# Patient Record
Sex: MALE | Race: WHITE | NOT HISPANIC OR LATINO
[De-identification: names, ages, dates, MRNs, and addresses within clinical notes are randomized per-mention and may not be internally consistent; named-entity substitution may affect disease eponyms.]

---

## 2017-04-03 ENCOUNTER — APPOINTMENT (OUTPATIENT)
Dept: FAMILY MEDICINE | Facility: CLINIC | Age: 52
End: 2017-04-03

## 2020-03-28 ENCOUNTER — EMERGENCY (EMERGENCY)
Facility: HOSPITAL | Age: 55
LOS: 1 days | End: 2020-03-28
Admitting: EMERGENCY MEDICINE
Payer: OTHER MISCELLANEOUS

## 2020-03-28 PROCEDURE — 99283 EMERGENCY DEPT VISIT LOW MDM: CPT

## 2020-03-28 PROCEDURE — 99053 MED SERV 10PM-8AM 24 HR FAC: CPT

## 2022-12-02 ENCOUNTER — APPOINTMENT (OUTPATIENT)
Dept: FAMILY MEDICINE | Facility: CLINIC | Age: 57
End: 2022-12-02

## 2023-10-12 ENCOUNTER — INPATIENT (INPATIENT)
Facility: HOSPITAL | Age: 58
LOS: 14 days | Discharge: NURSING, REHAB OR LONG-TERM CARE | DRG: 25 | End: 2023-10-27
Attending: INTERNAL MEDICINE | Admitting: PSYCHIATRY & NEUROLOGY
Payer: MEDICAID

## 2023-10-12 ENCOUNTER — APPOINTMENT (OUTPATIENT)
Dept: NEUROLOGY | Facility: HOSPITAL | Age: 58
End: 2023-10-12

## 2023-10-12 VITALS
HEART RATE: 84 BPM | OXYGEN SATURATION: 98 % | SYSTOLIC BLOOD PRESSURE: 142 MMHG | RESPIRATION RATE: 24 BRPM | DIASTOLIC BLOOD PRESSURE: 91 MMHG

## 2023-10-12 DIAGNOSIS — I63.9 CEREBRAL INFARCTION, UNSPECIFIED: ICD-10-CM

## 2023-10-12 DIAGNOSIS — Z98.890 OTHER SPECIFIED POSTPROCEDURAL STATES: Chronic | ICD-10-CM

## 2023-10-12 PROCEDURE — 36224 PLACE CATH CAROTD ART: CPT | Mod: 59,RT

## 2023-10-12 PROCEDURE — 61645 PERQ ART M-THROMBECT &/NFS: CPT | Mod: LT

## 2023-10-12 PROCEDURE — 70460 CT HEAD/BRAIN W/DYE: CPT | Mod: 26

## 2023-10-12 PROCEDURE — 76377 3D RENDER W/INTRP POSTPROCES: CPT | Mod: 26

## 2023-10-12 RX ORDER — INFLUENZA VIRUS VACCINE 15; 15; 15; 15 UG/.5ML; UG/.5ML; UG/.5ML; UG/.5ML
0.5 SUSPENSION INTRAMUSCULAR ONCE
Refills: 0 | Status: DISCONTINUED | OUTPATIENT
Start: 2023-10-12 | End: 2023-10-27

## 2023-10-12 RX ORDER — SODIUM CHLORIDE 9 MG/ML
1000 INJECTION INTRAMUSCULAR; INTRAVENOUS; SUBCUTANEOUS
Refills: 0 | Status: DISCONTINUED | OUTPATIENT
Start: 2023-10-12 | End: 2023-10-14

## 2023-10-12 RX ORDER — CHLORHEXIDINE GLUCONATE 213 G/1000ML
1 SOLUTION TOPICAL DAILY
Refills: 0 | Status: DISCONTINUED | OUTPATIENT
Start: 2023-10-12 | End: 2023-10-14

## 2023-10-12 NOTE — CHART NOTE - NSCHARTNOTEFT_GEN_A_CORE
Interventional Neuro Radiology  Pre-Procedure Note     HPI:  58 year old male with hx of DM, hyperlipidemia, HTN, derrell dove, non adherence, stroke post thrombolysis by report at [facility]. Presents s/p syncopal event while at work 10/12/23 w/ vision loss and numbness, last known normal reported  but by report not entirely clear if patient was at baseline prior. CT head concerning for Cutoff of the M1 portion of the right middle cerebral artery possibly chronic        PAST MEDICAL & SURGICAL HISTORY:  DM  HTN  HLD  Stroke post thrombolysis by report   Derrell dove      Social History: pending     FAMILY HISTORY: pending     Allergies: NKDA, NKFA     Current Medications: reported non adherence     Labs:   see [facility]     Blood Bank: pending       Neuro Exam: Awake and alert, oriented x3, fluent, normal naming and repetition, follows 3 step commands. Extraocular movements intact, no nystagmus, visual fields full, face symmetric, tongue midline. No drift, 5/5 power x 4 extremities. Normal sensation to LT. Normal finger-to-nose and rapid alternating movements.    NIH SS:  DATE: 10/12/23  TIME:  1A: Level of consciousness (0-3):   1B: Questions (0-2):   1C: Commands (0-2):   2: Gaze (0-2):   3: Visual fields (0-3):   4: Facial palsy (0-3):   MOTOR:  5A: Left arm motor drift (0-4):   5B: Right arm motor drift (0-4):   6A: Left leg motor drift (0-4):   6B: Right leg motor drift (0-4):   7: Limb ataxia (0-2):   SENSORY:  8: Sensation (0-2):   SPEECH:  9: Language (0-3):   10: Dysarthria (0-2):   EXTINCTION:  11: Extinction/inattention (0-2):     TOTAL SCORE:     Assessment/Plan:   This is a 58y ____ hand dominant Male  presents with   Patient presents to neuro-IR for selective cerebral angiography/mechanical thrombectomy.   Procedure/ risks/ benefits/ goals/ alternatives were explained. Risks include but are not limited to stroke/ vessel injury/ hemorrhage/ groin/vessel hematoma. All questions answered and concerns addressed. Informed content obtained from patients sister Selma who verbalizes /expresses full understanding (wife  and daughter < 18). Consent placed in chart. Interventional Neuro Radiology  Pre-Procedure Note   Preliminary note, official note pending attending review/signature.     HPI:  58 year old male with hx of DM, hyperlipidemia, HTN, dove derrell, non adherence to meds , stroke () with no residual  post thrombolysis by report at [facility]. Presents s/p syncopal event while at work 10/12/23 w/ vision loss and numbness, last known normal reported  but by report not entirely clear if patient was at baseline prior. 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 Cut off of the left posterior cerebral artery at the P1-2 junction with mild distal reconstitution.         PAST MEDICAL & SURGICAL HISTORY:  DM  HTN  HLD  Stroke post thrombolysis by report   Derrell dove    Social History: pending     FAMILY HISTORY: pending     Allergies: NKDA, NKFA     Current Medications: reported non adherence     Labs:   see [facility] paperwork   Bun 15 Cr 1.1    Blood Bank: pending       Neuro Exam: Awake and alert, disoriented to person, time, able to ID obects, repeat and follow simple commands, EOMI, no facial palsy, RHHA, right pupil 4 mm reactive, left pupil 3 mm reactive, LUE/LLE 5/5, RUE/RLE moves well against gravity but with drift in arm and leg, right hemiataxia, no neglect , decreased sensation on right     NIH SS:  DATE: 10/12/23  TIME:   1A: Level of consciousness (0-3): 0  1B: Questions (0-2): 2   1C: Commands (0-2): 0  2: Gaze (0-2): 0  3: Visual fields (0-3): 2  4: Facial palsy (0-3): 0  MOTOR:  5A: Left arm motor drift (0-4): 0  5B: Right arm motor drift (0-4): 1  6A: Left leg motor drift (0-4): 0  6B: Right leg motor drift (0-4): 1  7: Limb ataxia (0-2): 2  SENSORY:  8: Sensation (0-2): 1  SPEECH:  9: Language (0-3): 1  10: Dysarthria (0-2): 0  EXTINCTION:  11: Extinction/inattention (0-2): 0    TOTAL SCORE: 10    Assessment/Plan:   This is a 58y Male  presents with right sided vision loss, right sided sensory changes, right hemiparesis,  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     Patient presents to neuro-IR for selective cerebral angiography/mechanical thrombectomy.   Procedure/ risks/ benefits/ goals/ alternatives were explained. Risks include but are not limited to stroke/ vessel injury/ hemorrhage/ groin/vessel- damage hematoma. All questions answered and concerns addressed. Informed content obtained from patients sister Selma who verbalizes /expresses full understanding (wife  and daughter < 18). Consent placed in chart. Interventional Neuro Radiology  Pre-Procedure Note   Preliminary note, official note pending attending review/signature.     HPI:  58 year old male with hx of DM, hyperlipidemia, HTN, derrell dove, non adherence to meds , stroke () with no residual  post thrombolysis by report at [facility]. Presents s/p syncopal event while at work 10/12/23 w/ vision loss and numbness, last known normal reported  but by report not entirely clear if patient was at baseline prior. 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 Cut off of the left posterior cerebral artery at the P1-2 junction with mild distal reconstitution.     Pre MRS 0  NIHSS admission 10     PAST MEDICAL & SURGICAL HISTORY:  DM  HTN  HLD  Stroke post thrombolysis by report   Derrell dove    Social History: pending     FAMILY HISTORY: pending     Allergies: NKDA, NKFA     Current Medications: reported non adherence     Labs:   see [facility] paperwork   Bun 15 Cr 1.1    Blood Bank: pending       Neuro Exam: Awake and alert, disoriented to person, time, able to ID obects, repeat and follow simple commands, EOMI, no facial palsy, RHHA, right pupil 4 mm reactive, left pupil 3 mm reactive, LUE/LLE 5/5, RUE/RLE moves well against gravity but with drift in arm and leg, right hemiataxia, no neglect , decreased sensation on right     NIH SS:  DATE: 10/12/23  TIME:   1A: Level of consciousness (0-3): 0  1B: Questions (0-2): 2   1C: Commands (0-2): 0  2: Gaze (0-2): 0  3: Visual fields (0-3): 2  4: Facial palsy (0-3): 0  MOTOR:  5A: Left arm motor drift (0-4): 0  5B: Right arm motor drift (0-4): 1  6A: Left leg motor drift (0-4): 0  6B: Right leg motor drift (0-4): 1  7: Limb ataxia (0-2): 2  SENSORY:  8: Sensation (0-2): 1  SPEECH:  9: Language (0-3): 1  10: Dysarthria (0-2): 0  EXTINCTION:  11: Extinction/inattention (0-2): 0    TOTAL SCORE: 10    Assessment/Plan:   This is a 58y Male  presents with right sided vision loss, right sided sensory changes, right hemiparesis,  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     Patient presents to neuro-IR for selective cerebral angiography/mechanical thrombectomy.   Procedure/ risks/ benefits/ goals/ alternatives were explained. Risks include but are not limited to stroke/ vessel injury/ hemorrhage/ groin/vessel- damage hematoma. All questions answered and concerns addressed. Informed content obtained from patients sister Selma who verbalizes /expresses full understanding (wife  and daughter < 18). Consent placed in chart. Interventional Neuro Radiology  Pre-Procedure Note     HPI:  58 year old male with hx of DM, hyperlipidemia, HTN, derrell dove, non adherence to meds , stroke () with no residual  post thrombolysis by report at [facility]. Presents s/p syncopal event while at work 10/12/23 w/ vision loss and numbness, last known normal reported  but by report not entirely clear if patient was at baseline prior. 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 Cut off of the left posterior cerebral artery at the P1-2 junction with mild distal reconstitution.     Pre MRS 0  NIHSS admission 10     PAST MEDICAL & SURGICAL HISTORY:  DM  HTN  HLD  Stroke post thrombolysis by report   Derrell dove    Social History: pending     FAMILY HISTORY: pending     Allergies: NKDA, NKFA     Current Medications: reported non adherence     Labs:   see [facility] paperwork   Bun 15 Cr 1.1    Blood Bank: pending       Neuro Exam: Awake and alert, disoriented to person, time, able to ID obects, repeat and follow simple commands, EOMI, no facial palsy, RHHA, right pupil 4 mm reactive, left pupil 3 mm reactive, LUE/LLE 5/5, RUE/RLE moves well against gravity but with drift in arm and leg, right hemiataxia, no neglect , decreased sensation on right     NIH SS:  DATE: 10/12/23  TIME:   1A: Level of consciousness (0-3): 0  1B: Questions (0-2): 2   1C: Commands (0-2): 0  2: Gaze (0-2): 0  3: Visual fields (0-3): 2  4: Facial palsy (0-3): 0  MOTOR:  5A: Left arm motor drift (0-4): 0  5B: Right arm motor drift (0-4): 1  6A: Left leg motor drift (0-4): 0  6B: Right leg motor drift (0-4): 1  7: Limb ataxia (0-2): 2  SENSORY:  8: Sensation (0-2): 1  SPEECH:  9: Language (0-3): 1  10: Dysarthria (0-2): 0  EXTINCTION:  11: Extinction/inattention (0-2): 0    TOTAL SCORE: 10    Assessment/Plan:   This is a 58y Male  presents with right sided vision loss, right sided sensory changes, right hemiparesis,  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     Patient presents to neuro-IR for selective cerebral angiography/mechanical thrombectomy.   Procedure/ risks/ benefits/ goals/ alternatives were explained. Risks include but are not limited to stroke/ vessel injury/ hemorrhage/ groin/vessel- damage hematoma. All questions answered and concerns addressed. Informed content obtained from patients sister Selma who verbalizes /expresses full understanding (wife  and daughter < 18). Consent placed in chart.

## 2023-10-12 NOTE — H&P ADULT - NSHPPHYSICALEXAM_GEN_ALL_CORE
PHYSICAL EXAM:    General: No Acute Distress     Neurological: Awake, alert & oriented to person, place , Following some simple Commands, PERRL, EOMI, Visual fields intact, Face Symmetric, Mild to moderate dysarthria, Moving all extremities Right homonymous hemianopsia.  .  Muscle Strength:RUE drift and RLE drift. Decreased sesantion to light touch on   the left face, arm, and leg.     Sensation:Decreased sesantion to light touch on  the left face, arm, and leg.   Cerebellar: There is no dysmetria on finger to nose testing.    Gait : deferred    Pulmonary: non labored breathing, no use of accessory muscles    Cardiovascular:  Regular Rate and Rhythm     Gastrointestinal: Soft, Nontender, Nondistended

## 2023-10-12 NOTE — CHART NOTE - NSCHARTNOTEFT_GEN_A_CORE
Interventional Neuro- Radiology   Procedure Note     Procedure: Selective Cerebral Angiography   Pre- Procedure Diagnosis: left PCA occlusion, possible right PCA , MCA  Post- Procedure Diagnosis:    : Dr. Kp MENDEZ    First Assist: Dr. Alf Esteves     RN: Tessa    Anesthesia:   (general anesthesia) Dr. Solorio     Sheath: 7 Fr Slender left radial     I/Os:  Fluids:  Crespo:  Contrast:  Estimated Blood Loss:    Antibiotics:    Vitals:    Preliminary Report:  Under MAC/ general anesthesia, using a ___Fr short/long sheath to the right/ left/ bilateral groin examination of left vertebral artery/ left common carotid artery/ left external carotid artey/ right vertebral artery/ right common carotid artery/ right external carotid artery via selective cerebral angiography demonstrates ________. ( Official note to follow).    Patient tolerated procedure well, vital signs as noted above,  no change in neurological status noted.  Results discussed with   Groin sheath d/c'ed at , manual compression held to hemostasis, no active bleeding, no hematoma, soft throughout, + pedal pulses, angio-seal device applied, quick clot and safeguard balloon dressing applied at _____h.     Patient transferred to ICU. Interventional Neuro- Radiology   Procedure Note   Preliminary report, official note pending attending review/signature.  Procedure: Selective Cerebral Angiography   Pre- Procedure Diagnosis: left PCA occlusion, possible right PCA , MCA  Post- Procedure Diagnosis: left pca occlusion s/p thrombectomy TICI 3 recanalization     : Dr. Kp MENDEZ    First Assist: Dr. Alf Esteves     RN: Iva Zarate     Anesthesia:   (general anesthesia) Dr. Solorio     Sheath: 7 Fr Slender left radial     I/Os:  Fluids: 500cc   Crespo: due to void   Contrast: 41cc   Estimated Blood Loss: < 50cc     Vitals: 144/76 HR 96 2 95% RR 24    Preliminary Report:  Under   general anesthesia, using a 7 Fr slender sheath to left radial artery  examination  via selective cerebral angiography demonstrates left PCA occlusion now s/p aspiration thrombectomy with tici 3 recanalization, right occipital lobe fills from collateral from right SHANI, right MCA likely chronic , DIEGO Ct no acute findings appreciated ( Official note to follow).    Patient tolerated procedure well, vital signs as noted above, extubation in progress, patient awake, no acute neurological changes appreciated.  Results discussed with ICU  left radial sheath d/c'ed , manual compression held to hemostasis, no active bleeding, no hematoma, soft throughout, + pedal pulses, angio-seal device applied, quick clot and safeguard balloon dressing applied at 2245  SBP goal 120-160mmHg   Orders per sunrise, radial band deflation as noted due to heparin administration.   Patient transferred to ICU, sister. Interventional Neuro- Radiology   Procedure Note   Preliminary report, official note pending attending review/signature.  Procedure: Selective Cerebral Angiography   Pre- Procedure Diagnosis: left PCA occlusion, possible right PCA , MCA  Post- Procedure Diagnosis: left pca occlusion s/p thrombectomy TICI 3 recanalization     : Dr. Kp MENDEZ    First Assist: Dr. Alf Esteves     RN: Iva Zarate     Anesthesia:   (general anesthesia) Dr. Solorio     Medications:   2000Units Heparin  200mcg Nitroglycerin   Verapamil 2.5mg   IVP into left radial sheath     Sheath: 7 Fr Slender left radial     I/Os:  Fluids: 500cc   Crespo: due to void   Contrast: 41cc   Estimated Blood Loss: < 50cc     Vitals: 144/76 HR 96 2 95% RR 24    Preliminary Report:  Under   general anesthesia, using a 7 Fr slender sheath to left radial artery  examination  via selective cerebral angiography demonstrates left PCA occlusion now s/p aspiration thrombectomy with tici 3 recanalization, right occipital lobe fills from collateral from right SHANI, right MCA likely chronic , DIEGO Ct no acute findings appreciated ( Official note to follow).    Patient tolerated procedure well, vital signs as noted above, extubation in progress, patient awake, no acute neurological changes appreciated.  Results discussed with ICU  left radial sheath d/c'ed , manual compression held to hemostasis, no active bleeding, no hematoma, soft throughout, + pedal pulses, angio-seal device applied,  radial band applied at 2245  SBP goal 120-160mmHg   Orders per sunrise, radial band deflation as noted in orders  (had  heparin administration).   Patient transferred to ICU, sister. Interventional Neuro- Radiology   Procedure Note     Procedure: Selective Cerebral Angiography   Pre- Procedure Diagnosis: left PCA occlusion, possible right PCA , MCA  Post- Procedure Diagnosis: left pca occlusion s/p thrombectomy TICI 3 recanalization     : Dr. Kp MENDEZ    First Assist: Dr. Alf Esteves     RN: Iva Zarate     Anesthesia:   (general anesthesia) Dr. Solorio     Medications:   2000Units Heparin  200mcg Nitroglycerin   Verapamil 2.5mg   IVP into left radial sheath     Sheath: 7 Fr Slender left radial     I/Os:  Fluids: 500cc   Crespo: due to void   Contrast: 41cc   Estimated Blood Loss: < 50cc     Vitals: 144/76 HR 96 2 95% RR 24    Preliminary Report:  Under   general anesthesia, using a 7 Fr slender sheath to left radial artery  examination  via selective cerebral angiography demonstrates left PCA occlusion now s/p aspiration thrombectomy with tici 3 recanalization, right occipital lobe fills from collateral from right SHANI, right MCA likely chronic , DIEGO Ct no acute findings appreciated ( Official note to follow).    Patient tolerated procedure well, vital signs as noted above, extubation in progress, patient awake, no acute neurological changes appreciated.  Results discussed with ICU  left radial sheath d/c'ed , manual compression held to hemostasis, no active bleeding, no hematoma, soft throughout, + pedal pulses, angio-seal device applied,  radial band applied at 2245  SBP goal 120-160mmHg   Orders per sunrise, radial band deflation as noted in orders  (had  heparin administration).   Patient transferred to ICU, sister.

## 2023-10-12 NOTE — H&P ADULT - HISTORY OF PRESENT ILLNESS
Patient is a 59 y/o M with a PMHx of stroke one year ago (  at the time patient recieved TNK and had full  resolution of symptoms), HTN, DM, non-compliant with medications, who presented following an episode of LOC.   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 At around 6:15 pm patient had a sudden fall at work and lost conciousness. It is unclear if patient had any symptoms before he fell. NIHSS 10 on admission . Patient is not a current TNK candidate  due to unclear exact last known well. TRF to [facility] for thrombectomy     National Institutes of Health (NIH) Stroke Scale  .....................................................................................................  1a. Assess Level of Consciousness (Alert=0, Coma=3)  Alert (0 points)  1b. Assess Orientation: Month, Age (1 point per bad answer)  Answers one question correctly (1 point)  1c. Follow Commands: Open and close eyes, make fist and release (1   point per command NOT obeyed  )  Performs one task correctly (1 point)  2. Follow my finger (Normal=0, Forced deviation=2)  Normal (0 points)  3. Visual field   (Normal=0, hemianopia=2, bilateral loss=3)  Complete hemianopia (2 points)  4. Facial palsy: Show teeth, Raise eyebrows, Squeeze eyes shut (Normal=0, Complete=3)  Normal (0 points)  5a. Motor Strength Left Arm  : Elevate to 90 degrees  No drift (0 points)  5b. Motor Strength Right Arm  : Elevate to 90 degrees  Some effort against gravity   (2 points)  6a. Motor Strength Left Leg: Elevate to 30 degrees  No drift (0 points)  6b. Motor Strength Right Leg: Elevate to 30 degrees  Drift (1 point)  7. Coordination or limb ataxia: Finger-nose-finger, Heel-knee-shin (Absent=0, both limbs=2)  Absent (0 points)  8. Sensory: Pin prick to face, arm, trunk, and legs, Compare sides (Normal=0, Severe loss=2)  Mild to moderate loss (1 point)  9. Language: Name items, Describe picture, Read sentences ((No Aphasia=0, Mute=3)  Mild to moderate aphasa (1 point)   10.10. Dysarthria: Speech clarity while reading word list (Normal=0, Nearly unintelligible=2)  Mild to moderate dysarthria (1 point)  11Extinction and Inattention: Formerly called 'Neglect' (None=0, Complete=2)  No abnormality (0 points)

## 2023-10-12 NOTE — H&P ADULT - NSHPLABSRESULTS_GEN_ALL_CORE
Imaging   HEAD CT: No evidence of an acute intracranial hemorhage, midline shift or  hydrocephalus. Mild bifrontal periventricular white matter ischemic  changes. Bilateral maxillary sinusitis  NECK CTA  : No hemodynamic significant narowing involving the carotid  bifurcations. Hypoplastic right vertebral artery. Dominant left vertebral  artery..  BRAIN CTA  : Cutoff of the M  1 portion of the right middle cerebral artery  which may be chronic as there appears to be lenticular striate  collaterals and reconstitution of more distal MCA segments  . Cutoff of the  right posterior cerebral artery at the P1-2 junction with reconstitution  of more distal segments.   Cut off of the left posterior cerebral artery at  the P1-2 junction with mild distal reconstitution. Hypoplastic right  vertebral artery with portions of the V4 segment not visualized  CT PERFUSION  : Regions of ischemia within the posterior circulation with a  volume of 121   mL without evidence of core infarction

## 2023-10-12 NOTE — PATIENT PROFILE ADULT - FALL HARM RISK - HARM RISK INTERVENTIONS

## 2023-10-12 NOTE — H&P ADULT - ASSESSMENT
59 y/o M with a PMHx of stroke one year ago (at the time patient recieved TNK and had   full resolution of symptoms), HTN, DM, non-compliant with medications, who presented.presented following an episode of LOC. 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 Cut off of the left posterior cerebral artery at the P1-2 junction with mild distal reconstitution. Patient is not a current TNK candidate  due to unclear exact last known well. TRF to [facility] for thrombectomy       PLAN:  Neuro:  -Neurochecks per angio protocol  - Angio access: L radial. May deflate pressure per angio protocol. (d/c 3 cc of air x 15 minutes.)   - Tenecteplase  not given   - CT head in am   - Stat CT head if neurological decline.   - MRI non contrast.   CV:  - SBP Goal 120-160  - Stroke core measure   - TTE   - LED   Pulm:  -extubated on  RA, satting well.   GI:  - NPO, pending dysphagia   Gu:  - voiding  - I&O Q1 hour  - NS@75 while NPO   - Monitor Electrolytes & Renal Function  Heme:  - Monitor H&H  - Chemical DVT prophylaxis: * Chemical DVT prophylaxis is contraindicated due to risk of bleeding  - Mechanical DVT Prophylaxis: Maintain B/L LE sequential compression devices  ID:  - Monitor WBC and Temperature	  Endo  - Monitor BGL, maintain <180  - Pending A1C, TSH

## 2023-10-12 NOTE — H&P ADULT - NSHPSOCIALHISTORY_GEN_ALL_CORE
R occipital lobe from SHANI branch through collateral, MCA occlusion patient admits to drinking 4 beers /day. Denies smoking. denies illicit drug use.Resides  with daughter currently.

## 2023-10-13 ENCOUNTER — TRANSCRIPTION ENCOUNTER (OUTPATIENT)
Age: 58
End: 2023-10-13

## 2023-10-13 LAB
A1C WITH ESTIMATED AVERAGE GLUCOSE RESULT: 8.6 % — HIGH (ref 4–5.6)
ANION GAP SERPL CALC-SCNC: 12 MMOL/L — SIGNIFICANT CHANGE UP (ref 5–17)
BUN SERPL-MCNC: 11 MG/DL — SIGNIFICANT CHANGE UP (ref 8–20)
CALCIUM SERPL-MCNC: 8.4 MG/DL — SIGNIFICANT CHANGE UP (ref 8.4–10.5)
CHLORIDE SERPL-SCNC: 100 MMOL/L — SIGNIFICANT CHANGE UP (ref 96–108)
CHOLEST SERPL-MCNC: 199 MG/DL — SIGNIFICANT CHANGE UP
CO2 SERPL-SCNC: 24 MMOL/L — SIGNIFICANT CHANGE UP (ref 22–29)
CREAT SERPL-MCNC: 0.9 MG/DL — SIGNIFICANT CHANGE UP (ref 0.5–1.3)
EGFR: 99 ML/MIN/1.73M2 — SIGNIFICANT CHANGE UP
ESTIMATED AVERAGE GLUCOSE: 200 MG/DL — HIGH (ref 68–114)
GLUCOSE BLDC GLUCOMTR-MCNC: 145 MG/DL — HIGH (ref 70–99)
GLUCOSE BLDC GLUCOMTR-MCNC: 204 MG/DL — HIGH (ref 70–99)
GLUCOSE BLDC GLUCOMTR-MCNC: 259 MG/DL — HIGH (ref 70–99)
GLUCOSE SERPL-MCNC: 200 MG/DL — HIGH (ref 70–99)
HCT VFR BLD CALC: 34.9 % — LOW (ref 39–50)
HCV AB S/CO SERPL IA: 0.08 S/CO — SIGNIFICANT CHANGE UP (ref 0–0.99)
HCV AB SERPL-IMP: SIGNIFICANT CHANGE UP
HDLC SERPL-MCNC: 29 MG/DL — LOW
HGB BLD-MCNC: 12.6 G/DL — LOW (ref 13–17)
LIPID PNL WITH DIRECT LDL SERPL: 115 MG/DL — HIGH
MCHC RBC-ENTMCNC: 32.6 PG — SIGNIFICANT CHANGE UP (ref 27–34)
MCHC RBC-ENTMCNC: 36.1 GM/DL — HIGH (ref 32–36)
MCV RBC AUTO: 90.4 FL — SIGNIFICANT CHANGE UP (ref 80–100)
MRSA PCR RESULT.: SIGNIFICANT CHANGE UP
NON HDL CHOLESTEROL: 170 MG/DL — HIGH
PLATELET # BLD AUTO: 115 K/UL — LOW (ref 150–400)
POTASSIUM SERPL-MCNC: 4.1 MMOL/L — SIGNIFICANT CHANGE UP (ref 3.5–5.3)
POTASSIUM SERPL-SCNC: 4.1 MMOL/L — SIGNIFICANT CHANGE UP (ref 3.5–5.3)
RBC # BLD: 3.86 M/UL — LOW (ref 4.2–5.8)
RBC # FLD: 13.5 % — SIGNIFICANT CHANGE UP (ref 10.3–14.5)
S AUREUS DNA NOSE QL NAA+PROBE: SIGNIFICANT CHANGE UP
SODIUM SERPL-SCNC: 136 MMOL/L — SIGNIFICANT CHANGE UP (ref 135–145)
TRIGL SERPL-MCNC: 277 MG/DL — HIGH
TSH SERPL-MCNC: 5.36 UIU/ML — HIGH (ref 0.27–4.2)
WBC # BLD: 6.01 K/UL — SIGNIFICANT CHANGE UP (ref 3.8–10.5)
WBC # FLD AUTO: 6.01 K/UL — SIGNIFICANT CHANGE UP (ref 3.8–10.5)

## 2023-10-13 PROCEDURE — 93306 TTE W/DOPPLER COMPLETE: CPT | Mod: 26

## 2023-10-13 PROCEDURE — 99291 CRITICAL CARE FIRST HOUR: CPT

## 2023-10-13 PROCEDURE — 93010 ELECTROCARDIOGRAM REPORT: CPT

## 2023-10-13 PROCEDURE — 70544 MR ANGIOGRAPHY HEAD W/O DYE: CPT | Mod: 26,59

## 2023-10-13 PROCEDURE — 70551 MRI BRAIN STEM W/O DYE: CPT | Mod: 26

## 2023-10-13 PROCEDURE — 99223 1ST HOSP IP/OBS HIGH 75: CPT

## 2023-10-13 PROCEDURE — 70450 CT HEAD/BRAIN W/O DYE: CPT | Mod: 26

## 2023-10-13 PROCEDURE — 93970 EXTREMITY STUDY: CPT | Mod: 26

## 2023-10-13 RX ORDER — ENOXAPARIN SODIUM 100 MG/ML
40 INJECTION SUBCUTANEOUS
Refills: 0 | Status: DISCONTINUED | OUTPATIENT
Start: 2023-10-13 | End: 2023-10-14

## 2023-10-13 RX ORDER — SODIUM CHLORIDE 9 MG/ML
1000 INJECTION, SOLUTION INTRAVENOUS
Refills: 0 | Status: DISCONTINUED | OUTPATIENT
Start: 2023-10-13 | End: 2023-10-15

## 2023-10-13 RX ORDER — FAMOTIDINE 10 MG/ML
20 INJECTION INTRAVENOUS DAILY
Refills: 0 | Status: DISCONTINUED | OUTPATIENT
Start: 2023-10-13 | End: 2023-10-27

## 2023-10-13 RX ORDER — ACETAMINOPHEN 500 MG
650 TABLET ORAL EVERY 6 HOURS
Refills: 0 | Status: DISCONTINUED | OUTPATIENT
Start: 2023-10-13 | End: 2023-10-27

## 2023-10-13 RX ORDER — DEXTROSE 50 % IN WATER 50 %
12.5 SYRINGE (ML) INTRAVENOUS ONCE
Refills: 0 | Status: DISCONTINUED | OUTPATIENT
Start: 2023-10-13 | End: 2023-10-27

## 2023-10-13 RX ORDER — SENNA PLUS 8.6 MG/1
2 TABLET ORAL AT BEDTIME
Refills: 0 | Status: DISCONTINUED | OUTPATIENT
Start: 2023-10-13 | End: 2023-10-15

## 2023-10-13 RX ORDER — INSULIN LISPRO 100/ML
VIAL (ML) SUBCUTANEOUS
Refills: 0 | Status: DISCONTINUED | OUTPATIENT
Start: 2023-10-13 | End: 2023-10-27

## 2023-10-13 RX ORDER — THIAMINE MONONITRATE (VIT B1) 100 MG
500 TABLET ORAL EVERY 8 HOURS
Refills: 0 | Status: COMPLETED | OUTPATIENT
Start: 2023-10-13 | End: 2023-10-16

## 2023-10-13 RX ORDER — LABETALOL HCL 100 MG
10 TABLET ORAL EVERY 4 HOURS
Refills: 0 | Status: DISCONTINUED | OUTPATIENT
Start: 2023-10-13 | End: 2023-10-14

## 2023-10-13 RX ORDER — HYDRALAZINE HCL 50 MG
10 TABLET ORAL EVERY 4 HOURS
Refills: 0 | Status: DISCONTINUED | OUTPATIENT
Start: 2023-10-13 | End: 2023-10-25

## 2023-10-13 RX ORDER — DEXTROSE 50 % IN WATER 50 %
25 SYRINGE (ML) INTRAVENOUS ONCE
Refills: 0 | Status: DISCONTINUED | OUTPATIENT
Start: 2023-10-13 | End: 2023-10-27

## 2023-10-13 RX ORDER — FOLIC ACID 0.8 MG
1 TABLET ORAL DAILY
Refills: 0 | Status: DISCONTINUED | OUTPATIENT
Start: 2023-10-13 | End: 2023-10-27

## 2023-10-13 RX ORDER — GLUCAGON INJECTION, SOLUTION 0.5 MG/.1ML
1 INJECTION, SOLUTION SUBCUTANEOUS ONCE
Refills: 0 | Status: DISCONTINUED | OUTPATIENT
Start: 2023-10-13 | End: 2023-10-14

## 2023-10-13 RX ORDER — ALPRAZOLAM 0.25 MG
0.5 TABLET ORAL ONCE
Refills: 0 | Status: DISCONTINUED | OUTPATIENT
Start: 2023-10-13 | End: 2023-10-13

## 2023-10-13 RX ORDER — ATORVASTATIN CALCIUM 80 MG/1
80 TABLET, FILM COATED ORAL AT BEDTIME
Refills: 0 | Status: DISCONTINUED | OUTPATIENT
Start: 2023-10-13 | End: 2023-10-27

## 2023-10-13 RX ORDER — ASPIRIN/CALCIUM CARB/MAGNESIUM 324 MG
81 TABLET ORAL DAILY
Refills: 0 | Status: DISCONTINUED | OUTPATIENT
Start: 2023-10-13 | End: 2023-10-14

## 2023-10-13 RX ADMIN — SODIUM CHLORIDE 75 MILLILITER(S): 9 INJECTION INTRAMUSCULAR; INTRAVENOUS; SUBCUTANEOUS at 13:03

## 2023-10-13 RX ADMIN — ATORVASTATIN CALCIUM 80 MILLIGRAM(S): 80 TABLET, FILM COATED ORAL at 22:57

## 2023-10-13 RX ADMIN — ENOXAPARIN SODIUM 40 MILLIGRAM(S): 100 INJECTION SUBCUTANEOUS at 17:03

## 2023-10-13 RX ADMIN — Medication 1 TABLET(S): at 11:22

## 2023-10-13 RX ADMIN — Medication 1 MILLIGRAM(S): at 11:21

## 2023-10-13 RX ADMIN — Medication 105 MILLIGRAM(S): at 15:24

## 2023-10-13 RX ADMIN — FAMOTIDINE 20 MILLIGRAM(S): 10 INJECTION INTRAVENOUS at 17:03

## 2023-10-13 RX ADMIN — Medication 6: at 11:20

## 2023-10-13 RX ADMIN — Medication 10 MILLIGRAM(S): at 03:54

## 2023-10-13 RX ADMIN — SENNA PLUS 2 TABLET(S): 8.6 TABLET ORAL at 22:57

## 2023-10-13 RX ADMIN — Medication 0.5 MILLIGRAM(S): at 10:26

## 2023-10-13 RX ADMIN — Medication 4: at 17:03

## 2023-10-13 RX ADMIN — Medication 81 MILLIGRAM(S): at 17:03

## 2023-10-13 RX ADMIN — Medication 105 MILLIGRAM(S): at 22:57

## 2023-10-13 NOTE — DISCHARGE NOTE NURSING/CASE MANAGEMENT/SOCIAL WORK - PATIENT PORTAL LINK FT
You can access the FollowMyHealth Patient Portal offered by [facility] by registering at the following website: [email or web address]. By joining [facility]’s FollowMyHealth portal, you will also be able to view your health information using other applications (apps) compatible with our system.

## 2023-10-13 NOTE — SBIRT NOTE ADULT - NSSBIRTALCPOSREINDET_GEN_A_CORE
Patient reported he drinks 3 vodka drinks per week. Patient reported his alcohol use is not an issue. SW offered alcohol use resources, patient declined.

## 2023-10-13 NOTE — PHARMACOTHERAPY INTERVENTION NOTE - COMMENTS
Spoke with patient at bedside to confirm pharmacy records. Patient has not been compliant with medications/has not taken them in "a while" - this is consistent with [facility] records showing nothing filled since 1/2023

## 2023-10-13 NOTE — DISCHARGE NOTE NURSING/CASE MANAGEMENT/SOCIAL WORK - NSDCPEFALRISK_GEN_ALL_CORE
For information on Fall & Injury Prevention, visit: [email or web address] OR  [email or web address] OR  https://www.cdc.gov/steadi/patient.html

## 2023-10-13 NOTE — PROGRESS NOTE ADULT - SUBJECTIVE AND OBJECTIVE BOX
NSICU ATTENDING PROGRESS NOTE    Historical Review:   58M with a PMHx of stroke 2022 (at the time patient recieved TNK and had full resolution of symptoms), HTN, DM, non-compliant with medications, who presented following an episode of LOC.   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 At around 6:15 pm patient had a sudden fall at work and lost conciousness. It is unclear if patient had any symptoms before he fell. NIHSS 10 on admission . Patient is not a current TNK candidate  due to unclear exact last known well. TRF to [facility] for thrombectomy     24hr EVENTS:  - s/p DSA, found L PCA occlusion s/p aspiration with TICI 3 racanalization     ROS: reports R facial numbness, minimal R leg numbness, no CP/SOB, no heartburn, no n/v, no dysuria    ----------------------------------------------------------------------------------------------------  PHYSICAL EXAM:  General: sitting up in bed, comfortable, pleasant and conversant  HEENT: MMM  Neuro:  -Mental status- O to self, hospital (not which one) and year (not month), eyes open, following all commands  -CN- PERRL 3mm, EOMI, R upper quadrantonopia b/l, tongue midline, face symmetric  -Motor- 5/5 throughout  -Sensory - decreased sensation to L leg and L face (V2 distribution), no extinction to DSS  -Cerebellar - no tiffany dysmetria    CV: irregular rhythm and variable rate  Pulm: no accessory muscle use, breathing comfortably on RA  Abd: soft, nontender, nondistended  Skin: warm, dry    -----------------------------------------------------------------------------------------------------  ICU Vital Signs Last 24 Hrs  T(C): 36.9 (13 Oct 2023 07:35), Max: 36.9 (13 Oct 2023 07:35)  T(F): 98.4 (13 Oct 2023 07:35), Max: 98.4 (13 Oct 2023 07:35)  HR: 72 (13 Oct 2023 12:45) (62 - 84)  BP: 151/80 (13 Oct 2023 12:45) (112/91 - 168/110)  BP(mean): 101 (13 Oct 2023 12:45) (98 - 129)  ABP: --  ABP(mean): --  RR: 16 (13 Oct 2023 12:45) (10 - 24)  SpO2: 99% (13 Oct 2023 12:45) (94% - 100%)    O2 Parameters below as of 13 Oct 2023 12:00  Patient On (Oxygen Delivery Method): nasal cannula  O2 Flow (L/min): 3          I&O's Summary    12 Oct 2023 07:01  -  13 Oct 2023 07:00  --------------------------------------------------------  IN: 225 mL / OUT: 1725 mL / NET: -1500 mL    13 Oct 2023 07:01  -  13 Oct 2023 13:30  --------------------------------------------------------  IN: 387 mL / OUT: 0 mL / NET: 387 mL        MEDICATIONS  (STANDING):  atorvastatin 80 milliGRAM(s) Oral at bedtime  chlorhexidine 2% Cloths 1 Application(s) Topical daily  dextrose 5%. 1000 milliLiter(s) (50 mL/Hr) IV Continuous <Continuous>  dextrose 5%. 1000 milliLiter(s) (100 mL/Hr) IV Continuous <Continuous>  dextrose 50% Injectable 25 Gram(s) IV Push once  dextrose 50% Injectable 12.5 Gram(s) IV Push once  dextrose 50% Injectable 25 Gram(s) IV Push once  folic acid 1 milliGRAM(s) Oral daily  glucagon  Injectable 1 milliGRAM(s) IntraMuscular once  influenza   Vaccine 0.5 milliLiter(s) IntraMuscular once  insulin lispro (ADMELOG) corrective regimen sliding scale   SubCutaneous Before meals and at bedtime  multivitamin 1 Tablet(s) Oral daily  senna 2 Tablet(s) Oral at bedtime  sodium chloride 0.9%. 1000 milliLiter(s) (75 mL/Hr) IV Continuous <Continuous>  thiamine IVPB 500 milliGRAM(s) IV Intermittent every 8 hours      IMAGING:   MRI - L temporo-occipital and small L occipital acute infarcts    LAB RESULTS:                          12.6   6.01  )-----------( 115      ( 13 Oct 2023 03:10 )             34.9     10-13    136  |  100  |  11.0  ----------------------------<  200<H>  4.1   |  24.0  |  0.90    Ca    8.4      13 Oct 2023 03:10      -----------------------------------------------------------------------------------------------------------------------------------------------------------------------------------

## 2023-10-13 NOTE — CONSULT NOTE ADULT - ASSESSMENT
INCOMPLETE- VISIT PENDING   ASSESSMENT: Patient is a 57 y/o M with a PMHx of stroke 2022 reported at [facility]  (at the time patient received thrombolysis and had full  resolution of symptoms), reported dove dove, HTN, DM, HLD nonadherent with medications, who presented following an episode of LOC while at work the evening of 10/12/23, there was reported right sided hemisensory loss and right homonomous hemianopia .  Reported unclear if definitively last known well was prior at 1815 therefor excluded from tenecteplase after CT head did not demonstrate acute infarction or hemorrhage.  Perfusion demonstrated regions of ischemia in the posterior circulation, CT angiogram head/neck showed right PCA P1-2 cutoff, possibly chronic right MCA M1 cutoff which had distal reconstitution and established collaterals, and left PCA occlusion which patient was subsequently transferred for mechanical thrombectomy. Cerebral angiogram and mechanical thrombectomy performed using aspiration with TICI 3 recanalization of left PCA.  The right occipital lobe filled from right SHANI collaterals, and the right MCA as well was thought to be chronic given noted collateralization.        NEURO:   -Neurologically ---   -Continue close monitoring for neurologic deterioration    - Stroke neuro checks q _   - Permissive HTN or  SBP goal   -ANTITHROMBOTIC THERAPY:   -titrate statin to LDL goal less than 70-MRI Brain w/o, MRA Head w/o and Neck w/contrast  -Dysphagia screen: pass/fail   -Physical therapy/OT/Speech eval/treatment.       -CARDIOVASCULAR: check TTE, cardiac monitoring w/ telemetry for now, further evaluation pending findings of noted workup                              -HEMATOLOGY: H/H _, Platelets __, patient should have all age and risk appropriate malignancy screenings with PCP or sooner if clinically suspected      DVT ppx: Heparin s.c [] LMWH []     PULMONARY: CXR ___ , protecting airway, saturating well     RENAL: BUN/Cr _, monitor urine output, maintain adequate hydration       Na Goal:  135-145     Crespo:    ID: afebrile, no leukocytosis, monitor for si/sx of infection     OTHER:  condition and plan of care d/w patient, questions and concerns addressed.     DISPOSITION: Rehab or home depending on PT eval once stable and workup is complete      CORE MEASURES:        Admission NIHSS:10      Tenecteplase : [] YES [x] NO      LDL/HDL/A1C: 115/29/8.6     Depression Screen- if depression hx and/or present      Statin Therapy: as noted      Dysphagia Screen: [x] PASS [] FAIL     Smoking [] YES [x] NO      Afib [] YES [x] NO     Stroke Education [x] YES [] NO    Obtain screening lower extremity venous ultrasound in patients who meet 1 or more of the following criteria as patient is high risk for DVT/PE on admission:   [] History of DVT/PE  []Hypercoagulable states (Factor V Leiden, Cancer, OCP, etc. )  []Prolonged immobility (hemiplegia/hemiparesis/post operative or any other extended immobilization)  [] Transferred from outside facility (Rehab or Long term care)  [] Age </= to 50 INCOMPLETE- VISIT PENDING   ASSESSMENT: Patient is a 59 y/o M with a PMHx of stroke 2022 reported at [facility]  (at the time patient received thrombolysis and had full  resolution of symptoms), reported dove dove, HTN, DM, HLD nonadherent with medications, who presented following an episode of LOC while at work the evening of 10/12/23, there was reported right sided hemisensory loss and right homonomous hemianopia .  Reported unclear if definitively last known well was prior at 1815 therefor excluded from tenecteplase after CT head did not demonstrate acute infarction or hemorrhage.  Perfusion demonstrated regions of ischemia in the posterior circulation, CT angiogram head/neck showed right PCA P1-2 cutoff, possibly chronic right MCA M1 cutoff which had distal reconstitution and established collaterals, and left PCA occlusion which patient was subsequently transferred for mechanical thrombectomy. Cerebral angiogram and mechanical thrombectomy performed using aspiration with TICI 3 recanalization of left PCA.  The right occipital lobe filled from right SHANI collaterals, and the right MCA as well was thought to be chronic given noted collateralization. Etiology; embolic stroke of undetermined source.  While there is underlying dove dove can not entirely exclude cardioembolism/hypercoagualble state.        NEURO:   -Neurologically ---   -Continue close monitoring for neurologic deterioration    - Stroke neuro checks per post thrombectomy protocol  -CT Head follow up this am 10/13  -TTE  w/ bubble / contrast  -LE duplex  -Hypercoagulable panel   - SBP goal 120-160mmHg   -ANTITHROMBOTIC THERAPY:   -titrate statin to LDL goal less than 70  -MRI Brain w/o  - MRA Head NOVA  -Dysphagia screen: pass/fail   -Physical therapy/OT/Speech eval/treatment.       -CARDIOVASCULAR: check TTE, cardiac monitoring w/ telemetry for now, further evaluation pending findings of noted workup                              -HEMATOLOGY: H/H _, Platelets __, patient should have all age and risk appropriate malignancy screenings with PCP or sooner if clinically suspected      DVT ppx: Heparin s.c [] LMWH []     PULMONARY: CXR ___ , protecting airway, saturating well     RENAL: BUN/Cr _, monitor urine output, maintain adequate hydration       Na Goal:  135-145     Crespo:    ID: afebrile, no leukocytosis, monitor for si/sx of infection     OTHER:  condition and plan of care d/w patient, questions and concerns addressed.     DISPOSITION: Rehab or home depending on PT eval once stable and workup is complete      CORE MEASURES:        Admission NIHSS:10      Tenecteplase : [] YES [x] NO      LDL/HDL/A1C: 115/29/8.6     Depression Screen- if depression hx and/or present      Statin Therapy: as noted      Dysphagia Screen: [x] PASS [] FAIL     Smoking [] YES [x] NO      Afib [] YES [x] NO     Stroke Education [x] YES [] NO    Obtain screening lower extremity venous ultrasound in patients who meet 1 or more of the following criteria as patient is high risk for DVT/PE on admission:   [] History of DVT/PE  []Hypercoagulable states (Factor V Leiden, Cancer, OCP, etc. )  []Prolonged immobility (hemiplegia/hemiparesis/post operative or any other extended immobilization)  [] Transferred from outside facility (Rehab or Long term care)  [] Age </= to 50 INCOMPLETE- VISIT PENDING   ASSESSMENT: Patient is a 59 y/o M with a PMHx of stroke 2022 reported at [facility]  (at the time patient received thrombolysis and had full  resolution of symptoms), reported dove dove, HTN, DM, HLD nonadherent with medications, who presented following an episode of LOC while at work the evening of 10/12/23, there was reported right sided hemisensory loss and right homonomous hemianopia .  Reported unclear if definitively last known well was prior at 1815 therefor excluded from tenecteplase after CT head did not demonstrate acute infarction or hemorrhage.  Perfusion demonstrated regions of ischemia in the posterior circulation, CT angiogram head/neck showed right PCA P1-2 cutoff, possibly chronic right MCA M1 cutoff which had distal reconstitution and established collaterals, and left PCA occlusion which patient was subsequently transferred for mechanical thrombectomy. Cerebral angiogram and mechanical thrombectomy performed using aspiration with TICI 3 recanalization of left PCA.  The right occipital lobe filled from right SHANI collaterals, and the right MCA as well was thought to be chronic given noted collateralization. Etiology; embolic stroke of undetermined source.  While there is underlying dove dove can not entirely exclude cardioembolism/hypercoagualble state.        NEURO:   -Neurologically ---   -Continue close monitoring for neurologic deterioration    - Stroke neuro checks per post thrombectomy protocol  -CT Head follow up this am 10/13  -TTE  w/ bubble / contrast  -LE duplex  -Hypercoagulable panel   - SBP goal 120-160mmHg   -ANTITHROMBOTIC THERAPY:   -titrate statin to LDL goal less than 70  -MRI Brain w/o  - MRA Head NOVA  -Consideration in Outpatient NM spect w/w.out diamox  -Dysphagia screen: pass/fail   -Physical therapy/OT/Speech eval/treatment.       -CARDIOVASCULAR: check TTE, cardiac monitoring w/ telemetry for now, further evaluation pending findings of noted workup                              -HEMATOLOGY: H/H _, Platelets __, patient should have all age and risk appropriate malignancy screenings with PCP or sooner if clinically suspected      DVT ppx: Heparin s.c [] LMWH []     PULMONARY: CXR ___ , protecting airway, saturating well     RENAL: BUN/Cr _, monitor urine output, maintain adequate hydration       Na Goal:  135-145     Crespo:    ID: afebrile, no leukocytosis, monitor for si/sx of infection     OTHER:  condition and plan of care d/w patient, questions and concerns addressed.     DISPOSITION: Rehab or home depending on PT eval once stable and workup is complete      CORE MEASURES:        Admission NIHSS:10      Tenecteplase : [] YES [x] NO      LDL/HDL/A1C: 115/29/8.6     Depression Screen- if depression hx and/or present      Statin Therapy: as noted      Dysphagia Screen: [x] PASS [] FAIL     Smoking [] YES [x] NO      Afib [] YES [x] NO     Stroke Education [x] YES [] NO    Obtain screening lower extremity venous ultrasound in patients who meet 1 or more of the following criteria as patient is high risk for DVT/PE on admission:   [] History of DVT/PE  []Hypercoagulable states (Factor V Leiden, Cancer, OCP, etc. )  []Prolonged immobility (hemiplegia/hemiparesis/post operative or any other extended immobilization)  [] Transferred from outside facility (Rehab or Long term care)  [] Age </= to 50 INCOMPLETE- VISIT PENDING   ASSESSMENT: Patient is a 59 y/o M with a PMHx of stroke 2022 reported at [facility]  (at the time patient received thrombolysis and had full  resolution of symptoms), reported dove dove, HTN, DM, HLD nonadherent with medications, who presented following an episode of LOC while at work the evening of 10/12/23, there was reported right sided hemisensory loss and right homonomous hemianopia .  Reported unclear if definitively last known well was prior at 1815 therefor excluded from tenecteplase after CT head did not demonstrate acute infarction or hemorrhage.  Perfusion demonstrated regions of ischemia in the posterior circulation, CT angiogram head/neck showed right PCA P1-2 cutoff, possibly chronic right MCA M1 cutoff which had distal reconstitution and established collaterals, and left PCA occlusion which patient was subsequently transferred for mechanical thrombectomy. Cerebral angiogram and mechanical thrombectomy performed using aspiration with TICI 3 recanalization of left PCA.  The right occipital lobe filled from right SHANI collaterals, and the right MCA as well was thought to be chronic given noted collateralization. Etiology; embolic stroke of undetermined source.  While there is underlying dove dove can not entirely exclude cardioembolism/hypercoagualble state.        NEURO:   -Neurologically ---   -Continue close monitoring for neurologic deterioration    - Stroke neuro checks per post thrombectomy protocol  -CT Head follow up this am 10/13  -TTE  w/ bubble / contrast  -LE duplex  -Hypercoagulable panel   - SBP goal 120-160mmHg   -ANTITHROMBOTIC THERAPY:   -titrate statin to LDL goal less than 70  -MRI Brain w/o  - MRA Head NOVA  -Consideration in Outpatient NM spect w/w.out diamox  -Dysphagia screen: pass/fail   -Physical therapy/OT/Speech eval/treatment.       -CARDIOVASCULAR: check TTE, cardiac monitoring w/ telemetry for now, further evaluation pending findings of noted workup                              -HEMATOLOGY: H/H _, Platelets __, patient should have all age and risk appropriate malignancy screenings with PCP or sooner if clinically suspected      DVT ppx: Heparin s.c [] LMWH []     PULMONARY: CXR ___ , protecting airway, saturating well     RENAL: BUN/Cr _, monitor urine output, maintain adequate hydration       Na Goal:  135-145     Crespo:    ID: afebrile, no leukocytosis, monitor for si/sx of infection     OTHER:  condition and plan of care d/w patient, questions and concerns addressed.  CIWA     DISPOSITION: Rehab or home depending on PT eval once stable and workup is complete      CORE MEASURES:        Admission NIHSS:10      Tenecteplase : [] YES [x] NO      LDL/HDL/A1C: 115/29/8.6     Depression Screen- if depression hx and/or present      Statin Therapy: as noted      Dysphagia Screen: [x] PASS [] FAIL     Smoking [] YES [x] NO      Afib [] YES [x] NO     Stroke Education [x] YES [] NO    Obtain screening lower extremity venous ultrasound in patients who meet 1 or more of the following criteria as patient is high risk for DVT/PE on admission:   [] History of DVT/PE  []Hypercoagulable states (Factor V Leiden, Cancer, OCP, etc. )  []Prolonged immobility (hemiplegia/hemiparesis/post operative or any other extended immobilization)  [] Transferred from outside facility (Rehab or Long term care)  [] Age </= to 50 INCOMPLETE- VISIT PENDING   ASSESSMENT: Patient is a 57 y/o M with a PMHx of stroke 2022 reported at [facility]  (at the time patient received thrombolysis and had full  resolution of symptoms), reported dove dove, HTN, DM, HLD nonadherent with medications, who presented following an episode of LOC while at work the evening of 10/12/23, there was reported right sided hemisensory loss and right homonomous hemianopia .  Reported unclear if definitively last known well was prior at 1815 therefor excluded from tenecteplase after CT head did not demonstrate acute infarction or hemorrhage.  Perfusion demonstrated regions of ischemia in the posterior circulation, CT angiogram head/neck showed right PCA P1-2 cutoff, possibly chronic right MCA M1 cutoff which had distal reconstitution and established collaterals, and left PCA occlusion which patient was subsequently transferred for mechanical thrombectomy. Cerebral angiogram and mechanical thrombectomy performed using aspiration with TICI 3 recanalization of left PCA.  The right occipital lobe filled from right SHANI collaterals, and the right MCA as well was thought to be chronic given noted collateralization. MR with small to moderate left temporal occipital lobe infarctions. Occluded right MCA, occluded right PCA and right vertebral artery proximal segment. Etiology; embolic stroke of undetermined source.  While there is underlying dove dove can not entirely exclude cardioembolic / hypercoagulable state.     NEURO:   -Neurologically overall with improvement post thrombectomy and again today, appears with full visual fields at this time  -some memory impairment- suggest OT cognitive evaluation   -Continue close monitoring for neurologic deterioration    - Stroke neuro checks per post thrombectomy protocol  -TTE  w/ bubble / contrast  -LE duplex to r.o DVT   -Hypercoagulable panel   - SBP goal 120-160mmHg for now, further titration and recommendations pending noted workup   -ANTITHROMBOTIC THERAPY: ASA 81mg daily   -titrate statin to LDL goal less than 70  -MRI Brain w/o as noted   - MRA Head NOVA  -Consideration in Outpatient NM spect w/w.out diamox  -Dysphagia screen: pass  -Physical therapy/OT/Speech eval/treatment.     -CARDIOVASCULAR: check TTE, cardiac monitoring w/ telemetry for now, further evaluation pending findings of noted workup                              -HEMATOLOGY: H/H with anemia, Platelets 115, close monitoring for further thrombocytopenia, patient should have all age and risk appropriate malignancy screenings with PCP or sooner if clinically suspected      DVT ppx: no absolute neurological contraindication to pharmacological dvt ppx.     PULMONARY: CXR pending , protecting airway, saturating well     RENAL: BUN/Cr within range, monitor urine output, maintain adequate hydration       Na Goal:  135-145    ID: afebrile, no leukocytosis, monitor for si/sx of infection     OTHER:  condition and plan of care d/w patient, questions and concerns addressed.  CIWA protcol, monitor closely for si/sx of withdrawal.     DISPOSITION: Rehab or home depending on PT eval once stable and workup is complete      CORE MEASURES:        Admission NIHSS:10      Tenecteplase : [] YES [x] NO      LDL/HDL/A1C: 115/29/8.6     Depression Screen- if depression hx and/or present      Statin Therapy: as noted      Dysphagia Screen: [x] PASS [] FAIL     Smoking [] YES [x] NO      Afib [] YES [x] NO     Stroke Education [x] YES [] NO    Obtain screening lower extremity venous ultrasound in patients who meet 1 or more of the following criteria as patient is high risk for DVT/PE on admission:   [] History of DVT/PE  []Hypercoagulable states (Factor V Leiden, Cancer, OCP, etc. )  []Prolonged immobility (hemiplegia/hemiparesis/post operative or any other extended immobilization)  [] Transferred from outside facility (Rehab or Long term care)  [] Age </= to 50 ASSESSMENT: Patient is a 59 y/o M with a PMHx of stroke 2022 reported at [facility]  (at the time patient received thrombolysis and had full  resolution of symptoms), reported dove dove, HTN, DM, HLD nonadherent with medications, who presented following an episode of LOC while at work the evening of 10/12/23, there was reported right sided hemisensory loss and right homonomous hemianopia .  Reported unclear if definitively last known well was prior at 1815 therefor excluded from tenecteplase after CT head did not demonstrate acute infarction or hemorrhage.  Perfusion demonstrated regions of ischemia in the posterior circulation, CT angiogram head/neck showed right PCA P1-2 cutoff, possibly chronic right MCA M1 cutoff which had distal reconstitution and established collaterals, and left PCA occlusion which patient was subsequently transferred for mechanical thrombectomy. Cerebral angiogram and mechanical thrombectomy performed using aspiration with TICI 3 recanalization of left PCA.  The right occipital lobe filled from right SHANI collaterals, and the right MCA as well was thought to be chronic given noted collateralization. MR with small to moderate left temporal occipital lobe infarctions. Occluded right MCA, occluded right PCA and right vertebral artery proximal segment. Etiology; embolic stroke of undetermined source.  While there is underlying dove dove can not entirely exclude cardioembolic / hypercoagulable state.     NEURO:   -Neurologically overall with improvement post thrombectomy and again today, appears with full visual fields at this time  -some memory impairment- suggest OT cognitive evaluation   -Continue close monitoring for neurologic deterioration    - Stroke neuro checks per post thrombectomy protocol  -TTE  w/ bubble / contrast  -LE duplex to r.o DVT   -Hypercoagulable panel   - SBP goal 120-160mmHg for now, further titration and recommendations pending noted workup   -ANTITHROMBOTIC THERAPY: ASA 81mg daily   -titrate statin to LDL goal less than 70  -MRI Brain w/o as noted   - MRA Head NOVA  -Consideration in Outpatient NM spect w/w.out diamox  -Dysphagia screen: pass  -Physical therapy/OT/Speech eval/treatment.     -CARDIOVASCULAR: check TTE, cardiac monitoring w/ telemetry for now, further evaluation pending findings of noted workup                              -HEMATOLOGY: H/H with anemia, Platelets 115, close monitoring for further thrombocytopenia, patient should have all age and risk appropriate malignancy screenings with PCP or sooner if clinically suspected      DVT ppx: no absolute neurological contraindication to pharmacological dvt ppx.     PULMONARY: CXR pending , protecting airway, saturating well     RENAL: BUN/Cr within range, monitor urine output, maintain adequate hydration       Na Goal:  135-145    ID: afebrile, no leukocytosis, monitor for si/sx of infection     OTHER:  condition and plan of care d/w patient, questions and concerns addressed.  CIWA protcol, monitor closely for si/sx of withdrawal.     DISPOSITION: Rehab or home depending on PT eval once stable and workup is complete      CORE MEASURES:        Admission NIHSS:10      Tenecteplase : [] YES [x] NO      LDL/HDL/A1C: 115/29/8.6     Depression Screen- if depression hx and/or present      Statin Therapy: as noted      Dysphagia Screen: [x] PASS [] FAIL     Smoking [] YES [x] NO      Afib [] YES [x] NO     Stroke Education [x] YES [] NO    Obtain screening lower extremity venous ultrasound in patients who meet 1 or more of the following criteria as patient is high risk for DVT/PE on admission:   [] History of DVT/PE  []Hypercoagulable states (Factor V Leiden, Cancer, OCP, etc. )  []Prolonged immobility (hemiplegia/hemiparesis/post operative or any other extended immobilization)  [] Transferred from outside facility (Rehab or Long term care)  [] Age </= to 50

## 2023-10-13 NOTE — SBIRT NOTE ADULT - NSSBIRTDRGBRIEFINTDET_GEN_A_CORE
Patient denies current substance use. Patient reported history of substance use 6-7 years ago. Patient reported history of cocaine and heroine use. [facility]. SW offered substance abuse resources, patient declined.

## 2023-10-13 NOTE — CONSULT NOTE ADULT - SUBJECTIVE AND OBJECTIVE BOX
Preliminary note, official note pending attending review/signature.                               [facility] Stroke Team  CC: LOC    HPI:  Patient is a 57 y/o M with a PMHx of stroke 2022 reported at [facility]  (at the time patient received thrombolysis and had full  resolution of symptoms), family reported dove dove, HTN, DM, HLD nonadherent with medications, who presented following an episode of LOC.  As per sister report, patient was last seen well by his niece  morning of 10/12/23  before patient went to work. At around 6:15 pm that night patient had a sudden fall at work and lost consciousness It is unclear if patient had any symptoms before he fell. NIHSS 10 on admission . Transferred to [facility] for mechanical thrombectomy.        PAST MEDICAL & SURGICAL HISTORY:  HTN (hypertension)  CVA (cerebrovascular accident)  History of hip surgery      MEDICATIONS  (STANDING):  chlorhexidine 2% Cloths 1 Application(s) Topical daily  influenza   Vaccine 0.5 milliLiter(s) IntraMuscular once  sodium chloride 0.9%. 1000 milliLiter(s) (75 mL/Hr) IV Continuous <Continuous>    MEDICATIONS  (PRN):  hydrALAZINE Injectable 10 milliGRAM(s) IV Push every 4 hours PRN SBP>160  labetalol Injectable 10 milliGRAM(s) IV Push every 4 hours PRN SBP>160      Allergies  No Known Allergies    Intolerances        SOCIAL HISTORY:  no tob,   no alcohol   no drugs    FAMILY HISTORY:    ROS: 14 point ROS negative other than what is present in HPI or below    Vital Signs Last 24 Hrs  T(C): 36.9 (13 Oct 2023 07:35), Max: 36.9 (13 Oct 2023 07:35)  T(F): 98.4 (13 Oct 2023 07:35), Max: 98.4 (13 Oct 2023 07:35)  HR: 66 (13 Oct 2023 06:45) (62 - 84)  BP: 132/91 (13 Oct 2023 06:45) (132/91 - 168/110)  BP(mean): 103 (13 Oct 2023 06:45) (98 - 128)  RR: 19 (13 Oct 2023 06:45) (10 - 24)  SpO2: 99% (13 Oct 2023 06:45) (94% - 100%)    Parameters below as of 13 Oct 2023 06:45  Patient On (Oxygen Delivery Method): nasal cannula  O2 Flow (L/min): 3    EXAM PENDING     General: NAD    Detailed Neurologic Exam:    Mental status: The patient is awake and alert and has normal attention span.  The patient is fully oriented in 3 spheres. The patient is oriented to current events. The patient is able to name objects, follow commands, repeat sentences.    Cranial nerves: Pupils equal and react symmetrically to light. There is no visual field deficit to confrontation. Extraocular motion is full with no nystagmus. There is no ptosis. Facial sensation is intact. Facial musculature is symmetric. Palate elevates symmetrically. Tongue is midline.    Motor: There is normal bulk and tone.  There is no tremor.  Strength is 5/5 in the right arm and leg.   Strength is 5/5 in the left arm and leg.    Sensation: Intact to light touch and pin in 4 extremities    Reflexes: 1-2+ throughout and plantar responses are flexor.    Cerebellar: There is no dysmetria on finger to nose testing.    Gait : deferred    [facility] SS:  DATE: 10/13/23  TIME:  1A: Level of consciousness (0-3):   1B: Questions (0-2):   1C: Commands (0-2):   2: Gaze (0-2):   3: Visual fields (0-3):   4: Facial palsy (0-3):   MOTOR:  5A: Left arm motor drift (0-4):   5B: Right arm motor drift (0-4):   6A: Left leg motor drift (0-4):   6B: Right leg motor drift (0-4):   7: Limb ataxia (0-2):   SENSORY:  8: Sensation (0-2):   SPEECH:  9: Language (0-3):   10: Dysarthria (0-2):   EXTINCTION:  11: Extinction/inattention (0-2):     TOTAL SCORE:     prehospital mRS=0      LABS:                         12.6   6.01  )-----------( 115      ( 13 Oct 2023 03:10 )             34.9       10-13    136  |  100  |  11.0  ----------------------------<  200<H>  4.1   |  24.0  |  0.90    Ca    8.4      13 Oct 2023 03:10        Lipid Profile (10.13.23 @ 03:10)    Cholesterol: 199 mg/dL   Triglycerides, Serum: 277 mg/dL   HDL Cholesterol: 29 mg/dL   Non HDL Cholesterol: 170    LDL Cholesterol Calculated: 115 mg/dL    A1C with Estimated Average Glucose (10.13.23 @ 03:10)    A1C with Estimated Average Glucose Result: 8.6 %   Estimated Average Glucose: 200 mg/dL              RADIOLOGY & ADDITIONAL STUDIES (independently reviewed unless otherwise noted):  10/12/2023 19:13  IMPRESSION:  HEAD CT: No evidence of an acute intracranial hemorhage, midline shift or hydrocephalus. Mild bifrontal periventricular white matter ischemic changes. Bilateral maxillary sinusitis  NECK CTA: No hemodynamic significant narowing involving the carotid bifurcations. Hypoplastic right vertebral artery. Dominant left vertebral artery..  BRAIN CTA: Cutoff of the M1 portion of the right middle cerebral artery which may be chronic as there appears to be lenticular striate collaterals and reconstitution of more distal MCA segments. Cutoff of the right posterior cerebral artery at the P1-2 junction with reconstitution of more distal segments. Cut off of the left posterior cerebral artery at the P1-2 junction with mild distal reconstitution. Hypoplastic right vertebral artery with portions of the V4 segment not visualized  CT PERFUSION: Regions of ischemia within the posterior circulation with a volume of 121 mL without evidence of core infarction. Preliminary note, official note pending attending review/signature.                               [facility] Stroke Team  CC: LOC    HPI:  Patient is a 59 y/o M with a PMHx of stroke 2022 reported at [facility]  (at the time patient received thrombolysis and had full  resolution of symptoms), family reported derrell dove, HTN, DM, HLD nonadherent with medications, who presented following an episode of LOC.  As per sister report, patient was last seen well by his niece  morning of 10/12/23  before patient went to work. At around 6:15 pm that night patient had a sudden fall at work and lost consciousness It is unclear if patient had any symptoms before he fell. NIHSS 10 on admission . Transferred to [facility] for mechanical thrombectomy.        PAST MEDICAL & SURGICAL HISTORY:  HTN (hypertension)  CVA (cerebrovascular accident)  History of hip surgery  Stroke as noted by sister   Derrell Dove       MEDICATIONS  (STANDING):  chlorhexidine 2% Cloths 1 Application(s) Topical daily  influenza   Vaccine 0.5 milliLiter(s) IntraMuscular once  sodium chloride 0.9%. 1000 milliLiter(s) (75 mL/Hr) IV Continuous <Continuous>    MEDICATIONS  (PRN):  hydrALAZINE Injectable 10 milliGRAM(s) IV Push every 4 hours PRN SBP>160  labetalol Injectable 10 milliGRAM(s) IV Push every 4 hours PRN SBP>160      Allergies  No Known Allergies    Intolerances    SOCIAL HISTORY:  no tob,   etoh : 1 pt vodka daily  no drugs, years     FAMILY HISTORY:  possibly stroke in father   DM , HLD, HTN     ROS: 14 point ROS negative other than what is present in HPI or below. Right face numbness and tingling     Vital Signs Last 24 Hrs  T(C): 36.9 (13 Oct 2023 07:35), Max: 36.9 (13 Oct 2023 07:35)  T(F): 98.4 (13 Oct 2023 07:35), Max: 98.4 (13 Oct 2023 07:35)  HR: 66 (13 Oct 2023 06:45) (62 - 84)  BP: 132/91 (13 Oct 2023 06:45) (132/91 - 168/110)  BP(mean): 103 (13 Oct 2023 06:45) (98 - 128)  RR: 19 (13 Oct 2023 06:45) (10 - 24)  SpO2: 99% (13 Oct 2023 06:45) (94% - 100%)    Parameters below as of 13 Oct 2023 06:45  Patient On (Oxygen Delivery Method): nasal cannula  O2 Flow (L/min): 3    General: NAD    Detailed Neurologic Exam:    Mental status: The patient is awake and alert and has normal attention span.  The patient is fully oriented to self , location, president, disoriented to month and year, The patient is oriented to current events. The patient is able to name objects, follow commands, repeat sentences.    Cranial nerves: Pupil right 3mm left 2mm, There is no visual field deficit to confrontation, able to count fingers in all four quadrants. Extraocular motion is full with no nystagmus. There is no ptosis. Facial sensation is intact. Facial musculature is symmetric (states both sides numb but equal).      Motor: There is normal bulk and tone.  There is no tremor.  Strength is 5/5 in the right arm and leg.   Strength is 5/5 in the left arm and leg.    Sensation: Intact to light touch  in 4 extremities, no extinction.     Cerebellar: There is no dysmetria on finger to nose testing.    Gait : deferred    [facility] SS:  DATE: 10/13/23  TIME: 1250  1A: Level of consciousness (0-3):   1B: Questions (0-2): 1  1C: Commands (0-2):   2: Gaze (0-2):   3: Visual fields (0-3):   4: Facial palsy (0-3):   MOTOR:  5A: Left arm motor drift (0-4):   5B: Right arm motor drift (0-4):   6A: Left leg motor drift (0-4):   6B: Right leg motor drift (0-4):   7: Limb ataxia (0-2):   SENSORY:  8: Sensation (0-2):   SPEECH:  9: Language (0-3):   10: Dysarthria (0-2):   EXTINCTION:  11: Extinction/inattention (0-2):     TOTAL SCORE: 1    prehospital mRS=0      LABS:                         12.6   6.01  )-----------( 115      ( 13 Oct 2023 03:10 )             34.9       10-13    136  |  100  |  11.0  ----------------------------<  200<H>  4.1   |  24.0  |  0.90    Ca    8.4      13 Oct 2023 03:10        Lipid Profile (10.13.23 @ 03:10)    Cholesterol: 199 mg/dL   Triglycerides, Serum: 277 mg/dL   HDL Cholesterol: 29 mg/dL   Non HDL Cholesterol: 170    LDL Cholesterol Calculated: 115 mg/dL    A1C with Estimated Average Glucose (10.13.23 @ 03:10)    A1C with Estimated Average Glucose Result: 8.6 %   Estimated Average Glucose: 200 mg/dL              RADIOLOGY & ADDITIONAL STUDIES (independently reviewed unless otherwise noted):  10/12/2023 19:13  IMPRESSION:  HEAD CT: No evidence of an acute intracranial hemorhage, midline shift or hydrocephalus. Mild bifrontal periventricular white matter ischemic changes. Bilateral maxillary sinusitis  NECK CTA: No hemodynamic significant narowing involving the carotid bifurcations. Hypoplastic right vertebral artery. Dominant left vertebral artery..  BRAIN CTA: Cutoff of the M1 portion of the right middle cerebral artery which may be chronic as there appears to be lenticular striate collaterals and reconstitution of more distal MCA segments. Cutoff of the right posterior cerebral artery at the P1-2 junction with reconstitution of more distal segments. Cut off of the left posterior cerebral artery at the P1-2 junction with mild distal reconstitution. Hypoplastic right vertebral artery with portions of the V4 segment not visualized  CT PERFUSION: Regions of ischemia within the posterior circulation with a volume of 121 mL without evidence of core infarction. Preliminary note, official note pending attending review/signature.                               [facility] Stroke Team  CC: LOC    HPI:  Patient is a 59 y/o M with a PMHx of stroke 2022 reported at [facility]  (at the time patient received thrombolysis and had full  resolution of symptoms), family reported derrell dove, HTN, DM, HLD nonadherent with medications, who presented following an episode of LOC.  As per sister report, patient was last seen well by his niece  morning of 10/12/23  before patient went to work. At around 6:15 pm that night patient had a sudden fall at work and lost consciousness It is unclear if patient had any symptoms before he fell. NIHSS 10 on admission . Transferred to [facility] for mechanical thrombectomy.        PAST MEDICAL & SURGICAL HISTORY:  HTN (hypertension)  CVA (cerebrovascular accident)  History of hip surgery  Stroke as noted by sister   Derrell Dove       MEDICATIONS  (STANDING):  chlorhexidine 2% Cloths 1 Application(s) Topical daily  influenza   Vaccine 0.5 milliLiter(s) IntraMuscular once  sodium chloride 0.9%. 1000 milliLiter(s) (75 mL/Hr) IV Continuous <Continuous>    MEDICATIONS  (PRN):  hydrALAZINE Injectable 10 milliGRAM(s) IV Push every 4 hours PRN SBP>160  labetalol Injectable 10 milliGRAM(s) IV Push every 4 hours PRN SBP>160      Allergies  No Known Allergies    Intolerances    SOCIAL HISTORY:  no tob,   etoh : 1 pt vodka daily  no drugs, years     FAMILY HISTORY:  possibly stroke in father   DM , HLD, HTN     ROS: 14 point ROS negative other than what is present in HPI or below. Right face numbness and tingling.     Vital Signs Last 24 Hrs  T(C): 36.9 (13 Oct 2023 07:35), Max: 36.9 (13 Oct 2023 07:35)  T(F): 98.4 (13 Oct 2023 07:35), Max: 98.4 (13 Oct 2023 07:35)  HR: 66 (13 Oct 2023 06:45) (62 - 84)  BP: 132/91 (13 Oct 2023 06:45) (132/91 - 168/110)  BP(mean): 103 (13 Oct 2023 06:45) (98 - 128)  RR: 19 (13 Oct 2023 06:45) (10 - 24)  SpO2: 99% (13 Oct 2023 06:45) (94% - 100%)    Parameters below as of 13 Oct 2023 06:45  Patient On (Oxygen Delivery Method): nasal cannula  O2 Flow (L/min): 3    General: NAD    Detailed Neurologic Exam:    Mental status: The patient is awake and alert and has normal attention span.  The patient is fully oriented to self , location, president, disoriented to month and year, The patient is oriented to current events. The patient is able to name objects, follow commands, repeat sentences.    Cranial nerves: Pupil right 3mm left 2mm, There is no visual field deficit to confrontation, able to count fingers in all four quadrants. Extraocular motion is full with no nystagmus. There is no ptosis. Facial sensation is intact. Facial musculature is symmetric (states both sides numb but equal).      Motor: There is normal bulk and tone.  There is no tremor.  Strength is 5/5 in the right arm and leg.   Strength is 5/5 in the left arm and leg.    Sensation: Intact to light touch  in 4 extremities, no extinction.     Cerebellar: There is no dysmetria on finger to nose testing.    Gait : deferred    [facility] SS:  DATE: 10/13/23  TIME: 1250  1A: Level of consciousness (0-3):   1B: Questions (0-2): 1  1C: Commands (0-2):   2: Gaze (0-2):   3: Visual fields (0-3):   4: Facial palsy (0-3):   MOTOR:  5A: Left arm motor drift (0-4):   5B: Right arm motor drift (0-4):   6A: Left leg motor drift (0-4):   6B: Right leg motor drift (0-4):   7: Limb ataxia (0-2):   SENSORY:  8: Sensation (0-2): 1  SPEECH:  9: Language (0-3):   10: Dysarthria (0-2):   EXTINCTION:  11: Extinction/inattention (0-2):     TOTAL SCORE: 2    prehospital mRS=0      LABS:                         12.6   6.01  )-----------( 115      ( 13 Oct 2023 03:10 )             34.9       10-13    136  |  100  |  11.0  ----------------------------<  200<H>  4.1   |  24.0  |  0.90    Ca    8.4      13 Oct 2023 03:10        Lipid Profile (10.13.23 @ 03:10)    Cholesterol: 199 mg/dL   Triglycerides, Serum: 277 mg/dL   HDL Cholesterol: 29 mg/dL   Non HDL Cholesterol: 170    LDL Cholesterol Calculated: 115 mg/dL    A1C with Estimated Average Glucose (10.13.23 @ 03:10)    A1C with Estimated Average Glucose Result: 8.6 %   Estimated Average Glucose: 200 mg/dL      RADIOLOGY & ADDITIONAL STUDIES (independently reviewed unless otherwise noted):  10/12/2023 19:13  IMPRESSION:  HEAD CT: No evidence of an acute intracranial hemorhage, midline shift or hydrocephalus. Mild bifrontal periventricular white matter ischemic changes. Bilateral maxillary sinusitis  NECK CTA: No hemodynamic significant narowing involving the carotid bifurcations. Hypoplastic right vertebral artery. Dominant left vertebral artery..  BRAIN CTA: Cutoff of the M1 portion of the right middle cerebral artery which may be chronic as there appears to be lenticular striate collaterals and reconstitution of more distal MCA segments. Cutoff of the right posterior cerebral artery at the P1-2 junction with reconstitution of more distal segments. Cut off of the left posterior cerebral artery at the P1-2 junction with mild distal reconstitution. Hypoplastic right vertebral artery with portions of the V4 segment not visualized  CT PERFUSION: Regions of ischemia within the posterior circulation with a volume of 121 mL without evidence of core infarction.    (10.13.23 @ 12:31)   MRI BRAIN: Acute small to moderate medial left temporal occipital lobe   infarctions. Acute punctate left occipital lobe infarction.  MRA BRAIN: Occluded right MCA. Occluded right PCA. Occluded right   vertebral artery proximal V4 segment.  MRI BRAIN NOVA: Values above.                                        [facility] Stroke Team  CC: LOC    HPI:  Patient is a 59 y/o M with a PMHx of stroke 2022 reported at [facility]  (at the time patient received thrombolysis and had full  resolution of symptoms), family reported derrell dove, HTN, DM, HLD nonadherent with medications, who presented following an episode of LOC.  As per sister report, patient was last seen well by his niece  morning of 10/12/23  before patient went to work. At around 6:15 pm that night patient had a sudden fall at work and lost consciousness It is unclear if patient had any symptoms before he fell. NIHSS 10 on admission . Transferred to [facility] for mechanical thrombectomy.        PAST MEDICAL & SURGICAL HISTORY:  HTN (hypertension)  CVA (cerebrovascular accident)  History of hip surgery  Stroke as noted by sister   Derrell Dove       MEDICATIONS  (STANDING):  chlorhexidine 2% Cloths 1 Application(s) Topical daily  influenza   Vaccine 0.5 milliLiter(s) IntraMuscular once  sodium chloride 0.9%. 1000 milliLiter(s) (75 mL/Hr) IV Continuous <Continuous>    MEDICATIONS  (PRN):  hydrALAZINE Injectable 10 milliGRAM(s) IV Push every 4 hours PRN SBP>160  labetalol Injectable 10 milliGRAM(s) IV Push every 4 hours PRN SBP>160      Allergies  No Known Allergies    Intolerances    SOCIAL HISTORY:  no tob,   etoh : 1 pt vodka daily  no drugs, years     FAMILY HISTORY:  possibly stroke in father   DM , HLD, HTN     ROS: 14 point ROS negative other than what is present in HPI or below. Right face numbness and tingling.     Vital Signs Last 24 Hrs  T(C): 36.9 (13 Oct 2023 07:35), Max: 36.9 (13 Oct 2023 07:35)  T(F): 98.4 (13 Oct 2023 07:35), Max: 98.4 (13 Oct 2023 07:35)  HR: 66 (13 Oct 2023 06:45) (62 - 84)  BP: 132/91 (13 Oct 2023 06:45) (132/91 - 168/110)  BP(mean): 103 (13 Oct 2023 06:45) (98 - 128)  RR: 19 (13 Oct 2023 06:45) (10 - 24)  SpO2: 99% (13 Oct 2023 06:45) (94% - 100%)    Parameters below as of 13 Oct 2023 06:45  Patient On (Oxygen Delivery Method): nasal cannula  O2 Flow (L/min): 3    General: NAD    Detailed Neurologic Exam:    Mental status: The patient is awake and alert and has normal attention span.  The patient is fully oriented to self , location, president, disoriented to month and year, The patient is oriented to current events. The patient is able to name objects, follow commands, repeat sentences.    Cranial nerves: Pupil right 3mm left 2mm, There is no visual field deficit to confrontation, able to count fingers in all four quadrants. Extraocular motion is full with no nystagmus. There is no ptosis. Facial sensation is intact. Facial musculature is symmetric (states both sides numb but equal).      Motor: There is normal bulk and tone.  There is no tremor.  Strength is 5/5 in the right arm and leg.   Strength is 5/5 in the left arm and leg.    Sensation: Intact to light touch  in 4 extremities, no extinction.     Cerebellar: There is no dysmetria on finger to nose testing.    Gait : deferred    [facility] SS:  DATE: 10/13/23  TIME: 1250  1A: Level of consciousness (0-3):   1B: Questions (0-2): 1  1C: Commands (0-2):   2: Gaze (0-2):   3: Visual fields (0-3):   4: Facial palsy (0-3):   MOTOR:  5A: Left arm motor drift (0-4):   5B: Right arm motor drift (0-4):   6A: Left leg motor drift (0-4):   6B: Right leg motor drift (0-4):   7: Limb ataxia (0-2):   SENSORY:  8: Sensation (0-2): 1  SPEECH:  9: Language (0-3):   10: Dysarthria (0-2):   EXTINCTION:  11: Extinction/inattention (0-2):     TOTAL SCORE: 2    prehospital mRS=0      LABS:                         12.6   6.01  )-----------( 115      ( 13 Oct 2023 03:10 )             34.9       10-13    136  |  100  |  11.0  ----------------------------<  200<H>  4.1   |  24.0  |  0.90    Ca    8.4      13 Oct 2023 03:10        Lipid Profile (10.13.23 @ 03:10)    Cholesterol: 199 mg/dL   Triglycerides, Serum: 277 mg/dL   HDL Cholesterol: 29 mg/dL   Non HDL Cholesterol: 170    LDL Cholesterol Calculated: 115 mg/dL    A1C with Estimated Average Glucose (10.13.23 @ 03:10)    A1C with Estimated Average Glucose Result: 8.6 %   Estimated Average Glucose: 200 mg/dL      RADIOLOGY & ADDITIONAL STUDIES (independently reviewed unless otherwise noted):  10/12/2023 19:13  IMPRESSION:  HEAD CT: No evidence of an acute intracranial hemorhage, midline shift or hydrocephalus. Mild bifrontal periventricular white matter ischemic changes. Bilateral maxillary sinusitis  NECK CTA: No hemodynamic significant narowing involving the carotid bifurcations. Hypoplastic right vertebral artery. Dominant left vertebral artery..  BRAIN CTA: Cutoff of the M1 portion of the right middle cerebral artery which may be chronic as there appears to be lenticular striate collaterals and reconstitution of more distal MCA segments. Cutoff of the right posterior cerebral artery at the P1-2 junction with reconstitution of more distal segments. Cut off of the left posterior cerebral artery at the P1-2 junction with mild distal reconstitution. Hypoplastic right vertebral artery with portions of the V4 segment not visualized  CT PERFUSION: Regions of ischemia within the posterior circulation with a volume of 121 mL without evidence of core infarction.    (10.13.23 @ 12:31)   MRI BRAIN: Acute small to moderate medial left temporal occipital lobe   infarctions. Acute punctate left occipital lobe infarction.  MRA BRAIN: Occluded right MCA. Occluded right PCA. Occluded right   vertebral artery proximal V4 segment.  MRI BRAIN NOVA: Values above.

## 2023-10-13 NOTE — PROGRESS NOTE ADULT - ASSESSMENT
*** 57 y/o M with a PMHx of stroke one year ago (at the time patient recieved TNK and had   full resolution of symptoms), HTN, DM, non-compliant with medications, who presented.presented following an episode of LOC. 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 Cut off of the left posterior cerebral artery at the P1-2 junction with mild distal reconstitution. Patient is not a current TNK candidate  due to unclear exact last known well. TRF to [facility] for thrombectomy       PLAN:  Neuro:  -Neurochecks per angio protocol  - Angio access: L radial. May deflate pressure per angio protocol. (d/c 3 cc of air x 15 minutes.)   - Tenecteplase  not given   - CT head in am   - Stat CT head if neurological decline.   - MRI non contrast.   CV:  - SBP Goal 120-160  - Stroke core measure   - TTE   - LED   Pulm:  -extubated on  RA, satting well.   GI:  - NPO, pending dysphagia   Gu:  - voiding  - I&O Q1 hour  - NS@75 while NPO   - Monitor Electrolytes & Renal Function  Heme:  - Monitor H&H  - Chemical DVT prophylaxis: * Chemical DVT prophylaxis is contraindicated due to risk of bleeding  - Mechanical DVT Prophylaxis: Maintain B/L LE sequential compression devices  ID:  - Monitor WBC and Temperature	  Endo  - Monitor BGL, maintain <180  - Pending A1C, TSH     58M with h/o stroke (2022, sx fully resolved), HTN, DM, non-compliant with medications, developed LOC. CTH w/R PCA cutoff. Transfer to [facility] for thrombectomy.    PLAN:  Neuro: L temporoparietal and small occipital strokes  - Neurochecks per angio protocol  - tylenol for pain  - ASA 81mg, will need plavix in the future per INR  - CIWA protocol, high dose thiamine, folate  - stroke core measures  PT/OT - evaluate    CV:  - SBP Goal 120-160  - Stroke core measures  - TTE   - LED     Pulm:  - spO2 >92%  - incentive spirometer    GI:  - DASH diet  - bowel regimen  - famotidine    Renal/:  - I&O Q1 hour  - Monitor Electrolytes & Renal Function    Heme:  - Monitor H&H  - DVT ppx - lovenox and SCDs    ID:  - Monitor WBC and Temperature	    Endo: A1c 8.6, TSH 5.36  - Monitor BGL, maintain <180  - insulin sliding scale    Dispo: ICU until 24h post-thrombectomy  - SW to assist with housing, disability, childcare

## 2023-10-14 DIAGNOSIS — R94.31 ABNORMAL ELECTROCARDIOGRAM [ECG] [EKG]: ICD-10-CM

## 2023-10-14 DIAGNOSIS — I63.9 CEREBRAL INFARCTION, UNSPECIFIED: ICD-10-CM

## 2023-10-14 LAB
ALBUMIN SERPL ELPH-MCNC: 3.7 G/DL — SIGNIFICANT CHANGE UP (ref 3.3–5.2)
ALP SERPL-CCNC: 66 U/L — SIGNIFICANT CHANGE UP (ref 40–120)
ALT FLD-CCNC: 43 U/L — HIGH
ANION GAP SERPL CALC-SCNC: 9 MMOL/L — SIGNIFICANT CHANGE UP (ref 5–17)
APTT BLD: 27.5 SEC — SIGNIFICANT CHANGE UP (ref 24.5–35.6)
APTT BLD: 58.9 SEC — HIGH (ref 24.5–35.6)
AST SERPL-CCNC: 32 U/L — SIGNIFICANT CHANGE UP
BILIRUB DIRECT SERPL-MCNC: 0.1 MG/DL — SIGNIFICANT CHANGE UP (ref 0–0.3)
BILIRUB INDIRECT FLD-MCNC: 0.4 MG/DL — SIGNIFICANT CHANGE UP (ref 0.2–1)
BILIRUB SERPL-MCNC: 0.5 MG/DL — SIGNIFICANT CHANGE UP (ref 0.4–2)
BUN SERPL-MCNC: 10 MG/DL — SIGNIFICANT CHANGE UP (ref 8–20)
CALCIUM SERPL-MCNC: 8.4 MG/DL — SIGNIFICANT CHANGE UP (ref 8.4–10.5)
CHLORIDE SERPL-SCNC: 104 MMOL/L — SIGNIFICANT CHANGE UP (ref 96–108)
CO2 SERPL-SCNC: 27 MMOL/L — SIGNIFICANT CHANGE UP (ref 22–29)
CREAT SERPL-MCNC: 0.97 MG/DL — SIGNIFICANT CHANGE UP (ref 0.5–1.3)
EGFR: 90 ML/MIN/1.73M2 — SIGNIFICANT CHANGE UP
GLUCOSE BLDC GLUCOMTR-MCNC: 169 MG/DL — HIGH (ref 70–99)
GLUCOSE BLDC GLUCOMTR-MCNC: 170 MG/DL — HIGH (ref 70–99)
GLUCOSE BLDC GLUCOMTR-MCNC: 184 MG/DL — HIGH (ref 70–99)
GLUCOSE BLDC GLUCOMTR-MCNC: 244 MG/DL — HIGH (ref 70–99)
GLUCOSE SERPL-MCNC: 153 MG/DL — HIGH (ref 70–99)
HCT VFR BLD CALC: 36 % — LOW (ref 39–50)
HCT VFR BLD CALC: 37.1 % — LOW (ref 39–50)
HGB BLD-MCNC: 12.8 G/DL — LOW (ref 13–17)
HGB BLD-MCNC: 13 G/DL — SIGNIFICANT CHANGE UP (ref 13–17)
INR BLD: 1.02 RATIO — SIGNIFICANT CHANGE UP (ref 0.85–1.18)
MAGNESIUM SERPL-MCNC: 2.2 MG/DL — SIGNIFICANT CHANGE UP (ref 1.6–2.6)
MCHC RBC-ENTMCNC: 32.7 PG — SIGNIFICANT CHANGE UP (ref 27–34)
MCHC RBC-ENTMCNC: 33.1 PG — SIGNIFICANT CHANGE UP (ref 27–34)
MCHC RBC-ENTMCNC: 35 GM/DL — SIGNIFICANT CHANGE UP (ref 32–36)
MCHC RBC-ENTMCNC: 35.6 GM/DL — SIGNIFICANT CHANGE UP (ref 32–36)
MCV RBC AUTO: 93 FL — SIGNIFICANT CHANGE UP (ref 80–100)
MCV RBC AUTO: 93.5 FL — SIGNIFICANT CHANGE UP (ref 80–100)
PHOSPHATE SERPL-MCNC: 3.3 MG/DL — SIGNIFICANT CHANGE UP (ref 2.4–4.7)
PLATELET # BLD AUTO: 104 K/UL — LOW (ref 150–400)
PLATELET # BLD AUTO: 116 K/UL — LOW (ref 150–400)
POTASSIUM SERPL-MCNC: 4.2 MMOL/L — SIGNIFICANT CHANGE UP (ref 3.5–5.3)
POTASSIUM SERPL-SCNC: 4.2 MMOL/L — SIGNIFICANT CHANGE UP (ref 3.5–5.3)
PROT SERPL-MCNC: 6.2 G/DL — LOW (ref 6.6–8.7)
PROTHROM AB SERPL-ACNC: 11.3 SEC — SIGNIFICANT CHANGE UP (ref 9.5–13)
RBC # BLD: 3.87 M/UL — LOW (ref 4.2–5.8)
RBC # BLD: 3.97 M/UL — LOW (ref 4.2–5.8)
RBC # FLD: 13.3 % — SIGNIFICANT CHANGE UP (ref 10.3–14.5)
RBC # FLD: 13.6 % — SIGNIFICANT CHANGE UP (ref 10.3–14.5)
SODIUM SERPL-SCNC: 140 MMOL/L — SIGNIFICANT CHANGE UP (ref 135–145)
T3 SERPL-MCNC: 74 NG/DL — LOW (ref 80–200)
T4 AB SER-ACNC: 5.6 UG/DL — SIGNIFICANT CHANGE UP (ref 4.5–12)
T4 FREE SERPL-MCNC: 1.2 NG/DL — SIGNIFICANT CHANGE UP (ref 0.9–1.8)
TROPONIN T SERPL-MCNC: <0.01 NG/ML — SIGNIFICANT CHANGE UP (ref 0–0.06)
TSH SERPL-MCNC: 4.75 UIU/ML — HIGH (ref 0.27–4.2)
UFH PPP CHRO-ACNC: <0.04 IU/ML — LOW (ref 0.3–0.7)
WBC # BLD: 4.55 K/UL — SIGNIFICANT CHANGE UP (ref 3.8–10.5)
WBC # BLD: 4.75 K/UL — SIGNIFICANT CHANGE UP (ref 3.8–10.5)
WBC # FLD AUTO: 4.55 K/UL — SIGNIFICANT CHANGE UP (ref 3.8–10.5)
WBC # FLD AUTO: 4.75 K/UL — SIGNIFICANT CHANGE UP (ref 3.8–10.5)

## 2023-10-14 PROCEDURE — 99291 CRITICAL CARE FIRST HOUR: CPT

## 2023-10-14 PROCEDURE — 70450 CT HEAD/BRAIN W/O DYE: CPT | Mod: 26,59

## 2023-10-14 PROCEDURE — 0042T: CPT

## 2023-10-14 PROCEDURE — 70496 CT ANGIOGRAPHY HEAD: CPT | Mod: 26

## 2023-10-14 PROCEDURE — 99233 SBSQ HOSP IP/OBS HIGH 50: CPT

## 2023-10-14 PROCEDURE — 93010 ELECTROCARDIOGRAM REPORT: CPT

## 2023-10-14 PROCEDURE — 70498 CT ANGIOGRAPHY NECK: CPT | Mod: 26

## 2023-10-14 PROCEDURE — 99232 SBSQ HOSP IP/OBS MODERATE 35: CPT

## 2023-10-14 RX ORDER — SODIUM CHLORIDE 9 MG/ML
500 INJECTION INTRAMUSCULAR; INTRAVENOUS; SUBCUTANEOUS ONCE
Refills: 0 | Status: COMPLETED | OUTPATIENT
Start: 2023-10-14 | End: 2023-10-14

## 2023-10-14 RX ORDER — HEPARIN SODIUM 5000 [USP'U]/ML
1500 INJECTION INTRAVENOUS; SUBCUTANEOUS
Qty: 25000 | Refills: 0 | Status: DISCONTINUED | OUTPATIENT
Start: 2023-10-14 | End: 2023-10-15

## 2023-10-14 RX ADMIN — CHLORHEXIDINE GLUCONATE 1 APPLICATION(S): 213 SOLUTION TOPICAL at 06:35

## 2023-10-14 RX ADMIN — SODIUM CHLORIDE 1000 MILLILITER(S): 9 INJECTION INTRAMUSCULAR; INTRAVENOUS; SUBCUTANEOUS at 14:39

## 2023-10-14 RX ADMIN — Medication 105 MILLIGRAM(S): at 21:18

## 2023-10-14 RX ADMIN — HEPARIN SODIUM 15 UNIT(S)/HR: 5000 INJECTION INTRAVENOUS; SUBCUTANEOUS at 16:26

## 2023-10-14 RX ADMIN — Medication 105 MILLIGRAM(S): at 06:38

## 2023-10-14 RX ADMIN — HEPARIN SODIUM 15 UNIT(S)/HR: 5000 INJECTION INTRAVENOUS; SUBCUTANEOUS at 19:15

## 2023-10-14 RX ADMIN — Medication 1 MILLIGRAM(S): at 12:17

## 2023-10-14 RX ADMIN — SODIUM CHLORIDE 1000 MILLILITER(S): 9 INJECTION INTRAMUSCULAR; INTRAVENOUS; SUBCUTANEOUS at 16:26

## 2023-10-14 RX ADMIN — Medication 2: at 16:52

## 2023-10-14 RX ADMIN — Medication 2: at 06:38

## 2023-10-14 RX ADMIN — SENNA PLUS 2 TABLET(S): 8.6 TABLET ORAL at 21:18

## 2023-10-14 RX ADMIN — Medication 105 MILLIGRAM(S): at 13:45

## 2023-10-14 RX ADMIN — Medication 2: at 21:18

## 2023-10-14 RX ADMIN — FAMOTIDINE 20 MILLIGRAM(S): 10 INJECTION INTRAVENOUS at 12:24

## 2023-10-14 RX ADMIN — Medication 4: at 12:17

## 2023-10-14 RX ADMIN — Medication 1 TABLET(S): at 12:17

## 2023-10-14 RX ADMIN — Medication 81 MILLIGRAM(S): at 12:17

## 2023-10-14 RX ADMIN — ATORVASTATIN CALCIUM 80 MILLIGRAM(S): 80 TABLET, FILM COATED ORAL at 21:18
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
## 2023-10-14 NOTE — CHART NOTE - NSCHARTNOTEFT_GEN_A_CORE
CTA and CT perfusion performed to evaluate L superior quadrantanopsia. Acute CTP perfusion mismatch noted. 75cc penumbra, infinite mismatch ratio.     Case discussed with Dr. Barkley and Dr. Goodrich

## 2023-10-14 NOTE — CONSULT NOTE ADULT - PROBLEM SELECTOR RECOMMENDATION 9
Hx of Stroke x 1 years  Presents with fall/loc at work  Cerebral angiogram on 10/12/2023, found L PCA occlusion s/p aspiration with TICI 3 recanalization  Neurology following  aspirin  chewable 81 mg Oral daily  atorvastatin 80 mg Oral at bedtime
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
## 2023-10-14 NOTE — PROGRESS NOTE ADULT - ASSESSMENT
INCOMPLETE- VISIT PENDING   ASSESSMENT: Patient is a 57 y/o M with a PMHx of stroke 2022 reported at [facility]  (at the time patient received thrombolysis and had full  resolution of symptoms), reported dove dove, HTN, DM, HLD nonadherent with medications, who presented following an episode of LOC while at work the evening of 10/12/23, there was reported right sided hemisensory loss and right homonomous hemianopia .  Reported unclear if definitively last known well was prior at 1815 therefor excluded from tenecteplase after CT head did not demonstrate acute infarction or hemorrhage.  Perfusion demonstrated regions of ischemia in the posterior circulation, CT angiogram head/neck showed right PCA P1-2 cutoff, possibly chronic right MCA M1 cutoff which had distal reconstitution and established collaterals, and left PCA occlusion which patient was subsequently transferred for mechanical thrombectomy. Cerebral angiogram and mechanical thrombectomy performed using aspiration with TICI 3 recanalization of left PCA.  The right occipital lobe filled from right SHANI collaterals, and the right MCA as well was thought to be chronic given noted collateralization. MR with small to moderate left temporal occipital lobe infarctions. Occluded right MCA, occluded right PCA and right vertebral artery proximal segment. Etiology; embolic stroke of undetermined source.  While there is underlying dove dove can not entirely exclude cardioembolic / hypercoagulable state.     NEURO:   -Neurologically stable with left superior quadrantanopia and left hemisensory loss.  -some memory impairment- suggest OT cognitive evaluation   -Continue close monitoring for neurologic deterioration    - Stroke neuro checks q 2 hrs.  -TTE  -Report as above was reviewed.   -LE duplex no DVT reported  -Hypercoagulable panel   - SBP goal 120-160mmHg for now, further titration and recommendations pending noted workup   -ANTITHROMBOTIC THERAPY: ASA 81mg daily   -titrate statin to LDL goal less than 70  -MRI Brain w/o as noted   - MRA Head NOVA  -Consideration in Outpatient NM spect w/w.out diamox  -Dysphagia screen: pass  -Physical therapy/OT/Speech eval/treatment.     -CARDIOVASCULAR: check TTE, cardiac monitoring w/ telemetry for now, further evaluation pending findings of noted workup                              -HEMATOLOGY: H/H with anemia, Platelets 115, close monitoring for further thrombocytopenia, patient should have all age and risk appropriate malignancy screenings with PCP or sooner if clinically suspected      DVT ppx: no absolute neurological contraindication to pharmacological dvt ppx.     PULMONARY: CXR pending , protecting airway, saturating well     RENAL: BUN/Cr within range, monitor urine output, maintain adequate hydration       Na Goal:  135-145    ID: afebrile, no leukocytosis, monitor for si/sx of infection     OTHER:  condition and plan of care d/w patient, questions and concerns addressed.  CIWA protcol, monitor closely for si/sx of withdrawal.     DISPOSITION: Rehab or home depending on PT eval once stable and workup is complete      CORE MEASURES:        Admission NIHSS:10      Tenecteplase : [] YES [x] NO      LDL/HDL/A1C: 115/29/8.6     Depression Screen- if depression hx and/or present      Statin Therapy: as noted      Dysphagia Screen: [x] PASS [] FAIL     Smoking [] YES [x] NO      Afib [] YES [x] NO     Stroke Education [x] YES [] NO    Obtain screening lower extremity venous ultrasound in patients who meet 1 or more of the following criteria as patient is high risk for DVT/PE on admission:   [] History of DVT/PE  []Hypercoagulable states (Factor V Leiden, Cancer, OCP, etc. )  []Prolonged immobility (hemiplegia/hemiparesis/post operative or any other extended immobilization)  [] Transferred from outside facility (Rehab or Long term care)  [] Age </= to 50 INCOMPLETE- VISIT PENDING   ASSESSMENT: Patient is a 59 y/o M with a PMHx of stroke 2022 reported at [facility]  (at the time patient received thrombolysis and had full  resolution of symptoms), reported dove dove, HTN, DM, HLD nonadherent with medications, who presented following an episode of LOC while at work the evening of 10/12/23, there was reported right sided hemisensory loss and right homonomous hemianopia .  Reported unclear if definitively last known well was prior at 1815 therefor excluded from tenecteplase after CT head did not demonstrate acute infarction or hemorrhage.  Perfusion demonstrated regions of ischemia in the posterior circulation, CT angiogram head/neck showed right PCA P1-2 cutoff, possibly chronic right MCA M1 cutoff which had distal reconstitution and established collaterals, and left PCA occlusion which patient was subsequently transferred for mechanical thrombectomy. Cerebral angiogram and mechanical thrombectomy performed using aspiration with TICI 3 recanalization of left PCA.  The right occipital lobe filled from right SHANI collaterals, and the right MCA as well was thought to be chronic given noted collateralization. MR with small to moderate left temporal occipital lobe infarctions. Occluded right MCA, occluded right PCA and right vertebral artery proximal segment. Etiology; embolic stroke of undetermined source.  While there is underlying dove dove can not entirely exclude cardioembolic / hypercoagulable state.     NEURO:   -Neurologically stable .  -some memory impairment- suggest OT cognitive evaluation   -Continue close monitoring for neurologic deterioration    - Stroke neuro checks q 2 hrs.  -TTE  -Report as above was reviewed.   -LE duplex no DVT reported  -Hypercoagulable panel   - SBP goal 120-160mmHg for now, further titration and recommendations pending noted workup   -ANTITHROMBOTIC THERAPY: ASA 81mg daily   -titrate statin to LDL goal less than 70  -MRI Brain w/o as noted   - MRA Head NOVA  -Consideration in Outpatient NM spect w/w.out diamox  -Dysphagia screen: pass  -Physical therapy/OT/Speech eval/treatment.     -CARDIOVASCULAR: check TTE, cardiac monitoring w/ telemetry for now, further evaluation pending findings of noted workup                              -HEMATOLOGY: H/H with anemia, Platelets 115, close monitoring for further thrombocytopenia, patient should have all age and risk appropriate malignancy screenings with PCP or sooner if clinically suspected      DVT ppx: no absolute neurological contraindication to pharmacological dvt ppx.     PULMONARY: CXR pending , protecting airway, saturating well     RENAL: BUN/Cr within range, monitor urine output, maintain adequate hydration       Na Goal:  135-145    ID: afebrile, no leukocytosis, monitor for si/sx of infection     OTHER:  condition and plan of care d/w patient, questions and concerns addressed.  CIWA protcol, monitor closely for si/sx of withdrawal.     DISPOSITION: Rehab or home depending on PT eval once stable and workup is complete      CORE MEASURES:        Admission NIHSS:10      Tenecteplase : [] YES [x] NO      LDL/HDL/A1C: 115/29/8.6     Depression Screen- if depression hx and/or present      Statin Therapy: as noted      Dysphagia Screen: [x] PASS [] FAIL     Smoking [] YES [x] NO      Afib [] YES [x] NO     Stroke Education [x] YES [] NO    Obtain screening lower extremity venous ultrasound in patients who meet 1 or more of the following criteria as patient is high risk for DVT/PE on admission:   [] History of DVT/PE  []Hypercoagulable states (Factor V Leiden, Cancer, OCP, etc. )  []Prolonged immobility (hemiplegia/hemiparesis/post operative or any other extended immobilization)  [] Transferred from outside facility (Rehab or Long term care)  [] Age </= to 50 INCOMPLETE- VISIT PENDING   ASSESSMENT: Patient is a 59 y/o M with a PMHx of stroke 2022 reported at [facility]  (at the time patient received thrombolysis and had full  resolution of symptoms), reported dove dove, HTN, DM, HLD nonadherent with medications, who presented following an episode of LOC while at work the evening of 10/12/23, there was reported right sided hemisensory loss and right homonomous hemianopia .  Reported unclear if definitively last known well was prior at 1815 therefor excluded from tenecteplase after CT head did not demonstrate acute infarction or hemorrhage.  Perfusion demonstrated regions of ischemia in the posterior circulation, CT angiogram head/neck showed right PCA P1-2 cutoff, possibly chronic right MCA M1 cutoff which had distal reconstitution and established collaterals, and left PCA occlusion which patient was subsequently transferred for mechanical thrombectomy. Cerebral angiogram and mechanical thrombectomy performed using aspiration with TICI 3 recanalization of left PCA.  The right occipital lobe filled from right SHANI collaterals, and the right MCA as well was thought to be chronic given noted collateralization. MR with small to moderate left temporal occipital lobe infarctions. Occluded right MCA, occluded right PCA and right vertebral artery proximal segment. Etiology; embolic stroke of undetermined source.  While there is underlying dove dove can not entirely exclude cardioembolic / hypercoagulable state.     NEURO:   -Neurologically appears to have right hemispheric symptoms in addition to L PCA distribution stroke.  - Needs repeat CT head today.  - Will discuss with neuro IR Plan for any further intervention.  -some memory impairment- suggest OT cognitive evaluation   -Continue close monitoring for neurologic deterioration    - Stroke neuro checks q 2 hrs.  -TTE  -Report as above was reviewed.   -LE duplex no DVT reported  -Hypercoagulable panel   - SBP goal 120-160mmHg for now, further titration and recommendations pending noted workup   -ANTITHROMBOTIC THERAPY: ASA 81mg daily   -titrate statin to LDL goal less than 70  -MRI Brain w/o as noted   - MRA Head NOVA  -Consideration in Outpatient NM spect w/w.out diamox  -Dysphagia screen: pass  -Physical therapy/OT/Speech eval/treatment.     -CARDIOVASCULAR: check TTE, cardiac monitoring w/ telemetry for now, further evaluation pending findings of noted workup                              -HEMATOLOGY: H/H with anemia, Platelets 115, close monitoring for further thrombocytopenia, patient should have all age and risk appropriate malignancy screenings with PCP or sooner if clinically suspected      DVT ppx: no absolute neurological contraindication to pharmacological dvt ppx.     PULMONARY: CXR pending , protecting airway, saturating well     RENAL: BUN/Cr within range, monitor urine output, maintain adequate hydration       Na Goal:  135-145    ID: afebrile, no leukocytosis, monitor for si/sx of infection     OTHER:  condition and plan of care d/w patient, questions and concerns addressed.  CIWA protcol, monitor closely for si/sx of withdrawal.     DISPOSITION: Rehab or home depending on PT eval once stable and workup is complete      CORE MEASURES:        Admission NIHSS:10      Tenecteplase : [] YES [x] NO      LDL/HDL/A1C: 115/29/8.6     Depression Screen- if depression hx and/or present      Statin Therapy: as noted      Dysphagia Screen: [x] PASS [] FAIL     Smoking [] YES [x] NO      Afib [] YES [x] NO     Stroke Education [x] YES [] NO    Obtain screening lower extremity venous ultrasound in patients who meet 1 or more of the following criteria as patient is high risk for DVT/PE on admission:   [] History of DVT/PE  []Hypercoagulable states (Factor V Leiden, Cancer, OCP, etc. )  []Prolonged immobility (hemiplegia/hemiparesis/post operative or any other extended immobilization)  [] Transferred from outside facility (Rehab or Long term care)  [] Age </= to 50 INCOMPLETE- VISIT PENDING   ASSESSMENT: Patient is a 57 y/o M with a PMHx of stroke 2022 reported at [facility]  (at the time patient received thrombolysis and had full  resolution of symptoms), reported dove dove, HTN, DM, HLD nonadherent with medications, who presented following an episode of LOC while at work the evening of 10/12/23, there was reported right sided hemisensory loss and right homonomous hemianopia .  Reported unclear if definitively last known well was prior at 1815 therefor excluded from tenecteplase after CT head did not demonstrate acute infarction or hemorrhage.  Perfusion demonstrated regions of ischemia in the posterior circulation, CT angiogram head/neck showed right PCA P1-2 cutoff, possibly chronic right MCA M1 cutoff which had distal reconstitution and established collaterals, and left PCA occlusion which patient was subsequently transferred for mechanical thrombectomy. Cerebral angiogram and mechanical thrombectomy performed using aspiration with TICI 3 recanalization of left PCA.  The right occipital lobe filled from right SHANI collaterals, and the right MCA as well was thought to be chronic given noted collateralization. MR with small to moderate left temporal occipital lobe infarctions. Occluded right MCA, occluded right PCA and right vertebral artery proximal segment. Etiology; embolic stroke of undetermined source.  While there is underlying dove dove can not entirely exclude cardioembolic / hypercoagulable state.     NEURO:   -Neurologically appears to have right hemispheric symptoms in addition to L PCA distribution stroke.  - Needs repeat CT head and CT A CTP to evaluate for hypoperfusion.  -some memory impairment- suggest OT cognitive evaluation   -Continue close monitoring for neurologic deterioration    - Stroke neuro checks q 2 hrs.  -TTE  -Report as above was reviewed.   -LE duplex no DVT reported  -Hypercoagulable panel   - SBP goal permissive to [id number] Hg for now given new deficits.    -ANTITHROMBOTIC THERAPY: ASA 81mg daily   -titrate statin to LDL goal less than 70  -MRI Brain w/o as noted   - MRA Head NOVA -images and reports were reviewed.   -Consideration in Outpatient NM spect w/w.out diamox  -Dysphagia screen: pass  -Physical therapy/OT/Speech eval/treatment.     -CARDIOVASCULAR: check TTE, cardiac monitoring w/ telemetry for now, further evaluation pending findings of noted workup                              -HEMATOLOGY: H/H with anemia, Platelets 115, close monitoring for further thrombocytopenia, patient should have all age and risk appropriate malignancy screenings with PCP or sooner if clinically suspected      DVT ppx: no absolute neurological contraindication to pharmacological dvt ppx.     PULMONARY: CXR pending , protecting airway, saturating well     RENAL: BUN/Cr within range, monitor urine output, maintain adequate hydration       Na Goal:  135-145    ID: afebrile, no leukocytosis, monitor for si/sx of infection     OTHER:  condition and plan of care d/w patient, questions and concerns addressed.  CIWA protcol, monitor closely for si/sx of withdrawal.     DISPOSITION: Rehab or home depending on PT eval once stable and workup is complete      CORE MEASURES:        Admission NIHSS:10      Tenecteplase : [] YES [x] NO      LDL/HDL/A1C: 115/29/8.6     Depression Screen- if depression hx and/or present      Statin Therapy: as noted      Dysphagia Screen: [x] PASS [] FAIL     Smoking [] YES [x] NO      Afib [] YES [x] NO     Stroke Education [x] YES [] NO    Obtain screening lower extremity venous ultrasound in patients who meet 1 or more of the following criteria as patient is high risk for DVT/PE on admission:   [] History of DVT/PE  []Hypercoagulable states (Factor V Leiden, Cancer, OCP, etc. )  []Prolonged immobility (hemiplegia/hemiparesis/post operative or any other extended immobilization)  [] Transferred from outside facility (Rehab or Long term care)  [] Age </= to 50

## 2023-10-14 NOTE — CONSULT NOTE ADULT - ASSESSMENT
57 y/o M with a PMHx of stroke one year ago (patient recieved TNK and had full  resolution of symptoms), HTN, DM, non-compliant with medications, and sleep apnea, who presented following an episode of LOC.   As per chart patient had a sudden fall at work and lost consciousness It is unclear if patient had any symptoms before he fell.  NIHSS 10 on admission.   Patient is not a current TNK candidate.     Patient underwent cerebral angiogram on 10/12/2023, found L PCA occlusion s/p aspiration with TICI 3 recanalization.  A cardiac consult was called today due to abnormal EKG.  Patient with SB to 35 noted on the monitor overnight, today on the monitor SR with multiple episodes of PVC's, PJC's, noted.  Am EKG with SR 68, RBBB noted.   Patient denies any cardiac history, family history, states he is a non smoker, and does not take any medications.  Patient has known noncompliance.    Complains of facial numbness.  Denies headache, dizziness chest pain, palpation's sob, pnd, orthopnea, hemoptysis n/v/d/c/abd pain, fever, chills, syncope, presyncope, + for numbing and tingling of face, left arm, side and arm.

## 2023-10-14 NOTE — CONSULT NOTE ADULT - PROBLEM SELECTOR RECOMMENDATION 2
Tele:  Sr, tonya, PAC's  - james 35.  EKG:  Sr 68, RBBB, with PJC's.  Trops pending - continue to trend the trops  TTE - EF is 50 to 55%, Trace mitral valve regurgitation, and Mild tricuspid regurgitation.  Ischemic work up.

## 2023-10-14 NOTE — PROGRESS NOTE ADULT - ASSESSMENT
58M with h/o stroke (2022, sx fully resolved), HTN, DM, non-compliant with medications, developed LOC. CTH w/R PCA cutoff. Transfer to [facility] for thrombectomy.    PLAN:  Neuro: L temporoparietal and small occipital strokes  - Neurochecks q4h  - tylenol for pain  - ASA 81mg, will need plavix - timing per IR  - CIWA protocol, high dose thiamine, folate  - stroke core measures  PT/OT - evaluate    CV:  - SBP Goal 120-160  - Stroke core measures  - TTE   - LED     Pulm:  - spO2 >92%  - incentive spirometer    GI:  - DASH diet  - bowel regimen  - famotidine    Renal/:  - I&O Q1 hour  - Monitor Electrolytes & Renal Function    Heme:  - Monitor H&H  - DVT ppx - lovenox and SCDs    ID:  - Monitor WBC and Temperature	    Endo: A1c 8.6, TSH 5.36  - Monitor BGL, maintain <180  - insulin sliding scale    Dispo: ICU until 24h post-thrombectomy  - SW to assist with housing, disability, childcare  58M with h/o stroke (2022, sx fully resolved), HTN, DM, non-compliant with medications, developed LOC. CTH w/R PCA cutoff. Transfer to [facility] for thrombectomy.    PLAN:  Neuro: L temporoparietal and small occipital strokes  - Neurochecks q4h  - tylenol for pain  - ASA 81mg, will need plavix - timing per IR  - CIWA protocol, high dose thiamine, folate  - stroke core measures  PT/OT - evaluate    CV:  - SBP Goal 120-160  - Stroke core measures  - statin    Pulm: ADRIANO  - spO2 >92%  - incentive spirometer    GI:  - DASH diet  - bowel regimen  - famotidine  - LBM 10/12    Renal/:  - I&O Q1 hour  - Monitor Electrolytes & Renal Function  - voiding    Heme:  - Monitor H&H  - DVT ppx - lovenox and SCDs    ID:  - Monitor WBC and Temperature	    Endo: A1c 8.6, TSH 5.36  - Monitor BGL, maintain <180  - insulin sliding scale    Dispo:    - SW to assist with housing, disability, childcare

## 2023-10-14 NOTE — CONSULT NOTE ADULT - SUBJECTIVE AND OBJECTIVE BOX
[facility]                                              CARDIOLOGY AT [place] [address]                                             Telephone: [phone]. Fax:[phone]                                                       CARDIOLOGY CONSULTATION NOTE                                                                                             History obtained by: Patient and medical record  Community Cardiologist:   None   obtained: Yes [  ] No [ x ]  Reason for Consultation:   Abnormal ekg.    Available out pt records reviewed: Yes [  ] No [  ]  NA    Chief complaint:    Patient is a 58y old  Male who presents with a chief complaint of stroke.       HPI:  57 y/o M with a PMHx of stroke one year ago (patient recieved TNK and had full  resolution of symptoms), HTN, DM, non-compliant with medications, and sleep apnea, who presented following an episode of LOC.   As per chart patient had a sudden fall at work and lost consciousness It is unclear if patient had any symptoms before he fell.  NIHSS 10 on admission.   Patient is not a current TNK candidate.     Patient underwent cerebral angiogram on 10/12/2023, found L PCA occlusion s/p aspiration with TICI 3 recanalization.  A cardiac consult was called today due to abnormal EKG.  Patient with SB to 35 noted on the monitor overnight, today on the monitor SR with multiple episodes of PVC's, PJC's, noted.  Am EKG with SR 68, RBBB noted.   Patient denies any cardiac history, family history, states he is a non smoker, and does not take any medications.  Patient has known noncompliance.    Complains of facial numbness.  Denies headache, dizziness chest pain, palpation's sob, pnd, orthopnea, hemoptysis n/v/d/c/abd pain, fever, chills, syncope, presyncope, + for numbing and tingling of face, left arm, side and arm.       CARDIAC TESTING   ECHO:  ACC: [id number] EXAM:  ECHO TTE WITH CON COMP W DOPP                        PROCEDURE DATE:  10/13/2023    INTERPRETATION:  TRANSTHORACIC ECHOCARDIOGRAM REPORT    Patient Name:   CINDY DUARTE Patient Location: Inpatient  Medical Rec #:  [id number]               Accession #:      [id number]  Account #:                             Height:           66.0 in 167.6 cm  YOB: 1965               Weight:           242.5 lb 110.00   kg  Patient Age:    58 years               BSA:              2.17 m²  Patient Gender: M                      BP:               132/91 mmHg    Date of Exam:        10/13/2023 7:44:42 PM  Sonographer:         Jasmin Latif  Referring Physician: Savana Kenney    Procedure:     2D Echo/Doppler/Color Doppler Complete.  Indications:   I63.9 - Cerebral infarction, unspecified  Diagnosis:     I63.9 - Cerebral infarction, unspecified  Study Details: Technically difficult study. Study quality was adversely   affected                 due to body habitus.  2D AND M-MODE MEASUREMENTS (normal ranges within parentheses):  Left                 Normal   Aorta/Left            Normal  Ventricle:                    Atrium:  IVSd (2D):    0.97  (0.7-1.1) Aortic Root    2.80  (2.4-3.7)                 cm             (2D):           cm  LVPWd (2D):   0.93  (0.7-1.1) Left Atrium    2.90  (1.9-4.0)                 cm             (2D):           cm  LVIDd (2D):   4.87  (3.4-5.7) LA Volume      20.4                 cm             Index         ml/m²  LVIDs (2D):   3.60            Right Ventricle:                 cm             RVd (2D):        3.46 cm  LV FS (2D):   26.0   (>25%)   TAPSE:           1.59 cm                  %  Relative Wall 0.38   (<0.42)  Thickness    LV SYSTOLIC FUNCTION BY 2D PLANIMETRY (MOD):  EF-A4C View: 52.2 % EF-Biplane: 47 %    LV DIASTOLIC FUNCTION:  MV Peak E: 0.87 m/s Decel Time: 224 msec  MV Peak A: 0.69 m/s  E/A Ratio: 1.26    SPECTRAL DOPPLER ANALYSIS (where applicable):  Mitral Valve:  MV P1/2 Time: 64.96 msec  MV Area, PHT: 3.39 cm²    Aortic Valve: AoV Max Fam: 1.64 m/s AoV Peak PG: 10.8 mmHg AoV Mean P.0 mmHg    LVOT Vmax: 1.22 m/s LVOT VTI: 0.274 m LVOT Diameter: 1.70 cm    AoV Area, Vmax: 1.69 cm² AoV Area, VTI: 1.55 cm² AoV Area, Vmn: 1.50 cm²  Ao VTI: 0.402  Aortic Insufficiency:  AI Half-time:  481 msec  AI Decel Rate: 1.50 m/s²    Tricuspid Valve and PA/RV Systolic Pressure: TR Max Velocity: 2.54 m/s RA   Pressure: 3 mmHg RVSP/PASP: 28.8 mmHg    PHYSICIAN INTERPRETATION:  Left Ventricle: The left ventricular internal cavity size is normal. Left   ventricular wall thickness is normal.  Left ventricular ejection fraction, by visual estimation, is 50 to 55%.   Spectral Doppler shows normal pattern of LV diastolic filling. Normal LV   filling pressures.  Right Ventricle: The right ventricular size is mildly enlarged. TV S' 1.4   m/s.  Left Atrium: Normal left atrial size.  Right Atrium: The right atrium is normal in size. The right atrial   pressure is normal. Normal right atrial size.  Pericardium: Trivial pericardial effusion is present. The pericardial   effusion is anterior to the right ventricle. There is no evidence of   cardiac tamponade.  Mitral Valve: The mitral valve is normal in structure. Mild thickening of   the anterior mitral valve leaflet. Mitral leaflet mobility is normal.   There is mild mitral annular calcification. Trace mitral valve   regurgitation is seen.  Tricuspid Valve: Structurally normal tricuspid valve, with normal leaflet   excursion. Mild tricuspid regurgitation is visualized.  Aortic Valve: The aortic valve is trileaflet. Sclerotic aortic valve with   normal opening. Trivial aortic valve regurgitation is seen.  Pulmonic Valve: Structurally normal pulmonic valve, with normal leaflet   excursion. Trace pulmonic valve regurgitation.  Aorta: There is dilatation of the ascending aorta.  Pulmonary Artery: The main pulmonary artery is normal in size.  Venous: The pulmonary veins appear normal. The inferior vena cava is   normal. The inferior vena cava was normal sized, with respiratory size   variation greater than 50%.  Summary:   1. Technically difficult study.   2. Normal left ventricular internal cavity size.   3. Left ventricular ejection fraction, by visual estimation, is 50 to   55%.   4. Normal left atrial size.   5. Normal right atrial size.   6. Mildly enlarged right ventricle.   7. Dilatation of the ascending aorta.   8. Sclerotic aortic valve with normal opening.   9. Mild mitral annular calcification.  10. Mild thickening of the anterior mitral valve leaflet.  11. Trace mitral valve regurgitation.  12. Mild tricuspid regurgitation.  13. Trivial pericardial effusion.  14. Recommend transesophageal echocardiogram.    Michael Tavera MD Electronically signed on 10/14/2023 at 9:47:58 AM    STRESS:    CATH:     ELECTROPHYSIOLOGY:     PAST MEDICAL HISTORY  HTN (hypertension)  CVA (cerebrovascular accident)    PAST SURGICAL HISTORY  No significant past surgical history  History of hip surgery    SOCIAL HISTORY:  Denies smoking/alcohol/drugs  CIGARETTES:     ALCOHOL:  DRUGS:    FAMILY HISTORY:  Family History of Cardiovascular Disease:  Yes [  ] No [  x]  Coronary Artery Disease in first degree relative: Yes [  ] No [x  ]  Sudden Cardiac Death in First degree relative: Yes [  ] No [ x ]    HOME MEDICATIONS:  None    CURRENT CARDIAC MEDICATIONS:  hydrALAZINE Injectable 10 milliGRAM(s) IV Push every 4 hours PRN SBP>160    CURRENT OTHER MEDICATIONS:  acetaminophen     Tablet .. 650 milliGRAM(s) Oral every 6 hours PRN Mild Pain (1 - 3)  LORazepam   Injectable 2 milliGRAM(s) IV Push every 2 hours PRN CIWA-Ar score increase by 2 points and a total score of 7 or less  famotidine    Tablet 20 milliGRAM(s) Oral daily  senna 2 Tablet(s) Oral at bedtime  aspirin  chewable 81 milliGRAM(s) Oral daily  atorvastatin 80 milliGRAM(s) Oral at bedtime  enoxaparin Injectable 40 milliGRAM(s) SubCutaneous <User Schedule>  folic acid 1 milliGRAM(s) Oral daily  influenza   Vaccine 0.5 milliLiter(s) IntraMuscular once  insulin lispro (ADMELOG) corrective regimen sliding scale   SubCutaneous Before meals and at bedtime  multivitamin 1 Tablet(s) Oral daily  thiamine IVPB 500 milliGRAM(s) IV Intermittent every 8 hours, Stop order after: 3 Days    ALLERGIES:   No Known Allergies    REVIEW OF SYMPTOMS:   CONSTITUTIONAL: No fever, no chills, no weight loss, no weight gain, no fatigue   ENMT:  No vertigo; No sinus or throat pain  NECK: No pain or stiffness  CARDIOVASCULAR: No chest pain, no dyspnea, no syncope/presyncope, no palpitations, no dizziness, no Orthopnea, no Paroxsymal nocturnal dyspnea  RESPIRATORY: no Shortness of breath, no cough, no wheezing  : No dysuria, no hematuria   GI: No dark color stool, no nausea, no diarrhea, no constipation, no abdominal pain   NEURO: No headache, no slurred speech   MUSCULOSKELETAL: No joint pain or swelling; No muscle, back, or extremity pain  PSYCH: No agitation, no anxiety.    ALL OTHER REVIEW OF SYSTEMS ARE NEGATIVE.    VITAL SIGNS:  T(C): 36.4 (10-14-23 @ 07:49), Max: 36.8 (10-13-23 @ 15:43)  T(F): 97.6 (10-14-23 @ 07:49), Max: 98.3 (10-13-23 @ 15:43)  HR: 68 (10-14-23 @ 12:00) (56 - 86)  BP: 145/71 (10-14-23 @ 12:00) (122/78 - 159/88)  RR: 20 (10-14-23 @ 12:00) (14 - 21)  SpO2: 98% (10-14-23 @ 12:00) (91% - 100%)    INTAKE AND OUTPUT:   10-13 @ 07:01  -  10-14 @ 07:00  --------------------------------------------------------  IN: 3410 mL / OUT: 2700 mL / NET: 710 mL  10-14 @ 07:01  -  10-14 @ 13:31  --------------------------------------------------------  IN: 711 mL / OUT: 900 mL / NET: -189 mL    PHYSICAL EXAM:  Constitutional: Comfortable . No acute distress, obese  HEENT: Atraumatic, left facial droop noted,   neck is supple . no JVD. No carotid bruit.  CNS: A&Ox3. No focal deficits.   Respiratory:   Diminished bilaterally  Cardiovascular: RRR normal s1 s2. No murmur. No rubs or gallop.  Gastrointestinal: Soft, non-tender. +Bowel sounds.   Extremities: + Peripheral Pulses, No clubbing, cyanosis, or edema  Psychiatric: Calm . no agitation.   Skin: Warm and dry, no ulcers on extremities     LABS:                        12.8   4.55  )-----------( 104      ( 14 Oct 2023 03:38 )             36.0     1014    140  |  104  |  10.0  ----------------------------<  153<H>  4.2   |  27.0  |  0.97    Ca    8.4      14 Oct 2023 03:38  Phos  3.3     10-  Mg     2.2     10-    TPro  6.2<L>  /  Alb  3.7  /  TBili  0.5  /  DBili  0.1  /  AST  32  /  ALT  43<H>  /  AlkPhos  66  10-14    Urinalysis Basic - ( 14 Oct 2023 03:38 )    Color: x / Appearance: x / SG: x / pH: x  Gluc: 153 mg/dL / Ketone: x  / Bili: x / Urobili: x   Blood: x / Protein: x / Nitrite: x   Leuk Esterase: x / RBC: x / WBC x   Sq Epi: x / Non Sq Epi: x / Bacteria: x    Thyroid Stimulating Hormone, Serum: 4.75 uIU/mL (10-14-23 @ 03:38)    INTERPRETATION OF TELEMETRY:  Sr, bigeminy, PAC's  - james 35.      ECG:  Sr 68, RBBB, with PJC"s.    Prior ECG: Yes [  ] No [  ]                                                  [facility]                                              CARDIOLOGY AT [place] [address]                                             Telephone: [phone]. Fax:[phone]                                                       CARDIOLOGY CONSULTATION NOTE                                                                                             History obtained by: Patient and medical record  Community Cardiologist:   None   obtained: Yes [  ] No [ x ]  Reason for Consultation:   Abnormal ekg.    Available out pt records reviewed: Yes [  ] No [  ]  NA    Chief complaint:    Patient is a 58y old  Male who presents with a chief complaint of stroke.       HPI:  57 y/o M with a PMHx of stroke one year ago (patient recieved TNK and had full  resolution of symptoms), HTN, DM, non-compliant with medications, and sleep apnea, who presented following an episode of LOC.   As per chart patient had a sudden fall at work and lost consciousness It is unclear if patient had any symptoms before he fell.  NIHSS 10 on admission.   Patient is not a current TNK candidate.     Patient underwent cerebral angiogram on 10/12/2023, found L PCA occlusion s/p aspiration with TICI 3 recanalization.  A cardiac consult was called today due to abnormal EKG.  Patient with SB to 35 noted on the monitor overnight, today on the monitor SR with multiple episodes of PVC's, PJC's, noted.  Am EKG with SR 68, RBBB noted.   Patient denies any cardiac history, family history, states he is a non smoker, and does not take any medications.  Patient has known noncompliance.    Complains of facial numbness.  Denies headache, dizziness chest pain, palpation's sob, pnd, orthopnea, hemoptysis n/v/d/c/abd pain, fever, chills, syncope, presyncope, + for numbing and tingling of face, left arm, side and arm.       CARDIAC TESTING   ECHO:  ACC: [id number] EXAM:  ECHO TTE WITH CON COMP W DOPP                        PROCEDURE DATE:  10/13/2023    INTERPRETATION:  TRANSTHORACIC ECHOCARDIOGRAM REPORT    Patient Name:   CINDY DUARTE Patient Location: Inpatient  Medical Rec #:  [id number]               Accession #:      [id number]  Account #:                             Height:           66.0 in 167.6 cm  YOB: 1965               Weight:           242.5 lb 110.00   kg  Patient Age:    58 years               BSA:              2.17 m²  Patient Gender: M                      BP:               132/91 mmHg    Date of Exam:        10/13/2023 7:44:42 PM  Sonographer:         Jasmin Latif  Referring Physician: Savana Kenney    Procedure:     2D Echo/Doppler/Color Doppler Complete.  Indications:   I63.9 - Cerebral infarction, unspecified  Diagnosis:     I63.9 - Cerebral infarction, unspecified  Study Details: Technically difficult study. Study quality was adversely   affected                 due to body habitus.  2D AND M-MODE MEASUREMENTS (normal ranges within parentheses):  Left                 Normal   Aorta/Left            Normal  Ventricle:                    Atrium:  IVSd (2D):    0.97  (0.7-1.1) Aortic Root    2.80  (2.4-3.7)                 cm             (2D):           cm  LVPWd (2D):   0.93  (0.7-1.1) Left Atrium    2.90  (1.9-4.0)                 cm             (2D):           cm  LVIDd (2D):   4.87  (3.4-5.7) LA Volume      20.4                 cm             Index         ml/m²  LVIDs (2D):   3.60            Right Ventricle:                 cm             RVd (2D):        3.46 cm  LV FS (2D):   26.0   (>25%)   TAPSE:           1.59 cm                  %  Relative Wall 0.38   (<0.42)  Thickness    LV SYSTOLIC FUNCTION BY 2D PLANIMETRY (MOD):  EF-A4C View: 52.2 % EF-Biplane: 47 %    LV DIASTOLIC FUNCTION:  MV Peak E: 0.87 m/s Decel Time: 224 msec  MV Peak A: 0.69 m/s  E/A Ratio: 1.26    SPECTRAL DOPPLER ANALYSIS (where applicable):  Mitral Valve:  MV P1/2 Time: 64.96 msec  MV Area, PHT: 3.39 cm²    Aortic Valve: AoV Max Fam: 1.64 m/s AoV Peak PG: 10.8 mmHg AoV Mean P.0 mmHg    LVOT Vmax: 1.22 m/s LVOT VTI: 0.274 m LVOT Diameter: 1.70 cm    AoV Area, Vmax: 1.69 cm² AoV Area, VTI: 1.55 cm² AoV Area, Vmn: 1.50 cm²  Ao VTI: 0.402  Aortic Insufficiency:  AI Half-time:  481 msec  AI Decel Rate: 1.50 m/s²    Tricuspid Valve and PA/RV Systolic Pressure: TR Max Velocity: 2.54 m/s RA   Pressure: 3 mmHg RVSP/PASP: 28.8 mmHg    PHYSICIAN INTERPRETATION:  Left Ventricle: The left ventricular internal cavity size is normal. Left   ventricular wall thickness is normal.  Left ventricular ejection fraction, by visual estimation, is 50 to 55%.   Spectral Doppler shows normal pattern of LV diastolic filling. Normal LV   filling pressures.  Right Ventricle: The right ventricular size is mildly enlarged. TV S' 1.4   m/s.  Left Atrium: Normal left atrial size.  Right Atrium: The right atrium is normal in size. The right atrial   pressure is normal. Normal right atrial size.  Pericardium: Trivial pericardial effusion is present. The pericardial   effusion is anterior to the right ventricle. There is no evidence of   cardiac tamponade.  Mitral Valve: The mitral valve is normal in structure. Mild thickening of   the anterior mitral valve leaflet. Mitral leaflet mobility is normal.   There is mild mitral annular calcification. Trace mitral valve   regurgitation is seen.  Tricuspid Valve: Structurally normal tricuspid valve, with normal leaflet   excursion. Mild tricuspid regurgitation is visualized.  Aortic Valve: The aortic valve is trileaflet. Sclerotic aortic valve with   normal opening. Trivial aortic valve regurgitation is seen.  Pulmonic Valve: Structurally normal pulmonic valve, with normal leaflet   excursion. Trace pulmonic valve regurgitation.  Aorta: There is dilatation of the ascending aorta.  Pulmonary Artery: The main pulmonary artery is normal in size.  Venous: The pulmonary veins appear normal. The inferior vena cava is   normal. The inferior vena cava was normal sized, with respiratory size   variation greater than 50%.  Summary:   1. Technically difficult study.   2. Normal left ventricular internal cavity size.   3. Left ventricular ejection fraction, by visual estimation, is 50 to   55%.   4. Normal left atrial size.   5. Normal right atrial size.   6. Mildly enlarged right ventricle.   7. Dilatation of the ascending aorta.   8. Sclerotic aortic valve with normal opening.   9. Mild mitral annular calcification.  10. Mild thickening of the anterior mitral valve leaflet.  11. Trace mitral valve regurgitation.  12. Mild tricuspid regurgitation.  13. Trivial pericardial effusion.  14. Recommend transesophageal echocardiogram.    Michael Tavera MD Electronically signed on 10/14/2023 at 9:47:58 AM    STRESS:    CATH:     ELECTROPHYSIOLOGY:     PAST MEDICAL HISTORY  HTN (hypertension)  CVA (cerebrovascular accident)    PAST SURGICAL HISTORY  No significant past surgical history  History of hip surgery    SOCIAL HISTORY:  Denies smoking/alcohol/drugs  CIGARETTES:     ALCOHOL:  DRUGS:    FAMILY HISTORY:  Family History of Cardiovascular Disease:  Yes [  ] No [  x]  Coronary Artery Disease in first degree relative: Yes [  ] No [x  ]  Sudden Cardiac Death in First degree relative: Yes [  ] No [ x ]    HOME MEDICATIONS:  None    CURRENT CARDIAC MEDICATIONS:  hydrALAZINE Injectable 10 milliGRAM(s) IV Push every 4 hours PRN SBP>160    CURRENT OTHER MEDICATIONS:  acetaminophen     Tablet .. 650 milliGRAM(s) Oral every 6 hours PRN Mild Pain (1 - 3)  LORazepam   Injectable 2 milliGRAM(s) IV Push every 2 hours PRN CIWA-Ar score increase by 2 points and a total score of 7 or less  famotidine    Tablet 20 milliGRAM(s) Oral daily  senna 2 Tablet(s) Oral at bedtime  aspirin  chewable 81 milliGRAM(s) Oral daily  atorvastatin 80 milliGRAM(s) Oral at bedtime  enoxaparin Injectable 40 milliGRAM(s) SubCutaneous <User Schedule>  folic acid 1 milliGRAM(s) Oral daily  influenza   Vaccine 0.5 milliLiter(s) IntraMuscular once  insulin lispro (ADMELOG) corrective regimen sliding scale   SubCutaneous Before meals and at bedtime  multivitamin 1 Tablet(s) Oral daily  thiamine IVPB 500 milliGRAM(s) IV Intermittent every 8 hours, Stop order after: 3 Days    ALLERGIES:   No Known Allergies    REVIEW OF SYMPTOMS:   CONSTITUTIONAL: No fever, no chills, no weight loss, no weight gain, no fatigue   ENMT:  No vertigo; No sinus or throat pain  NECK: No pain or stiffness  CARDIOVASCULAR: No chest pain, no dyspnea, no syncope/presyncope, no palpitations, no dizziness, no Orthopnea, no Paroxsymal nocturnal dyspnea  RESPIRATORY: no Shortness of breath, no cough, no wheezing  : No dysuria, no hematuria   GI: No dark color stool, no nausea, no diarrhea, no constipation, no abdominal pain   NEURO: No headache, no slurred speech   MUSCULOSKELETAL: No joint pain or swelling; No muscle, back, or extremity pain  PSYCH: No agitation, no anxiety.    ALL OTHER REVIEW OF SYSTEMS ARE NEGATIVE.    VITAL SIGNS:  T(C): 36.4 (10-14-23 @ 07:49), Max: 36.8 (10-13-23 @ 15:43)  T(F): 97.6 (10-14-23 @ 07:49), Max: 98.3 (10-13-23 @ 15:43)  HR: 68 (10-14-23 @ 12:00) (56 - 86)  BP: 145/71 (10-14-23 @ 12:00) (122/78 - 159/88)  RR: 20 (10-14-23 @ 12:00) (14 - 21)  SpO2: 98% (10-14-23 @ 12:00) (91% - 100%)    INTAKE AND OUTPUT:   10-13 @ 07:01  -  10-14 @ 07:00  --------------------------------------------------------  IN: 3410 mL / OUT: 2700 mL / NET: 710 mL  10-14 @ 07:01  -  10-14 @ 13:31  --------------------------------------------------------  IN: 711 mL / OUT: 900 mL / NET: -189 mL    PHYSICAL EXAM:  Constitutional: Comfortable . No acute distress, obese  HEENT: Atraumatic, left facial droop noted,   neck is supple . no JVD. No carotid bruit.  CNS: A&Ox3. No focal deficits.   Respiratory:   Diminished bilaterally  Cardiovascular: RRR normal s1 s2. No murmur. No rubs or gallop.  Gastrointestinal: Soft, non-tender. +Bowel sounds.   Extremities: + Peripheral Pulses, No clubbing, cyanosis, or edema  Psychiatric: Calm . no agitation.   Skin: Warm and dry, no ulcers on extremities     LABS:                        12.8   4.55  )-----------( 104      ( 14 Oct 2023 03:38 )             36.0     1014    140  |  104  |  10.0  ----------------------------<  153<H>  4.2   |  27.0  |  0.97    Ca    8.4      14 Oct 2023 03:38  Phos  3.3     10-  Mg     2.2     10-14    TPro  6.2<L>  /  Alb  3.7  /  TBili  0.5  /  DBili  0.1  /  AST  32  /  ALT  43<H>  /  AlkPhos  66  10-14    Urinalysis Basic - ( 14 Oct 2023 03:38 )    Color: x / Appearance: x / SG: x / pH: x  Gluc: 153 mg/dL / Ketone: x  / Bili: x / Urobili: x   Blood: x / Protein: x / Nitrite: x   Leuk Esterase: x / RBC: x / WBC x   Sq Epi: x / Non Sq Epi: x / Bacteria: x    Thyroid Stimulating Hormone, Serum: 4.75 uIU/mL (10-14-23 @ 03:38)    INTERPRETATION OF TELEMETRY:  Sr, bigeminy, PAC's  - james 35.      ECG:  Sr 68, RBBB, with PJC"s, PAC's, possible 2-1 block  Concerned for 2 - 1 block discussed with Dr. Mercer - stated Sr with PJC.     Prior ECG: Yes [  ] No [  ]

## 2023-10-14 NOTE — CONSULT NOTE ADULT - SUBJECTIVE AND OBJECTIVE BOX
GERALDCINDY COMBS  58y  Male      Patient is a 58y old  Male who presents with a chief complaint of stroke (13 Oct 2023 13:23)    Patient is a 59 y/o M with a PMHx of stroke one year ago (at the time patient received TNK and had full  resolution of symptoms), HTN, DM, non-compliant with medications, who presented following an episode of LOC. NIHSS 10 on admission . Patient is not a current TNK candidate  due to unclear exact last known well. TRF to [facility] for thrombectomy. MRI confirmed - Acute small to moderate medial left temporal occipital lobe infarctions. Acute punctate left occipital lobe infarction. Occluded right MCA. Occluded right PCA. Occluded right. Vertebral artery proximal V4 segment. S/p cerebral angiogram: found L PCA occlusion s/p aspiration with TICI 3 recanalization on 10/12. Down stable to downgrade to medicine. Also h/o ETOH use, reports drinking vodka almost everyday, no DT history.     At the time of my examination, pt reports left sided lip and facial numbness (was reports right sided symptoms to neurosx team). No other concerns.     PAST MEDICAL/SURGICAL HISTORY  PAST MEDICAL & SURGICAL HISTORY:  HTN (hypertension)      CVA (cerebrovascular accident)      History of hip surgery      REVIEW OF SYSTEMS:  CONSTITUTIONAL: No fever, weight loss, or fatigue  EYES: No eye pain, visual disturbances, or discharge  ENMT:  No difficulty hearing, tinnitus, vertigo; No sinus or throat pain  NECK: No pain or stiffness  BREASTS: No pain, masses, or nipple discharge  RESPIRATORY: No cough, wheezing, chills or hemoptysis; No shortness of breath  CARDIOVASCULAR: No chest pain, palpitations, dizziness, or leg swelling  GASTROINTESTINAL: No abdominal or epigastric pain. No nausea, vomiting, or hematemesis; No diarrhea or constipation. No melena or hematochezia.  GENITOURINARY: No dysuria, frequency, hematuria, or incontinence  NEUROLOGICAL: No headaches, memory loss, loss of strength, numbness, or tremors  SKIN: No itching, burning, rashes, or lesions   LYMPH NODES: No enlarged glands  ENDOCRINE: No heat or cold intolerance; No hair loss  MUSCULOSKELETAL: No joint pain or swelling; No muscle, back, or extremity pain  PSYCHIATRIC: No depression, anxiety, mood swings, or difficulty sleeping  HEME/LYMPH: No easy bruising, or bleeding gums  ALLERY AND IMMUNOLOGIC: No hives or eczema    T(C): 36.4 (10-14-23 @ 07:49), Max: 36.8 (10-13-23 @ 15:43)  HR: 56 (10-14-23 @ 06:00) (56 - 86)  BP: 133/72 (10-14-23 @ 06:00) (125/81 - 158/80)  RR: 14 (10-14-23 @ 06:00) (13 - 21)  SpO2: 99% (10-14-23 @ 06:00) (94% - 100%)  Wt(kg): --Vital Signs Last 24 Hrs  T(C): 36.4 (14 Oct 2023 07:49), Max: 36.8 (13 Oct 2023 15:43)  T(F): 97.6 (14 Oct 2023 07:49), Max: 98.3 (13 Oct 2023 15:43)  HR: 56 (14 Oct 2023 06:00) (56 - 86)  BP: 133/72 (14 Oct 2023 06:00) (125/81 - 158/80)  BP(mean): 91 (14 Oct 2023 06:00) (89 - 129)  RR: 14 (14 Oct 2023 06:00) (13 - 21)  SpO2: 99% (14 Oct 2023 06:00) (94% - 100%)    Parameters below as of 14 Oct 2023 04:00  Patient On (Oxygen Delivery Method): room air    PHYSICAL EXAM:  GENERAL: NAD, well-groomed  HEAD:  Atraumatic, Normocephalic  NERVOUS SYSTEM:  Alert & Oriented X3, Good concentration; Motor Strength 5/5 B/L upper and lower extremities  CHEST/LUNG: Clear to percussion bilaterally; No rales, rhonchi, wheezing, or rubs  HEART: Regular rate and rhythm; No murmurs, rubs, or gallops  ABDOMEN: Soft, Nontender, Nondistended; Bowel sounds present  EXTREMITIES:  2+ Peripheral Pulses, No clubbing, cyanosis, or edema  LYMPH: No lymphadenopathy noted  SKIN: No rashes or lesions    Consultant(s) Notes Reviewed:  [x ] YES  [ ] NO  Care Discussed with Consultants/Other Providers [ x] YES  [ ] NO    LABS:  CBC   10-14-23 @ 03:38  Hematcorit 36.0  Hemoglobin 12.8  Mean Cell Hemoglobin 33.1  Platelet Count-Automated 104  RBC Count 3.87  Red Cell Distrib Width 13.6  Wbc Count 4.55      BMP  10-14-23 @ 03:38  Anion Gap. Serum 9  Blood Urea Nitrogen,Serm 10.0  Calcium, Total Serum 8.4  Carbon Dioxide, Serum 27.0  Chloride, Serum 104  Creatinine, Serum 0.97  eGFR in  --  eGFR in Non Afican American --  Gloucose, serum 153  Potassium, Serum 4.2  Sodium, Serum 140        10-13-23 @ 03:10  Anion Gap. Serum 12  Blood Urea Nitrogen,Serm 11.0  Calcium, Total Serum 8.4  Carbon Dioxide, Serum 24.0  Chloride, Serum 100  Creatinine, Serum 0.90  eGFR in  --  eGFR in Non Afican American --  Gloucose, serum 200  Potassium, Serum 4.1  Sodium, Serum 136        CMP  10-14-23 @ 03:38  Linda Aminotransferase(ALT/SGPT)43  Albumin, Serum 3.7  Alkaline Phosphatase, Serum 66  Anion Gap, Serum 9  Aspartate Aminotransferase (AST/SGOT)32  Bilirubin Total, Serum 0.5  Blood Urea Nitrogen, Serum 10.0  Calcium,Total Serum 8.4  Carbon Dioxide, Serum 27.0  Chloride, Serum 104  Creatinine, Serum 0.97  eGFR if  --  eGFR if Non African American --  Glucose, Serum 153  Potassium, Serum 4.2  Protein Total, Serum 6.2  Sodium, Serum 140        10-13-23 @ 03:10  Linda Aminotransferase(ALT/SGPT)--  Albumin, Serum --  Alkaline Phosphatase, Serum --  Anion Gap, Serum 12  Aspartate Aminotransferase (AST/SGOT)--  Bilirubin Total, Serum --  Blood Urea Nitrogen, Serum 11.0  Calcium,Total Serum 8.4  Carbon Dioxide, Serum 24.0  Chloride, Serum 100  Creatinine, Serum 0.90  eGFR if  --  eGFR if Non African American --  Glucose, Serum 200  Potassium, Serum 4.1  Protein Total, Serum --  Sodium, Serum 136        PT/INR      Amylase/Lipase            RADIOLOGY & ADDITIONAL TESTS:    Imaging Personally Reviewed:  [ ] YES  [ ] NO

## 2023-10-14 NOTE — PROGRESS NOTE ADULT - SUBJECTIVE AND OBJECTIVE BOX
[facility] Stroke Team  CC: LOC    HPI:  Patient is a 57 y/o M with a PMHx of stroke 2022 reported at [facility]  (at the time patient received thrombolysis and had full  resolution of symptoms), family reported dove dove, HTN, DM, HLD nonadherent with medications, who presented following an episode of LOC.  As per sister report, patient was last seen well by his niece  morning of 10/12/23  before patient went to work. At around 6:15 pm that night patient had a sudden fall at work and lost consciousness It is unclear if patient had any symptoms before he fell. NIHSS 10 on admission . Transferred to [facility] for mechanical thrombectomy.        PAST MEDICAL & SURGICAL HISTORY:  HTN (hypertension)  CVA (cerebrovascular accident)  History of hip surgery  Stroke as noted by sister   Miriam Dove     Allergies  No Known Allergies    Intolerances    SOCIAL HISTORY:  no tob,   etoh : 1 pt vodka daily  no drugs, years     FAMILY HISTORY:  possibly stroke in father   DM , HLD, HTN       Vital Signs Last 24 Hrs  T(C): 36.4 (14 Oct 2023 07:49), Max: 36.8 (13 Oct 2023 15:43)  T(F): 97.6 (14 Oct 2023 07:49), Max: 98.3 (13 Oct 2023 15:43)  HR: 68 (14 Oct 2023 12:00) (56 - 86)  BP: 145/71 (14 Oct 2023 12:00) (122/78 - 159/88)  BP(mean): 89 (14 Oct 2023 12:00) (89 - 125)  RR: 20 (14 Oct 2023 12:00) (14 - 21)  SpO2: 98% (14 Oct 2023 12:00) (91% - 100%)    Parameters below as of 14 Oct 2023 12:00  Patient On (Oxygen Delivery Method): room air    MEDICATIONS    acetaminophen     Tablet .. 650 milliGRAM(s) Oral every 6 hours PRN  aspirin  chewable 81 milliGRAM(s) Oral daily  atorvastatin 80 milliGRAM(s) Oral at bedtime  dextrose 5%. 1000 milliLiter(s) IV Continuous <Continuous>  dextrose 5%. 1000 milliLiter(s) IV Continuous <Continuous>  dextrose 50% Injectable 12.5 Gram(s) IV Push once  dextrose 50% Injectable 25 Gram(s) IV Push once  dextrose 50% Injectable 25 Gram(s) IV Push once  enoxaparin Injectable 40 milliGRAM(s) SubCutaneous <User Schedule>  famotidine    Tablet 20 milliGRAM(s) Oral daily  folic acid 1 milliGRAM(s) Oral daily  hydrALAZINE Injectable 10 milliGRAM(s) IV Push every 4 hours PRN  influenza   Vaccine 0.5 milliLiter(s) IntraMuscular once  insulin lispro (ADMELOG) corrective regimen sliding scale   SubCutaneous Before meals and at bedtime  LORazepam   Injectable 2 milliGRAM(s) IV Push every 2 hours PRN  multivitamin 1 Tablet(s) Oral daily  senna 2 Tablet(s) Oral at bedtime  thiamine IVPB 500 milliGRAM(s) IV Intermittent every 8 hours      LABS:  CBC Full  -  ( 14 Oct 2023 03:38 )  WBC Count : 4.55 K/uL  RBC Count : 3.87 M/uL  Hemoglobin : 12.8 g/dL  Hematocrit : 36.0 %  Platelet Count - Automated : 104 K/uL  Mean Cell Volume : 93.0 fl  Mean Cell Hemoglobin : 33.1 pg  Mean Cell Hemoglobin Concentration : 35.6 gm/dL  Auto Neutrophil # : x  Auto Lymphocyte # : x  Auto Monocyte # : x  Auto Eosinophil # : x  Auto Basophil # : x  Auto Neutrophil % : x  Auto Lymphocyte % : x  Auto Monocyte % : x  Auto Eosinophil % : x  Auto Basophil % : x    Urinalysis Basic - ( 14 Oct 2023 03:38 )    Color: x / Appearance: x / SG: x / pH: x  Gluc: 153 mg/dL / Ketone: x  / Bili: x / Urobili: x   Blood: x / Protein: x / Nitrite: x   Leuk Esterase: x / RBC: x / WBC x   Sq Epi: x / Non Sq Epi: x / Bacteria: x      10-14    140  |  104  |  10.0  ----------------------------<  153<H>  4.2   |  27.0  |  0.97    Ca    8.4      14 Oct 2023 03:38  Phos  3.3     10-14  Mg     2.2     10-14    TPro  6.2<L>  /  Alb  3.7  /  TBili  0.5  /  DBili  0.1  /  AST  32  /  ALT  43<H>  /  AlkPhos  66  10-14    LIVER FUNCTIONS - ( 14 Oct 2023 03:38 )  Alb: 3.7 g/dL / Pro: 6.2 g/dL / ALK PHOS: 66 U/L / ALT: 43 U/L / AST: 32 U/L / GGT: x           Lipid Profile (10.13.23 @ 03:10)    Cholesterol: 199 mg/dL   Triglycerides, Serum: 277 mg/dL   HDL Cholesterol: 29 mg/dL   Non HDL Cholesterol: 170    LDL Cholesterol Calculated: 115 mg/dL    A1C with Estimated Average Glucose (10.13.23 @ 03:10)    A1C with Estimated Average Glucose Result: 8.6 %   Estimated Average Glucose: 200 mg/dL    Detailed Neurologic Exam:    Mental status: The patient is awake and alert and has normal attention span.  The patient is fully oriented to self , location, president, disoriented to month and year, The patient is oriented to current events. The patient is able to name objects, follow commands, repeat sentences.    Cranial nerves: Pupil right 3mm left 2mm, There is a left superior quadrantanopsia. Extraocular motion is full with no nystagmus. There is no ptosis. Facial sensation is intact on the right and diminished on the left. Facial musculature is symmetric (states both sides numb but equal).      Motor: There is normal bulk and tone.  There is no tremor.  Strength is 5/5 in the right arm and leg.   Strength is 5/5 in the left arm and leg.    Sensation: Intact to light touch  on the right and diminished on the left.     Cerebellar: There is no dysmetria on finger to nose testing.    Gait : deferred      RADIOLOGY & ADDITIONAL STUDIES (independently reviewed unless otherwise noted):  10/12/2023 19:13  IMPRESSION:  HEAD CT: No evidence of an acute intracranial hemorhage, midline shift or hydrocephalus. Mild bifrontal periventricular white matter ischemic changes. Bilateral maxillary sinusitis  NECK CTA: No hemodynamic significant narowing involving the carotid bifurcations. Hypoplastic right vertebral artery. Dominant left vertebral artery..  BRAIN CTA: Cutoff of the M1 portion of the right middle cerebral artery which may be chronic as there appears to be lenticular striate collaterals and reconstitution of more distal MCA segments. Cutoff of the right posterior cerebral artery at the P1-2 junction with reconstitution of more distal segments. Cut off of the left posterior cerebral artery at the P1-2 junction with mild distal reconstitution. Hypoplastic right vertebral artery with portions of the V4 segment not visualized  CT PERFUSION: Regions of ischemia within the posterior circulation with a volume of 121 mL without evidence of core infarction.    (10.13.23 @ 12:31)   MRI BRAIN: Acute small to moderate medial left temporal occipital lobe   infarctions. Acute punctate left occipital lobe infarction.  MRA BRAIN: Occluded right MCA. Occluded right PCA. Occluded right   vertebral artery proximal V4 segment.  MRI BRAIN NOVA: Values above.      US Duplex Venous Lower Ext Complete, Bilateral (10.13.23 @ 22:47) >  IMPRESSION:  No evidence of deep venous thrombosis in either lower extremity.      TIARA KOEHLER MD; Attending Radiologist     TTE Echo Complete w/ Contrast w/ Doppler (10.13.23 @ 19:44) >    Summary:   1. Technically difficult study.   2. Normal left ventricular internal cavity size.   3. Left ventricular ejection fraction, by visual estimation, is 50 to   55%.   4. Normal left atrial size.   5. Normal right atrial size.   6. Mildly enlarged right ventricle.   7. Dilatation of the ascending aorta.   8. Sclerotic aortic valve with normal opening.   9. Mild mitral annular calcification.  10. Mild thickening of the anterior mitral valve leaflet.  11. Trace mitral valve regurgitation.  12. Mild tricuspid regurgitation.  13. Trivial pericardial effusion.  14. Recommend transesophageal echocardiogram.    Michael Tavera MD Electronically signed on10/14/2023 at 9:47:58 AM                                [facility] Stroke Team  CC: LOC    HPI:  Patient is a 57 y/o M with a PMHx of stroke 2022 reported at [facility]  (at the time patient received thrombolysis and had full  resolution of symptoms), family reported dove dove, HTN, DM, HLD nonadherent with medications, who presented following an episode of LOC.  As per sister report, patient was last seen well by his niece  morning of 10/12/23  before patient went to work. At around 6:15 pm that night patient had a sudden fall at work and lost consciousness It is unclear if patient had any symptoms before he fell. NIHSS 10 on admission . Transferred to [facility] for mechanical thrombectomy.        PAST MEDICAL & SURGICAL HISTORY:  HTN (hypertension)  CVA (cerebrovascular accident)  History of hip surgery  Stroke as noted by sister   Miriam Dove     Allergies  No Known Allergies    Intolerances    SOCIAL HISTORY:  no tob,   etoh : 1 pt vodka daily  no drugs, years     FAMILY HISTORY:  possibly stroke in father   DM , HLD, HTN       Vital Signs Last 24 Hrs  T(C): 36.4 (14 Oct 2023 07:49), Max: 36.8 (13 Oct 2023 15:43)  T(F): 97.6 (14 Oct 2023 07:49), Max: 98.3 (13 Oct 2023 15:43)  HR: 68 (14 Oct 2023 12:00) (56 - 86)  BP: 145/71 (14 Oct 2023 12:00) (122/78 - 159/88)  BP(mean): 89 (14 Oct 2023 12:00) (89 - 125)  RR: 20 (14 Oct 2023 12:00) (14 - 21)  SpO2: 98% (14 Oct 2023 12:00) (91% - 100%)    Parameters below as of 14 Oct 2023 12:00  Patient On (Oxygen Delivery Method): room air    MEDICATIONS    acetaminophen     Tablet .. 650 milliGRAM(s) Oral every 6 hours PRN  aspirin  chewable 81 milliGRAM(s) Oral daily  atorvastatin 80 milliGRAM(s) Oral at bedtime  dextrose 5%. 1000 milliLiter(s) IV Continuous <Continuous>  dextrose 5%. 1000 milliLiter(s) IV Continuous <Continuous>  dextrose 50% Injectable 12.5 Gram(s) IV Push once  dextrose 50% Injectable 25 Gram(s) IV Push once  dextrose 50% Injectable 25 Gram(s) IV Push once  enoxaparin Injectable 40 milliGRAM(s) SubCutaneous <User Schedule>  famotidine    Tablet 20 milliGRAM(s) Oral daily  folic acid 1 milliGRAM(s) Oral daily  hydrALAZINE Injectable 10 milliGRAM(s) IV Push every 4 hours PRN  influenza   Vaccine 0.5 milliLiter(s) IntraMuscular once  insulin lispro (ADMELOG) corrective regimen sliding scale   SubCutaneous Before meals and at bedtime  LORazepam   Injectable 2 milliGRAM(s) IV Push every 2 hours PRN  multivitamin 1 Tablet(s) Oral daily  senna 2 Tablet(s) Oral at bedtime  thiamine IVPB 500 milliGRAM(s) IV Intermittent every 8 hours      LABS:  CBC Full  -  ( 14 Oct 2023 03:38 )  WBC Count : 4.55 K/uL  RBC Count : 3.87 M/uL  Hemoglobin : 12.8 g/dL  Hematocrit : 36.0 %  Platelet Count - Automated : 104 K/uL  Mean Cell Volume : 93.0 fl  Mean Cell Hemoglobin : 33.1 pg  Mean Cell Hemoglobin Concentration : 35.6 gm/dL  Auto Neutrophil # : x  Auto Lymphocyte # : x  Auto Monocyte # : x  Auto Eosinophil # : x  Auto Basophil # : x  Auto Neutrophil % : x  Auto Lymphocyte % : x  Auto Monocyte % : x  Auto Eosinophil % : x  Auto Basophil % : x    Urinalysis Basic - ( 14 Oct 2023 03:38 )    Color: x / Appearance: x / SG: x / pH: x  Gluc: 153 mg/dL / Ketone: x  / Bili: x / Urobili: x   Blood: x / Protein: x / Nitrite: x   Leuk Esterase: x / RBC: x / WBC x   Sq Epi: x / Non Sq Epi: x / Bacteria: x      10-14    140  |  104  |  10.0  ----------------------------<  153<H>  4.2   |  27.0  |  0.97    Ca    8.4      14 Oct 2023 03:38  Phos  3.3     10-14  Mg     2.2     10-14    TPro  6.2<L>  /  Alb  3.7  /  TBili  0.5  /  DBili  0.1  /  AST  32  /  ALT  43<H>  /  AlkPhos  66  10-14    LIVER FUNCTIONS - ( 14 Oct 2023 03:38 )  Alb: 3.7 g/dL / Pro: 6.2 g/dL / ALK PHOS: 66 U/L / ALT: 43 U/L / AST: 32 U/L / GGT: x           Lipid Profile (10.13.23 @ 03:10)    Cholesterol: 199 mg/dL   Triglycerides, Serum: 277 mg/dL   HDL Cholesterol: 29 mg/dL   Non HDL Cholesterol: 170    LDL Cholesterol Calculated: 115 mg/dL    A1C with Estimated Average Glucose (10.13.23 @ 03:10)    A1C with Estimated Average Glucose Result: 8.6 %   Estimated Average Glucose: 200 mg/dL    Detailed Neurologic Exam:    Mental status: The patient is awake and alert and has normal attention span.  The patient is fully oriented to self , location, president, disoriented to month and year, The patient is oriented to current events. The patient is able to name objects, follow commands, repeat sentences.    Cranial nerves: Pupil right 3mm left 2mm, There is a right superior quadrantanopsia. Extraocular motion is full with no nystagmus. There is no ptosis. Facial sensation is intact on the right and diminished on the left. Facial musculature is symmetric (states both sides numb but equal).      Motor: There is normal bulk and tone.  There is no tremor.  Strength is 5/5 in the right arm and leg.   Strength is 5/5 in the left arm and leg.    Sensation: Intact to light touch  on the right and diminished on the left.     Cerebellar: There is no dysmetria on finger to nose testing.    Gait : deferred      RADIOLOGY & ADDITIONAL STUDIES (independently reviewed unless otherwise noted):  10/12/2023 19:13  IMPRESSION:  HEAD CT: No evidence of an acute intracranial hemorhage, midline shift or hydrocephalus. Mild bifrontal periventricular white matter ischemic changes. Bilateral maxillary sinusitis  NECK CTA: No hemodynamic significant narowing involving the carotid bifurcations. Hypoplastic right vertebral artery. Dominant left vertebral artery..  BRAIN CTA: Cutoff of the M1 portion of the right middle cerebral artery which may be chronic as there appears to be lenticular striate collaterals and reconstitution of more distal MCA segments. Cutoff of the right posterior cerebral artery at the P1-2 junction with reconstitution of more distal segments. Cut off of the left posterior cerebral artery at the P1-2 junction with mild distal reconstitution. Hypoplastic right vertebral artery with portions of the V4 segment not visualized  CT PERFUSION: Regions of ischemia within the posterior circulation with a volume of 121 mL without evidence of core infarction.    (10.13.23 @ 12:31)   MRI BRAIN: Acute small to moderate medial left temporal occipital lobe   infarctions. Acute punctate left occipital lobe infarction.  MRA BRAIN: Occluded right MCA. Occluded right PCA. Occluded right   vertebral artery proximal V4 segment.  MRI BRAIN NOVA: Values above.      US Duplex Venous Lower Ext Complete, Bilateral (10.13.23 @ 22:47) >  IMPRESSION:  No evidence of deep venous thrombosis in either lower extremity.      TIARA KOEHLER MD; Attending Radiologist     TTE Echo Complete w/ Contrast w/ Doppler (10.13.23 @ 19:44) >    Summary:   1. Technically difficult study.   2. Normal left ventricular internal cavity size.   3. Left ventricular ejection fraction, by visual estimation, is 50 to   55%.   4. Normal left atrial size.   5. Normal right atrial size.   6. Mildly enlarged right ventricle.   7. Dilatation of the ascending aorta.   8. Sclerotic aortic valve with normal opening.   9. Mild mitral annular calcification.  10. Mild thickening of the anterior mitral valve leaflet.  11. Trace mitral valve regurgitation.  12. Mild tricuspid regurgitation.  13. Trivial pericardial effusion.  14. Recommend transesophageal echocardiogram.    Michael Tavera MD Electronically signed on10/14/2023 at 9:47:58 AM                                [facility] Stroke Team  CC: LOC    HPI:  Patient is a 57 y/o M with a PMHx of stroke 2022 reported at [facility]  (at the time patient received thrombolysis and had full  resolution of symptoms), family reported dove dove, HTN, DM, HLD nonadherent with medications, who presented following an episode of LOC.  As per sister report, patient was last seen well by his niece  morning of 10/12/23  before patient went to work. At around 6:15 pm that night patient had a sudden fall at work and lost consciousness It is unclear if patient had any symptoms before he fell. NIHSS 10 on admission . Transferred to [facility] for mechanical thrombectomy.        PAST MEDICAL & SURGICAL HISTORY:  HTN (hypertension)  CVA (cerebrovascular accident)  History of hip surgery  Stroke as noted by sister   Miriam Dove     Allergies  No Known Allergies    Intolerances    SOCIAL HISTORY:  no tob,   etoh : 1 pt vodka daily  no drugs, years     FAMILY HISTORY:  possibly stroke in father   DM , HLD, HTN       Vital Signs Last 24 Hrs  T(C): 36.4 (14 Oct 2023 07:49), Max: 36.8 (13 Oct 2023 15:43)  T(F): 97.6 (14 Oct 2023 07:49), Max: 98.3 (13 Oct 2023 15:43)  HR: 68 (14 Oct 2023 12:00) (56 - 86)  BP: 145/71 (14 Oct 2023 12:00) (122/78 - 159/88)  BP(mean): 89 (14 Oct 2023 12:00) (89 - 125)  RR: 20 (14 Oct 2023 12:00) (14 - 21)  SpO2: 98% (14 Oct 2023 12:00) (91% - 100%)    Parameters below as of 14 Oct 2023 12:00  Patient On (Oxygen Delivery Method): room air    MEDICATIONS    acetaminophen     Tablet .. 650 milliGRAM(s) Oral every 6 hours PRN  aspirin  chewable 81 milliGRAM(s) Oral daily  atorvastatin 80 milliGRAM(s) Oral at bedtime  dextrose 5%. 1000 milliLiter(s) IV Continuous <Continuous>  dextrose 5%. 1000 milliLiter(s) IV Continuous <Continuous>  dextrose 50% Injectable 12.5 Gram(s) IV Push once  dextrose 50% Injectable 25 Gram(s) IV Push once  dextrose 50% Injectable 25 Gram(s) IV Push once  enoxaparin Injectable 40 milliGRAM(s) SubCutaneous <User Schedule>  famotidine    Tablet 20 milliGRAM(s) Oral daily  folic acid 1 milliGRAM(s) Oral daily  hydrALAZINE Injectable 10 milliGRAM(s) IV Push every 4 hours PRN  influenza   Vaccine 0.5 milliLiter(s) IntraMuscular once  insulin lispro (ADMELOG) corrective regimen sliding scale   SubCutaneous Before meals and at bedtime  LORazepam   Injectable 2 milliGRAM(s) IV Push every 2 hours PRN  multivitamin 1 Tablet(s) Oral daily  senna 2 Tablet(s) Oral at bedtime  thiamine IVPB 500 milliGRAM(s) IV Intermittent every 8 hours      LABS:  CBC Full  -  ( 14 Oct 2023 03:38 )  WBC Count : 4.55 K/uL  RBC Count : 3.87 M/uL  Hemoglobin : 12.8 g/dL  Hematocrit : 36.0 %  Platelet Count - Automated : 104 K/uL  Mean Cell Volume : 93.0 fl  Mean Cell Hemoglobin : 33.1 pg  Mean Cell Hemoglobin Concentration : 35.6 gm/dL  Auto Neutrophil # : x  Auto Lymphocyte # : x  Auto Monocyte # : x  Auto Eosinophil # : x  Auto Basophil # : x  Auto Neutrophil % : x  Auto Lymphocyte % : x  Auto Monocyte % : x  Auto Eosinophil % : x  Auto Basophil % : x    Urinalysis Basic - ( 14 Oct 2023 03:38 )    Color: x / Appearance: x / SG: x / pH: x  Gluc: 153 mg/dL / Ketone: x  / Bili: x / Urobili: x   Blood: x / Protein: x / Nitrite: x   Leuk Esterase: x / RBC: x / WBC x   Sq Epi: x / Non Sq Epi: x / Bacteria: x      10-14    140  |  104  |  10.0  ----------------------------<  153<H>  4.2   |  27.0  |  0.97    Ca    8.4      14 Oct 2023 03:38  Phos  3.3     10-14  Mg     2.2     10-14    TPro  6.2<L>  /  Alb  3.7  /  TBili  0.5  /  DBili  0.1  /  AST  32  /  ALT  43<H>  /  AlkPhos  66  10-14    LIVER FUNCTIONS - ( 14 Oct 2023 03:38 )  Alb: 3.7 g/dL / Pro: 6.2 g/dL / ALK PHOS: 66 U/L / ALT: 43 U/L / AST: 32 U/L / GGT: x           Lipid Profile (10.13.23 @ 03:10)    Cholesterol: 199 mg/dL   Triglycerides, Serum: 277 mg/dL   HDL Cholesterol: 29 mg/dL   Non HDL Cholesterol: 170    LDL Cholesterol Calculated: 115 mg/dL    A1C with Estimated Average Glucose (10.13.23 @ 03:10)    A1C with Estimated Average Glucose Result: 8.6 %   Estimated Average Glucose: 200 mg/dL    Detailed Neurologic Exam:    Mental status: The patient is awake and alert and has normal attention span.  The patient is fully oriented to self , location, president, disoriented to month and year, The patient is oriented to current events. The patient is able to name objects, follow commands, repeat sentences.    Cranial nerves: Pupil right 3mm left 2mm, There is a left superior quadrantanopsia. Extraocular motion is full with no nystagmus. There is no ptosis. Facial sensation is intact on the right and diminished on the left. Facial musculature is symmetric (states both sides numb but equal).      Motor: There is normal bulk and tone.  There is no tremor.  Strength is 5/5 in the right arm and leg.   Strength is 5/5 in the left arm and leg.    Sensation: Intact to light touch  on the right and diminished on the left.     Cerebellar: There is no dysmetria on finger to nose testing.    Gait : deferred      RADIOLOGY & ADDITIONAL STUDIES (independently reviewed unless otherwise noted):  10/12/2023 19:13  IMPRESSION:  HEAD CT: No evidence of an acute intracranial hemorhage, midline shift or hydrocephalus. Mild bifrontal periventricular white matter ischemic changes. Bilateral maxillary sinusitis  NECK CTA: No hemodynamic significant narowing involving the carotid bifurcations. Hypoplastic right vertebral artery. Dominant left vertebral artery..  BRAIN CTA: Cutoff of the M1 portion of the right middle cerebral artery which may be chronic as there appears to be lenticular striate collaterals and reconstitution of more distal MCA segments. Cutoff of the right posterior cerebral artery at the P1-2 junction with reconstitution of more distal segments. Cut off of the left posterior cerebral artery at the P1-2 junction with mild distal reconstitution. Hypoplastic right vertebral artery with portions of the V4 segment not visualized  CT PERFUSION: Regions of ischemia within the posterior circulation with a volume of 121 mL without evidence of core infarction.    (10.13.23 @ 12:31)   MRI BRAIN: Acute small to moderate medial left temporal occipital lobe   infarctions. Acute punctate left occipital lobe infarction.  MRA BRAIN: Occluded right MCA. Occluded right PCA. Occluded right   vertebral artery proximal V4 segment.  MRI BRAIN NOVA: Values above.      US Duplex Venous Lower Ext Complete, Bilateral (10.13.23 @ 22:47) >  IMPRESSION:  No evidence of deep venous thrombosis in either lower extremity.      TIARA KOEHLER MD; Attending Radiologist     TTE Echo Complete w/ Contrast w/ Doppler (10.13.23 @ 19:44) >    Summary:   1. Technically difficult study.   2. Normal left ventricular internal cavity size.   3. Left ventricular ejection fraction, by visual estimation, is 50 to   55%.   4. Normal left atrial size.   5. Normal right atrial size.   6. Mildly enlarged right ventricle.   7. Dilatation of the ascending aorta.   8. Sclerotic aortic valve with normal opening.   9. Mild mitral annular calcification.  10. Mild thickening of the anterior mitral valve leaflet.  11. Trace mitral valve regurgitation.  12. Mild tricuspid regurgitation.  13. Trivial pericardial effusion.  14. Recommend transesophageal echocardiogram.    Michael Tavera MD Electronically signed on10/14/2023 at 9:47:58 AM

## 2023-10-14 NOTE — CONSULT NOTE ADULT - ASSESSMENT
57 y/o M with a PMHx of stroke one year ago (at the time patient received TNK and had full  resolution of symptoms), HTN, DM, non-compliant with medications, p/w LOC, noted with acute CVA.     #Acute CVA  MRI brain result noted  s/p recanalization  c/w ASA, Lipitor  f/u neuro recs about timing of Plavix  TTE read pending  DVT studies negative    #Occluded R MCA/PCA   per discussion with neurosx, pt with collaterals  c/w q4hr neuro checks  outpt neurosx followup    #ETOH abuse  not in active withdrawal, and pt denies h/o DT  c/w symptom trigger CIWA  c/w MV, thiamine and folic acid  SW consulted  PT/OT eval    #HTN  BP stable currently, off BP meds  start as needed    #DM  non compliant with meds at home  A1C 8.6  ISS, accu checks, CC diet  DM educator consulted    #Elevated TSH  f/u free T4 level    #HLD  c/w Lipitor 80mg QD    #Anemia  Hg stable at 10.6, trend CBC    DVT ppx: Lovenox  Dispo: Tele    PT/OT eval pending  59 y/o M with a PMHx of stroke one year ago (at the time patient received TNK and had full  resolution of symptoms), HTN, DM, non-compliant with medications, p/w LOC, noted with acute CVA.     #Acute CVA  MRI brain result noted  s/p recanalization  c/w ASA, Lipitor  f/u neuro recs about timing of Plavix  TTE read pending  DVT studies negative    #Occluded R MCA/PCA   per discussion with neurosx, pt with collaterals  c/w q4hr neuro checks  outpt neurosx followup    #ETOH abuse  not in active withdrawal, and pt denies h/o DT  c/w symptom trigger CIWA  c/w MV, thiamine and folic acid  SW consulted  PT/OT eval    #Episode of bradycardia  Per tele, pt had 1 episode of james down to 30s x few seconds and self resolved  f/u TTE read  obtain EKG  monitor on tele, dc Labetalol   if persistent bradycardia, will consult cardiology  T4 level as below    #HTN  BP stable currently, off BP meds  start as needed    #DM  non compliant with meds at home  A1C 8.6  ISS, accu checks, CC diet  DM educator consulted    #Elevated TSH  f/u free T4 level    #HLD  c/w Lipitor 80mg QD    #Anemia  Hg stable at 10.6, trend CBC    DVT ppx: Lovenox  Dispo: Tele    PT/OT eval pending  59 y/o M with a PMHx of stroke one year ago (at the time patient received TNK and had full  resolution of symptoms), HTN, DM, non-compliant with medications, p/w LOC, noted with acute CVA.     #Acute CVA  MRI brain result noted  s/p recanalization  c/w ASA, Lipitor  f/u neuro recs about timing of Plavix  TTE read pending  DVT studies negative    #Occluded R MCA/PCA   per discussion with neurosx, pt with collaterals  c/w q4hr neuro checks  outpt neurosx followup    #ETOH abuse  not in active withdrawal, and pt denies h/o DT  c/w symptom trigger CIWA  c/w MV, thiamine and folic acid  SW consulted  PT/OT eval    #Episode of bradycardia  Per tele, pt had 1 episode of james down to 30s x few seconds and self resolved  f/u TTE read  obtain EKG, will consult cards  monitor on tele, dc Labetalol   if persistent bradycardia, will consult cardiology  T4 level as below    #HTN  BP stable currently, off BP meds  start as needed    #DM  non compliant with meds at home  A1C 8.6  ISS, accu checks, CC diet  DM educator consulted    #Elevated TSH  f/u free T4 level    #HLD  c/w Lipitor 80mg QD    #Anemia  Hg stable at 10.6, trend CBC    DVT ppx: Lovenox  Dispo: Tele    PT/OT eval pending  57 y/o M with a PMHx of stroke one year ago (at the time patient received TNK and had full  resolution of symptoms), HTN, DM, non-compliant with medications, p/w LOC, noted with acute CVA.     #Acute CVA  MRI brain result noted  s/p recanalization  c/w ASA, Lipitor  f/u neuro recs about timing of Plavix and further images if indicated  TTE read pending  DVT studies negative    #Occluded R MCA/PCA   per discussion with neurosx, pt with collaterals  c/w q4hr neuro checks  outpt neurosx followup    #ETOH abuse  not in active withdrawal, and pt denies h/o DT  c/w symptom trigger CIWA  c/w MV, thiamine and folic acid  SW consulted  PT/OT eval    #Episode of bradycardia  Per tele, pt had 1 episode of james down to 30s x few seconds and self resolved  f/u TTE read  obtain EKG, will consult cards  monitor on tele, dc Labetalol   if persistent bradycardia, will consult cardiology  T4 level as below    #HTN  BP stable currently, off BP meds  start as needed    #DM  non compliant with meds at home  A1C 8.6  ISS, accu checks, CC diet  DM educator consulted    #Elevated TSH  f/u free T4 level    #HLD  c/w Lipitor 80mg QD    #Anemia  Hg stable at 10.6, trend CBC    DVT ppx: Lovenox  Dispo: Tele    PT/OT eval pending

## 2023-10-14 NOTE — CONSULT NOTE ADULT - NS ATTEND AMEND GEN_ALL_CORE FT
Patient seen and examined by me personally. 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 Overnight was found to have bradycardia with atrial periods of bigeminy. While awake he is in sinus rhythm with PVCs. No syncope noted. TTE without structural abnormality.       Recommendations:   - Continue telemetry monitoring  - recommend ILR on discharge  - Avoid av alejandro blockers  - Will consult electrophysiology for further management    --  Denzel Zapata MD  Interventional and Structural Cardiology  [phone]
Agree with PA's assessment and plan.

## 2023-10-15 LAB
ANION GAP SERPL CALC-SCNC: 11 MMOL/L — SIGNIFICANT CHANGE UP (ref 5–17)
APTT BLD: 73.6 SEC — HIGH (ref 24.5–35.6)
APTT BLD: 78.6 SEC — HIGH (ref 24.5–35.6)
APTT BLD: 88.4 SEC — HIGH (ref 24.5–35.6)
BUN SERPL-MCNC: 9.5 MG/DL — SIGNIFICANT CHANGE UP (ref 8–20)
CALCIUM SERPL-MCNC: 8.8 MG/DL — SIGNIFICANT CHANGE UP (ref 8.4–10.5)
CHLORIDE SERPL-SCNC: 101 MMOL/L — SIGNIFICANT CHANGE UP (ref 96–108)
CO2 SERPL-SCNC: 25 MMOL/L — SIGNIFICANT CHANGE UP (ref 22–29)
CREAT SERPL-MCNC: 1.03 MG/DL — SIGNIFICANT CHANGE UP (ref 0.5–1.3)
EGFR: 84 ML/MIN/1.73M2 — SIGNIFICANT CHANGE UP
GLUCOSE BLDC GLUCOMTR-MCNC: 161 MG/DL — HIGH (ref 70–99)
GLUCOSE BLDC GLUCOMTR-MCNC: 184 MG/DL — HIGH (ref 70–99)
GLUCOSE BLDC GLUCOMTR-MCNC: 198 MG/DL — HIGH (ref 70–99)
GLUCOSE BLDC GLUCOMTR-MCNC: 206 MG/DL — HIGH (ref 70–99)
GLUCOSE SERPL-MCNC: 175 MG/DL — HIGH (ref 70–99)
HCT VFR BLD CALC: 37.7 % — LOW (ref 39–50)
HGB BLD-MCNC: 12.9 G/DL — LOW (ref 13–17)
MAGNESIUM SERPL-MCNC: 2.2 MG/DL — SIGNIFICANT CHANGE UP (ref 1.6–2.6)
MCHC RBC-ENTMCNC: 32 PG — SIGNIFICANT CHANGE UP (ref 27–34)
MCHC RBC-ENTMCNC: 34.2 GM/DL — SIGNIFICANT CHANGE UP (ref 32–36)
MCV RBC AUTO: 93.5 FL — SIGNIFICANT CHANGE UP (ref 80–100)
PHOSPHATE SERPL-MCNC: 3.3 MG/DL — SIGNIFICANT CHANGE UP (ref 2.4–4.7)
PLATELET # BLD AUTO: 112 K/UL — LOW (ref 150–400)
POTASSIUM SERPL-MCNC: 4.3 MMOL/L — SIGNIFICANT CHANGE UP (ref 3.5–5.3)
POTASSIUM SERPL-SCNC: 4.3 MMOL/L — SIGNIFICANT CHANGE UP (ref 3.5–5.3)
RBC # BLD: 4.03 M/UL — LOW (ref 4.2–5.8)
RBC # FLD: 13.3 % — SIGNIFICANT CHANGE UP (ref 10.3–14.5)
SODIUM SERPL-SCNC: 137 MMOL/L — SIGNIFICANT CHANGE UP (ref 135–145)
T4 FREE SERPL-MCNC: 1.2 NG/DL — SIGNIFICANT CHANGE UP (ref 0.9–1.8)
TROPONIN T SERPL-MCNC: <0.01 NG/ML — SIGNIFICANT CHANGE UP (ref 0–0.06)
TSH SERPL-MCNC: 3.05 UIU/ML — SIGNIFICANT CHANGE UP (ref 0.27–4.2)
WBC # BLD: 5.12 K/UL — SIGNIFICANT CHANGE UP (ref 3.8–10.5)
WBC # FLD AUTO: 5.12 K/UL — SIGNIFICANT CHANGE UP (ref 3.8–10.5)

## 2023-10-15 PROCEDURE — 99233 SBSQ HOSP IP/OBS HIGH 50: CPT

## 2023-10-15 PROCEDURE — 70450 CT HEAD/BRAIN W/O DYE: CPT | Mod: 26

## 2023-10-15 PROCEDURE — 99232 SBSQ HOSP IP/OBS MODERATE 35: CPT

## 2023-10-15 PROCEDURE — 93010 ELECTROCARDIOGRAM REPORT: CPT

## 2023-10-15 RX ORDER — SENNA PLUS 8.6 MG/1
2 TABLET ORAL EVERY 12 HOURS
Refills: 0 | Status: DISCONTINUED | OUTPATIENT
Start: 2023-10-15 | End: 2023-10-27

## 2023-10-15 RX ORDER — CHLORHEXIDINE GLUCONATE 213 G/1000ML
1 SOLUTION TOPICAL
Refills: 0 | Status: DISCONTINUED | OUTPATIENT
Start: 2023-10-15 | End: 2023-10-27

## 2023-10-15 RX ORDER — HEPARIN SODIUM 5000 [USP'U]/ML
1500 INJECTION INTRAVENOUS; SUBCUTANEOUS
Qty: 25000 | Refills: 0 | Status: DISCONTINUED | OUTPATIENT
Start: 2023-10-15 | End: 2023-10-17

## 2023-10-15 RX ORDER — POLYETHYLENE GLYCOL 3350 17 G/17G
17 POWDER, FOR SOLUTION ORAL EVERY 12 HOURS
Refills: 0 | Status: DISCONTINUED | OUTPATIENT
Start: 2023-10-15 | End: 2023-10-27

## 2023-10-15 RX ADMIN — FAMOTIDINE 20 MILLIGRAM(S): 10 INJECTION INTRAVENOUS at 11:18

## 2023-10-15 RX ADMIN — Medication 1 TABLET(S): at 11:18

## 2023-10-15 RX ADMIN — Medication 2: at 11:21

## 2023-10-15 RX ADMIN — ATORVASTATIN CALCIUM 80 MILLIGRAM(S): 80 TABLET, FILM COATED ORAL at 21:38

## 2023-10-15 RX ADMIN — HEPARIN SODIUM 15 UNIT(S)/HR: 5000 INJECTION INTRAVENOUS; SUBCUTANEOUS at 19:30

## 2023-10-15 RX ADMIN — Medication 105 MILLIGRAM(S): at 13:29

## 2023-10-15 RX ADMIN — POLYETHYLENE GLYCOL 3350 17 GRAM(S): 17 POWDER, FOR SOLUTION ORAL at 17:15

## 2023-10-15 RX ADMIN — Medication 4: at 21:38

## 2023-10-15 RX ADMIN — Medication 2: at 17:14

## 2023-10-15 RX ADMIN — SENNA PLUS 2 TABLET(S): 8.6 TABLET ORAL at 17:15

## 2023-10-15 RX ADMIN — Medication 105 MILLIGRAM(S): at 06:18

## 2023-10-15 RX ADMIN — Medication 1 MILLIGRAM(S): at 11:18

## 2023-10-15 RX ADMIN — HEPARIN SODIUM 16 UNIT(S)/HR: 5000 INJECTION INTRAVENOUS; SUBCUTANEOUS at 06:18

## 2023-10-15 RX ADMIN — Medication 2: at 06:23

## 2023-10-15 RX ADMIN — Medication 105 MILLIGRAM(S): at 22:00

## 2023-10-15 RX ADMIN — HEPARIN SODIUM 15 UNIT(S)/HR: 5000 INJECTION INTRAVENOUS; SUBCUTANEOUS at 11:47

## 2023-10-15 NOTE — PHYSICAL THERAPY INITIAL EVALUATION ADULT - ADDITIONAL COMMENTS
pt a poor historian due to memory deficits, reports living in home with daughter. Can't remember if there are stairs to enter/inside. Independent. Working prior to admission

## 2023-10-15 NOTE — PROGRESS NOTE ADULT - PROBLEM SELECTOR PLAN 2
Tele:  Sr, bigeminy, PAC's  - james 35. yesterday  Overnight monitor has concern for 2-1block and periods of SB with PJC' and PAC"s.    EKG:  Sr 68, RBBB, with PJC's.  Trops negative.    TTE - EF is 50 to 55%, Trace mitral valve regurgitation, and Mild tricuspid regurgitation.  Ischemic work up before discharge Tele:  Sr, bigeminy, PAC's  - james 35. yesterday  Overnight monitor has concern for 2-1block. Strips reviewed with Dr. Benz and spoke with Neuro surgery.  No concern for PPM.      Sr with p waves after t wave concerning for 2- 1 block, when sb drops 30's and has PJC's and PACS.  Reviewed with Dr. Zapata and Dr. Sylvie Mercer recommending adding bb, multiple PJC's, and PAC's noted.    EP consult   ECG:  Sr, PJC's, interior ischemia this am  Trops negative.    TTE - EF is 50 to 55%, Trace mitral valve regurgitation, and Mild tricuspid regurgitation.  Ischemic work up before discharge

## 2023-10-15 NOTE — PHYSICAL THERAPY INITIAL EVALUATION ADULT - PERTINENT HX OF CURRENT PROBLEM, REHAB EVAL
58M with h/o stroke (2022, sx fully resolved), HTN, DM, non-compliant with medications, developed LOC. CTH w/R PCA cutoff. Transfer to [facility] for thrombectomy.

## 2023-10-15 NOTE — PROGRESS NOTE ADULT - SUBJECTIVE AND OBJECTIVE BOX
[facility]                                                         CARDIOLOGY AT [place] [address]                                                         Telephone: [phone]. Fax:[phone]                                                                             PROGRESS NOTE    Reason for follow up:   Stroke  Update:   Patient  started on heparin for poor circulation.  Patient seen by Neurology and stating concern embolic stroke will plan for ROCIO and ILR    Review of symptoms:   Cardiac:  No chest pain. No dyspnea. No palpitations.  Respiratory: no cough. No dyspnea  Gastrointestinal: No diarrhea. No abdominal pain. No bleeding.   Neuro: No focal neuro complaints.    Vitals:  T(C): 36.8 (10-15-23 @ 08:00), Max: 36.8 (10-15-23 @ 04:05)  HR: 68 (10-15-23 @ 08:00) (35 - 78)  BP: 144/95 (10-15-23 @ 08:00) (122/78 - 173/87)  RR: 17 (10-15-23 @ 08:00) (14 - 21)  SpO2: 97% (10-15-23 @ 08:00) (91% - 100%)  I&O's Summary  14 Oct 2023 07:01  -  15 Oct 2023 07:00  --------------------------------------------------------  IN: 2362 mL / OUT: 4100 mL / NET: -1738 mL  15 Oct 2023 07:01  -  15 Oct 2023 09:38  --------------------------------------------------------  IN: 253 mL / OUT: 0 mL / NET: 253 mL    Weight (kg): 111.3 (10-12 @ 23:30)    PHYSICAL EXAM:  Appearance: Comfortable. No acute distress  HEENT:  Atraumatic. Normocephalic.  Normal oral mucosa  Neurologic: A & O x 3, no gross focal deficits.  Cardiovascular: RRR S1 S2, No murmur, no rubs/gallops. No JVD  Respiratory: Lungs clear to auscultation, unlabored   Gastrointestinal:  Soft, Non-tender, + BS  Lower Extremities: 2+ Peripheral Pulses, No clubbing, cyanosis, or edema  Psychiatry: Patient is calm. No agitation.   Skin: warm and dry.    CURRENT CARDIAC MEDICATIONS:  hydrALAZINE Injectable 10 milliGRAM(s) IV Push every 4 hours PRN      CURRENT OTHER MEDICATIONS:  acetaminophen     Tablet .. 650 milliGRAM(s) Oral every 6 hours PRN Mild Pain (1 - 3)  LORazepam   Injectable 2 milliGRAM(s) IV Push every 2 hours PRN CIWA-Ar score increase by 2 points and a total score of 7 or less  famotidine    Tablet 20 milliGRAM(s) Oral daily  senna 2 Tablet(s) Oral at bedtime  atorvastatin 80 milliGRAM(s) Oral at bedtime  insulin lispro (ADMELOG) corrective regimen sliding scale   SubCutaneous Before meals and at bedtime  dextrose 5%. 1000 milliLiter(s) (50 mL/Hr) IV Continuous <Continuous>  dextrose 5%. 1000 milliLiter(s) (100 mL/Hr) IV Continuous <Continuous>  folic acid 1 milliGRAM(s) Oral daily  heparin  Infusion 1500 Unit(s)/Hr (16 mL/Hr) IV Continuous <Continuous>  influenza   Vaccine 0.5 milliLiter(s) IntraMuscular once  multivitamin 1 Tablet(s) Oral daily  thiamine IVPB 500 milliGRAM(s) IV Intermittent every 8 hours, Stop order after: 3 Days      LABS:	 	  ( 14 Oct 2023 14:16 )  Troponin T  <0.01,  CPK  X    , CKMB  X    , BNP X                                12.9   5.12  )-----------( 112      ( 15 Oct 2023 03:55 )             37.7     10-15    137  |  101  |  9.5  ----------------------------<  175<H>  4.3   |  25.0  |  1.03    Ca    8.8      15 Oct 2023 03:55  Phos  3.3     10-15  Mg     2.2     10-15    TPro  6.2<L>  /  Alb  3.7  /  TBili  0.5  /  DBili  0.1  /  AST  32  /  ALT  43<H>  /  AlkPhos  66  10-14    PT/INR/PTT ( 15 Oct 2023 03:55 )                       :                       :      X            :       73.6                  .        .                   .              .           .       X           .                                       Lipid Profile: Date: 10-13 @ 03:10  Total cholesterol 199; Direct LDL: --; HDL: 29; Triglycerides:277    HgA1c:   TSH: Thyroid Stimulating Hormone, Serum: 3.05 uIU/mL  Thyroid Stimulating Hormone, Serum: 4.75 uIU/mL  Thyroid Stimulating Hormone, Serum: 5.36 uIU/mL      TELEMETRY:   Sr with p waves after t wave concerning for 2- 1 block, when sb drops 30's and has PJC's and PACS.  Reviewed with Dr. Zapata and Dr. Mercer    ECG:  Pending                                                                [facility]                                                         CARDIOLOGY AT [place] [address]                                                         Telephone: [phone]. Fax:[phone]                                                                             PROGRESS NOTE    Reason for follow up:   Stroke  Update:   Patient  started on heparin by neuro surgery  Patient seen by Neurology and stating concern embolic stroke will plan for ROCIO and ILR    Review of symptoms:   Cardiac:  No chest pain. No dyspnea. No palpitations.  Respiratory: no cough. No dyspnea  Gastrointestinal: No diarrhea. No abdominal pain. No bleeding.   Neuro: No focal neuro complaints.    Vitals:  T(C): 36.8 (10-15-23 @ 08:00), Max: 36.8 (10-15-23 @ 04:05)  HR: 68 (10-15-23 @ 08:00) (35 - 78)  BP: 144/95 (10-15-23 @ 08:00) (122/78 - 173/87)  RR: 17 (10-15-23 @ 08:00) (14 - 21)  SpO2: 97% (10-15-23 @ 08:00) (91% - 100%)  I&O's Summary  14 Oct 2023 07:01  -  15 Oct 2023 07:00  --------------------------------------------------------  IN: 2362 mL / OUT: 4100 mL / NET: -1738 mL  15 Oct 2023 07:01  -  15 Oct 2023 09:38  --------------------------------------------------------  IN: 253 mL / OUT: 0 mL / NET: 253 mL    Weight (kg): 111.3 (10-12 @ 23:30)    PHYSICAL EXAM:  Appearance: Comfortable. No acute distress  HEENT:  Atraumatic. Normocephalic.  Normal oral mucosa  Neurologic: A & O x 3, no gross focal deficits.  Cardiovascular: RRR S1 S2, No murmur, no rubs/gallops. No JVD  Respiratory: Lungs clear to auscultation, unlabored   Gastrointestinal:  Soft, Non-tender, + BS  Lower Extremities: 2+ Peripheral Pulses, No clubbing, cyanosis, or edema  Psychiatry: Patient is calm. No agitation.   Skin: warm and dry.    CURRENT CARDIAC MEDICATIONS:  hydrALAZINE Injectable 10 milliGRAM(s) IV Push every 4 hours PRN      CURRENT OTHER MEDICATIONS:  acetaminophen     Tablet .. 650 milliGRAM(s) Oral every 6 hours PRN Mild Pain (1 - 3)  LORazepam   Injectable 2 milliGRAM(s) IV Push every 2 hours PRN CIWA-Ar score increase by 2 points and a total score of 7 or less  famotidine    Tablet 20 milliGRAM(s) Oral daily  senna 2 Tablet(s) Oral at bedtime  atorvastatin 80 milliGRAM(s) Oral at bedtime  insulin lispro (ADMELOG) corrective regimen sliding scale   SubCutaneous Before meals and at bedtime  dextrose 5%. 1000 milliLiter(s) (50 mL/Hr) IV Continuous <Continuous>  dextrose 5%. 1000 milliLiter(s) (100 mL/Hr) IV Continuous <Continuous>  folic acid 1 milliGRAM(s) Oral daily  heparin  Infusion 1500 Unit(s)/Hr (16 mL/Hr) IV Continuous <Continuous>  influenza   Vaccine 0.5 milliLiter(s) IntraMuscular once  multivitamin 1 Tablet(s) Oral daily  thiamine IVPB 500 milliGRAM(s) IV Intermittent every 8 hours, Stop order after: 3 Days      LABS:	 	  ( 14 Oct 2023 14:16 )  Troponin T  <0.01,  CPK  X    , CKMB  X    , BNP X                                12.9   5.12  )-----------( 112      ( 15 Oct 2023 03:55 )             37.7     10-15    137  |  101  |  9.5  ----------------------------<  175<H>  4.3   |  25.0  |  1.03    Ca    8.8      15 Oct 2023 03:55  Phos  3.3     10-15  Mg     2.2     10-15    TPro  6.2<L>  /  Alb  3.7  /  TBili  0.5  /  DBili  0.1  /  AST  32  /  ALT  43<H>  /  AlkPhos  66  10-14    PT/INR/PTT ( 15 Oct 2023 03:55 )                       :                       :      X            :       73.6                  .        .                   .              .           .       X           .                                       Lipid Profile: Date: 10-13 @ 03:10  Total cholesterol 199; Direct LDL: --; HDL: 29; Triglycerides:277    HgA1c:   TSH: Thyroid Stimulating Hormone, Serum: 3.05 uIU/mL  Thyroid Stimulating Hormone, Serum: 4.75 uIU/mL  Thyroid Stimulating Hormone, Serum: 5.36 uIU/mL      TELEMETRY:   Sr with p waves after t wave concerning for 2- 1 block, when sb drops 30's and has PJC's and PACS.  Reviewed with Dr. Zapata and Dr. Mercer    ECG:  Pending                                                                [facility]                                                         CARDIOLOGY AT [place] [address]                                                         Telephone: [phone]. Fax:[phone]                                                                             PROGRESS NOTE    Reason for follow up:   Stroke  Update:   Patient  started on heparin by neuro surgery  Patient seen by Neurology and stating concern embolic stroke will plan for ROCIO and ILR  Abnormal EKG - EP reviewed strips  Review of symptoms:   Cardiac:  No chest pain. No dyspnea. No palpitations.  Respiratory: no cough. No dyspnea  Gastrointestinal: No diarrhea. No abdominal pain. No bleeding.   Neuro: No focal neuro complaints.    Vitals:  T(C): 36.8 (10-15-23 @ 08:00), Max: 36.8 (10-15-23 @ 04:05)  HR: 68 (10-15-23 @ 08:00) (35 - 78)  BP: 144/95 (10-15-23 @ 08:00) (122/78 - 173/87)  RR: 17 (10-15-23 @ 08:00) (14 - 21)  SpO2: 97% (10-15-23 @ 08:00) (91% - 100%)  I&O's Summary  14 Oct 2023 07:01  -  15 Oct 2023 07:00  --------------------------------------------------------  IN: 2362 mL / OUT: 4100 mL / NET: -1738 mL  15 Oct 2023 07:01  -  15 Oct 2023 09:38  --------------------------------------------------------  IN: 253 mL / OUT: 0 mL / NET: 253 mL    Weight (kg): 111.3 (10-12 @ 23:30)    PHYSICAL EXAM:  Appearance: Comfortable. No acute distress  HEENT:  Atraumatic. Normocephalic.  Normal oral mucosa  Neurologic: A & O x 3, no gross focal deficits.  Cardiovascular: RRR S1 S2, No murmur, no rubs/gallops. No JVD  Respiratory: Lungs clear to auscultation, unlabored   Gastrointestinal:  Soft, Non-tender, + BS  Lower Extremities: 2+ Peripheral Pulses, No clubbing, cyanosis, or edema  Psychiatry: Patient is calm. No agitation.   Skin: warm and dry.    CURRENT CARDIAC MEDICATIONS:  hydrALAZINE Injectable 10 milliGRAM(s) IV Push every 4 hours PRN      CURRENT OTHER MEDICATIONS:  acetaminophen     Tablet .. 650 milliGRAM(s) Oral every 6 hours PRN Mild Pain (1 - 3)  LORazepam   Injectable 2 milliGRAM(s) IV Push every 2 hours PRN CIWA-Ar score increase by 2 points and a total score of 7 or less  famotidine    Tablet 20 milliGRAM(s) Oral daily  senna 2 Tablet(s) Oral at bedtime  atorvastatin 80 milliGRAM(s) Oral at bedtime  insulin lispro (ADMELOG) corrective regimen sliding scale   SubCutaneous Before meals and at bedtime  dextrose 5%. 1000 milliLiter(s) (50 mL/Hr) IV Continuous <Continuous>  dextrose 5%. 1000 milliLiter(s) (100 mL/Hr) IV Continuous <Continuous>  folic acid 1 milliGRAM(s) Oral daily  heparin  Infusion 1500 Unit(s)/Hr (16 mL/Hr) IV Continuous <Continuous>  influenza   Vaccine 0.5 milliLiter(s) IntraMuscular once  multivitamin 1 Tablet(s) Oral daily  thiamine IVPB 500 milliGRAM(s) IV Intermittent every 8 hours, Stop order after: 3 Days      LABS:	 	  ( 14 Oct 2023 14:16 )  Troponin T  <0.01,  CPK  X    , CKMB  X    , BNP X                                12.9   5.12  )-----------( 112      ( 15 Oct 2023 03:55 )             37.7     10-15    137  |  101  |  9.5  ----------------------------<  175<H>  4.3   |  25.0  |  1.03    Ca    8.8      15 Oct 2023 03:55  Phos  3.3     10-15  Mg     2.2     10-15    TPro  6.2<L>  /  Alb  3.7  /  TBili  0.5  /  DBili  0.1  /  AST  32  /  ALT  43<H>  /  AlkPhos  66  10-14    PT/INR/PTT ( 15 Oct 2023 03:55 )                       :                       :      X            :       73.6                  .        .                   .              .           .       X           .                                       Lipid Profile: Date: 10-13 @ 03:10  Total cholesterol 199; Direct LDL: --; HDL: 29; Triglycerides:277    HgA1c:   TSH: Thyroid Stimulating Hormone, Serum: 3.05 uIU/mL  Thyroid Stimulating Hormone, Serum: 4.75 uIU/mL  Thyroid Stimulating Hormone, Serum: 5.36 uIU/mL      TELEMETRY:   Sr with p waves after t wave concerning for 2- 1 block, when sb drops 30's and has PJC's and PACS.  Reviewed with Dr. Zapata and Dr. Sylvie Mercer recommending adding bb, multiple PJC's, and PAC's noted.     ECG:  Sr, PJC's, interior ischemia.                                                                  [facility]                                                         CARDIOLOGY AT [place] [address]                                                         Telephone: [phone]. Fax:[phone]                                                                             PROGRESS NOTE    Reason for follow up:   Stroke  Update:   Patient  started on heparin by neuro surgery  Patient seen by Neurology and stating concern embolic stroke will plan for ROCIO and ILR vs MCOT - as patient has known non complaince  Abnormal EKG - EP reviewed strips  Review of symptoms:   Cardiac:  No chest pain. No dyspnea. No palpitations.  Respiratory: no cough. No dyspnea  Gastrointestinal: No diarrhea. No abdominal pain. No bleeding.   Neuro: No focal neuro complaints.    Vitals:  T(C): 36.8 (10-15-23 @ 08:00), Max: 36.8 (10-15-23 @ 04:05)  HR: 68 (10-15-23 @ 08:00) (35 - 78)  BP: 144/95 (10-15-23 @ 08:00) (122/78 - 173/87)  RR: 17 (10-15-23 @ 08:00) (14 - 21)  SpO2: 97% (10-15-23 @ 08:00) (91% - 100%)  I&O's Summary  14 Oct 2023 07:01  -  15 Oct 2023 07:00  --------------------------------------------------------  IN: 2362 mL / OUT: 4100 mL / NET: -1738 mL  15 Oct 2023 07:01  -  15 Oct 2023 09:38  --------------------------------------------------------  IN: 253 mL / OUT: 0 mL / NET: 253 mL    Weight (kg): 111.3 (10-12 @ 23:30)    PHYSICAL EXAM:  Appearance: Comfortable. No acute distress  HEENT:  Atraumatic. Normocephalic.  Normal oral mucosa  Neurologic: A & O x 3, no gross focal deficits.  Cardiovascular: RRR S1 S2, No murmur, no rubs/gallops. No JVD  Respiratory: Lungs clear to auscultation, unlabored   Gastrointestinal:  Soft, Non-tender, + BS  Lower Extremities: 2+ Peripheral Pulses, No clubbing, cyanosis, or edema  Psychiatry: Patient is calm. No agitation.   Skin: warm and dry.    CURRENT CARDIAC MEDICATIONS:  hydrALAZINE Injectable 10 milliGRAM(s) IV Push every 4 hours PRN      CURRENT OTHER MEDICATIONS:  acetaminophen     Tablet .. 650 milliGRAM(s) Oral every 6 hours PRN Mild Pain (1 - 3)  LORazepam   Injectable 2 milliGRAM(s) IV Push every 2 hours PRN CIWA-Ar score increase by 2 points and a total score of 7 or less  famotidine    Tablet 20 milliGRAM(s) Oral daily  senna 2 Tablet(s) Oral at bedtime  atorvastatin 80 milliGRAM(s) Oral at bedtime  insulin lispro (ADMELOG) corrective regimen sliding scale   SubCutaneous Before meals and at bedtime  dextrose 5%. 1000 milliLiter(s) (50 mL/Hr) IV Continuous <Continuous>  dextrose 5%. 1000 milliLiter(s) (100 mL/Hr) IV Continuous <Continuous>  folic acid 1 milliGRAM(s) Oral daily  heparin  Infusion 1500 Unit(s)/Hr (16 mL/Hr) IV Continuous <Continuous>  influenza   Vaccine 0.5 milliLiter(s) IntraMuscular once  multivitamin 1 Tablet(s) Oral daily  thiamine IVPB 500 milliGRAM(s) IV Intermittent every 8 hours, Stop order after: 3 Days      LABS:	 	  ( 14 Oct 2023 14:16 )  Troponin T  <0.01,  CPK  X    , CKMB  X    , BNP X                                12.9   5.12  )-----------( 112      ( 15 Oct 2023 03:55 )             37.7     10-15    137  |  101  |  9.5  ----------------------------<  175<H>  4.3   |  25.0  |  1.03    Ca    8.8      15 Oct 2023 03:55  Phos  3.3     10-15  Mg     2.2     10-15    TPro  6.2<L>  /  Alb  3.7  /  TBili  0.5  /  DBili  0.1  /  AST  32  /  ALT  43<H>  /  AlkPhos  66  10-14    PT/INR/PTT ( 15 Oct 2023 03:55 )                       :                       :      X            :       73.6                  .        .                   .              .           .       X           .                                       Lipid Profile: Date: 10-13 @ 03:10  Total cholesterol 199; Direct LDL: --; HDL: 29; Triglycerides:277    HgA1c:   TSH: Thyroid Stimulating Hormone, Serum: 3.05 uIU/mL  Thyroid Stimulating Hormone, Serum: 4.75 uIU/mL  Thyroid Stimulating Hormone, Serum: 5.36 uIU/mL      TELEMETRY:   Sr with p waves after t wave concerning for 2- 1 block, when sb drops 30's and has PJC's and PACS.  Reviewed with Dr. Zapata and Dr. Sylvie Mercer recommending adding bb, multiple PJC's, and PAC's noted.     ECG:  Sr, PJC's, interior ischemia.

## 2023-10-15 NOTE — PROGRESS NOTE ADULT - SUBJECTIVE AND OBJECTIVE BOX
Luis Carbone M.D.    Patient is a 58y old  Male who presents with a chief complaint of Cerebral infarction     (15 Oct 2023 11:53)    Patient is a 57 y/o M with a PMHx of stroke one year ago (at the time patient received TNK and had full  resolution of symptoms), HTN, DM, non-compliant with medications, who presented following an episode of LOC. NIHSS 10 on admission . Patient is not a current TNK candidate  due to unclear exact last known well. TRF to [facility] for thrombectomy. MRI confirmed - Acute small to moderate medial left temporal occipital lobe infarctions. Acute punctate left occipital lobe infarction. Occluded right MCA. Occluded right PCA. Occluded right. Vertebral artery proximal V4 segment. S/p cerebral angiogram: found L PCA occlusion s/p aspiration with TICI 3 recanalization on 10/12. Repeat stroke imaging 10/14 with initial concern for poss new perfusion deficit however on further review noted to be present on admission. Started on heparin drip per neurology recommendations given severe athrosclerotic disease. Also noted with abnormal EKG, possible 2:1 block, seen by cardiology. Now stable for down stable to downgrade to medicine. Also h/o ETOH use, reports drinking vodka almost everyday, no DT history.     At the time of my examination, pt reports left sided lip and facial numbness (was reports right sided symptoms to neurosx team). No other concerns.     MEDICATIONS  (STANDING):  atorvastatin 80 milliGRAM(s) Oral at bedtime  chlorhexidine 2% Cloths 1 Application(s) Topical <User Schedule>  dextrose 50% Injectable 12.5 Gram(s) IV Push once  dextrose 50% Injectable 25 Gram(s) IV Push once  dextrose 50% Injectable 25 Gram(s) IV Push once  famotidine    Tablet 20 milliGRAM(s) Oral daily  folic acid 1 milliGRAM(s) Oral daily  heparin  Infusion 1500 Unit(s)/Hr (15 mL/Hr) IV Continuous <Continuous>  influenza   Vaccine 0.5 milliLiter(s) IntraMuscular once  insulin lispro (ADMELOG) corrective regimen sliding scale   SubCutaneous Before meals and at bedtime  multivitamin 1 Tablet(s) Oral daily  polyethylene glycol 3350 17 Gram(s) Oral every 12 hours  senna 2 Tablet(s) Oral every 12 hours  thiamine IVPB 500 milliGRAM(s) IV Intermittent every 8 hours    MEDICATIONS  (PRN):  acetaminophen     Tablet .. 650 milliGRAM(s) Oral every 6 hours PRN Mild Pain (1 - 3)  hydrALAZINE Injectable 10 milliGRAM(s) IV Push every 4 hours PRN SBP>160  LORazepam   Injectable 2 milliGRAM(s) IV Push every 2 hours PRN CIWA-Ar score increase by 2 points and a total score of 7 or less      I&O's Summary    14 Oct 2023 07:01  -  15 Oct 2023 07:00  --------------------------------------------------------  IN: 2362 mL / OUT: 4100 mL / NET: -1738 mL    15 Oct 2023 07:01  -  15 Oct 2023 15:25  --------------------------------------------------------  IN: 1056 mL / OUT: 700 mL / NET: 356 mL        PHYSICAL EXAM:  Vital Signs Last 24 Hrs  T(C): 36.8 (15 Oct 2023 08:00), Max: 36.8 (15 Oct 2023 04:05)  T(F): 98.2 (15 Oct 2023 08:00), Max: 98.2 (15 Oct 2023 04:05)  HR: 66 (15 Oct 2023 15:00) (35 - 78)  BP: 175/99 (15 Oct 2023 15:00) (121/72 - 175/99)  BP(mean): 115 (15 Oct 2023 15:00) (82 - 121)  RR: 20 (15 Oct 2023 15:00) (14 - 21)  SpO2: 100% (15 Oct 2023 15:00) (96% - 100%)    Parameters below as of 15 Oct 2023 13:00  Patient On (Oxygen Delivery Method): room air    GENERAL: NAD, well-groomed  HEAD:  Atraumatic, Normocephalic  NERVOUS SYSTEM:  Alert & Oriented x2-3 with short term memory loss; Motor Strength 5/5 B/L upper and lower extremities  CHEST/LUNG: Clear to percussion bilaterally; No rales, rhonchi, wheezing, or rubs  HEART: Regular rate and rhythm; No murmurs, rubs, or gallops  ABDOMEN: Soft, Nontender, Nondistended; Bowel sounds present  EXTREMITIES:  2+ Peripheral Pulses, No clubbing, cyanosis, or edema  LYMPH: No lymphadenopathy noted  SKIN: No rashes or lesions    LABS:                        12.9   5.12  )-----------( 112      ( 15 Oct 2023 03:55 )             37.7     10-15    137  |  101  |  9.5  ----------------------------<  175<H>  4.3   |  25.0  |  1.03    Ca    8.8      15 Oct 2023 03:55  Phos  3.3     10-15  Mg     2.2     10-15    TPro  6.2<L>  /  Alb  3.7  /  TBili  0.5  /  DBili  0.1  /  AST  32  /  ALT  43<H>  /  AlkPhos  66  10-14    PT/INR - ( 14 Oct 2023 15:55 )   PT: 11.3 sec;   INR: 1.02 ratio         PTT - ( 15 Oct 2023 09:58 )  PTT:88.4 sec  CARDIAC MARKERS ( 15 Oct 2023 11:10 )  x     / <0.01 ng/mL / x     / x     / x      CARDIAC MARKERS ( 14 Oct 2023 14:16 )  x     / <0.01 ng/mL / x     / x     / x          Urinalysis Basic - ( 15 Oct 2023 03:55 )    Color: x / Appearance: x / SG: x / pH: x  Gluc: 175 mg/dL / Ketone: x  / Bili: x / Urobili: x   Blood: x / Protein: x / Nitrite: x   Leuk Esterase: x / RBC: x / WBC x   Sq Epi: x / Non Sq Epi: x / Bacteria: x        CAPILLARY BLOOD GLUCOSE      POCT Blood Glucose.: 184 mg/dL (15 Oct 2023 11:15)  POCT Blood Glucose.: 161 mg/dL (15 Oct 2023 06:22)  POCT Blood Glucose.: 184 mg/dL (14 Oct 2023 21:17)  POCT Blood Glucose.: 169 mg/dL (14 Oct 2023 16:50)      RADIOLOGY & ADDITIONAL TESTS:  Results Reviewed:   Imaging Personally Reviewed:  Electrocardiogram Personally Reviewed: Luis Carbone M.D.    Patient is a 58y old  Male who presents with a chief complaint of Cerebral infarction     (15 Oct 2023 11:53)    Patient is a 59 y/o M with a PMHx of stroke one year ago (at the time patient received TNK and had full  resolution of symptoms), HTN, DM, non-compliant with medications, who presented following an episode of LOC. NIHSS 10 on admission . Patient was not TNK candidate  due to unclear exact last known well. TRF to [facility] for thrombectomy. MRI confirmed - Acute small to moderate medial left temporal occipital lobe infarctions. Acute punctate left occipital lobe infarction. Occluded right MCA. Occluded right PCA. Occluded right. Vertebral artery proximal V4 segment. S/p cerebral angiogram: found with L PCA occlusion s/p aspiration with TICI 3 recanalization on 10/12. Repeat stroke imaging 10/14 with initial concern for poss new perfusion deficit however on further review noted to be present on admission. Started on heparin drip per neurology given severe atherosclerotic disease. Also noted with abnormal EKG, possible 2:1 block, seen by cardiology. Now stable for down stable to downgrade to medicine. Also h/o ETOH use, reports drinking vodka almost everyday, no DT history.     At the time of my examination, pt reports left sided lip, facial arm and leg numbness. No other concerns.     MEDICATIONS  (STANDING):  atorvastatin 80 milliGRAM(s) Oral at bedtime  chlorhexidine 2% Cloths 1 Application(s) Topical <User Schedule>  dextrose 50% Injectable 12.5 Gram(s) IV Push once  dextrose 50% Injectable 25 Gram(s) IV Push once  dextrose 50% Injectable 25 Gram(s) IV Push once  famotidine    Tablet 20 milliGRAM(s) Oral daily  folic acid 1 milliGRAM(s) Oral daily  heparin  Infusion 1500 Unit(s)/Hr (15 mL/Hr) IV Continuous <Continuous>  influenza   Vaccine 0.5 milliLiter(s) IntraMuscular once  insulin lispro (ADMELOG) corrective regimen sliding scale   SubCutaneous Before meals and at bedtime  multivitamin 1 Tablet(s) Oral daily  polyethylene glycol 3350 17 Gram(s) Oral every 12 hours  senna 2 Tablet(s) Oral every 12 hours  thiamine IVPB 500 milliGRAM(s) IV Intermittent every 8 hours    MEDICATIONS  (PRN):  acetaminophen     Tablet .. 650 milliGRAM(s) Oral every 6 hours PRN Mild Pain (1 - 3)  hydrALAZINE Injectable 10 milliGRAM(s) IV Push every 4 hours PRN SBP>160  LORazepam   Injectable 2 milliGRAM(s) IV Push every 2 hours PRN CIWA-Ar score increase by 2 points and a total score of 7 or less      I&O's Summary    14 Oct 2023 07:01  -  15 Oct 2023 07:00  --------------------------------------------------------  IN: 2362 mL / OUT: 4100 mL / NET: -1738 mL    15 Oct 2023 07:01  -  15 Oct 2023 15:25  --------------------------------------------------------  IN: 1056 mL / OUT: 700 mL / NET: 356 mL        PHYSICAL EXAM:  Vital Signs Last 24 Hrs  T(C): 36.8 (15 Oct 2023 08:00), Max: 36.8 (15 Oct 2023 04:05)  T(F): 98.2 (15 Oct 2023 08:00), Max: 98.2 (15 Oct 2023 04:05)  HR: 66 (15 Oct 2023 15:00) (35 - 78)  BP: 175/99 (15 Oct 2023 15:00) (121/72 - 175/99)  BP(mean): 115 (15 Oct 2023 15:00) (82 - 121)  RR: 20 (15 Oct 2023 15:00) (14 - 21)  SpO2: 100% (15 Oct 2023 15:00) (96% - 100%)    Parameters below as of 15 Oct 2023 13:00  Patient On (Oxygen Delivery Method): room air    GENERAL: NAD, well-groomed  HEAD:  Atraumatic, Normocephalic  NERVOUS SYSTEM:  Alert & Oriented x2 (hospital and name), with short term memory loss; Motor Strength 5/5 B/L upper and lower extremities. left sided body numbness and left sided hemianopsia  CHEST/LUNG: Clear to percussion bilaterally; No rales, rhonchi, wheezing, or rubs  HEART: Regular rate and rhythm; No murmurs, rubs, or gallops  ABDOMEN: Soft, Nontender, Nondistended; Bowel sounds present  EXTREMITIES:  2+ Peripheral Pulses, No clubbing, cyanosis, or edema  LYMPH: No lymphadenopathy noted  SKIN: No rashes or lesions    LABS:                        12.9   5.12  )-----------( 112      ( 15 Oct 2023 03:55 )             37.7     10-15    137  |  101  |  9.5  ----------------------------<  175<H>  4.3   |  25.0  |  1.03    Ca    8.8      15 Oct 2023 03:55  Phos  3.3     10-15  Mg     2.2     10-15    TPro  6.2<L>  /  Alb  3.7  /  TBili  0.5  /  DBili  0.1  /  AST  32  /  ALT  43<H>  /  AlkPhos  66  10-14    PT/INR - ( 14 Oct 2023 15:55 )   PT: 11.3 sec;   INR: 1.02 ratio         PTT - ( 15 Oct 2023 09:58 )  PTT:88.4 sec  CARDIAC MARKERS ( 15 Oct 2023 11:10 )  x     / <0.01 ng/mL / x     / x     / x      CARDIAC MARKERS ( 14 Oct 2023 14:16 )  x     / <0.01 ng/mL / x     / x     / x          Urinalysis Basic - ( 15 Oct 2023 03:55 )    Color: x / Appearance: x / SG: x / pH: x  Gluc: 175 mg/dL / Ketone: x  / Bili: x / Urobili: x   Blood: x / Protein: x / Nitrite: x   Leuk Esterase: x / RBC: x / WBC x   Sq Epi: x / Non Sq Epi: x / Bacteria: x        CAPILLARY BLOOD GLUCOSE      POCT Blood Glucose.: 184 mg/dL (15 Oct 2023 11:15)  POCT Blood Glucose.: 161 mg/dL (15 Oct 2023 06:22)  POCT Blood Glucose.: 184 mg/dL (14 Oct 2023 21:17)  POCT Blood Glucose.: 169 mg/dL (14 Oct 2023 16:50)      RADIOLOGY & ADDITIONAL TESTS:  Results Reviewed:   Imaging Personally Reviewed:  Electrocardiogram Personally Reviewed:

## 2023-10-15 NOTE — DIETITIAN INITIAL EVALUATION ADULT - PERTINENT MEDS FT
MEDICATIONS  (STANDING):  atorvastatin 80 milliGRAM(s) Oral at bedtime  chlorhexidine 2% Cloths 1 Application(s) Topical <User Schedule>  dextrose 50% Injectable 12.5 Gram(s) IV Push once  dextrose 50% Injectable 25 Gram(s) IV Push once  dextrose 50% Injectable 25 Gram(s) IV Push once  famotidine    Tablet 20 milliGRAM(s) Oral daily  folic acid 1 milliGRAM(s) Oral daily  heparin  Infusion 1500 Unit(s)/Hr (15 mL/Hr) IV Continuous <Continuous>  influenza   Vaccine 0.5 milliLiter(s) IntraMuscular once  insulin lispro (ADMELOG) corrective regimen sliding scale   SubCutaneous Before meals and at bedtime  multivitamin 1 Tablet(s) Oral daily  polyethylene glycol 3350 17 Gram(s) Oral every 12 hours  senna 2 Tablet(s) Oral every 12 hours  thiamine IVPB 500 milliGRAM(s) IV Intermittent every 8 hours    MEDICATIONS  (PRN):  acetaminophen     Tablet .. 650 milliGRAM(s) Oral every 6 hours PRN Mild Pain (1 - 3)  hydrALAZINE Injectable 10 milliGRAM(s) IV Push every 4 hours PRN SBP>160  LORazepam   Injectable 2 milliGRAM(s) IV Push every 2 hours PRN CIWA-Ar score increase by 2 points and a total score of 7 or less
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
## 2023-10-15 NOTE — OCCUPATIONAL THERAPY INITIAL EVALUATION ADULT - PERTINENT HX OF CURRENT PROBLEM, REHAB EVAL
As per MD note: Patient is a 59 y/o M with a PMHx of stroke one year ago (  at the time patient recieved TNK and had full  resolution of symptoms), HTN, DM, non-compliant with medications, who presented following an episode of LOC.   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 At around 6:15 pm patient had a sudden fall at work and lost conciousness. It is unclear if patient had any symptoms before he fell. NIHSS 10 on admission . Patient is not a current TNK candidate  due to unclear exact last known well. TRF to [facility] for thrombectomy

## 2023-10-15 NOTE — PHYSICAL THERAPY INITIAL EVALUATION ADULT - IMPAIRMENTS CONTRIBUTING TO GAIT DEVIATIONS, PT EVAL
impaired balance/impaired coordination/impaired postural control/decreased sensation/decreased strength

## 2023-10-15 NOTE — PROGRESS NOTE ADULT - SUBJECTIVE AND OBJECTIVE BOX
[facility] Stroke Team  CC: LOC    HPI:  Patient is a 57 y/o M with a PMHx of stroke 2022 reported at [facility]  (at the time patient received thrombolysis and had full  resolution of symptoms), family reported dove dove, HTN, DM, HLD nonadherent with medications, who presented following an episode of LOC.  As per sister report, patient was last seen well by his niece  morning of 10/12/23  before patient went to work. At around 6:15 pm that night patient had a sudden fall at work and lost consciousness It is unclear if patient had any symptoms before he fell. NIHSS 10 on admission . Transferred to [facility] for mechanical thrombectomy.        PAST MEDICAL & SURGICAL HISTORY:  HTN (hypertension)  CVA (cerebrovascular accident)  History of hip surgery  Stroke as noted by sister   Miriam Dove     Allergies  No Known Allergies    Intolerances    SOCIAL HISTORY:  no tob,   etoh : 1 pt vodka daily  no drugs, years     FAMILY HISTORY:  possibly stroke in father   DM , HLD, HTN             Vital Signs Last 24 Hrs  T(C): 36.8 (15 Oct 2023 08:00), Max: 36.8 (15 Oct 2023 04:05)  T(F): 98.2 (15 Oct 2023 08:00), Max: 98.2 (15 Oct 2023 04:05)  HR: 66 (15 Oct 2023 15:00) (35 - 78)  BP: 175/99 (15 Oct 2023 15:00) (121/72 - 175/99)  BP(mean): 115 (15 Oct 2023 15:00) (82 - 121)  RR: 20 (15 Oct 2023 15:00) (14 - 21)  SpO2: 100% (15 Oct 2023 15:00) (96% - 100%)    Parameters below as of 15 Oct 2023 13:00  Patient On (Oxygen Delivery Method): room air        MEDICATIONS    acetaminophen     Tablet .. 650 milliGRAM(s) Oral every 6 hours PRN  atorvastatin 80 milliGRAM(s) Oral at bedtime  chlorhexidine 2% Cloths 1 Application(s) Topical <User Schedule>  dextrose 50% Injectable 12.5 Gram(s) IV Push once  dextrose 50% Injectable 25 Gram(s) IV Push once  dextrose 50% Injectable 25 Gram(s) IV Push once  famotidine    Tablet 20 milliGRAM(s) Oral daily  folic acid 1 milliGRAM(s) Oral daily  heparin  Infusion 1500 Unit(s)/Hr IV Continuous <Continuous>  hydrALAZINE Injectable 10 milliGRAM(s) IV Push every 4 hours PRN  influenza   Vaccine 0.5 milliLiter(s) IntraMuscular once  insulin lispro (ADMELOG) corrective regimen sliding scale   SubCutaneous Before meals and at bedtime  LORazepam   Injectable 2 milliGRAM(s) IV Push every 2 hours PRN  multivitamin 1 Tablet(s) Oral daily  polyethylene glycol 3350 17 Gram(s) Oral every 12 hours  senna 2 Tablet(s) Oral every 12 hours  thiamine IVPB 500 milliGRAM(s) IV Intermittent every 8 hours         LABS:  CBC Full  -  ( 15 Oct 2023 03:55 )  WBC Count : 5.12 K/uL  RBC Count : 4.03 M/uL  Hemoglobin : 12.9 g/dL  Hematocrit : 37.7 %  Platelet Count - Automated : 112 K/uL  Mean Cell Volume : 93.5 fl  Mean Cell Hemoglobin : 32.0 pg  Mean Cell Hemoglobin Concentration : 34.2 gm/dL  Auto Neutrophil # : x  Auto Lymphocyte # : x  Auto Monocyte # : x  Auto Eosinophil # : x  Auto Basophil # : x  Auto Neutrophil % : x  Auto Lymphocyte % : x  Auto Monocyte % : x  Auto Eosinophil % : x  Auto Basophil % : x    Urinalysis Basic - ( 15 Oct 2023 03:55 )    Color: x / Appearance: x / SG: x / pH: x  Gluc: 175 mg/dL / Ketone: x  / Bili: x / Urobili: x   Blood: x / Protein: x / Nitrite: x   Leuk Esterase: x / RBC: x / WBC x   Sq Epi: x / Non Sq Epi: x / Bacteria: x      10-15    137  |  101  |  9.5  ----------------------------<  175<H>  4.3   |  25.0  |  1.03    Ca    8.8      15 Oct 2023 03:55  Phos  3.3     10-15  Mg     2.2     10-15    TPro  6.2<L>  /  Alb  3.7  /  TBili  0.5  /  DBili  0.1  /  AST  32  /  ALT  43<H>  /  AlkPhos  66  10-14    LIVER FUNCTIONS - ( 14 Oct 2023 03:38 )  Alb: 3.7 g/dL / Pro: 6.2 g/dL / ALK PHOS: 66 U/L / ALT: 43 U/L / AST: 32 U/L / GGT: x           Hemoglobin A1C:       PT/INR - ( 14 Oct 2023 15:55 )   PT: 11.3 sec;   INR: 1.02 ratio         PTT - ( 15 Oct 2023 09:58 )  PTT:88.4 sec    Lipid Profile (10.13.23 @ 03:10)    Cholesterol: 199 mg/dL   Triglycerides, Serum: 277 mg/dL   HDL Cholesterol: 29 mg/dL   Non HDL Cholesterol: 170    LDL Cholesterol Calculated: 115 mg/dL    A1C with Estimated Average Glucose (10.13.23 @ 03:10)    A1C with Estimated Average Glucose Result: 8.6 %   Estimated Average Glucose: 200 mg/dL    Detailed Neurologic Exam:    Mental status: The patient is awake and alert and has normal attention span.  The patient is fully oriented to self , location, president, disoriented to month and year, The patient is oriented to current events. The patient is able to name objects, follow commands, repeat sentences.    Cranial nerves: Pupil right 3mm left 2mm, There is a left Homonymous hemianopia. ( denser in the superior quadrants)  Extraocular motion is full with no nystagmus. There is no ptosis. Facial sensation is intact on the right and diminished on the left. Facial musculature is symmetric .    Motor: There is normal bulk and tone.  There is no tremor.  Strength is 5/5 in the right arm and leg.   Strength is 5/5 in the left arm and leg.    Sensation: Intact to light touch  on the right and diminished on the left.     Cerebellar: There is no dysmetria on finger to nose testing.    Gait : deferred      RADIOLOGY & ADDITIONAL STUDIES (independently reviewed unless otherwise noted):  10/12/2023 19:13  IMPRESSION:  HEAD CT: No evidence of an acute intracranial hemorhage, midline shift or hydrocephalus. Mild bifrontal periventricular white matter ischemic changes. Bilateral maxillary sinusitis  NECK CTA: No hemodynamic significant narowing involving the carotid bifurcations. Hypoplastic right vertebral artery. Dominant left vertebral artery..  BRAIN CTA: Cutoff of the M1 portion of the right middle cerebral artery which may be chronic as there appears to be lenticular striate collaterals and reconstitution of more distal MCA segments. Cutoff of the right posterior cerebral artery at the P1-2 junction with reconstitution of more distal segments. Cut off of the left posterior cerebral artery at the P1-2 junction with mild distal reconstitution. Hypoplastic right vertebral artery with portions of the V4 segment not visualized  CT PERFUSION: Regions of ischemia within the posterior circulation with a volume of 121 mL without evidence of core infarction.    (10.13.23 @ 12:31)   MRI BRAIN: Acute small to moderate medial left temporal occipital lobe   infarctions. Acute punctate left occipital lobe infarction.  MRA BRAIN: Occluded right MCA. Occluded right PCA. Occluded right   vertebral artery proximal V4 segment.  MRI BRAIN NOVA: Values below.    NOVA BRAIN: (ml/min)    RMCA :0 RACA :326 RACA2 :187RPCA :-1 RVA :-48 JEN :317    LACA2 :64 LACA :110 LMCA :192 LPCA :107 LVA :187 LICA :241 right superior   cerebellar artery 16. Left superior cerebellar artery 40    BA :1:30     CT Angio Head w/ IV Cont (10.14.23 @ 15:25) >    IMPRESSION:      CT BRAIN WITHOUT CONTRAST:  1. Decreased attenuation involving the medial aspect of the left temporal   lobe, not present on prior noncontrast CT study. However, restricted   diffusion was appreciated in the same location on the prior MRI of   10/13/2023.  2. Nonacute left ventral pontine ischemic event, present on prior CT and   MRI.      Findings were communicated by Dr. Behr-Ventura to Dr. Luis Carbone,   covering attending at approximately 3:08 PM on 10/14/2023..        CT PERFUSION:    Technical limitations: Significantly limited by patient motion during   image acquisition and suboptimal arterial waveform.     Core infartction: 0 ml; [place]/tissue at risk for active ischemia: 75   mL involving right temporal brain parenchyma.  If symptoms persist consider follow up head CT or MRI, MRA  if no   contraindication.    CTA HEAD:  1. Poor visualization of the proximal M1 segment of the right middle   cerebral artery, as on prior.  2. Irregular pattern of flow within the P2 segment of the left posterior   cerebral artery, as on prior.  4. Cut off of the left posterior cerebral artery at the P1/P2 junction,   as on prior, with suggestion of distal reconstitution..  5. Hypoplastic poorly visualized intracranial right vertebral artery.  6. No evidence of aneurysm formation at the level of the [place]   [place]. Tiny aneurysms can be beyond the resolution of CTA technique.    CTA NECK:  1. No evidence of significant stenosis or occlusion in association with   the bilateral common carotid arteriesor bilateral internal carotid   arteries.  2. Poorly visualized proximal left vertebral artery with suggestion of   distal reconstitution at the level of C2.    Consider follow-up MRI of the brain, if clinically warranted and if there   are no MRI contraindications.    Remainder of the findings were communicated by Dr. Behr-Ventura to Dr. Luis Carbone, covering attending at approximately 4:18 PM on 10/14/2023.    DAWN M BEHR-VENTURA MD; Attending Radiologist    CT Head No Cont (10.15.23 @ 13:13) >    IMPRESSION: Stable medial left temporal occipital lobe infarction.    SOCRATES GRAY MD; Attending Radiologist        US Duplex Venous Lower Ext Complete, Bilateral (10.13.23 @ 22:47) >  IMPRESSION:  No evidence of deep venous thrombosis in either lower extremity.      TIARA KOEHLER MD; Attending Radiologist     TTE Echo Complete w/ Contrast w/ Doppler (10.13.23 @ 19:44) >    Summary:   1. Technically difficult study.   2. Normal left ventricular internal cavity size.   3. Left ventricular ejection fraction, by visual estimation, is 50 to   55%.   4. Normal left atrial size.   5. Normal right atrial size.   6. Mildly enlarged right ventricle.   7. Dilatation of the ascending aorta.   8. Sclerotic aortic valve with normal opening.   9. Mild mitral annular calcification.  10. Mild thickening of the anterior mitral valve leaflet.  11. Trace mitral valve regurgitation.  12. Mild tricuspid regurgitation.  13. Trivial pericardial effusion.  14. Recommend transesophageal echocardiogram.    Michael Tavera MD Electronically signed on10/14/2023 at 9:47:58 AM

## 2023-10-15 NOTE — PROGRESS NOTE ADULT - PROBLEM SELECTOR PLAN 1
Hx of Stroke x 1 years  Presents with fall/loc at work  Cerebral angiogram on 10/12/2023, found L PCA occlusion s/p aspiration with TICI 3 recanalization  Neurology following -   aspirin  chewable 81 mg Oral daily  atorvastatin 80 mg Oral at bedtime.  Patient seen by Neurology and stating concern embolic stroke will plan for ROCIO and ILR Hx of Stroke x 1 years  Presents with fall/loc at work  Cerebral angiogram on 10/12/2023, found L PCA occlusion s/p aspiration with TICI 3 recanalization  Neurology following -   aspirin  chewable 81 mg Oral daily  atorvastatin 80 mg Oral at bedtime.  Patient seen by Neurology and stating concern embolic stroke will plan for ROCIO and MCOT outpatient vs ILR  Patient has known non compliance.   EP consult  in am.

## 2023-10-15 NOTE — OCCUPATIONAL THERAPY INITIAL EVALUATION ADULT - FINE MOTOR COORDINATION, RIGHT HAND THUMB/FINGER OPPOSITION SKILLS, OT EVAL
Patient comes to clinic for follow up anticoagulation visit.  DX: Thrombophelia Goal range: 2.0-3.0    LAST(INR) 1.6 on 1/14/19. Dose To resume usual dose prior to starting Bactrim  Todays INR is 1.8. Dose Increase 2.5 mg/wk (10%)2.5 mg every Su, Tu, F; 5 mg all other days=27.5 mg/wk  as per protocol.  Follow up 2 wks. See Ambulatory Anticoagulation Flow Sheet.    Teaching: dose, return date, conditions requiring contact with Coumadin Clinic. Dosing calendar provided.  Pt verbalized understanding of instructions and is aware of need to call with any questions or concerns and to report any changes in medications, health, diet and / or lifestyle.     Dr. Reid  is in office today supervising treatment. Note forwarded to physician for review.     
normal performance

## 2023-10-15 NOTE — DIETITIAN INITIAL EVALUATION ADULT - PERTINENT LABORATORY DATA
10-15    137  |  101  |  9.5  ----------------------------<  175<H>  4.3   |  25.0  |  1.03    Ca    8.8      15 Oct 2023 03:55  Phos  3.3     10-15  Mg     2.2     10-15    TPro  6.2<L>  /  Alb  3.7  /  TBili  0.5  /  DBili  0.1  /  AST  32  /  ALT  43<H>  /  AlkPhos  66  10-14  POCT Blood Glucose.: 184 mg/dL (10-15-23 @ 11:15)  A1C with Estimated Average Glucose Result: 8.6 % (10-13-23 @ 03:10)

## 2023-10-15 NOTE — PHYSICAL THERAPY INITIAL EVALUATION ADULT - GENERAL OBSERVATIONS, REHAB EVAL
Pt received in bed, + IV, + tele//BP monitoring, breathing on RA in NAD, in 0/10 pain reports left sided numbness, agreeable to PT eval

## 2023-10-15 NOTE — PROGRESS NOTE ADULT - CRITICAL CARE ATTENDING COMMENT
I spent 40minutes of critical care time examining patient, reviewing vitals, labs, medications, imaging and discussing with the team goals of care to prevent life-threatening in this patient who is at high risk for deterioration or death due to:  stroke
I have personally provided the above mentioned minutes of critical care time including review of laboratory values, imaging, interdisciplinary care coordination, and frequent monitoring for decompensation.    Based on my personal evaluation, this patient has a high probability of imminent or life-threatening deterioration due to the presence of: stroke post-thrombectomy, hypertension, hyperglycemia -  which required my direct attention, intervention, and personal management. Other billable procedures, if performed, are documented separately.
I spent 60 minutes of critical care time examining patient, reviewing vitals, labs, medications, imaging and discussing with the team goals of care to prevent life-threatening in this patient who is at high risk for deterioration or death due to:  stroke

## 2023-10-15 NOTE — PROGRESS NOTE ADULT - ASSESSMENT
59 y/o M with a PMHx of stroke one year ago (at the time patient received TNK and had full  resolution of symptoms), HTN, DM, non-compliant with medications, p/w LOC, noted with acute CVA.     #Acute CVA  MRI brain result noted, repeat CTH stable  s/p recanalization  c/w Lipitor  TTE result below  DVT studies negative  Started on heparin drip given extensive atherosclerotic disease, AC per neuro  ROCIO pending, NPO mn    #Occluded R MCA/PCA   per discussion with neurosx, pt with collaterals  c/w q4hr neuro checks  outpt neurosx followup    #ETOH abuse  not in active withdrawal, and pt denies h/o DT  c/w symptom trigger CIWA  c/w MV, thiamine and folic acid  SW consulted  PT/OT eval    #Episode of bradycardia with concern for 2:1 block on tele  TTE EF 50-55%, no other structural abnormal   ROCIO pending as above  cards naomie recs  ILR on discharge  Cards to consult EP for evaluation, recs pending     #DM  non compliant with meds at home  A1C 8.6  ISS, accu checks, CC diet  DM educator consulted    #Elevated TSH  T4 level normal  outpt f/u    #HLD  c/w Lipitor 80mg QD    #Anemia  Hg stable at 10.6, trend CBC    DVT ppx: Lovenox  Dispo: Tele    PT/OT eval - acute rehab     59 y/o M with a PMHx of stroke one year ago (at the time patient received TNK and had full  resolution of symptoms), HTN, DM, non-compliant with medications, p/w LOC, noted with acute CVA.     #Acute CVA  MRI brain result noted, repeat CTH stable  s/p recanalization  c/w Lipitor  TTE result below  DVT studies negative  Started on heparin drip given extensive atherosclerotic disease, AC per neuro  ROCIO pending, NPO mn  BP goal 120-180s per neurosx    #Occluded R MCA/PCA   per discussion with neurosx, pt with collaterals  c/w q4hr neuro checks  outpt neurosx followup    #ETOH abuse  not in active withdrawal, and pt denies h/o DT  c/w symptom trigger CIWA  c/w MV, thiamine and folic acid  SW consulted  PT/OT eval - acute rehab    #Episode of bradycardia with concern for 2:1 block on tele  TTE EF 50-55%, no other structural abnormal   ROCIO pending as above  cards naomie recs  ILR on discharge  Cards to consult EP for evaluation, recs pending     #HTN  No meds at home  Per neurosx, BP gaol 120-180s, at goal currently  cont to monitor    #DM  non compliant with meds at home  A1C 8.6  ISS, accu checks, CC diet  DM educator consulted    #Elevated TSH  T4 level normal  outpt f/u    #HLD  c/w Lipitor 80mg QD    #Anemia  Hg stable at 10.6, trend CBC    DVT ppx: Lovenox  Dispo: Tele    PT/OT eval - acute rehab

## 2023-10-15 NOTE — PROGRESS NOTE ADULT - ASSESSMENT
INCOMPLETE- VISIT PENDING   ASSESSMENT: Patient is a 57 y/o M with a PMHx of stroke 2022 reported at [facility]  (at the time patient received thrombolysis and had full  resolution of symptoms), reported dove dove, HTN, DM, HLD nonadherent with medications, who presented following an episode of LOC while at work the evening of 10/12/23, there was reported right sided hemisensory loss and right homonomous hemianopia .  Reported unclear if definitively last known well was prior at 1815 therefor excluded from tenecteplase after CT head did not demonstrate acute infarction or hemorrhage.  Perfusion demonstrated regions of ischemia in the posterior circulation, CT angiogram head/neck showed right PCA P1-2 cutoff, possibly chronic right MCA M1 cutoff which had distal reconstitution and established collaterals, and left PCA occlusion which patient was subsequently transferred for mechanical thrombectomy. Cerebral angiogram and mechanical thrombectomy performed using aspiration with TICI 3 recanalization of left PCA.  The right occipital lobe filled from right SHANI collaterals, and the right MCA as well was thought to be chronic given noted collateralization. MR with small to moderate left temporal occipital lobe infarctions. Occluded right MCA, occluded right PCA and right vertebral artery proximal segment. Etiology; embolic stroke of undetermined source.  While there is underlying dove dove can not entirely exclude cardioembolic / hypercoagulable state.     NEURO:   -Neurologically appears to have right hemispheric symptoms in addition to L PCA distribution stroke.  - Repeat CT head and CTA CTP  -images and reports were reviewed. Shows R PCA region hypoperfusion.  - Will need repeat MRI Brain when stable.  -some memory impairment- suggest OT cognitive evaluation   -Continue close monitoring for neurologic deterioration    - Stroke neuro checks q 2 hrs.  -TTE  -Report as above was reviewed.   -LE duplex no DVT reported  -Hypercoagulable panel   - SBP goal permissive to [id number] mm Hg for now.  -ANTITHROMBOTIC THERAPY:   - After discussion with [facility] attending Dr. Goodrich therapeutic Heparin gtt with PTT goal 60-80 was started.  - ASA discontinued.  -titrate statin to LDL goal less than 70  -MRI Brain w/o as noted   - MRA Head NOVA -images and reports were reviewed.   -Consideration in Outpatient NM spect w/w out Diamox  -Dysphagia screen: pass  -Physical therapy/OT/Speech eval/treatment.     -CARDIOVASCULAR: check TTE, cardiac monitoring w/ telemetry for now, further evaluation pending findings of noted workup                              -HEMATOLOGY: H/H with anemia, Platelets 115, close monitoring for further thrombocytopenia, patient should have all age and risk appropriate malignancy screenings with PCP or sooner if clinically suspected      DVT ppx: no absolute neurological contraindication to pharmacological dvt ppx.     PULMONARY: CXR pending , protecting airway, saturating well     RENAL: BUN/Cr within range, monitor urine output, maintain adequate hydration       Na Goal:  135-145    ID: afebrile, no leukocytosis, monitor for si/sx of infection     OTHER:  condition and plan of care d/w patient, questions and concerns addressed.  CIWA protcol, monitor closely for si/sx of withdrawal.     DISPOSITION: Rehab or home depending on PT eval once stable and workup is complete      CORE MEASURES:        Admission NIHSS:10      Tenecteplase : [] YES [x] NO      LDL/HDL/A1C: 115/29/8.6     Depression Screen- if depression hx and/or present      Statin Therapy: as noted      Dysphagia Screen: [x] PASS [] FAIL     Smoking [] YES [x] NO      Afib [] YES [x] NO     Stroke Education [x] YES [] NO    Obtain screening lower extremity venous ultrasound in patients who meet 1 or more of the following criteria as patient is high risk for DVT/PE on admission:   [] History of DVT/PE  []Hypercoagulable states (Factor V Leiden, Cancer, OCP, etc. )  []Prolonged immobility (hemiplegia/hemiparesis/post operative or any other extended immobilization)  [] Transferred from outside facility (Rehab or Long term care)  [] Age </= to 50

## 2023-10-15 NOTE — DIETITIAN INITIAL EVALUATION ADULT - OTHER INFO
58M with h/o stroke (2022, sx fully resolved), HTN, DM, non-compliant with medications, developed LOC. CTH w/R PCA cutoff. Transfer to [facility] for thrombectomy.

## 2023-10-15 NOTE — OCCUPATIONAL THERAPY INITIAL EVALUATION ADULT - ADDITIONAL COMMENTS
Pt unable to provide social history secondary to memory recall barriers. Pt will benefit from care manager follow up.  right handed

## 2023-10-15 NOTE — CHART NOTE - NSCHARTNOTEFT_GEN_A_CORE
HPI: Patient is a 57 y/o M with a PMHx of stroke one year ago (at the time patient received TNK and had full  resolution of symptoms), HTN, DM, non-compliant with medications, who presented following an episode of LOC. 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 At around 6:15 pm patient had a sudden fall at work and lost consciousness. It is unclear if patient had any symptoms before he fell. NIHSS 10 on admission . Patient is not a current TNK candidate  due to unclear exact last known well. TRF to [facility] for thrombectomy     Int Events:  10/12: Thrombectomy via L radial artery for TICI 3 revascularization of L P1-P2 occlusion. Noted LMCA chronic occlusion.   10/13: Neurologic status improving. Got MR. Started on ASA and SQL.  10/14: quadrantanopia now on L superior (originally R); Repeat stroke imaging completed and initial concern for CTP showing poss new perfusion deficit however on further review noted to be present on admission. Started on heparin drip per neurology recommendations.  10/15: Therapeutic on heparin drip, stability HCT obtained. Cardiology consult for further stroke work-up. Liberalized SBP goal 120-180. Increase bowel regimen.     PROCEDURE:  10/12: Thrombectomy via L radial artery for TICI 3 revascularization of L P1-P2 occlusion  POD#3    Vital Signs Last 24 Hrs  T(C): 36.8 (10-15-23 @ 08:00), Max: 36.8 (10-15-23 @ 04:05)  T(F): 98.2 (10-15-23 @ 08:00), Max: 98.2 (10-15-23 @ 04:05)  HR: 66 (10-15-23 @ 14:00) (35 - 78)  BP: 141/82 (10-15-23 @ 14:00) (121/72 - 173/87)  BP(mean): 90 (10-15-23 @ 14:00) (82 - 121)  RR: 17 (10-15-23 @ 14:00) (14 - 21)  SpO2: 100% (10-15-23 @ 14:00) (96% - 100%)    PHYSICAL EXAM:  General: sitting up in bed, comfortable, pleasant and conversant  HEENT: MMM  Neuro:  -Mental status- forgetful, oriented x 2-3 to self, hospital (not which one) and year (not month, intermittently year), eyes open, following all commands  -CN- PERRL 3mm, EOMI, b/l R upper quadrantonopia resolved, tongue midline, face symmetric  -Motor- strength 5/5 throughout  -Sensory - minimally decreased sensation to L leg and L face (V2 distribution), no extinction to DSS  -Cerebellar - no dysmetria    CV: irregular rhythm and variable rate  Pulm: no accessory muscle use, breathing comfortably on RA  Abd: soft, nontender, nondistended  Skin: warm, dry    LABS:                12.9   5.12  )-----------( 112      ( 15 Oct 2023 03:55 )             37.7    10-15    137  |  101  |  9.5  ----------------------------<  175<H>  4.3   |  25.0  |  1.03    Ca    8.8      15 Oct 2023 03:55  Phos  3.3     10-15  Mg     2.2     10-15    TPro  6.2<L>  /  Alb  3.7  /  TBili  0.5  /  DBili  0.1  /  AST  32  /  ALT  43<H>  /  AlkPhos  66  10-14  PT/INR - ( 14 Oct 2023 15:55 )   PT: 11.3 sec;   INR: 1.02 ratio    PTT - ( 15 Oct 2023 09:58 )  PTT:88.4 sec    IMAGING:     MEDICATIONS:  Antibiotics:    Neuro:  acetaminophen     Tablet .. 650 milliGRAM(s) Oral every 6 hours PRN Mild Pain (1 - 3)  LORazepam   Injectable 2 milliGRAM(s) IV Push every 2 hours PRN CIWA-Ar score increase by 2 points and a total score of 7 or less    Cardiac:  hydrALAZINE Injectable 10 milliGRAM(s) IV Push every 4 hours PRN SBP>160    Pulm:    GI/:  famotidine    Tablet 20 milliGRAM(s) Oral daily  polyethylene glycol 3350 17 Gram(s) Oral every 12 hours  senna 2 Tablet(s) Oral every 12 hours    Other:   atorvastatin 80 milliGRAM(s) Oral at bedtime  chlorhexidine 2% Cloths 1 Application(s) Topical <User Schedule>  dextrose 50% Injectable 12.5 Gram(s) IV Push once  dextrose 50% Injectable 25 Gram(s) IV Push once  dextrose 50% Injectable 25 Gram(s) IV Push once  folic acid 1 milliGRAM(s) Oral daily  heparin  Infusion 1500 Unit(s)/Hr IV Continuous <Continuous>  influenza   Vaccine 0.5 milliLiter(s) IntraMuscular once  insulin lispro (ADMELOG) corrective regimen sliding scale   SubCutaneous Before meals and at bedtime  multivitamin 1 Tablet(s) Oral daily  thiamine IVPB 500 milliGRAM(s) IV Intermittent every 8 hours    -------------------------------------------------------------------------------------------------------------   58 year Male with h/o stroke (2022, sx fully resolved), HTN, DM, non-compliant with medications, ETOH (pint of vodka per day) developed LOC. CTH w/ R PCA cutoff. NIHSS 10 on admission . Patient is not TNK candidate due to unclear exact last known well. Transfer to [facility] for thrombectomy.   10/12 s/p cerebral angiogram: found L PCA occlusion s/p aspiration with TICI 3 recanalization of left PCA. The right occipital lobe filled from right SHANI collaterals, and the right MCA as well was thought to be chronic given noted collateralization. MR with small to moderate left temporal occipital lobe infarctions. Occluded right MCA, occluded right PCA and right vertebral artery proximal segment. Etiology; embolic stroke of undetermined source.  While there is underlying dove dove can not entirely exclude cardioembolic / hypercoagulable state.     Neuro:   - Q 4 hr stroke checks   - continue Heparin drip, full AC planning per Neurology  - Appreciate neurology recs  - pain control with prn tylenol  - CIWA protocol (drinks pint of vodka per day). high dose thiamine, folate.  - stroke core measures  - pending PT/OT    Respiratory:   - Room Air saturating well  - incentive spirometry     CV:  - SBP goal 120- 160. prn hydralazine and labetolol IVP  - Cardiology consulted, planning for ROCIO 10/16  - EP consulted c/f potential new 2-1 block, pending official recs however no acute intervetions  - continue lipitor    Renal:   - IVL   - Voiding   - Replete electrolytes as needed    GI:    - DASH diet, tolerating well. NPO after MN for ROCIO  - Bowel Regimen: senna, miralax  - GI PPX: pepcid    Endocrine:   - ISS    Heme/Onc:               - DVT ppx: venodynes  - On therapeutic heparin drip at 15. goal 60-80  - LE dopp 10/13 neg    ID:   - Afebrile      SW consulted to assist with housing, disability, childcare ( w/ 15yo daughter)    Dispo: telemtery under medicine  Case discussed with Dr. Mcdaniels. HPI: Patient is a 59 y/o M with a PMHx of stroke one year ago (at the time patient received TNK and had full  resolution of symptoms), HTN, DM, non-compliant with medications, who presented following an episode of LOC. 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 At around 6:15 pm patient had a sudden fall at work and lost consciousness. It is unclear if patient had any symptoms before he fell. NIHSS 10 on admission . Patient is not a current TNK candidate  due to unclear exact last known well. TRF to [facility] for thrombectomy     Int Events:  10/12: Thrombectomy via L radial artery for TICI 3 revascularization of L P1-P2 occlusion. Noted LMCA chronic occlusion.   10/13: Neurologic status improving. Got MR. Started on ASA and SQL.  10/14: quadrantanopia now on L superior (originally R); Repeat stroke imaging completed and initial concern for CTP showing poss new perfusion deficit however on further review noted to be present on admission. Started on heparin drip per neurology recommendations.  10/15: Therapeutic on heparin drip, stability HCT obtained. Cardiology consult for further stroke work-up. Liberalized SBP goal 120-180. Increase bowel regimen.     PROCEDURE:  10/12: Thrombectomy via L radial artery for TICI 3 revascularization of L P1-P2 occlusion  POD#3    Vital Signs Last 24 Hrs  T(C): 36.8 (10-15-23 @ 08:00), Max: 36.8 (10-15-23 @ 04:05)  T(F): 98.2 (10-15-23 @ 08:00), Max: 98.2 (10-15-23 @ 04:05)  HR: 66 (10-15-23 @ 14:00) (35 - 78)  BP: 141/82 (10-15-23 @ 14:00) (121/72 - 173/87)  BP(mean): 90 (10-15-23 @ 14:00) (82 - 121)  RR: 17 (10-15-23 @ 14:00) (14 - 21)  SpO2: 100% (10-15-23 @ 14:00) (96% - 100%)    PHYSICAL EXAM:  General: sitting up in bed, comfortable, pleasant and conversant  HEENT: MMM  Neuro:  -Mental status- forgetful, oriented x 2-3 to self, hospital (not which one) and year (not month, intermittently year), eyes open, following all commands  -CN- PERRL 3mm, EOMI, b/l R upper quadrantonopia resolved, tongue midline, face symmetric  -Motor- strength 5/5 throughout  -Sensory - minimally decreased sensation to L leg and L face (V2 distribution), no extinction to DSS  -Cerebellar - no dysmetria    CV: irregular rhythm and variable rate  Pulm: no accessory muscle use, breathing comfortably on RA  Abd: soft, nontender, nondistended  Skin: warm, dry    LABS:                12.9   5.12  )-----------( 112      ( 15 Oct 2023 03:55 )             37.7    10-15    137  |  101  |  9.5  ----------------------------<  175<H>  4.3   |  25.0  |  1.03    Ca    8.8      15 Oct 2023 03:55  Phos  3.3     10-15  Mg     2.2     10-15    TPro  6.2<L>  /  Alb  3.7  /  TBili  0.5  /  DBili  0.1  /  AST  32  /  ALT  43<H>  /  AlkPhos  66  10-14  PT/INR - ( 14 Oct 2023 15:55 )   PT: 11.3 sec;   INR: 1.02 ratio    PTT - ( 15 Oct 2023 09:58 )  PTT:88.4 sec    IMAGING:     MEDICATIONS:  Antibiotics:    Neuro:  acetaminophen     Tablet .. 650 milliGRAM(s) Oral every 6 hours PRN Mild Pain (1 - 3)  LORazepam   Injectable 2 milliGRAM(s) IV Push every 2 hours PRN CIWA-Ar score increase by 2 points and a total score of 7 or less    Cardiac:  hydrALAZINE Injectable 10 milliGRAM(s) IV Push every 4 hours PRN SBP>160    Pulm:    GI/:  famotidine    Tablet 20 milliGRAM(s) Oral daily  polyethylene glycol 3350 17 Gram(s) Oral every 12 hours  senna 2 Tablet(s) Oral every 12 hours    Other:   atorvastatin 80 milliGRAM(s) Oral at bedtime  chlorhexidine 2% Cloths 1 Application(s) Topical <User Schedule>  dextrose 50% Injectable 12.5 Gram(s) IV Push once  dextrose 50% Injectable 25 Gram(s) IV Push once  dextrose 50% Injectable 25 Gram(s) IV Push once  folic acid 1 milliGRAM(s) Oral daily  heparin  Infusion 1500 Unit(s)/Hr IV Continuous <Continuous>  influenza   Vaccine 0.5 milliLiter(s) IntraMuscular once  insulin lispro (ADMELOG) corrective regimen sliding scale   SubCutaneous Before meals and at bedtime  multivitamin 1 Tablet(s) Oral daily  thiamine IVPB 500 milliGRAM(s) IV Intermittent every 8 hours    -------------------------------------------------------------------------------------------------------------   58 year Male with h/o stroke (2022, sx fully resolved), HTN, DM, non-compliant with medications, ETOH (pint of vodka per day) developed LOC. CTH w/ R PCA cutoff. NIHSS 10 on admission . Patient is not TNK candidate due to unclear exact last known well. Transfer to [facility] for thrombectomy.   10/12 s/p cerebral angiogram: found L PCA occlusion s/p aspiration with TICI 3 recanalization of left PCA. The right occipital lobe filled from right SHANI collaterals, and the right MCA as well was thought to be chronic given noted collateralization. MR with small to moderate left temporal occipital lobe infarctions. Occluded right MCA, occluded right PCA and right vertebral artery proximal segment. Etiology; embolic stroke of undetermined source.  While there is underlying dove dove can not entirely exclude cardioembolic / hypercoagulable state.     Neuro:   - Q 4 hr stroke checks   - continue Heparin drip, full AC planning per Neurology  - Appreciate neurology recs  - pain control with prn tylenol  - CIWA protocol (drinks pint of vodka per day). high dose thiamine, folate.  - stroke core measures  - pending PT/OT    Respiratory:   - Room Air saturating well  - incentive spirometry     CV:  - SBP goal 120- 160. prn hydralazine and labetolol IVP  - Cardiology consulted, planning for ROCIO 10/16  - EP consulted c/f potential new 2-1 block, pending official recs however no acute intervetions  - continue lipitor    Renal:   - IVL   - Voiding   - Replete electrolytes as needed    GI:    - DASH diet, tolerating well. NPO after MN for ROCIO  - Bowel Regimen: senna, miralax  - GI PPX: pepcid    Endocrine:   - ISS    Heme/Onc:               - DVT ppx: venodynes  - On therapeutic heparin drip at 15. goal 60-80  - LE dopp 10/13 neg    ID:   - Afebrile      SW consulted to assist with housing, disability, childcare ( w/ 17yo daughter)    Dispo: telemtery under medicine, signed out to Dr. Carbone  Case discussed with Dr. Mcdaniels.

## 2023-10-15 NOTE — PROGRESS NOTE ADULT - ASSESSMENT
58M with h/o stroke (2022, sx fully resolved), HTN, DM, non-compliant with medications, developed LOC. CTH w/R PCA cutoff. Transfer to [facility] for thrombectomy.    PLAN:  Neuro: L temporoparietal and small occipital strokes  - Neurochecks q4h  - tylenol for pain  - ASA 81mg, will need plavix - timing per IR  - CIWA protocol, high dose thiamine, folate  - stroke core measures  PT/OT - evaluate    CV:  - SBP Goal 120-160  - Stroke core measures  - statin    Pulm: ADRIANO  - spO2 >92%  - incentive spirometer    GI:  - DASH diet  - bowel regimen  - famotidine  - LBM 10/12    Renal/:  - I&O Q1 hour  - Monitor Electrolytes & Renal Function  - voiding    Heme:  - Monitor H&H  - DVT ppx - lovenox and SCDs    ID:  - Monitor WBC and Temperature	    Endo: A1c 8.6, TSH 5.36  - Monitor BGL, maintain <180  - insulin sliding scale    Dispo:    - SW to assist with housing, disability, childcare  58M with h/o stroke (2022, sx fully resolved), HTN, DM, non-compliant with medications, developed LOC. CTH w/R PCA cutoff. Transfer to [facility] for thrombectomy.    PLAN:  Neuro: L temporoparietal and small occipital strokes  - Neurochecks q2h  - tylenol for pain  - hep gtt PTT goal 60-80 with poor perfusion  - CIWA protocol, high dose thiamine, folate  - stroke core measures  PT/OT - evaluate    CV: bradycardia and PVCs  - appreciate cardiology recs, consult EP, may need pacemaker  - EKG   - SBP Goal 120-180, no change in exam with BP  - Stroke core measures  - statin    Pulm: ADRIANO  - spO2 >92%  - incentive spirometer    GI:  - DASH diet  - bowel regimen- add miralax  - famotidine  - LBM 10/12    Renal/:  - I&O Q1 hour  - Monitor Electrolytes & Renal Function  - voiding    Heme:  - Monitor H&H  - DVT ppx - SCDs  - hep gtt    ID:  - Monitor WBC and Temperature	    Endo: A1c 8.6, TSH 5.36  - Monitor BGL, maintain <180  - insulin sliding scale    Dispo:    - SW to assist with housing, disability, childcare

## 2023-10-16 DIAGNOSIS — I49.3 VENTRICULAR PREMATURE DEPOLARIZATION: ICD-10-CM

## 2023-10-16 DIAGNOSIS — I49.2 JUNCTIONAL PREMATURE DEPOLARIZATION: ICD-10-CM

## 2023-10-16 DIAGNOSIS — G47.33 OBSTRUCTIVE SLEEP APNEA (ADULT) (PEDIATRIC): ICD-10-CM

## 2023-10-16 LAB
ANION GAP SERPL CALC-SCNC: 13 MMOL/L — SIGNIFICANT CHANGE UP (ref 5–17)
APTT BLD: 79.1 SEC — HIGH (ref 24.5–35.6)
BUN SERPL-MCNC: 13 MG/DL — SIGNIFICANT CHANGE UP (ref 8–20)
CALCIUM SERPL-MCNC: 8.9 MG/DL — SIGNIFICANT CHANGE UP (ref 8.4–10.5)
CHLORIDE SERPL-SCNC: 100 MMOL/L — SIGNIFICANT CHANGE UP (ref 96–108)
CO2 SERPL-SCNC: 25 MMOL/L — SIGNIFICANT CHANGE UP (ref 22–29)
CREAT SERPL-MCNC: 0.96 MG/DL — SIGNIFICANT CHANGE UP (ref 0.5–1.3)
EGFR: 92 ML/MIN/1.73M2 — SIGNIFICANT CHANGE UP
GLUCOSE BLDC GLUCOMTR-MCNC: 162 MG/DL — HIGH (ref 70–99)
GLUCOSE BLDC GLUCOMTR-MCNC: 162 MG/DL — HIGH (ref 70–99)
GLUCOSE BLDC GLUCOMTR-MCNC: 165 MG/DL — HIGH (ref 70–99)
GLUCOSE BLDC GLUCOMTR-MCNC: 171 MG/DL — HIGH (ref 70–99)
GLUCOSE BLDC GLUCOMTR-MCNC: 171 MG/DL — HIGH (ref 70–99)
GLUCOSE BLDC GLUCOMTR-MCNC: 172 MG/DL — HIGH (ref 70–99)
GLUCOSE SERPL-MCNC: 176 MG/DL — HIGH (ref 70–99)
HCT VFR BLD CALC: 36.5 % — LOW (ref 39–50)
HGB BLD-MCNC: 12.9 G/DL — LOW (ref 13–17)
MAGNESIUM SERPL-MCNC: 2.2 MG/DL — SIGNIFICANT CHANGE UP (ref 1.6–2.6)
MCHC RBC-ENTMCNC: 32.7 PG — SIGNIFICANT CHANGE UP (ref 27–34)
MCHC RBC-ENTMCNC: 35.3 GM/DL — SIGNIFICANT CHANGE UP (ref 32–36)
MCV RBC AUTO: 92.6 FL — SIGNIFICANT CHANGE UP (ref 80–100)
PHOSPHATE SERPL-MCNC: 4 MG/DL — SIGNIFICANT CHANGE UP (ref 2.4–4.7)
PLATELET # BLD AUTO: 112 K/UL — LOW (ref 150–400)
POTASSIUM SERPL-MCNC: 4.2 MMOL/L — SIGNIFICANT CHANGE UP (ref 3.5–5.3)
POTASSIUM SERPL-SCNC: 4.2 MMOL/L — SIGNIFICANT CHANGE UP (ref 3.5–5.3)
RBC # BLD: 3.94 M/UL — LOW (ref 4.2–5.8)
RBC # FLD: 13.2 % — SIGNIFICANT CHANGE UP (ref 10.3–14.5)
SODIUM SERPL-SCNC: 138 MMOL/L — SIGNIFICANT CHANGE UP (ref 135–145)
WBC # BLD: 4.9 K/UL — SIGNIFICANT CHANGE UP (ref 3.8–10.5)
WBC # FLD AUTO: 4.9 K/UL — SIGNIFICANT CHANGE UP (ref 3.8–10.5)

## 2023-10-16 PROCEDURE — 99232 SBSQ HOSP IP/OBS MODERATE 35: CPT | Mod: 25

## 2023-10-16 PROCEDURE — 99223 1ST HOSP IP/OBS HIGH 75: CPT

## 2023-10-16 PROCEDURE — 99232 SBSQ HOSP IP/OBS MODERATE 35: CPT

## 2023-10-16 PROCEDURE — 99233 SBSQ HOSP IP/OBS HIGH 50: CPT

## 2023-10-16 RX ORDER — METOPROLOL TARTRATE 50 MG
12.5 TABLET ORAL EVERY 12 HOURS
Refills: 0 | Status: DISCONTINUED | OUTPATIENT
Start: 2023-10-16 | End: 2023-10-18

## 2023-10-16 RX ADMIN — Medication 105 MILLIGRAM(S): at 06:20

## 2023-10-16 RX ADMIN — SENNA PLUS 2 TABLET(S): 8.6 TABLET ORAL at 16:51

## 2023-10-16 RX ADMIN — ATORVASTATIN CALCIUM 80 MILLIGRAM(S): 80 TABLET, FILM COATED ORAL at 21:13

## 2023-10-16 RX ADMIN — FAMOTIDINE 20 MILLIGRAM(S): 10 INJECTION INTRAVENOUS at 11:20

## 2023-10-16 RX ADMIN — Medication 1 MILLIGRAM(S): at 11:20

## 2023-10-16 RX ADMIN — Medication 2: at 21:16

## 2023-10-16 RX ADMIN — Medication 2: at 11:26

## 2023-10-16 RX ADMIN — HEPARIN SODIUM 15 UNIT(S)/HR: 5000 INJECTION INTRAVENOUS; SUBCUTANEOUS at 03:57

## 2023-10-16 RX ADMIN — CHLORHEXIDINE GLUCONATE 1 APPLICATION(S): 213 SOLUTION TOPICAL at 06:21

## 2023-10-16 RX ADMIN — Medication 2: at 16:49

## 2023-10-16 RX ADMIN — SENNA PLUS 2 TABLET(S): 8.6 TABLET ORAL at 06:18

## 2023-10-16 RX ADMIN — POLYETHYLENE GLYCOL 3350 17 GRAM(S): 17 POWDER, FOR SOLUTION ORAL at 16:51

## 2023-10-16 RX ADMIN — Medication 2: at 07:16

## 2023-10-16 RX ADMIN — POLYETHYLENE GLYCOL 3350 17 GRAM(S): 17 POWDER, FOR SOLUTION ORAL at 06:19

## 2023-10-16 RX ADMIN — Medication 650 MILLIGRAM(S): at 21:13

## 2023-10-16 RX ADMIN — Medication 650 MILLIGRAM(S): at 12:32

## 2023-10-16 RX ADMIN — Medication 650 MILLIGRAM(S): at 11:20

## 2023-10-16 RX ADMIN — HEPARIN SODIUM 15 UNIT(S)/HR: 5000 INJECTION INTRAVENOUS; SUBCUTANEOUS at 07:17

## 2023-10-16 RX ADMIN — Medication 650 MILLIGRAM(S): at 22:00

## 2023-10-16 RX ADMIN — Medication 1 TABLET(S): at 11:20

## 2023-10-16 NOTE — PROGRESS NOTE ADULT - SUBJECTIVE AND OBJECTIVE BOX
[facility]                                                         CARDIOLOGY AT [facility]                                                                  [address], [place] [address]                                                         Telephone: [phone]. Fax:[phone]                                                                             PROGRESS NOTE    Reason for follow up:    Stroke  Update:   As discussed wiht neuro - hold on ROCIO until discussed furthe with neuro - surgery.    Review of symptoms:   Cardiac:  No chest pain. No dyspnea. No palpitations.  Respiratory: no cough. No dyspnea  Gastrointestinal: No diarrhea. No abdominal pain. No bleeding.   Neuro: No focal neuro complaints.    Vitals:  T(C): 36.6 (10-16-23 @ 08:00), Max: 36.9 (10-15-23 @ 23:10)  HR: 65 (10-16-23 @ 09:00) (51 - 69)  BP: 167/86 (10-16-23 @ 09:00) (121/72 - 175/99)  RR: 17 (10-16-23 @ 09:00) (6 - 23)  SpO2: 96% (10-16-23 @ 09:00) (93% - 100%)  I&O's Summary  15 Oct 2023 07:01  -  16 Oct 2023 07:00  --------------------------------------------------------  IN: 1558 mL / OUT: 2250 mL / NET: -692 mL  Weight (kg): 111.3 (10-12 @ 23:30)    PHYSICAL EXAM:  Appearance: Comfortable. No acute distress  HEENT:  Atraumatic. Normocephalic.  Normal oral mucosa  Neurologic: A & O x 3, no gross focal deficits.  Cardiovascular: RRR S1 S2, No murmur, no rubs/gallops. No JVD  Respiratory: Lungs clear to auscultation, unlabored   Gastrointestinal:  Soft, Non-tender, + BS  Lower Extremities: 2+ Peripheral Pulses, No clubbing, cyanosis, or edema  Psychiatry: Patient is calm. No agitation.   Skin: warm and dry.    CURRENT CARDIAC MEDICATIONS:  hydrALAZINE Injectable 10 milliGRAM(s) IV Push every 4 hours PRN    CURRENT OTHER MEDICATIONS:  acetaminophen     Tablet .. 650 milliGRAM(s) Oral every 6 hours PRN Mild Pain (1 - 3)  LORazepam   Injectable 2 milliGRAM(s) IV Push every 2 hours PRN CIWA-Ar score increase by 2 points and a total score of 7 or less  famotidine    Tablet 20 milliGRAM(s) Oral daily  polyethylene glycol 3350 17 Gram(s) Oral every 12 hours  senna 2 Tablet(s) Oral every 12 hours  atorvastatin 80 milliGRAM(s) Oral at bedtime  dextrose 50% Injectable 12.5 Gram(s) IV Push once, Stop order after: 1 Doses  dextrose 50% Injectable 25 Gram(s) IV Push once, Stop order after: 1 Doses  dextrose 50% Injectable 25 Gram(s) IV Push once, Stop order after: 1 Doses  insulin lispro (ADMELOG) corrective regimen sliding scale   SubCutaneous Before meals and at bedtime  chlorhexidine 2% Cloths 1 Application(s) Topical <User Schedule>  folic acid 1 milliGRAM(s) Oral daily  heparin  Infusion 1500 Unit(s)/Hr (15 mL/Hr) IV Continuous <Continuous>  influenza   Vaccine 0.5 milliLiter(s) IntraMuscular once  multivitamin 1 Tablet(s) Oral daily    LABS:	 	  ( 15 Oct 2023 11:10 )  Troponin T  <0.01,  CPK  X    , CKMB  X    , BNP X        , ( 14 Oct 2023 14:16 )  Troponin T  <0.01,  CPK  X    , CKMB  X    , BNP X                            12.9   4.90  )-----------( 112      ( 16 Oct 2023 01:29 )             36.5     10-16    138  |  100  |  13.0  ----------------------------<  176<H>  4.2   |  25.0  |  0.96    Ca    8.9      16 Oct 2023 01:29  Phos  4.0     10-16  Mg     2.2     10-16      PT/INR/PTT ( 16 Oct 2023 01:29 )                       :                       :      X            :       79.1                  .        .                   .              .           .       X           .                                       Lipid Profile: Date: 10-13 @ 03:10  Total cholesterol 199; Direct LDL: --; HDL: 29; Triglycerides:277    HgA1c:   TSH: Thyroid Stimulating Hormone, Serum: 3.05 uIU/mL  Thyroid Stimulating Hormone, Serum: 4.75 uIU/mL  Thyroid Stimulating Hormone, Serum: 5.36 uIU/mL    TELEMETRY:   Sb PJC continued, strips concerning for AV block with p waves after t waves, and blocked PACs  	                                                                [facility]                                                         CARDIOLOGY AT [facility]                                                                  [address], [place] [address]                                                         Telephone: [phone]. Fax:[phone]                                                                             PROGRESS NOTE    Reason for follow up:    Stroke  Update:   As discussed wiht neuro - hold on ROCIO  Review of symptoms:   Cardiac:  No chest pain. No dyspnea. No palpitations.  Respiratory: no cough. No dyspnea  Gastrointestinal: No diarrhea. No abdominal pain. No bleeding.   Neuro: No focal neuro complaints.    Vitals:  T(C): 36.6 (10-16-23 @ 08:00), Max: 36.9 (10-15-23 @ 23:10)  HR: 65 (10-16-23 @ 09:00) (51 - 69)  BP: 167/86 (10-16-23 @ 09:00) (121/72 - 175/99)  RR: 17 (10-16-23 @ 09:00) (6 - 23)  SpO2: 96% (10-16-23 @ 09:00) (93% - 100%)  I&O's Summary  15 Oct 2023 07:01  -  16 Oct 2023 07:00  --------------------------------------------------------  IN: 1558 mL / OUT: 2250 mL / NET: -692 mL  Weight (kg): 111.3 (10-12 @ 23:30)    PHYSICAL EXAM:  Appearance: Comfortable. No acute distress  HEENT:  Atraumatic. Normocephalic.  Normal oral mucosa  Neurologic: A & O x 3, no gross focal deficits.  Cardiovascular: RRR S1 S2, No murmur, no rubs/gallops. No JVD  Respiratory: Lungs clear to auscultation, unlabored   Gastrointestinal:  Soft, Non-tender, + BS  Lower Extremities: 2+ Peripheral Pulses, No clubbing, cyanosis, or edema  Psychiatry: Patient is calm. No agitation.   Skin: warm and dry.    CURRENT CARDIAC MEDICATIONS:  hydrALAZINE Injectable 10 milliGRAM(s) IV Push every 4 hours PRN    CURRENT OTHER MEDICATIONS:  acetaminophen     Tablet .. 650 milliGRAM(s) Oral every 6 hours PRN Mild Pain (1 - 3)  LORazepam   Injectable 2 milliGRAM(s) IV Push every 2 hours PRN CIWA-Ar score increase by 2 points and a total score of 7 or less  famotidine    Tablet 20 milliGRAM(s) Oral daily  polyethylene glycol 3350 17 Gram(s) Oral every 12 hours  senna 2 Tablet(s) Oral every 12 hours  atorvastatin 80 milliGRAM(s) Oral at bedtime  dextrose 50% Injectable 12.5 Gram(s) IV Push once, Stop order after: 1 Doses  dextrose 50% Injectable 25 Gram(s) IV Push once, Stop order after: 1 Doses  dextrose 50% Injectable 25 Gram(s) IV Push once, Stop order after: 1 Doses  insulin lispro (ADMELOG) corrective regimen sliding scale   SubCutaneous Before meals and at bedtime  chlorhexidine 2% Cloths 1 Application(s) Topical <User Schedule>  folic acid 1 milliGRAM(s) Oral daily  heparin  Infusion 1500 Unit(s)/Hr (15 mL/Hr) IV Continuous <Continuous>  influenza   Vaccine 0.5 milliLiter(s) IntraMuscular once  multivitamin 1 Tablet(s) Oral daily    LABS:	 	  ( 15 Oct 2023 11:10 )  Troponin T  <0.01,  CPK  X    , CKMB  X    , BNP X        , ( 14 Oct 2023 14:16 )  Troponin T  <0.01,  CPK  X    , CKMB  X    , BNP X                            12.9   4.90  )-----------( 112      ( 16 Oct 2023 01:29 )             36.5     10-16    138  |  100  |  13.0  ----------------------------<  176<H>  4.2   |  25.0  |  0.96    Ca    8.9      16 Oct 2023 01:29  Phos  4.0     10-16  Mg     2.2     10-16      PT/INR/PTT ( 16 Oct 2023 01:29 )                       :                       :      X            :       79.1                  .        .                   .              .           .       X           .                                       Lipid Profile: Date: 10-13 @ 03:10  Total cholesterol 199; Direct LDL: --; HDL: 29; Triglycerides:277    HgA1c:   TSH: Thyroid Stimulating Hormone, Serum: 3.05 uIU/mL  Thyroid Stimulating Hormone, Serum: 4.75 uIU/mL  Thyroid Stimulating Hormone, Serum: 5.36 uIU/mL    TELEMETRY:   Sb PJC continued, strips concerning for AV block with p waves after t waves, and blocked PACs  	                                                                [facility]                                                         CARDIOLOGY AT [facility]                                                                  [address], [place] [address]                                                         Telephone: [phone]. Fax:[phone]                                                                             PROGRESS NOTE    Reason for follow up:    Stroke  Update:   As discussed with neuro - No ROCIO for now  Review of symptoms:   Cardiac:  No chest pain. No dyspnea. No palpitations.  Respiratory: no cough. No dyspnea  Gastrointestinal: No diarrhea. No abdominal pain. No bleeding.   Neuro: No focal neuro complaints.    Vitals:  T(C): 36.6 (10-16-23 @ 08:00), Max: 36.9 (10-15-23 @ 23:10)  HR: 65 (10-16-23 @ 09:00) (51 - 69)  BP: 167/86 (10-16-23 @ 09:00) (121/72 - 175/99)  RR: 17 (10-16-23 @ 09:00) (6 - 23)  SpO2: 96% (10-16-23 @ 09:00) (93% - 100%)  I&O's Summary  15 Oct 2023 07:01  -  16 Oct 2023 07:00  --------------------------------------------------------  IN: 1558 mL / OUT: 2250 mL / NET: -692 mL  Weight (kg): 111.3 (10-12 @ 23:30)    PHYSICAL EXAM:  Appearance: Comfortable. No acute distress  HEENT:  Atraumatic. Normocephalic.  Normal oral mucosa  Neurologic: A & O x 3, no gross focal deficits.  Cardiovascular: RRR S1 S2, No murmur, no rubs/gallops. No JVD  Respiratory: Lungs clear to auscultation, unlabored   Gastrointestinal:  Soft, Non-tender, + BS  Lower Extremities: 2+ Peripheral Pulses, No clubbing, cyanosis, or edema  Psychiatry: Patient is calm. No agitation.   Skin: warm and dry.    CURRENT CARDIAC MEDICATIONS:  hydrALAZINE Injectable 10 milliGRAM(s) IV Push every 4 hours PRN    CURRENT OTHER MEDICATIONS:  acetaminophen     Tablet .. 650 milliGRAM(s) Oral every 6 hours PRN Mild Pain (1 - 3)  LORazepam   Injectable 2 milliGRAM(s) IV Push every 2 hours PRN CIWA-Ar score increase by 2 points and a total score of 7 or less  famotidine    Tablet 20 milliGRAM(s) Oral daily  polyethylene glycol 3350 17 Gram(s) Oral every 12 hours  senna 2 Tablet(s) Oral every 12 hours  atorvastatin 80 milliGRAM(s) Oral at bedtime  insulin lispro (ADMELOG) corrective regimen sliding scale   SubCutaneous Before meals and at bedtime  chlorhexidine 2% Cloths 1 Application(s) Topical <User Schedule>  folic acid 1 milliGRAM(s) Oral daily  heparin  Infusion 1500 Unit(s)/Hr (15 mL/Hr) IV Continuous <Continuous>  influenza   Vaccine 0.5 milliLiter(s) IntraMuscular once  multivitamin 1 Tablet(s) Oral daily    LABS:	 	  ( 15 Oct 2023 11:10 )  Troponin T  <0.01,  CPK  X    , CKMB  X    , BNP X        , ( 14 Oct 2023 14:16 )  Troponin T  <0.01,  CPK  X    , CKMB  X    , BNP X                            12.9   4.90  )-----------( 112      ( 16 Oct 2023 01:29 )             36.5     10-16    138  |  100  |  13.0  ----------------------------<  176<H>  4.2   |  25.0  |  0.96    Ca    8.9      16 Oct 2023 01:29  Phos  4.0     10-16  Mg     2.2     10-16      PT/INR/PTT ( 16 Oct 2023 01:29 )                       :                       :      X            :       79.1                  .        .                   .              .           .       X           .                                       Lipid Profile: Date: 10-13 @ 03:10  Total cholesterol 199; Direct LDL: --; HDL: 29; Triglycerides:277    HgA1c:   TSH: Thyroid Stimulating Hormone, Serum: 3.05 uIU/mL  Thyroid Stimulating Hormone, Serum: 4.75 uIU/mL  Thyroid Stimulating Hormone, Serum: 5.36 uIU/mL    TELEMETRY:   Sb PJC continued, strips concerning for AV block with p waves after t waves, and blocked PACs

## 2023-10-16 NOTE — PROGRESS NOTE ADULT - ASSESSMENT
INCOMPLETE- VISIT PENDING     ASSESSMENT: Patient is a 57 y/o M with a PMHx of stroke 2022 reported at [facility]  (at the time patient received thrombolysis and had full  resolution of symptoms), reported dove dove, HTN, DM, HLD nonadherent with medications, who presented following an episode of LOC while at work the evening of 10/12/23, there was reported right sided hemisensory loss and right homonomous hemianopia .  Reported unclear if definitively last known well was prior at 1815 therefor excluded from tenecteplase after CT head did not demonstrate acute infarction or hemorrhage.  Perfusion demonstrated regions of ischemia in the posterior circulation, CT angiogram head/neck showed right PCA P1-2 cutoff, possibly chronic right MCA M1 cutoff which had distal reconstitution and established collaterals, and left PCA occlusion which patient was subsequently transferred for mechanical thrombectomy. Cerebral angiogram and mechanical thrombectomy performed using aspiration with TICI 3 recanalization of left PCA.  The right occipital lobe filled from right SHANI collaterals, and the right MCA as well was thought to be chronic given noted collateralization. MR Brain with small to moderate left temporal occipital lobe infarctions. Occluded right MCA, occluded right PCA and right vertebral artery proximal segment. Etiology; embolic stroke of undetermined source.  While there is underlying dove dove can not entirely exclude cardioembolic / hypercoagulable state.     NEURO:   -Neurologically  noted to have had right hemispheric symptoms in addition to Left PCA distribution stroke.  - Repeat CT head and CTA CTP  -images and reports were reviewed. Shows Right PCA region hypoperfusion.  - Will need repeat MRI Brain when stable.  -some memory impairment-  OT cognitive evaluation   -Continue close monitoring for neurologic deterioration    - Stroke neuro checks q 2 hrs.  -TTE  -Report as above was reviewed.   -LE duplex no DVT reported  -Hypercoagulable panel   - SBP goal permissive to keep 140-180 mm Hg for now due to concern for hypoperfusion   -ANTITHROMBOTIC THERAPY:  After discussion with [facility] attending Dr. Goodrich decision was made (given fluctuating/new sx as noted) to initiate therapeutic Heparin gtt with PTT goal 60-80, further regimen titration/transition pending clinical course and repeat imaging. Anticipate 3 month course for now, long term to be determined w/ Dr. Goodrich as outpatient.    - ASA discontinued in setting of therapeutic anticoagulation.  -titrate statin to LDL goal less than 70  -MRI Brain w/o as noted   - MRA Head NOVA -images and reports were reviewed.   -Consideration in Outpatient NM spect w/w out Diamox  -Dysphagia screen: pass  -Physical therapy/OT/Speech eval/treatment.     -CARDIOVASCULAR:  TTE as noted: ef 50-55%, cardiology follow up, cardiac monitoring w/ telemetry for now, further evaluation pending findings of noted workup                              -HEMATOLOGY: H/H with anemia, Platelets 112, close monitoring for further thrombocytopenia, patient should have all age and risk appropriate malignancy screenings with PCP or sooner if clinically suspected      DVT ppx: heparin gtt    PULMONARY: CXR pending , protecting airway, saturating well     RENAL: BUN/Cr within range, monitor urine output, maintain adequate hydration       Na Goal:  135-145    ID: afebrile, no leukocytosis, monitor for si/sx of infection     OTHER:  condition and plan of care d/w patient, questions and concerns addressed.  CIWA protcol, monitor closely for si/sx of withdrawal.     DISPOSITION: Rehab or home depending on PT eval once stable and workup is complete      CORE MEASURES:        Admission NIHSS:10      Tenecteplase : [] YES [x] NO      LDL/HDL/A1C: 115/29/8.6     Depression Screen- if depression hx and/or present      Statin Therapy: as noted      Dysphagia Screen: [x] PASS [] FAIL     Smoking [] YES [x] NO      Afib [] YES [x] NO     Stroke Education [x] YES [] NO    Obtain screening lower extremity venous ultrasound in patients who meet 1 or more of the following criteria as patient is high risk for DVT/PE on admission:   [] History of DVT/PE  []Hypercoagulable states (Factor V Leiden, Cancer, OCP, etc. )  []Prolonged immobility (hemiplegia/hemiparesis/post operative or any other extended immobilization)  [] Transferred from outside facility (Rehab or Long term care)  [] Age </= to 50 INCOMPLETE- VISIT PENDING     ASSESSMENT: Patient is a 57 y/o M with a PMHx of stroke 2022 reported at [facility] [place]  (at the time patient received thrombolysis and had full  resolution of symptoms), reported dove dove, HTN, DM, HLD nonadherent with medications, who presented following an episode of LOC while at work the evening of 10/12/23, there was reported right sided hemisensory loss and right homonomous hemianopia .  Reported unclear if definitively last known well was prior at 1815 therefor excluded from tenecteplase after CT head did not demonstrate acute infarction or hemorrhage.  Perfusion demonstrated regions of ischemia in the posterior circulation, CT angiogram head/neck showed right PCA P1-2 cutoff, possibly chronic right MCA M1 cutoff which had distal reconstitution and established collaterals, and left PCA occlusion which patient was subsequently transferred for mechanical thrombectomy. Cerebral angiogram and mechanical thrombectomy performed using aspiration with TICI 3 recanalization of left PCA.  The right occipital lobe filled from right SHANI collaterals, and the right MCA as well was thought to be chronic given noted collateralization. MR Brain with small to moderate left temporal occipital lobe infarctions. Occluded right MCA, occluded right PCA and right vertebral artery proximal segment. Etiology; embolic stroke of undetermined source.  While there is underlying dove dove can not entirely exclude cardioembolic / hypercoagulable state, avoid further hypotension or hypotension precipitating events.     NEURO:   -Neurologically  noted to have had right hemispheric symptoms in addition to Left PCA distribution stroke.  - Repeat CT head and CTA CTP  -images and reports were reviewed. Shows Right PCA region hypoperfusion.  - Will need repeat MRI Brain when stable.  -some memory impairment-  OT cognitive evaluation   -Continue close monitoring for neurologic deterioration    - Stroke neuro checks q 2 hrs.  -TTE  -Report as above was reviewed.   -LE duplex no DVT reported  -Hypercoagulable panel   - SBP goal permissive to keep 140-180 mm Hg for now due to concern for hypoperfusion per d/w GUICHO   -ANTITHROMBOTIC THERAPY:  After discussion with [facility] attending Dr. Goodrich decision was made (given fluctuating/new sx as noted) to initiate therapeutic Heparin gtt with PTT goal 60-80, further regimen titration/transition pending clinical course and repeat imaging. Anticipate 3 month course for now, long term to be determined w/ Dr. Goodrich as outpatient.  BP goal as noted above.  - ASA discontinued in setting of therapeutic anticoagulation.  -titrate statin to LDL goal less than 70  -MRI Brain w/o as noted   - MRA Head NOVA -images and reports were reviewed.   -Consideration in Outpatient NM spect w/w out Diamox  -Dysphagia screen: pass  -Physical therapy/OT/Speech eval/treatment.     -CARDIOVASCULAR:  TTE as noted, cardiac monitoring w/ telemetry for now, further evaluation pending findings of noted workup and clinical course                             -HEMATOLOGY: H/H with anemia, Platelets 112, close monitoring for further thrombocytopenia, patient should have all age and risk appropriate malignancy screenings with PCP or sooner if clinically suspected      DVT ppx: heparin gtt    PULMONARY: CXR pending , protecting airway, saturating well     RENAL: BUN/Cr within range, monitor urine output, maintain adequate hydration       Na Goal:  135-145    ID: afebrile, no leukocytosis, monitor for si/sx of infection     OTHER:  condition and plan of care d/w patient, questions and concerns addressed.  CIWA protcol, monitor closely for si/sx of withdrawal.     DISPOSITION: Rehab or home depending on PT eval once stable and workup is complete      CORE MEASURES:        Admission NIHSS:10      Tenecteplase : [] YES [x] NO      LDL/HDL/A1C: 115/29/8.6     Depression Screen- if depression hx and/or present      Statin Therapy: as noted      Dysphagia Screen: [x] PASS [] FAIL     Smoking [] YES [x] NO      Afib [] YES [x] NO     Stroke Education [x] YES [] NO    Obtain screening lower extremity venous ultrasound in patients who meet 1 or more of the following criteria as patient is high risk for DVT/PE on admission:   [] History of DVT/PE  []Hypercoagulable states (Factor V Leiden, Cancer, OCP, etc. )  []Prolonged immobility (hemiplegia/hemiparesis/post operative or any other extended immobilization)  [] Transferred from outside facility (Rehab or Long term care)  [] Age </= to 50     ASSESSMENT: Patient is a 57 y/o M with a PMHx of stroke 2022 reported at [facility]  (at the time patient received thrombolysis and had full  resolution of symptoms), reported dove dove, HTN, DM, HLD nonadherent with medications, who presented following an episode of LOC while at work the evening of 10/12/23, there was reported right sided hemisensory loss and right homonomous hemianopia .  Reported unclear if definitively last known well was prior at 1815 therefor excluded from tenecteplase after CT head did not demonstrate acute infarction or hemorrhage.  Perfusion demonstrated regions of ischemia in the posterior circulation, CT angiogram head/neck showed right PCA P1-2 cutoff, possibly chronic right MCA M1 cutoff which had distal reconstitution and established collaterals, and left PCA occlusion which patient was subsequently transferred for mechanical thrombectomy. Cerebral angiogram and mechanical thrombectomy performed using aspiration with TICI 3 recanalization of left PCA.  The right occipital lobe filled from right SHANI collaterals, and the right MCA as well was thought to be chronic given noted collateralization. MR Brain with small to moderate left temporal occipital lobe infarctions. Occluded right MCA, occluded right PCA and right vertebral artery proximal segment. Etiology; embolic stroke of undetermined source.  While there is underlying dove dove can not entirely exclude cardioembolic / hypercoagulable state, avoid further hypotension or hypotension precipitating events. Now with new right hemispheric neurological symptoms of left hemianopia and left hemisensory loss, possibly hypoperfusion.     NEURO:   -Neurologically  noted to have had right hemispheric symptoms in addition to Left PCA distribution stroke.   - Repeat CT head and CTA CTP  -images and reports were reviewed. Shows Right temporal region hypoperfusion.  - Will need repeat MRI Brain w/o cont.  -some memory impairment-  OT cognitive evaluation   -Continue close monitoring for neurologic deterioration    - Stroke neuro checks q 2 hrs.  -TTE  -Report as above was reviewed.  -Check blood cx x 2  -LE duplex no DVT reported  -Hypercoagulable panel   - SBP goal permissive to keep 140-180 mm Hg for now due to concern for hypoperfusion per d/w GUICHO   -ANTITHROMBOTIC THERAPY:  After discussion with [facility] attending Dr. Goodrich decision was made (given fluctuating/new symptoms as noted) to initiate therapeutic Heparin gtt with PTT goal 60-80, further regimen titration/transition pending clinical course and repeat imaging. Anticipate 3 month course for now, long term to be determined w/ Dr. Goodrich as outpatient.  BP goal as noted above.  - ASA discontinued in setting of therapeutic anticoagulation.  -titrate statin to LDL goal less than 70  -MRI Brain w/o as noted , repeat pending   - MRA Head NOVA -images and reports were reviewed.   -Consideration in Outpatient NM spect w/w out Diamox  -Dysphagia screen: pass  -Physical therapy/OT/Speech eval/treatment.     -CARDIOVASCULAR:  TTE as noted, cardiac monitoring w/ telemetry for now, further evaluation pending findings of noted workup and clinical course, at this time defer ROCIO as to avoid precipitating hypotension due to concern for hypoperfusion, d/w cardiology team, no obvious evidence of cardiac mass/ vegetation to warrant further evaluation given overall risk:benefit. EP for long term cardiac monitoring. Should there be concern for cardiac lesion/ finding which would warrant management change would reassess.                               -HEMATOLOGY: H/H with anemia, Platelets 112, close monitoring for further thrombocytopenia, suggest HIT/MELISSA, patient should have all age and risk appropriate malignancy screenings with PCP or sooner if clinically suspected      DVT ppx: heparin gtt    PULMONARY: CXR pending , protecting airway, saturating well     RENAL: BUN/Cr within range, monitor urine output, maintain adequate hydration       Na Goal:  135-145    ID: afebrile, no leukocytosis, monitor for si/sx of infection, suggest blood cultures to ensure no underlying infectious contributing process.     OTHER:  condition and plan of care d/w patient, questions and concerns addressed. D/W ICU - changes as noted were those found previously which warranted most recent workup/management changes.  CIWA protcol, monitor closely for si/sx of withdrawal.    DISPOSITION: Rehab or home depending on PT eval once stable and workup is complete      CORE MEASURES:        Admission NIHSS:10      Tenecteplase : [] YES [x] NO      LDL/HDL/A1C: 115/29/8.6     Depression Screen- if depression hx and/or present      Statin Therapy: as noted      Dysphagia Screen: [x] PASS [] FAIL     Smoking [] YES [x] NO      Afib [] YES [x] NO     Stroke Education [x] YES [] NO    Obtain screening lower extremity venous ultrasound in patients who meet 1 or more of the following criteria as patient is high risk for DVT/PE on admission:   [] History of DVT/PE  []Hypercoagulable states (Factor V Leiden, Cancer, OCP, etc. )  []Prolonged immobility (hemiplegia/hemiparesis/post operative or any other extended immobilization)  [] Transferred from outside facility (Rehab or Long term care)  [] Age </= to 50     ASSESSMENT: Patient is a 57 y/o M with a PMHx of stroke 2022 reported at [facility]  (at the time patient received thrombolysis and had full  resolution of symptoms), reported dove dove, HTN, DM, HLD nonadherent with medications, who presented following an episode of LOC while at work the evening of 10/12/23, there was reported right sided hemisensory loss and right homonomous hemianopia .  Reported unclear if definitively last known well was prior at 1815 therefor excluded from tenecteplase after CT head did not demonstrate acute infarction or hemorrhage.  Perfusion demonstrated regions of ischemia in the posterior circulation, CT angiogram head/neck showed right PCA P1-2 cutoff, possibly chronic right MCA M1 cutoff which had distal reconstitution and established collaterals, and left PCA occlusion which patient was subsequently transferred for mechanical thrombectomy. Cerebral angiogram and mechanical thrombectomy performed using aspiration with TICI 3 recanalization of left PCA.  The right occipital lobe filled from right SHANI collaterals, and the right MCA as well was thought to be chronic given noted collateralization. MR Brain with small to moderate left temporal occipital lobe infarctions. Occluded right MCA, occluded right PCA and right vertebral artery proximal segment. Etiology; embolic stroke of undetermined source.  While there is underlying dove dove can not entirely exclude cardioembolic / hypercoagulable state, avoid further hypotension or hypotension precipitating events. Now with new right hemispheric neurological symptoms of left hemianopia and left hemisensory loss, possibly hypoperfusion.     NEURO:   -Neurologically  noted to have had right hemispheric symptoms in addition to Left PCA distribution stroke.   - Repeat CT head and CTA CTP  -images and reports were reviewed. Shows Right temporal region hypoperfusion.  - Will need repeat MRI Brain w/o cont.  -some memory impairment-  OT cognitive evaluation   -Continue close monitoring for neurologic deterioration    - Stroke neuro checks q 2 hrs.  -TTE  -Report as above was reviewed.  -Check blood cx x 2  -LE duplex no DVT reported  -Hypercoagulable panel   - SBP goal permissive to keep 140-180 mm Hg for now due to concern for hypoperfusion per d/w GUICHO, avoid further hypertension as to minimize risk of hemorrhagic component. Further titration pending clinical course.   -ANTITHROMBOTIC THERAPY:  After discussion with [facility] attending Dr. Goodrich decision was made (given fluctuating/new symptoms as noted) to initiate therapeutic Heparin gtt with PTT goal 60-80, further regimen titration/transition pending clinical course and repeat imaging. Anticipate 3 month course for now, long term to be determined w/ Dr. Goodrich as outpatient.  BP goal as noted above as patient appears to be dependent on collateral flow and high risk for recurrent thrombotic event.  Monitor closely and repeat CT head for any change and notify on call neurology team.   - ASA discontinued in setting of therapeutic anticoagulation.  -titrate statin to LDL goal less than 70  -MRI Brain w/o as noted , repeat pending   - MRA Head NOVA -images and reports were reviewed.   -Consideration in Outpatient NM spect w/w out Diamox  -Dysphagia screen: pass  -Physical therapy/OT/Speech eval/treatment.     -CARDIOVASCULAR:  TTE as noted, cardiac monitoring w/ telemetry for now, further evaluation pending findings of noted workup and clinical course, at this time defer ROCIO as to avoid precipitating hypotension due to concern for hypoperfusion, d/w cardiology team, no obvious evidence of cardiac mass/ vegetation to warrant further evaluation given overall risk:benefit. EP for long term cardiac monitoring. Should there be concern for cardiac lesion/ finding which would warrant management change would reassess.                               -HEMATOLOGY: H/H with anemia, Platelets 112, close monitoring for further thrombocytopenia, suggest HIT/MELISSA, patient should have all age and risk appropriate malignancy screenings with PCP or sooner if clinically suspected      DVT ppx: heparin gtt    PULMONARY: CXR pending , protecting airway, saturating well     RENAL: BUN/Cr within range, monitor urine output, maintain adequate hydration       Na Goal:  135-145    ID: afebrile, no leukocytosis, monitor for si/sx of infection, suggest blood cultures to ensure no underlying infectious contributing process.     OTHER:  condition and plan of care d/w patient, questions and concerns addressed. D/W ICU - changes as noted were those found previously which warranted most recent workup/management changes.  CIWA protcol, monitor closely for si/sx of withdrawal.    DISPOSITION: Rehab or home depending on PT eval once stable and workup is complete      CORE MEASURES:        Admission NIHSS:10      Tenecteplase : [] YES [x] NO      LDL/HDL/A1C: 115/29/8.6     Depression Screen- if depression hx and/or present      Statin Therapy: as noted      Dysphagia Screen: [x] PASS [] FAIL     Smoking [] YES [x] NO      Afib [] YES [x] NO     Stroke Education [x] YES [] NO    Obtain screening lower extremity venous ultrasound in patients who meet 1 or more of the following criteria as patient is high risk for DVT/PE on admission:   [] History of DVT/PE  []Hypercoagulable states (Factor V Leiden, Cancer, OCP, etc. )  []Prolonged immobility (hemiplegia/hemiparesis/post operative or any other extended immobilization)  [] Transferred from outside facility (Rehab or Long term care)  [] Age </= to 50 ASSESSMENT: Patient is a 57 y/o M with a PMHx of stroke 2022 reported at [facility]  (at the time patient received thrombolysis and had full  resolution of symptoms), reported dove dove, HTN, DM, HLD nonadherent with medications, who presented following an episode of LOC while at work the evening of 10/12/23, there was reported right sided hemisensory loss and right homonomous hemianopia .  Reported unclear if definitively last known well was prior at 1815 therefor excluded from tenecteplase after CT head did not demonstrate acute infarction or hemorrhage.  Perfusion demonstrated regions of ischemia in the posterior circulation, CT angiogram head/neck showed right PCA P1-2 cutoff, possibly chronic right MCA M1 cutoff which had distal reconstitution and established collaterals, and left PCA occlusion which patient was subsequently transferred for mechanical thrombectomy. Cerebral angiogram and mechanical thrombectomy performed using aspiration with TICI 3 recanalization of left PCA.  The right occipital lobe filled from right SHANI collaterals, and the right MCA as well was thought to be chronic given noted collateralization. MR Brain with small to moderate left temporal occipital lobe infarctions. Occluded right MCA, occluded right PCA and right vertebral artery proximal segment. Etiology; embolic stroke of undetermined source.  While there is underlying dove dove can not entirely exclude cardioembolic / hypercoagulable state, avoid further hypotension or hypotension precipitating events. Now with new right hemispheric neurological symptoms of left hemianopia and left hemisensory loss, possibly hypoperfusion.     NEURO:   -Neurologically  noted to have had right hemispheric symptoms in addition to Left PCA distribution stroke.   - Repeat CT head and CTA CTP  -images and reports were reviewed. Shows Right temporal region hypoperfusion.  - Will need repeat MRI Brain w/o cont.  -some memory impairment-  OT cognitive evaluation   -Continue close monitoring for neurologic deterioration    - Stroke neuro checks q 2 hrs.  -TTE  -Report as above was reviewed.  -Check blood cx x 2  -LE duplex no DVT reported  -Hypercoagulable panel   - SBP goal permissive to keep 140-180 mm Hg for now due to concern for hypoperfusion per d/w GUICHO, avoid further hypertension as to minimize risk of hemorrhagic component. Further titration pending clinical course.   -ANTITHROMBOTIC THERAPY:  After discussion with [facility] attending Dr. Goodrich decision was made (given fluctuating/new symptoms as noted) to initiate therapeutic Heparin gtt with PTT goal 60-80, further regimen titration/transition pending clinical course and repeat imaging. Anticipate 3 month course for now, long term to be determined w/ Dr. Goodrich as outpatient.  BP goal as noted above as patient appears to be dependent on collateral flow and high risk for recurrent thrombotic event.  Monitor closely and repeat CT head for any change and notify on call neurology team.   - ASA discontinued in setting of therapeutic anticoagulation.  -titrate statin to LDL goal less than 70  -MRI Brain w/o as noted , repeat pending   - MRA Head NOVA -images and reports were reviewed.   -Consideration in Outpatient NM spect w/w out Diamox  -Dysphagia screen: pass  -Physical therapy/OT/Speech eval/treatment.     -CARDIOVASCULAR:  TTE as noted, cardiac monitoring w/ telemetry for now, further evaluation pending findings of noted workup and clinical course, at this time defer ROCIO as to avoid precipitating hypotension due to concern for hypoperfusion, d/w cardiology team, no obvious evidence of cardiac mass/ vegetation to warrant further evaluation given overall risk:benefit. EP for long term cardiac monitoring. Should there be concern for cardiac lesion/ finding which would warrant management change would reassess.                               -HEMATOLOGY: H/H with anemia, Platelets 112, close monitoring for further thrombocytopenia, suggest HIT/MELISSA, patient should have all age and risk appropriate malignancy screenings with PCP or sooner if clinically suspected      DVT ppx: heparin gtt    PULMONARY: CXR pending , protecting airway, saturating well     RENAL: BUN/Cr within range, monitor urine output, maintain adequate hydration       Na Goal:  135-145    ID: afebrile, no leukocytosis, monitor for si/sx of infection, suggest blood cultures to ensure no underlying infectious contributing process.     OTHER:  condition and plan of care d/w patient, questions and concerns addressed. D/W ICU - changes as noted were those found previously which warranted most recent workup/management changes.  CIWA protcol, monitor closely for si/sx of withdrawal.    DISPOSITION: Rehab or home depending on PT eval once stable and workup is complete      CORE MEASURES:        Admission NIHSS:10      Tenecteplase : [] YES [x] NO      LDL/HDL/A1C: 115/29/8.6     Depression Screen- if depression hx and/or present      Statin Therapy: as noted      Dysphagia Screen: [x] PASS [] FAIL     Smoking [] YES [x] NO      Afib [] YES [x] NO     Stroke Education [x] YES [] NO    Obtain screening lower extremity venous ultrasound in patients who meet 1 or more of the following criteria as patient is high risk for DVT/PE on admission:   [] History of DVT/PE  []Hypercoagulable states (Factor V Leiden, Cancer, OCP, etc. )  []Prolonged immobility (hemiplegia/hemiparesis/post operative or any other extended immobilization)  [] Transferred from outside facility (Rehab or Long term care)  [] Age </= to 50 Tissue Cultured Epidermal Autograft Text: The defect edges were debeveled with a #15 scalpel blade.  Given the location of the defect, shape of the defect and the proximity to free margins a tissue cultured epidermal autograft was deemed most appropriate.  The graft was then trimmed to fit the size of the defect.  The graft was then placed in the primary defect and oriented appropriately.

## 2023-10-16 NOTE — CONSULT NOTE ADULT - ASSESSMENT
Abdelrahman Somers is a 58 year old male with HTN, HL, ADRIANO, and CVA who presented with syncope found to have acute CVA with hospitalization complicated by bradycardia for which EP is consulted.    ECG and telemetry shows episodes of bradycardia with blocked APCs and frequent junctional (his) extrasystoles vs conducted APCs (though P waves not visible with extra beats). The blocked APCs appear to come at longer coupling intervals than the junctional extrasystoles which suggests that the junctional/his extrasystoles may be causing concealment into the AVN precipitating antegrade block with APCs. There is no evidence of high grade AV alejo or infranodal block that would require pacemaker implantation at this time.    If there is no neurologic contraindication, would consider starting Metoprolol 12.5mg BID to see if this can suppress his extrasystoles. Alternatively, can also consider using Flecainide but would prefer to rule out ischemic heart disease since he is at elevated risk with comorbidities of HTN, HL & CVA.    Lastly, would recommend ILR implant prior to discharge if there is no clear cause of current episode (ie cryptogenic stroke) to evaluate for underlying AF.    Recommendations:  - No indication for pacemaker  - Start Metoprolol 12.5mg BID if no contraindication from Neurologic standpoint  - Consider ILR prior to discharge  - Treat ADRIANO with CPAP    Thank you for this consult. We will follow with you.    Maciej Mercer MD  Clinical Cardiac Electrophysiology Abdelrahman Somers is a 58 year old male with HTN, HL, ADRIANO, and CVA who presented with syncope found to have acute CVA with hospitalization complicated by bradycardia for which EP is consulted.    ECG and telemetry shows episodes of bradycardia with fascicular PVCs (from LPFB) and non-conducted P waves vs retrograde P waves and antegrade block. The coupling interval of the P waves is similar to the PVCs which makes the latter (retrograde P waves with antegrade block) more likely. There is no evidence of high grade AV alejo or infranodal block that would require pacemaker implantation at this time.    If there is no neurologic contraindication, would consider starting Metoprolol 12.5mg BID to see if this can suppress his extrasystoles. Alternatively, can also consider using Flecainide but would prefer to rule out ischemic heart disease since he is at elevated risk with comorbidities of HTN, HL & CVA.    Lastly, would recommend ILR implant prior to discharge if there is no clear cause of current episode (ie cryptogenic stroke) to evaluate for underlying AF.    Recommendations:  - No indication for pacemaker  - Start Metoprolol 12.5mg BID if no contraindication from Neurologic standpoint  - Consider ILR prior to discharge  - Treat ADRIANO with CPAP    Thank you for this consult. We will follow with you.    Maciej Mercer MD  Clinical Cardiac Electrophysiology Abdelrahman Somers is a 58 year old male with HTN, HL, ADRIANO, and CVA who presented with syncope found to have acute CVA with hospitalization complicated by bradycardia for which EP is consulted.    ECG and telemetry shows episodes of bradycardia with fascicular PVCs (from LPFB) and non-conducted P waves vs retrograde P waves and antegrade block. The coupling interval of the P waves is similar to the PVCs which makes the latter (retrograde P waves with antegrade block) more likely. Alternatively, patient may be having his extrasystoles with aberrant conduction. There is no evidence of high grade AV alejo or infranodal block that would require pacemaker implantation at this time.    If there is no neurologic contraindication, would consider starting Metoprolol 12.5mg BID to see if this can suppress his extrasystoles. Alternatively, can also consider using Flecainide but would prefer to rule out ischemic heart disease since he is at elevated risk with comorbidities of HTN, HL & CVA.    Lastly, would recommend ILR implant prior to discharge if there is no clear cause of current episode (ie cryptogenic stroke) to evaluate for underlying AF.    Recommendations:  - No indication for pacemaker  - Start Metoprolol 12.5mg BID if no contraindication from Neurologic standpoint  - Consider ILR prior to discharge  - Treat ADRIANO with CPAP    Discussed with Neuro-ICU team.    Thank you for this consult. We will follow with you.    Maciej Mercer MD  Clinical Cardiac Electrophysiology

## 2023-10-16 NOTE — PROGRESS NOTE ADULT - PROBLEM SELECTOR PLAN 1
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  Neuro with concern for Embolic event - discussed with Dr. Barkley and recommending hold off on ROCIO as patient will require 3 months of AC.  SBP goal permissive to keep 140-180 mm Hg for now due to concern for hypoperfusion   Consider ILR vs MCOT prior to discharge (patient has hx of non compliance.    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  Neuro with concern for Embolic event - discussed with Dr. Barkley and recommending hold off on ROCIO as patient will require 3 months of AC.  SBP goal permissive to keep 140-180 mm Hg for now due to concern for hypoperfusion   Consider ILR vs MCOT prior to discharge (patient has hx of non compliance.    SBP goal permissive to keep 140-180 mm Hg for now due to concern for hypoperfusion  Follow up outpatient with [facility]ela doctor near [place]  Will sign off - please reconsult with any issues. 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  Neuro with concern for Embolic event - discussed with Dr. Barkley and recommending hold off on ROCIO as patient will require 3 months of AC.  SBP goal permissive to keep 140-180 mm Hg for now due to concern for hypoperfusion   Consider ILR vs MCOT prior to discharge (patient has hx of non compliance.    SBP goal permissive to keep 140-180 mm Hg for now due to concern for hypoperfusion  Follow up outpatient with [facility] doctor near [place]/[facility]  Will sign off - please reconsult with any issues.

## 2023-10-16 NOTE — PROGRESS NOTE ADULT - SUBJECTIVE AND OBJECTIVE BOX
Preliminary note, offical recommendations pending attending review/signature   [facility] Stroke Team  Progress Note     HPI:  Patient is a 59 y/o M with a PMHx of stroke 2022 reported at [facility]  (at the time patient received thrombolysis and had full  resolution of symptoms), family reported dove dove, HTN, DM, HLD nonadherent with medications, who presented following an episode of LOC.  As per sister report, patient was last seen well by his niece  morning of 10/12/23  before patient went to work. At around 6:15 pm that night patient had a sudden fall at work and lost consciousness It is unclear if patient had any symptoms before he fell. NIHSS 10 on admission . Transferred to [facility] for mechanical thrombectomy.        SUBJECTIVE: No events overnight.  No new neurologic complaints.  ROS reported negative unless otherwise noted.    acetaminophen     Tablet .. 650 milliGRAM(s) Oral every 6 hours PRN  atorvastatin 80 milliGRAM(s) Oral at bedtime  chlorhexidine 2% Cloths 1 Application(s) Topical <User Schedule>  dextrose 50% Injectable 12.5 Gram(s) IV Push once  dextrose 50% Injectable 25 Gram(s) IV Push once  dextrose 50% Injectable 25 Gram(s) IV Push once  famotidine    Tablet 20 milliGRAM(s) Oral daily  folic acid 1 milliGRAM(s) Oral daily  heparin  Infusion 1500 Unit(s)/Hr IV Continuous <Continuous>  hydrALAZINE Injectable 10 milliGRAM(s) IV Push every 4 hours PRN  influenza   Vaccine 0.5 milliLiter(s) IntraMuscular once  insulin lispro (ADMELOG) corrective regimen sliding scale   SubCutaneous Before meals and at bedtime  LORazepam   Injectable 2 milliGRAM(s) IV Push every 2 hours PRN  multivitamin 1 Tablet(s) Oral daily  polyethylene glycol 3350 17 Gram(s) Oral every 12 hours  senna 2 Tablet(s) Oral every 12 hours      PHYSICAL EXAM:   Vital Signs Last 24 Hrs  T(C): 36.9 (16 Oct 2023 04:10), Max: 36.9 (15 Oct 2023 23:10)  T(F): 98.5 (16 Oct 2023 04:10), Max: 98.5 (16 Oct 2023 04:10)  HR: 58 (16 Oct 2023 05:00) (52 - 69)  BP: 159/68 (16 Oct 2023 04:00) (121/72 - 175/99)  BP(mean): 96 (16 Oct 2023 04:00) (82 - 119)  RR: 17 (16 Oct 2023 05:00) (6 - 23)  SpO2: 99% (16 Oct 2023 05:00) (93% - 100%)    Parameters below as of 16 Oct 2023 04:00  Patient On (Oxygen Delivery Method): nasal cannula  O2 Flow (L/min): 2        EXAM PENDING     General: No acute distress    NEUROLOGICAL EXAM:  Mental status: Awake, alert, oriented x3, speech fluent, follows commands, no neglect, normal memory   Cranial Nerves: No facial asymmetry, no nystagmus, no dysarthria,  tongue midline  Motor exam: Normal tone, no drift, 5/5 RUE, 5/5 RLE, 5/5 LUE, 5/5 LLE, normal fine finger movements.  Sensation: Intact to light touch   Coordination/ Gait: No dysmetria, gait not tested    LABS:                        12.9   4.90  )-----------( 112      ( 16 Oct 2023 01:29 )             36.5    10-16    138  |  100  |  13.0  ----------------------------<  176<H>  4.2   |  25.0  |  0.96    Ca    8.9      16 Oct 2023 01:29  Phos  4.0     10-16  Mg     2.2     10-16    PT/INR - ( 14 Oct 2023 15:55 )   PT: 11.3 sec;   INR: 1.02 ratio         PTT - ( 16 Oct 2023 01:29 )  PTT:79.1 sec      IMAGING: Reviewed by me.     RADIOLOGY & ADDITIONAL STUDIES (independently reviewed unless otherwise noted):  10/12/2023 19:13  IMPRESSION:  HEAD CT: No evidence of an acute intracranial hemorhage, midline shift or hydrocephalus. Mild bifrontal periventricular white matter ischemic changes. Bilateral maxillary sinusitis  NECK CTA: No hemodynamic significant narowing involving the carotid bifurcations. Hypoplastic right vertebral artery. Dominant left vertebral artery..  BRAIN CTA: Cutoff of the M1 portion of the right middle cerebral artery which may be chronic as there appears to be lenticular striate collaterals and reconstitution of more distal MCA segments. Cutoff of the right posterior cerebral artery at the P1-2 junction with reconstitution of more distal segments. Cut off of the left posterior cerebral artery at the P1-2 junction with mild distal reconstitution. Hypoplastic right vertebral artery with portions of the V4 segment not visualized  CT PERFUSION: Regions of ischemia within the posterior circulation with a volume of 121 mL without evidence of core infarction.    (10.13.23 @ 12:31)   MRI BRAIN: Acute small to moderate medial left temporal occipital lobe   infarctions. Acute punctate left occipital lobe infarction.  MRA BRAIN: Occluded right MCA. Occluded right PCA. Occluded right   vertebral artery proximal V4 segment.  MRI BRAIN NOVA: Values above.    TTE Echo Complete w/ Contrast w/ Doppler (10.13.23 @ 19:44)   Summary:   1. Technically difficult study.   2. Normal left ventricular internal cavity size.   3. Left ventricular ejection fraction, by visual estimation, is 50 to   55%.   4. Normal left atrial size.   5. Normal right atrial size.   6. Mildly enlarged right ventricle.   7. Dilatation of the ascending aorta.   8. Sclerotic aortic valve with normal opening.   9. Mild mitral annular calcification.  10. Mild thickening of the anterior mitral valve leaflet.  11. Trace mitral valve regurgitation.  12. Mild tricuspid regurgitation.  13. Trivial pericardial effusion.  14. Recommend transesophageal echocardiogram.          Preliminary note, offical recommendations pending attending review/signature   [facility] Stroke Team  Progress Note     HPI:  Patient is a 57 y/o M with a PMHx of stroke 2022 reported at [facility]  (at the time patient received thrombolysis and had full  resolution of symptoms), family reported dove dove, HTN, DM, HLD nonadherent with medications, who presented following an episode of LOC.  As per sister report, patient was last seen well by his niece  morning of 10/12/23  before patient went to work. At around 6:15 pm that night patient had a sudden fall at work and lost consciousness It is unclear if patient had any symptoms before he fell. NIHSS 10 on admission . Transferred to [facility] for mechanical thrombectomy.        SUBJECTIVE: No events overnight.  No new neurologic complaints.  ROS reported negative unless otherwise noted.    acetaminophen     Tablet .. 650 milliGRAM(s) Oral every 6 hours PRN  atorvastatin 80 milliGRAM(s) Oral at bedtime  chlorhexidine 2% Cloths 1 Application(s) Topical <User Schedule>  dextrose 50% Injectable 12.5 Gram(s) IV Push once  dextrose 50% Injectable 25 Gram(s) IV Push once  dextrose 50% Injectable 25 Gram(s) IV Push once  famotidine    Tablet 20 milliGRAM(s) Oral daily  folic acid 1 milliGRAM(s) Oral daily  heparin  Infusion 1500 Unit(s)/Hr IV Continuous <Continuous>  hydrALAZINE Injectable 10 milliGRAM(s) IV Push every 4 hours PRN  influenza   Vaccine 0.5 milliLiter(s) IntraMuscular once  insulin lispro (ADMELOG) corrective regimen sliding scale   SubCutaneous Before meals and at bedtime  LORazepam   Injectable 2 milliGRAM(s) IV Push every 2 hours PRN  multivitamin 1 Tablet(s) Oral daily  polyethylene glycol 3350 17 Gram(s) Oral every 12 hours  senna 2 Tablet(s) Oral every 12 hours      PHYSICAL EXAM:   Vital Signs Last 24 Hrs  T(C): 36.9 (16 Oct 2023 04:10), Max: 36.9 (15 Oct 2023 23:10)  T(F): 98.5 (16 Oct 2023 04:10), Max: 98.5 (16 Oct 2023 04:10)  HR: 58 (16 Oct 2023 05:00) (52 - 69)  BP: 159/68 (16 Oct 2023 04:00) (121/72 - 175/99)  BP(mean): 96 (16 Oct 2023 04:00) (82 - 119)  RR: 17 (16 Oct 2023 05:00) (6 - 23)  SpO2: 99% (16 Oct 2023 05:00) (93% - 100%)    Parameters below as of 16 Oct 2023 04:00  Patient On (Oxygen Delivery Method): nasal cannula  O2 Flow (L/min): 2        General: No acute distress, in bedside chair eating lunch.     NEUROLOGICAL EXAM:  Mental status: Awake, alert, oriented x self , hospital ([facility] side), does not recall where he lives (town), states year 20 something, 2023, when prompted with Halloween states October, repetition intact, IDs objects, follows simple commands, speech fluent,   impaired memory, later recalls year, not month- needs prompting.   Cranial Nerves: right superior quadrant of vision with movement visualized only, left homonomous hemianopia, right facial palsy, , no nystagmus, no dysarthria  Motor exam: Normal tone, no drift, 4/5 RUE, 5/5 RLE, 5/5 LUE, 5/5 LLE   Sensation: decreased  sensation on left   Coordination/ Gait: No dysmetria, gait not tested    LABS:                        12.9   4.90  )-----------( 112      ( 16 Oct 2023 01:29 )             36.5    10-16    138  |  100  |  13.0  ----------------------------<  176<H>  4.2   |  25.0  |  0.96    Ca    8.9      16 Oct 2023 01:29  Phos  4.0     10-16  Mg     2.2     10-16    PT/INR - ( 14 Oct 2023 15:55 )   PT: 11.3 sec;   INR: 1.02 ratio         PTT - ( 16 Oct 2023 01:29 )  PTT:79.1 sec      IMAGING: Reviewed by me.     RADIOLOGY & ADDITIONAL STUDIES (independently reviewed unless otherwise noted):     CT Head No Cont (10.15.23 @ 13:13)     IMPRESSION: Stable medial left temporal occipital lobe infarction.    (10.14.23 @ 15:25)   CT BRAIN WITHOUT CONTRAST:  1. Decreased attenuation involving the medial aspect of the left temporal   lobe, not present on prior noncontrast CT study. However, restricted   diffusion was appreciated in the same location on the prior MRI of   10/13/2023.  2. Nonacute left ventral pontine ischemic event, present on prior CT and   MRI.    10.14.23 @ 14:58)   CT BRAIN WITHOUT CONTRAST:  1. Decreased attenuation involving the medial aspect of the left temporal   lobe, not present on prior noncontrast CT study. However, restricted   diffusion was appreciated in the same location on the prior MRI of   10/13/2023.  2. Nonacute left ventral pontine ischemic event, present on prior CT and   MRI.    CT PERFUSION:  Technical limitations: Significantly limited by patient motion during   image acquisition and suboptimal arterial waveform.   Core infartction: 0 ml; Penubra/tissue at risk for active ischemia: 75   mL involving right temporal brain parenchyma.  If symptoms persist consider follow up head CT or MRI, MRA  if no   contraindication.    CTA HEAD:  1. Poor visualization of the proximal M1 segment of the right middle   cerebral artery, as on prior.  2. Irregular pattern of flow within the P2 segment of the left posterior   cerebral artery, as on prior.  4. Cut off of the left posterior cerebral artery at the P1/P2 junction,   as on prior, with suggestion of distal reconstitution..  5. Hypoplastic poorly visualized intracranial right vertebral artery.  6. No evidence of aneurysm formation at the level of the [place] of   [place]. Tiny aneurysms can be beyond the resolution of CTA technique.    CTA NECK:  1. No evidence of significant stenosis or occlusion in association with   the bilateral common carotid arteriesor bilateral internal carotid   arteries.  2. Poorly visualized proximal left vertebral artery with suggestion of   distal reconstitution at the level of C2.       10/12/2023 19:13  IMPRESSION:  HEAD CT: No evidence of an acute intracranial hemorhage, midline shift or hydrocephalus. Mild bifrontal periventricular white matter ischemic changes. Bilateral maxillary sinusitis  NECK CTA: No hemodynamic significant narowing involving the carotid bifurcations. Hypoplastic right vertebral artery. Dominant left vertebral artery..  BRAIN CTA: Cutoff of the M1 portion of the right middle cerebral artery which may be chronic as there appears to be lenticular striate collaterals and reconstitution of more distal MCA segments. Cutoff of the right posterior cerebral artery at the P1-2 junction with reconstitution of more distal segments. Cut off of the left posterior cerebral artery at the P1-2 junction with mild distal reconstitution. Hypoplastic right vertebral artery with portions of the V4 segment not visualized  CT PERFUSION: Regions of ischemia within the posterior circulation with a volume of 121 mL without evidence of core infarction.    (10.13.23 @ 12:31)   MRI BRAIN: Acute small to moderate medial left temporal occipital lobe   infarctions. Acute punctate left occipital lobe infarction.  MRA BRAIN: Occluded right MCA. Occluded right PCA. Occluded right   vertebral artery proximal V4 segment.  MRI BRAIN NOVA: Values above.    TTE Echo Complete w/ Contrast w/ Doppler (10.13.23 @ 19:44)   Summary:   1. Technically difficult study.   2. Normal left ventricular internal cavity size.   3. Left ventricular ejection fraction, by visual estimation, is 50 to   55%.   4. Normal left atrial size.   5. Normal right atrial size.   6. Mildly enlarged right ventricle.   7. Dilatation of the ascending aorta.   8. Sclerotic aortic valve with normal opening.   9. Mild mitral annular calcification.  10. Mild thickening of the anterior mitral valve leaflet.  11. Trace mitral valve regurgitation.  12. Mild tricuspid regurgitation.  13. Trivial pericardial effusion.  14. Recommend transesophageal echocardiogram.          [facility] Stroke Team  Progress Note     HPI:  Patient is a 57 y/o M with a PMHx of stroke 2022 reported at [facility]  (at the time patient received thrombolysis and had full  resolution of symptoms), family reported dove dove, HTN, DM, HLD nonadherent with medications, who presented following an episode of LOC.  As per sister report, patient was last seen well by his niece  morning of 10/12/23  before patient went to work. At around 6:15 pm that night patient had a sudden fall at work and lost consciousness It is unclear if patient had any symptoms before he fell. NIHSS 10 on admission . Transferred to [facility] for mechanical thrombectomy.        SUBJECTIVE: No events overnight.  No new neurologic complaints.  ROS reported negative unless otherwise noted.    acetaminophen     Tablet .. 650 milliGRAM(s) Oral every 6 hours PRN  atorvastatin 80 milliGRAM(s) Oral at bedtime  chlorhexidine 2% Cloths 1 Application(s) Topical <User Schedule>  dextrose 50% Injectable 12.5 Gram(s) IV Push once  dextrose 50% Injectable 25 Gram(s) IV Push once  dextrose 50% Injectable 25 Gram(s) IV Push once  famotidine    Tablet 20 milliGRAM(s) Oral daily  folic acid 1 milliGRAM(s) Oral daily  heparin  Infusion 1500 Unit(s)/Hr IV Continuous <Continuous>  hydrALAZINE Injectable 10 milliGRAM(s) IV Push every 4 hours PRN  influenza   Vaccine 0.5 milliLiter(s) IntraMuscular once  insulin lispro (ADMELOG) corrective regimen sliding scale   SubCutaneous Before meals and at bedtime  LORazepam   Injectable 2 milliGRAM(s) IV Push every 2 hours PRN  multivitamin 1 Tablet(s) Oral daily  polyethylene glycol 3350 17 Gram(s) Oral every 12 hours  senna 2 Tablet(s) Oral every 12 hours      PHYSICAL EXAM:   Vital Signs Last 24 Hrs  T(C): 36.9 (16 Oct 2023 04:10), Max: 36.9 (15 Oct 2023 23:10)  T(F): 98.5 (16 Oct 2023 04:10), Max: 98.5 (16 Oct 2023 04:10)  HR: 58 (16 Oct 2023 05:00) (52 - 69)  BP: 159/68 (16 Oct 2023 04:00) (121/72 - 175/99)  BP(mean): 96 (16 Oct 2023 04:00) (82 - 119)  RR: 17 (16 Oct 2023 05:00) (6 - 23)  SpO2: 99% (16 Oct 2023 05:00) (93% - 100%)    Parameters below as of 16 Oct 2023 04:00  Patient On (Oxygen Delivery Method): nasal cannula  O2 Flow (L/min): 2    General: No acute distress, in bedside chair eating lunch.     NEUROLOGICAL EXAM:  Mental status: Awake, alert, oriented x self , hospital ([facility]), does not recall where he lives (town), states year 20 something, 2023, when prompted with Halloween states October, repetition intact, IDs objects, follows simple commands, speech fluent,   impaired memory, later recalls year, not month- needs prompting.   Cranial Nerves: right superior quadrant of vision with movement visualized only, left homonomous hemianopia, right facial palsy, , no nystagmus, no dysarthria  Motor exam: Normal tone, no drift, 4/5 RUE, 5/5 RLE, 5/5 LUE, 5/5 LLE   Sensation: decreased  sensation on left   Coordination/ Gait: No dysmetria, gait not tested    LABS:                        12.9   4.90  )-----------( 112      ( 16 Oct 2023 01:29 )             36.5    10-16    138  |  100  |  13.0  ----------------------------<  176<H>  4.2   |  25.0  |  0.96    Ca    8.9      16 Oct 2023 01:29  Phos  4.0     10-16  Mg     2.2     10-16    PT/INR - ( 14 Oct 2023 15:55 )   PT: 11.3 sec;   INR: 1.02 ratio         PTT - ( 16 Oct 2023 01:29 )  PTT:79.1 sec      IMAGING: Reviewed by me.     RADIOLOGY & ADDITIONAL STUDIES (independently reviewed unless otherwise noted):     CT Head No Cont (10.15.23 @ 13:13)     IMPRESSION: Stable medial left temporal occipital lobe infarction.    (10.14.23 @ 15:25)   CT BRAIN WITHOUT CONTRAST:  1. Decreased attenuation involving the medial aspect of the left temporal   lobe, not present on prior noncontrast CT study. However, restricted   diffusion was appreciated in the same location on the prior MRI of   10/13/2023.  2. Nonacute left ventral pontine ischemic event, present on prior CT and   MRI.    10.14.23 @ 14:58)   CT BRAIN WITHOUT CONTRAST:  1. Decreased attenuation involving the medial aspect of the left temporal   lobe, not present on prior noncontrast CT study. However, restricted   diffusion was appreciated in the same location on the prior MRI of   10/13/2023.  2. Nonacute left ventral pontine ischemic event, present on prior CT and   MRI.    CT PERFUSION:  Technical limitations: Significantly limited by patient motion during   image acquisition and suboptimal arterial waveform.   Core infartction: 0 ml; Penubra/tissue at risk for active ischemia: 75   mL involving right temporal brain parenchyma.  If symptoms persist consider follow up head CT or MRI, MRA  if no   contraindication.    CTA HEAD:  1. Poor visualization of the proximal M1 segment of the right middle   cerebral artery, as on prior.  2. Irregular pattern of flow within the P2 segment of the left posterior   cerebral artery, as on prior.  4. Cut off of the left posterior cerebral artery at the P1/P2 junction,   as on prior, with suggestion of distal reconstitution..  5. Hypoplastic poorly visualized intracranial right vertebral artery.  6. No evidence of aneurysm formation at the level of the [place] of   [place]. Tiny aneurysms can be beyond the resolution of CTA technique.    CTA NECK:  1. No evidence of significant stenosis or occlusion in association with   the bilateral common carotid arteriesor bilateral internal carotid   arteries.  2. Poorly visualized proximal left vertebral artery with suggestion of   distal reconstitution at the level of C2.       10/12/2023 19:13  IMPRESSION:  HEAD CT: No evidence of an acute intracranial hemorhage, midline shift or hydrocephalus. Mild bifrontal periventricular white matter ischemic changes. Bilateral maxillary sinusitis  NECK CTA: No hemodynamic significant narowing involving the carotid bifurcations. Hypoplastic right vertebral artery. Dominant left vertebral artery..  BRAIN CTA: Cutoff of the M1 portion of the right middle cerebral artery which may be chronic as there appears to be lenticular striate collaterals and reconstitution of more distal MCA segments. Cutoff of the right posterior cerebral artery at the P1-2 junction with reconstitution of more distal segments. Cut off of the left posterior cerebral artery at the P1-2 junction with mild distal reconstitution. Hypoplastic right vertebral artery with portions of the V4 segment not visualized  CT PERFUSION: Regions of ischemia within the posterior circulation with a volume of 121 mL without evidence of core infarction.    (10.13.23 @ 12:31)   MRI BRAIN: Acute small to moderate medial left temporal occipital lobe   infarctions. Acute punctate left occipital lobe infarction.  MRA BRAIN: Occluded right MCA. Occluded right PCA. Occluded right   vertebral artery proximal V4 segment.  MRI BRAIN NOVA: Values above.    TTE Echo Complete w/ Contrast w/ Doppler (10.13.23 @ 19:44)   Summary:   1. Technically difficult study.   2. Normal left ventricular internal cavity size.   3. Left ventricular ejection fraction, by visual estimation, is 50 to   55%.   4. Normal left atrial size.   5. Normal right atrial size.   6. Mildly enlarged right ventricle.   7. Dilatation of the ascending aorta.   8. Sclerotic aortic valve with normal opening.   9. Mild mitral annular calcification.  10. Mild thickening of the anterior mitral valve leaflet.  11. Trace mitral valve regurgitation.  12. Mild tricuspid regurgitation.  13. Trivial pericardial effusion.  14. Recommend transesophageal echocardiogram.

## 2023-10-16 NOTE — [facility]VANCED PRACTICE NURSE CONSULT - ASSESSMENT
went to see pt in am, pt can not recall information. he is very upset about it. emotional support provided

## 2023-10-16 NOTE — PROGRESS NOTE ADULT - SUBJECTIVE AND OBJECTIVE BOX
CINDY DUARTE    [id number]    58y      Male    Patient is a 58y old  Male who presents with a chief complaint of Cerebral infarction     (15 Oct 2023 11:53)      INTERVAL HPI/OVERNIGHT EVENTS:    Patient is some what forgetful, denies weakness, numbness, has no fever, chills, chest pain, sob      Vital Signs Last 24 Hrs  T(C): 36.9 (16 Oct 2023 15:24), Max: 36.9 (15 Oct 2023 23:10)  T(F): 98.4 (16 Oct 2023 15:24), Max: 98.5 (16 Oct 2023 04:10)  HR: 55 (16 Oct 2023 15:00) (51 - 66)  BP: 155/89 (16 Oct 2023 15:00) (127/49 - 169/92)  BP(mean): 106 (16 Oct 2023 15:00) (73 - 119)  RR: 14 (16 Oct 2023 15:00) (6 - 23)  SpO2: 97% (16 Oct 2023 15:00) (93% - 99%)    Parameters below as of 16 Oct 2023 14:00  Patient On (Oxygen Delivery Method): nasal cannula  O2 Flow (L/min): 2      PHYSICAL EXAM:    GENERAL: Middle age Obese male looking comfortable   HEENT: PERRL, +EOMI  NECK: soft, Supple, No JVD  CHEST/LUNG: Clear to auscultate bilaterally; No wheezing  HEART: S1S2+, Regular rate and rhythm; No murmurs  ABDOMEN: Soft, Nontender, Nondistended; Bowel sounds present  EXTREMITIES:  1+ Peripheral Pulses, No edema  SKIN: No rashes or lesions  NEURO: OX2, following commands, speech fluent, abl to move all extremities, no facial droop   PSYCH: normal mood      LABS:                        12.9   4.90  )-----------( 112      ( 16 Oct 2023 01:29 )             36.5     10-16    138  |  100  |  13.0  ----------------------------<  176<H>  4.2   |  25.0  |  0.96    Ca    8.9      16 Oct 2023 01:29  Phos  4.0     10-16  Mg     2.2     10-16      PTT - ( 16 Oct 2023 01:29 )  PTT:79.1 sec  Urinalysis Basic - ( 16 Oct 2023 01:29 )    Color: x / Appearance: x / SG: x / pH: x  Gluc: 176 mg/dL / Ketone: x  / Bili: x / Urobili: x   Blood: x / Protein: x / Nitrite: x   Leuk Esterase: x / RBC: x / WBC x   Sq Epi: x / Non Sq Epi: x / Bacteria: x          I&O's Summary    15 Oct 2023 07:01  -  16 Oct 2023 07:00  --------------------------------------------------------  IN: 1573 mL / OUT: 2250 mL / NET: -677 mL    16 Oct 2023 07:01  -  16 Oct 2023 15:58  --------------------------------------------------------  IN: 600 mL / OUT: 1450 mL / NET: -850 mL        MEDICATIONS  (STANDING):  atorvastatin 80 milliGRAM(s) Oral at bedtime  chlorhexidine 2% Cloths 1 Application(s) Topical <User Schedule>  dextrose 50% Injectable 25 Gram(s) IV Push once  dextrose 50% Injectable 12.5 Gram(s) IV Push once  dextrose 50% Injectable 25 Gram(s) IV Push once  famotidine    Tablet 20 milliGRAM(s) Oral daily  folic acid 1 milliGRAM(s) Oral daily  heparin  Infusion 1500 Unit(s)/Hr (15 mL/Hr) IV Continuous <Continuous>  influenza   Vaccine 0.5 milliLiter(s) IntraMuscular once  insulin lispro (ADMELOG) corrective regimen sliding scale   SubCutaneous Before meals and at bedtime  metoprolol tartrate 12.5 milliGRAM(s) Oral every 12 hours  multivitamin 1 Tablet(s) Oral daily  polyethylene glycol 3350 17 Gram(s) Oral every 12 hours  senna 2 Tablet(s) Oral every 12 hours    MEDICATIONS  (PRN):  acetaminophen     Tablet .. 650 milliGRAM(s) Oral every 6 hours PRN Mild Pain (1 - 3)  hydrALAZINE Injectable 10 milliGRAM(s) IV Push every 4 hours PRN SBP>160  LORazepam   Injectable 2 milliGRAM(s) IV Push every 2 hours PRN CIWA-Ar score increase by 2 points and a total score of 7 or less

## 2023-10-16 NOTE — PROGRESS NOTE ADULT - ASSESSMENT
57 y/o M with a PMHx of stroke one year ago (patient recieved TNK and had full  resolution of symptoms), HTN, DM, non-compliant with medications, and sleep apnea, who presented following an episode of LOC.   As per chart patient had a sudden fall at work and lost consciousness It is unclear if patient had any symptoms before he fell.  NIHSS 10 on admission.   Patient is not a current TNK candidate.     Patient underwent cerebral angiogram on 10/12/2023, found L PCA occlusion s/p aspiration with TICI 3 recanalization.  A cardiac consult was called today due to abnormal EKG.  Patient with SB to 35 noted on the monitor overnight, today on the monitor SR with multiple episodes of PVC's, PJC's, noted.  Am EKG with SR 68, RBBB noted.   Patient denies any cardiac history, family history, states he is a non smoker, and does not take any medications.  Patient has known noncompliance.    Complains of facial numbness.  Denies headache, dizziness chest pain, palpation's sob, pnd, orthopnea, hemoptysis n/v/d/c/abd pain, fever, chills, syncope, presyncope, + for numbing and tingling of face, left arm, side and arm.  57 y/o M with a PMHx of stroke one year ago (patient received TNK and had full  resolution of symptoms), HTN, DM, non-compliant with medications, and sleep apnea, who presented following an episode of LOC.   As per chart patient had a sudden fall at work and lost consciousness It is unclear if patient had any symptoms before he fell.  NIHSS 10 on admission.   Patient is not a current TNK candidate.     Patient underwent cerebral angiogram on 10/12/2023, found L PCA occlusion s/p aspiration with TICI 3 recanalization.  A cardiac consult was called today due to abnormal EKG.  Patient with SB to 35 noted on the monitor overnight, today on the monitor SR with multiple episodes of PVC's, PJC's, noted.  Am EKG with SR 68, RBBB noted.   Patient denies any cardiac history, family history, states he is a non smoker, and does not take any medications.  Patient has known noncompliance.    Complains of facial numbness.  Denies headache, dizziness chest pain, palpation's sob, pnd, orthopnea, hemoptysis n/v/d/c/abd pain, fever, chills, syncope, presyncope, + for numbing and tingling of face, left arm, side and arm.

## 2023-10-16 NOTE — CONSULT NOTE ADULT - SUBJECTIVE AND OBJECTIVE BOX
[facility]                                                               CARDIAC ELECTROPHYSIOLOGY                                                       [facility] [place]                                                      Office: [address] [facility], [facility]                                                       Telephone: [phone]. Fax:[phone]    HPI:  Abdelrahman Somers is a 58 year old male with HTN, HL, ADRIANO, and CVA who presented with syncope found to have acute CVA with hospitalization complicated by bradycardia for which EP is consulted.    The patient had a CVA 1 year ago and had TNK with full resolution of symptoms. This admission he presented with a sudden fall at work and loss of consciousness. He underwent cerebral angiogram on 10/12/23 and was found to have a L PCA occlusion s/p aspiration with TICI 3 recanalization. During hospitalization he was noted to have periods of bradycardia and ectopy for which cardiology, and now EP is consulted. He has complaints of facial numbness.    He denies having any fevers, chills, sweats, cough, chest pain/pressure, palpitations, nausea, vomiting, diarrhea, peripheral edema, dyspnea on exertion, orthopnea, or PND.    10/14/23 TTE: LVEF: 50-55%. Normal LA/RA. Mildly enlarged RV. Dilated ascending aorta. Mild MAC. Mild TR.    TELE: NSR with frequent junctional extrasystoles and at times blocked APCs.    ECG: NSR with RBBB and junctional extrasystoles and at times blocked APCs.    PAST MEDICAL & SURGICAL HISTORY:  HTN (hypertension)  CVA (cerebrovascular accident)  History of hip surgery    REVIEW OF SYSTEMS: As per HPI. Remaining 10 point ROS were reviewed and are negative.    MEDICATIONS  (STANDING):  atorvastatin 80 milliGRAM(s) Oral at bedtime  chlorhexidine 2% Cloths 1 Application(s) Topical <User Schedule>  dextrose 50% Injectable 12.5 Gram(s) IV Push once  dextrose 50% Injectable 25 Gram(s) IV Push once  dextrose 50% Injectable 25 Gram(s) IV Push once  famotidine    Tablet 20 milliGRAM(s) Oral daily  folic acid 1 milliGRAM(s) Oral daily  heparin  Infusion 1500 Unit(s)/Hr (15 mL/Hr) IV Continuous <Continuous>  influenza   Vaccine 0.5 milliLiter(s) IntraMuscular once  insulin lispro (ADMELOG) corrective regimen sliding scale   SubCutaneous Before meals and at bedtime  multivitamin 1 Tablet(s) Oral daily  polyethylene glycol 3350 17 Gram(s) Oral every 12 hours  senna 2 Tablet(s) Oral every 12 hours    MEDICATIONS  (PRN):  acetaminophen     Tablet .. 650 milliGRAM(s) Oral every 6 hours PRN Mild Pain (1 - 3)  hydrALAZINE Injectable 10 milliGRAM(s) IV Push every 4 hours PRN SBP>160  LORazepam   Injectable 2 milliGRAM(s) IV Push every 2 hours PRN CIWA-Ar score increase by 2 points and a total score of 7 or less    Allergies  No Known Allergies    SOCIAL HISTORY:  Denies smoking/alcohol/drugs    FAMILY HISTORY:  Family History of Cardiovascular Disease:  Yes [  ] No [  x]  Coronary Artery Disease in first degree relative: Yes [  ] No [x  ]  Sudden Cardiac Death in First degree relative: Yes [  ] No [ x ]    Vital Signs Last 24 Hrs  T(C): 36.9 (16 Oct 2023 04:10), Max: 36.9 (15 Oct 2023 23:10)  T(F): 98.5 (16 Oct 2023 04:10), Max: 98.5 (16 Oct 2023 04:10)  HR: 58 (16 Oct 2023 05:00) (52 - 69)  BP: 159/68 (16 Oct 2023 04:00) (121/72 - 175/99)  BP(mean): 96 (16 Oct 2023 04:00) (82 - 119)  RR: 17 (16 Oct 2023 05:00) (6 - 23)  SpO2: 99% (16 Oct 2023 05:00) (93% - 100%)    Parameters below as of 16 Oct 2023 04:00  Patient On (Oxygen Delivery Method): nasal cannula  O2 Flow (L/min): 2      Physical Exam:  Constitutional: Well-developed, well nourished, in no acute distress  Head: normocephalic atraumatic   Eyes:  extraocular movements intact, sclera anicteric  ENT: mucous membranes moist, pharynx no erythema or swelling  Neck: supple, full range of motion, nontender to palpation midline  Chest wall: normal in appearance, nontender to palpation  Resp: effort normal, breath sounds clear to auscultation bilaterally  Cardiac: Heart regular rate and rhythm, no murmurs, rubs, or gallops.  No edema.  Abdomen: soft, nondistended, bowel sounds active, nontender to palpation in all four quadrants    Back: no costovertebral angle tenderness    Musculoskeletal: full range of motion all extremities with no pain, tenderness, swelling, or erythema    Neuro: Alert and oriented x 3, motor & sensation grossly in tact  Skin: color normal, no rashes or injury  Psych: mood calm    LABS:                        12.9   4.90  )-----------( 112      ( 16 Oct 2023 01:29 )             36.5   10-16    138  |  100  |  13.0  ----------------------------<  176<H>  4.2   |  25.0  |  0.96    Ca    8.9      16 Oct 2023 01:29  Phos  4.0     10-16  Mg     2.2     10-16      PT/INR - ( 14 Oct 2023 15:55 )   PT: 11.3 sec;   INR: 1.02 ratio         PTT - ( 16 Oct 2023 01:29 )  PTT:79.1 secCARDIAC MARKERS ( 15 Oct 2023 11:10 )  x     / <0.01 ng/mL / x     / x     / x      CARDIAC MARKERS ( 14 Oct 2023 14:16 )  x     / <0.01 ng/mL / x     / x     / x          Urinalysis Basic - ( 16 Oct 2023 01:29 )    Color: x / Appearance: x / SG: x / pH: x  Gluc: 176 mg/dL / Ketone: x  / Bili: x / Urobili: x   Blood: x / Protein: x / Nitrite: x   Leuk Esterase: x / RBC: x / WBC x   Sq Epi: x / Non Sq Epi: x / Bacteria: x    RADIOLOGY & ADDITIONAL STUDIES:  < from: CT Head No Cont (10.15.23 @ 13:13) >  IMPRESSION: Stable medial left temporal occipital lobe infarction.    < from: CT Angio Neck w/ IV Cont (10.14.23 @ 15:25) >  IMPRESSION:      CT BRAIN WITHOUT CONTRAST:  1. Decreased attenuation involving the medial aspect of the left temporal   lobe, not present on prior noncontrast CT study. However, restricted   diffusion was appreciated in the same location on the prior MRI of   10/13/2023.  2. Nonacute left ventral pontine ischemic event, present on prior CT and   MRI.    < from: MR Angio Head No Cont (10.13.23 @ 12:31) >  IMPRESSION:    MRI BRAIN: Acute small to moderate medial left temporal occipital lobe   infarctions. Acute punctate left occipital lobe infarction.    MRA BRAIN: Occluded right MCA. Occluded right PCA. Occluded right   vertebral artery proximal V4 segment.    MRI BRAIN NOVA: Values above.                                                              [facility]                                                               CARDIAC ELECTROPHYSIOLOGY                                                       [facility] [place]                                                      Office: [address] [facility], [facility]                                                       Telephone: [phone]. Fax:[phone]    HPI:  Abdelrahman Somers is a 58 year old male with HTN, HL, ADRIANO, and CVA who presented with syncope found to have acute CVA with hospitalization complicated by bradycardia for which EP is consulted.    The patient had a CVA 1 year ago and had TNK with full resolution of symptoms. This admission he presented with a sudden fall at work and loss of consciousness. He underwent cerebral angiogram on 10/12/23 and was found to have a L PCA occlusion s/p aspiration with TICI 3 recanalization. During hospitalization he was noted to have periods of bradycardia and ectopy for which cardiology, and now EP is consulted. He has complaints of facial numbness.    He denies having any fevers, chills, sweats, cough, chest pain/pressure, palpitations, nausea, vomiting, diarrhea, peripheral edema, dyspnea on exertion, orthopnea, or PND.    10/14/23 TTE: LVEF: 50-55%. Normal LA/RA. Mildly enlarged RV. Dilated ascending aorta. Mild MAC. Mild TR.    TELE: NSR with PVCs and at times non-conducted P waves.    ECG: NSR with RBBB and fasicular PVC and at times with antegrade block but retrograde conduction.    PAST MEDICAL & SURGICAL HISTORY:  HTN (hypertension)  CVA (cerebrovascular accident)  History of hip surgery    REVIEW OF SYSTEMS: As per HPI. Remaining 10 point ROS were reviewed and are negative.    MEDICATIONS  (STANDING):  atorvastatin 80 milliGRAM(s) Oral at bedtime  chlorhexidine 2% Cloths 1 Application(s) Topical <User Schedule>  dextrose 50% Injectable 12.5 Gram(s) IV Push once  dextrose 50% Injectable 25 Gram(s) IV Push once  dextrose 50% Injectable 25 Gram(s) IV Push once  famotidine    Tablet 20 milliGRAM(s) Oral daily  folic acid 1 milliGRAM(s) Oral daily  heparin  Infusion 1500 Unit(s)/Hr (15 mL/Hr) IV Continuous <Continuous>  influenza   Vaccine 0.5 milliLiter(s) IntraMuscular once  insulin lispro (ADMELOG) corrective regimen sliding scale   SubCutaneous Before meals and at bedtime  multivitamin 1 Tablet(s) Oral daily  polyethylene glycol 3350 17 Gram(s) Oral every 12 hours  senna 2 Tablet(s) Oral every 12 hours    MEDICATIONS  (PRN):  acetaminophen     Tablet .. 650 milliGRAM(s) Oral every 6 hours PRN Mild Pain (1 - 3)  hydrALAZINE Injectable 10 milliGRAM(s) IV Push every 4 hours PRN SBP>160  LORazepam   Injectable 2 milliGRAM(s) IV Push every 2 hours PRN CIWA-Ar score increase by 2 points and a total score of 7 or less    Allergies  No Known Allergies    SOCIAL HISTORY:  Denies smoking/alcohol/drugs    FAMILY HISTORY:  Family History of Cardiovascular Disease:  Yes [  ] No [  x]  Coronary Artery Disease in first degree relative: Yes [  ] No [x  ]  Sudden Cardiac Death in First degree relative: Yes [  ] No [ x ]    Vital Signs Last 24 Hrs  T(C): 36.9 (16 Oct 2023 04:10), Max: 36.9 (15 Oct 2023 23:10)  T(F): 98.5 (16 Oct 2023 04:10), Max: 98.5 (16 Oct 2023 04:10)  HR: 58 (16 Oct 2023 05:00) (52 - 69)  BP: 159/68 (16 Oct 2023 04:00) (121/72 - 175/99)  BP(mean): 96 (16 Oct 2023 04:00) (82 - 119)  RR: 17 (16 Oct 2023 05:00) (6 - 23)  SpO2: 99% (16 Oct 2023 05:00) (93% - 100%)    Parameters below as of 16 Oct 2023 04:00  Patient On (Oxygen Delivery Method): nasal cannula  O2 Flow (L/min): 2      Physical Exam:  Constitutional: Well-developed, well nourished, in no acute distress  Head: normocephalic atraumatic   Eyes:  extraocular movements intact, sclera anicteric  ENT: mucous membranes moist, pharynx no erythema or swelling  Neck: supple, full range of motion, nontender to palpation midline  Chest wall: normal in appearance, nontender to palpation  Resp: effort normal, breath sounds clear to auscultation bilaterally  Cardiac: Heart regular rate and rhythm, no murmurs, rubs, or gallops.  No edema.  Abdomen: soft, nondistended, bowel sounds active, nontender to palpation in all four quadrants    Back: no costovertebral angle tenderness    Musculoskeletal: full range of motion all extremities with no pain, tenderness, swelling, or erythema    Neuro: Alert and oriented x 3, motor & sensation grossly in tact  Skin: color normal, no rashes or injury  Psych: mood calm    LABS:                        12.9   4.90  )-----------( 112      ( 16 Oct 2023 01:29 )             36.5   10-16    138  |  100  |  13.0  ----------------------------<  176<H>  4.2   |  25.0  |  0.96    Ca    8.9      16 Oct 2023 01:29  Phos  4.0     10-16  Mg     2.2     10-16      PT/INR - ( 14 Oct 2023 15:55 )   PT: 11.3 sec;   INR: 1.02 ratio         PTT - ( 16 Oct 2023 01:29 )  PTT:79.1 secCARDIAC MARKERS ( 15 Oct 2023 11:10 )  x     / <0.01 ng/mL / x     / x     / x      CARDIAC MARKERS ( 14 Oct 2023 14:16 )  x     / <0.01 ng/mL / x     / x     / x          Urinalysis Basic - ( 16 Oct 2023 01:29 )    Color: x / Appearance: x / SG: x / pH: x  Gluc: 176 mg/dL / Ketone: x  / Bili: x / Urobili: x   Blood: x / Protein: x / Nitrite: x   Leuk Esterase: x / RBC: x / WBC x   Sq Epi: x / Non Sq Epi: x / Bacteria: x    RADIOLOGY & ADDITIONAL STUDIES:  < from: CT Head No Cont (10.15.23 @ 13:13) >  IMPRESSION: Stable medial left temporal occipital lobe infarction.    < from: CT Angio Neck w/ IV Cont (10.14.23 @ 15:25) >  IMPRESSION:      CT BRAIN WITHOUT CONTRAST:  1. Decreased attenuation involving the medial aspect of the left temporal   lobe, not present on prior noncontrast CT study. However, restricted   diffusion was appreciated in the same location on the prior MRI of   10/13/2023.  2. Nonacute left ventral pontine ischemic event, present on prior CT and   MRI.    < from: MR Angio Head No Cont (10.13.23 @ 12:31) >  IMPRESSION:    MRI BRAIN: Acute small to moderate medial left temporal occipital lobe   infarctions. Acute punctate left occipital lobe infarction.    MRA BRAIN: Occluded right MCA. Occluded right PCA. Occluded right   vertebral artery proximal V4 segment.    MRI BRAIN NOVA: Values above.                                                              [facility]                                                               CARDIAC ELECTROPHYSIOLOGY                                                       [facility] [place]                                                      Office: [address] [facility], [place]                                                       Telephone: [phone]. Fax:[phone]    HPI:  Abdelrahman Somers is a 58 year old male with HTN, HL, ADRIANO, and CVA who presented with syncope found to have acute CVA with hospitalization complicated by bradycardia for which EP is consulted.    The patient had a CVA 1 year ago and had TNK with full resolution of symptoms. This admission he presented with a sudden fall at work and loss of consciousness. He underwent cerebral angiogram on 10/12/23 and was found to have a L PCA occlusion s/p aspiration with TICI 3 recanalization. During hospitalization he was noted to have periods of bradycardia and ectopy for which cardiology, and now EP is consulted. He has complaints of facial numbness.    He denies having any fevers, chills, sweats, cough, chest pain/pressure, palpitations, nausea, vomiting, diarrhea, peripheral edema, dyspnea on exertion, orthopnea, or PND.    10/14/23 TTE: LVEF: 50-55%. Normal LA/RA. Mildly enlarged RV. Dilated ascending aorta. Mild MAC. Mild TR.    TELE: NSR with PVCs, his extrasystoles and at times non-conducted P waves.    ECG: NSR with RBBB and fasicular PVC and at times with antegrade block but retrograde conduction.    PAST MEDICAL & SURGICAL HISTORY:  HTN (hypertension)  CVA (cerebrovascular accident)  History of hip surgery    REVIEW OF SYSTEMS: As per HPI. Remaining 10 point ROS were reviewed and are negative.    MEDICATIONS  (STANDING):  atorvastatin 80 milliGRAM(s) Oral at bedtime  chlorhexidine 2% Cloths 1 Application(s) Topical <User Schedule>  dextrose 50% Injectable 12.5 Gram(s) IV Push once  dextrose 50% Injectable 25 Gram(s) IV Push once  dextrose 50% Injectable 25 Gram(s) IV Push once  famotidine    Tablet 20 milliGRAM(s) Oral daily  folic acid 1 milliGRAM(s) Oral daily  heparin  Infusion 1500 Unit(s)/Hr (15 mL/Hr) IV Continuous <Continuous>  influenza   Vaccine 0.5 milliLiter(s) IntraMuscular once  insulin lispro (ADMELOG) corrective regimen sliding scale   SubCutaneous Before meals and at bedtime  multivitamin 1 Tablet(s) Oral daily  polyethylene glycol 3350 17 Gram(s) Oral every 12 hours  senna 2 Tablet(s) Oral every 12 hours    MEDICATIONS  (PRN):  acetaminophen     Tablet .. 650 milliGRAM(s) Oral every 6 hours PRN Mild Pain (1 - 3)  hydrALAZINE Injectable 10 milliGRAM(s) IV Push every 4 hours PRN SBP>160  LORazepam   Injectable 2 milliGRAM(s) IV Push every 2 hours PRN CIWA-Ar score increase by 2 points and a total score of 7 or less    Allergies  No Known Allergies    SOCIAL HISTORY:  Denies smoking/alcohol/drugs    FAMILY HISTORY:  Family History of Cardiovascular Disease:  Yes [  ] No [  x]  Coronary Artery Disease in first degree relative: Yes [  ] No [x  ]  Sudden Cardiac Death in First degree relative: Yes [  ] No [ x ]    Vital Signs Last 24 Hrs  T(C): 36.9 (16 Oct 2023 04:10), Max: 36.9 (15 Oct 2023 23:10)  T(F): 98.5 (16 Oct 2023 04:10), Max: 98.5 (16 Oct 2023 04:10)  HR: 58 (16 Oct 2023 05:00) (52 - 69)  BP: 159/68 (16 Oct 2023 04:00) (121/72 - 175/99)  BP(mean): 96 (16 Oct 2023 04:00) (82 - 119)  RR: 17 (16 Oct 2023 05:00) (6 - 23)  SpO2: 99% (16 Oct 2023 05:00) (93% - 100%)    Parameters below as of 16 Oct 2023 04:00  Patient On (Oxygen Delivery Method): nasal cannula  O2 Flow (L/min): 2      Physical Exam:  Constitutional: Well-developed, well nourished, in no acute distress  Head: normocephalic atraumatic   Eyes:  extraocular movements intact, sclera anicteric  ENT: mucous membranes moist, pharynx no erythema or swelling  Neck: supple, full range of motion, nontender to palpation midline  Chest wall: normal in appearance, nontender to palpation  Resp: effort normal, bibasilar rales, no rhonchi/wheezes  Cardiac: Heart regular rate and rhythm, no murmurs, rubs, or gallops.  No edema.  Abdomen: soft, nondistended, bowel sounds active, nontender to palpation in all four quadrants    Back: no costovertebral angle tenderness    Musculoskeletal: full range of motion all extremities with no pain, tenderness, swelling, or erythema    Neuro: Alert and oriented x 3, left sided weakness, motor grossly in tact  Skin: color normal, no rashes or injury  Psych: mood calm    LABS:                        12.9   4.90  )-----------( 112      ( 16 Oct 2023 01:29 )             36.5   10-16    138  |  100  |  13.0  ----------------------------<  176<H>  4.2   |  25.0  |  0.96    Ca    8.9      16 Oct 2023 01:29  Phos  4.0     10-16  Mg     2.2     10-16      PT/INR - ( 14 Oct 2023 15:55 )   PT: 11.3 sec;   INR: 1.02 ratio         PTT - ( 16 Oct 2023 01:29 )  PTT:79.1 secCARDIAC MARKERS ( 15 Oct 2023 11:10 )  x     / <0.01 ng/mL / x     / x     / x      CARDIAC MARKERS ( 14 Oct 2023 14:16 )  x     / <0.01 ng/mL / x     / x     / x          Urinalysis Basic - ( 16 Oct 2023 01:29 )    Color: x / Appearance: x / SG: x / pH: x  Gluc: 176 mg/dL / Ketone: x  / Bili: x / Urobili: x   Blood: x / Protein: x / Nitrite: x   Leuk Esterase: x / RBC: x / WBC x   Sq Epi: x / Non Sq Epi: x / Bacteria: x    RADIOLOGY & ADDITIONAL STUDIES:  < from: CT Head No Cont (10.15.23 @ 13:13) >  IMPRESSION: Stable medial left temporal occipital lobe infarction.    < from: CT Angio Neck w/ IV Cont (10.14.23 @ 15:25) >  IMPRESSION:      CT BRAIN WITHOUT CONTRAST:  1. Decreased attenuation involving the medial aspect of the left temporal   lobe, not present on prior noncontrast CT study. However, restricted   diffusion was appreciated in the same location on the prior MRI of   10/13/2023.  2. Nonacute left ventral pontine ischemic event, present on prior CT and   MRI.    < from: MR Angio Head No Cont (10.13.23 @ 12:31) >  IMPRESSION:    MRI BRAIN: Acute small to moderate medial left temporal occipital lobe   infarctions. Acute punctate left occipital lobe infarction.    MRA BRAIN: Occluded right MCA. Occluded right PCA. Occluded right   vertebral artery proximal V4 segment.    MRI BRAIN NOVA: Values above.

## 2023-10-16 NOTE — PROGRESS NOTE ADULT - ASSESSMENT
57 y/o M with a Hx of stroke one year ago (at the time patient received TNK and had full  resolution of symptoms), HTN, DM, non-compliant with medications, who presented following an episode of LOC, as per family at around 6:15 pm patient had a sudden fall at work and lost consciousness. It is unclear if patient had any symptoms before he fell. NIHSS 10 on admission, admitted under NeuroICU on 10/12: Thrombectomy via L radial artery for TICI 3 revascularization of L P1-P2 occlusion. Noted LMCA chronic occlusion., on 0/13: Neurologic status improving. Got MR. Started on ASA and SQL, on 10/14: quadrantanopia now on L superior (originally R); Repeat stroke imaging completed and initial concern for CTP showing poss new perfusion deficit however on further review noted to be present on admission. Started on heparin drip per neurology recommendations, on 10/15: Therapeutic on heparin drip, stability HCT obtained. Cardiology consult for further stroke work-up and downgraded under medicine.       Plan:     #Acute CVA  MRI brain result noted, repeat CTH stable  s/p thrombectomy   c/w Lipitor  TTE result below  DVT studies negative  Started on heparin drip given extensive atherosclerotic disease, AC per neuro  - Treat ADRIANO with CPAP  -as per cardiology Ischemic workup before discharge   Per Neuro no ROCIO  BP goal 120-180s per neurosx    #Occluded R MCA/PCA   per discussion with neurosx, pt with collaterals  c/w q4hr neuro checks  outpt neurosx followup    #ETOH abuse  not in active withdrawal, and pt denies h/o DT  c/w symptom trigger CIWA  c/w MV, thiamine and folic acid  SW consulted  PT/OT eval - acute rehab    #Episode of bradycardia with concern for 2:1 block on tele  TTE EF 50-55%, no other structural abnormal   cards naomie recs  as per electrophysiology   - No indication for pacemaker  - Start Metoprolol 12.5mg BID if no contraindication from Neurologic standpoint  - Consider ILR prior to discharge    #HTN  No meds at home  Per neurosx, BP gaol 120-180s, at goal currently  cont to monitor    #DM  non compliant with meds at home  A1C 8.6  ISS, accu checks, CC diet  DM educator consulted    #Elevated TSH  T4 level normal  outpt f/u    #HLD  c/w Lipitor 80mg QD    #Anemia  Hg stable at 10.6, trend CBC    DVT ppx: Lovenox  Dispo: Tele    PT/OT eval - acute rehab

## 2023-10-16 NOTE — PROGRESS NOTE ADULT - PROBLEM SELECTOR PLAN 2
Tele:  Sr, bigeminy, PAC's  - james to 30s  EKG:  Sr 68, RBBB, with PJC's.  Seen by EP and no concern for high grade block  Trops negative.    TTE - EF is 50 to 55%, Trace mitral valve regurgitation, and Mild tricuspid regurgitation.  Ischemic work up when stable before discharge.    Consider Metoprolol 12.5mg BID when there are no contraindication from Neurologic standpoint  Consider ILR vs MCOT prior to discharge  Treat ADRIANO with CPAP

## 2023-10-17 LAB
APTT BLD: 100.7 SEC — HIGH (ref 24.5–35.6)
APTT BLD: 100.7 SEC — HIGH (ref 24.5–35.6)
APTT BLD: 103 SEC — HIGH (ref 24.5–35.6)
APTT BLD: 103 SEC — HIGH (ref 24.5–35.6)
APTT BLD: 30.2 SEC — SIGNIFICANT CHANGE UP (ref 24.5–35.6)
APTT BLD: 30.2 SEC — SIGNIFICANT CHANGE UP (ref 24.5–35.6)
APTT BLD: 49.9 SEC — HIGH (ref 24.5–35.6)
APTT BLD: 49.9 SEC — HIGH (ref 24.5–35.6)
GLUCOSE BLDC GLUCOMTR-MCNC: 145 MG/DL — HIGH (ref 70–99)
GLUCOSE BLDC GLUCOMTR-MCNC: 145 MG/DL — HIGH (ref 70–99)
GLUCOSE BLDC GLUCOMTR-MCNC: 149 MG/DL — HIGH (ref 70–99)
GLUCOSE BLDC GLUCOMTR-MCNC: 149 MG/DL — HIGH (ref 70–99)
GLUCOSE BLDC GLUCOMTR-MCNC: 172 MG/DL — HIGH (ref 70–99)
GLUCOSE BLDC GLUCOMTR-MCNC: 172 MG/DL — HIGH (ref 70–99)
GLUCOSE BLDC GLUCOMTR-MCNC: 194 MG/DL — HIGH (ref 70–99)
GLUCOSE BLDC GLUCOMTR-MCNC: 194 MG/DL — HIGH (ref 70–99)
HCT VFR BLD CALC: 39.7 % — SIGNIFICANT CHANGE UP (ref 39–50)
HCT VFR BLD CALC: 39.7 % — SIGNIFICANT CHANGE UP (ref 39–50)
HGB BLD-MCNC: 14.3 G/DL — SIGNIFICANT CHANGE UP (ref 13–17)
HGB BLD-MCNC: 14.3 G/DL — SIGNIFICANT CHANGE UP (ref 13–17)
MCHC RBC-ENTMCNC: 32.7 PG — SIGNIFICANT CHANGE UP (ref 27–34)
MCHC RBC-ENTMCNC: 32.7 PG — SIGNIFICANT CHANGE UP (ref 27–34)
MCHC RBC-ENTMCNC: 36 GM/DL — SIGNIFICANT CHANGE UP (ref 32–36)
MCHC RBC-ENTMCNC: 36 GM/DL — SIGNIFICANT CHANGE UP (ref 32–36)
MCV RBC AUTO: 90.8 FL — SIGNIFICANT CHANGE UP (ref 80–100)
MCV RBC AUTO: 90.8 FL — SIGNIFICANT CHANGE UP (ref 80–100)
PLATELET # BLD AUTO: 134 K/UL — LOW (ref 150–400)
PLATELET # BLD AUTO: 134 K/UL — LOW (ref 150–400)
RBC # BLD: 4.37 M/UL — SIGNIFICANT CHANGE UP (ref 4.2–5.8)
RBC # BLD: 4.37 M/UL — SIGNIFICANT CHANGE UP (ref 4.2–5.8)
RBC # FLD: 13.3 % — SIGNIFICANT CHANGE UP (ref 10.3–14.5)
RBC # FLD: 13.3 % — SIGNIFICANT CHANGE UP (ref 10.3–14.5)
WBC # BLD: 5.48 K/UL — SIGNIFICANT CHANGE UP (ref 3.8–10.5)
WBC # BLD: 5.48 K/UL — SIGNIFICANT CHANGE UP (ref 3.8–10.5)
WBC # FLD AUTO: 5.48 K/UL — SIGNIFICANT CHANGE UP (ref 3.8–10.5)
WBC # FLD AUTO: 5.48 K/UL — SIGNIFICANT CHANGE UP (ref 3.8–10.5)

## 2023-10-17 PROCEDURE — 99233 SBSQ HOSP IP/OBS HIGH 50: CPT

## 2023-10-17 PROCEDURE — 70450 CT HEAD/BRAIN W/O DYE: CPT | Mod: 26

## 2023-10-17 RX ORDER — HEPARIN SODIUM 5000 [USP'U]/ML
1200 INJECTION INTRAVENOUS; SUBCUTANEOUS
Qty: 25000 | Refills: 0 | Status: DISCONTINUED | OUTPATIENT
Start: 2023-10-17 | End: 2023-10-20

## 2023-10-17 RX ORDER — MAGNESIUM HYDROXIDE 400 MG/1
30 TABLET, CHEWABLE ORAL DAILY
Refills: 0 | Status: DISCONTINUED | OUTPATIENT
Start: 2023-10-17 | End: 2023-10-27

## 2023-10-17 RX ORDER — MULTIVIT WITH MIN/MFOLATE/K2 340-15/3 G
296 POWDER (GRAM) ORAL ONCE
Refills: 0 | Status: DISCONTINUED | OUTPATIENT
Start: 2023-10-17 | End: 2023-10-27

## 2023-10-17 RX ADMIN — Medication 2: at 12:55

## 2023-10-17 RX ADMIN — Medication 10 MILLIGRAM(S): at 11:10

## 2023-10-17 RX ADMIN — FAMOTIDINE 20 MILLIGRAM(S): 10 INJECTION INTRAVENOUS at 11:02

## 2023-10-17 RX ADMIN — HEPARIN SODIUM 1200 UNIT(S)/HR: 5000 INJECTION INTRAVENOUS; SUBCUTANEOUS at 08:55

## 2023-10-17 RX ADMIN — Medication 1 MILLIGRAM(S): at 11:02

## 2023-10-17 RX ADMIN — POLYETHYLENE GLYCOL 3350 17 GRAM(S): 17 POWDER, FOR SOLUTION ORAL at 06:31

## 2023-10-17 RX ADMIN — HEPARIN SODIUM 1000 UNIT(S)/HR: 5000 INJECTION INTRAVENOUS; SUBCUTANEOUS at 14:58

## 2023-10-17 RX ADMIN — Medication 650 MILLIGRAM(S): at 04:25

## 2023-10-17 RX ADMIN — HEPARIN SODIUM 1200 UNIT(S)/HR: 5000 INJECTION INTRAVENOUS; SUBCUTANEOUS at 23:37

## 2023-10-17 RX ADMIN — SENNA PLUS 2 TABLET(S): 8.6 TABLET ORAL at 06:31

## 2023-10-17 RX ADMIN — Medication 10 MILLIGRAM(S): at 00:10

## 2023-10-17 RX ADMIN — Medication 650 MILLIGRAM(S): at 12:20

## 2023-10-17 RX ADMIN — Medication 650 MILLIGRAM(S): at 03:25

## 2023-10-17 RX ADMIN — SENNA PLUS 2 TABLET(S): 8.6 TABLET ORAL at 17:43

## 2023-10-17 RX ADMIN — Medication 650 MILLIGRAM(S): at 22:43

## 2023-10-17 RX ADMIN — Medication 650 MILLIGRAM(S): at 11:27

## 2023-10-17 RX ADMIN — Medication 2: at 09:22

## 2023-10-17 RX ADMIN — Medication 650 MILLIGRAM(S): at 21:43

## 2023-10-17 RX ADMIN — CHLORHEXIDINE GLUCONATE 1 APPLICATION(S): 213 SOLUTION TOPICAL at 06:31

## 2023-10-17 RX ADMIN — Medication 1 TABLET(S): at 11:02

## 2023-10-17 RX ADMIN — ATORVASTATIN CALCIUM 80 MILLIGRAM(S): 80 TABLET, FILM COATED ORAL at 21:43

## 2023-10-17 RX ADMIN — Medication 12.5 MILLIGRAM(S): at 17:43

## 2023-10-17 RX ADMIN — POLYETHYLENE GLYCOL 3350 17 GRAM(S): 17 POWDER, FOR SOLUTION ORAL at 17:43

## 2023-10-17 NOTE — DISCHARGE NOTE PROVIDER - EXTENDED VTE YES NO FOR MLM ENOXAPARIN
We want to give the best care possible. If you receive a [employer] survey from our office, please take the time to fill out the survey and return it in the envelope provided. Your feedback helps us know how we are doing and we really appreciate it. Thank you.      
,

## 2023-10-17 NOTE — DISCHARGE NOTE PROVIDER - DETAILS OF MALNUTRITION DIAGNOSIS/DIAGNOSES
This patient has been assessed with a concern for Malnutrition and was treated during this hospitalization for the following Nutrition diagnosis/diagnoses:     -  10/26/2023: Moderate protein-calorie malnutrition

## 2023-10-17 NOTE — CHART NOTE - NSCHARTNOTEFT_GEN_A_CORE
Got a call from Radiologist about the CT head new finsinafua, result discussed with Dr Efrain Ferrera, as per him continue with Q2 neurocheck in stepdown, MRI to be done  w

## 2023-10-17 NOTE — DISCHARGE NOTE PROVIDER - PROVIDER TOKENS
PROVIDER:[TOKEN:[[id number]]] PROVIDER:[TOKEN:[[id number]]],PROVIDER:[TOKEN:[[id number]],FOLLOWUP:[1 week]],PROVIDER:[TOKEN:[[id number]],FOLLOWUP:[1 week]],FREE:[LAST:[PCP],PHONE:[(   )    -],FAX:[(   )    -],FOLLOWUP:[1 week]],PROVIDER:[TOKEN:[[id number]],FOLLOWUP:[1 week]]

## 2023-10-17 NOTE — PROGRESS NOTE ADULT - ASSESSMENT
57 y/o M with a Hx of stroke one year ago (at the time patient received TNK and had full  resolution of symptoms), HTN, DM, non-compliant with medications, who presented following an episode of LOC, as per family at around 6:15 pm patient had a sudden fall at work and lost consciousness. It is unclear if patient had any symptoms before he fell. NIHSS 10 on admission, admitted under NeuroICU on 10/12: Thrombectomy via L radial artery for TICI 3 revascularization of L P1-P2 occlusion. Noted LMCA chronic occlusion., on 0/13: Neurologic status improving. Got MR. Started on ASA and SQL, on 10/14: quadrantanopia now on L superior (originally R); Repeat stroke imaging completed and initial concern for CTP showing poss new perfusion deficit however on further review noted to be present on admission. Started on heparin drip per neurology recommendations, on 10/15: Therapeutic on heparin drip, stability HCT obtained. Cardiology consult for further stroke work-up and downgraded under medicine.       Plan:     #Acute CVA  MRI brain result noted, repeat CTH stable  s/p thrombectomy   c/w Lipitor  TTE shows with  Normal left ventricular internal cavity size,  3. Left ventricular ejection fraction, by visual estimation, is 50 to   55%.  DVT studies negative  Started on heparin drip given extensive atherosclerotic disease, AC per neuro  - Treat ADRIANO with CPAP  -as per cardiology Ischemic workup before discharge   Per Neuro no ROCIO  BP goal 120-180s per neurosx  will continue with Neuro checks Q4    #Occluded R MCA/PCA:   per discussion with neurosx, pt with collaterals  c/w q4hr neuro checks  outpt neurosx followup    #ETOH abuse  not in active withdrawal, and pt denies h/o DT  c/w symptom trigger CIWA  c/w MV, thiamine and folic acid  SW consulted  PT/OT eval - acute rehab    #Episode of bradycardia with concern for 2:1 block on tele  TTE EF 50-55%, no other structural abnormal   cards naomie recs  as per electrophysiology   - No indication for pacemaker  - Start Metoprolol 12.5mg BID if no contraindication from Neurologic standpoint  - Consider ILR prior to discharge    #HTN  No meds at home  Per neurosx, BP gaol 120-180s, at goal currently  cont to monitor    #DM  non compliant with meds at home  A1C 8.6  ISS, accu checks, CC diet  DM educator consulted    #Elevated TSH  T4 level normal  outpt f/u    #HLD  c/w Lipitor 80mg QD    #Anemia  Hg stable at 10.6, trend CBC    DVT ppx: Lovenox  Dispo: Tele    PT/OT eval - acute rehab     59 y/o M with a Hx of stroke one year ago (at the time patient received TNK and had full  resolution of symptoms), HTN, DM, non-compliant with medications, who presented following an episode of LOC, as per family at around 6:15 pm patient had a sudden fall at work and lost consciousness. It is unclear if patient had any symptoms before he fell. NIHSS 10 on admission, admitted under NeuroICU on 10/12: Thrombectomy via L radial artery for TICI 3 revascularization of L P1-P2 occlusion. Noted LMCA chronic occlusion., on 0/13: Neurologic status improving. Got MR. Started on ASA and SQL, on 10/14: quadrantanopia now on L superior (originally R); Repeat stroke imaging completed and initial concern for CTP showing poss new perfusion deficit however on further review noted to be present on admission. Started on heparin drip per neurology recommendations, on 10/15: Therapeutic on heparin drip, stability HCT obtained. Cardiology consult for further stroke work-up and downgraded under medicine.       Plan:     #Acute CVA  MRI brain result noted, repeat CTH stable  s/p thrombectomy   c/w Lipitor  TTE shows with  Normal left ventricular internal cavity size,  3. Left ventricular ejection fraction, by visual estimation, is 50 to   55%.  DVT studies negative  Started on heparin drip given extensive atherosclerotic disease, AC per neuro  - Treat ADRIANO with CPAP  -as per cardiology Ischemic workup before discharge   Per Neuro no ROCIO  BP goal 120-180s per neurosx  will continue with Neuro checks Q4    #Occluded R MCA/PCA:   per discussion with neurosx, pt with collaterals  c/w q4hr neuro checks  outpt neurosx followup    #ETOH abuse  not in active withdrawal, and pt denies h/o DT  c/w symptom trigger CIWA  c/w MV, thiamine and folic acid  SW consulted  PT/OT eval - acute rehab    #Episode of bradycardia with concern for 2:1 block on tele  TTE EF 50-55%, no other structural abnormal   cards naomie recs  as per electrophysiology   - No indication for pacemaker  - Start Metoprolol 12.5mg BID if no contraindication from Neurologic standpoint  - Consider ILR prior to discharge    #HTN  No meds at home  Per neurosx, BP gaol 120-180s, at goal currently  cont to monitor    #DM  non compliant with meds at home  A1C 8.6  ISS, accu checks, CC diet  DM educator consulted    #Elevated TSH  T4 level normal  outpt f/u    #HLD  c/w Lipitor 80mg QD    #Anemia  Hg stable at 10.6, trend CBC    DVT ppx: Lovenox  Dispo: Tele    PT/OT eval - acute rehab    Constipation: On Miralax and Senna, will give dulcolax, if dose not go then will give mag citrate      59 y/o M with a Hx of stroke one year ago (at the time patient received TNK and had full  resolution of symptoms), HTN, DM, non-compliant with medications, who presented following an episode of LOC, as per family at around 6:15 pm patient had a sudden fall at work and lost consciousness. It is unclear if patient had any symptoms before he fell. NIHSS 10 on admission, admitted under NeuroICU on 10/12: Thrombectomy via L radial artery for TICI 3 revascularization of L P1-P2 occlusion. Noted LMCA chronic occlusion., on 0/13: Neurologic status improving. Got MR. Started on ASA and SQL, on 10/14: quadrantanopia now on L superior (originally R); Repeat stroke imaging completed and initial concern for CTP showing poss new perfusion deficit however on further review noted to be present on admission. Started on heparin drip per neurology recommendations, on 10/15: Therapeutic on heparin drip, stability HCT obtained. Cardiology consult for further stroke work-up and downgraded under medicine.       Plan:     #Acute CVA  MRI brain result noted, repeat CTH stable  s/p thrombectomy   c/w Lipitor  TTE shows with  Normal left ventricular internal cavity size,  3. Left ventricular ejection fraction, by visual estimation, is 50 to   55%.  DVT studies negative  Started on heparin drip given extensive atherosclerotic disease, AC per neuro  - Treat ADRIANO with CPAP  -as per cardiology Ischemic workup before discharge   Per Neuro no ROCIO  BP goal 120-180s per neurosx  will continue with Neuro checks Q2  repeat CT head to follow and MRI ordered as well    #Occluded R MCA/PCA:   per discussion with neurosx, pt with collaterals  c/w q4hr neuro checks  outpt neurosx followup    #ETOH abuse  not in active withdrawal, and pt denies h/o DT  c/w symptom trigger CIWA  c/w MV, thiamine and folic acid  SW consulted  PT/OT eval - acute rehab    #Episode of bradycardia with concern for 2:1 block on tele  TTE EF 50-55%, no other structural abnormal   cards naomie recs  as per electrophysiology   - No indication for pacemaker  - Start Metoprolol 12.5mg BID if no contraindication from Neurologic standpoint  - Consider ILR prior to discharge    #HTN  No meds at home  Per neurosx, BP gaol 120-180s, at goal currently  cont to monitor    #DM  non compliant with meds at home  A1C 8.6  ISS, accu checks, CC diet  DM educator consulted    #Elevated TSH  T4 level normal  outpt f/u    #HLD  c/w Lipitor 80mg QD    #Anemia  Hg stable at 10.6, trend CBC    DVT ppx: Lovenox  Dispo: Tele    PT/OT eval - acute rehab    Constipation: On Miralax and Senna, will give dulcolax, if dose not go then will give mag citrate

## 2023-10-17 NOTE — DISCHARGE NOTE PROVIDER - NSDCCPCAREPLAN_GEN_ALL_CORE_FT
PRINCIPAL DISCHARGE DIAGNOSIS  Diagnosis: Acute cerebrovascular accident (CVA)  Assessment and Plan of Treatment: - You underwent a thrombectomy during this hospitalization   - Continue with medications as directed  - Follow up with Neurology, Neuro IR, Cardiology, EP, and PCP in 1 week      SECONDARY DISCHARGE DIAGNOSES  Diagnosis: ETOH abuse  Assessment and Plan of Treatment: - Supportive care  - Cessation of alcohol was advised  - Follow up with PCP in 1 week    Diagnosis: Bradycardia  Assessment and Plan of Treatment: - ILR was placed on this admission  - Continue metoprolol as directed   - Follow up with EP and Cardiology in 1 week    Diagnosis: HTN (hypertension)  Assessment and Plan of Treatment: - Continue medications as directed  - Follow up with PCP in 1 week    Diagnosis: Depression, major  Assessment and Plan of Treatment: - Start celexa and follow up with PCP in 1 week    Diagnosis: DM (diabetes mellitus)  Assessment and Plan of Treatment: - Continue medications as directed  - Follow up with PCP in 1 week    Diagnosis: Elevated TSH  Assessment and Plan of Treatment: - Follow up outpatient in 1 week for repeat thyroid panel

## 2023-10-17 NOTE — DISCHARGE NOTE PROVIDER - ATTENDING DISCHARGE PHYSICAL EXAMINATION:
General: Middle aged male lying in bed comfortably. No acute distress  HEENT: EOMI. Clear conjunctivae. Moist mucus membrane  Neck: Supple.   Chest: CTA bilaterally. No wheezing, rales or rhonchi. No chest wall tenderness.  Heart: Normal S1 & S2. RRR.   Abdomen: Non distended. Soft. Non-tender. + BS  Ext: No pedal edema. No calf tenderness   Neuro: Active and alert but not completely oriented. Motor 5/5 in RUE/RLE and 4-5/5 in LUE/LLE. Sensation decreased on left side. Reflexes 1+. Cerebellar sings intact. No speech disorder  Skin: Warm and Dry  Psychiatry: Normal mood and affect

## 2023-10-17 NOTE — PROGRESS NOTE ADULT - SUBJECTIVE AND OBJECTIVE BOX
INCOMPLETE NOTE - VISIT PENDING     Preliminary note, offical recommendations pending attending review/signature   [facility] Stroke Team  Progress Note     HPI:  Patient is a 59 y/o M with a PMHx of stroke 2022 reported at [facility]  (at the time patient received thrombolysis and had full  resolution of symptoms), family reported dove dove, HTN, DM, HLD nonadherent with medications, who presented following an episode of LOC.  As per sister report, patient was last seen well by his niece  morning of 10/12/23  before patient went to work. At around 6:15 pm that night patient had a sudden fall at work and lost consciousness It is unclear if patient had any symptoms before he fell. NIHSS 10 on admission . Transferred to [facility] for mechanical thrombectomy.       SUBJECTIVE: No events overnight.  No new neurologic complaints.  ROS reported negative unless otherwise noted.    acetaminophen     Tablet .. 650 milliGRAM(s) Oral every 6 hours PRN  atorvastatin 80 milliGRAM(s) Oral at bedtime  chlorhexidine 2% Cloths 1 Application(s) Topical <User Schedule>  dextrose 50% Injectable 25 Gram(s) IV Push once  dextrose 50% Injectable 12.5 Gram(s) IV Push once  dextrose 50% Injectable 25 Gram(s) IV Push once  famotidine    Tablet 20 milliGRAM(s) Oral daily  folic acid 1 milliGRAM(s) Oral daily  heparin  Infusion. 1200 Unit(s)/Hr IV Continuous <Continuous>  hydrALAZINE Injectable 10 milliGRAM(s) IV Push every 4 hours PRN  influenza   Vaccine 0.5 milliLiter(s) IntraMuscular once  insulin lispro (ADMELOG) corrective regimen sliding scale   SubCutaneous Before meals and at bedtime  LORazepam   Injectable 2 milliGRAM(s) IV Push every 2 hours PRN  metoprolol tartrate 12.5 milliGRAM(s) Oral every 12 hours  multivitamin 1 Tablet(s) Oral daily  polyethylene glycol 3350 17 Gram(s) Oral every 12 hours  senna 2 Tablet(s) Oral every 12 hours      PHYSICAL EXAM:   Vital Signs Last 24 Hrs  T(C): 36.6 (17 Oct 2023 08:00), Max: 36.9 (16 Oct 2023 15:24)  T(F): 97.8 (17 Oct 2023 08:00), Max: 98.4 (16 Oct 2023 15:24)  HR: 58 (17 Oct 2023 08:00) (50 - 65)  BP: 161/83 (17 Oct 2023 08:00) (121/58 - 163/100)  BP(mean): 108 (17 Oct 2023 08:00) (68 - 120)  RR: 13 (17 Oct 2023 08:00) (11 - 17)  SpO2: 93% (17 Oct 2023 08:00) (93% - 100%)    Parameters below as of 17 Oct 2023 08:00  Patient On (Oxygen Delivery Method): room air    EXAM PENDING     General: No acute distress    NEUROLOGICAL EXAM:  Mental status: Awake, alert, oriented x self , hospital ([facility]), does not recall where he lives (town), states year 20 something, 2023, when prompted with Halloween states October, repetition intact, IDs objects, follows simple commands, speech fluent,   impaired memory, later recalls year, not month- needs prompting.   Cranial Nerves: right superior quadrant of vision with movement visualized only, left homonomous hemianopia, right facial palsy, , no nystagmus, no dysarthria  Motor exam: Normal tone, no drift, 4/5 RUE, 5/5 RLE, 5/5 LUE, 5/5 LLE   Sensation: decreased  sensation on left   Coordination/ Gait: No dysmetria, gait not tested    LABS:                        12.9   4.90  )-----------( 112      ( 16 Oct 2023 01:29 )             36.5    10-16    138  |  100  |  13.0  ----------------------------<  176<H>  4.2   |  25.0  |  0.96    Ca    8.9      16 Oct 2023 01:29  Phos  4.0     10-16  Mg     2.2     10-16    PTT - ( 17 Oct 2023 07:35 )  PTT:100.7 sec      IMAGING: Reviewed by me.     RADIOLOGY & ADDITIONAL STUDIES (independently reviewed unless otherwise noted):    CT Head No Cont (10.15.23 @ 13:13)     IMPRESSION: Stable medial left temporal occipital lobe infarction.    (10.14.23 @ 15:25)   CT BRAIN WITHOUT CONTRAST:  1. Decreased attenuation involving the medial aspect of the left temporal   lobe, not present on prior noncontrast CT study. However, restricted   diffusion was appreciated in the same location on the prior MRI of   10/13/2023.  2. Nonacute left ventral pontine ischemic event, present on prior CT and   MRI.    10.14.23 @ 14:58)   CT BRAIN WITHOUT CONTRAST:  1. Decreased attenuation involving the medial aspect of the left temporal   lobe, not present on prior noncontrast CT study. However, restricted   diffusion was appreciated in the same location on the prior MRI of   10/13/2023.  2. Nonacute left ventral pontine ischemic event, present on prior CT and   MRI.    CT PERFUSION:  Technical limitations: Significantly limited by patient motion during   image acquisition and suboptimal arterial waveform.   Core infartction: 0 ml; Penubra/tissue at risk for active ischemia: 75   mL involving right temporal brain parenchyma.  If symptoms persist consider follow up head CT or MRI, MRA  if no   contraindication.    CTA HEAD:  1. Poor visualization of the proximal M1 segment of the right middle   cerebral artery, as on prior.  2. Irregular pattern of flow within the P2 segment of the left posterior   cerebral artery, as on prior.  4. Cut off of the left posterior cerebral artery at the P1/P2 junction,   as on prior, with suggestion of distal reconstitution..  5. Hypoplastic poorly visualized intracranial right vertebral artery.  6. No evidence of aneurysm formation at the level of the [place] of   [place]. Tiny aneurysms can be beyond the resolution of CTA technique.    CTA NECK:  1. No evidence of significant stenosis or occlusion in association with   the bilateral common carotid arteriesor bilateral internal carotid   arteries.  2. Poorly visualized proximal left vertebral artery with suggestion of   distal reconstitution at the level of C2.       10/12/2023 19:13  IMPRESSION:  HEAD CT: No evidence of an acute intracranial hemorhage, midline shift or hydrocephalus. Mild bifrontal periventricular white matter ischemic changes. Bilateral maxillary sinusitis  NECK CTA: No hemodynamic significant narowing involving the carotid bifurcations. Hypoplastic right vertebral artery. Dominant left vertebral artery..  BRAIN CTA: Cutoff of the M1 portion of the right middle cerebral artery which may be chronic as there appears to be lenticular striate collaterals and reconstitution of more distal MCA segments. Cutoff of the right posterior cerebral artery at the P1-2 junction with reconstitution of more distal segments. Cut off of the left posterior cerebral artery at the P1-2 junction with mild distal reconstitution. Hypoplastic right vertebral artery with portions of the V4 segment not visualized  CT PERFUSION: Regions of ischemia within the posterior circulation with a volume of 121 mL without evidence of core infarction.    (10.13.23 @ 12:31)   MRI BRAIN: Acute small to moderate medial left temporal occipital lobe   infarctions. Acute punctate left occipital lobe infarction.  MRA BRAIN: Occluded right MCA. Occluded right PCA. Occluded right   vertebral artery proximal V4 segment.  MRI BRAIN NOVA: Values above.    TTE Echo Complete w/ Contrast w/ Doppler (10.13.23 @ 19:44)   Summary:   1. Technically difficult study.   2. Normal left ventricular internal cavity size.   3. Left ventricular ejection fraction, by visual estimation, is 50 to   55%.   4. Normal left atrial size.   5. Normal right atrial size.   6. Mildly enlarged right ventricle.   7. Dilatation of the ascending aorta.   8. Sclerotic aortic valve with normal opening.   9. Mild mitral annular calcification.  10. Mild thickening of the anterior mitral valve leaflet.  11. Trace mitral valve regurgitation.  12. Mild tricuspid regurgitation.  13. Trivial pericardial effusion.  14. Recommend transesophageal echocardiogram.   Preliminary note, offical recommendations pending attending review/signature   [facility] Stroke Team  Progress Note     HPI:  Patient is a 59 y/o M with a PMHx of stroke 2022 reported at [facility]  (at the time patient received thrombolysis and had full  resolution of symptoms), family reported dove dove, HTN, DM, HLD nonadherent with medications, who presented following an episode of LOC.  As per sister report, patient was last seen well by his niece  morning of 10/12/23  before patient went to work. At around 6:15 pm that night patient had a sudden fall at work and lost consciousness It is unclear if patient had any symptoms before he fell. NIHSS 10 on admission . Transferred to [facility] for mechanical thrombectomy.       SUBJECTIVE: Complained of Headache last night, otherwise no events.  No new neurologic complaints.  ROS reported negative unless otherwise noted.    acetaminophen     Tablet .. 650 milliGRAM(s) Oral every 6 hours PRN  atorvastatin 80 milliGRAM(s) Oral at bedtime  chlorhexidine 2% Cloths 1 Application(s) Topical <User Schedule>  dextrose 50% Injectable 25 Gram(s) IV Push once  dextrose 50% Injectable 12.5 Gram(s) IV Push once  dextrose 50% Injectable 25 Gram(s) IV Push once  famotidine    Tablet 20 milliGRAM(s) Oral daily  folic acid 1 milliGRAM(s) Oral daily  heparin  Infusion. 1200 Unit(s)/Hr IV Continuous <Continuous>  hydrALAZINE Injectable 10 milliGRAM(s) IV Push every 4 hours PRN  influenza   Vaccine 0.5 milliLiter(s) IntraMuscular once  insulin lispro (ADMELOG) corrective regimen sliding scale   SubCutaneous Before meals and at bedtime  LORazepam   Injectable 2 milliGRAM(s) IV Push every 2 hours PRN  metoprolol tartrate 12.5 milliGRAM(s) Oral every 12 hours  multivitamin 1 Tablet(s) Oral daily  polyethylene glycol 3350 17 Gram(s) Oral every 12 hours  senna 2 Tablet(s) Oral every 12 hours      PHYSICAL EXAM:   Vital Signs Last 24 Hrs  T(C): 36.6 (17 Oct 2023 08:00), Max: 36.9 (16 Oct 2023 15:24)  T(F): 97.8 (17 Oct 2023 08:00), Max: 98.4 (16 Oct 2023 15:24)  HR: 58 (17 Oct 2023 08:00) (50 - 65)  BP: 161/83 (17 Oct 2023 08:00) (121/58 - 163/100)  BP(mean): 108 (17 Oct 2023 08:00) (68 - 120)  RR: 13 (17 Oct 2023 08:00) (11 - 17)  SpO2: 93% (17 Oct 2023 08:00) (93% - 100%)    Parameters below as of 17 Oct 2023 08:00  Patient On (Oxygen Delivery Method): room air      General: No acute distress, complaining of right frontal headache    NEUROLOGICAL EXAM:  Mental status: Awake, alert, oriented x self and time , hospital, states year 2023 and accurately guess the month, repetition intact, IDs objects, follows simple commands, speech fluent,  impaired memory.   Cranial Nerves: right superior quadrant of vision improved from (10/16) exam, left homonomous hemianopia, right facial palsy, no nystagmus, no dysarthria  Motor exam: Normal tone, no drift, 4/5 RUE, 5/5 RLE, 5/5 LUE, 5/5 LLE   Sensation: decreased  sensation on left   Coordination/ Gait: No dysmetria, gait not tested    LABS:                        12.9   4.90  )-----------( 112      ( 16 Oct 2023 01:29 )             36.5    10-16    138  |  100  |  13.0  ----------------------------<  176<H>  4.2   |  25.0  |  0.96    Ca    8.9      16 Oct 2023 01:29  Phos  4.0     10-16  Mg     2.2     10-16    PTT - ( 17 Oct 2023 07:35 )  PTT:100.7 sec      IMAGING: Reviewed by me.     RADIOLOGY & ADDITIONAL STUDIES (independently reviewed unless otherwise noted):    CT Head No Cont (10.17.23 @ 12:19)     Multiple foci of low density right mesial temporal lobe and right   thalamus are more prominent on the current examination and likely   represent the presence of evolving cytotoxic edema.  Similar-appearing low density involving the left mesial temporal lobe   likely representing cytotoxic edema.  Redemonstration of chronic left of midline basis pontis infarct.  No acute intracranial hemorrhage.        CT Head No Cont (10.15.23 @ 13:13)     IMPRESSION: Stable medial left temporal occipital lobe infarction.    (10.14.23 @ 15:25)   CT BRAIN WITHOUT CONTRAST:  1. Decreased attenuation involving the medial aspect of the left temporal   lobe, not present on prior noncontrast CT study. However, restricted   diffusion was appreciated in the same location on the prior MRI of   10/13/2023.  2. Nonacute left ventral pontine ischemic event, present on prior CT and   MRI.    10.14.23 @ 14:58)   CT BRAIN WITHOUT CONTRAST:  1. Decreased attenuation involving the medial aspect of the left temporal   lobe, not present on prior noncontrast CT study. However, restricted   diffusion was appreciated in the same location on the prior MRI of   10/13/2023.  2. Nonacute left ventral pontine ischemic event, present on prior CT and   MRI.    CT PERFUSION:  Technical limitations: Significantly limited by patient motion during   image acquisition and suboptimal arterial waveform.   Core infartction: 0 ml; Penubra/tissue at risk for active ischemia: 75   mL involving right temporal brain parenchyma.  If symptoms persist consider follow up head CT or MRI, MRA  if no   contraindication.    CTA HEAD:  1. Poor visualization of the proximal M1 segment of the right middle   cerebral artery, as on prior.  2. Irregular pattern of flow within the P2 segment of the left posterior   cerebral artery, as on prior.  4. Cut off of the left posterior cerebral artery at the P1/P2 junction,   as on prior, with suggestion of distal reconstitution..  5. Hypoplastic poorly visualized intracranial right vertebral artery.  6. No evidence of aneurysm formation at the level of the [place] of   [place]. Tiny aneurysms can be beyond the resolution of CTA technique.    CTA NECK:  1. No evidence of significant stenosis or occlusion in association with   the bilateral common carotid arteriesor bilateral internal carotid   arteries.  2. Poorly visualized proximal left vertebral artery with suggestion of   distal reconstitution at the level of C2.       10/12/2023 19:13  IMPRESSION:  HEAD CT: No evidence of an acute intracranial hemorhage, midline shift or hydrocephalus. Mild bifrontal periventricular white matter ischemic changes. Bilateral maxillary sinusitis  NECK CTA: No hemodynamic significant narowing involving the carotid bifurcations. Hypoplastic right vertebral artery. Dominant left vertebral artery..  BRAIN CTA: Cutoff of the M1 portion of the right middle cerebral artery which may be chronic as there appears to be lenticular striate collaterals and reconstitution of more distal MCA segments. Cutoff of the right posterior cerebral artery at the P1-2 junction with reconstitution of more distal segments. Cut off of the left posterior cerebral artery at the P1-2 junction with mild distal reconstitution. Hypoplastic right vertebral artery with portions of the V4 segment not visualized  CT PERFUSION: Regions of ischemia within the posterior circulation with a volume of 121 mL without evidence of core infarction.    (10.13.23 @ 12:31)   MRI BRAIN: Acute small to moderate medial left temporal occipital lobe   infarctions. Acute punctate left occipital lobe infarction.  MRA BRAIN: Occluded right MCA. Occluded right PCA. Occluded right   vertebral artery proximal V4 segment.  MRI BRAIN NOVA: Values above.    TTE Echo Complete w/ Contrast w/ Doppler (10.13.23 @ 19:44)   Summary:   1. Technically difficult study.   2. Normal left ventricular internal cavity size.   3. Left ventricular ejection fraction, by visual estimation, is 50 to   55%.   4. Normal left atrial size.   5. Normal right atrial size.   6. Mildly enlarged right ventricle.   7. Dilatation of the ascending aorta.   8. Sclerotic aortic valve with normal opening.   9. Mild mitral annular calcification.  10. Mild thickening of the anterior mitral valve leaflet.  11. Trace mitral valve regurgitation.  12. Mild tricuspid regurgitation.  13. Trivial pericardial effusion.  14. Recommend transesophageal echocardiogram.   [facility] Stroke Team  Progress Note     HPI:  Patient is a 59 y/o M with a PMHx of stroke 2022 reported at [facility]  (at the time patient received thrombolysis and had full  resolution of symptoms), family reported dove dove, HTN, DM, HLD nonadherent with medications, who presented following an episode of LOC.  As per sister report, patient was last seen well by his niece  morning of 10/12/23  before patient went to work. At around 6:15 pm that night patient had a sudden fall at work and lost consciousness It is unclear if patient had any symptoms before he fell. NIHSS 10 on admission . Transferred to [facility] for mechanical thrombectomy.       SUBJECTIVE: Complained of Headache last night and this morning otherwise no events.  No new neurologic complaints.  ROS reported negative unless otherwise noted.    acetaminophen     Tablet .. 650 milliGRAM(s) Oral every 6 hours PRN  atorvastatin 80 milliGRAM(s) Oral at bedtime  chlorhexidine 2% Cloths 1 Application(s) Topical <User Schedule>  dextrose 50% Injectable 25 Gram(s) IV Push once  dextrose 50% Injectable 12.5 Gram(s) IV Push once  dextrose 50% Injectable 25 Gram(s) IV Push once  famotidine    Tablet 20 milliGRAM(s) Oral daily  folic acid 1 milliGRAM(s) Oral daily  heparin  Infusion. 1200 Unit(s)/Hr IV Continuous <Continuous>  hydrALAZINE Injectable 10 milliGRAM(s) IV Push every 4 hours PRN  influenza   Vaccine 0.5 milliLiter(s) IntraMuscular once  insulin lispro (ADMELOG) corrective regimen sliding scale   SubCutaneous Before meals and at bedtime  LORazepam   Injectable 2 milliGRAM(s) IV Push every 2 hours PRN  metoprolol tartrate 12.5 milliGRAM(s) Oral every 12 hours  multivitamin 1 Tablet(s) Oral daily  polyethylene glycol 3350 17 Gram(s) Oral every 12 hours  senna 2 Tablet(s) Oral every 12 hours      PHYSICAL EXAM:   Vital Signs Last 24 Hrs  T(C): 36.6 (17 Oct 2023 08:00), Max: 36.9 (16 Oct 2023 15:24)  T(F): 97.8 (17 Oct 2023 08:00), Max: 98.4 (16 Oct 2023 15:24)  HR: 58 (17 Oct 2023 08:00) (50 - 65)  BP: 161/83 (17 Oct 2023 08:00) (121/58 - 163/100)  BP(mean): 108 (17 Oct 2023 08:00) (68 - 120)  RR: 13 (17 Oct 2023 08:00) (11 - 17)  SpO2: 93% (17 Oct 2023 08:00) (93% - 100%)    Parameters below as of 17 Oct 2023 08:00  Patient On (Oxygen Delivery Method): room air      General: No acute distress, complaining of right frontal headache    NEUROLOGICAL EXAM:  Mental status: Awake, alert, oriented x self and time , hospital, states year 2023 and accurately guess the month, repetition intact, IDs objects, follows simple commands, speech fluent,  impaired memory.   Cranial Nerves: right superior quadrant of vision improved from (10/16) exam, left homonomous hemianopia, right facial palsy, no nystagmus, no dysarthria  Motor exam: Normal tone, no drift, 4/5 RUE, 5/5 RLE, 5/5 LUE, 5/5 LLE   Sensation: decreased  sensation on left   Coordination/ Gait: No dysmetria, gait not tested    LABS:                        12.9   4.90  )-----------( 112      ( 16 Oct 2023 01:29 )             36.5    10-16    138  |  100  |  13.0  ----------------------------<  176<H>  4.2   |  25.0  |  0.96    Ca    8.9      16 Oct 2023 01:29  Phos  4.0     10-16  Mg     2.2     10-16    PTT - ( 17 Oct 2023 07:35 )  PTT:100.7 sec      IMAGING: Reviewed by me.     RADIOLOGY & ADDITIONAL STUDIES (independently reviewed unless otherwise noted):    CT Head No Cont (10.17.23 @ 12:19)     Multiple foci of low density right mesial temporal lobe and right   thalamus are more prominent on the current examination and likely   represent the presence of evolving cytotoxic edema.  Similar-appearing low density involving the left mesial temporal lobe   likely representing cytotoxic edema.  Redemonstration of chronic left of midline basis pontis infarct.  No acute intracranial hemorrhage.        CT Head No Cont (10.15.23 @ 13:13)     IMPRESSION: Stable medial left temporal occipital lobe infarction.    (10.14.23 @ 15:25)   CT BRAIN WITHOUT CONTRAST:  1. Decreased attenuation involving the medial aspect of the left temporal   lobe, not present on prior noncontrast CT study. However, restricted   diffusion was appreciated in the same location on the prior MRI of   10/13/2023.  2. Nonacute left ventral pontine ischemic event, present on prior CT and   MRI.    10.14.23 @ 14:58)   CT BRAIN WITHOUT CONTRAST:  1. Decreased attenuation involving the medial aspect of the left temporal   lobe, not present on prior noncontrast CT study. However, restricted   diffusion was appreciated in the same location on the prior MRI of   10/13/2023.  2. Nonacute left ventral pontine ischemic event, present on prior CT and   MRI.    CT PERFUSION:  Technical limitations: Significantly limited by patient motion during   image acquisition and suboptimal arterial waveform.   Core infartction: 0 ml; Penubra/tissue at risk for active ischemia: 75   mL involving right temporal brain parenchyma.  If symptoms persist consider follow up head CT or MRI, MRA  if no   contraindication.    CTA HEAD:  1. Poor visualization of the proximal M1 segment of the right middle   cerebral artery, as on prior.  2. Irregular pattern of flow within the P2 segment of the left posterior   cerebral artery, as on prior.  4. Cut off of the left posterior cerebral artery at the P1/P2 junction,   as on prior, with suggestion of distal reconstitution..  5. Hypoplastic poorly visualized intracranial right vertebral artery.  6. No evidence of aneurysm formation at the level of the [place] of   [place]. Tiny aneurysms can be beyond the resolution of CTA technique.    CTA NECK:  1. No evidence of significant stenosis or occlusion in association with   the bilateral common carotid arteriesor bilateral internal carotid   arteries.  2. Poorly visualized proximal left vertebral artery with suggestion of   distal reconstitution at the level of C2.       10/12/2023 19:13  IMPRESSION:  HEAD CT: No evidence of an acute intracranial hemorhage, midline shift or hydrocephalus. Mild bifrontal periventricular white matter ischemic changes. Bilateral maxillary sinusitis  NECK CTA: No hemodynamic significant narowing involving the carotid bifurcations. Hypoplastic right vertebral artery. Dominant left vertebral artery..  BRAIN CTA: Cutoff of the M1 portion of the right middle cerebral artery which may be chronic as there appears to be lenticular striate collaterals and reconstitution of more distal MCA segments. Cutoff of the right posterior cerebral artery at the P1-2 junction with reconstitution of more distal segments. Cut off of the left posterior cerebral artery at the P1-2 junction with mild distal reconstitution. Hypoplastic right vertebral artery with portions of the V4 segment not visualized  CT PERFUSION: Regions of ischemia within the posterior circulation with a volume of 121 mL without evidence of core infarction.    (10.13.23 @ 12:31)   MRI BRAIN: Acute small to moderate medial left temporal occipital lobe   infarctions. Acute punctate left occipital lobe infarction.  MRA BRAIN: Occluded right MCA. Occluded right PCA. Occluded right   vertebral artery proximal V4 segment.  MRI BRAIN NOVA: Values above.    TTE Echo Complete w/ Contrast w/ Doppler (10.13.23 @ 19:44)   Summary:   1. Technically difficult study.   2. Normal left ventricular internal cavity size.   3. Left ventricular ejection fraction, by visual estimation, is 50 to   55%.   4. Normal left atrial size.   5. Normal right atrial size.   6. Mildly enlarged right ventricle.   7. Dilatation of the ascending aorta.   8. Sclerotic aortic valve with normal opening.   9. Mild mitral annular calcification.  10. Mild thickening of the anterior mitral valve leaflet.  11. Trace mitral valve regurgitation.  12. Mild tricuspid regurgitation.  13. Trivial pericardial effusion.  14. Recommend transesophageal echocardiogram.

## 2023-10-17 NOTE — PROGRESS NOTE ADULT - SUBJECTIVE AND OBJECTIVE BOX
CINDY DUARTE    [id number]    58y      Male    Patient is a 58y old  Male who presents with a chief complaint of LOC (17 Oct 2023 09:05)      INTERVAL HPI/OVERNIGHT EVENTS:    Patient is some what forgetful, he has constipation, denies fever, chills, chest pain     Vital Signs Last 24 Hrs  T(C): 36.6 (17 Oct 2023 11:40), Max: 36.9 (16 Oct 2023 15:24)  T(F): 97.9 (17 Oct 2023 11:40), Max: 98.4 (16 Oct 2023 15:24)  HR: 62 (17 Oct 2023 11:30) (47 - 65)  BP: 166/71 (17 Oct 2023 11:30) (121/58 - 185/84)  BP(mean): 95 (17 Oct 2023 11:30) (68 - 120)  RR: 14 (17 Oct 2023 11:30) (11 - 17)  SpO2: 96% (17 Oct 2023 11:30) (93% - 100%)    Parameters below as of 17 Oct 2023 11:00  Patient On (Oxygen Delivery Method): room air        PHYSICAL EXAM:  GENERAL: Middle age Obese male looking comfortable   HEENT: PERRL, +EOMI  NECK: soft, Supple, No JVD  CHEST/LUNG: Clear to auscultate bilaterally; No wheezing  HEART: S1S2+, Regular rate and rhythm; No murmurs  ABDOMEN: Soft, Nontender, Nondistended; Bowel sounds present  EXTREMITIES:  1+ Peripheral Pulses, No edema  SKIN: No rashes or lesions  NEURO: OX2, following commands, speech fluent, abl to move all extremities, no facial droop   PSYCH: normal mood        LABS:                        12.9   4.90  )-----------( 112      ( 16 Oct 2023 01:29 )             36.5     10-16    138  |  100  |  13.0  ----------------------------<  176<H>  4.2   |  25.0  |  0.96    Ca    8.9      16 Oct 2023 01:29  Phos  4.0     10-16  Mg     2.2     10-16      PTT - ( 17 Oct 2023 07:35 )  PTT:100.7 sec  Urinalysis Basic - ( 16 Oct 2023 01:29 )    Color: x / Appearance: x / SG: x / pH: x  Gluc: 176 mg/dL / Ketone: x  / Bili: x / Urobili: x   Blood: x / Protein: x / Nitrite: x   Leuk Esterase: x / RBC: x / WBC x   Sq Epi: x / Non Sq Epi: x / Bacteria: x          I&O's Summary    16 Oct 2023 07:01  -  17 Oct 2023 07:00  --------------------------------------------------------  IN: 1680 mL / OUT: 2350 mL / NET: -670 mL    17 Oct 2023 07:01  -  17 Oct 2023 11:55  --------------------------------------------------------  IN: 0 mL / OUT: 650 mL / NET: -650 mL        MEDICATIONS  (STANDING):  atorvastatin 80 milliGRAM(s) Oral at bedtime  chlorhexidine 2% Cloths 1 Application(s) Topical <User Schedule>  dextrose 50% Injectable 12.5 Gram(s) IV Push once  dextrose 50% Injectable 25 Gram(s) IV Push once  dextrose 50% Injectable 25 Gram(s) IV Push once  famotidine    Tablet 20 milliGRAM(s) Oral daily  folic acid 1 milliGRAM(s) Oral daily  heparin  Infusion. 1200 Unit(s)/Hr (12 mL/Hr) IV Continuous <Continuous>  influenza   Vaccine 0.5 milliLiter(s) IntraMuscular once  insulin lispro (ADMELOG) corrective regimen sliding scale   SubCutaneous Before meals and at bedtime  metoprolol tartrate 12.5 milliGRAM(s) Oral every 12 hours  multivitamin 1 Tablet(s) Oral daily  polyethylene glycol 3350 17 Gram(s) Oral every 12 hours  senna 2 Tablet(s) Oral every 12 hours    MEDICATIONS  (PRN):  acetaminophen     Tablet .. 650 milliGRAM(s) Oral every 6 hours PRN Mild Pain (1 - 3)  bisacodyl Suppository 10 milliGRAM(s) Rectal daily PRN Constipation  hydrALAZINE Injectable 10 milliGRAM(s) IV Push every 4 hours PRN SBP>160  LORazepam   Injectable 2 milliGRAM(s) IV Push every 2 hours PRN CIWA-Ar score increase by 2 points and a total score of 7 or less  magnesium citrate Oral Solution 296 milliLiter(s) Oral once PRN if he does not go with suppository  magnesium hydroxide Suspension 30 milliLiter(s) Oral daily PRN Constipation         CINDY DUARTE    [id number]    58y      Male    Patient is a 58y old  Male who presents with a chief complaint of LOC (17 Oct 2023 09:05)      INTERVAL HPI/OVERNIGHT EVENTS:    Patient is some what forgetful, he has constipation, denies fever, chills, chest pain, he was having haeadche, neurology ordered repeat CT head     Vital Signs Last 24 Hrs  T(C): 36.6 (17 Oct 2023 11:40), Max: 36.9 (16 Oct 2023 15:24)  T(F): 97.9 (17 Oct 2023 11:40), Max: 98.4 (16 Oct 2023 15:24)  HR: 62 (17 Oct 2023 11:30) (47 - 65)  BP: 166/71 (17 Oct 2023 11:30) (121/58 - 185/84)  BP(mean): 95 (17 Oct 2023 11:30) (68 - 120)  RR: 14 (17 Oct 2023 11:30) (11 - 17)  SpO2: 96% (17 Oct 2023 11:30) (93% - 100%)    Parameters below as of 17 Oct 2023 11:00  Patient On (Oxygen Delivery Method): room air        PHYSICAL EXAM:  GENERAL: Middle age Obese male looking comfortable   HEENT: PERRL, +EOMI  NECK: soft, Supple, No JVD  CHEST/LUNG: Clear to auscultate bilaterally; No wheezing  HEART: S1S2+, Regular rate and rhythm; No murmurs  ABDOMEN: Soft, Nontender, Nondistended; Bowel sounds present  EXTREMITIES:  1+ Peripheral Pulses, No edema  SKIN: No rashes or lesions  NEURO: OX2, following commands, speech fluent, abl to move all extremities, no facial droop   PSYCH: normal mood        LABS:                        12.9   4.90  )-----------( 112      ( 16 Oct 2023 01:29 )             36.5     10-16    138  |  100  |  13.0  ----------------------------<  176<H>  4.2   |  25.0  |  0.96    Ca    8.9      16 Oct 2023 01:29  Phos  4.0     10-16  Mg     2.2     10-16      PTT - ( 17 Oct 2023 07:35 )  PTT:100.7 sec  Urinalysis Basic - ( 16 Oct 2023 01:29 )    Color: x / Appearance: x / SG: x / pH: x  Gluc: 176 mg/dL / Ketone: x  / Bili: x / Urobili: x   Blood: x / Protein: x / Nitrite: x   Leuk Esterase: x / RBC: x / WBC x   Sq Epi: x / Non Sq Epi: x / Bacteria: x          I&O's Summary    16 Oct 2023 07:01  -  17 Oct 2023 07:00  --------------------------------------------------------  IN: 1680 mL / OUT: 2350 mL / NET: -670 mL    17 Oct 2023 07:01  -  17 Oct 2023 11:55  --------------------------------------------------------  IN: 0 mL / OUT: 650 mL / NET: -650 mL        MEDICATIONS  (STANDING):  atorvastatin 80 milliGRAM(s) Oral at bedtime  chlorhexidine 2% Cloths 1 Application(s) Topical <User Schedule>  dextrose 50% Injectable 12.5 Gram(s) IV Push once  dextrose 50% Injectable 25 Gram(s) IV Push once  dextrose 50% Injectable 25 Gram(s) IV Push once  famotidine    Tablet 20 milliGRAM(s) Oral daily  folic acid 1 milliGRAM(s) Oral daily  heparin  Infusion. 1200 Unit(s)/Hr (12 mL/Hr) IV Continuous <Continuous>  influenza   Vaccine 0.5 milliLiter(s) IntraMuscular once  insulin lispro (ADMELOG) corrective regimen sliding scale   SubCutaneous Before meals and at bedtime  metoprolol tartrate 12.5 milliGRAM(s) Oral every 12 hours  multivitamin 1 Tablet(s) Oral daily  polyethylene glycol 3350 17 Gram(s) Oral every 12 hours  senna 2 Tablet(s) Oral every 12 hours    MEDICATIONS  (PRN):  acetaminophen     Tablet .. 650 milliGRAM(s) Oral every 6 hours PRN Mild Pain (1 - 3)  bisacodyl Suppository 10 milliGRAM(s) Rectal daily PRN Constipation  hydrALAZINE Injectable 10 milliGRAM(s) IV Push every 4 hours PRN SBP>160  LORazepam   Injectable 2 milliGRAM(s) IV Push every 2 hours PRN CIWA-Ar score increase by 2 points and a total score of 7 or less  magnesium citrate Oral Solution 296 milliLiter(s) Oral once PRN if he does not go with suppository  magnesium hydroxide Suspension 30 milliLiter(s) Oral daily PRN Constipation

## 2023-10-17 NOTE — PROGRESS NOTE ADULT - ASSESSMENT
INCOMPLETE NOTE     ASSESSMENT: Patient is a 59 y/o M with a PMHx of stroke 2022 reported at [facility]  (at the time patient received thrombolysis and had full  resolution of symptoms), reported dove dove, HTN, DM, HLD nonadherent with medications, who presented following an episode of LOC while at work the evening of 10/12/23, there was reported right sided hemisensory loss and right homonomous hemianopia .  Reported unclear if definitively last known well was prior at 1815 therefor excluded from tenecteplase after CT head did not demonstrate acute infarction or hemorrhage.  Perfusion demonstrated regions of ischemia in the posterior circulation, CT angiogram head/neck showed right PCA P1-2 cutoff, possibly chronic right MCA M1 cutoff which had distal reconstitution and established collaterals, and left PCA occlusion which patient was subsequently transferred for mechanical thrombectomy. Cerebral angiogram and mechanical thrombectomy performed using aspiration with TICI 3 recanalization of left PCA.  The right occipital lobe filled from right SHANI collaterals, and the right MCA as well was thought to be chronic given noted collateralization. MR Brain with small to moderate left temporal occipital lobe infarctions. Occluded right MCA, occluded right PCA and right vertebral artery proximal segment. Etiology; embolic stroke of undetermined source.  While there is underlying dove dove can not entirely exclude cardioembolic / hypercoagulable state, avoid further hypotension or hypotension precipitating events. Now with new right hemispheric neurological symptoms of left hemianopia and left hemisensory loss, possibly hypoperfusion.     NEURO:   -Neurologically  noted to have had right hemispheric symptoms in addition to Left PCA distribution stroke.   - Repeat CT head and CTA CTP  -images and reports were reviewed. Shows Right temporal region hypoperfusion.  - Will need repeat MRI Brain w/o cont.  -some memory impairment-  OT cognitive evaluation   -Continue close monitoring for neurologic deterioration    - Stroke neuro checks q 2 hrs.  -TTE  -Report as above was reviewed.  -Check blood cx x 2  -LE duplex no DVT reported  -Hypercoagulable panel   - SBP goal permissive to keep 140-180 mm Hg for now due to concern for hypoperfusion per d/w GUICHO, avoid further hypertension as to minimize risk of hemorrhagic component. Further titration pending clinical course.   -ANTITHROMBOTIC THERAPY:  After discussion with GUIC[facility] attending Dr. Goodrich decision was made (given fluctuating/new symptoms as noted) to transition from Heparin gtt to Eliquis     to initiate therapeutic Heparin gtt with PTT goal 60-80,       further regimen titration/transition pending clinical course and repeat imaging. Anticipate 3 month course for now, long term to be determined w/ Dr. Goodrich as outpatient.  BP goal as noted above as patient appears to be dependent on collateral flow and high risk for recurrent thrombotic event.  Monitor closely and repeat CT head for any change and notify on call neurology team.       - ASA discontinued in setting of therapeutic anticoagulation.  -titrate statin to LDL goal less than 70  -MRI Brain w/o as noted , repeat pending   - MRA Head NOVA -images and reports were reviewed.   -Consideration in Outpatient NM spect w/w out Diamox  -Dysphagia screen: pass  -Physical therapy/OT/Speech eval/treatment.     -CARDIOVASCULAR:  TTE as noted, cardiac monitoring w/ telemetry for now, further evaluation pending findings of noted workup and clinical course, at this time defer ROCIO as to avoid precipitating hypotension due to concern for hypoperfusion, d/w cardiology team, no obvious evidence of cardiac mass/ vegetation to warrant further evaluation given overall risk / benefit. EP for long term cardiac monitoring - rec ILR upon discharge. Should there be concern for cardiac lesion/ finding which would warrant management change would reassess.                               -HEMATOLOGY: H/H with anemia, Platelets 112, close monitoring for further thrombocytopenia, suggest HIT/MELISSA, patient should have all age and risk appropriate malignancy screenings with PCP or sooner if clinically suspected      DVT ppx: heparin gtt    PULMONARY: CXR pending , protecting airway, saturating well     RENAL: BUN/Cr within range, monitor urine output, maintain adequate hydration       Na Goal:  135-145    ID: afebrile, no leukocytosis, monitor for si/sx of infection, suggest blood cultures to ensure no underlying infectious contributing process.     OTHER:  condition and plan of care d/w patient, questions and concerns addressed. D/W ICU - changes as noted were those found previously which warranted most recent workup/management changes.  CIWA protcol, monitor closely for si/sx of withdrawal.    DISPOSITION: Rehab or home depending on PT eval once stable and workup is complete      CORE MEASURES:        Admission NIHSS:10      Tenecteplase : [] YES [x] NO      LDL/HDL/A1C: 115/29/8.6     Depression Screen- if depression hx and/or present      Statin Therapy: as noted      Dysphagia Screen: [x] PASS [] FAIL     Smoking [] YES [x] NO      Afib [] YES [x] NO     Stroke Education [x] YES [] NO    Obtain screening lower extremity venous ultrasound in patients who meet 1 or more of the following criteria as patient is high risk for DVT/PE on admission:   [] History of DVT/PE  []Hypercoagulable states (Factor V Leiden, Cancer, OCP, etc. )  []Prolonged immobility (hemiplegia/hemiparesis/post operative or any other extended immobilization)  [] Transferred from outside facility (Rehab or Long term care)  [] Age </= to 50 ASSESSMENT: Patient is a 57 y/o M with a PMHx of stroke 2022 reported at [facility]  (at the time patient received thrombolysis and had full  resolution of symptoms), reported dove dove, HTN, DM, HLD nonadherent with medications, who presented following an episode of LOC while at work the evening of 10/12/23, there was reported right sided hemisensory loss and right homonomous hemianopia .  Reported unclear if definitively last known well was prior at 1815 therefor excluded from tenecteplase after CT head did not demonstrate acute infarction or hemorrhage.  Perfusion demonstrated regions of ischemia in the posterior circulation, CT angiogram head/neck showed right PCA P1-2 cutoff, possibly chronic right MCA M1 cutoff which had distal reconstitution and established collaterals, and left PCA occlusion which patient was subsequently transferred for mechanical thrombectomy. Cerebral angiogram and mechanical thrombectomy performed using aspiration with TICI 3 recanalization of left PCA.  The right occipital lobe filled from right SHANI collaterals, and the right MCA as well was thought to be chronic given noted collateralization. MR Brain with small to moderate left temporal occipital lobe infarctions. Occluded right MCA, occluded right PCA and right vertebral artery proximal segment. Etiology; embolic stroke of undetermined source.  While there is underlying dove dove can not entirely exclude cardioembolic / hypercoagulable state, avoid further hypotension or hypotension precipitating events. Now with new right frontal headache, repeat CT scan as noted above.      NEURO:   -Neurologically noted to have had right hemispheric symptoms in addition to Left PCA distribution stroke.   - Repeat CT head (10/17) as noted above, showed Right mesial temporal lobe and thalamus hypodensity likely evolving cytotoxic edema.   - Will need repeat MRI Brain w/o cont.  -some memory impairment - OT cognitive evaluation   -Continue close monitoring for neurologic deterioration    - Stroke neuro checks q 2 hrs.  -TTE  -Report as above was reviewed.  -Check blood cx x 2  -LE duplex no DVT reported  -Hypercoagulable panel   - SBP goal permissive to keep 140-180 mm Hg for now due to concern for hypoperfusion per d/w GUICHO, avoid further hypertension as to minimize risk of hemorrhagic component. Further titration pending clinical course.   -ANTITHROMBOTIC THERAPY:  After discussion with [facility] attending Dr. Goodrich decision was made (given fluctuating/new symptoms as noted) to transition from Heparin gtt to Eliquis 5 mg BID. Further regimen titration/transition pending clinical course and repeat imaging. Anticipate 3 month course for now, long term to be determined w/ Dr. Goodrich as outpatient.  BP goal as noted above as patient appears to be dependent on collateral flow and high risk for recurrent thrombotic event.  Monitor closely and repeat CT head for any change and notify on call neurology team.   -ASA discontinued in setting of therapeutic anticoagulation.  -titrate statin to LDL goal less than 70  -MRI Brain w/o as noted , repeat pending   - MRA Head NOVA -images and reports were reviewed.   -Consideration in Outpatient NM spect w/w out Diamox  -Dysphagia screen: pass  -Physical therapy/OT/Speech eval/treatment.     -CARDIOVASCULAR:  TTE as noted, cardiac monitoring w/ telemetry for now, further evaluation pending findings of noted workup and clinical course, at this time defer ROCIO as to avoid precipitating hypotension due to concern for hypoperfusion, d/w cardiology team, no obvious evidence of cardiac mass/ vegetation to warrant further evaluation given overall risk / benefit.   EP for long term cardiac monitoring - rec ILR upon discharge and metoprolol. Please use with caution, there is an increase risk of recurrent stroke given the patient is dependent on collateral flow. Again, SBP goal 140-180 mmHg for now due to concern for hypoperfusion.                                -HEMATOLOGY: H/H with anemia, Platelets 112, close monitoring for further thrombocytopenia, suggest HIT/MELISSA, patient should have all age and risk appropriate malignancy screenings with PCP or sooner if clinically suspected      DVT ppx: heparin gtt    PULMONARY: CXR pending , protecting airway, saturating well     RENAL: BUN/Cr within range, monitor urine output, maintain adequate hydration       Na Goal:  135-145    ID: afebrile, no leukocytosis, monitor for si/sx of infection, suggest blood cultures to ensure no underlying infectious contributing process.     OTHER:  condition and plan of care d/w patient and ICU team, questions and concerns addressed.  CIWA protcol, monitor closely for si/sx of withdrawal.    DISPOSITION: Rehab or home depending on PT eval once stable and workup is complete      CORE MEASURES:        Admission NIHSS:10      Tenecteplase : [] YES [x] NO      LDL/HDL/A1C: 115/29/8.6     Depression Screen- if depression hx and/or present      Statin Therapy: as noted      Dysphagia Screen: [x] PASS [] FAIL     Smoking [] YES [x] NO      Afib [] YES [x] NO     Stroke Education [x] YES [] NO    Obtain screening lower extremity venous ultrasound in patients who meet 1 or more of the following criteria as patient is high risk for DVT/PE on admission:   [] History of DVT/PE  []Hypercoagulable states (Factor V Leiden, Cancer, OCP, etc. )  []Prolonged immobility (hemiplegia/hemiparesis/post operative or any other extended immobilization)  [] Transferred from outside facility (Rehab or Long term care)  [] Age </= to 50 ASSESSMENT: Patient is a 57 y/o M with a PMHx of stroke 2022 reported at [facility]  (at the time patient received thrombolysis and had full  resolution of symptoms), reported dove dove, HTN, DM, HLD nonadherent with medications, who presented following an episode of LOC while at work the evening of 10/12/23, there was reported right sided hemisensory loss and right homonomous hemianopia .  Reported unclear if definitively last known well was prior at 1815 therefor excluded from tenecteplase after CT head did not demonstrate acute infarction or hemorrhage.  Perfusion demonstrated regions of ischemia in the posterior circulation, CT angiogram head/neck showed right PCA P1-2 cutoff, possibly chronic right MCA M1 cutoff which had distal reconstitution and established collaterals, and left PCA occlusion which patient was subsequently transferred for mechanical thrombectomy. Cerebral angiogram and mechanical thrombectomy performed using aspiration with TICI 3 recanalization of left PCA.  The right occipital lobe filled from right SHANI collaterals, and the right MCA as well was thought to be chronic given noted collateralization. MR Brain with small to moderate left temporal occipital lobe infarctions. Occluded right MCA, occluded right PCA and right vertebral artery proximal segment. Etiology; embolic stroke of undetermined source.  While there is underlying dove dove can not entirely exclude cardioembolic / hypercoagulable state, avoid further hypotension or hypotension precipitating events. Now with new right frontal headache, repeat CT scan as noted above.      NEURO:   -Neurologically noted to have had right hemispheric symptoms in addition to Left PCA distribution stroke.   - Repeat CT head (10/17) as noted above, showed Right mesial temporal lobe and thalamus hypodensity likely evolving cytotoxic edema.   - Will need repeat MRI Brain w/o cont.  -some memory impairment - OT cognitive evaluation   -Continue close monitoring for neurologic deterioration    - Stroke neuro checks q 2 hrs.  -TTE  -Report as above was reviewed.  -Check blood cx x 2  -LE duplex no DVT reported  -Hypercoagulable panel   - SBP goal permissive to keep 140-180 mm Hg for now due to concern for hypoperfusion per d/w GUICHO, avoid further hypertension as to minimize risk of hemorrhagic component. Further titration pending clinical course.   -ANTITHROMBOTIC THERAPY:  After discussion with [facility] attending Dr. Goodrich decision was made (given fluctuating/new symptoms as noted) to transition from Heparin gtt to Eliquis 5 mg BID. Further regimen titration/transition pending clinical course and repeat imaging. Anticipate 3 month course for now, long term to be determined w/ Dr. Goodrihc as outpatient.  BP goal as noted above as patient appears to be dependent on collateral flow and high risk for recurrent thrombotic event.  Monitor closely and repeat CT head for any change and notify on call neurology team.   -ASA discontinued in setting of therapeutic anticoagulation.  -titrate statin to LDL goal less than 70  -MRI Brain w/o as noted , repeat pending   - MRA Head NOVA -images and reports were reviewed.   -Consideration in Outpatient NM spect w/w out Diamox  -Dysphagia screen: pass  -Physical therapy/OT/Speech eval/treatment.     -CARDIOVASCULAR:  TTE as noted, cardiac monitoring w/ telemetry for now, further evaluation pending findings of noted workup and clinical course, at this time defer ROCIO as to avoid precipitating hypotension due to concern for hypoperfusion, d/w cardiology team, no obvious evidence of cardiac mass/ vegetation to warrant further evaluation given overall risk / benefit.   EP for long term cardiac monitoring - rec ILR upon discharge and metoprolol. Please use with caution, ideally, will defer BP pressure altering agents at this time due to sensitivity and concern for hypoperfusion. Again, SBP goal 140-180 mmHg for now; if absolutely needed will titrate gradually with hold parameters with SBP less than 160.                                -HEMATOLOGY: H/H with anemia, Platelets 112, close monitoring for further thrombocytopenia, suggest HIT/MELISSA, patient should have all age and risk appropriate malignancy screenings with PCP or sooner if clinically suspected      DVT ppx: heparin gtt    PULMONARY: CXR pending , protecting airway, saturating well     RENAL: BUN/Cr within range, monitor urine output, maintain adequate hydration       Na Goal:  135-145    ID: afebrile, no leukocytosis, monitor for si/sx of infection, suggest blood cultures to ensure no underlying infectious contributing process.     OTHER:  condition and plan of care d/w patient and ICU team, questions and concerns addressed.  CIWA protcol, monitor closely for si/sx of withdrawal.    DISPOSITION: Rehab or home depending on PT eval once stable and workup is complete      CORE MEASURES:        Admission NIHSS:10      Tenecteplase : [] YES [x] NO      LDL/HDL/A1C: 115/29/8.6     Depression Screen- if depression hx and/or present      Statin Therapy: as noted      Dysphagia Screen: [x] PASS [] FAIL     Smoking [] YES [x] NO      Afib [] YES [x] NO     Stroke Education [x] YES [] NO    Obtain screening lower extremity venous ultrasound in patients who meet 1 or more of the following criteria as patient is high risk for DVT/PE on admission:   [] History of DVT/PE  []Hypercoagulable states (Factor V Leiden, Cancer, OCP, etc. )  []Prolonged immobility (hemiplegia/hemiparesis/post operative or any other extended immobilization)  [] Transferred from outside facility (Rehab or Long term care)  [] Age </= to 50 ASSESSMENT: Patient is a 57 y/o M with a PMHx of stroke 2022 reported at [facility]  (at the time patient received thrombolysis and had full  resolution of symptoms), reported dove dove, HTN, DM, HLD nonadherent with medications, who presented following an episode of LOC while at work the evening of 10/12/23, there was reported right sided hemisensory loss and right homonomous hemianopia .  Reported unclear if definitively last known well was prior at 1815 therefor excluded from tenecteplase after CT head did not demonstrate acute infarction or hemorrhage.  Perfusion demonstrated regions of ischemia in the posterior circulation, CT angiogram head/neck showed right PCA P1-2 cutoff, possibly chronic right MCA M1 cutoff which had distal reconstitution and established collaterals, and left PCA occlusion which patient was subsequently transferred for mechanical thrombectomy. Cerebral angiogram and mechanical thrombectomy performed using aspiration with TICI 3 recanalization of left PCA.  The right occipital lobe filled from right SHANI collaterals, and the right MCA as well was thought to be chronic given noted collateralization. MR Brain with small to moderate left temporal occipital lobe infarctions. Occluded right MCA, occluded right PCA and right vertebral artery proximal segment. Etiology; embolic stroke of undetermined source.  While there is underlying dove dove can not entirely exclude cardioembolic / hypercoagulable state, avoid further hypotension or hypotension precipitating events. Now with new right frontal headache, repeat CT scan as noted above.      NEURO:   -Neurologically noted to have had right hemispheric symptoms in addition to Left PCA distribution stroke.   - Repeat CT head (10/17) as noted above, showed Right mesial temporal lobe and thalamus hypodensity likely evolving cytotoxic edema.   - Will need repeat MRI Brain w/o cont.  -some memory impairment - OT cognitive evaluation , possible outpatient neuropsych follow up for cognitive therapy  -Continue close monitoring for neurologic deterioration    - Stroke neuro checks q 2 hrs.  -TTE  -Report as above was reviewed.  -Check blood cx x 2  -LE duplex no DVT reported  -Hypercoagulable panel   - SBP goal permissive to keep 140-180 mm Hg for now due to concern for hypoperfusion per d/w GUICHO, avoid further hypertension as to minimize risk of hemorrhagic component. Further titration pending clinical course.   -ANTITHROMBOTIC THERAPY:  After discussion with [facility] attending Dr. Goodrich decision was made (given fluctuating/new symptoms as noted) to transition from Heparin gtt to Eliquis 5 mg BID. Further regimen titration/transition pending clinical course and repeat imaging. Anticipate 3 month course for now, long term to be determined w/ Dr. Goodrich as outpatient.  BP goal as noted above as patient appears to be dependent on collateral flow and high risk for recurrent thrombotic event.  Monitor closely and repeat CT head for any change and notify on call neurology team.   -ASA discontinued in setting of therapeutic anticoagulation.  -titrate statin to LDL goal less than 70  -MRI Brain w/o as noted , repeat pending   - MRA Head NOVA -images and reports were reviewed.   -Consideration in Outpatient NM spect w/w out Diamox  -Dysphagia screen: pass  -Physical therapy/OT/Speech eval/treatment.     -CARDIOVASCULAR:  TTE as noted, cardiac monitoring w/ telemetry for now, further evaluation pending findings of noted workup and clinical course, at this time defer ROCIO as to avoid precipitating hypotension due to concern for hypoperfusion, d/w cardiology team, no obvious evidence of cardiac mass/ vegetation to warrant further evaluation given overall risk / benefit.   EP for long term cardiac monitoring - rec ILR upon discharge and metoprolol. Please use with caution, ideally, will defer BP pressure altering agents at this time due to sensitivity and concern for hypoperfusion. Again, SBP goal 140-180 mmHg for now; if absolutely needed suggest to titrate gradually with hold parameters with SBP less than 160.                 -HEMATOLOGY: H/H with anemia, Platelets 112, close monitoring for further thrombocytopenia, suggest HIT/MELISSA, patient should have all age and risk appropriate malignancy screenings with PCP or sooner if clinically suspected      DVT ppx: heparin gtt    PULMONARY: CXR pending , protecting airway, saturating well     RENAL: BUN/Cr within range, monitor urine output, maintain adequate hydration       Na Goal:  135-145    ID: afebrile, no leukocytosis, monitor for si/sx of infection, suggest blood cultures to ensure no underlying infectious contributing process.     OTHER:  condition and plan of care d/w patient and medical team, questions and concerns addressed.  CIWA protcol, monitor closely for si/sx of withdrawal.    DISPOSITION: Rehab or home depending on PT eval once stable and workup is complete      CORE MEASURES:        Admission NIHSS:10      Tenecteplase : [] YES [x] NO      LDL/HDL/A1C: 115/29/8.6     Depression Screen- if depression hx and/or present      Statin Therapy: as noted      Dysphagia Screen: [x] PASS [] FAIL     Smoking [] YES [x] NO      Afib [] YES [x] NO     Stroke Education [x] YES [] NO    Obtain screening lower extremity venous ultrasound in patients who meet 1 or more of the following criteria as patient is high risk for DVT/PE on admission:   [] History of DVT/PE  []Hypercoagulable states (Factor V Leiden, Cancer, OCP, etc. )  []Prolonged immobility (hemiplegia/hemiparesis/post operative or any other extended immobilization)  [] Transferred from outside facility (Rehab or Long term care)  [] Age </= to 50

## 2023-10-17 NOTE — DISCHARGE NOTE PROVIDER - NSDCFUADDINST_GEN_ALL_CORE_FT
Follow up with Dr. Delgado in 3-4 weeks. Our office will contact you in 3-5 days to schedule this appointment. Please call your doctor with any questions or concerns.

## 2023-10-17 NOTE — DISCHARGE NOTE PROVIDER - NSDCMRMEDTOKEN_GEN_ALL_CORE_FT
apixaban 5 mg oral tablet: 1 tab(s) orally 2 times a day  atorvastatin 80 mg oral tablet: 1 tab(s) orally once a day (at bedtime)  famotidine 20 mg oral tablet: 1 tab(s) orally once a day  folic acid 1 mg oral tablet: 1 tab(s) orally once a day  Multiple Vitamins oral tablet: 1 tab(s) orally once a day  thiamine 100 mg oral tablet: 1 tab(s) orally once a day   apixaban 5 mg oral tablet: 1 tab(s) orally 2 times a day  atorvastatin 80 mg oral tablet: 1 tab(s) orally once a day (at bedtime)  CeleXA 20 mg oral tablet: 1 tab(s) orally once a day  famotidine 20 mg oral tablet: 1 tab(s) orally once a day  folic acid 1 mg oral tablet: 1 tab(s) orally once a day  Multiple Vitamins oral tablet: 1 tab(s) orally once a day  thiamine 100 mg oral tablet: 1 tab(s) orally once a day   apixaban 5 mg oral tablet: 1 tab(s) orally 2 times a day  atorvastatin 80 mg oral tablet: 1 tab(s) orally once a day (at bedtime)  CeleXA 20 mg oral tablet: 1 tab(s) orally once a day  famotidine 20 mg oral tablet: 1 tab(s) orally once a day  folic acid 1 mg oral tablet: 1 tab(s) orally once a day  insulin glargine 100 units/mL subcutaneous solution: 6 unit(s) subcutaneous once a day (at bedtime)  insulin lispro 100 units/mL injectable solution: injectable 3 times a day (before meals) 2 Unit(s) if Glucose 151 - 200      4 Unit(s) if Glucose 201 - 250      6 Unit(s) if Glucose 251 - 300      8 Unit(s) if Glucose 301 - 350      10 Unit(s) if Glucose 351 - 400      12 Unit(s) if Glucose Greater Than 400 + Contact MD  SubCutaneous, Before meals  Multiple Vitamins oral tablet: 1 tab(s) orally once a day  thiamine 100 mg oral tablet: 1 tab(s) orally once a day

## 2023-10-17 NOTE — DISCHARGE NOTE PROVIDER - HOSPITAL COURSE
59 y/o M with a Hx of stroke one year ago (at the time patient received TNK and had full resolution of symptoms), HTN, DM, non-compliant with medications, who presented following an episode of LOC, as per family at around 6:15 pm patient had a sudden fall at work and lost consciousness. It is unclear if patient had any symptoms before he fell. NIHSS 10 on admission, admitted under NeuroICU on 10/12. Thrombectomy via L radial artery for TICI 3 revascularization of L P1-P2 occlusion. Noted LMCA chronic occlusion., on 10/13. As per discussion w Neuro, pt with collaterals, and is recommended to follow up with Neuro IR outpatient. Neurologic status improving. Neurology, EP and Cardiology were consulted. MRI deferred per d/w IR as likely not to  at this time. On 10/14, pt with quadrantanopia, now on L superior (originally R). Repeat CTP revealing possible new perfusion deficit, however upon further review appears to have been present on admission. Pt was initiated on a heparin gtt given extensive atherosclerotic disease per Neuro and later transitioned to DOAC. H&H has remained stable. TTE with normal left ventricular internal cavity size, 3. Left ventricular ejection fraction, by visual estimation, is 50 to 55%. Per Neuro, no need for ROCIO at this time. Pt with an episode of bradycardia on this admission, concern for 2:1 block, TTE as above. ILR placed 10/20 and no indication for PPM at this time. Pt has since improved overall and is now medically stable for discharge with appropriate outpatient follow up.     All electrolyte abnormalities were monitored carefully and repleted as necessary during this hospitalization. At the time of discharge patient was hemodynamically stable and amenable to all terms of discharge.

## 2023-10-17 NOTE — DISCHARGE NOTE PROVIDER - CARE PROVIDER_API CALL
Rj Delgado [facility]  Cardiac Electrophysiology  [address], [address]  [place] [address]  Phone: [phone]  Fax: [phone]  Follow Up Time:    Rj Delgado  Cardiac Electrophysiology  [address] [facility], [address] [address]  Phone: [phone]  Fax: [phone]  Follow Up Time:     Wily Barkley  Neurology  [address] [place] [address]  Phone: [phone]  Fax: [phone]  Follow Up Time: 1 week    Denzel Zapata  Interventional Cardiology  [address] [facility], [address] [address]  Phone: [phone]  Fax: [phone]  Follow Up Time: 1 week    PCP,   Phone: (   )    -  Fax: (   )    -  Follow Up Time: 1 week    Arsenio Goodrich  Interventional Neuroradiology  [address] [id number]  Phone: [phone]  Fax: [phone]  Follow Up Time: 1 week

## 2023-10-17 NOTE — DISCHARGE NOTE PROVIDER - CARE PROVIDERS DIRECT ADDRESSES
,DirectAddress_Unknown ,DirectAddress_Unknown,[email or web address],DirectAddress_Unknown,DirectAddress_Unknown,DirectAddress_Unknown

## 2023-10-18 LAB
ALBUMIN SERPL ELPH-MCNC: 4 G/DL — SIGNIFICANT CHANGE UP (ref 3.3–5.2)
ALBUMIN SERPL ELPH-MCNC: 4 G/DL — SIGNIFICANT CHANGE UP (ref 3.3–5.2)
ALP SERPL-CCNC: 71 U/L — SIGNIFICANT CHANGE UP (ref 40–120)
ALP SERPL-CCNC: 71 U/L — SIGNIFICANT CHANGE UP (ref 40–120)
ALT FLD-CCNC: 31 U/L — SIGNIFICANT CHANGE UP
ALT FLD-CCNC: 31 U/L — SIGNIFICANT CHANGE UP
ANION GAP SERPL CALC-SCNC: 12 MMOL/L — SIGNIFICANT CHANGE UP (ref 5–17)
ANION GAP SERPL CALC-SCNC: 12 MMOL/L — SIGNIFICANT CHANGE UP (ref 5–17)
APTT BLD: 57 SEC — HIGH (ref 24.5–35.6)
APTT BLD: 57 SEC — HIGH (ref 24.5–35.6)
APTT BLD: 76.4 SEC — HIGH (ref 24.5–35.6)
APTT BLD: 76.4 SEC — HIGH (ref 24.5–35.6)
APTT BLD: 88.1 SEC — HIGH (ref 24.5–35.6)
APTT BLD: 88.1 SEC — HIGH (ref 24.5–35.6)
AST SERPL-CCNC: 22 U/L — SIGNIFICANT CHANGE UP
AST SERPL-CCNC: 22 U/L — SIGNIFICANT CHANGE UP
BILIRUB SERPL-MCNC: 0.8 MG/DL — SIGNIFICANT CHANGE UP (ref 0.4–2)
BILIRUB SERPL-MCNC: 0.8 MG/DL — SIGNIFICANT CHANGE UP (ref 0.4–2)
BUN SERPL-MCNC: 12.8 MG/DL — SIGNIFICANT CHANGE UP (ref 8–20)
BUN SERPL-MCNC: 12.8 MG/DL — SIGNIFICANT CHANGE UP (ref 8–20)
CALCIUM SERPL-MCNC: 9.5 MG/DL — SIGNIFICANT CHANGE UP (ref 8.4–10.5)
CALCIUM SERPL-MCNC: 9.5 MG/DL — SIGNIFICANT CHANGE UP (ref 8.4–10.5)
CHLORIDE SERPL-SCNC: 100 MMOL/L — SIGNIFICANT CHANGE UP (ref 96–108)
CHLORIDE SERPL-SCNC: 100 MMOL/L — SIGNIFICANT CHANGE UP (ref 96–108)
CO2 SERPL-SCNC: 25 MMOL/L — SIGNIFICANT CHANGE UP (ref 22–29)
CO2 SERPL-SCNC: 25 MMOL/L — SIGNIFICANT CHANGE UP (ref 22–29)
CREAT SERPL-MCNC: 0.96 MG/DL — SIGNIFICANT CHANGE UP (ref 0.5–1.3)
CREAT SERPL-MCNC: 0.96 MG/DL — SIGNIFICANT CHANGE UP (ref 0.5–1.3)
EGFR: 92 ML/MIN/1.73M2 — SIGNIFICANT CHANGE UP
EGFR: 92 ML/MIN/1.73M2 — SIGNIFICANT CHANGE UP
GLUCOSE BLDC GLUCOMTR-MCNC: 125 MG/DL — HIGH (ref 70–99)
GLUCOSE BLDC GLUCOMTR-MCNC: 125 MG/DL — HIGH (ref 70–99)
GLUCOSE BLDC GLUCOMTR-MCNC: 149 MG/DL — HIGH (ref 70–99)
GLUCOSE BLDC GLUCOMTR-MCNC: 149 MG/DL — HIGH (ref 70–99)
GLUCOSE BLDC GLUCOMTR-MCNC: 151 MG/DL — HIGH (ref 70–99)
GLUCOSE BLDC GLUCOMTR-MCNC: 151 MG/DL — HIGH (ref 70–99)
GLUCOSE BLDC GLUCOMTR-MCNC: 230 MG/DL — HIGH (ref 70–99)
GLUCOSE BLDC GLUCOMTR-MCNC: 230 MG/DL — HIGH (ref 70–99)
GLUCOSE SERPL-MCNC: 138 MG/DL — HIGH (ref 70–99)
GLUCOSE SERPL-MCNC: 138 MG/DL — HIGH (ref 70–99)
HCT VFR BLD CALC: 40.8 % — SIGNIFICANT CHANGE UP (ref 39–50)
HCT VFR BLD CALC: 40.8 % — SIGNIFICANT CHANGE UP (ref 39–50)
HGB BLD-MCNC: 14 G/DL — SIGNIFICANT CHANGE UP (ref 13–17)
HGB BLD-MCNC: 14 G/DL — SIGNIFICANT CHANGE UP (ref 13–17)
MCHC RBC-ENTMCNC: 31.7 PG — SIGNIFICANT CHANGE UP (ref 27–34)
MCHC RBC-ENTMCNC: 31.7 PG — SIGNIFICANT CHANGE UP (ref 27–34)
MCHC RBC-ENTMCNC: 34.3 GM/DL — SIGNIFICANT CHANGE UP (ref 32–36)
MCHC RBC-ENTMCNC: 34.3 GM/DL — SIGNIFICANT CHANGE UP (ref 32–36)
MCV RBC AUTO: 92.5 FL — SIGNIFICANT CHANGE UP (ref 80–100)
MCV RBC AUTO: 92.5 FL — SIGNIFICANT CHANGE UP (ref 80–100)
PLATELET # BLD AUTO: 122 K/UL — LOW (ref 150–400)
PLATELET # BLD AUTO: 122 K/UL — LOW (ref 150–400)
POTASSIUM SERPL-MCNC: 4.1 MMOL/L — SIGNIFICANT CHANGE UP (ref 3.5–5.3)
POTASSIUM SERPL-MCNC: 4.1 MMOL/L — SIGNIFICANT CHANGE UP (ref 3.5–5.3)
POTASSIUM SERPL-SCNC: 4.1 MMOL/L — SIGNIFICANT CHANGE UP (ref 3.5–5.3)
POTASSIUM SERPL-SCNC: 4.1 MMOL/L — SIGNIFICANT CHANGE UP (ref 3.5–5.3)
PROT SERPL-MCNC: 6.8 G/DL — SIGNIFICANT CHANGE UP (ref 6.6–8.7)
PROT SERPL-MCNC: 6.8 G/DL — SIGNIFICANT CHANGE UP (ref 6.6–8.7)
RBC # BLD: 4.41 M/UL — SIGNIFICANT CHANGE UP (ref 4.2–5.8)
RBC # BLD: 4.41 M/UL — SIGNIFICANT CHANGE UP (ref 4.2–5.8)
RBC # FLD: 13.3 % — SIGNIFICANT CHANGE UP (ref 10.3–14.5)
RBC # FLD: 13.3 % — SIGNIFICANT CHANGE UP (ref 10.3–14.5)
SODIUM SERPL-SCNC: 137 MMOL/L — SIGNIFICANT CHANGE UP (ref 135–145)
SODIUM SERPL-SCNC: 137 MMOL/L — SIGNIFICANT CHANGE UP (ref 135–145)
WBC # BLD: 5.49 K/UL — SIGNIFICANT CHANGE UP (ref 3.8–10.5)
WBC # BLD: 5.49 K/UL — SIGNIFICANT CHANGE UP (ref 3.8–10.5)
WBC # FLD AUTO: 5.49 K/UL — SIGNIFICANT CHANGE UP (ref 3.8–10.5)
WBC # FLD AUTO: 5.49 K/UL — SIGNIFICANT CHANGE UP (ref 3.8–10.5)

## 2023-10-18 PROCEDURE — 99232 SBSQ HOSP IP/OBS MODERATE 35: CPT

## 2023-10-18 PROCEDURE — 99233 SBSQ HOSP IP/OBS HIGH 50: CPT

## 2023-10-18 RX ADMIN — HEPARIN SODIUM 1400 UNIT(S)/HR: 5000 INJECTION INTRAVENOUS; SUBCUTANEOUS at 14:53

## 2023-10-18 RX ADMIN — HEPARIN SODIUM 1200 UNIT(S)/HR: 5000 INJECTION INTRAVENOUS; SUBCUTANEOUS at 01:33

## 2023-10-18 RX ADMIN — HEPARIN SODIUM 1400 UNIT(S)/HR: 5000 INJECTION INTRAVENOUS; SUBCUTANEOUS at 06:57

## 2023-10-18 RX ADMIN — Medication 1 MILLIGRAM(S): at 17:36

## 2023-10-18 RX ADMIN — Medication 12.5 MILLIGRAM(S): at 05:12

## 2023-10-18 RX ADMIN — HEPARIN SODIUM 1400 UNIT(S)/HR: 5000 INJECTION INTRAVENOUS; SUBCUTANEOUS at 11:42

## 2023-10-18 RX ADMIN — HEPARIN SODIUM 1400 UNIT(S)/HR: 5000 INJECTION INTRAVENOUS; SUBCUTANEOUS at 21:48

## 2023-10-18 RX ADMIN — CHLORHEXIDINE GLUCONATE 1 APPLICATION(S): 213 SOLUTION TOPICAL at 05:15

## 2023-10-18 RX ADMIN — Medication 1 TABLET(S): at 17:36

## 2023-10-18 RX ADMIN — SENNA PLUS 2 TABLET(S): 8.6 TABLET ORAL at 17:36

## 2023-10-18 RX ADMIN — Medication 4: at 11:42

## 2023-10-18 RX ADMIN — Medication 2: at 21:43

## 2023-10-18 RX ADMIN — HEPARIN SODIUM 1400 UNIT(S)/HR: 5000 INJECTION INTRAVENOUS; SUBCUTANEOUS at 19:03

## 2023-10-18 RX ADMIN — POLYETHYLENE GLYCOL 3350 17 GRAM(S): 17 POWDER, FOR SOLUTION ORAL at 17:36

## 2023-10-18 RX ADMIN — FAMOTIDINE 20 MILLIGRAM(S): 10 INJECTION INTRAVENOUS at 17:36

## 2023-10-18 RX ADMIN — ATORVASTATIN CALCIUM 80 MILLIGRAM(S): 80 TABLET, FILM COATED ORAL at 21:43

## 2023-10-18 NOTE — CHART NOTE - NSCHARTNOTEFT_GEN_A_CORE
Telemetry reviewed- sinus rhythm w/ blocked APCs in bigeminy, some short runs of APCs (some aberrantly conducting). Continue current plan with ILR prior to discharge. OK to hold BB for now to allow for permissive hypertension.

## 2023-10-18 NOTE — PROGRESS NOTE ADULT - ASSESSMENT
57 y/o M with a Hx of stroke one year ago (at the time patient received TNK and had full  resolution of symptoms), HTN, DM, non-compliant with medications, who presented following an episode of LOC, as per family at around 6:15 pm patient had a sudden fall at work and lost consciousness. It is unclear if patient had any symptoms before he fell. NIHSS 10 on admission, admitted under NeuroICU on 10/12: Thrombectomy via L radial artery for TICI 3 revascularization of L P1-P2 occlusion. Noted LMCA chronic occlusion., on 0/13: Neurologic status improving. Got MR. Started on ASA and SQL, on 10/14: quadrantanopia now on L superior (originally R); Repeat stroke imaging completed and initial concern for CTP showing poss new perfusion deficit however on further review noted to be present on admission. Started on heparin drip per neurology recommendations, on 10/15: Therapeutic on heparin drip, stability HCT obtained. Cardiology consult for further stroke work-up and downgraded under medicine.       Plan:     #Acute CVA  s/p thrombectomy   c/w Lipitor  TTE shows with  Normal left ventricular internal cavity size,  3. Left ventricular ejection fraction, by visual estimation, is 50 to   55%.  DVT studies negative  Started on heparin drip given extensive atherosclerotic disease, AC per neuro  - Treat ADRIANO with CPAP  -as per cardiology Ischemic workup before discharge   Per Neuro no ROCIO  BP goal 120-180s per neurosx  will continue with Neuro checks Q2  repeat CT head showed worsening of prior stroke  Neurology is aware   waiting for MRI repeat   wound avoid permissive htn, will d/c metoprolol    #Occluded R MCA/PCA:   per discussion with neurosx, pt with collaterals  c/w q4hr neuro checks  outpt neurosx followup    #ETOH abuse  not in active withdrawal, and pt denies h/o DT  c/w symptom trigger CIWA  c/w MV, thiamine and folic acid  SW consulted  PT/OT eval - acute rehab    #Episode of bradycardia with concern for 2:1 block on tele  TTE EF 50-55%, no other structural abnormal   cards naomie recs  as per electrophysiology   - No indication for pacemaker  - HR went down significantly, he was Metoprolol 12.5mg BID has been discontinued   - Consider ILR prior to discharge    #HTN  No meds at home  Per neurosx, BP goal 140-180 mmHg for now  cont to monitor    #DM  non compliant with meds at home  A1C 8.6  ISS, accu checks, CC diet  DM educator consulted    #Elevated TSH  T4 level normal  outpt f/u    #HLD  c/w Lipitor 80mg QD    #Anemia  Hg stable at 10.6, trend CBC    DVT ppx: Lovenox  Dispo: Tele    PT/OT eval - acute rehab    Constipation: On Miralax and Senna and dulcolax, had bowel movement

## 2023-10-18 NOTE — PROGRESS NOTE ADULT - ASSESSMENT
INCOMPLETE_ VISIT PENDING     ASSESSMENT: Patient is a 57 y/o M with a PMHx of stroke 2022 reported at [facility]  (at the time patient received thrombolysis and had full  resolution of symptoms), reported dove dove, HTN, DM, HLD nonadherent with medications, who presented following an episode of LOC while at work the evening of 10/12/23, there was reported right sided hemisensory loss and right homonomous hemianopia .  Reported unclear if definitively last known well was prior at 1815 therefor excluded from tenecteplase after CT head did not demonstrate acute infarction or hemorrhage.  Perfusion demonstrated regions of ischemia in the posterior circulation, CT angiogram head/neck showed right PCA P1-2 cutoff, possibly chronic right MCA M1 cutoff which had distal reconstitution and established collaterals, and left PCA occlusion which patient was subsequently transferred for mechanical thrombectomy. Cerebral angiogram and mechanical thrombectomy performed using aspiration with TICI 3 recanalization of left PCA.  The right occipital lobe filled from right SHANI collaterals, and the right MCA as well was thought to be chronic given noted collateralization. MR Brain with small to moderate left temporal occipital lobe infarctions. Occluded right MCA, occluded right PCA and right vertebral artery proximal segment. Etiology; embolic stroke of undetermined source.  While there is underlying dove dove can not entirely exclude cardioembolic / hypercoagulable state, avoid further hypotension or hypotension precipitating events. Now with new right frontal headache, repeat CT scan as noted above.      NEURO:   -Neurologically    - Repeat CT head (10/17) as noted above, showed Right mesial temporal lobe and thalamus hypodensity likely evolving cytotoxic edema.   - MRI------  -some memory impairment - OT cognitive evaluation , possible outpatient neuropsych follow up for cognitive therapy  -Continue close monitoring for neurologic deterioration    - Stroke neuro checks q 2 hrs.  -TTE  -Report as above was reviewed.  -Check blood cx x 2  -LE duplex no DVT reported  -Hypercoagulable panel   - SBP goal permissive to keep 140-180 mm Hg for now due to concern for hypoperfusion per d/w GUICHO, avoid further hypertension as to minimize risk of hemorrhagic component. Further titration pending clinical course.   -ANTITHROMBOTIC THERAPY:  After discussion with GUIC[facility] attending Dr. Goodrich decision was made (given fluctuating/new symptoms as noted) to transition from Heparin gtt to Eliquis 5 mg BID. Further regimen titration/transition pending clinical course and repeat imaging. Anticipate 3 month course for now, long term to be determined w/ Dr. Goodrich as outpatient.  BP goal as noted above as patient appears to be dependent on collateral flow and high risk for recurrent thrombotic event.  Monitor closely and repeat CT head for any change and notify on call neurology team.   -ASA discontinued in setting of therapeutic anticoagulation.  -titrate statin to LDL goal less than 70  -MRI Brain w/o as noted , repeat pending   - MRA Head NOVA -images and reports were reviewed.   -Consideration in Outpatient NM spect w/w out Diamox w/ close outpatient follow up for consideration in bypass pending repeat imaging and clinical course   -Dysphagia screen: pass  -Physical therapy/OT/Speech eval/treatment.     -CARDIOVASCULAR:  TTE as noted, cardiac monitoring w/ telemetry for now, further evaluation pending findings of noted workup and clinical course, at this time defer ROCIO as to avoid precipitating hypotension due to concern for hypoperfusion, d/w cardiology team, no obvious evidence of cardiac mass/ vegetation to warrant further evaluation given overall risk / benefit.  ILR upon discharge and . Please use with caution, ideally, will defer BP pressure altering agents at this time due to sensitivity and concern for hypoperfusion. Again, SBP goal 140-180 mmHg for now; if absolutely needed suggest to titrate gradually with hold parameters with SBP less than 160mmHg.                 -HEMATOLOGY: H/H with anemia, Platelets 122, close monitoring for further thrombocytopenia, suggest HIT/MELISSA if persists/worsens, patient should have all age and risk appropriate malignancy screenings with PCP or sooner if clinically suspected      DVT ppx: heparin gtt    PULMONARY: CXR pending , protecting airway, saturating well     RENAL: BUN/Cr within range, monitor urine output, maintain adequate hydration       Na Goal:  135-145    ID: afebrile, no leukocytosis, monitor for si/sx of infection, suggest blood cultures to ensure no underlying infectious contributing process.     OTHER:  condition and plan of care d/w patient and medical team, questions and concerns addressed.  CIWA protcol, monitor closely for si/sx of withdrawal.    DISPOSITION: Rehab or home depending on PT eval once stable and workup is complete      CORE MEASURES:        Admission NIHSS:10      Tenecteplase : [] YES [x] NO      LDL/HDL/A1C: 115/29/8.6     Depression Screen- if depression hx and/or present      Statin Therapy: as noted      Dysphagia Screen: [x] PASS [] FAIL     Smoking [] YES [x] NO      Afib [] YES [x] NO     Stroke Education [x] YES [] NO    Obtain screening lower extremity venous ultrasound in patients who meet 1 or more of the following criteria as patient is high risk for DVT/PE on admission:   [] History of DVT/PE  []Hypercoagulable states (Factor V Leiden, Cancer, OCP, etc. )  []Prolonged immobility (hemiplegia/hemiparesis/post operative or any other extended immobilization)  [] Transferred from outside facility (Rehab or Long term care)  [] Age </= to 50   ASSESSMENT: Patient is a 57 y/o M with a PMHx of stroke 2022 reported at [facility]  (at the time patient received thrombolysis and had full  resolution of symptoms), reported dove dove, HTN, DM, HLD nonadherent with medications, who presented following an episode of LOC while at work the evening of 10/12/23, there was reported right sided hemisensory loss and right homonomous hemianopia .  Reported unclear if definitively last known well was prior at 1815 therefor excluded from tenecteplase after CT head did not demonstrate acute infarction or hemorrhage.  Perfusion demonstrated regions of ischemia in the posterior circulation, CT angiogram head/neck showed right PCA P1-2 cutoff, possibly chronic right MCA M1 cutoff which had distal reconstitution and established collaterals, and left PCA occlusion which patient was subsequently transferred for mechanical thrombectomy. Cerebral angiogram and mechanical thrombectomy performed using aspiration with TICI 3 recanalization of left PCA.  The right occipital lobe filled from right SHANI collaterals, and the right MCA as well was thought to be chronic given noted collateralization. MR Brain with small to moderate left temporal occipital lobe infarctions. Occluded right MCA, occluded right PCA and right vertebral artery proximal segment. Etiology; embolic stroke of undetermined source.  While there is underlying dove dove can not entirely exclude cardioembolic / hypercoagulable state, avoid further hypotension or hypotension precipitating events. Now with new hypodensity in right hemisphere (right temporal lobe and thalamic region) possibly hypoperfusion with concern for underlying ischemia.     NEURO:   -Neurologically overall with improvement,  new right hemispheric symptoms without acute change.   - Repeat CT head (10/17) as noted above, showed Right mesial temporal lobe and thalamus hypodensity likely evolving cytotoxic edema.   - MRI deferred per d/w IR as likely not to change acute management at this time.  -some memory impairment - OT cognitive evaluation , possible outpatient neuropsych follow up for cognitive therapy  -Continue close monitoring for neurologic deterioration    - Stroke neuro checks q 2 hrs.  -TTE  -Report as above was reviewed.  -Check blood cx x 2  -LE duplex no DVT reported  -Hypercoagulable panel   - SBP goal permissive to keep 140-180 mm Hg for now due to concern for hypoperfusion per d/w GUICHO, avoid further hypertension as to minimize risk of hemorrhagic component. Further titration pending clinical course.   -ANTITHROMBOTIC THERAPY:  After discussion with [facility] attending Dr. Goodrich decision was made (given fluctuating/new symptoms as noted) to transition from Heparin gtt to Eliquis 5 mg BID. Anticipate 3 month course for now, long term to be determined w/ Dr. Goodrich as outpatient.  BP goal as noted above as patient appears to be dependent on collateral flow and high risk for recurrent thrombotic event.  Monitor closely and repeat CT head for any change and notify on call neurology team.   -ASA discontinued in setting of therapeutic anticoagulation.  -titrate statin to LDL goal less than 70  -MRI Brain w/o as noted    - MRA Head NOVA -images and reports were reviewed.   -Consideration in Outpatient NM spect w/w out Diamox w/ close outpatient follow up for consideration in bypass pending repeat imaging and clinical course   -Dysphagia screen: pass  -Physical therapy/OT/Speech eval/treatment.     -CARDIOVASCULAR:  TTE as noted, cardiac monitoring w/ telemetry for now, further evaluation pending findings of noted workup and clinical course, at this time defer ROCIO as to avoid precipitating hypotension due to concern for hypoperfusion, d/w cardiology team, no obvious evidence of cardiac mass/ vegetation to warrant further evaluation given overall risk / benefit.  ILR upon discharge and . Please use with caution, ideally, will defer BP pressure altering agents at this time due to sensitivity and concern for hypoperfusion. Again, SBP goal 140-180 mmHg for now; if absolutely needed suggest to titrate gradually with hold parameters with SBP less than 160mmHg.                 -HEMATOLOGY: H/H with anemia, Platelets 122, close monitoring for further thrombocytopenia, suggest HIT/MELISSA if persists/worsens, patient should have all age and risk appropriate malignancy screenings with PCP or sooner if clinically suspected      DVT ppx: heparin gtt    PULMONARY: CXR pending , protecting airway, saturating well     RENAL: BUN/Cr within range, monitor urine output, maintain adequate hydration       Na Goal:  135-145    ID: afebrile, no leukocytosis, monitor for si/sx of infection, suggest blood cultures to ensure no underlying infectious contributing process.     OTHER:  condition and plan of care d/w patient and medical team, questions and concerns addressed.  CIWA protcol, monitor closely for si/sx of withdrawal.    DISPOSITION: Rehab or home depending on PT eval once stable and workup is complete      CORE MEASURES:        Admission NIHSS:10      Tenecteplase : [] YES [x] NO      LDL/HDL/A1C: 115/29/8.6     Depression Screen- if depression hx and/or present      Statin Therapy: as noted      Dysphagia Screen: [x] PASS [] FAIL     Smoking [] YES [x] NO      Afib [] YES [x] NO     Stroke Education [x] YES [] NO    Obtain screening lower extremity venous ultrasound in patients who meet 1 or more of the following criteria as patient is high risk for DVT/PE on admission:   [] History of DVT/PE  []Hypercoagulable states (Factor V Leiden, Cancer, OCP, etc. )  []Prolonged immobility (hemiplegia/hemiparesis/post operative or any other extended immobilization)  [] Transferred from outside facility (Rehab or Long term care)  [] Age </= to 50   ASSESSMENT: Patient is a 57 y/o M with a PMHx of stroke 2022 reported at [facility]  (at the time patient received thrombolysis and had full  resolution of symptoms), reported dove dove, HTN, DM, HLD nonadherent with medications, who presented following an episode of LOC while at work the evening of 10/12/23, there was reported right sided hemisensory loss and right homonomous hemianopia .  Reported unclear if definitively last known well was prior at 1815 therefor excluded from tenecteplase after CT head did not demonstrate acute infarction or hemorrhage.  Perfusion demonstrated regions of ischemia in the posterior circulation, CT angiogram head/neck showed right PCA P1-2 cutoff, possibly chronic right MCA M1 cutoff which had distal reconstitution and established collaterals, and left PCA occlusion which patient was subsequently transferred for mechanical thrombectomy. Cerebral angiogram and mechanical thrombectomy performed using aspiration with TICI 3 recanalization of left PCA.  The right occipital lobe filled from right SHANI collaterals, and the right MCA as well was thought to be chronic given noted collateralization. MR Brain with small to moderate left temporal occipital lobe infarctions. Occluded right MCA, occluded right PCA and right vertebral artery proximal segment. Etiology; embolic stroke of undetermined source.  While there is underlying dove dove can not entirely exclude cardioembolic / hypercoagulable state, avoid further hypotension or hypotension precipitating events. Now with new hypodensity in right hemisphere (right temporal lobe and thalamic region) possibly hypoperfusion with concern for underlying ischemia.     NEURO:   -Neurologically overall with improvement,  new right hemispheric symptoms without acute change.   - Repeat CT head (10/17) as noted above, showed Right mesial temporal lobe and thalamus hypodensity likely evolving cytotoxic edema.   - MRI deferred per d/w IR as likely not to change acute management at this time.  -some memory impairment - OT cognitive evaluation , possible outpatient neuropsych follow up for cognitive therapy  -Continue close monitoring for neurologic deterioration    - Stroke neuro checks q 2 hrs.  -TTE  -Report as above was reviewed.  -Check blood cx x 2  -LE duplex no DVT reported  -Hypercoagulable panel   - SBP goal permissive to keep 140-180 mm Hg for now due to concern for hypoperfusion per d/w GUICHO, avoid further hypertension as to minimize risk of hemorrhagic component. Further titration pending clinical course.   -ANTITHROMBOTIC THERAPY:  After discussion with GU[facility] attending Dr. Goodrich decision was made (given fluctuating/new symptoms as noted) to transition from Heparin gtt to Eliquis 5 mg BID. Anticipate 3 month course for now, long term to be determined w/ Dr. Goodrich as outpatient.  BP goal as noted above as patient appears to be dependent on collateral flow and high risk for recurrent thrombotic event.  Monitor closely and repeat CT head for any change and notify on call neurology team.   -ASA discontinued in setting of therapeutic anticoagulation.  -titrate statin to LDL goal less than 70  -MRI Brain w/o as noted    - MRA Head NOVA -images and reports were reviewed.   -Consideration in Outpatient NM spect w/w out Diamox w/ close outpatient follow up for consideration in bypass pending repeat imaging and clinical course   -Dysphagia screen: pass  -Physical therapy/OT/Speech eval/treatment.     -CARDIOVASCULAR:  TTE as noted, cardiac monitoring w/ telemetry for now, further evaluation pending findings of noted workup and clinical course, at this time defer ROCIO as to avoid precipitating hypotension due to concern for hypoperfusion, d/w cardiology team, no obvious evidence of cardiac mass/ vegetation to warrant further evaluation given overall risk / benefit.  ILR upon discharge and . Please use with caution, ideally, will defer BP pressure altering agents at this time due to sensitivity and concern for hypoperfusion. Again, SBP goal 140-180 mmHg for now; if absolutely needed suggest to titrate gradually with hold parameters with SBP less than 160mmHg.                 -HEMATOLOGY: H/H with anemia, Platelets 122, close monitoring for further thrombocytopenia, suggest HIT/MELISSA if persists/worsens, patient should have all age and risk appropriate malignancy screenings with PCP or sooner if clinically suspected      DVT ppx: heparin gtt    PULMONARY: CXR pending , protecting airway, saturating well     RENAL: BUN/Cr within range, monitor urine output, maintain adequate hydration       Na Goal:  135-145    ID: afebrile, no leukocytosis, monitor for si/sx of infection, suggest blood cultures to ensure no underlying infectious contributing process.     OTHER:  condition and plan of care d/w patient, his sister (by phone) and medical team, questions and concerns addressed.  CIWA protcol, monitor closely for si/sx of withdrawal.    DISPOSITION: Rehab or home depending on PT eval once stable and workup is complete      CORE MEASURES:        Admission NIHSS:10      Tenecteplase : [] YES [x] NO      LDL/HDL/A1C: 115/29/8.6     Depression Screen- if depression hx and/or present      Statin Therapy: as noted      Dysphagia Screen: [x] PASS [] FAIL     Smoking [] YES [x] NO      Afib [] YES [x] NO     Stroke Education [x] YES [] NO    Obtain screening lower extremity venous ultrasound in patients who meet 1 or more of the following criteria as patient is high risk for DVT/PE on admission:   [] History of DVT/PE  []Hypercoagulable states (Factor V Leiden, Cancer, OCP, etc. )  []Prolonged immobility (hemiplegia/hemiparesis/post operative or any other extended immobilization)  [] Transferred from outside facility (Rehab or Long term care)  [] Age </= to 50

## 2023-10-18 NOTE — PROGRESS NOTE ADULT - SUBJECTIVE AND OBJECTIVE BOX
Preliminary note, offical recommendations pending attending review/signature   [facility] Stroke Team  Progress Note     HPI:  Patient is a 57 y/o M with a PMHx of stroke 2022 reported at [facility]  (at the time patient received thrombolysis and had full  resolution of symptoms), family reported dove dove, HTN, DM, HLD nonadherent with medications, who presented following an episode of LOC.  As per sister report, patient was last seen well by his niece  morning of 10/12/23  before patient went to work. At around 6:15 pm that night patient had a sudden fall at work and lost consciousness It is unclear if patient had any symptoms before he fell. NIHSS 10 on admission . Transferred to [facility] for mechanical thrombectomy.     SUBJECTIVE: No events overnight.  No new neurologic complaints.  ROS reported negative unless otherwise noted.    acetaminophen     Tablet .. 650 milliGRAM(s) Oral every 6 hours PRN  atorvastatin 80 milliGRAM(s) Oral at bedtime  bisacodyl Suppository 10 milliGRAM(s) Rectal daily PRN  chlorhexidine 2% Cloths 1 Application(s) Topical <User Schedule>  dextrose 50% Injectable 25 Gram(s) IV Push once  dextrose 50% Injectable 12.5 Gram(s) IV Push once  dextrose 50% Injectable 25 Gram(s) IV Push once  famotidine    Tablet 20 milliGRAM(s) Oral daily  folic acid 1 milliGRAM(s) Oral daily  heparin  Infusion. 1200 Unit(s)/Hr IV Continuous <Continuous>  hydrALAZINE Injectable 10 milliGRAM(s) IV Push every 4 hours PRN  influenza   Vaccine 0.5 milliLiter(s) IntraMuscular once  insulin lispro (ADMELOG) corrective regimen sliding scale   SubCutaneous Before meals and at bedtime  LORazepam   Injectable 2 milliGRAM(s) IV Push every 2 hours PRN  magnesium citrate Oral Solution 296 milliLiter(s) Oral once PRN  magnesium hydroxide Suspension 30 milliLiter(s) Oral daily PRN  metoprolol tartrate 12.5 milliGRAM(s) Oral every 12 hours  multivitamin 1 Tablet(s) Oral daily  polyethylene glycol 3350 17 Gram(s) Oral every 12 hours  senna 2 Tablet(s) Oral every 12 hours      PHYSICAL EXAM:   Vital Signs Last 24 Hrs  T(C): 37.3 (18 Oct 2023 08:07), Max: 37.3 (18 Oct 2023 08:07)  T(F): 99.1 (18 Oct 2023 08:07), Max: 99.1 (18 Oct 2023 08:07)  HR: 66 (18 Oct 2023 05:18) (54 - 82)  BP: 140/92 (18 Oct 2023 05:18) (126/77 - 185/84)  BP(mean): 102 (18 Oct 2023 05:18) (82 - 118)  RR: 20 (18 Oct 2023 04:00) (11 - 20)  SpO2: 98% (18 Oct 2023 04:00) (92% - 98%)    Parameters below as of 18 Oct 2023 04:00  Patient On (Oxygen Delivery Method): nasal cannula  O2 Flow (L/min): 1      EXAM PENDING :    General: No acute distress     NEUROLOGICAL EXAM:  Mental status: Awake, alert, oriented x self and time , hospital, states year 2023 and accurately guess the month, repetition intact, IDs objects, follows simple commands, speech fluent,  impaired memory.   Cranial Nerves: right superior quadrant of vision improved from (10/16) exam, left homonomous hemianopia, right facial palsy, no nystagmus, no dysarthria  Motor exam: Normal tone, no drift, 4/5 RUE, 5/5 RLE, 5/5 LUE, 5/5 LLE   Sensation: decreased  sensation on left   Coordination/ Gait: No dysmetria, gait not tested    LABS:                        14.0   5.49  )-----------( 122      ( 18 Oct 2023 05:37 )             40.8    10-18    137  |  100  |  12.8  ----------------------------<  138<H>  4.1   |  25.0  |  0.96    Ca    9.5      18 Oct 2023 05:37    TPro  6.8  /  Alb  4.0  /  TBili  0.8  /  DBili  x   /  AST  22  /  ALT  31  /  AlkPhos  71  10-18  PTT - ( 18 Oct 2023 05:37 )  PTT:57.0 sec      IMAGING: Reviewed by me.       RADIOLOGY & ADDITIONAL STUDIES (independently reviewed unless otherwise noted):    CT Head No Cont (10.17.23 @ 12:19)     Multiple foci of low density right mesial temporal lobe and right   thalamus are more prominent on the current examination and likely   represent the presence of evolving cytotoxic edema.  Similar-appearing low density involving the left mesial temporal lobe   likely representing cytotoxic edema.  Redemonstration of chronic left of midline basis pontis infarct.  No acute intracranial hemorrhage.        CT Head No Cont (10.15.23 @ 13:13)     IMPRESSION: Stable medial left temporal occipital lobe infarction.    (10.14.23 @ 15:25)   CT BRAIN WITHOUT CONTRAST:  1. Decreased attenuation involving the medial aspect of the left temporal   lobe, not present on prior noncontrast CT study. However, restricted   diffusion was appreciated in the same location on the prior MRI of   10/13/2023.  2. Nonacute left ventral pontine ischemic event, present on prior CT and   MRI.    10.14.23 @ 14:58)   CT BRAIN WITHOUT CONTRAST:  1. Decreased attenuation involving the medial aspect of the left temporal   lobe, not present on prior noncontrast CT study. However, restricted   diffusion was appreciated in the same location on the prior MRI of   10/13/2023.  2. Nonacute left ventral pontine ischemic event, present on prior CT and   MRI.    CT PERFUSION:  Technical limitations: Significantly limited by patient motion during   image acquisition and suboptimal arterial waveform.   Core infartction: 0 ml; [place]/tissue at risk for active ischemia: 75   mL involving right temporal brain parenchyma.  If symptoms persist consider follow up head CT or MRI, MRA  if no   contraindication.    CTA HEAD:  1. Poor visualization of the proximal M1 segment of the right middle   cerebral artery, as on prior.  2. Irregular pattern of flow within the P2 segment of the left posterior   cerebral artery, as on prior.  4. Cut off of the left posterior cerebral artery at the P1/P2 junction,   as on prior, with suggestion of distal reconstitution..  5. Hypoplastic poorly visualized intracranial right vertebral artery.  6. No evidence of aneurysm formation at the level of the [place] of   [place]. Tiny aneurysms can be beyond the resolution of CTA technique.    CTA NECK:  1. No evidence of significant stenosis or occlusion in association with   the bilateral common carotid arteriesor bilateral internal carotid   arteries.  2. Poorly visualized proximal left vertebral artery with suggestion of   distal reconstitution at the level of C2.       10/12/2023 19:13  IMPRESSION:  HEAD CT: No evidence of an acute intracranial hemorhage, midline shift or hydrocephalus. Mild bifrontal periventricular white matter ischemic changes. Bilateral maxillary sinusitis  NECK CTA: No hemodynamic significant narowing involving the carotid bifurcations. Hypoplastic right vertebral artery. Dominant left vertebral artery..  BRAIN CTA: Cutoff of the M1 portion of the right middle cerebral artery which may be chronic as there appears to be lenticular striate collaterals and reconstitution of more distal MCA segments. Cutoff of the right posterior cerebral artery at the P1-2 junction with reconstitution of more distal segments. Cut off of the left posterior cerebral artery at the P1-2 junction with mild distal reconstitution. Hypoplastic right vertebral artery with portions of the V4 segment not visualized  CT PERFUSION: Regions of ischemia within the posterior circulation with a volume of 121 mL without evidence of core infarction.    (10.13.23 @ 12:31)   MRI BRAIN: Acute small to moderate medial left temporal occipital lobe   infarctions. Acute punctate left occipital lobe infarction.  MRA BRAIN: Occluded right MCA. Occluded right PCA. Occluded right   vertebral artery proximal V4 segment.  MRI BRAIN NOVA: Values above.    TTE Echo Complete w/ Contrast w/ Doppler (10.13.23 @ 19:44)   Summary:   1. Technically difficult study.   2. Normal left ventricular internal cavity size.   3. Left ventricular ejection fraction, by visual estimation, is 50 to   55%.   4. Normal left atrial size.   5. Normal right atrial size.   6. Mildly enlarged right ventricle.   7. Dilatation of the ascending aorta.   8. Sclerotic aortic valve with normal opening.   9. Mild mitral annular calcification.  10. Mild thickening of the anterior mitral valve leaflet.  11. Trace mitral valve regurgitation.  12. Mild tricuspid regurgitation.  13. Trivial pericardial effusion.  14. Recommend transesophageal echocardiogram.     Preliminary note, offical recommendations pending attending review/signature   [facility] Stroke Team  Progress Note     HPI:  Patient is a 59 y/o M with a PMHx of stroke 2022 reported at [facility]  (at the time patient received thrombolysis and had full  resolution of symptoms), family reported dove dove, HTN, DM, HLD nonadherent with medications, who presented following an episode of LOC.  As per sister report, patient was last seen well by his niece  morning of 10/12/23  before patient went to work. At around 6:15 pm that night patient had a sudden fall at work and lost consciousness It is unclear if patient had any symptoms before he fell. NIHSS 10 on admission . Transferred to [facility] for mechanical thrombectomy.     SUBJECTIVE: No events overnight. Headache improved, numbness remains.   No new neurologic complaints.  ROS reported negative unless otherwise noted.    acetaminophen     Tablet .. 650 milliGRAM(s) Oral every 6 hours PRN  atorvastatin 80 milliGRAM(s) Oral at bedtime  bisacodyl Suppository 10 milliGRAM(s) Rectal daily PRN  chlorhexidine 2% Cloths 1 Application(s) Topical <User Schedule>  dextrose 50% Injectable 25 Gram(s) IV Push once  dextrose 50% Injectable 12.5 Gram(s) IV Push once  dextrose 50% Injectable 25 Gram(s) IV Push once  famotidine    Tablet 20 milliGRAM(s) Oral daily  folic acid 1 milliGRAM(s) Oral daily  heparin  Infusion. 1200 Unit(s)/Hr IV Continuous <Continuous>  hydrALAZINE Injectable 10 milliGRAM(s) IV Push every 4 hours PRN  influenza   Vaccine 0.5 milliLiter(s) IntraMuscular once  insulin lispro (ADMELOG) corrective regimen sliding scale   SubCutaneous Before meals and at bedtime  LORazepam   Injectable 2 milliGRAM(s) IV Push every 2 hours PRN  magnesium citrate Oral Solution 296 milliLiter(s) Oral once PRN  magnesium hydroxide Suspension 30 milliLiter(s) Oral daily PRN  metoprolol tartrate 12.5 milliGRAM(s) Oral every 12 hours  multivitamin 1 Tablet(s) Oral daily  polyethylene glycol 3350 17 Gram(s) Oral every 12 hours  senna 2 Tablet(s) Oral every 12 hours      PHYSICAL EXAM:   Vital Signs Last 24 Hrs  T(C): 37.3 (18 Oct 2023 08:07), Max: 37.3 (18 Oct 2023 08:07)  T(F): 99.1 (18 Oct 2023 08:07), Max: 99.1 (18 Oct 2023 08:07)  HR: 66 (18 Oct 2023 05:18) (54 - 82)  BP: 140/92 (18 Oct 2023 05:18) (126/77 - 185/84)  BP(mean): 102 (18 Oct 2023 05:18) (82 - 118)  RR: 20 (18 Oct 2023 04:00) (11 - 20)  SpO2: 98% (18 Oct 2023 04:00) (92% - 98%)    Parameters below as of 18 Oct 2023 04:00  Patient On (Oxygen Delivery Method): nasal cannula  O2 Flow (L/min): 1         General: No acute distress     NEUROLOGICAL EXAM:  Mental status: Awake, alert, oriented x self  hospital, states year 2022 corrects to 2023 , OCT, repetition intact, IDs objects, follows simple commands, speech fluent,  impaired memory.   Cranial Nerves: right superior quadrant able to see movement not able to count, left homonomous hemianopia, right facial palsy, no nystagmus, no dysarthria  Motor exam: Normal tone, no drift, 4+/5 RUE, 5/5 RLE, 5/5 LUE, 5/5 LLE   Sensation: decreased  sensation on left   Coordination/ Gait: No dysmetria, gait not tested    LABS:                        14.0   5.49  )-----------( 122      ( 18 Oct 2023 05:37 )             40.8    10-18    137  |  100  |  12.8  ----------------------------<  138<H>  4.1   |  25.0  |  0.96    Ca    9.5      18 Oct 2023 05:37    TPro  6.8  /  Alb  4.0  /  TBili  0.8  /  DBili  x   /  AST  22  /  ALT  31  /  AlkPhos  71  10-18  PTT - ( 18 Oct 2023 05:37 )  PTT:57.0 sec      IMAGING: Reviewed by me.       RADIOLOGY & ADDITIONAL STUDIES (independently reviewed unless otherwise noted):    CT Head No Cont (10.17.23 @ 12:19)     Multiple foci of low density right mesial temporal lobe and right   thalamus are more prominent on the current examination and likely   represent the presence of evolving cytotoxic edema.  Similar-appearing low density involving the left mesial temporal lobe   likely representing cytotoxic edema.  Redemonstration of chronic left of midline basis pontis infarct.  No acute intracranial hemorrhage.        CT Head No Cont (10.15.23 @ 13:13)     IMPRESSION: Stable medial left temporal occipital lobe infarction.    (10.14.23 @ 15:25)   CT BRAIN WITHOUT CONTRAST:  1. Decreased attenuation involving the medial aspect of the left temporal   lobe, not present on prior noncontrast CT study. However, restricted   diffusion was appreciated in the same location on the prior MRI of   10/13/2023.  2. Nonacute left ventral pontine ischemic event, present on prior CT and   MRI.    10.14.23 @ 14:58)   CT BRAIN WITHOUT CONTRAST:  1. Decreased attenuation involving the medial aspect of the left temporal   lobe, not present on prior noncontrast CT study. However, restricted   diffusion was appreciated in the same location on the prior MRI of   10/13/2023.  2. Nonacute left ventral pontine ischemic event, present on prior CT and   MRI.    CT PERFUSION:  Technical limitations: Significantly limited by patient motion during   image acquisition and suboptimal arterial waveform.   Core infartction: 0 ml; [place]ra/tissue at risk for active ischemia: 75   mL involving right temporal brain parenchyma.  If symptoms persist consider follow up head CT or MRI, MRA  if no   contraindication.    CTA HEAD:  1. Poor visualization of the proximal M1 segment of the right middle   cerebral artery, as on prior.  2. Irregular pattern of flow within the P2 segment of the left posterior   cerebral artery, as on prior.  4. Cut off of the left posterior cerebral artery at the P1/P2 junction,   as on prior, with suggestion of distal reconstitution..  5. Hypoplastic poorly visualized intracranial right vertebral artery.  6. No evidence of aneurysm formation at the level of the [place] of   [place]. Tiny aneurysms can be beyond the resolution of CTA technique.    CTA NECK:  1. No evidence of significant stenosis or occlusion in association with   the bilateral common carotid arteriesor bilateral internal carotid   arteries.  2. Poorly visualized proximal left vertebral artery with suggestion of   distal reconstitution at the level of C2.       10/12/2023 19:13  IMPRESSION:  HEAD CT: No evidence of an acute intracranial hemorhage, midline shift or hydrocephalus. Mild bifrontal periventricular white matter ischemic changes. Bilateral maxillary sinusitis  NECK CTA: No hemodynamic significant narowing involving the carotid bifurcations. Hypoplastic right vertebral artery. Dominant left vertebral artery..  BRAIN CTA: Cutoff of the M1 portion of the right middle cerebral artery which may be chronic as there appears to be lenticular striate collaterals and reconstitution of more distal MCA segments. Cutoff of the right posterior cerebral artery at the P1-2 junction with reconstitution of more distal segments. Cut off of the left posterior cerebral artery at the P1-2 junction with mild distal reconstitution. Hypoplastic right vertebral artery with portions of the V4 segment not visualized  CT PERFUSION: Regions of ischemia within the posterior circulation with a volume of 121 mL without evidence of core infarction.    (10.13.23 @ 12:31)   MRI BRAIN: Acute small to moderate medial left temporal occipital lobe   infarctions. Acute punctate left occipital lobe infarction.  MRA BRAIN: Occluded right MCA. Occluded right PCA. Occluded right   vertebral artery proximal V4 segment.  MRI BRAIN NOVA: Values above.    TTE Echo Complete w/ Contrast w/ Doppler (10.13.23 @ 19:44)   Summary:   1. Technically difficult study.   2. Normal left ventricular internal cavity size.   3. Left ventricular ejection fraction, by visual estimation, is 50 to   55%.   4. Normal left atrial size.   5. Normal right atrial size.   6. Mildly enlarged right ventricle.   7. Dilatation of the ascending aorta.   8. Sclerotic aortic valve with normal opening.   9. Mild mitral annular calcification.  10. Mild thickening of the anterior mitral valve leaflet.  11. Trace mitral valve regurgitation.  12. Mild tricuspid regurgitation.  13. Trivial pericardial effusion.  14. Recommend transesophageal echocardiogram.     [facility] Stroke Team  Progress Note     HPI:  Patient is a 59 y/o M with a PMHx of stroke 2022 reported at [facility]  (at the time patient received thrombolysis and had full  resolution of symptoms), family reported dove dove, HTN, DM, HLD nonadherent with medications, who presented following an episode of LOC.  As per sister report, patient was last seen well by his niece  morning of 10/12/23  before patient went to work. At around 6:15 pm that night patient had a sudden fall at work and lost consciousness It is unclear if patient had any symptoms before he fell. NIHSS 10 on admission . Transferred to [facility] for mechanical thrombectomy.     SUBJECTIVE: No events overnight. Headache improved, left sided visual field cut and numbness remains.  Short term memory impairment contunues No new neurologic complaints.  ROS reported negative unless otherwise noted.    acetaminophen     Tablet .. 650 milliGRAM(s) Oral every 6 hours PRN  atorvastatin 80 milliGRAM(s) Oral at bedtime  bisacodyl Suppository 10 milliGRAM(s) Rectal daily PRN  chlorhexidine 2% Cloths 1 Application(s) Topical <User Schedule>  dextrose 50% Injectable 25 Gram(s) IV Push once  dextrose 50% Injectable 12.5 Gram(s) IV Push once  dextrose 50% Injectable 25 Gram(s) IV Push once  famotidine    Tablet 20 milliGRAM(s) Oral daily  folic acid 1 milliGRAM(s) Oral daily  heparin  Infusion. 1200 Unit(s)/Hr IV Continuous <Continuous>  hydrALAZINE Injectable 10 milliGRAM(s) IV Push every 4 hours PRN  influenza   Vaccine 0.5 milliLiter(s) IntraMuscular once  insulin lispro (ADMELOG) corrective regimen sliding scale   SubCutaneous Before meals and at bedtime  LORazepam   Injectable 2 milliGRAM(s) IV Push every 2 hours PRN  magnesium citrate Oral Solution 296 milliLiter(s) Oral once PRN  magnesium hydroxide Suspension 30 milliLiter(s) Oral daily PRN  metoprolol tartrate 12.5 milliGRAM(s) Oral every 12 hours  multivitamin 1 Tablet(s) Oral daily  polyethylene glycol 3350 17 Gram(s) Oral every 12 hours  senna 2 Tablet(s) Oral every 12 hours      PHYSICAL EXAM:   Vital Signs Last 24 Hrs  T(C): 37.3 (18 Oct 2023 08:07), Max: 37.3 (18 Oct 2023 08:07)  T(F): 99.1 (18 Oct 2023 08:07), Max: 99.1 (18 Oct 2023 08:07)  HR: 66 (18 Oct 2023 05:18) (54 - 82)  BP: 140/92 (18 Oct 2023 05:18) (126/77 - 185/84)  BP(mean): 102 (18 Oct 2023 05:18) (82 - 118)  RR: 20 (18 Oct 2023 04:00) (11 - 20)  SpO2: 98% (18 Oct 2023 04:00) (92% - 98%)    Parameters below as of 18 Oct 2023 04:00  Patient On (Oxygen Delivery Method): nasal cannula  O2 Flow (L/min): 1      General: No acute distress     NEUROLOGICAL EXAM:  Mental status: Awake, alert, oriented x self  hospital, states year 2022 corrects to 2023 , OCT, repetition intact, IDs objects, follows simple commands, speech fluent,  impaired memory.   Cranial Nerves: right superior quadrant able to see movement not able to count, left homonomous hemianopia, right facial palsy, no nystagmus, no dysarthria  Motor exam: Normal tone, no drift, 4+/5 RUE, 5/5 RLE, 5/5 LUE, 5/5 LLE   Sensation: decreased  sensation on left   Coordination/ Gait: No dysmetria, gait not tested    LABS:                        14.0   5.49  )-----------( 122      ( 18 Oct 2023 05:37 )             40.8    10-18    137  |  100  |  12.8  ----------------------------<  138<H>  4.1   |  25.0  |  0.96    Ca    9.5      18 Oct 2023 05:37    TPro  6.8  /  Alb  4.0  /  TBili  0.8  /  DBili  x   /  AST  22  /  ALT  31  /  AlkPhos  71  10-18  PTT - ( 18 Oct 2023 05:37 )  PTT:57.0 sec        RADIOLOGY & ADDITIONAL STUDIES (independently reviewed unless otherwise noted):    CT Head No Cont (10.17.23 @ 12:19)   Multiple foci of low density right mesial temporal lobe and right   thalamus are more prominent on the current examination and likely   represent the presence of evolving cytotoxic edema.  Similar-appearing low density involving the left mesial temporal lobe   likely representing cytotoxic edema.  Redemonstration of chronic left of midline basis pontis infarct.  No acute intracranial hemorrhage.    CT Head No Cont (10.15.23 @ 13:13)   IMPRESSION: Stable medial left temporal occipital lobe infarction.    (10.14.23 @ 15:25)   CT BRAIN WITHOUT CONTRAST:  1. Decreased attenuation involving the medial aspect of the left temporal   lobe, not present on prior noncontrast CT study. However, restricted   diffusion was appreciated in the same location on the prior MRI of   10/13/2023.  2. Nonacute left ventral pontine ischemic event, present on prior CT and   MRI.    10.14.23 @ 14:58)   CT BRAIN WITHOUT CONTRAST:  1. Decreased attenuation involving the medial aspect of the left temporal   lobe, not present on prior noncontrast CT study. However, restricted   diffusion was appreciated in the same location on the prior MRI of   10/13/2023.  2. Nonacute left ventral pontine ischemic event, present on prior CT and   MRI.    CT PERFUSION:  Technical limitations: Significantly limited by patient motion during   image acquisition and suboptimal arterial waveform.   Core infartction: 0 ml; [place]ra/tissue at risk for active ischemia: 75   mL involving right temporal brain parenchyma.  If symptoms persist consider follow up head CT or MRI, MRA  if no   contraindication.    CTA HEAD:  1. Poor visualization of the proximal M1 segment of the right middle   cerebral artery, as on prior.  2. Irregular pattern of flow within the P2 segment of the left posterior   cerebral artery, as on prior.  4. Cut off of the left posterior cerebral artery at the P1/P2 junction,   as on prior, with suggestion of distal reconstitution..  5. Hypoplastic poorly visualized intracranial right vertebral artery.  6. No evidence of aneurysm formation at the level of the [place] of   [place]. Tiny aneurysms can be beyond the resolution of CTA technique.    CTA NECK:  1. No evidence of significant stenosis or occlusion in association with   the bilateral common carotid arteriesor bilateral internal carotid   arteries.  2. Poorly visualized proximal left vertebral artery with suggestion of   distal reconstitution at the level of C2.       10/12/2023 19:13  IMPRESSION:  HEAD CT: No evidence of an acute intracranial hemorhage, midline shift or hydrocephalus. Mild bifrontal periventricular white matter ischemic changes. Bilateral maxillary sinusitis  NECK CTA: No hemodynamic significant narowing involving the carotid bifurcations. Hypoplastic right vertebral artery. Dominant left vertebral artery..  BRAIN CTA: Cutoff of the M1 portion of the right middle cerebral artery which may be chronic as there appears to be lenticular striate collaterals and reconstitution of more distal MCA segments. Cutoff of the right posterior cerebral artery at the P1-2 junction with reconstitution of more distal segments. Cut off of the left posterior cerebral artery at the P1-2 junction with mild distal reconstitution. Hypoplastic right vertebral artery with portions of the V4 segment not visualized  CT PERFUSION: Regions of ischemia within the posterior circulation with a volume of 121 mL without evidence of core infarction.    (10.13.23 @ 12:31)   MRI BRAIN: Acute small to moderate medial left temporal occipital lobe   infarctions. Acute punctate left occipital lobe infarction.  MRA BRAIN: Occluded right MCA. Occluded right PCA. Occluded right   vertebral artery proximal V4 segment.  MRI BRAIN NOVA: Values above.    TTE Echo Complete w/ Contrast w/ Doppler (10.13.23 @ 19:44)   Summary:   1. Technically difficult study.   2. Normal left ventricular internal cavity size.   3. Left ventricular ejection fraction, by visual estimation, is 50 to   55%.   4. Normal left atrial size.   5. Normal right atrial size.   6. Mildly enlarged right ventricle.   7. Dilatation of the ascending aorta.   8. Sclerotic aortic valve with normal opening.   9. Mild mitral annular calcification.  10. Mild thickening of the anterior mitral valve leaflet.  11. Trace mitral valve regurgitation.  12. Mild tricuspid regurgitation.  13. Trivial pericardial effusion.  14. Recommend transesophageal echocardiogram.

## 2023-10-18 NOTE — PROGRESS NOTE ADULT - SUBJECTIVE AND OBJECTIVE BOX
CINDY DUARTE    [id number]    58y      Male    Patient is a 58y old  Male who presents with a chief complaint of LOC (17 Oct 2023 09:05)      INTERVAL HPI/OVERNIGHT EVENTS:    Patient is some what forgetful, he is feeling hald of the body is numb, able to move all extremities, denies fever, chills, chest pain, headache, dizziness    Vital Signs Last 24 Hrs  T(C): 37.3 (18 Oct 2023 08:07), Max: 37.3 (18 Oct 2023 08:07)  T(F): 99.1 (18 Oct 2023 08:07), Max: 99.1 (18 Oct 2023 08:07)  HR: 45 (18 Oct 2023 10:00) (45 - 82)  BP: 158/88 (18 Oct 2023 10:00) (126/77 - 185/84)  BP(mean): 105 (18 Oct 2023 10:00) (82 - 118)  RR: 20 (18 Oct 2023 10:00) (12 - 20)  SpO2: 98% (18 Oct 2023 10:00) (92% - 98%)    Parameters below as of 18 Oct 2023 10:00  Patient On (Oxygen Delivery Method): nasal cannula  O2 Flow (L/min): 2      PHYSICAL EXAM:    GENERAL: Middle age Obese male looking comfortable   HEENT: PERRL, +EOMI  NECK: soft, Supple, No JVD  CHEST/LUNG: Clear to auscultate bilaterally; No wheezing  HEART: S1S2+, Regular rate and rhythm; No murmurs  ABDOMEN: Soft, Nontender, Nondistended; Bowel sounds present  EXTREMITIES:  1+ Peripheral Pulses, No edema  SKIN: No rashes or lesions  NEURO: OX2, following commands, speech fluent, abl to move all extremities, no facial droop   PSYCH: normal mood      LABS:                        14.0   5.49  )-----------( 122      ( 18 Oct 2023 05:37 )             40.8     10-18    137  |  100  |  12.8  ----------------------------<  138<H>  4.1   |  25.0  |  0.96    Ca    9.5      18 Oct 2023 05:37    TPro  6.8  /  Alb  4.0  /  TBili  0.8  /  DBili  x   /  AST  22  /  ALT  31  /  AlkPhos  71  10-18    PTT - ( 18 Oct 2023 05:37 )  PTT:57.0 sec  Urinalysis Basic - ( 18 Oct 2023 05:37 )    Color: x / Appearance: x / SG: x / pH: x  Gluc: 138 mg/dL / Ketone: x  / Bili: x / Urobili: x   Blood: x / Protein: x / Nitrite: x   Leuk Esterase: x / RBC: x / WBC x   Sq Epi: x / Non Sq Epi: x / Bacteria: x          I&O's Summary    17 Oct 2023 07:01  -  18 Oct 2023 07:00  --------------------------------------------------------  IN: 1692 mL / OUT: 2000 mL / NET: -308 mL        MEDICATIONS  (STANDING):  atorvastatin 80 milliGRAM(s) Oral at bedtime  chlorhexidine 2% Cloths 1 Application(s) Topical <User Schedule>  dextrose 50% Injectable 25 Gram(s) IV Push once  dextrose 50% Injectable 12.5 Gram(s) IV Push once  dextrose 50% Injectable 25 Gram(s) IV Push once  famotidine    Tablet 20 milliGRAM(s) Oral daily  folic acid 1 milliGRAM(s) Oral daily  heparin  Infusion. 1200 Unit(s)/Hr (12 mL/Hr) IV Continuous <Continuous>  influenza   Vaccine 0.5 milliLiter(s) IntraMuscular once  insulin lispro (ADMELOG) corrective regimen sliding scale   SubCutaneous Before meals and at bedtime  multivitamin 1 Tablet(s) Oral daily  polyethylene glycol 3350 17 Gram(s) Oral every 12 hours  senna 2 Tablet(s) Oral every 12 hours    MEDICATIONS  (PRN):  acetaminophen     Tablet .. 650 milliGRAM(s) Oral every 6 hours PRN Mild Pain (1 - 3)  bisacodyl Suppository 10 milliGRAM(s) Rectal daily PRN Constipation  hydrALAZINE Injectable 10 milliGRAM(s) IV Push every 4 hours PRN SBP>160  LORazepam   Injectable 2 milliGRAM(s) IV Push every 2 hours PRN CIWA-Ar score increase by 2 points and a total score of 7 or less  magnesium citrate Oral Solution 296 milliLiter(s) Oral once PRN if he does not go with suppository  magnesium hydroxide Suspension 30 milliLiter(s) Oral daily PRN Constipation

## 2023-10-19 LAB
APTT BLD: 90.9 SEC — HIGH (ref 24.5–35.6)
APTT BLD: 90.9 SEC — HIGH (ref 24.5–35.6)
GLUCOSE BLDC GLUCOMTR-MCNC: 117 MG/DL — HIGH (ref 70–99)
GLUCOSE BLDC GLUCOMTR-MCNC: 117 MG/DL — HIGH (ref 70–99)
GLUCOSE BLDC GLUCOMTR-MCNC: 132 MG/DL — HIGH (ref 70–99)
GLUCOSE BLDC GLUCOMTR-MCNC: 132 MG/DL — HIGH (ref 70–99)
GLUCOSE BLDC GLUCOMTR-MCNC: 151 MG/DL — HIGH (ref 70–99)
GLUCOSE BLDC GLUCOMTR-MCNC: 151 MG/DL — HIGH (ref 70–99)
GLUCOSE BLDC GLUCOMTR-MCNC: 154 MG/DL — HIGH (ref 70–99)
GLUCOSE BLDC GLUCOMTR-MCNC: 154 MG/DL — HIGH (ref 70–99)
HCT VFR BLD CALC: 41.8 % — SIGNIFICANT CHANGE UP (ref 39–50)
HCT VFR BLD CALC: 41.8 % — SIGNIFICANT CHANGE UP (ref 39–50)
HGB BLD-MCNC: 14.4 G/DL — SIGNIFICANT CHANGE UP (ref 13–17)
HGB BLD-MCNC: 14.4 G/DL — SIGNIFICANT CHANGE UP (ref 13–17)
MCHC RBC-ENTMCNC: 31.9 PG — SIGNIFICANT CHANGE UP (ref 27–34)
MCHC RBC-ENTMCNC: 31.9 PG — SIGNIFICANT CHANGE UP (ref 27–34)
MCHC RBC-ENTMCNC: 34.4 GM/DL — SIGNIFICANT CHANGE UP (ref 32–36)
MCHC RBC-ENTMCNC: 34.4 GM/DL — SIGNIFICANT CHANGE UP (ref 32–36)
MCV RBC AUTO: 92.5 FL — SIGNIFICANT CHANGE UP (ref 80–100)
MCV RBC AUTO: 92.5 FL — SIGNIFICANT CHANGE UP (ref 80–100)
PLATELET # BLD AUTO: 117 K/UL — LOW (ref 150–400)
PLATELET # BLD AUTO: 117 K/UL — LOW (ref 150–400)
RBC # BLD: 4.52 M/UL — SIGNIFICANT CHANGE UP (ref 4.2–5.8)
RBC # BLD: 4.52 M/UL — SIGNIFICANT CHANGE UP (ref 4.2–5.8)
RBC # FLD: 13.4 % — SIGNIFICANT CHANGE UP (ref 10.3–14.5)
RBC # FLD: 13.4 % — SIGNIFICANT CHANGE UP (ref 10.3–14.5)
WBC # BLD: 5.69 K/UL — SIGNIFICANT CHANGE UP (ref 3.8–10.5)
WBC # BLD: 5.69 K/UL — SIGNIFICANT CHANGE UP (ref 3.8–10.5)
WBC # FLD AUTO: 5.69 K/UL — SIGNIFICANT CHANGE UP (ref 3.8–10.5)
WBC # FLD AUTO: 5.69 K/UL — SIGNIFICANT CHANGE UP (ref 3.8–10.5)

## 2023-10-19 PROCEDURE — G0452: CPT | Mod: 26

## 2023-10-19 PROCEDURE — 99233 SBSQ HOSP IP/OBS HIGH 50: CPT

## 2023-10-19 PROCEDURE — 99232 SBSQ HOSP IP/OBS MODERATE 35: CPT

## 2023-10-19 RX ORDER — THIAMINE MONONITRATE (VIT B1) 100 MG
100 TABLET ORAL DAILY
Refills: 0 | Status: DISCONTINUED | OUTPATIENT
Start: 2023-10-19 | End: 2023-10-27

## 2023-10-19 RX ADMIN — FAMOTIDINE 20 MILLIGRAM(S): 10 INJECTION INTRAVENOUS at 11:21

## 2023-10-19 RX ADMIN — SENNA PLUS 2 TABLET(S): 8.6 TABLET ORAL at 05:53

## 2023-10-19 RX ADMIN — Medication 2: at 21:39

## 2023-10-19 RX ADMIN — CHLORHEXIDINE GLUCONATE 1 APPLICATION(S): 213 SOLUTION TOPICAL at 05:53

## 2023-10-19 RX ADMIN — HEPARIN SODIUM 1400 UNIT(S)/HR: 5000 INJECTION INTRAVENOUS; SUBCUTANEOUS at 05:52

## 2023-10-19 RX ADMIN — HEPARIN SODIUM 1400 UNIT(S)/HR: 5000 INJECTION INTRAVENOUS; SUBCUTANEOUS at 07:10

## 2023-10-19 RX ADMIN — HEPARIN SODIUM 1400 UNIT(S)/HR: 5000 INJECTION INTRAVENOUS; SUBCUTANEOUS at 15:18

## 2023-10-19 RX ADMIN — HEPARIN SODIUM 1400 UNIT(S)/HR: 5000 INJECTION INTRAVENOUS; SUBCUTANEOUS at 07:21

## 2023-10-19 RX ADMIN — POLYETHYLENE GLYCOL 3350 17 GRAM(S): 17 POWDER, FOR SOLUTION ORAL at 18:20

## 2023-10-19 RX ADMIN — Medication 1 TABLET(S): at 11:21

## 2023-10-19 RX ADMIN — SENNA PLUS 2 TABLET(S): 8.6 TABLET ORAL at 18:20

## 2023-10-19 RX ADMIN — Medication 2: at 09:18

## 2023-10-19 RX ADMIN — HEPARIN SODIUM 1400 UNIT(S)/HR: 5000 INJECTION INTRAVENOUS; SUBCUTANEOUS at 20:12

## 2023-10-19 RX ADMIN — Medication 1 MILLIGRAM(S): at 11:21

## 2023-10-19 RX ADMIN — ATORVASTATIN CALCIUM 80 MILLIGRAM(S): 80 TABLET, FILM COATED ORAL at 21:39

## 2023-10-19 RX ADMIN — Medication 100 MILLIGRAM(S): at 18:21

## 2023-10-19 RX ADMIN — POLYETHYLENE GLYCOL 3350 17 GRAM(S): 17 POWDER, FOR SOLUTION ORAL at 05:53

## 2023-10-19 NOTE — PROGRESS NOTE ADULT - SUBJECTIVE AND OBJECTIVE BOX
Cerebral infarction    HPI:  Patient is a 57 y/o M with a PMHx of stroke one year ago (  at the time patient recieved TNK and had full  resolution of symptoms), HTN, DM, non-compliant with medications, who presented following an episode of LOC.   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 At around 6:15 pm patient had a sudden fall at work and lost conciousness. It is unclear if patient had any symptoms before he fell. NIHSS 10 on admission . Patient is not a current TNK candidate  due to unclear exact last known well. TRF to [facility] for thrombectomy. (12 Oct 2023 23:00)    Interval History:  Patient was seen and examined at bedside around 11:30 am.  Forgetful.   Feels OK.  Has weakness and numbness on left side.   Denies chest pain, palpitations, shortness of breath, headache, dizziness, visual symptoms, nausea, vomiting or abdominal pain.    ROS:  As per interval history otherwise unremarkable.    PHYSICAL EXAM:  Vital Signs   T(C): 36.8 (19 Oct 2023 15:56), Max: 37.4 (19 Oct 2023 13:53)  T(F): 98.2 (19 Oct 2023 15:56), Max: 99.4 (19 Oct 2023 13:53)  HR: 59 (19 Oct 2023 16:00) (51 - 65)  BP: 150/84 (19 Oct 2023 15:56) (142/85 - 168/83)  BP(mean): 103 (19 Oct 2023 12:00) (103 - 103)  RR: 18 (19 Oct 2023 15:56) (18 - 20)  SpO2: 95% (19 Oct 2023 15:56) (95% - 99%)  Parameters below as of 19 Oct 2023 15:56  Patient On (Oxygen Delivery Method): room air  General: Middle aged male lying in bed comfortably. No acute distress  HEENT: EOMI. Clear conjunctivae. Moist mucus membrane  Neck: Supple.   Chest: CTA bilaterally. No wheezing, rales or rhonchi. No chest wall tenderness.  Heart: Normal S1 & S2. RRR.   Abdomen: Non distended. Soft. Non-tender. + BS  Ext: No pedal edema. No calf tenderness   Neuro: Active and alert but not completely oriented. Motor 5/5 in RUE/RLE and 4-5/5 in LUE/LLE. Sensation decreased on left side. Reflexes 1+. Cerebellar sings intact. No speech disorder  Skin: Warm and Dry  Psychiatry: Normal mood and affect    I&O's Summary    18 Oct 2023 07:01  -  19 Oct 2023 07:00  --------------------------------------------------------  IN: 966 mL / OUT: 700 mL / NET: 266 mL    LABS:  CAPILLARY BLOOD GLUCOSE  POCT Blood Glucose.: 132 mg/dL (19 Oct 2023 13:41)  POCT Blood Glucose.: 154 mg/dL (19 Oct 2023 09:03)  POCT Blood Glucose.: 151 mg/dL (18 Oct 2023 21:42)  POCT Blood Glucose.: 125 mg/dL (18 Oct 2023 17:32)                      14.4   5.69  )-----------( 117      ( 19 Oct 2023 05:47 )             41.8     10-18    137  |  100  |  12.8  ----------------------------<  138<H>  4.1   |  25.0  |  0.96    Ca    9.5      18 Oct 2023 05:37    TPro  6.8  /  Alb  4.0  /  TBili  0.8  /  DBili  x   /  AST  22  /  ALT  31  /  AlkPhos  71  10-18    PTT - ( 19 Oct 2023 05:47 )  PTT:90.9 sec  Urinalysis Basic - ( 18 Oct 2023 05:37 )    Color: x / Appearance: x / SG: x / pH: x  Gluc: 138 mg/dL / Ketone: x  / Bili: x / Urobili: x   Blood: x / Protein: x / Nitrite: x   Leuk Esterase: x / RBC: x / WBC x   Sq Epi: x / Non Sq Epi: x / Bacteria: x    RADIOLOGY & ADDITIONAL STUDIES:  Reviewed     MEDICATIONS  (STANDING):  atorvastatin 80 milliGRAM(s) Oral at bedtime  chlorhexidine 2% Cloths 1 Application(s) Topical <User Schedule>  dextrose 50% Injectable 12.5 Gram(s) IV Push once  dextrose 50% Injectable 25 Gram(s) IV Push once  dextrose 50% Injectable 25 Gram(s) IV Push once  famotidine    Tablet 20 milliGRAM(s) Oral daily  folic acid 1 milliGRAM(s) Oral daily  heparin  Infusion. 1200 Unit(s)/Hr (12 mL/Hr) IV Continuous <Continuous>  influenza   Vaccine 0.5 milliLiter(s) IntraMuscular once  insulin lispro (ADMELOG) corrective regimen sliding scale   SubCutaneous Before meals and at bedtime  multivitamin 1 Tablet(s) Oral daily  polyethylene glycol 3350 17 Gram(s) Oral every 12 hours  senna 2 Tablet(s) Oral every 12 hours    MEDICATIONS  (PRN):  acetaminophen     Tablet .. 650 milliGRAM(s) Oral every 6 hours PRN Mild Pain (1 - 3)  bisacodyl Suppository 10 milliGRAM(s) Rectal daily PRN Constipation  hydrALAZINE Injectable 10 milliGRAM(s) IV Push every 4 hours PRN SBP>160  LORazepam   Injectable 2 milliGRAM(s) IV Push every 2 hours PRN CIWA-Ar score increase by 2 points and a total score of 7 or less  magnesium citrate Oral Solution 296 milliLiter(s) Oral once PRN if he does not go with suppository  magnesium hydroxide Suspension 30 milliLiter(s) Oral daily PRN Constipation

## 2023-10-19 NOTE — PROGRESS NOTE ADULT - SUBJECTIVE AND OBJECTIVE BOX
Preliminary note, offical recommendations pending attending review/signature   [facility] Stroke Team  Progress Note     HPI:  Patient is a 57 y/o M with a PMHx of stroke 2022 reported at [facility]  (at the time patient received thrombolysis and had full  resolution of symptoms), family reported dove dove, HTN, DM, HLD nonadherent with medications, who presented following an episode of LOC.  As per sister report, patient was last seen well by his niece  morning of 10/12/23  before patient went to work. At around 6:15 pm that night patient had a sudden fall at work and lost consciousness It is unclear if patient had any symptoms before he fell. NIHSS 10 on admission . Transferred to [facility] for mechanical thrombectomy.     SUBJECTIVE: No events overnight.  No new neurologic complaints.  ROS reported negative unless otherwise noted.    acetaminophen     Tablet .. 650 milliGRAM(s) Oral every 6 hours PRN  atorvastatin 80 milliGRAM(s) Oral at bedtime  bisacodyl Suppository 10 milliGRAM(s) Rectal daily PRN  chlorhexidine 2% Cloths 1 Application(s) Topical <User Schedule>  dextrose 50% Injectable 25 Gram(s) IV Push once  dextrose 50% Injectable 12.5 Gram(s) IV Push once  dextrose 50% Injectable 25 Gram(s) IV Push once  famotidine    Tablet 20 milliGRAM(s) Oral daily  folic acid 1 milliGRAM(s) Oral daily  heparin  Infusion. 1200 Unit(s)/Hr IV Continuous <Continuous>  hydrALAZINE Injectable 10 milliGRAM(s) IV Push every 4 hours PRN  influenza   Vaccine 0.5 milliLiter(s) IntraMuscular once  insulin lispro (ADMELOG) corrective regimen sliding scale   SubCutaneous Before meals and at bedtime  LORazepam   Injectable 2 milliGRAM(s) IV Push every 2 hours PRN  magnesium citrate Oral Solution 296 milliLiter(s) Oral once PRN  magnesium hydroxide Suspension 30 milliLiter(s) Oral daily PRN  multivitamin 1 Tablet(s) Oral daily  polyethylene glycol 3350 17 Gram(s) Oral every 12 hours  senna 2 Tablet(s) Oral every 12 hours      PHYSICAL EXAM:   Vital Signs Last 24 Hrs  T(C): 36.9 (19 Oct 2023 04:26), Max: 37.3 (18 Oct 2023 12:50)  T(F): 98.4 (19 Oct 2023 04:26), Max: 99.2 (18 Oct 2023 12:50)  HR: 57 (19 Oct 2023 04:00) (45 - 67)  BP: 142/85 (19 Oct 2023 04:00) (106/65 - 158/88)  BP(mean): 78 (18 Oct 2023 16:00) (76 - 105)  RR: 20 (19 Oct 2023 04:00) (20 - 20)  SpO2: 96% (19 Oct 2023 04:00) (96% - 99%)    Parameters below as of 19 Oct 2023 04:00  Patient On (Oxygen Delivery Method): room air    EXAM PENDING     General: No acute distress     NEUROLOGICAL EXAM:  Mental status: Awake, alert, oriented x self  hospital, states year 2022 corrects to 2023 , OCT, repetition intact, IDs objects, follows simple commands, speech fluent,  impaired memory.   Cranial Nerves: right superior quadrant able to see movement not able to count, left homonomous hemianopia, right facial palsy, no nystagmus, no dysarthria  Motor exam: Normal tone, no drift, 4+/5 RUE, 5/5 RLE, 5/5 LUE, 5/5 LLE   Sensation: decreased  sensation on left   Coordination/ Gait: No dysmetria, gait not tested    LABS:                        14.4   5.69  )-----------( 117      ( 19 Oct 2023 05:47 )             41.8    10-18    137  |  100  |  12.8  ----------------------------<  138<H>  4.1   |  25.0  |  0.96    Ca    9.5      18 Oct 2023 05:37    TPro  6.8  /  Alb  4.0  /  TBili  0.8  /  DBili  x   /  AST  22  /  ALT  31  /  AlkPhos  71  10-18  PTT - ( 19 Oct 2023 05:47 )  PTT:90.9 sec      IMAGING: Reviewed by me.     CT Head No Cont (10.17.23 @ 12:19)   Multiple foci of low density right mesial temporal lobe and right   thalamus are more prominent on the current examination and likely   represent the presence of evolving cytotoxic edema.  Similar-appearing low density involving the left mesial temporal lobe   likely representing cytotoxic edema.  Redemonstration of chronic left of midline basis pontis infarct.  No acute intracranial hemorrhage.    CT Head No Cont (10.15.23 @ 13:13)   IMPRESSION: Stable medial left temporal occipital lobe infarction.    (10.14.23 @ 15:25)   CT BRAIN WITHOUT CONTRAST:  1. Decreased attenuation involving the medial aspect of the left temporal   lobe, not present on prior noncontrast CT study. However, restricted   diffusion was appreciated in the same location on the prior MRI of   10/13/2023.  2. Nonacute left ventral pontine ischemic event, present on prior CT and   MRI.    10.14.23 @ 14:58)   CT BRAIN WITHOUT CONTRAST:  1. Decreased attenuation involving the medial aspect of the left temporal   lobe, not present on prior noncontrast CT study. However, restricted   diffusion was appreciated in the same location on the prior MRI of   10/13/2023.  2. Nonacute left ventral pontine ischemic event, present on prior CT and   MRI.    CT PERFUSION:  Technical limitations: Significantly limited by patient motion during   image acquisition and suboptimal arterial waveform.   Core infartction: 0 ml; Penubra/tissue at risk for active ischemia: 75   mL involving right temporal brain parenchyma.  If symptoms persist consider follow up head CT or MRI, MRA  if no   contraindication.    CTA HEAD:  1. Poor visualization of the proximal M1 segment of the right middle   cerebral artery, as on prior.  2. Irregular pattern of flow within the P2 segment of the left posterior   cerebral artery, as on prior.  4. Cut off of the left posterior cerebral artery at the P1/P2 junction,   as on prior, with suggestion of distal reconstitution..  5. Hypoplastic poorly visualized intracranial right vertebral artery.  6. No evidence of aneurysm formation at the level of the [place] of   [place]. Tiny aneurysms can be beyond the resolution of CTA technique.    CTA NECK:  1. No evidence of significant stenosis or occlusion in association with   the bilateral common carotid arteriesor bilateral internal carotid   arteries.  2. Poorly visualized proximal left vertebral artery with suggestion of   distal reconstitution at the level of C2.       10/12/2023 19:13  IMPRESSION:  HEAD CT: No evidence of an acute intracranial hemorhage, midline shift or hydrocephalus. Mild bifrontal periventricular white matter ischemic changes. Bilateral maxillary sinusitis  NECK CTA: No hemodynamic significant narowing involving the carotid bifurcations. Hypoplastic right vertebral artery. Dominant left vertebral artery..  BRAIN CTA: Cutoff of the M1 portion of the right middle cerebral artery which may be chronic as there appears to be lenticular striate collaterals and reconstitution of more distal MCA segments. Cutoff of the right posterior cerebral artery at the P1-2 junction with reconstitution of more distal segments. Cut off of the left posterior cerebral artery at the P1-2 junction with mild distal reconstitution. Hypoplastic right vertebral artery with portions of the V4 segment not visualized  CT PERFUSION: Regions of ischemia within the posterior circulation with a volume of 121 mL without evidence of core infarction.    (10.13.23 @ 12:31)   MRI BRAIN: Acute small to moderate medial left temporal occipital lobe   infarctions. Acute punctate left occipital lobe infarction.  MRA BRAIN: Occluded right MCA. Occluded right PCA. Occluded right   vertebral artery proximal V4 segment.  MRI BRAIN NOVA: Values above.    TTE Echo Complete w/ Contrast w/ Doppler (10.13.23 @ 19:44)   Summary:   1. Technically difficult study.   2. Normal left ventricular internal cavity size.   3. Left ventricular ejection fraction, by visual estimation, is 50 to   55%.   4. Normal left atrial size.   5. Normal right atrial size.   6. Mildly enlarged right ventricle.   7. Dilatation of the ascending aorta.   8. Sclerotic aortic valve with normal opening.   9. Mild mitral annular calcification.  10. Mild thickening of the anterior mitral valve leaflet.  11. Trace mitral valve regurgitation.  12. Mild tricuspid regurgitation.  13. Trivial pericardial effusion.  14. Recommend transesophageal echocardiogram.       Preliminary note, offical recommendations pending attending review/signature   [facility] Stroke Team  Progress Note     HPI:  Patient is a 57 y/o M with a PMHx of stroke 2022 reported at [facility]  (at the time patient received thrombolysis and had full  resolution of symptoms), family reported dove dove, HTN, DM, HLD nonadherent with medications, who presented following an episode of LOC.  As per sister report, patient was last seen well by his niece  morning of 10/12/23  before patient went to work. At around 6:15 pm that night patient had a sudden fall at work and lost consciousness It is unclear if patient had any symptoms before he fell. NIHSS 10 on admission . Transferred to [facility] for mechanical thrombectomy.     SUBJECTIVE: No events overnight. Notes headache is alright. Still with left numbness. Does not recall to compare.  No new neurologic complaints.  ROS reported negative unless otherwise noted.    acetaminophen     Tablet .. 650 milliGRAM(s) Oral every 6 hours PRN  atorvastatin 80 milliGRAM(s) Oral at bedtime  bisacodyl Suppository 10 milliGRAM(s) Rectal daily PRN  chlorhexidine 2% Cloths 1 Application(s) Topical <User Schedule>  dextrose 50% Injectable 25 Gram(s) IV Push once  dextrose 50% Injectable 12.5 Gram(s) IV Push once  dextrose 50% Injectable 25 Gram(s) IV Push once  famotidine    Tablet 20 milliGRAM(s) Oral daily  folic acid 1 milliGRAM(s) Oral daily  heparin  Infusion. 1200 Unit(s)/Hr IV Continuous <Continuous>  hydrALAZINE Injectable 10 milliGRAM(s) IV Push every 4 hours PRN  influenza   Vaccine 0.5 milliLiter(s) IntraMuscular once  insulin lispro (ADMELOG) corrective regimen sliding scale   SubCutaneous Before meals and at bedtime  LORazepam   Injectable 2 milliGRAM(s) IV Push every 2 hours PRN  magnesium citrate Oral Solution 296 milliLiter(s) Oral once PRN  magnesium hydroxide Suspension 30 milliLiter(s) Oral daily PRN  multivitamin 1 Tablet(s) Oral daily  polyethylene glycol 3350 17 Gram(s) Oral every 12 hours  senna 2 Tablet(s) Oral every 12 hours      PHYSICAL EXAM:   Vital Signs Last 24 Hrs  T(C): 36.9 (19 Oct 2023 04:26), Max: 37.3 (18 Oct 2023 12:50)  T(F): 98.4 (19 Oct 2023 04:26), Max: 99.2 (18 Oct 2023 12:50)  HR: 57 (19 Oct 2023 04:00) (45 - 67)  BP: 142/85 (19 Oct 2023 04:00) (106/65 - 158/88)  BP(mean): 78 (18 Oct 2023 16:00) (76 - 105)  RR: 20 (19 Oct 2023 04:00) (20 - 20)  SpO2: 96% (19 Oct 2023 04:00) (96% - 99%)    Parameters below as of 19 Oct 2023 04:00  Patient On (Oxygen Delivery Method): room air      General: No acute distress     NEUROLOGICAL EXAM:  Mental status: Awake, alert, oriented x self  [facility], states year  2023 , [place] for month (was reoriented), repetition intact, IDs objects, follows simple commands, speech fluent,  impaired memory.   Cranial Nerves: slight right facial palsy, right superior quadrantopia in far periphery, left superior quadrantopia right facial palsy, no nystagmus, no dysarthria  Motor exam: Normal tone, no drift, 5/5 RUE, 5/5 RLE, 5/5 proximally, 4+/5  LUE, 5/5 LLE   Sensation: decreased  sensation on left   Coordination/ Gait: No dysmetria, gait not tested    LABS:                        14.4   5.69  )-----------( 117      ( 19 Oct 2023 05:47 )             41.8    10-18    137  |  100  |  12.8  ----------------------------<  138<H>  4.1   |  25.0  |  0.96    Ca    9.5      18 Oct 2023 05:37    TPro  6.8  /  Alb  4.0  /  TBili  0.8  /  DBili  x   /  AST  22  /  ALT  31  /  AlkPhos  71  10-18  PTT - ( 19 Oct 2023 05:47 )  PTT:90.9 sec      IMAGING: Reviewed by me.     CT Head No Cont (10.17.23 @ 12:19)   Multiple foci of low density right mesial temporal lobe and right   thalamus are more prominent on the current examination and likely   represent the presence of evolving cytotoxic edema.  Similar-appearing low density involving the left mesial temporal lobe   likely representing cytotoxic edema.  Redemonstration of chronic left of midline basis pontis infarct.  No acute intracranial hemorrhage.    CT Head No Cont (10.15.23 @ 13:13)   IMPRESSION: Stable medial left temporal occipital lobe infarction.    (10.14.23 @ 15:25)   CT BRAIN WITHOUT CONTRAST:  1. Decreased attenuation involving the medial aspect of the left temporal   lobe, not present on prior noncontrast CT study. However, restricted   diffusion was appreciated in the same location on the prior MRI of   10/13/2023.  2. Nonacute left ventral pontine ischemic event, present on prior CT and   MRI.    10.14.23 @ 14:58)   CT BRAIN WITHOUT CONTRAST:  1. Decreased attenuation involving the medial aspect of the left temporal   lobe, not present on prior noncontrast CT study. However, restricted   diffusion was appreciated in the same location on the prior MRI of   10/13/2023.  2. Nonacute left ventral pontine ischemic event, present on prior CT and   MRI.    CT PERFUSION:  Technical limitations: Significantly limited by patient motion during   image acquisition and suboptimal arterial waveform.   Core infartction: 0 ml; Penubra/tissue at risk for active ischemia: 75   mL involving right temporal brain parenchyma.  If symptoms persist consider follow up head CT or MRI, MRA  if no   contraindication.    CTA HEAD:  1. Poor visualization of the proximal M1 segment of the right middle   cerebral artery, as on prior.  2. Irregular pattern of flow within the P2 segment of the left posterior   cerebral artery, as on prior.  4. Cut off of the left posterior cerebral artery at the P1/P2 junction,   as on prior, with suggestion of distal reconstitution..  5. Hypoplastic poorly visualized intracranial right vertebral artery.  6. No evidence of aneurysm formation at the level of the [place] of   [place]. Tiny aneurysms can be beyond the resolution of CTA technique.    CTA NECK:  1. No evidence of significant stenosis or occlusion in association with   the bilateral common carotid arteriesor bilateral internal carotid   arteries.  2. Poorly visualized proximal left vertebral artery with suggestion of   distal reconstitution at the level of C2.       10/12/2023 19:13  IMPRESSION:  HEAD CT: No evidence of an acute intracranial hemorhage, midline shift or hydrocephalus. Mild bifrontal periventricular white matter ischemic changes. Bilateral maxillary sinusitis  NECK CTA: No hemodynamic significant narowing involving the carotid bifurcations. Hypoplastic right vertebral artery. Dominant left vertebral artery..  BRAIN CTA: Cutoff of the M1 portion of the right middle cerebral artery which may be chronic as there appears to be lenticular striate collaterals and reconstitution of more distal MCA segments. Cutoff of the right posterior cerebral artery at the P1-2 junction with reconstitution of more distal segments. Cut off of the left posterior cerebral artery at the P1-2 junction with mild distal reconstitution. Hypoplastic right vertebral artery with portions of the V4 segment not visualized  CT PERFUSION: Regions of ischemia within the posterior circulation with a volume of 121 mL without evidence of core infarction.    (10.13.23 @ 12:31)   MRI BRAIN: Acute small to moderate medial left temporal occipital lobe   infarctions. Acute punctate left occipital lobe infarction.  MRA BRAIN: Occluded right MCA. Occluded right PCA. Occluded right   vertebral artery proximal V4 segment.  MRI BRAIN NOVA: Values above.    TTE Echo Complete w/ Contrast w/ Doppler (10.13.23 @ 19:44)   Summary:   1. Technically difficult study.   2. Normal left ventricular internal cavity size.   3. Left ventricular ejection fraction, by visual estimation, is 50 to   55%.   4. Normal left atrial size.   5. Normal right atrial size.   6. Mildly enlarged right ventricle.   7. Dilatation of the ascending aorta.   8. Sclerotic aortic valve with normal opening.   9. Mild mitral annular calcification.  10. Mild thickening of the anterior mitral valve leaflet.  11. Trace mitral valve regurgitation.  12. Mild tricuspid regurgitation.  13. Trivial pericardial effusion.  14. Recommend transesophageal echocardiogram.       [facility] Stroke Team  Progress Note     HPI:  Patient is a 57 y/o M with a [facility]x of [facility]  (at the time patient received thrombolysis and had full  resolution of symptoms), family reported dove dove, HTN, DM, HLD nonadherent with medications, who presented following an episode of LOC.  As per sister report, patient was last seen well by his niece  morning of 10/12/23  before patient went to work. At around 6:15 pm that night patient had a sudden fall at work and lost consciousness It is unclear if patient had any symptoms before he fell. NIHSS 10 on admission . Transferred to [facility] for mechanical thrombectomy.     SUBJECTIVE: No events overnight. Notes headache is alright. Still with left numbness. Does not recall to compare.  No new neurologic complaints.  ROS reported negative unless otherwise noted.    acetaminophen     Tablet .. 650 milliGRAM(s) Oral every 6 hours PRN  atorvastatin 80 milliGRAM(s) Oral at bedtime  bisacodyl Suppository 10 milliGRAM(s) Rectal daily PRN  chlorhexidine 2% Cloths 1 Application(s) Topical <User Schedule>  dextrose 50% Injectable 25 Gram(s) IV Push once  dextrose 50% Injectable 12.5 Gram(s) IV Push once  dextrose 50% Injectable 25 Gram(s) IV Push once  famotidine    Tablet 20 milliGRAM(s) Oral daily  folic acid 1 milliGRAM(s) Oral daily  heparin  Infusion. 1200 Unit(s)/Hr IV Continuous <Continuous>  hydrALAZINE Injectable 10 milliGRAM(s) IV Push every 4 hours PRN  influenza   Vaccine 0.5 milliLiter(s) IntraMuscular once  insulin lispro (ADMELOG) corrective regimen sliding scale   SubCutaneous Before meals and at bedtime  LORazepam   Injectable 2 milliGRAM(s) IV Push every 2 hours PRN  magnesium citrate Oral Solution 296 milliLiter(s) Oral once PRN  magnesium hydroxide Suspension 30 milliLiter(s) Oral daily PRN  multivitamin 1 Tablet(s) Oral daily  polyethylene glycol 3350 17 Gram(s) Oral every 12 hours  senna 2 Tablet(s) Oral every 12 hours      PHYSICAL EXAM:   Vital Signs Last 24 Hrs  T(C): 36.9 (19 Oct 2023 04:26), Max: 37.3 (18 Oct 2023 12:50)  T(F): 98.4 (19 Oct 2023 04:26), Max: 99.2 (18 Oct 2023 12:50)  HR: 57 (19 Oct 2023 04:00) (45 - 67)  BP: 142/85 (19 Oct 2023 04:00) (106/65 - 158/88)  BP(mean): 78 (18 Oct 2023 16:00) (76 - 105)  RR: 20 (19 Oct 2023 04:00) (20 - 20)  SpO2: 96% (19 Oct 2023 04:00) (96% - 99%)    Parameters below as of 19 Oct 2023 04:00  Patient On (Oxygen Delivery Method): room air      General: No acute distress     NEUROLOGICAL EXAM:  Mental status: Awake, alert, oriented x self  [facility], states year  2023 , [place] for month (was reoriented), repetition intact, IDs objects, follows simple commands, speech fluent,  impaired memory.   Cranial Nerves: slight right facial palsy, right superior quadrantopia in far periphery, left superior quadrantopia right facial palsy, no nystagmus, no dysarthria  Motor exam: Normal tone, no drift, 5/5 RUE, 5/5 RLE, 5/5 proximally, 4+/5  LUE, 5/5 LLE   Sensation: decreased  sensation on left   Coordination/ Gait: No dysmetria, gait not tested    LABS:                        14.4   5.69  )-----------( 117      ( 19 Oct 2023 05:47 )             41.8    10-18    137  |  100  |  12.8  ----------------------------<  138<H>  4.1   |  25.0  |  0.96    Ca    9.5      18 Oct 2023 05:37    TPro  6.8  /  Alb  4.0  /  TBili  0.8  /  DBili  x   /  AST  22  /  ALT  31  /  AlkPhos  71  10-18  PTT - ( 19 Oct 2023 05:47 )  PTT:90.9 sec      IMAGING: Reviewed by me.     CT Head No Cont (10.17.23 @ 12:19)   Multiple foci of low density right mesial temporal lobe and right   thalamus are more prominent on the current examination and likely   represent the presence of evolving cytotoxic edema.  Similar-appearing low density involving the left mesial temporal lobe   likely representing cytotoxic edema.  Redemonstration of chronic left of midline basis pontis infarct.  No acute intracranial hemorrhage.    CT Head No Cont (10.15.23 @ 13:13)   IMPRESSION: Stable medial left temporal occipital lobe infarction.    (10.14.23 @ 15:25)   CT BRAIN WITHOUT CONTRAST:  1. Decreased attenuation involving the medial aspect of the left temporal   lobe, not present on prior noncontrast CT study. However, restricted   diffusion was appreciated in the same location on the prior MRI of   10/13/2023.  2. Nonacute left ventral pontine ischemic event, present on prior CT and   MRI.    10.14.23 @ 14:58)   CT BRAIN WITHOUT CONTRAST:  1. Decreased attenuation involving the medial aspect of the left temporal   lobe, not present on prior noncontrast CT study. However, restricted   diffusion was appreciated in the same location on the prior MRI of   10/13/2023.  2. Nonacute left ventral pontine ischemic event, present on prior CT and   MRI.    CT PERFUSION:  Technical limitations: Significantly limited by patient motion during   image acquisition and suboptimal arterial waveform.   Core infartction: 0 ml; Penubra/tissue at risk for active ischemia: 75   mL involving right temporal brain parenchyma.  If symptoms persist consider follow up head CT or MRI, MRA  if no   contraindication.    CTA HEAD:  1. Poor visualization of the proximal M1 segment of the right middle   cerebral artery, as on prior.  2. Irregular pattern of flow within the P2 segment of the left posterior   cerebral artery, as on prior.  4. Cut off of the left posterior cerebral artery at the P1/P2 junction,   as on prior, with suggestion of distal reconstitution..  5. Hypoplastic poorly visualized intracranial right vertebral artery.  6. No evidence of aneurysm formation at the level of the [place] of   [place]. Tiny aneurysms can be beyond the resolution of CTA technique.    CTA NECK:  1. No evidence of significant stenosis or occlusion in association with   the bilateral common carotid arteriesor bilateral internal carotid   arteries.  2. Poorly visualized proximal left vertebral artery with suggestion of   distal reconstitution at the level of C2.       10/12/2023 19:13  IMPRESSION:  HEAD CT: No evidence of an acute intracranial hemorhage, midline shift or hydrocephalus. Mild bifrontal periventricular white matter ischemic changes. Bilateral maxillary sinusitis  NECK CTA: No hemodynamic significant narowing involving the carotid bifurcations. Hypoplastic right vertebral artery. Dominant left vertebral artery..  BRAIN CTA: Cutoff of the M1 portion of the right middle cerebral artery which may be chronic as there appears to be lenticular striate collaterals and reconstitution of more distal MCA segments. Cutoff of the right posterior cerebral artery at the P1-2 junction with reconstitution of more distal segments. Cut off of the left posterior cerebral artery at the P1-2 junction with mild distal reconstitution. Hypoplastic right vertebral artery with portions of the V4 segment not visualized  CT PERFUSION: Regions of ischemia within the posterior circulation with a volume of 121 mL without evidence of core infarction.    (10.13.23 @ 12:31)   MRI BRAIN: Acute small to moderate medial left temporal occipital lobe   infarctions. Acute punctate left occipital lobe infarction.  MRA BRAIN: Occluded right MCA. Occluded right PCA. Occluded right   vertebral artery proximal V4 segment.  MRI BRAIN NOVA: Values above.    TTE Echo Complete w/ Contrast w/ Doppler (10.13.23 @ 19:44)   Summary:   1. Technically difficult study.   2. Normal left ventricular internal cavity size.   3. Left ventricular ejection fraction, by visual estimation, is 50 to   55%.   4. Normal left atrial size.   5. Normal right atrial size.   6. Mildly enlarged right ventricle.   7. Dilatation of the ascending aorta.   8. Sclerotic aortic valve with normal opening.   9. Mild mitral annular calcification.  10. Mild thickening of the anterior mitral valve leaflet.  11. Trace mitral valve regurgitation.  12. Mild tricuspid regurgitation.  13. Trivial pericardial effusion.  14. Recommend transesophageal echocardiogram.       [facility] Stroke Team  Progress Note     HPI:  Patient is a 59 y/o M with a PMHx of stroke 2022 reported at [facility]  (at the time patient received thrombolysis and had full  resolution of symptoms), family reported dove dove, HTN, DM, HLD nonadherent with medications, who presented following an episode of LOC.  As per sister report, patient was last seen well by his niece  morning of 10/12/23  before patient went to work. At around 6:15 pm that night patient had a sudden fall at work and lost consciousness It is unclear if patient had any symptoms before he fell. NIHSS 10 on admission . Transferred to [facility] for mechanical thrombectomy.     SUBJECTIVE: No events overnight. Notes headache is alright. Still with left numbness. Does not recall to compare.  No new neurologic complaints.  ROS reported negative unless otherwise noted.    acetaminophen     Tablet .. 650 milliGRAM(s) Oral every 6 hours PRN  atorvastatin 80 milliGRAM(s) Oral at bedtime  bisacodyl Suppository 10 milliGRAM(s) Rectal daily PRN  chlorhexidine 2% Cloths 1 Application(s) Topical <User Schedule>  dextrose 50% Injectable 25 Gram(s) IV Push once  dextrose 50% Injectable 12.5 Gram(s) IV Push once  dextrose 50% Injectable 25 Gram(s) IV Push once  famotidine    Tablet 20 milliGRAM(s) Oral daily  folic acid 1 milliGRAM(s) Oral daily  heparin  Infusion. 1200 Unit(s)/Hr IV Continuous <Continuous>  hydrALAZINE Injectable 10 milliGRAM(s) IV Push every 4 hours PRN  influenza   Vaccine 0.5 milliLiter(s) IntraMuscular once  insulin lispro (ADMELOG) corrective regimen sliding scale   SubCutaneous Before meals and at bedtime  LORazepam   Injectable 2 milliGRAM(s) IV Push every 2 hours PRN  magnesium citrate Oral Solution 296 milliLiter(s) Oral once PRN  magnesium hydroxide Suspension 30 milliLiter(s) Oral daily PRN  multivitamin 1 Tablet(s) Oral daily  polyethylene glycol 3350 17 Gram(s) Oral every 12 hours  senna 2 Tablet(s) Oral every 12 hours      PHYSICAL EXAM:   Vital Signs Last 24 Hrs  T(C): 36.9 (19 Oct 2023 04:26), Max: 37.3 (18 Oct 2023 12:50)  T(F): 98.4 (19 Oct 2023 04:26), Max: 99.2 (18 Oct 2023 12:50)  HR: 57 (19 Oct 2023 04:00) (45 - 67)  BP: 142/85 (19 Oct 2023 04:00) (106/65 - 158/88)  BP(mean): 78 (18 Oct 2023 16:00) (76 - 105)  RR: 20 (19 Oct 2023 04:00) (20 - 20)  SpO2: 96% (19 Oct 2023 04:00) (96% - 99%)    Parameters below as of 19 Oct 2023 04:00  Patient On (Oxygen Delivery Method): room air      General: No acute distress     NEUROLOGICAL EXAM:  Mental status: Awake, alert, oriented x self  [facility], states year  2023 , Branden for month (was reoriented), repetition intact, IDs objects, follows simple commands, speech fluent,  impaired memory.   Cranial Nerves: slight right facial palsy, right superior quadrantopia in far periphery, left superior quadrantopia right facial palsy, no nystagmus, no dysarthria  Motor exam: Normal tone, no drift, 5/5 RUE, 5/5 RLE, 5/5 proximally, 4+/5  LUE, 5/5 LLE   Sensation: decreased  sensation on left   Coordination/ Gait: No dysmetria, gait not tested    LABS:                        14.4   5.69  )-----------( 117      ( 19 Oct 2023 05:47 )             41.8    10-18    137  |  100  |  12.8  ----------------------------<  138<H>  4.1   |  25.0  |  0.96    Ca    9.5      18 Oct 2023 05:37    TPro  6.8  /  Alb  4.0  /  TBili  0.8  /  DBili  x   /  AST  22  /  ALT  31  /  AlkPhos  71  10-18  PTT - ( 19 Oct 2023 05:47 )  PTT:90.9 sec        IMAGING: Reviewed by me.     CT Head No Cont (10.17.23 @ 12:19)   Multiple foci of low density right mesial temporal lobe and right   thalamus are more prominent on the current examination and likely   represent the presence of evolving cytotoxic edema.  Similar-appearing low density involving the left mesial temporal lobe   likely representing cytotoxic edema.  Redemonstration of chronic left of midline basis pontis infarct.  No acute intracranial hemorrhage.    CT Head No Cont (10.15.23 @ 13:13)   IMPRESSION: Stable medial left temporal occipital lobe infarction.    (10.14.23 @ 15:25)   CT BRAIN WITHOUT CONTRAST:  1. Decreased attenuation involving the medial aspect of the left temporal   lobe, not present on prior noncontrast CT study. However, restricted   diffusion was appreciated in the same location on the prior MRI of   10/13/2023.  2. Nonacute left ventral pontine ischemic event, present on prior CT and   MRI.    10.14.23 @ 14:58)   CT BRAIN WITHOUT CONTRAST:  1. Decreased attenuation involving the medial aspect of the left temporal   lobe, not present on prior noncontrast CT study. However, restricted   diffusion was appreciated in the same location on the prior MRI of   10/13/2023.  2. Nonacute left ventral pontine ischemic event, present on prior CT and   MRI.    CT PERFUSION:  Technical limitations: Significantly limited by patient motion during   image acquisition and suboptimal arterial waveform.   Core infartction: 0 ml; [place]/tissue at risk for active ischemia: 75   mL involving right temporal brain parenchyma.  If symptoms persist consider follow up head CT or MRI, MRA  if no   contraindication.    CTA HEAD:  1. Poor visualization of the proximal M1 segment of the right middle   cerebral artery, as on prior.  2. Irregular pattern of flow within the P2 segment of the left posterior   cerebral artery, as on prior.  4. Cut off of the left posterior cerebral artery at the P1/P2 junction,   as on prior, with suggestion of distal reconstitution..  5. Hypoplastic poorly visualized intracranial right vertebral artery.  6. No evidence of aneurysm formation at the level of the [place] of   [place]. Tiny aneurysms can be beyond the resolution of CTA technique.    CTA NECK:  1. No evidence of significant stenosis or occlusion in association with   the bilateral common carotid arteriesor bilateral internal carotid   arteries.  2. Poorly visualized proximal left vertebral artery with suggestion of   distal reconstitution at the level of C2.       10/12/2023 19:13  IMPRESSION:  HEAD CT: No evidence of an acute intracranial hemorrhage midline shift or hydrocephalus. Mild bifrontal periventricular white matter ischemic changes. Bilateral maxillary sinusitis  NECK CTA: No hemodynamic significant narowing involving the carotid bifurcations. Hypoplastic right vertebral artery. Dominant left vertebral artery..  BRAIN CTA: Cutoff of the M1 portion of the right middle cerebral artery which may be chronic as there appears to be lenticular striate collaterals and reconstitution of more distal MCA segments. Cutoff of the right posterior cerebral artery at the P1-2 junction with reconstitution of more distal segments. Cut off of the left posterior cerebral artery at the P1-2 junction with mild distal reconstitution. Hypoplastic right vertebral artery with portions of the V4 segment not visualized  CT PERFUSION: Regions of ischemia within the posterior circulation with a volume of 121 mL without evidence of core infarction.    (10.13.23 @ 12:31)   MRI BRAIN: Acute small to moderate medial left temporal occipital lobe   infarctions. Acute punctate left occipital lobe infarction.  MRA BRAIN: Occluded right MCA. Occluded right PCA. Occluded right   vertebral artery proximal V4 segment.  MRI BRAIN NOVA: Values above.    TTE Echo Complete w/ Contrast w/ Doppler (10.13.23 @ 19:44)   Summary:   1. Technically difficult study.   2. Normal left ventricular internal cavity size.   3. Left ventricular ejection fraction, by visual estimation, is 50 to   55%.   4. Normal left atrial size.   5. Normal right atrial size.   6. Mildly enlarged right ventricle.   7. Dilatation of the ascending aorta.   8. Sclerotic aortic valve with normal opening.   9. Mild mitral annular calcification.  10. Mild thickening of the anterior mitral valve leaflet.  11. Trace mitral valve regurgitation.  12. Mild tricuspid regurgitation.  13. Trivial pericardial effusion.  14. Recommend transesophageal echocardiogram.

## 2023-10-19 NOTE — PROGRESS NOTE ADULT - ASSESSMENT
57 y/o M with a Hx of stroke one year ago (at the time patient received TNK and had full  resolution of symptoms), HTN, DM, non-compliant with medications, who presented following an episode of LOC, as per family at around 6:15 pm patient had a sudden fall at work and lost consciousness. It is unclear if patient had any symptoms before he fell. NIHSS 10 on admission, admitted under NeuroICU on 10/12: Thrombectomy via L radial artery for TICI 3 revascularization of L P1-P2 occlusion. Noted LMCA chronic occlusion., on 0/13: Neurologic status improving. Got MR. Started on ASA and SQL, on 10/14: quadrantanopia now on L superior (originally R); Repeat stroke imaging completed and initial concern for CTP showing poss new perfusion deficit however on further review noted to be present on admission. Started on heparin drip per neurology recommendations, on 10/15: Therapeutic on heparin drip, stability HCT obtained. Cardiology consulted for further stroke work-up and downgraded under medicine.     #Acute CVA  s/p thrombectomy   c/w Lipitor  TTE shows with  Normal left ventricular internal cavity size,  3. Left ventricular ejection fraction, by visual estimation, is 50 to   55%.  DVT studies negative  Started on heparin drip given extensive atherosclerotic disease per Neuro. Will switch to Eliquis after ILR placement.   Per Neuro no ROCIO  BP goal 140-180s per neuro  will continue with Neuro checks Q4  repeat CT head showed worsening of prior stroke  Neurology following   PT/OT eval - acute rehab    #Occluded R MCA/PCA:   per discussion with neuro, pt with collaterals  c/w q4hr neuro checks  outpt neuro IR followup    #ETOH abuse  not in active withdrawal, and pt denies h/o DT  c/w symptom trigger CIWA  c/w MV, thiamine (for suspected deficiency) and folic acid  SW consulted    #Episode of bradycardia with concern for 2:1 block on tele  TTE EF 50-55%, no other structural abnormal   cards naomie recs  as per electrophysiology   - No indication for pacemaker  - HR went down significantly, he was Metoprolol 12.5mg BID has been discontinued   - ILR prior to discharge    #HTN  No meds at home  Per neuro, BP goal 140-180 mmHg for now  cont to monitor    #DM 2  non compliant with meds at home  A1C 8.6  Accu checks and ISS    #Elevated TSH  T4 level normal  outpt f/u    #HLD  c/w Lipitor 80mg QD    DVT Prophylaxis -- Patient is on Heparin Infusion.     Dispo: Acute Rehab once stable.

## 2023-10-19 NOTE — CHART NOTE - NSCHARTNOTEFT_GEN_A_CORE
Telemetry reviewed: sinus bradycardia with APCs, atrial bigeminy, and blocked APCs, as well as PVCs and occasional PJCs.   EP plan remains ILR prior to d/c. Please call EP to arrange when d/c planning.

## 2023-10-19 NOTE — CHART NOTE - NSCHARTNOTEFT_GEN_A_CORE
GYN FOLLOW UP VISIT    CC:  painful heavy menses     HPI: Patient is a 29 y.o.  who presents for follow up for painful heavy menses. She saw Dr. Fernandes with the same complaints in April and desire at that time was to switch to Mirena IUD which she's had in the past but she just never followed up with making the appt. She is here today to again discuss her concerns. She states that over the past 4 months the menstrual pain has become even more severe and intense.  The cramps feel as bad as contractions.  She remembers she had very little bleeding with the IUD and doesn't recall cramps so she does desire to return to IUD.  She takes ibuprofen for cramps but just doesn't feel it fully controls the pain.    Patient's last menstrual period was 2023.       ROS:   General: denies fever / chills  HEENT: denies sore throat:  CV: denies chest pain:  Repiratory: denies shortness of breath  GI: denies abdominal pain  : denies dysuria:    PFSH:  I personally reviewed the past medical and surgical histories.       PHYSICAL EXAMINATION:  Vital Signs:   Vitals:    23 1518   BP: 134/86   BP Location: Right arm   Patient Position: Sitting   BP Cuff Size: Adult   Weight: 117 lb 8 oz     Body mass index is 24.56 kg/m².    Gen: appears well, NAD  Respiratory: normal effort  Abdomen: Soft, non-tender.    Last US 2022 notes retroverted uterus, otherwise normal    ASSESSMENT AND PLAN:  29 y.o.    1. Dysmenorrhea   - will obtain pelvic US to exclude pathology since the pain is acutely worse   - Rx NSAID to better treat pain.  Discussed precautions for GI upset, bleeding, ulcers.  Medication to be taken during menses for 3-5 days    - pt is on day 7 of cycle now. Advised abstinence and return on 10/9 for IUD insertion    - Mefenamic Acid 250 MG Cap; 2 tablets PO for first dose followed by 1 tablet q 6 hours for 3-5 days during menses  Dispense: 21 Capsule; Refill: 3  - US-PELVIC COMPLETE  (TRANSABDOMINAL/TRANSVAGINAL) (COMBO); Future      Lynda Ochoa D.O.     Source: Patient [ ]  Family [ ]   other [x ] EMR, Nurse    59 y/o M with a Hx of stroke one year ago (at the time patient received TNK and had full  resolution of symptoms), HTN, DM, non-compliant with medications, who presented following an episode of LOC, as per family at around 6:15 pm patient had a sudden fall at work and lost consciousness. It is unclear if patient had any symptoms before he fell. NIHSS 10 on admission, admitted under NeuroICU on 10/12: Thrombectomy via L radial artery for TICI 3 revascularization of L P1-P2 occlusion. Noted LMCA chronic occlusion., on 0/13: Neurologic status improving. Got MR. Started on ASA and SQL, on 10/14: quadrantanopia now on L superior (originally R); Repeat stroke imaging completed and initial concern for CTP showing poss new perfusion deficit however on further review noted to be present on admission. Started on heparin drip per neurology recommendations, on 10/15: Therapeutic on heparin drip, stability HCT obtained. Cardiology consult for further stroke work-up and downgraded under medicine.     Current Diet: Diet, DASH/TLC:   Sodium & Cholesterol Restricted (10-13-23 @ 00:33)    PO intake:  < 50% [x ]   50-75%  [ ]   %  [ ]  other :    Source for PO intake [ ] Patient [ ] family [ x] chart [x ] staff [ ] other    Current Weight:   241.12 lb (10/18)  245 lb (10/15)    % Weight Change: No edema    Pertinent Medications: MEDICATIONS  (STANDING):  atorvastatin 80 milliGRAM(s) Oral at bedtime  chlorhexidine 2% Cloths 1 Application(s) Topical <User Schedule>  dextrose 50% Injectable 12.5 Gram(s) IV Push once  dextrose 50% Injectable 25 Gram(s) IV Push once  dextrose 50% Injectable 25 Gram(s) IV Push once  famotidine    Tablet 20 milliGRAM(s) Oral daily  folic acid 1 milliGRAM(s) Oral daily  heparin  Infusion. 1200 Unit(s)/Hr (12 mL/Hr) IV Continuous <Continuous>  influenza   Vaccine 0.5 milliLiter(s) IntraMuscular once  insulin lispro (ADMELOG) corrective regimen sliding scale   SubCutaneous Before meals and at bedtime  multivitamin 1 Tablet(s) Oral daily  polyethylene glycol 3350 17 Gram(s) Oral every 12 hours  senna 2 Tablet(s) Oral every 12 hours    MEDICATIONS  (PRN):  acetaminophen     Tablet .. 650 milliGRAM(s) Oral every 6 hours PRN Mild Pain (1 - 3)  bisacodyl Suppository 10 milliGRAM(s) Rectal daily PRN Constipation  hydrALAZINE Injectable 10 milliGRAM(s) IV Push every 4 hours PRN SBP>160  LORazepam   Injectable 2 milliGRAM(s) IV Push every 2 hours PRN CIWA-Ar score increase by 2 points and a total score of 7 or less  magnesium citrate Oral Solution 296 milliLiter(s) Oral once PRN if he does not go with suppository  magnesium hydroxide Suspension 30 milliLiter(s) Oral daily PRN Constipation    Pertinent Labs: CBC Full  -  ( 19 Oct 2023 05:47 )  WBC Count : 5.69 K/uL  RBC Count : 4.52 M/uL  Hemoglobin : 14.4 g/dL  Hematocrit : 41.8 %  Platelet Count - Automated : 117 K/uL  Mean Cell Volume : 92.5 fl  Mean Cell Hemoglobin : 31.9 pg  Mean Cell Hemoglobin Concentration : 34.4 gm/dL    Skin: No skin breakdown    Nutrition focused physical exam conducted - found signs of malnutrition [x ]absent [ ]present    Subcutaneous fat loss: [ ] Orbital fat pads region, [ ]Buccal fat region, [ ]Triceps region,  [ ]Ribs region    Muscle wasting: [ ]Temples region, [ ]Clavicle region, [ ]Shoulder region, [ ]Scapula region, [ ]Interosseous region,  [ ]thigh region, [ ]Calf region    Estimated Needs:   [x ] no change since previous assessment  [ ] recalculated:     Current Nutrition Diagnosis: Increased Nutrient Needs (protein and micronutrients) related to increased physiologic demand of stress factor as evidenced by s/p CVA. Pt asleep during follow up. Po documented % on (10/15). As per nurse, yesterday po 0-25%. Pt noted to be experiencing short term memory loss. A1c 8.6. Will defer education until pt more appropriate. Will continue to monitor. Last BM noted (10/17).      Recommendations:   1) Cont MVI, Thiamine, folic acid  2) Encourage po intake     Monitoring and Evaluation:   [x ] PO intake [x ] Tolerance to diet prescription [X] Weights  [X] Follow up per protocol [X] Labs: Source: Patient [ ]  Family [ ]   other [x ] EMR, Nurse    59 y/o M with a Hx of stroke one year ago (at the time patient received TNK and had full  resolution of symptoms), HTN, DM, non-compliant with medications, who presented following an episode of LOC, as per family at around 6:15 pm patient had a sudden fall at work and lost consciousness. It is unclear if patient had any symptoms before he fell. NIHSS 10 on admission, admitted under NeuroICU on 10/12: Thrombectomy via L radial artery for TICI 3 revascularization of L P1-P2 occlusion. Noted LMCA chronic occlusion., on 0/13: Neurologic status improving. Got MR. Started on ASA and SQL, on 10/14: quadrantanopia now on L superior (originally R); Repeat stroke imaging completed and initial concern for CTP showing poss new perfusion deficit however on further review noted to be present on admission. Started on heparin drip per neurology recommendations, on 10/15: Therapeutic on heparin drip, stability HCT obtained. Cardiology consult for further stroke work-up and downgraded under medicine.     Current Diet: Diet, DASH/TLC:   Sodium & Cholesterol Restricted (10-13-23 @ 00:33)    PO intake:  < 50% [x ]   50-75%  [ ]   %  [ ]  other :    Source for PO intake [ ] Patient [ ] family [ x] chart [x ] staff [ ] other    Current Weight:   241.12 lb (10/18)  245 lb (10/15)    % Weight Change: no significant weight loss noted, No edema effecting weight change.    Pertinent Medications: MEDICATIONS  (STANDING):  atorvastatin 80 milliGRAM(s) Oral at bedtime  chlorhexidine 2% Cloths 1 Application(s) Topical <User Schedule>  dextrose 50% Injectable 12.5 Gram(s) IV Push once  dextrose 50% Injectable 25 Gram(s) IV Push once  dextrose 50% Injectable 25 Gram(s) IV Push once  famotidine    Tablet 20 milliGRAM(s) Oral daily  folic acid 1 milliGRAM(s) Oral daily  heparin  Infusion. 1200 Unit(s)/Hr (12 mL/Hr) IV Continuous <Continuous>  influenza   Vaccine 0.5 milliLiter(s) IntraMuscular once  insulin lispro (ADMELOG) corrective regimen sliding scale   SubCutaneous Before meals and at bedtime  multivitamin 1 Tablet(s) Oral daily  polyethylene glycol 3350 17 Gram(s) Oral every 12 hours  senna 2 Tablet(s) Oral every 12 hours    MEDICATIONS  (PRN):  acetaminophen     Tablet .. 650 milliGRAM(s) Oral every 6 hours PRN Mild Pain (1 - 3)  bisacodyl Suppository 10 milliGRAM(s) Rectal daily PRN Constipation  hydrALAZINE Injectable 10 milliGRAM(s) IV Push every 4 hours PRN SBP>160  LORazepam   Injectable 2 milliGRAM(s) IV Push every 2 hours PRN CIWA-Ar score increase by 2 points and a total score of 7 or less  magnesium citrate Oral Solution 296 milliLiter(s) Oral once PRN if he does not go with suppository  magnesium hydroxide Suspension 30 milliLiter(s) Oral daily PRN Constipation    Pertinent Labs: CBC Full  -  ( 19 Oct 2023 05:47 )  WBC Count : 5.69 K/uL  RBC Count : 4.52 M/uL  Hemoglobin : 14.4 g/dL  Hematocrit : 41.8 %  Platelet Count - Automated : 117 K/uL  Mean Cell Volume : 92.5 fl  Mean Cell Hemoglobin : 31.9 pg  Mean Cell Hemoglobin Concentration : 34.4 gm/dL    Skin: No skin breakdown    Nutrition focused physical exam conducted - found signs of malnutrition [x ]absent [ ]present    Subcutaneous fat loss: [ ] Orbital fat pads region, [ ]Buccal fat region, [ ]Triceps region,  [ ]Ribs region    Muscle wasting: [ ]Temples region, [ ]Clavicle region, [ ]Shoulder region, [ ]Scapula region, [ ]Interosseous region,  [ ]thigh region, [ ]Calf region    Estimated Needs:   [x ] no change since previous assessment  [ ] recalculated:     Current Nutrition Diagnosis: Increased Nutrient Needs (protein and micronutrients) related to increased physiologic demand of stress factor as evidenced by s/p CVA. Pt asleep during follow up. Po documented % on (10/15). As per nurse, yesterday po 0-25%. Pt noted to be experiencing short term memory loss. A1c 8.6. Will defer education until pt more appropriate. Will continue to monitor. Last BM noted (10/17).      Recommendations:   1) Cont MVI, Thiamine, folic acid  2) Encourage po intake     Monitoring and Evaluation:   [x ] PO intake [x ] Tolerance to diet prescription [X] Weights  [X] Follow up per protocol [X] Labs: Source: Patient [ ]  Family [ ]   other [x ] EMR, Nurse    59 y/o M with a Hx of stroke one year ago (at the time patient received TNK and had full  resolution of symptoms), HTN, DM, non-compliant with medications, who presented following an episode of LOC, as per family at around 6:15 pm patient had a sudden fall at work and lost consciousness. It is unclear if patient had any symptoms before he fell. NIHSS 10 on admission, admitted under NeuroICU on 10/12: Thrombectomy via L radial artery for TICI 3 revascularization of L P1-P2 occlusion. Noted LMCA chronic occlusion., on 0/13: Neurologic status improving. Got MR. Started on ASA and SQL, on 10/14: quadrantanopia now on L superior (originally R); Repeat stroke imaging completed and initial concern for CTP showing poss new perfusion deficit however on further review noted to be present on admission. Started on heparin drip per neurology recommendations, on 10/15: Therapeutic on heparin drip, stability HCT obtained. Cardiology consult for further stroke work-up and downgraded under medicine.     Current Diet: Diet, DASH/TLC:   Sodium & Cholesterol Restricted (10-13-23 @ 00:33)    PO intake:  < 50% [x ]   50-75%  [ ]   %  [ ]  other :    Source for PO intake [ ] Patient [ ] family [ x] chart [x ] staff [ ] other    Current Weight:   241.12 lb (10/18)  245 lb (10/15)  No edema documented     Pertinent Medications: MEDICATIONS  (STANDING):  folic acid 1 milliGRAM(s) Oral daily  insulin lispro (ADMELOG) corrective regimen sliding scale   SubCutaneous Before meals and at bedtime  multivitamin 1 Tablet(s) Oral daily  polyethylene glycol 3350 17 Gram(s) Oral every 12 hours  senna 2 Tablet(s) Oral every 12 hours    MEDICATIONS  (PRN):  bisacodyl Suppository 10 milliGRAM(s) Rectal daily PRN Constipation  magnesium citrate Oral Solution 296 milliLiter(s) Oral once PRN if he does not go with suppository  magnesium hydroxide Suspension 30 milliLiter(s) Oral daily PRN Constipation    Pertinent Labs: CBC Full  -  ( 19 Oct 2023 05:47 )  WBC Count : 5.69 K/uL  RBC Count : 4.52 M/uL  Hemoglobin : 14.4 g/dL  Hematocrit : 41.8 %    Skin: No skin breakdown    Nutrition focused physical exam conducted - found signs of malnutrition [x ]absent [ ]present    Subcutaneous fat loss: [ ] Orbital fat pads region, [ ]Buccal fat region, [ ]Triceps region,  [ ]Ribs region    Muscle wasting: [ ]Temples region, [ ]Clavicle region, [ ]Shoulder region, [ ]Scapula region, [ ]Interosseous region,  [ ]thigh region, [ ]Calf region    Estimated Needs:   [x ] no change since previous assessment  [ ] recalculated:     Current Nutrition Diagnosis: Increased Nutrient Needs (protein and micronutrients) related to increased physiologic demand of stress factor as evidenced by s/p CVA. Pt asleep during follow up, unable to provide diet education; noted pt continues to experience short term memory loss. As per nurse, yesterday po 0-25%. Last BM noted (10/17).    Recommendations:   1) Add consistent carbohydrate to diet rx.   2) Add Glucerna BID (220 kcal, 10g protein per serving).  3) Continue folic acid and MVI daily.   4) Encourage po intake, monitor diet tolerance, and provide assistance at meals as needed.  5) Obtain daily weights to monitor trends.   6) Continue bowel regimen PRN.     Monitoring and Evaluation:   [x ] PO intake [x ] Tolerance to diet prescription [X] Weights  [X] Follow up per protocol [X] Labs: chem 8, mg, phos, H/H

## 2023-10-19 NOTE — PROGRESS NOTE ADULT - ASSESSMENT
INCOMPLETE- VISIT PENDING     ASSESSMENT: Patient is a 59 y/o M with a PMHx of stroke 2022 reported at [facility]  (at the time patient received thrombolysis and had full  resolution of symptoms), reported dove dove, HTN, DM, HLD nonadherent with medications, who presented following an episode of LOC while at work the evening of 10/12/23, there was reported right sided hemisensory loss and right homonomous hemianopia .  Reported unclear if definitively last known well was prior at 1815 therefor excluded from tenecteplase after CT head did not demonstrate acute infarction or hemorrhage.  Perfusion demonstrated regions of ischemia in the posterior circulation, CT angiogram head/neck showed right PCA P1-2 cutoff, possibly chronic right MCA M1 cutoff which had distal reconstitution and established collaterals, and left PCA occlusion which patient was subsequently transferred for mechanical thrombectomy. Cerebral angiogram and mechanical thrombectomy performed using aspiration with TICI 3 recanalization of left PCA.  The right occipital lobe filled from right SHANI collaterals, and the right MCA as well was thought to be chronic given noted collateralization. MR Brain with small to moderate left temporal occipital lobe infarctions. Occluded right MCA, occluded right PCA and right vertebral artery proximal segment. CT head now with new hypodensity in right hemisphere (right temporal lobe and thalamic region) possibly hypoperfusion with concern for underlying ischemia. Etiology; embolic stroke of undetermined source.  While there is underlying dove dove with dependency on collateralization  can not entirely exclude cardioembolic / hypercoagulable state, avoid  hypotension or hypotension precipitating events.     NEURO:   -Neurologically  .   - Repeat CT head (10/17) as noted above, showed Right mesial temporal lobe and thalamus hypodensity likely evolving cytotoxic edema.   - MRI deferred per d/w IR as likely not to change acute management at this time.  -some memory impairment - OT cognitive evaluation , possible outpatient neuropsych follow up for cognitive therapy  -Continue close monitoring for neurologic deterioration    - Stroke neuro checks q 2 hrs.  -TTE  -Report as above was reviewed.  -Check blood cx x 2  -LE duplex without evidence of DVT reported  -Hypercoagulable panel in progress   - SBP goal permissive to keep 140-180 mm Hg for now due to concern for hypoperfusion per d/w GUICHO, avoid further hypertension as to minimize risk of hemorrhagic component. Further titration pending clinical course.   -ANTITHROMBOTIC THERAPY:  After discussion with GUICHO attending Dr. Goodrich decision was made (given fluctuating/new symptoms as noted) to initiate Heparin gtt followed by Eliquis 5 mg BID. Anticipate 3 month course for now, long term to be determined w/ Dr. Goodrich as outpatient.  BP goal as noted above as patient appears to be dependent on collateral flow and high risk for recurrent thrombotic event.  Monitor closely and repeat CT head for any change and notify on call neurology team.   -ASA discontinued in setting of therapeutic anticoagulation.  -titrate statin to LDL goal less than 70  -MRI Brain w/o as noted    - MRA Head NOVA -images and reports were reviewed.   -Consideration in Outpatient NM spect w/w out Diamox w/ close outpatient follow up for consideration in bypass pending repeat imaging and clinical course   -Dysphagia screen: pass  -Physical therapy/OT/Speech eval/treatment.     -CARDIOVASCULAR:  TTE as noted, cardiac monitoring w/ telemetry for now, further evaluation pending findings of noted workup and clinical course, at this time defer ROCIO as to avoid precipitating hypotension due to concern for hypoperfusion, d/w cardiology team, no obvious evidence of cardiac mass/ vegetation to warrant further evaluation given overall risk / benefit.  ILR upon discharge and . Please use with caution, ideally, suggest to defer BP pressure altering agents at this time due to sensitivity and concern for hypoperfusion and SBP goal 140-180 mmHg for now; if agent is  absolutely needed suggest to titrate gradually with hold parameters with SBP less than 150-160mmHg.                 -HEMATOLOGY: H/H with anemia, Platelets 117, close monitoring for further thrombocytopenia, suggest HIT/MELISSA if persists/worsens, patient should have all age and risk appropriate malignancy screenings with PCP or sooner if clinically suspected      DVT ppx: heparin gtt    PULMONARY: CXR pending , protecting airway, saturating well     RENAL: BUN/Cr within range, monitor urine output, maintain adequate hydration       Na Goal:  135-145    ID: afebrile, no leukocytosis, monitor for si/sx of infection, suggest blood cultures to ensure no underlying infectious contributing process.     OTHER:  condition and plan of care d/w patient, his sister (by phone) and medical team, questions and concerns addressed.  CIWA protcol, monitor closely for si/sx of withdrawal.    DISPOSITION: Rehab or home depending on PT eval once stable and workup is complete      CORE MEASURES:        Admission NIHSS:10      Tenecteplase : [] YES [x] NO      LDL/HDL/A1C: 115/29/8.6     Depression Screen- if depression hx and/or present      Statin Therapy: as noted      Dysphagia Screen: [x] PASS [] FAIL     Smoking [] YES [x] NO      Afib [] YES [x] NO     Stroke Education [x] YES [] NO    Obtain screening lower extremity venous ultrasound in patients who meet 1 or more of the following criteria as patient is high risk for DVT/PE on admission:   [] History of DVT/PE  []Hypercoagulable states (Factor V Leiden, Cancer, OCP, etc. )  []Prolonged immobility (hemiplegia/hemiparesis/post operative or any other extended immobilization)  [] Transferred from outside facility (Rehab or Long term care)  [] Age </= to 50 INCOMPLETE     ASSESSMENT: Patient is a 57 y/o M with a PMHx of stroke 2022 reported at [facility]  (at the time patient received thrombolysis and had full  resolution of symptoms), reported dove dove, HTN, DM, HLD nonadherent with medications, who presented following an episode of LOC while at work the evening of 10/12/23, there was reported right sided hemisensory loss and right homonomous hemianopia .  Reported unclear if definitively last known well was prior at 1815 therefor excluded from tenecteplase after CT head did not demonstrate acute infarction or hemorrhage.  Perfusion demonstrated regions of ischemia in the posterior circulation, CT angiogram head/neck showed right PCA P1-2 cutoff, possibly chronic right MCA M1 cutoff which had distal reconstitution and established collaterals, and left PCA occlusion which patient was subsequently transferred for mechanical thrombectomy. Cerebral angiogram and mechanical thrombectomy performed using aspiration with TICI 3 recanalization of left PCA.  The right occipital lobe filled from right SHANI collaterals, and the right MCA as well was thought to be chronic given noted collateralization. MR Brain with small to moderate left temporal occipital lobe infarctions. Occluded right MCA, occluded right PCA and right vertebral artery proximal segment. CT head now with new hypodensity in right hemisphere (right temporal lobe and thalamic region) possibly hypoperfusion with concern for underlying ischemia. Etiology; embolic stroke of undetermined source.  While there is underlying dove dove with dependency on collateralization  can not entirely exclude cardioembolic / hypercoagulable state, avoid  hypotension or hypotension precipitating events.     NEURO:   -Neurologically with some improvement in memory, visual fields, and RUE, LUE  slightly variable notes numbness ? contributory, continue close monitoring.   - Repeat CT head (10/17) as noted above, showed Right mesial temporal lobe and thalamus hypodensity likely evolving cytotoxic edema.   - MRI deferred per d/w IR as likely not to change acute management at this time.  -some memory impairment - OT cognitive evaluation , possible outpatient neuropsych follow up for cognitive therapy  -Continue close monitoring for neurologic deterioration    - Stroke neuro checks q 2 hrs.  -TTE  -Report as above was reviewed.  -Check blood cx x 2  -LE duplex without evidence of DVT reported  -Hypercoagulable panel in progress   - SBP goal permissive to keep 140-180 mm Hg for now due to concern for hypoperfusion per d/w GUICHO, avoid further hypertension as to minimize risk of hemorrhagic component. Further titration pending clinical course.   -ANTITHROMBOTIC THERAPY:  After discussion with GUIC[facility] attending Dr. Goodrich decision was made (given fluctuating/new symptoms as noted) to initiate Heparin gtt followed by Eliquis 5 mg BID. Anticipate 3 month course for now, long term to be determined w/ Dr. Goodrich as outpatient.  BP goal as noted above as patient appears to be dependent on collateral flow and high risk for recurrent thrombotic event.  Monitor closely and repeat CT head for any change and notify on call neurology team.   -ASA discontinued in setting of therapeutic anticoagulation.  -titrate statin to LDL goal less than 70  -MRI Brain w/o as noted    - MRA Head NOVA -images and reports were reviewed.   -Consideration in Outpatient NM spect w/w out Diamox w/ close outpatient follow up for consideration in bypass pending repeat imaging and clinical course   -Dysphagia screen: pass  -Physical therapy/OT/Speech eval/treatment.     -CARDIOVASCULAR:  TTE as noted, cardiac monitoring w/ telemetry for now, further evaluation pending findings of noted workup and clinical course, at this time defer ROCIO as to avoid precipitating hypotension due to concern for hypoperfusion, d/w cardiology team, no obvious evidence of cardiac mass/ vegetation to warrant further evaluation given overall risk / benefit.  ILR upon discharge and . Please use with caution, ideally, suggest to defer BP pressure altering agents at this time due to sensitivity and concern for hypoperfusion and SBP goal 140-180 mmHg for now; if agent is  absolutely needed suggest to titrate gradually with hold parameters with SBP less than 160mmHg.                 -HEMATOLOGY: H/H with anemia, Platelets 117, close monitoring for further thrombocytopenia, suggest HIT/MELISSA if persists/worsens, patient should have all age and risk appropriate malignancy screenings with PCP or sooner if clinically suspected      DVT ppx: heparin gtt    PULMONARY: CXR pending , protecting airway, saturating well     RENAL: BUN/Cr within range, monitor urine output, maintain adequate hydration       Na Goal:  135-145    ID: afebrile, no leukocytosis, monitor for si/sx of infection, suggest blood cultures to ensure no underlying infectious contributing process.     OTHER:  condition and plan of care d/w patient, his sister (by phone) and medical team, questions and concerns addressed.  CIWA protcol, monitor closely for si/sx of withdrawal.    DISPOSITION: Rehab or home depending on PT eval once stable and workup is complete      CORE MEASURES:        Admission NIHSS:10      Tenecteplase : [] YES [x] NO      LDL/HDL/A1C: 115/29/8.6     Depression Screen- if depression hx and/or present      Statin Therapy: as noted      Dysphagia Screen: [x] PASS [] FAIL     Smoking [] YES [x] NO      Afib [] YES [x] NO     Stroke Education [x] YES [] NO    Obtain screening lower extremity venous ultrasound in patients who meet 1 or more of the following criteria as patient is high risk for DVT/PE on admission:   [] History of DVT/PE  []Hypercoagulable states (Factor V Leiden, Cancer, OCP, etc. )  []Prolonged immobility (hemiplegia/hemiparesis/post operative or any other extended immobilization)  [] Transferred from outside facility (Rehab or Long term care)  [] Age </= to 50 INCOMPLETE     ASSESSMENT: Patient is a 59 y/o M with a PMHx of stroke 2022 reported at [facility]  (at the time patient received thrombolysis and had full  resolution of symptoms), reported dove dove, HTN, DM, HLD nonadherent with medications, who presented following an episode of LOC while at work the evening of 10/12/23, there was reported right sided hemisensory loss and right homonomous hemianopia .  Reported unclear if definitively last known well was prior at 1815 therefor excluded from tenecteplase after CT head did not demonstrate acute infarction or hemorrhage.  Perfusion demonstrated regions of ischemia in the posterior circulation, CT angiogram head/neck showed right PCA P1-2 cutoff, possibly chronic right MCA M1 cutoff which had distal reconstitution and established collaterals, and left PCA occlusion which patient was subsequently transferred for mechanical thrombectomy. Cerebral angiogram and mechanical thrombectomy performed using aspiration with TICI 3 recanalization of left PCA.  The right occipital lobe filled from right SHANI collaterals, and the right MCA as well was thought to be chronic given noted collateralization. MR Brain with small to moderate left temporal occipital lobe infarctions. Occluded right MCA, occluded right PCA and right vertebral artery proximal segment. CT head now with new hypodensity in right hemisphere (right temporal lobe and thalamic region) possibly hypoperfusion with concern for underlying ischemia. Etiology; embolic stroke of undetermined source.  While there is underlying dove dove with dependency on collateralization  can not entirely exclude cardioembolic / hypercoagulable state, avoid  hypotension or hypotension precipitating events.     NEURO:   -Neurologically with some improvement in memory, visual fields, and RUE, LUE  slightly variable notes numbness ? contributory, continue close monitoring.   - Repeat CT head (10/17) as noted above, showed Right mesial temporal lobe and thalamus hypodensity likely evolving cytotoxic edema.   - MRI deferred per d/w IR as likely not to change acute management at this time.  -some memory impairment - OT cognitive evaluation , possible outpatient neuropsych follow up for cognitive therapy  -Continue close monitoring for neurologic deterioration, repeat urgent  CT head for any change and notify neurology     - Stroke neuro checks q 2 hrs.  -TTE  -Report as above was reviewed.  -Check blood cx x 2  -LE duplex without evidence of DVT reported  -Hypercoagulable panel in progress   - SBP goal permissive to keep 140-180 mm Hg for now due to concern for hypoperfusion per d/w GUICHO, avoid further hypertension as to minimize risk of hemorrhagic component. Further titration pending clinical course.   -ANTITHROMBOTIC THERAPY:  After discussion with GUIC[facility] attending Dr. Goodrich decision was made (given fluctuating/new symptoms as noted) to initiate Heparin gtt followed by Eliquis 5 mg BID. Anticipate 3 month course for now, long term to be determined w/ Dr. Goodrich as outpatient.  BP goal as noted above as patient appears to be dependent on collateral flow and high risk for recurrent thrombotic event.  Monitor closely and repeat CT head for any change and notify on call neurology team.   -ASA discontinued in setting of therapeutic anticoagulation. To be reconsidered if transitioned off anticoagulation.   -titrate statin to LDL goal less than 70  -MRI Brain w/o as noted    - MRA Head NOVA -images and reports were reviewed.   -Encouraged adherence to follow up/ recommended medications   -Consideration in Outpatient NM spect w/w out Diamox w/ close outpatient follow up for consideration in bypass pending repeat imaging and clinical course   -Dysphagia screen: pass  -Physical therapy/OT/Speech eval/treatment. Cognitive evaluation. Supervision.     -CARDIOVASCULAR:  TTE as noted, cardiac monitoring w/ telemetry for now, further evaluation pending findings of noted workup and clinical course, at this time defer ROCIO as to avoid precipitating hypotension due to concern for hypoperfusion, d/w cardiology team, no obvious evidence of cardiac mass/ vegetation to warrant further evaluation given overall risk / benefit.  ILR upon discharge and . Please use with caution, ideally, suggest to defer BP pressure altering agents at this time due to sensitivity and concern for hypoperfusion and SBP goal 140-180 mmHg for now; if agent is  absolutely needed suggest to titrate gradually with hold parameters with SBP less than 160mmHg.                 -HEMATOLOGY: H/H with anemia, Platelets 117, close monitoring for further thrombocytopenia, suggest HIT/MELISSA if persists/worsens as well as hematology, patient should have all age and risk appropriate malignancy screenings with PCP or sooner if clinically suspected      DVT ppx: heparin gtt    PULMONARY: CXR pending , protecting airway, saturating well     RENAL: BUN/Cr within range, monitor urine output, maintain adequate hydration       Na Goal:  135-145    ID: afebrile, no leukocytosis, monitor for si/sx of infection, suggest blood cultures to ensure no underlying infectious contributing process.     OTHER:  condition and plan of care d/w patient, his sister (by phone) prior and medical team, questions and concerns addressed.  CIWA protcol, monitor closely for si/sx of withdrawal.    DISPOSITION: Rehab or home depending on PT eval once stable and workup is complete      CORE MEASURES:        Admission NIHSS:10      Tenecteplase : [] YES [x] NO      LDL/HDL/A1C: 115/29/8.6     Depression Screen- if depression hx and/or present      Statin Therapy: as noted      Dysphagia Screen: [x] PASS [] FAIL     Smoking [] YES [x] NO      Afib [] YES [x] NO     Stroke Education [x] YES [] NO    Obtain screening lower extremity venous ultrasound in patients who meet 1 or more of the following criteria as patient is high risk for DVT/PE on admission:   [] History of DVT/PE  []Hypercoagulable states (Factor V Leiden, Cancer, OCP, etc. )  []Prolonged immobility (hemiplegia/hemiparesis/post operative or any other extended immobilization)  [] Transferred from outside facility (Rehab or Long term care)  [] Age </= to 50 INCOMPLETE     ASSESSMENT: Patient is a 57 y/o M with a PMHx of stroke 2022 reported at [facility]  (at the time patient received thrombolysis and had full  resolution of symptoms), reported dove dove, HTN, DM, HLD nonadherent with medications, who presented following an episode of LOC while at work the evening of 10/12/23, there was reported right sided hemisensory loss and right homonomous hemianopia .  Reported unclear if definitively last known well was prior at 1815 therefor excluded from tenecteplase after CT head did not demonstrate acute infarction or hemorrhage.  Perfusion demonstrated regions of ischemia in the posterior circulation, CT angiogram head/neck showed right PCA P1-2 cutoff, possibly chronic right MCA M1 cutoff which had distal reconstitution and established collaterals, and left PCA occlusion which patient was subsequently transferred for mechanical thrombectomy. Cerebral angiogram and mechanical thrombectomy performed using aspiration with TICI 3 recanalization of left PCA.  The right occipital lobe filled from right SHANI collaterals, and the right MCA as well was thought to be chronic given noted collateralization. MR Brain with small to moderate left temporal occipital lobe infarctions. Occluded right MCA, occluded right PCA and right vertebral artery proximal segment. CT head now with new hypodensity in right hemisphere (right temporal lobe and thalamic region) possibly hypoperfusion with concern for underlying ischemia. Etiology; embolic stroke of undetermined source.  While there is underlying dove dove with dependency on collateralization  can not entirely exclude cardioembolic / hypercoagulable state, avoid  hypotension or hypotension precipitating events.     NEURO:   -Neurologically with some improvement in memory, visual fields, and RUE, LUE  slightly variable notes numbness ? contributory, continue close monitoring.   - Repeat CT head (10/17) as noted above, showed Right mesial temporal lobe and thalamus hypodensity likely evolving cytotoxic edema.   - MRI deferred per d/w IR as likely not to change acute management at this time.  -some memory impairment - OT cognitive evaluation , possible outpatient neuropsych follow up for cognitive therapy  -Continue close monitoring for neurologic deterioration, repeat urgent  CT head for any change and notify neurology     - Stroke neuro checks q 2 hrs.  -TTE  -Report as above was reviewed.  -Check blood cx x 2  -LE duplex without evidence of DVT reported  -Hypercoagulable panel in progress   - SBP goal permissive to keep 140-180 mm Hg for now due to concern for hypoperfusion per d/w GUICHO, avoid further hypertension as to minimize risk of hemorrhagic component. Further titration pending clinical course.   -ANTITHROMBOTIC THERAPY:  After discussion with [facility] attending Dr. Goodrich decision was made (given fluctuating/new symptoms as noted) to initiate Heparin gtt (PTT 60-80), no contraindication from neurological standpoint to transition to Apixaban 5 mg BID. Anticipate 3 month course for now, long term to be determined w/ Dr. Goodrich as outpatient.    -BP goal as noted above as patient appears to be dependent on collateral flow and high risk for recurrent thrombotic event.  Monitor closely and repeat CT head for any change and notify on call neurology team.   -ASA discontinued in setting of therapeutic anticoagulation. To be reconsidered if transitioned off anticoagulation.   -titrate statin to LDL goal less than 70  -MRI Brain w/o as noted    - MRA Head NOVA -images and reports were reviewed.   -Encouraged adherence to follow up/ recommended medications   -Consideration in Outpatient NM spect w/w out Diamox w/ close outpatient follow up for consideration in bypass pending repeat imaging and clinical course   -Dysphagia screen: pass  -Physical therapy/OT/Speech eval/treatment. Cognitive evaluation. Supervision.     -CARDIOVASCULAR:  TTE as noted, cardiac monitoring w/ telemetry for now, further evaluation pending findings of noted workup and clinical course, at this time defer ROCIO as to avoid precipitating hypotension due to concern for hypoperfusion, d/w cardiology team, no obvious evidence of cardiac mass/ vegetation to warrant further evaluation given overall risk / benefit.  ILR upon discharge and . Please use with caution, ideally, suggest to defer BP pressure altering agents at this time due to sensitivity and concern for hypoperfusion and SBP goal 140-180 mmHg for now; if agent is  absolutely needed suggest to titrate gradually with hold parameters with SBP less than 160mmHg.                 -HEMATOLOGY: H/H with anemia, Platelets 117, close monitoring for further thrombocytopenia, suggest HIT/MELISSA if persists/worsens as well as hematology, patient should have all age and risk appropriate malignancy screenings with PCP or sooner if clinically suspected to ensure no underlying hypercoagulable states      DVT ppx: heparin gtt    PULMONARY: CXR pending , protecting airway, saturating well     RENAL: BUN/Cr within range, monitor urine output, maintain adequate hydration       Na Goal:  135-145    ID: afebrile, no leukocytosis, monitor for si/sx of infection, suggest blood cultures to ensure no underlying infectious contributing process.     OTHER:  condition and plan of care d/w patient, his sister (by phone) prior and medical team, questions and concerns addressed.  CIWA protcol, monitor closely for si/sx of withdrawal.    DISPOSITION: Rehab or home depending on PT eval once stable and workup is complete      CORE MEASURES:        Admission NIHSS:10      Tenecteplase : [] YES [x] NO      LDL/HDL/A1C: 115/29/8.6     Depression Screen- if depression hx and/or present      Statin Therapy: as noted      Dysphagia Screen: [x] PASS [] FAIL     Smoking [] YES [x] NO      Afib [] YES [x] NO     Stroke Education [x] YES [] NO    Obtain screening lower extremity venous ultrasound in patients who meet 1 or more of the following criteria as patient is high risk for DVT/PE on admission:   [] History of DVT/PE  []Hypercoagulable states (Factor V Leiden, Cancer, OCP, etc. )  []Prolonged immobility (hemiplegia/hemiparesis/post operative or any other extended immobilization)  [] Transferred from outside facility (Rehab or Long term care)  [] Age </= to 50 ASSESSMENT: Patient is a 57 y/o M with a PMHx of stroke 2022 reported at [facility]  (at the time patient received thrombolysis and had full  resolution of symptoms), reported dove dove, HTN, DM, HLD nonadherent with medications, who presented following an episode of LOC while at work the evening of 10/12/23, there was reported right sided hemisensory loss and right homonomous hemianopia .  Reported unclear if definitively last known well was prior at 1815 therefor excluded from tenecteplase after CT head did not demonstrate acute infarction or hemorrhage.  Perfusion demonstrated regions of ischemia in the posterior circulation, CT angiogram head/neck showed right PCA P1-2 cutoff, possibly chronic right MCA M1 cutoff which had distal reconstitution and established collaterals, and left PCA occlusion which patient was subsequently transferred for mechanical thrombectomy. Cerebral angiogram and mechanical thrombectomy performed using aspiration with TICI 3 recanalization of left PCA.  The right occipital lobe filled from right SHANI collaterals, and the right MCA as well was thought to be chronic given noted collateralization. MR Brain with small to moderate left temporal occipital lobe infarctions. Occluded right MCA, occluded right PCA and right vertebral artery proximal segment. CT head now with new hypodensity in right hemisphere (right temporal lobe and thalamic region) possibly hypoperfusion with concern for underlying ischemia. Etiology; embolic stroke of undetermined source.  While there is underlying dove dove with dependency on collateralization  can not entirely exclude cardioembolic / hypercoagulable state, avoid  hypotension or hypotension precipitating events.     NEURO:   -Neurologically with some improvement in memory, visual fields, and RUE, LUE  slightly variable notes numbness ? contributory, continue close monitoring.   - Repeat CT head (10/17) as noted above, showed Right mesial temporal lobe and thalamus hypodensity likely evolving cytotoxic edema.   - MRI deferred per d/w IR as likely not to change acute management at this time.  -some memory impairment - OT cognitive evaluation , possible outpatient neuropsych follow up for cognitive therapy  -Continue close monitoring for neurologic deterioration, repeat urgent  CT head for any change and notify neurology     - Stroke neuro checks q 2 hrs.  -TTE  -Report as above was reviewed.  - Check blood cx x 2  -LE duplex without evidence of DVT reported  -Hypercoagulable panel in progress   - SBP goal permissive to keep 140-180 mm Hg for now due to concern for hypoperfusion per d/w GUICHO, avoid further hypertension as to minimize risk of hemorrhagic component. Further titration pending clinical course.   -ANTITHROMBOTIC THERAPY:  After discussion with GUIC[facility] attending Dr. Goodrich decision was made (given fluctuating/new symptoms as noted) to initiate Heparin gtt (PTT 60-80), no contraindication from neurological standpoint to transition to Apixaban 5 mg BID. Anticipate 3 month course for now, long term to be determined w/ Dr. Goodrich as outpatient.    -BP goal as noted above as patient appears to be dependent on collateral flow and high risk for recurrent thrombotic event.  Monitor closely and repeat CT head for any change and notify on call neurology team.   -ASA discontinued in setting of therapeutic anticoagulation. To be reconsidered if transitioned off anticoagulation.   -titrate statin to LDL goal less than 70  -MRI Brain w/o as noted    - MRA Head NOVA -images and reports were reviewed.   -Encouraged adherence to follow up/ recommended medications   -Consideration in Outpatient NM spect w/w out Diamox w/ close outpatient follow up for consideration in bypass pending repeat imaging and clinical course   -Dysphagia screen: pass  -Physical therapy/OT/Speech eval/treatment. Cognitive evaluation. Supervision.     -CARDIOVASCULAR:  TTE as noted, cardiac monitoring w/ telemetry for now, further evaluation pending findings of noted workup and clinical course, at this time defer ROCIO as to avoid precipitating hypotension due to concern for hypoperfusion, d/w cardiology team, no obvious evidence of cardiac mass/ vegetation to warrant further evaluation given overall risk / benefit.  ILR upon discharge and . Please use with caution, ideally, suggest to defer BP pressure altering agents at this time due to sensitivity and concern for hypoperfusion and SBP goal 140-180 mmHg for now; if agent is  absolutely needed suggest to titrate gradually with hold parameters with SBP less than 160mmHg.                 -HEMATOLOGY: H/H with anemia, Platelets 117, close monitoring for further thrombocytopenia, suggest HIT/MELISSA if persists/worsens as well as hematology, patient should have all age and risk appropriate malignancy screenings with PCP or sooner if clinically suspected to ensure no underlying hypercoagulable states      DVT ppx: heparin gtt    PULMONARY: CXR pending , protecting airway, saturating well     RENAL: BUN/Cr within range, monitor urine output, maintain adequate hydration       Na Goal:  135-145    ID: afebrile, no leukocytosis, monitor for si/sx of infection, suggest blood cultures to ensure no underlying infectious contributing process.     OTHER:  condition and plan of care d/w patient, his sister (by phone) prior and medical team, questions and concerns addressed.  CIWA protcol, monitor closely for si/sx of withdrawal.    DISPOSITION: Rehab or home depending on PT eval once stable and workup is complete      CORE MEASURES:        Admission NIHSS:10      Tenecteplase : [] YES [x] NO      LDL/HDL/A1C: 115/29/8.6     Depression Screen- if depression hx and/or present      Statin Therapy: as noted      Dysphagia Screen: [x] PASS [] FAIL     Smoking [] YES [x] NO      Afib [] YES [x] NO     Stroke Education [x] YES [] NO    Obtain screening lower extremity venous ultrasound in patients who meet 1 or more of the following criteria as patient is high risk for DVT/PE on admission:   [] History of DVT/PE  []Hypercoagulable states (Factor V Leiden, Cancer, OCP, etc. )  []Prolonged immobility (hemiplegia/hemiparesis/post operative or any other extended immobilization)  [] Transferred from outside facility (Rehab or Long term care)  [] Age </= to 50 ASSESSMENT: Patient is a 57 y/o M with a PMHx of stroke 2022 reported at [facility]  (at the time patient received thrombolysis and had full  resolution of symptoms), reported dove dove, HTN, DM, HLD nonadherent with medications, who presented following an episode of LOC while at work the evening of 10/12/23, there was reported right sided hemisensory loss and right homonomous hemianopia .  Reported unclear if definitively last known well was prior at 1815 therefor excluded from tenecteplase after CT head did not demonstrate acute infarction or hemorrhage.  Perfusion demonstrated regions of ischemia in the posterior circulation, CT angiogram head/neck showed right PCA P1-2 cutoff, possibly chronic right MCA M1 cutoff which had distal reconstitution and established collaterals, and left PCA occlusion which patient was subsequently transferred for mechanical thrombectomy. Cerebral angiogram and mechanical thrombectomy performed using aspiration with TICI 3 recanalization of left PCA.  The right occipital lobe filled from right SHANI collaterals, and the right MCA as well was thought to be chronic given noted collateralization. MR Brain with small to moderate left temporal occipital lobe infarctions. Occluded right MCA, occluded right PCA and right vertebral artery proximal segment. CT head now with new hypodensity in right hemisphere (right temporal lobe and thalamic region) possibly hypoperfusion with concern for underlying ischemia. Etiology; embolic stroke of undetermined source.  While there is underlying dove doev with dependency on collateralization  can not entirely exclude cardioembolic / hypercoagulable state, avoid  hypotension or hypotension precipitating events.     NEURO:   -Neurologically with some improvement in memory, visual fields, and RUE, LUE  slightly variable notes numbness ? contributory, continue close monitoring.   - Repeat CT head (10/17) as noted above, showed Right mesial temporal lobe and thalamus hypodensity likely evolving cytotoxic edema.   - MRI deferred per d/w IR as likely not to change acute management at this time.  -some memory impairment - OT cognitive evaluation , possible outpatient neuropsych follow up for cognitive therapy  -Continue close monitoring for neurologic deterioration, repeat urgent  CT head for any change and notify neurology     - Stroke neuro checks q 2 hrs.  -TTE  -Report as above was reviewed.  - blood cultures pedning  -LE duplex without evidence of DVT reported  -Hypercoagulable panel ordered, results pending  - SBP goal permissive to keep 140-180 mm Hg for now due to concern for hypoperfusion per d/w GUICHO, avoid further hypertension as to minimize risk of hemorrhagic component. Further titration pending clinical course.   -ANTITHROMBOTIC THERAPY:  After discussion with [facility] attending Dr. Goodrich decision was made (given fluctuating/new symptoms as noted) to initiate Heparin gtt (PTT 60-80), no contraindication from neurological standpoint to transition to Apixaban 5 mg BID. Anticipate 3 month course for now, long term to be determined w/ Dr. Goodrich as outpatient.    -BP goal as noted above as patient appears to be dependent on collateral flow and high risk for recurrent thrombotic event.  Monitor closely and repeat CT head for any change and notify on call neurology team.   -ASA discontinued in setting of therapeutic anticoagulation. To be reconsidered if transitioned off anticoagulation.   -titrate statin to LDL goal less than 70  -MRI Brain w/o as noted    - MRA Head NOVA -images and reports were reviewed.   -Encouraged adherence to follow up/ recommended medications   -Consideration in Outpatient NM spect w/w out Diamox w/ close outpatient follow up for consideration in bypass pending repeat imaging and clinical course   -Dysphagia screen: pass  -Physical therapy/OT/Speech eval/treatment. Cognitive evaluation. Supervision.     -CARDIOVASCULAR:  TTE as noted, cardiac monitoring w/ telemetry for now, further evaluation pending findings of noted workup and clinical course, at this time defer ROCIO as to avoid precipitating hypotension due to concern for hypoperfusion, d/w cardiology team, no obvious evidence of cardiac mass/ vegetation to warrant further evaluation given overall risk / benefit.  ILR upon discharge and . Please use with caution, ideally, suggest to defer BP pressure altering agents at this time due to sensitivity and concern for hypoperfusion and SBP goal 140-180 mmHg for now; if agent is  absolutely needed suggest to titrate gradually with hold parameters with SBP less than 160mmHg.                 -HEMATOLOGY: H/H with anemia, Platelets 117, close monitoring for further thrombocytopenia, suggest HIT/MELISSA if persists/worsens as well as hematology, patient should have all age and risk appropriate malignancy screenings with PCP or sooner if clinically suspected to ensure no underlying hypercoagulable states      DVT ppx: heparin gtt    PULMONARY: CXR pending , protecting airway, saturating well     RENAL: BUN/Cr within range, monitor urine output, maintain adequate hydration       Na Goal:  135-145    ID: afebrile, no leukocytosis, monitor for si/sx of infection, suggest blood cultures to ensure no underlying infectious contributing process.     OTHER:  condition and plan of care d/w patient, his sister (by phone) prior and medical team, questions and concerns addressed.  CIWA protcol, monitor closely for si/sx of withdrawal.    DISPOSITION: Rehab or home depending on PT eval once stable and workup is complete      CORE MEASURES:        Admission NIHSS:10      Tenecteplase : [] YES [x] NO      LDL/HDL/A1C: 115/29/8.6     Depression Screen- if depression hx and/or present      Statin Therapy: as noted      Dysphagia Screen: [x] PASS [] FAIL     Smoking [] YES [x] NO      Afib [] YES [x] NO     Stroke Education [x] YES [] NO    Obtain screening lower extremity venous ultrasound in patients who meet 1 or more of the following criteria as patient is high risk for DVT/PE on admission:   [] History of DVT/PE  []Hypercoagulable states (Factor V Leiden, Cancer, OCP, etc. )  []Prolonged immobility (hemiplegia/hemiparesis/post operative or any other extended immobilization)  [] Transferred from outside facility (Rehab or Long term care)  [] Age </= to 50

## 2023-10-20 ENCOUNTER — TRANSCRIPTION ENCOUNTER (OUTPATIENT)
Age: 58
End: 2023-10-20

## 2023-10-20 LAB
ANION GAP SERPL CALC-SCNC: 13 MMOL/L — SIGNIFICANT CHANGE UP (ref 5–17)
ANION GAP SERPL CALC-SCNC: 13 MMOL/L — SIGNIFICANT CHANGE UP (ref 5–17)
APTT BLD: 86.9 SEC — HIGH (ref 24.5–35.6)
APTT BLD: 86.9 SEC — HIGH (ref 24.5–35.6)
AT III ACT/NOR PPP CHRO: 69 % — LOW (ref 85–135)
AT III ACT/NOR PPP CHRO: 69 % — LOW (ref 85–135)
AT III AG PPP IA-MCNC: 22 MG/DL — SIGNIFICANT CHANGE UP (ref 19–31)
AT III AG PPP IA-MCNC: 22 MG/DL — SIGNIFICANT CHANGE UP (ref 19–31)
B2 GLYCOPROT1 AB SER QL: NEGATIVE — SIGNIFICANT CHANGE UP
B2 GLYCOPROT1 AB SER QL: NEGATIVE — SIGNIFICANT CHANGE UP
BUN SERPL-MCNC: 17.1 MG/DL — SIGNIFICANT CHANGE UP (ref 8–20)
BUN SERPL-MCNC: 17.1 MG/DL — SIGNIFICANT CHANGE UP (ref 8–20)
CALCIUM SERPL-MCNC: 9.1 MG/DL — SIGNIFICANT CHANGE UP (ref 8.4–10.5)
CALCIUM SERPL-MCNC: 9.1 MG/DL — SIGNIFICANT CHANGE UP (ref 8.4–10.5)
CARDIOLIPIN AB SER-ACNC: NEGATIVE — SIGNIFICANT CHANGE UP
CARDIOLIPIN AB SER-ACNC: NEGATIVE — SIGNIFICANT CHANGE UP
CHLORIDE SERPL-SCNC: 104 MMOL/L — SIGNIFICANT CHANGE UP (ref 96–108)
CHLORIDE SERPL-SCNC: 104 MMOL/L — SIGNIFICANT CHANGE UP (ref 96–108)
CO2 SERPL-SCNC: 22 MMOL/L — SIGNIFICANT CHANGE UP (ref 22–29)
CO2 SERPL-SCNC: 22 MMOL/L — SIGNIFICANT CHANGE UP (ref 22–29)
CREAT SERPL-MCNC: 1.02 MG/DL — SIGNIFICANT CHANGE UP (ref 0.5–1.3)
CREAT SERPL-MCNC: 1.02 MG/DL — SIGNIFICANT CHANGE UP (ref 0.5–1.3)
DRVVT RATIO: 1.05 RATIO — SIGNIFICANT CHANGE UP (ref 0–1.21)
DRVVT RATIO: 1.05 RATIO — SIGNIFICANT CHANGE UP (ref 0–1.21)
DRVVT SCREEN TO CONFIRM RATIO: SIGNIFICANT CHANGE UP
DRVVT SCREEN TO CONFIRM RATIO: SIGNIFICANT CHANGE UP
EGFR: 85 ML/MIN/1.73M2 — SIGNIFICANT CHANGE UP
EGFR: 85 ML/MIN/1.73M2 — SIGNIFICANT CHANGE UP
GLUCOSE BLDC GLUCOMTR-MCNC: 137 MG/DL — HIGH (ref 70–99)
GLUCOSE BLDC GLUCOMTR-MCNC: 137 MG/DL — HIGH (ref 70–99)
GLUCOSE BLDC GLUCOMTR-MCNC: 140 MG/DL — HIGH (ref 70–99)
GLUCOSE BLDC GLUCOMTR-MCNC: 140 MG/DL — HIGH (ref 70–99)
GLUCOSE BLDC GLUCOMTR-MCNC: 145 MG/DL — HIGH (ref 70–99)
GLUCOSE BLDC GLUCOMTR-MCNC: 145 MG/DL — HIGH (ref 70–99)
GLUCOSE BLDC GLUCOMTR-MCNC: 166 MG/DL — HIGH (ref 70–99)
GLUCOSE BLDC GLUCOMTR-MCNC: 166 MG/DL — HIGH (ref 70–99)
GLUCOSE BLDC GLUCOMTR-MCNC: 183 MG/DL — HIGH (ref 70–99)
GLUCOSE BLDC GLUCOMTR-MCNC: 183 MG/DL — HIGH (ref 70–99)
GLUCOSE SERPL-MCNC: 148 MG/DL — HIGH (ref 70–99)
GLUCOSE SERPL-MCNC: 148 MG/DL — HIGH (ref 70–99)
HCT VFR BLD CALC: 41 % — SIGNIFICANT CHANGE UP (ref 39–50)
HCT VFR BLD CALC: 41 % — SIGNIFICANT CHANGE UP (ref 39–50)
HCYS SERPL-MCNC: 9.4 UMOL/L — SIGNIFICANT CHANGE UP
HCYS SERPL-MCNC: 9.4 UMOL/L — SIGNIFICANT CHANGE UP
HGB BLD-MCNC: 14.1 G/DL — SIGNIFICANT CHANGE UP (ref 13–17)
HGB BLD-MCNC: 14.1 G/DL — SIGNIFICANT CHANGE UP (ref 13–17)
INR BLD: 1.12 RATIO — SIGNIFICANT CHANGE UP (ref 0.85–1.18)
INR BLD: 1.12 RATIO — SIGNIFICANT CHANGE UP (ref 0.85–1.18)
MAGNESIUM SERPL-MCNC: 2.3 MG/DL — SIGNIFICANT CHANGE UP (ref 1.8–2.6)
MAGNESIUM SERPL-MCNC: 2.3 MG/DL — SIGNIFICANT CHANGE UP (ref 1.8–2.6)
MCHC RBC-ENTMCNC: 31.5 PG — SIGNIFICANT CHANGE UP (ref 27–34)
MCHC RBC-ENTMCNC: 31.5 PG — SIGNIFICANT CHANGE UP (ref 27–34)
MCHC RBC-ENTMCNC: 34.4 GM/DL — SIGNIFICANT CHANGE UP (ref 32–36)
MCHC RBC-ENTMCNC: 34.4 GM/DL — SIGNIFICANT CHANGE UP (ref 32–36)
MCV RBC AUTO: 91.5 FL — SIGNIFICANT CHANGE UP (ref 80–100)
MCV RBC AUTO: 91.5 FL — SIGNIFICANT CHANGE UP (ref 80–100)
PLATELET # BLD AUTO: 115 K/UL — LOW (ref 150–400)
PLATELET # BLD AUTO: 115 K/UL — LOW (ref 150–400)
POTASSIUM SERPL-MCNC: 4 MMOL/L — SIGNIFICANT CHANGE UP (ref 3.5–5.3)
POTASSIUM SERPL-MCNC: 4 MMOL/L — SIGNIFICANT CHANGE UP (ref 3.5–5.3)
POTASSIUM SERPL-SCNC: 4 MMOL/L — SIGNIFICANT CHANGE UP (ref 3.5–5.3)
POTASSIUM SERPL-SCNC: 4 MMOL/L — SIGNIFICANT CHANGE UP (ref 3.5–5.3)
PROT C ACT/NOR PPP: 122 % — SIGNIFICANT CHANGE UP (ref 74–150)
PROT C ACT/NOR PPP: 122 % — SIGNIFICANT CHANGE UP (ref 74–150)
PROTHROM AB SERPL-ACNC: 12.4 SEC — SIGNIFICANT CHANGE UP (ref 9.5–13)
PROTHROM AB SERPL-ACNC: 12.4 SEC — SIGNIFICANT CHANGE UP (ref 9.5–13)
RBC # BLD: 4.48 M/UL — SIGNIFICANT CHANGE UP (ref 4.2–5.8)
RBC # BLD: 4.48 M/UL — SIGNIFICANT CHANGE UP (ref 4.2–5.8)
RBC # FLD: 13.2 % — SIGNIFICANT CHANGE UP (ref 10.3–14.5)
RBC # FLD: 13.2 % — SIGNIFICANT CHANGE UP (ref 10.3–14.5)
SODIUM SERPL-SCNC: 139 MMOL/L — SIGNIFICANT CHANGE UP (ref 135–145)
SODIUM SERPL-SCNC: 139 MMOL/L — SIGNIFICANT CHANGE UP (ref 135–145)
WBC # BLD: 6.56 K/UL — SIGNIFICANT CHANGE UP (ref 3.8–10.5)
WBC # BLD: 6.56 K/UL — SIGNIFICANT CHANGE UP (ref 3.8–10.5)
WBC # FLD AUTO: 6.56 K/UL — SIGNIFICANT CHANGE UP (ref 3.8–10.5)
WBC # FLD AUTO: 6.56 K/UL — SIGNIFICANT CHANGE UP (ref 3.8–10.5)

## 2023-10-20 PROCEDURE — 99232 SBSQ HOSP IP/OBS MODERATE 35: CPT

## 2023-10-20 PROCEDURE — 33285 INSJ SUBQ CAR RHYTHM MNTR: CPT

## 2023-10-20 RX ORDER — APIXABAN 2.5 MG/1
5 TABLET, FILM COATED ORAL
Refills: 0 | Status: DISCONTINUED | OUTPATIENT
Start: 2023-10-20 | End: 2023-10-27

## 2023-10-20 RX ORDER — CEFAZOLIN SODIUM 1 G
2000 VIAL (EA) INJECTION ONCE
Refills: 0 | Status: COMPLETED | OUTPATIENT
Start: 2023-10-20 | End: 2023-10-20

## 2023-10-20 RX ADMIN — Medication 2: at 21:54

## 2023-10-20 RX ADMIN — Medication 2: at 09:14

## 2023-10-20 RX ADMIN — FAMOTIDINE 20 MILLIGRAM(S): 10 INJECTION INTRAVENOUS at 11:46

## 2023-10-20 RX ADMIN — APIXABAN 5 MILLIGRAM(S): 2.5 TABLET, FILM COATED ORAL at 18:44

## 2023-10-20 RX ADMIN — POLYETHYLENE GLYCOL 3350 17 GRAM(S): 17 POWDER, FOR SOLUTION ORAL at 18:16

## 2023-10-20 RX ADMIN — HEPARIN SODIUM 1400 UNIT(S)/HR: 5000 INJECTION INTRAVENOUS; SUBCUTANEOUS at 06:50

## 2023-10-20 RX ADMIN — Medication 1 TABLET(S): at 11:46

## 2023-10-20 RX ADMIN — HEPARIN SODIUM 1400 UNIT(S)/HR: 5000 INJECTION INTRAVENOUS; SUBCUTANEOUS at 07:54

## 2023-10-20 RX ADMIN — Medication 100 MILLIGRAM(S): at 15:38

## 2023-10-20 RX ADMIN — Medication 1 MILLIGRAM(S): at 11:46

## 2023-10-20 RX ADMIN — HEPARIN SODIUM 1400 UNIT(S)/HR: 5000 INJECTION INTRAVENOUS; SUBCUTANEOUS at 00:54

## 2023-10-20 RX ADMIN — ATORVASTATIN CALCIUM 80 MILLIGRAM(S): 80 TABLET, FILM COATED ORAL at 21:56

## 2023-10-20 RX ADMIN — Medication 100 MILLIGRAM(S): at 11:46

## 2023-10-20 RX ADMIN — SENNA PLUS 2 TABLET(S): 8.6 TABLET ORAL at 18:17

## 2023-10-20 NOTE — PROGRESS NOTE ADULT - ASSESSMENT
57 y/o M with a Hx of stroke one year ago (at the time patient received TNK and had full  resolution of symptoms), HTN, DM, non-compliant with medications, who presented following an episode of LOC, as per family at around 6:15 pm patient had a sudden fall at work and lost consciousness. It is unclear if patient had any symptoms before he fell. NIHSS 10 on admission, admitted under NeuroICU on 10/12: Thrombectomy via L radial artery for TICI 3 revascularization of L P1-P2 occlusion. Noted LMCA chronic occlusion., on 0/13: Neurologic status improving. Got MR. Started on ASA and SQL, on 10/14: quadrantanopia now on L superior (originally R); Repeat stroke imaging completed and initial concern for CTP showing poss new perfusion deficit however on further review noted to be present on admission. Started on heparin drip per neurology recommendations, on 10/15: Therapeutic on heparin drip, stability HCT obtained. Cardiology consulted for further stroke work-up and downgraded under medicine.     #Acute CVA  s/p thrombectomy   c/w Lipitor  TTE shows with  Normal left ventricular internal cavity size,  3. Left ventricular ejection fraction, by visual estimation, is 50 to 55%.  DVT studies negative  Started on heparin drip given extensive atherosclerotic disease per Neuro. Will switch to Eliquis after ILR placement.   Per Neuro no need for ROCIO  BP goal 140-180s per neuro  will continue with Neuro checks Q4  repeat CT head showed worsening of prior stroke  Neurology following   PT/OT eval - acute rehab    #Occluded R MCA/PCA  per discussion with neuro, pt with collaterals  c/w q4hr neuro checks  outpt neuro IR followup    #ETOH abuse  not in active withdrawal, and pt denies h/o DT  c/w MVI, thiamine (for suspected deficiency) and folic acid  SW following     #Episode of bradycardia with concern for 2:1 block on tele  TTE EF 50-55%, no other structural abnormal   cards naomie recs  as per electrophysiology   - No indication for pacemaker  - Metoprolol 12.5mg BID has been discontinued   - ILR today   - EP following     #HTN  No meds at home  Per neuro, BP goal 140-180 mmHg for now  cont to monitor    #DM 2  non compliant with meds at home  A1C 8.6  Accu checks and ISS    #Elevated TSH  T4 level normal  outpt f/u    #HLD  c/w Lipitor 80mg QD    DVT Prophylaxis -- Patient is on Heparin Infusion.     Dispo: Acute Rehab once arranged.

## 2023-10-20 NOTE — PROGRESS NOTE ADULT - SUBJECTIVE AND OBJECTIVE BOX
[facility] Stroke Team  Progress Note    Preliminary note, official note pending attending review/signature     HPI:  Patient is a 57 y/o M with a PMHx of stroke 2022 reported at [facility]  (at the time patient received thrombolysis and had full  resolution of symptoms), family reported dove dove, HTN, DM, HLD nonadherent with medications, who presented following an episode of LOC.  As per sister report, patient was last seen well by his niece  morning of 10/12/23  before patient went to work. At around 6:15 pm that night patient had a sudden fall at work and lost consciousness It is unclear if patient had any symptoms before he fell. NIHSS 10 on admission . Transferred to [facility] for mechanical thrombectomy.     National Institutes of Health (NIH) Stroke Scale  .....................................................................................................  1a. Assess Level of Consciousness (Alert=0, Coma=3)  Alert (0 points)  1b. Assess Orientation: Month, Age (1 point per bad answer)  Answers one question correctly (1 point)  1c. Follow Commands: Open and close eyes, make fist and release (1   point per command NOT obeyed  )  Performs one task correctly (1 point)  2. Follow my finger (Normal=0, Forced deviation=2)  Normal (0 points)  3. Visual field   (Normal=0, hemianopia=2, bilateral loss=3)  Complete hemianopia (2 points)  4. Facial palsy: Show teeth, Raise eyebrows, Squeeze eyes shut (Normal=0, Complete=3)  Normal (0 points)  5a. Motor Strength Left Arm  : Elevate to 90 degrees  No drift (0 points)  5b. Motor Strength Right Arm  : Elevate to 90 degrees  Some effort against gravity   (2 points)  6a. Motor Strength Left Leg: Elevate to 30 degrees  No drift (0 points)  6b. Motor Strength Right Leg: Elevate to 30 degrees  Drift (1 point)  7. Coordination or limb ataxia: Finger-nose-finger, Heel-knee-shin (Absent=0, both limbs=2)  Absent (0 points)  8. Sensory: Pin prick to face, arm, trunk, and legs, Compare sides (Normal=0, Severe loss=2)  Mild to moderate loss (1 point)  9. Language: Name items, Describe picture, Read sentences ((No Aphasia=0, Mute=3)  Mild to moderate aphasa (1 point)   10.10. Dysarthria: Speech clarity while reading word list (Normal=0, Nearly unintelligible=2)  Mild to moderate dysarthria (1 point)  11Extinction and Inattention: Formerly called 'Neglect' (None=0, Complete=2)  No abnormality (0 points)         (12 Oct 2023 23:00)      Admission NIHSS  Pre-MRS  ICH Score (if applicable)    SUBJECTIVE: No events overnight.  No new neurologic complaints.  ROS reported negative unless otherwise noted.    acetaminophen     Tablet .. 650 milliGRAM(s) Oral every 6 hours PRN  atorvastatin 80 milliGRAM(s) Oral at bedtime  bisacodyl Suppository 10 milliGRAM(s) Rectal daily PRN  chlorhexidine 2% Cloths 1 Application(s) Topical <User Schedule>  dextrose 50% Injectable 12.5 Gram(s) IV Push once  dextrose 50% Injectable 25 Gram(s) IV Push once  dextrose 50% Injectable 25 Gram(s) IV Push once  famotidine    Tablet 20 milliGRAM(s) Oral daily  folic acid 1 milliGRAM(s) Oral daily  heparin  Infusion. 1200 Unit(s)/Hr IV Continuous <Continuous>  hydrALAZINE Injectable 10 milliGRAM(s) IV Push every 4 hours PRN  influenza   Vaccine 0.5 milliLiter(s) IntraMuscular once  insulin lispro (ADMELOG) corrective regimen sliding scale   SubCutaneous Before meals and at bedtime  LORazepam   Injectable 2 milliGRAM(s) IV Push every 2 hours PRN  magnesium citrate Oral Solution 296 milliLiter(s) Oral once PRN  magnesium hydroxide Suspension 30 milliLiter(s) Oral daily PRN  multivitamin 1 Tablet(s) Oral daily  polyethylene glycol 3350 17 Gram(s) Oral every 12 hours  senna 2 Tablet(s) Oral every 12 hours  thiamine 100 milliGRAM(s) Oral daily      PHYSICAL EXAM:   Vital Signs Last 24 Hrs  T(C): 36.6 (20 Oct 2023 07:00), Max: 37.4 (19 Oct 2023 13:53)  T(F): 97.9 (20 Oct 2023 07:00), Max: 99.4 (19 Oct 2023 13:53)  HR: 45 (20 Oct 2023 07:00) (45 - 67)  BP: 110/81 (20 Oct 2023 07:00) (110/81 - 159/85)  BP(mean): 103 (19 Oct 2023 12:00) (103 - 103)  RR: 18 (20 Oct 2023 07:00) (18 - 19)  SpO2: 94% (20 Oct 2023 07:00) (94% - 97%)    Parameters below as of 20 Oct 2023 07:00  Patient On (Oxygen Delivery Method): room air        General: No acute distress    NEUROLOGICAL EXAM:  Mental status: Awake, alert, oriented x3, speech fluent, follows commands, no neglect, normal memory   Cranial Nerves: No facial asymmetry, no nystagmus, no dysarthria,  tongue midline  Motor exam: Normal tone, no drift, 5/5 RUE, 5/5 RLE, 5/5 LUE, 5/5 LLE, normal fine finger movements.  Sensation: Intact to light touch   Coordination/ Gait: No dysmetria, gait not tested    LABS:                        14.1   6.56  )-----------( 115      ( 20 Oct 2023 06:04 )             41.0    10-20    139  |  104  |  17.1  ----------------------------<  148<H>  4.0   |  22.0  |  1.02    Ca    9.1      20 Oct 2023 06:04  Mg     2.3     10-20    PT/INR - ( 20 Oct 2023 06:04 )   PT: 12.4 sec;   INR: 1.12 ratio         PTT - ( 20 Oct 2023 06:04 )  PTT:86.9 sec      IMAGING: Reviewed by me.      [facility] Stroke Team  Progress Note    Preliminary note, official note pending attending review/signature     HPI:  Patient is a 59 y/o M with a PMHx of stroke 2022 reported at [facility]  (at the time patient received thrombolysis and had full  resolution of symptoms), family reported dove dove, HTN, DM, HLD nonadherent with medications, who presented following an episode of LOC.  As per sister report, patient was last seen well by his niece  morning of 10/12/23  before patient went to work. At around 6:15 pm that night patient had a sudden fall at work and lost consciousness It is unclear if patient had any symptoms before he fell. NIHSS 10 on admission . Transferred to [facility] for mechanical thrombectomy.     SUBJECTIVE: No events overnight. Plan for ILR today. Continues on heparin gtt. No new neurologic complaints. ROS reported negative unless otherwise noted.    acetaminophen     Tablet .. 650 milliGRAM(s) Oral every 6 hours PRN  atorvastatin 80 milliGRAM(s) Oral at bedtime  bisacodyl Suppository 10 milliGRAM(s) Rectal daily PRN  chlorhexidine 2% Cloths 1 Application(s) Topical <User Schedule>  dextrose 50% Injectable 12.5 Gram(s) IV Push once  dextrose 50% Injectable 25 Gram(s) IV Push once  dextrose 50% Injectable 25 Gram(s) IV Push once  famotidine    Tablet 20 milliGRAM(s) Oral daily  folic acid 1 milliGRAM(s) Oral daily  heparin  Infusion. 1200 Unit(s)/Hr IV Continuous <Continuous>  hydrALAZINE Injectable 10 milliGRAM(s) IV Push every 4 hours PRN  influenza   Vaccine 0.5 milliLiter(s) IntraMuscular once  insulin lispro (ADMELOG) corrective regimen sliding scale   SubCutaneous Before meals and at bedtime  LORazepam   Injectable 2 milliGRAM(s) IV Push every 2 hours PRN  magnesium citrate Oral Solution 296 milliLiter(s) Oral once PRN  magnesium hydroxide Suspension 30 milliLiter(s) Oral daily PRN  multivitamin 1 Tablet(s) Oral daily  polyethylene glycol 3350 17 Gram(s) Oral every 12 hours  senna 2 Tablet(s) Oral every 12 hours  thiamine 100 milliGRAM(s) Oral daily    PHYSICAL EXAM:   Vital Signs Last 24 Hrs  T(C): 36.6 (20 Oct 2023 07:00), Max: 37.4 (19 Oct 2023 13:53)  T(F): 97.9 (20 Oct 2023 07:00), Max: 99.4 (19 Oct 2023 13:53)  HR: 45 (20 Oct 2023 07:00) (45 - 67)  BP: 110/81 (20 Oct 2023 07:00) (110/81 - 159/85)  BP(mean): 103 (19 Oct 2023 12:00) (103 - 103)  RR: 18 (20 Oct 2023 07:00) (18 - 19)  SpO2: 94% (20 Oct 2023 07:00) (94% - 97%)    Parameters below as of 20 Oct 2023 07:00  Patient On (Oxygen Delivery Method): room air    General: No acute distress    NEUROLOGICAL EXAM:  Mental status: Awake, alert, oriented x self, place, unknown month/year, repetition intact, IDs objects, follows simple commands, speech fluent, impaired memory  Cranial Nerves: slight left facial palsy, right superior quadrantopia in far periphery, left superior quadrantopia, no nystagmus, no dysarthria  Motor exam: Normal tone, no drift, 5/5 RUE, 5/5 RLE, 5/5 LUE proximally, 4+/5  LUE, 5/5 LLE   Sensation: decreased sensation on left   Coordination/ Gait: No dysmetria, gait not tested    LABS:                        14.1   6.56  )-----------( 115      ( 20 Oct 2023 06:04 )             41.0    10-20    139  |  104  |  17.1  ----------------------------<  148<H>  4.0   |  22.0  |  1.02    Ca    9.1      20 Oct 2023 06:04  Mg     2.3     10-20    PT/INR - ( 20 Oct 2023 06:04 )   PT: 12.4 sec;   INR: 1.12 ratio         PTT - ( 20 Oct 2023 06:04 )  PTT:86.9 sec      IMAGING: Reviewed by me.     CT Head No Cont (10.17.23 @ 12:19)   Multiple foci of low density right mesial temporal lobe and right   thalamus are more prominent on the current examination and likely   represent the presence of evolving cytotoxic edema.  Similar-appearing low density involving the left mesial temporal lobe   likely representing cytotoxic edema.  Redemonstration of chronic left of midline basis pontis infarct.  No acute intracranial hemorrhage.    CT Head No Cont (10.15.23 @ 13:13)   IMPRESSION: Stable medial left temporal occipital lobe infarction.    (10.14.23 @ 15:25)   CT BRAIN WITHOUT CONTRAST:  1. Decreased attenuation involving the medial aspect of the left temporal   lobe, not present on prior noncontrast CT study. However, restricted   diffusion was appreciated in the same location on the prior MRI of   10/13/2023.  2. Nonacute left ventral pontine ischemic event, present on prior CT and   MRI.    10.14.23 @ 14:58)   CT BRAIN WITHOUT CONTRAST:  1. Decreased attenuation involving the medial aspect of the left temporal   lobe, not present on prior noncontrast CT study. However, restricted   diffusion was appreciated in the same location on the prior MRI of   10/13/2023.  2. Nonacute left ventral pontine ischemic event, present on prior CT and   MRI.    CT PERFUSION:  Technical limitations: Significantly limited by patient motion during   image acquisition and suboptimal arterial waveform.   Core infartction: 0 ml; [place]ra/tissue at risk for active ischemia: 75   mL involving right temporal brain parenchyma.  If symptoms persist consider follow up head CT or MRI, MRA  if no   contraindication.    CTA HEAD:  1. Poor visualization of the proximal M1 segment of the right middle   cerebral artery, as on prior.  2. Irregular pattern of flow within the P2 segment of the left posterior   cerebral artery, as on prior.  4. Cut off of the left posterior cerebral artery at the P1/P2 junction,   as on prior, with suggestion of distal reconstitution..  5. Hypoplastic poorly visualized intracranial right vertebral artery.  6. No evidence of aneurysm formation at the level of the [place] of   [place]. Tiny aneurysms can be beyond the resolution of CTA technique.    CTA NECK:  1. No evidence of significant stenosis or occlusion in association with   the bilateral common carotid arteriesor bilateral internal carotid   arteries.  2. Poorly visualized proximal left vertebral artery with suggestion of   distal reconstitution at the level of C2.     10/12/2023 19:13  IMPRESSION:  HEAD CT: No evidence of an acute intracranial hemorrhage midline shift or hydrocephalus. Mild bifrontal periventricular white matter ischemic changes. Bilateral maxillary sinusitis  NECK CTA: No hemodynamic significant narowing involving the carotid bifurcations. Hypoplastic right vertebral artery. Dominant left vertebral artery..  BRAIN CTA: Cutoff of the M1 portion of the right middle cerebral artery which may be chronic as there appears to be lenticular striate collaterals and reconstitution of more distal MCA segments. Cutoff of the right posterior cerebral artery at the P1-2 junction with reconstitution of more distal segments. Cut off of the left posterior cerebral artery at the P1-2 junction with mild distal reconstitution. Hypoplastic right vertebral artery with portions of the V4 segment not visualized  CT PERFUSION: Regions of ischemia within the posterior circulation with a volume of 121 mL without evidence of core infarction.    (10.13.23 @ 12:31)   MRI BRAIN: Acute small to moderate medial left temporal occipital lobe   infarctions. Acute punctate left occipital lobe infarction.  MRA BRAIN: Occluded right MCA. Occluded right PCA. Occluded right   vertebral artery proximal V4 segment.  MRI BRAIN NOVA: Values above.    TTE Echo Complete w/ Contrast w/ Doppler (10.13.23 @ 19:44)   Summary:   1. Technically difficult study.   2. Normal left ventricular internal cavity size.   3. Left ventricular ejection fraction, by visual estimation, is 50 to   55%.   4. Normal left atrial size.   5. Normal right atrial size.   6. Mildly enlarged right ventricle.   7. Dilatation of the ascending aorta.   8. Sclerotic aortic valve with normal opening.   9. Mild mitral annular calcification.  10. Mild thickening of the anterior mitral valve leaflet.  11. Trace mitral valve regurgitation.  12. Mild tricuspid regurgitation.  13. Trivial pericardial effusion.  14. Recommend transesophageal echocardiogram.    < from: US Duplex Venous Lower Ext Complete, Bilateral (10.13.23 @ 22:47) >  IMPRESSION:  No evidence of deep venous thrombosis in either lower extremity. [facility] Stroke Team  Progress Note    HPI:  Patient is a 57 y/o M with a PMHx of stroke 2022 reported at [facility]  (at the time patient received thrombolysis and had full  resolution of symptoms), family reported dove dove, HTN, DM, HLD nonadherent with medications, who presented following an episode of LOC.  As per sister report, patient was last seen well by his niece  morning of 10/12/23  before patient went to work. At around 6:15 pm that night patient had a sudden fall at work and lost consciousness It is unclear if patient had any symptoms before he fell. NIHSS 10 on admission . Transferred to [facility] for mechanical thrombectomy.     SUBJECTIVE: No events overnight. Plan for ILR today. Continues on heparin gtt. No new neurologic complaints. ROS reported negative unless otherwise noted.    acetaminophen     Tablet .. 650 milliGRAM(s) Oral every 6 hours PRN  atorvastatin 80 milliGRAM(s) Oral at bedtime  bisacodyl Suppository 10 milliGRAM(s) Rectal daily PRN  chlorhexidine 2% Cloths 1 Application(s) Topical <User Schedule>  dextrose 50% Injectable 12.5 Gram(s) IV Push once  dextrose 50% Injectable 25 Gram(s) IV Push once  dextrose 50% Injectable 25 Gram(s) IV Push once  famotidine    Tablet 20 milliGRAM(s) Oral daily  folic acid 1 milliGRAM(s) Oral daily  heparin  Infusion. 1200 Unit(s)/Hr IV Continuous <Continuous>  hydrALAZINE Injectable 10 milliGRAM(s) IV Push every 4 hours PRN  influenza   Vaccine 0.5 milliLiter(s) IntraMuscular once  insulin lispro (ADMELOG) corrective regimen sliding scale   SubCutaneous Before meals and at bedtime  LORazepam   Injectable 2 milliGRAM(s) IV Push every 2 hours PRN  magnesium citrate Oral Solution 296 milliLiter(s) Oral once PRN  magnesium hydroxide Suspension 30 milliLiter(s) Oral daily PRN  multivitamin 1 Tablet(s) Oral daily  polyethylene glycol 3350 17 Gram(s) Oral every 12 hours  senna 2 Tablet(s) Oral every 12 hours  thiamine 100 milliGRAM(s) Oral daily    PHYSICAL EXAM:   Vital Signs Last 24 Hrs  T(C): 36.6 (20 Oct 2023 07:00), Max: 37.4 (19 Oct 2023 13:53)  T(F): 97.9 (20 Oct 2023 07:00), Max: 99.4 (19 Oct 2023 13:53)  HR: 45 (20 Oct 2023 07:00) (45 - 67)  BP: 110/81 (20 Oct 2023 07:00) (110/81 - 159/85)  BP(mean): 103 (19 Oct 2023 12:00) (103 - 103)  RR: 18 (20 Oct 2023 07:00) (18 - 19)  SpO2: 94% (20 Oct 2023 07:00) (94% - 97%)    Parameters below as of 20 Oct 2023 07:00  Patient On (Oxygen Delivery Method): room air    General: No acute distress    NEUROLOGICAL EXAM:  Mental status: Awake, alert, oriented x self, place, unknown month/year, repetition intact, IDs objects, follows simple commands, speech fluent, impaired memory  Cranial Nerves: slight left facial palsy, right superior quadrantopia in far periphery, left superior quadrantopia, no nystagmus, no dysarthria  Motor exam: Normal tone, no drift, 5/5 RUE, 5/5 RLE, 5/5 LUE proximally, 4+/5  LUE, 5/5 LLE   Sensation: decreased sensation on left   Coordination/ Gait: No dysmetria, gait not tested    LABS:                        14.1   6.56  )-----------( 115      ( 20 Oct 2023 06:04 )             41.0    10-20    139  |  104  |  17.1  ----------------------------<  148<H>  4.0   |  22.0  |  1.02    Ca    9.1      20 Oct 2023 06:04  Mg     2.3     10-20    PT/INR - ( 20 Oct 2023 06:04 )   PT: 12.4 sec;   INR: 1.12 ratio         PTT - ( 20 Oct 2023 06:04 )  PTT:86.9 sec      IMAGING: Reviewed by me.     CT Head No Cont (10.17.23 @ 12:19)   Multiple foci of low density right mesial temporal lobe and right   thalamus are more prominent on the current examination and likely   represent the presence of evolving cytotoxic edema.  Similar-appearing low density involving the left mesial temporal lobe   likely representing cytotoxic edema.  Redemonstration of chronic left of midline basis pontis infarct.  No acute intracranial hemorrhage.    CT Head No Cont (10.15.23 @ 13:13)   IMPRESSION: Stable medial left temporal occipital lobe infarction.    (10.14.23 @ 15:25)   CT BRAIN WITHOUT CONTRAST:  1. Decreased attenuation involving the medial aspect of the left temporal   lobe, not present on prior noncontrast CT study. However, restricted   diffusion was appreciated in the same location on the prior MRI of   10/13/2023.  2. Nonacute left ventral pontine ischemic event, present on prior CT and   MRI.    10.14.23 @ 14:58)   CT BRAIN WITHOUT CONTRAST:  1. Decreased attenuation involving the medial aspect of the left temporal   lobe, not present on prior noncontrast CT study. However, restricted   diffusion was appreciated in the same location on the prior MRI of   10/13/2023.  2. Nonacute left ventral pontine ischemic event, present on prior CT and   MRI.    CT PERFUSION:  Technical limitations: Significantly limited by patient motion during   image acquisition and suboptimal arterial waveform.   Core infartction: 0 ml; Penubra/tissue at risk for active ischemia: 75   mL involving right temporal brain parenchyma.  If symptoms persist consider follow up head CT or MRI, MRA  if no   contraindication.    CTA HEAD:  1. Poor visualization of the proximal M1 segment of the right middle   cerebral artery, as on prior.  2. Irregular pattern of flow within the P2 segment of the left posterior   cerebral artery, as on prior.  4. Cut off of the left posterior cerebral artery at the P1/P2 junction,   as on prior, with suggestion of distal reconstitution..  5. Hypoplastic poorly visualized intracranial right vertebral artery.  6. No evidence of aneurysm formation at the level of the [place] of   [place]. Tiny aneurysms can be beyond the resolution of CTA technique.    CTA NECK:  1. No evidence of significant stenosis or occlusion in association with   the bilateral common carotid arteriesor bilateral internal carotid   arteries.  2. Poorly visualized proximal left vertebral artery with suggestion of   distal reconstitution at the level of C2.     10/12/2023 19:13  IMPRESSION:  HEAD CT: No evidence of an acute intracranial hemorrhage midline shift or hydrocephalus. Mild bifrontal periventricular white matter ischemic changes. Bilateral maxillary sinusitis  NECK CTA: No hemodynamic significant narowing involving the carotid bifurcations. Hypoplastic right vertebral artery. Dominant left vertebral artery..  BRAIN CTA: Cutoff of the M1 portion of the right middle cerebral artery which may be chronic as there appears to be lenticular striate collaterals and reconstitution of more distal MCA segments. Cutoff of the right posterior cerebral artery at the P1-2 junction with reconstitution of more distal segments. Cut off of the left posterior cerebral artery at the P1-2 junction with mild distal reconstitution. Hypoplastic right vertebral artery with portions of the V4 segment not visualized  CT PERFUSION: Regions of ischemia within the posterior circulation with a volume of 121 mL without evidence of core infarction.    (10.13.23 @ 12:31)   MRI BRAIN: Acute small to moderate medial left temporal occipital lobe   infarctions. Acute punctate left occipital lobe infarction.  MRA BRAIN: Occluded right MCA. Occluded right PCA. Occluded right   vertebral artery proximal V4 segment.  MRI BRAIN NOVA: Values above.    TTE Echo Complete w/ Contrast w/ Doppler (10.13.23 @ 19:44)   Summary:   1. Technically difficult study.   2. Normal left ventricular internal cavity size.   3. Left ventricular ejection fraction, by visual estimation, is 50 to   55%.   4. Normal left atrial size.   5. Normal right atrial size.   6. Mildly enlarged right ventricle.   7. Dilatation of the ascending aorta.   8. Sclerotic aortic valve with normal opening.   9. Mild mitral annular calcification.  10. Mild thickening of the anterior mitral valve leaflet.  11. Trace mitral valve regurgitation.  12. Mild tricuspid regurgitation.  13. Trivial pericardial effusion.  14. Recommend transesophageal echocardiogram.    US Duplex Venous Lower Ext Complete, Bilateral (10.13.23 @ 22:47)   IMPRESSION:  No evidence of deep venous thrombosis in either lower extremity.

## 2023-10-20 NOTE — PROGRESS NOTE ADULT - SUBJECTIVE AND OBJECTIVE BOX
Pt 58 year old male with HTN, HL, ADRIANO, and CVA who presented with syncope found to have acute CVA with hospitalization complicated by bradycardia for which EP is consulted.  ECG and telemetry shows episodes of bradycardia with fascicular PVCs (from LPFB) and non-conducted P waves vs retrograde P waves and antegrade block. The coupling interval of the P waves is similar to the PVCs which makes the latter (retrograde P waves with antegrade block) more likely. Alternatively, patient may be having his extrasystoles with aberrant conduction. There is no evidence of high grade AV alejo or infranodal block that would require pacemaker implantation at this time.  Pt now presents for ILR implant prior to discharge to evaluate for underlying AF.

## 2023-10-20 NOTE — PROGRESS NOTE ADULT - ASSESSMENT
ASSESSMENT: Patient is a 57 y/o M with a PMHx of stroke 2022 reported at [facility] (at the time patient received thrombolysis and had full  resolution of symptoms), reported dove dove, HTN, DM, HLD nonadherent with medications, who presented following an episode of LOC while at work the evening of 10/12/23, there was reported right sided hemisensory loss and right homonomous hemianopia .  Reported unclear if definitively last known well was prior at 1815 therefor excluded from tenecteplase after CT head did not demonstrate acute infarction or hemorrhage.  Perfusion demonstrated regions of ischemia in the posterior circulation, CT angiogram head/neck showed right PCA P1-2 cutoff, possibly chronic right MCA M1 cutoff which had distal reconstitution and established collaterals, and left PCA occlusion which patient was subsequently transferred for mechanical thrombectomy. Cerebral angiogram and mechanical thrombectomy performed using aspiration with TICI 3 recanalization of left PCA.  The right occipital lobe filled from right SHANI collaterals, and the right MCA as well was thought to be chronic given noted collateralization. MR Brain with small to moderate left temporal occipital lobe infarctions. Occluded right MCA, occluded right PCA and right vertebral artery proximal segment. CT head now with new hypodensity in right hemisphere (right temporal lobe and thalamic region) possibly hypoperfusion with concern for underlying ischemia. Etiology; embolic stroke of undetermined source. While there is underlying dove dove with dependency on collateralization can not entirely exclude cardioembolic / hypercoagulable state, avoid  hypotension or hypotension precipitating events.     NEURO:   - MRI deferred per d/w IR as likely not to  at this time.  - Consider outpatient neuropsych follow up for cognitive therapy  - Continue close monitoring for neurologic deterioration, repeat urgent CT head for any change and notify neurology     - Stroke neuro checks q 4 hrs.  - Hypercoagulable panel sent  - SBP goal permissive to keep 140-180 mm Hg for now due to concern for hypoperfusion per d/w GUICHO, avoid further hypertension as to minimize risk of hemorrhagic component. Further titration pending clinical course.   - ANTITHROMBOTIC THERAPY:  After discussion with GUICHO attending Dr. Goodrich decision was made (given fluctuating/new symptoms as noted) to initiate Heparin gtt (PTT 60-80), no contraindication from neurological standpoint to transition to Apixaban 5 mg BID. Anticipate 3 month course for now, long term to be determined w/ Dr. Goodrich as outpatient.    - BP goal as noted above as patient appears to be dependent on collateral flow and high risk for recurrent thrombotic event.  Monitor closely and repeat CT head for any change and notify on call neurology team.   - ASA discontinued in setting of therapeutic anticoagulation. To be reconsidered if transitioned off anticoagulation.   - titrate statin to LDL goal less than 70  - Encouraged adherence to follow up/ recommended medications   - Consideration in Outpatient NM spect w/w out Diamox w/ close outpatient follow up for consideration in bypass pending repeat imaging and clinical course   - Dysphagia screen: pass  - Physical therapy/OT/Speech eval/treatment. Cognitive evaluation. Supervision.     - CARDIOVASCULAR: TTE as noted, cardiac monitoring w/ telemetry for now, at this time defer ROCIO as to avoid precipitating hypotension due to concern for hypoperfusion, d/w cardiology team, no obvious evidence of cardiac mass/ vegetation to warrant further evaluation given overall risk / benefit.  ILR implanted today. Plan to transition to eliquis upon discharge. Please use with caution, ideally, suggest to defer BP pressure altering agents at this time due to sensitivity and concern for hypoperfusion and SBP goal 140-180 mmHg for now; if agent is  absolutely needed suggest to titrate gradually with hold parameters with SBP less than 160mmHg.                 - HEMATOLOGY: H/H 14.1/41.0, Platelets 115, close monitoring for thrombocytopenia, patient should have all age and risk appropriate malignancy screenings with PCP or sooner if clinically suspected to ensure no underlying hypercoagulable states      DVT ppx: heparin gtt    PULMONARY: protecting airway, saturating well     RENAL: BUN/Cr within range, monitor urine output, maintain adequate hydration       Na Goal:  135-145    ID: afebrile, no leukocytosis, monitor for si/sx of infection    OTHER:  condition and plan of care d/w patient, questions and concerns addressed. CIWA protcol, monitor closely for si/sx of withdrawal.    DISPOSITION: Rehab or home depending on PT eval once stable and workup is complete      CORE MEASURES:        Admission NIHSS:10      Tenecteplase : [] YES [x] NO      LDL/HDL/A1C: 115/29/8.6     Depression Screen- if depression hx and/or present      Statin Therapy: as noted      Dysphagia Screen: [x] PASS [] FAIL     Smoking [] YES [x] NO      Afib [] YES [x] NO     Stroke Education [x] YES [] NO    Obtain screening lower extremity venous ultrasound in patients who meet 1 or more of the following criteria as patient is high risk for DVT/PE on admission:   [] History of DVT/PE  []Hypercoagulable states (Factor V Leiden, Cancer, OCP, etc. )  []Prolonged immobility (hemiplegia/hemiparesis/post operative or any other extended immobilization)  [] Transferred from outside facility (Rehab or Long term care)  [] Age </= to 50

## 2023-10-20 NOTE — PROGRESS NOTE ADULT - SUBJECTIVE AND OBJECTIVE BOX
Procedure: Loop Recorder Implant   Electrophysiologist: Rj Delgado MD    Pt doing well s/p loop recorder implant. Denies complaint post procedure.     Incision: Dermabond & Steristrips C/D/I; no bleeding, hematoma, erythema, exudate or edema    Assessment:   58 year old male with HTN, HL, ADRIANO, and CVA who presented with syncope found to have acute CVA with hospitalization complicated by bradycardia for which EP is consulted.  ECG and telemetry shows episodes of bradycardia with fascicular PVCs (from LPFB) and non-conducted P waves vs retrograde P waves and antegrade block. There has been no evidence of high grade AV block or infranodal block. Now status post [employer] ILR for long term arrhythmia surveillance.     Plan:   Resume PO intake.   Ambulate w/ assist, then progress as tolerated.     Pain control with PO analgesia PRN.   Loop Recorder Incision Care:     - Do not touch the incision until it is completely healed.   - There is Dermabond (skin glue) and/or Steristrips on your incision, which will start to flake off on its own over the next 2-3 weeks. Do not pick at or peel off the Dermabond and /or Steristrips.    - Do not apply soaps, creams, lotions, ointments or powders to the incision until it is completely healed.  - You should call the doctor if you notice redness, drainage, swelling, increased tenderness, hot sensation around the  incision, bleeding or incision edges pulling apart.    Outpatient follow up with Dr. Delgado in 3-4 weeks.

## 2023-10-20 NOTE — PROGRESS NOTE ADULT - SUBJECTIVE AND OBJECTIVE BOX
Cerebral infarction    HPI:  Patient is a 59 y/o M with a PMHx of stroke one year ago (  at the time patient recieved TNK and had full  resolution of symptoms), HTN, DM, non-compliant with medications, who presented following an episode of LOC.   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 At around 6:15 pm patient had a sudden fall at work and lost conciousness. It is unclear if patient had any symptoms before he fell. NIHSS 10 on admission . Patient is not a current TNK candidate  due to unclear exact last known well. TRF to [facility] for thrombectomy. (12 Oct 2023 23:00)    Interval History:  Patient was seen and examined at bedside around 10 am.  Woke him up from sleep.  Doing well.   Has weakness / numbness along with visual deficit on left side.   Denies chest pain, palpitations, shortness of breath, headache, dizziness, visual symptoms, nausea, vomiting or abdominal pain.    ROS:  As per interval history otherwise unremarkable.    PHYSICAL EXAM:  Vital Signs   T(C): 36.6 (20 Oct 2023 07:00), Max: 37.4 (19 Oct 2023 13:53)  T(F): 97.9 (20 Oct 2023 07:00), Max: 99.4 (19 Oct 2023 13:53)  HR: 45 (20 Oct 2023 07:00) (45 - 67)  BP: 110/81 (20 Oct 2023 07:00) (110/81 - 159/85)  BP(mean): 103 (19 Oct 2023 12:00) (103 - 103)  RR: 18 (20 Oct 2023 07:00) (18 - 19)  SpO2: 94% (20 Oct 2023 07:00) (94% - 97%)  Parameters below as of 20 Oct 2023 07:00  Patient On (Oxygen Delivery Method): room air  General: Middle aged male lying in bed comfortably. No acute distress  HEENT: EOMI. Clear conjunctivae. Moist mucus membrane  Neck: Supple.   Chest: CTA bilaterally. No wheezing, rales or rhonchi. No chest wall tenderness.  Heart: Normal S1 & S2. RRR.   Abdomen: Non distended. Soft. Non-tender. + BS  Ext: No pedal edema. No calf tenderness   Neuro: Active and alert but not completely oriented. Motor 5/5 in RUE/RLE and 4-5/5 in LUE/LLE. Sensation decreased on left side. Reflexes 1+. Cerebellar sings intact. No speech disorder  Skin: Warm and Dry  Psychiatry: Normal mood and affect    LABS:  CAPILLARY BLOOD GLUCOSE  POCT Blood Glucose.: 183 mg/dL (20 Oct 2023 08:32)  POCT Blood Glucose.: 151 mg/dL (19 Oct 2023 21:30)  POCT Blood Glucose.: 117 mg/dL (19 Oct 2023 18:12)  POCT Blood Glucose.: 132 mg/dL (19 Oct 2023 13:41)                      14.1   6.56  )-----------( 115      ( 20 Oct 2023 06:04 )             41.0     10-20    139  |  104  |  17.1  ----------------------------<  148<H>  4.0   |  22.0  |  1.02    Ca    9.1      20 Oct 2023 06:04  Mg     2.3     10-20    PT/INR - ( 20 Oct 2023 06:04 )   PT: 12.4 sec;   INR: 1.12 ratio       PTT - ( 20 Oct 2023 06:04 )  PTT:86.9 sec    Blood Culture: 10-18 @ 13:00  Organism --  Gram Stain Blood -- Gram Stain --  Specimen Source .Blood Blood-Peripheral  Culture-Blood --    RADIOLOGY & ADDITIONAL STUDIES:  Reviewed     MEDICATIONS  (STANDING):  atorvastatin 80 milliGRAM(s) Oral at bedtime  chlorhexidine 2% Cloths 1 Application(s) Topical <User Schedule>  dextrose 50% Injectable 12.5 Gram(s) IV Push once  dextrose 50% Injectable 25 Gram(s) IV Push once  dextrose 50% Injectable 25 Gram(s) IV Push once  famotidine    Tablet 20 milliGRAM(s) Oral daily  folic acid 1 milliGRAM(s) Oral daily  heparin  Infusion. 1200 Unit(s)/Hr (12 mL/Hr) IV Continuous <Continuous>  influenza   Vaccine 0.5 milliLiter(s) IntraMuscular once  insulin lispro (ADMELOG) corrective regimen sliding scale   SubCutaneous Before meals and at bedtime  multivitamin 1 Tablet(s) Oral daily  polyethylene glycol 3350 17 Gram(s) Oral every 12 hours  senna 2 Tablet(s) Oral every 12 hours  thiamine 100 milliGRAM(s) Oral daily    MEDICATIONS  (PRN):  acetaminophen     Tablet .. 650 milliGRAM(s) Oral every 6 hours PRN Mild Pain (1 - 3)  bisacodyl Suppository 10 milliGRAM(s) Rectal daily PRN Constipation  hydrALAZINE Injectable 10 milliGRAM(s) IV Push every 4 hours PRN SBP>160  LORazepam   Injectable 2 milliGRAM(s) IV Push every 2 hours PRN CIWA-Ar score increase by 2 points and a total score of 7 or less  magnesium citrate Oral Solution 296 milliLiter(s) Oral once PRN if he does not go with suppository  magnesium hydroxide Suspension 30 milliLiter(s) Oral daily PRN Constipation

## 2023-10-21 LAB
APCR PPP: 2.9 RATIO — SIGNIFICANT CHANGE UP
APCR PPP: 2.9 RATIO — SIGNIFICANT CHANGE UP
GLUCOSE BLDC GLUCOMTR-MCNC: 112 MG/DL — HIGH (ref 70–99)
GLUCOSE BLDC GLUCOMTR-MCNC: 112 MG/DL — HIGH (ref 70–99)
GLUCOSE BLDC GLUCOMTR-MCNC: 143 MG/DL — HIGH (ref 70–99)
GLUCOSE BLDC GLUCOMTR-MCNC: 143 MG/DL — HIGH (ref 70–99)
GLUCOSE BLDC GLUCOMTR-MCNC: 164 MG/DL — HIGH (ref 70–99)
GLUCOSE BLDC GLUCOMTR-MCNC: 164 MG/DL — HIGH (ref 70–99)
GLUCOSE BLDC GLUCOMTR-MCNC: 172 MG/DL — HIGH (ref 70–99)
GLUCOSE BLDC GLUCOMTR-MCNC: 172 MG/DL — HIGH (ref 70–99)

## 2023-10-21 PROCEDURE — 99232 SBSQ HOSP IP/OBS MODERATE 35: CPT

## 2023-10-21 RX ADMIN — SENNA PLUS 2 TABLET(S): 8.6 TABLET ORAL at 17:39

## 2023-10-21 RX ADMIN — Medication 10 MILLIGRAM(S): at 21:50

## 2023-10-21 RX ADMIN — APIXABAN 5 MILLIGRAM(S): 2.5 TABLET, FILM COATED ORAL at 17:39

## 2023-10-21 RX ADMIN — FAMOTIDINE 20 MILLIGRAM(S): 10 INJECTION INTRAVENOUS at 12:20

## 2023-10-21 RX ADMIN — Medication 2: at 12:22

## 2023-10-21 RX ADMIN — APIXABAN 5 MILLIGRAM(S): 2.5 TABLET, FILM COATED ORAL at 05:12

## 2023-10-21 RX ADMIN — Medication 2: at 22:24

## 2023-10-21 RX ADMIN — CHLORHEXIDINE GLUCONATE 1 APPLICATION(S): 213 SOLUTION TOPICAL at 05:12

## 2023-10-21 RX ADMIN — POLYETHYLENE GLYCOL 3350 17 GRAM(S): 17 POWDER, FOR SOLUTION ORAL at 17:38

## 2023-10-21 RX ADMIN — POLYETHYLENE GLYCOL 3350 17 GRAM(S): 17 POWDER, FOR SOLUTION ORAL at 05:12

## 2023-10-21 RX ADMIN — Medication 100 MILLIGRAM(S): at 12:22

## 2023-10-21 RX ADMIN — Medication 1 MILLIGRAM(S): at 12:20

## 2023-10-21 RX ADMIN — ATORVASTATIN CALCIUM 80 MILLIGRAM(S): 80 TABLET, FILM COATED ORAL at 21:50

## 2023-10-21 RX ADMIN — SENNA PLUS 2 TABLET(S): 8.6 TABLET ORAL at 05:12

## 2023-10-21 RX ADMIN — Medication 1 TABLET(S): at 12:20

## 2023-10-21 NOTE — PROGRESS NOTE ADULT - SUBJECTIVE AND OBJECTIVE BOX
Cerebral infarction    HPI:  Patient is a 59 y/o M with a PMHx of stroke one year ago (  at the time patient recieved TNK and had full  resolution of symptoms), HTN, DM, non-compliant with medications, who presented following an episode of LOC.   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 At around 6:15 pm patient had a sudden fall at work and lost conciousness. It is unclear if patient had any symptoms before he fell. NIHSS 10 on admission . Patient is not a current TNK candidate  due to unclear exact last known well. TRF to [facility] for thrombectomy. (12 Oct 2023 23:00)    Interval History:  Patient was seen and examined at bedside around 10:15 am.  Feels OK.  Very emotional.   Has weakness / numbness along with visual deficit on left side -- no change.     ROS:  As per interval history otherwise unremarkable.    PHYSICAL EXAM:  Vital Signs   T(C): 36.7 (21 Oct 2023 12:00), Max: 37 (21 Oct 2023 00:14)  T(F): 98 (21 Oct 2023 12:00), Max: 98.6 (21 Oct 2023 00:14)  HR: 88 (21 Oct 2023 12:00) (59 - 88)  BP: 156/75 (21 Oct 2023 12:00) (143/78 - 160/84)  RR: 18 (21 Oct 2023 12:00) (18 - 18)  SpO2: 93% (21 Oct 2023 12:00) (91% - 99%)  Parameters below as of 21 Oct 2023 12:00  Patient On (Oxygen Delivery Method): room air  General: Middle aged male lying in bed comfortably. No acute distress  HEENT: EOMI. Clear conjunctivae. Moist mucus membrane  Neck: Supple.   Chest: CTA bilaterally. No wheezing, rales or rhonchi. No chest wall tenderness.  Heart: Normal S1 & S2. RRR.   Abdomen: Non distended. Soft. Non-tender. + BS  Ext: No pedal edema. No calf tenderness   Neuro: Active and alert but not completely oriented. Motor 5/5 in RUE/RLE and 4-5/5 in LUE/LLE. Sensation decreased on left side. Reflexes 1+. Cerebellar sings intact. No speech disorder  Skin: Warm and Dry  Psychiatry: Normal mood and affect    LABS:  CAPILLARY BLOOD GLUCOSE  POCT Blood Glucose.: 164 mg/dL (21 Oct 2023 12:20)  POCT Blood Glucose.: 143 mg/dL (21 Oct 2023 07:40)  POCT Blood Glucose.: 166 mg/dL (20 Oct 2023 21:53)  POCT Blood Glucose.: 145 mg/dL (20 Oct 2023 18:06)  POCT Blood Glucose.: 137 mg/dL (20 Oct 2023 15:46)                    14.1   6.56  )-----------( 115      ( 20 Oct 2023 06:04 )             41.0     10-20    139  |  104  |  17.1  ----------------------------<  148<H>  4.0   |  22.0  |  1.02    Ca    9.1      20 Oct 2023 06:04  Mg     2.3     10-20    PT/INR - ( 20 Oct 2023 06:04 )   PT: 12.4 sec;   INR: 1.12 ratio       PTT - ( 20 Oct 2023 06:04 )  PTT:86.9 sec    Blood Culture: 10-18 @ 13:00  Organism --  Gram Stain Blood -- Gram Stain --  Specimen Source .Blood Blood-Peripheral  Culture-Blood --    RADIOLOGY & ADDITIONAL STUDIES:  Reviewed     MEDICATIONS  (STANDING):  apixaban 5 milliGRAM(s) Oral two times a day  atorvastatin 80 milliGRAM(s) Oral at bedtime  chlorhexidine 2% Cloths 1 Application(s) Topical <User Schedule>  dextrose 50% Injectable 12.5 Gram(s) IV Push once  dextrose 50% Injectable 25 Gram(s) IV Push once  dextrose 50% Injectable 25 Gram(s) IV Push once  famotidine    Tablet 20 milliGRAM(s) Oral daily  folic acid 1 milliGRAM(s) Oral daily  influenza   Vaccine 0.5 milliLiter(s) IntraMuscular once  insulin lispro (ADMELOG) corrective regimen sliding scale   SubCutaneous Before meals and at bedtime  multivitamin 1 Tablet(s) Oral daily  polyethylene glycol 3350 17 Gram(s) Oral every 12 hours  senna 2 Tablet(s) Oral every 12 hours  thiamine 100 milliGRAM(s) Oral daily    MEDICATIONS  (PRN):  acetaminophen     Tablet .. 650 milliGRAM(s) Oral every 6 hours PRN Mild Pain (1 - 3)  bisacodyl Suppository 10 milliGRAM(s) Rectal daily PRN Constipation  hydrALAZINE Injectable 10 milliGRAM(s) IV Push every 4 hours PRN SBP>160  magnesium citrate Oral Solution 296 milliLiter(s) Oral once PRN if he does not go with suppository  magnesium hydroxide Suspension 30 milliLiter(s) Oral daily PRN Constipation

## 2023-10-21 NOTE — PROGRESS NOTE ADULT - ASSESSMENT
ASSESSMENT: Patient is a 57 y/o M with a PMHx of stroke 2022 reported at [facility] (at the time patient received thrombolysis and had full  resolution of symptoms), reported dove dove, HTN, DM, HLD nonadherent with medications, who presented following an episode of LOC while at work the evening of 10/12/23, there was reported right sided hemisensory loss and right homonomous hemianopia .  Reported unclear if definitively last known well was prior at 1815 therefor excluded from tenecteplase after CT head did not demonstrate acute infarction or hemorrhage.  Perfusion demonstrated regions of ischemia in the posterior circulation, CT angiogram head/neck showed right PCA P1-2 cutoff, possibly chronic right MCA M1 cutoff which had distal reconstitution and established collaterals, and left PCA occlusion which patient was subsequently transferred for mechanical thrombectomy. Cerebral angiogram and mechanical thrombectomy performed using aspiration with TICI 3 recanalization of left PCA.  The right occipital lobe filled from right SHANI collaterals, and the right MCA as well was thought to be chronic given noted collateralization. MR Brain with small to moderate left temporal occipital lobe infarctions. Occluded right MCA, occluded right PCA and right vertebral artery proximal segment. CT head now with new hypodensity in right hemisphere (right temporal lobe and thalamic region) possibly hypoperfusion with concern for underlying ischemia. Etiology; embolic stroke of undetermined source. While there is underlying dove dove with dependency on collateralization can not entirely exclude cardioembolic / hypercoagulable state, avoid  hypotension or hypotension precipitating events.     NEURO:   - MRI deferred per d/w IR as likely not to  at this time.  - Consider outpatient neuropsych follow up for cognitive therapy  - Continue close monitoring for neurologic deterioration, repeat urgent CT head for any change and notify neurology     - Stroke neuro checks q 4 hrs.  - Hypercoagulable panel sent  - SBP goal permissive to keep 140-180 mm Hg for now due to concern for hypoperfusion per d/w GUICHO, avoid further hypertension as to minimize risk of hemorrhagic component. Further titration pending clinical course.   - ANTITHROMBOTIC THERAPY:  After discussion with GUICHO attending Dr. Goodrich decision was made (given fluctuating/new symptoms as noted) to initiate Heparin gtt (PTT 60-80), transitioned to Apixaban 5 mg BID. Anticipate 3 month course for now, long term to be determined w/ Dr. Goodrich as outpatient.    - BP goal as noted above as patient appears to be dependent on collateral flow and high risk for recurrent thrombotic event.  Monitor closely and repeat CT head for any change and notify on call neurology team.   - ASA discontinued in setting of therapeutic anticoagulation. To be reconsidered if transitioned off anticoagulation.   - titrate statin to LDL goal less than 70  - Encouraged adherence to follow up/ recommended medications   - Consideration in Outpatient NM spect w/w out Diamox w/ close outpatient follow up for consideration in bypass pending repeat imaging and clinical course   - Dysphagia screen: pass  - Physical therapy/OT/Speech eval/treatment. Cognitive evaluation. Supervision.     - CARDIOVASCULAR: TTE as noted, cardiac monitoring w/ telemetry for now, at this time defer ROCIO as to avoid precipitating hypotension due to concern for hypoperfusion, d/w cardiology team, no obvious evidence of cardiac mass/ vegetation to warrant further evaluation given overall risk / benefit.  ILR implanted today. Plan to transition to eliquis upon discharge. Please use with caution, ideally, suggest to defer BP pressure altering agents at this time due to sensitivity and concern for hypoperfusion and SBP goal 140-180 mmHg for now; if agent is  absolutely needed suggest to titrate gradually with hold parameters with SBP less than 160mmHg.                 - HEMATOLOGY: H/H 14.1/41.0, Platelets 115, close monitoring for thrombocytopenia, patient should have all age and risk appropriate malignancy screenings with PCP or sooner if clinically suspected to ensure no underlying hypercoagulable states      DVT ppx: heparin gtt    PULMONARY: protecting airway, saturating well     RENAL: BUN/Cr within range, monitor urine output, maintain adequate hydration       Na Goal:  135-145    ID: afebrile, no leukocytosis, monitor for si/sx of infection    OTHER:  condition and plan of care d/w patient, questions and concerns addressed. CIWA protcol, monitor closely for si/sx of withdrawal.    DISPOSITION: Rehab or home depending on PT eval once stable and workup is complete      CORE MEASURES:        Admission NIHSS:10      Tenecteplase : [] YES [x] NO      LDL/HDL/A1C: 115/29/8.6     Depression Screen- if depression hx and/or present      Statin Therapy: as noted      Dysphagia Screen: [x] PASS [] FAIL     Smoking [] YES [x] NO      Afib [] YES [x] NO     Stroke Education [x] YES [] NO    Obtain screening lower extremity venous ultrasound in patients who meet 1 or more of the following criteria as patient is high risk for DVT/PE on admission:   [] History of DVT/PE  []Hypercoagulable states (Factor V Leiden, Cancer, OCP, etc. )  []Prolonged immobility (hemiplegia/hemiparesis/post operative or any other extended immobilization)  [] Transferred from outside facility (Rehab or Long term care)  [] Age </= to 50 ASSESSMENT: Patient is a 57 y/o M with a PMHx of stroke 2022 reported at [facility] (at the time patient received thrombolysis and had full  resolution of symptoms), reported dove dove, HTN, DM, HLD nonadherent with medications, who presented following an episode of LOC while at work the evening of 10/12/23, there was reported right sided hemisensory loss and right homonomous hemianopia .  Reported unclear if definitively last known well was prior at 1815 therefor excluded from tenecteplase after CT head did not demonstrate acute infarction or hemorrhage.  Perfusion demonstrated regions of ischemia in the posterior circulation, CT angiogram head/neck showed right PCA P1-2 cutoff, possibly chronic right MCA M1 cutoff which had distal reconstitution and established collaterals, and left PCA occlusion which patient was subsequently transferred for mechanical thrombectomy. Cerebral angiogram and mechanical thrombectomy performed using aspiration with TICI 3 recanalization of left PCA.  The right occipital lobe filled from right SHANI collaterals, and the right MCA as well was thought to be chronic given noted collateralization. MR Brain with small to moderate left temporal occipital lobe infarctions. Occluded right MCA, occluded right PCA and right vertebral artery proximal segment. CT head now with new hypodensity in right hemisphere (right temporal lobe and thalamic region) possibly hypoperfusion with concern for underlying ischemia. Etiology; embolic stroke of undetermined source. While there is underlying dove dove with dependency on collateralization can not entirely exclude cardioembolic / hypercoagulable state, avoid  hypotension or hypotension precipitating events.     NEURO:   - MRI deferred per d/w IR as likely not to  at this time.  - Consider outpatient neuropsych follow up for cognitive therapy  - Continue close monitoring for neurologic deterioration, repeat urgent CT head for any change and notify neurology     - Stroke neuro checks q 4 hrs.  - Hypercoagulable panel sent  - SBP goal permissive to keep 140-180 mm Hg for now due to concern for hypoperfusion per d/w GUICHO, avoid further hypertension as to minimize risk of hemorrhagic component. Further titration pending clinical course.   - ANTITHROMBOTIC THERAPY:  After discussion with GUICHO attending Dr. Goodrich decision was made (given fluctuating/new symptoms as noted) to initiate Heparin gtt (PTT 60-80), transitioned to Apixaban 5 mg BID. Anticipate 3 month course for now, long term to be determined w/ Dr. Goodrich as outpatient.    - BP goal as noted above as patient appears to be dependent on collateral flow and high risk for recurrent thrombotic event.  Monitor closely and repeat CT head for any change and notify on call neurology team.   - ASA discontinued in setting of therapeutic anticoagulation. To be reconsidered if transitioned off anticoagulation.   - titrate statin to LDL goal less than 70  - Encouraged adherence to follow up/ recommended medications   - Consideration in Outpatient NM spect w/w out Diamox w/ close outpatient follow up for consideration in bypass pending repeat imaging and clinical course   - Dysphagia screen: pass  - Physical therapy/OT/Speech eval/treatment. Cognitive evaluation. Supervision.     - CARDIOVASCULAR: TTE as noted, cardiac monitoring w/ telemetry for now, at this time defer ROCIO as to avoid precipitating hypotension due to concern for hypoperfusion, d/w cardiology team, no obvious evidence of cardiac mass/ vegetation to warrant further evaluation given overall risk / benefit.  ILR implanted today. Plan to transition to eliquis upon discharge. Please use with caution, ideally, suggest to defer BP pressure altering agents at this time due to sensitivity and concern for hypoperfusion and SBP goal 130-160 mmHg for now; if agent is  absolutely needed suggest to titrate gradually with hold parameters with SBP less than 130mmHg.                 - HEMATOLOGY: H/H 14.1/41.0, Platelets 115, close monitoring for thrombocytopenia, patient should have all age and risk appropriate malignancy screenings with PCP or sooner if clinically suspected to ensure no underlying hypercoagulable states      DVT ppx: heparin gtt    PULMONARY: protecting airway, saturating well     RENAL: BUN/Cr within range, monitor urine output, maintain adequate hydration       Na Goal:  135-145    ID: afebrile, no leukocytosis, monitor for si/sx of infection    OTHER:  condition and plan of care d/w patient, questions and concerns addressed. CIWA protcol, monitor closely for si/sx of withdrawal.    DISPOSITION: Rehab or home depending on PT eval once stable and workup is complete      CORE MEASURES:        Admission NIHSS:10      Tenecteplase : [] YES [x] NO      LDL/HDL/A1C: 115/29/8.6     Depression Screen- if depression hx and/or present      Statin Therapy: as noted      Dysphagia Screen: [x] PASS [] FAIL     Smoking [] YES [x] NO      Afib [] YES [x] NO     Stroke Education [x] YES [] NO    Obtain screening lower extremity venous ultrasound in patients who meet 1 or more of the following criteria as patient is high risk for DVT/PE on admission:   [] History of DVT/PE  []Hypercoagulable states (Factor V Leiden, Cancer, OCP, etc. )  []Prolonged immobility (hemiplegia/hemiparesis/post operative or any other extended immobilization)  [] Transferred from outside facility (Rehab or Long term care)  [] Age </= to 50

## 2023-10-21 NOTE — PROGRESS NOTE ADULT - SUBJECTIVE AND OBJECTIVE BOX
[facility] Stroke Team  Progress Note    HPI:  Patient is a 59 y/o M with a PMHx of stroke 2022 reported at [facility]  (at the time patient received thrombolysis and had full  resolution of symptoms), family reported dove dove, HTN, DM, HLD nonadherent with medications, who presented following an episode of LOC.  As per sister report, patient was last seen well by his niece  morning of 10/12/23  before patient went to work. At around 6:15 pm that night patient had a sudden fall at work and lost consciousness It is unclear if patient had any symptoms before he fell. NIHSS 10 on admission . Transferred to [facility] for mechanical thrombectomy.     SUBJECTIVE: No events overnight. S/P ILR 10-.       Vital Signs Last 24 Hrs  T(C): 36.9 (21 Oct 2023 16:25), Max: 37 (21 Oct 2023 00:14)  T(F): 98.4 (21 Oct 2023 16:25), Max: 98.6 (21 Oct 2023 00:14)  HR: 94 (21 Oct 2023 16:25) (59 - 94)  BP: 140/79 (21 Oct 2023 16:25) (140/79 - 160/84)  BP(mean): --  RR: 18 (21 Oct 2023 16:25) (18 - 18)  SpO2: 92% (21 Oct 2023 16:25) (91% - 99%)    Parameters below as of 21 Oct 2023 16:25  Patient On (Oxygen Delivery Method): room air        MEDICATIONS    acetaminophen     Tablet .. 650 milliGRAM(s) Oral every 6 hours PRN  apixaban 5 milliGRAM(s) Oral two times a day  atorvastatin 80 milliGRAM(s) Oral at bedtime  bisacodyl Suppository 10 milliGRAM(s) Rectal daily PRN  chlorhexidine 2% Cloths 1 Application(s) Topical <User Schedule>  dextrose 50% Injectable 12.5 Gram(s) IV Push once  dextrose 50% Injectable 25 Gram(s) IV Push once  dextrose 50% Injectable 25 Gram(s) IV Push once  famotidine    Tablet 20 milliGRAM(s) Oral daily  folic acid 1 milliGRAM(s) Oral daily  hydrALAZINE Injectable 10 milliGRAM(s) IV Push every 4 hours PRN  influenza   Vaccine 0.5 milliLiter(s) IntraMuscular once  insulin lispro (ADMELOG) corrective regimen sliding scale   SubCutaneous Before meals and at bedtime  magnesium citrate Oral Solution 296 milliLiter(s) Oral once PRN  magnesium hydroxide Suspension 30 milliLiter(s) Oral daily PRN  multivitamin 1 Tablet(s) Oral daily  polyethylene glycol 3350 17 Gram(s) Oral every 12 hours  senna 2 Tablet(s) Oral every 12 hours  thiamine 100 milliGRAM(s) Oral daily       LABS:  CBC Full  -  ( 20 Oct 2023 06:04 )  WBC Count : 6.56 K/uL  RBC Count : 4.48 M/uL  Hemoglobin : 14.1 g/dL  Hematocrit : 41.0 %  Platelet Count - Automated : 115 K/uL  Mean Cell Volume : 91.5 fl  Mean Cell Hemoglobin : 31.5 pg  Mean Cell Hemoglobin Concentration : 34.4 gm/dL  Auto Neutrophil # : x  Auto Lymphocyte # : x  Auto Monocyte # : x  Auto Eosinophil # : x  Auto Basophil # : x  Auto Neutrophil % : x  Auto Lymphocyte % : x  Auto Monocyte % : x  Auto Eosinophil % : x  Auto Basophil % : x    Urinalysis Basic - ( 20 Oct 2023 06:04 )    Color: x / Appearance: x / SG: x / pH: x  Gluc: 148 mg/dL / Ketone: x  / Bili: x / Urobili: x   Blood: x / Protein: x / Nitrite: x   Leuk Esterase: x / RBC: x / WBC x   Sq Epi: x / Non Sq Epi: x / Bacteria: x      10-20    139  |  104  |  17.1  ----------------------------<  148<H>  4.0   |  22.0  |  1.02    Ca    9.1      20 Oct 2023 06:04  Mg     2.3     10-20        Hemoglobin A1C:       PT/INR - ( 20 Oct 2023 06:04 )   PT: 12.4 sec;   INR: 1.12 ratio         PTT - ( 20 Oct 2023 06:04 )  PTT:86.9 sec    NEUROLOGICAL EXAM:  Mental status: Awake, alert, oriented x self, place, unknown month/year, repetition intact, IDs objects, follows simple commands, speech fluent, impaired memory  Cranial Nerves: slight left facial palsy, right superior quadrantopia in far periphery, left superior quadrantopia, no nystagmus, no dysarthria  Motor exam: Normal tone, no drift, 5/5 RUE, 5/5 RLE, 5/5 LUE proximally, 4+/5  LUE, 5/5 LLE   Sensation: decreased sensation on left   Coordination/ Gait: No dysmetria, gait not tested    LABS:                        14.1   6.56  )-----------( 115      ( 20 Oct 2023 06:04 )             41.0    10-20    139  |  104  |  17.1  ----------------------------<  148<H>  4.0   |  22.0  |  1.02    Ca    9.1      20 Oct 2023 06:04  Mg     2.3     10-20    PT/INR - ( 20 Oct 2023 06:04 )   PT: 12.4 sec;   INR: 1.12 ratio         PTT - ( 20 Oct 2023 06:04 )  PTT:86.9 sec      IMAGING: Reviewed by me.     CT Head No Cont (10.17.23 @ 12:19)   Multiple foci of low density right mesial temporal lobe and right   thalamus are more prominent on the current examination and likely   represent the presence of evolving cytotoxic edema.  Similar-appearing low density involving the left mesial temporal lobe   likely representing cytotoxic edema.  Redemonstration of chronic left of midline basis pontis infarct.  No acute intracranial hemorrhage.    CT Head No Cont (10.15.23 @ 13:13)   IMPRESSION: Stable medial left temporal occipital lobe infarction.    (10.14.23 @ 15:25)   CT BRAIN WITHOUT CONTRAST:  1. Decreased attenuation involving the medial aspect of the left temporal   lobe, not present on prior noncontrast CT study. However, restricted   diffusion was appreciated in the same location on the prior MRI of   10/13/2023.  2. Nonacute left ventral pontine ischemic event, present on prior CT and   MRI.    10.14.23 @ 14:58)   CT BRAIN WITHOUT CONTRAST:  1. Decreased attenuation involving the medial aspect of the left temporal   lobe, not present on prior noncontrast CT study. However, restricted   diffusion was appreciated in the same location on the prior MRI of   10/13/2023.  2. Nonacute left ventral pontine ischemic event, present on prior CT and   MRI.    CT PERFUSION:  Technical limitations: Significantly limited by patient motion during   image acquisition and suboptimal arterial waveform.   Core infartction: 0 ml; [place]/tissue at risk for active ischemia: 75   mL involving right temporal brain parenchyma.  If symptoms persist consider follow up head CT or MRI, MRA  if no   contraindication.    CTA HEAD:  1. Poor visualization of the proximal M1 segment of the right middle   cerebral artery, as on prior.  2. Irregular pattern of flow within the P2 segment of the left posterior   cerebral artery, as on prior.  4. Cut off of the left posterior cerebral artery at the P1/P2 junction,   as on prior, with suggestion of distal reconstitution..  5. Hypoplastic poorly visualized intracranial right vertebral artery.  6. No evidence of aneurysm formation at the level of the [place]   [place]. Tiny aneurysms can be beyond the resolution of CTA technique.    CTA NECK:  1. No evidence of significant stenosis or occlusion in association with   the bilateral common carotid arteriesor bilateral internal carotid   arteries.  2. Poorly visualized proximal left vertebral artery with suggestion of   distal reconstitution at the level of C2.     10/12/2023 19:13  IMPRESSION:  HEAD CT: No evidence of an acute intracranial hemorrhage midline shift or hydrocephalus. Mild bifrontal periventricular white matter ischemic changes. Bilateral maxillary sinusitis  NECK CTA: No hemodynamic significant narowing involving the carotid bifurcations. Hypoplastic right vertebral artery. Dominant left vertebral artery..  BRAIN CTA: Cutoff of the M1 portion of the right middle cerebral artery which may be chronic as there appears to be lenticular striate collaterals and reconstitution of more distal MCA segments. Cutoff of the right posterior cerebral artery at the P1-2 junction with reconstitution of more distal segments. Cut off of the left posterior cerebral artery at the P1-2 junction with mild distal reconstitution. Hypoplastic right vertebral artery with portions of the V4 segment not visualized  CT PERFUSION: Regions of ischemia within the posterior circulation with a volume of 121 mL without evidence of core infarction.    (10.13.23 @ 12:31)   MRI BRAIN: Acute small to moderate medial left temporal occipital lobe   infarctions. Acute punctate left occipital lobe infarction.  MRA BRAIN: Occluded right MCA. Occluded right PCA. Occluded right   vertebral artery proximal V4 segment.  MRI BRAIN NOVA: Values above.    TTE Echo Complete w/ Contrast w/ Doppler (10.13.23 @ 19:44)   Summary:   1. Technically difficult study.   2. Normal left ventricular internal cavity size.   3. Left ventricular ejection fraction, by visual estimation, is 50 to   55%.   4. Normal left atrial size.   5. Normal right atrial size.   6. Mildly enlarged right ventricle.   7. Dilatation of the ascending aorta.   8. Sclerotic aortic valve with normal opening.   9. Mild mitral annular calcification.  10. Mild thickening of the anterior mitral valve leaflet.  11. Trace mitral valve regurgitation.  12. Mild tricuspid regurgitation.  13. Trivial pericardial effusion.  14. Recommend transesophageal echocardiogram.    US Duplex Venous Lower Ext Complete, Bilateral (10.13.23 @ 22:47)   IMPRESSION:  No evidence of deep venous thrombosis in either lower extremity.

## 2023-10-21 NOTE — PROGRESS NOTE ADULT - ASSESSMENT
57 y/o M with a Hx of stroke one year ago (at the time patient received TNK and had full  resolution of symptoms), HTN, DM, non-compliant with medications, who presented following an episode of LOC, as per family at around 6:15 pm patient had a sudden fall at work and lost consciousness. It is unclear if patient had any symptoms before he fell. NIHSS 10 on admission, admitted under NeuroICU on 10/12: Thrombectomy via L radial artery for TICI 3 revascularization of L P1-P2 occlusion. Noted LMCA chronic occlusion., on 0/13: Neurologic status improving. Got MR. Started on ASA and SQL, on 10/14: quadrantanopia now on L superior (originally R); Repeat stroke imaging completed and initial concern for CTP showing poss new perfusion deficit however on further review noted to be present on admission. Started on heparin drip per neurology recommendations, on 10/15: Therapeutic on heparin drip, stability HCT obtained. Cardiology consulted for further stroke work-up and downgraded under medicine.     #Acute CVA  s/p thrombectomy   c/w Lipitor  TTE shows with  Normal left ventricular internal cavity size,  3. Left ventricular ejection fraction, by visual estimation, is 50 to 55%.  DVT studies negative  Started on heparin drip given extensive atherosclerotic disease per Neuro. Switch to Eliquis. Needs for 3 months.    Per Neuro no need for ORCIO  BP goal 140-180s per neuro  will continue with Neuro checks Q4  repeat CT head showed worsening of prior stroke  Neurology follow up noted   PT/OT eval - acute rehab    #Occluded R MCA/PCA  per discussion with neuro, pt with collaterals  c/w q4hr neuro checks  outpt neuro IR followup    #ETOH abuse  not in active withdrawal, and pt denies h/o DT  c/w MVI, thiamine (for suspected deficiency) and folic acid  SW following     #Episode of bradycardia with concern for 2:1 block on tele  TTE EF 50-55%, no other structural abnormal   cards naomie recs  as per electrophysiology   - No indication for pacemaker  - Metoprolol 12.5mg BID has been discontinued   - s/p ILR on 10/20  - EP follow up noted      #HTN  No meds at home  Per neuro, BP goal 140-180 mmHg for now  cont to monitor    #DM 2  non compliant with meds at home  A1C 8.6  Accu checks and ISS    #Elevated TSH  T4 level normal  outpt f/u    #HLD  c/w Lipitor 80mg QD    DVT Prophylaxis -- Patient is on Eliquis.     Dispo: Acute Rehab once arranged.

## 2023-10-22 LAB
GLUCOSE BLDC GLUCOMTR-MCNC: 108 MG/DL — HIGH (ref 70–99)
GLUCOSE BLDC GLUCOMTR-MCNC: 108 MG/DL — HIGH (ref 70–99)
GLUCOSE BLDC GLUCOMTR-MCNC: 153 MG/DL — HIGH (ref 70–99)
GLUCOSE BLDC GLUCOMTR-MCNC: 153 MG/DL — HIGH (ref 70–99)
GLUCOSE BLDC GLUCOMTR-MCNC: 161 MG/DL — HIGH (ref 70–99)
GLUCOSE BLDC GLUCOMTR-MCNC: 161 MG/DL — HIGH (ref 70–99)
GLUCOSE BLDC GLUCOMTR-MCNC: 251 MG/DL — HIGH (ref 70–99)
GLUCOSE BLDC GLUCOMTR-MCNC: 251 MG/DL — HIGH (ref 70–99)
PROT S FREE PPP-ACNC: 101 % — SIGNIFICANT CHANGE UP (ref 63–140)
PROT S FREE PPP-ACNC: 101 % — SIGNIFICANT CHANGE UP (ref 63–140)

## 2023-10-22 PROCEDURE — 99232 SBSQ HOSP IP/OBS MODERATE 35: CPT

## 2023-10-22 RX ORDER — LISINOPRIL 2.5 MG/1
5 TABLET ORAL DAILY
Refills: 0 | Status: DISCONTINUED | OUTPATIENT
Start: 2023-10-22 | End: 2023-10-23

## 2023-10-22 RX ADMIN — Medication 6: at 17:40

## 2023-10-22 RX ADMIN — FAMOTIDINE 20 MILLIGRAM(S): 10 INJECTION INTRAVENOUS at 12:40

## 2023-10-22 RX ADMIN — Medication 2: at 08:23

## 2023-10-22 RX ADMIN — LISINOPRIL 5 MILLIGRAM(S): 2.5 TABLET ORAL at 17:38

## 2023-10-22 RX ADMIN — POLYETHYLENE GLYCOL 3350 17 GRAM(S): 17 POWDER, FOR SOLUTION ORAL at 17:38

## 2023-10-22 RX ADMIN — CHLORHEXIDINE GLUCONATE 1 APPLICATION(S): 213 SOLUTION TOPICAL at 05:47

## 2023-10-22 RX ADMIN — APIXABAN 5 MILLIGRAM(S): 2.5 TABLET, FILM COATED ORAL at 17:39

## 2023-10-22 RX ADMIN — SENNA PLUS 2 TABLET(S): 8.6 TABLET ORAL at 05:47

## 2023-10-22 RX ADMIN — Medication 100 MILLIGRAM(S): at 12:40

## 2023-10-22 RX ADMIN — SENNA PLUS 2 TABLET(S): 8.6 TABLET ORAL at 17:38

## 2023-10-22 RX ADMIN — APIXABAN 5 MILLIGRAM(S): 2.5 TABLET, FILM COATED ORAL at 05:47

## 2023-10-22 RX ADMIN — Medication 1 TABLET(S): at 12:40

## 2023-10-22 RX ADMIN — Medication 2: at 12:41

## 2023-10-22 RX ADMIN — Medication 1 MILLIGRAM(S): at 12:40

## 2023-10-22 RX ADMIN — ATORVASTATIN CALCIUM 80 MILLIGRAM(S): 80 TABLET, FILM COATED ORAL at 21:26

## 2023-10-22 RX ADMIN — POLYETHYLENE GLYCOL 3350 17 GRAM(S): 17 POWDER, FOR SOLUTION ORAL at 05:47

## 2023-10-22 NOTE — PROGRESS NOTE ADULT - SUBJECTIVE AND OBJECTIVE BOX
Cerebral infarction    HPI:  Patient is a 57 y/o M with a PMHx of stroke one year ago (  at the time patient recieved TNK and had full  resolution of symptoms), HTN, DM, non-compliant with medications, who presented following an episode of LOC.   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 At around 6:15 pm patient had a sudden fall at work and lost conciousness. It is unclear if patient had any symptoms before he fell. NIHSS 10 on admission . Patient is not a current TNK candidate  due to unclear exact last known well. TRF to [facility] for thrombectomy. (12 Oct 2023 23:00)    Interval History:  Patient was seen and examined at bedside around 10:45 am.  Doing well.   No active complaints.   Has weakness / numbness along with visual deficit on left side -- no change.     ROS:  As per interval history otherwise unremarkable.    PHYSICAL EXAM:  Vital Signs   T(C): 36.8 (22 Oct 2023 12:00), Max: 36.9 (21 Oct 2023 16:25)  T(F): 98.2 (22 Oct 2023 12:00), Max: 98.4 (21 Oct 2023 16:25)  HR: 88 (22 Oct 2023 12:00) (56 - 94)  BP: 144/73 (22 Oct 2023 12:00) (140/79 - 174/101)  RR: 18 (22 Oct 2023 12:00) (18 - 18)  SpO2: 98% (22 Oct 2023 12:00) (92% - 98%)  Parameters below as of 22 Oct 2023 12:00  Patient On (Oxygen Delivery Method): room air  General: Middle aged male lying in bed comfortably. No acute distress  HEENT: EOMI. Clear conjunctivae. Moist mucus membrane  Neck: Supple.   Chest: CTA bilaterally. No wheezing, rales or rhonchi. No chest wall tenderness.  Heart: Normal S1 & S2. RRR.   Abdomen: Non distended. Soft. Non-tender. + BS  Ext: No pedal edema. No calf tenderness   Neuro: Active and alert but not completely oriented. Motor 5/5 in RUE/RLE and 4-5/5 in LUE/LLE. Sensation decreased on left side. Reflexes 1+. Cerebellar sings intact. No speech disorder  Skin: Warm and Dry  Psychiatry: Normal mood and affect    LABS:  CAPILLARY BLOOD GLUCOSE  POCT Blood Glucose.: 153 mg/dL (22 Oct 2023 12:39)  POCT Blood Glucose.: 161 mg/dL (22 Oct 2023 08:20)  POCT Blood Glucose.: 172 mg/dL (21 Oct 2023 22:22)  POCT Blood Glucose.: 112 mg/dL (21 Oct 2023 17:38)                  14.1   6.56  )-----------( 115      ( 20 Oct 2023 06:04 )             41.0     10-20    139  |  104  |  17.1  ----------------------------<  148<H>  4.0   |  22.0  |  1.02    Ca    9.1      20 Oct 2023 06:04  Mg     2.3     10-20    PT/INR - ( 20 Oct 2023 06:04 )   PT: 12.4 sec;   INR: 1.12 ratio       PTT - ( 20 Oct 2023 06:04 )  PTT:86.9 sec    Blood Culture: 10-18 @ 13:00  Organism --  Gram Stain Blood -- Gram Stain --  Specimen Source .Blood Blood-Peripheral  Culture-Blood --    RADIOLOGY & ADDITIONAL STUDIES:  Reviewed     MEDICATIONS  (STANDING):  apixaban 5 milliGRAM(s) Oral two times a day  atorvastatin 80 milliGRAM(s) Oral at bedtime  chlorhexidine 2% Cloths 1 Application(s) Topical <User Schedule>  dextrose 50% Injectable 12.5 Gram(s) IV Push once  dextrose 50% Injectable 25 Gram(s) IV Push once  dextrose 50% Injectable 25 Gram(s) IV Push once  famotidine    Tablet 20 milliGRAM(s) Oral daily  folic acid 1 milliGRAM(s) Oral daily  influenza   Vaccine 0.5 milliLiter(s) IntraMuscular once  insulin lispro (ADMELOG) corrective regimen sliding scale   SubCutaneous Before meals and at bedtime  lisinopril 5 milliGRAM(s) Oral daily  multivitamin 1 Tablet(s) Oral daily  polyethylene glycol 3350 17 Gram(s) Oral every 12 hours  senna 2 Tablet(s) Oral every 12 hours  thiamine 100 milliGRAM(s) Oral daily    MEDICATIONS  (PRN):  acetaminophen     Tablet .. 650 milliGRAM(s) Oral every 6 hours PRN Mild Pain (1 - 3)  bisacodyl Suppository 10 milliGRAM(s) Rectal daily PRN Constipation  hydrALAZINE Injectable 10 milliGRAM(s) IV Push every 4 hours PRN SBP>160  magnesium citrate Oral Solution 296 milliLiter(s) Oral once PRN if he does not go with suppository  magnesium hydroxide Suspension 30 milliLiter(s) Oral daily PRN Constipation

## 2023-10-22 NOTE — PROGRESS NOTE ADULT - ASSESSMENT
57 y/o M with a Hx of stroke one year ago (at the time patient received TNK and had full  resolution of symptoms), HTN, DM, non-compliant with medications, who presented following an episode of LOC, as per family at around 6:15 pm patient had a sudden fall at work and lost consciousness. It is unclear if patient had any symptoms before he fell. NIHSS 10 on admission, admitted under NeuroICU on 10/12: Thrombectomy via L radial artery for TICI 3 revascularization of L P1-P2 occlusion. Noted LMCA chronic occlusion., on 0/13: Neurologic status improving. Got MR. Started on ASA and SQL, on 10/14: quadrantanopia now on L superior (originally R); Repeat stroke imaging completed and initial concern for CTP showing poss new perfusion deficit however on further review noted to be present on admission. Started on heparin drip per neurology recommendations, on 10/15: Therapeutic on heparin drip, stability HCT obtained. Cardiology consulted for further stroke work-up and downgraded under medicine.     #Acute CVA  s/p thrombectomy   TTE shows with  Normal left ventricular internal cavity size,  3. Left ventricular ejection fraction, by visual estimation, is 50 to 55%.  DVT studies negative  repeat CT head showed worsening of prior stroke  Per Neuro no need for ROCIO  Started on heparin drip given extensive atherosclerotic disease per Neuro. Switch to Eliquis. Needs for 3 months.    c/w Lipitor  BP goal 130-160 per neuro (Discussed with Dr. Barkley)   Neuro checks Q4  Neurology follow up noted   PT/OT eval - acute rehab    #Occluded R MCA/PCA  per discussion with neuro, pt with collaterals  c/w q4hr neuro checks  outpt neuro IR followup    #ETOH abuse  not in active withdrawal, and pt denies h/o DT  c/w MVI, thiamine (for suspected deficiency) and folic acid  SW following     #Episode of bradycardia with concern for 2:1 block on tele  TTE EF 50-55%, no other structural abnormal   cards naomie recs  as per electrophysiology   - No indication for pacemaker  - Metoprolol 12.5mg BID has been discontinued   - s/p ILR on 10/20  - EP follow up noted      #HTN  No meds at home  Per neuro (Dr. Barkley), BP goal 130-160 mmHg   cont to monitor  Start Lisinopril 5 mg with holding parameters     #HLD  c/w Lipitor 80mg QD    #DM 2  non compliant with meds at home  A1C 8.6  Accu checks and ISS    #Elevated TSH  T4 level normal  outpt f/u    DVT Prophylaxis -- Patient is on Eliquis.     Dispo: Acute Rehab once arranged.

## 2023-10-23 LAB
CULTURE RESULTS: SIGNIFICANT CHANGE UP
CULTURE RESULTS: SIGNIFICANT CHANGE UP
GLUCOSE BLDC GLUCOMTR-MCNC: 151 MG/DL — HIGH (ref 70–99)
GLUCOSE BLDC GLUCOMTR-MCNC: 151 MG/DL — HIGH (ref 70–99)
GLUCOSE BLDC GLUCOMTR-MCNC: 157 MG/DL — HIGH (ref 70–99)
GLUCOSE BLDC GLUCOMTR-MCNC: 157 MG/DL — HIGH (ref 70–99)
GLUCOSE BLDC GLUCOMTR-MCNC: 201 MG/DL — HIGH (ref 70–99)
GLUCOSE BLDC GLUCOMTR-MCNC: 201 MG/DL — HIGH (ref 70–99)
GLUCOSE BLDC GLUCOMTR-MCNC: 228 MG/DL — HIGH (ref 70–99)
GLUCOSE BLDC GLUCOMTR-MCNC: 228 MG/DL — HIGH (ref 70–99)
SPECIMEN SOURCE: SIGNIFICANT CHANGE UP
SPECIMEN SOURCE: SIGNIFICANT CHANGE UP

## 2023-10-23 PROCEDURE — 99232 SBSQ HOSP IP/OBS MODERATE 35: CPT

## 2023-10-23 RX ADMIN — Medication 2: at 09:12

## 2023-10-23 RX ADMIN — Medication 1 MILLIGRAM(S): at 12:06

## 2023-10-23 RX ADMIN — Medication 4: at 21:29

## 2023-10-23 RX ADMIN — Medication 2: at 18:18

## 2023-10-23 RX ADMIN — Medication 1 TABLET(S): at 12:06

## 2023-10-23 RX ADMIN — ATORVASTATIN CALCIUM 80 MILLIGRAM(S): 80 TABLET, FILM COATED ORAL at 21:22

## 2023-10-23 RX ADMIN — APIXABAN 5 MILLIGRAM(S): 2.5 TABLET, FILM COATED ORAL at 17:32

## 2023-10-23 RX ADMIN — CHLORHEXIDINE GLUCONATE 1 APPLICATION(S): 213 SOLUTION TOPICAL at 05:44

## 2023-10-23 RX ADMIN — Medication 100 MILLIGRAM(S): at 12:06

## 2023-10-23 RX ADMIN — APIXABAN 5 MILLIGRAM(S): 2.5 TABLET, FILM COATED ORAL at 05:44

## 2023-10-23 RX ADMIN — Medication 4: at 13:00

## 2023-10-23 RX ADMIN — FAMOTIDINE 20 MILLIGRAM(S): 10 INJECTION INTRAVENOUS at 12:06

## 2023-10-23 NOTE — PROGRESS NOTE ADULT - SUBJECTIVE AND OBJECTIVE BOX
Cerebral infarction    HPI:  Patient is a 57 y/o M with a PMHx of stroke one year ago (  at the time patient recieved TNK and had full  resolution of symptoms), HTN, DM, non-compliant with medications, who presented following an episode of LOC.   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 At around 6:15 pm patient had a sudden fall at work and lost conciousness. It is unclear if patient had any symptoms before he fell. NIHSS 10 on admission . Patient is not a current TNK candidate  due to unclear exact last known well. TRF to [facility] for thrombectomy. (12 Oct 2023 23:00)    Interval History:  Patient was seen and examined at bedside around 10:15 am.  Feels the same.  No active complaints.   Weakness / numbness along with visual deficit on left side -- no change.   Very sensitive to antihypertensive medications.     ROS:  As per interval history otherwise unremarkable.    PHYSICAL EXAM:  Vital Signs   T(C): 36.6 (23 Oct 2023 15:45), Max: 37.2 (23 Oct 2023 00:24)  T(F): 97.8 (23 Oct 2023 15:45), Max: 98.9 (23 Oct 2023 00:24)  HR: 66 (23 Oct 2023 15:45) (58 - 78)  BP: 129/78 (23 Oct 2023 15:45) (99/66 - 142/80)  RR: 18 (23 Oct 2023 15:45) (18 - 18)  SpO2: 95% (23 Oct 2023 15:45) (94% - 99%)  Parameters below as of 23 Oct 2023 15:45  Patient On (Oxygen Delivery Method): room air  General: Middle aged male lying in bed comfortably. No acute distress  HEENT: EOMI. Clear conjunctivae. Moist mucus membrane  Neck: Supple.   Chest: CTA bilaterally. No wheezing, rales or rhonchi. No chest wall tenderness.  Heart: Normal S1 & S2. RRR.   Abdomen: Non distended. Soft. Non-tender. + BS  Ext: No pedal edema. No calf tenderness   Neuro: Active and alert but not completely oriented. Motor 5/5 in RUE/RLE and 4-5/5 in LUE/LLE. Sensation decreased on left side. No speech disorder  Skin: Warm and Dry  Psychiatry: Normal mood and affect    LABS:  CAPILLARY BLOOD GLUCOSE  POCT Blood Glucose.: 201 mg/dL (23 Oct 2023 12:58)  POCT Blood Glucose.: 151 mg/dL (23 Oct 2023 09:10)  POCT Blood Glucose.: 108 mg/dL (22 Oct 2023 21:31)  POCT Blood Glucose.: 251 mg/dL (22 Oct 2023 17:37)               14.1   6.56  )-----------( 115      ( 20 Oct 2023 06:04 )             41.0     10-20    139  |  104  |  17.1  ----------------------------<  148<H>  4.0   |  22.0  |  1.02    Ca    9.1      20 Oct 2023 06:04  Mg     2.3     10-20    PT/INR - ( 20 Oct 2023 06:04 )   PT: 12.4 sec;   INR: 1.12 ratio       PTT - ( 20 Oct 2023 06:04 )  PTT:86.9 sec    Blood Culture: 10-18 @ 13:00  Organism --  Gram Stain Blood -- Gram Stain --  Specimen Source .Blood Blood-Peripheral  Culture-Blood --    RADIOLOGY & ADDITIONAL STUDIES:  Reviewed     MEDICATIONS  (STANDING):  apixaban 5 milliGRAM(s) Oral two times a day  atorvastatin 80 milliGRAM(s) Oral at bedtime  chlorhexidine 2% Cloths 1 Application(s) Topical <User Schedule>  dextrose 50% Injectable 12.5 Gram(s) IV Push once  dextrose 50% Injectable 25 Gram(s) IV Push once  dextrose 50% Injectable 25 Gram(s) IV Push once  famotidine    Tablet 20 milliGRAM(s) Oral daily  folic acid 1 milliGRAM(s) Oral daily  influenza   Vaccine 0.5 milliLiter(s) IntraMuscular once  insulin lispro (ADMELOG) corrective regimen sliding scale   SubCutaneous Before meals and at bedtime  multivitamin 1 Tablet(s) Oral daily  polyethylene glycol 3350 17 Gram(s) Oral every 12 hours  senna 2 Tablet(s) Oral every 12 hours  thiamine 100 milliGRAM(s) Oral daily    MEDICATIONS  (PRN):  acetaminophen     Tablet .. 650 milliGRAM(s) Oral every 6 hours PRN Mild Pain (1 - 3)  bisacodyl Suppository 10 milliGRAM(s) Rectal daily PRN Constipation  hydrALAZINE Injectable 10 milliGRAM(s) IV Push every 4 hours PRN SBP>160  magnesium citrate Oral Solution 296 milliLiter(s) Oral once PRN if he does not go with suppository  magnesium hydroxide Suspension 30 milliLiter(s) Oral daily PRN Constipation

## 2023-10-23 NOTE — PROGRESS NOTE ADULT - ASSESSMENT
VISIT PENDING- INCOMPLETE NOTE     ASSESSMENT: Patient is a 59 y/o M with a PMHx of stroke 2022 reported at [facility] (at the time patient received thrombolysis and had full  resolution of symptoms), reported dove dove, HTN, DM, HLD nonadherent with medications, who presented following an episode of LOC while at work the evening of 10/12/23, there was reported right sided hemisensory loss and right homonomous hemianopia .  Reported unclear if definitively last known well was prior at 1815 therefor excluded from tenecteplase after CT head did not demonstrate acute infarction or hemorrhage.  Perfusion demonstrated regions of ischemia in the posterior circulation, CT angiogram head/neck showed right PCA P1-2 cutoff, possibly chronic right MCA M1 cutoff which had distal reconstitution and established collaterals, and left PCA occlusion which patient was subsequently transferred for mechanical thrombectomy. Cerebral angiogram and mechanical thrombectomy performed using aspiration with TICI 3 recanalization of left PCA.  The right occipital lobe filled from right SHANI collaterals, and the right MCA as well was thought to be chronic given noted collateralization. MR Brain with small to moderate left temporal occipital lobe infarctions. Occluded right MCA, occluded right PCA and right vertebral artery proximal segment. CT head now with new hypodensity in right hemisphere (right temporal lobe and thalamic region) possibly hypoperfusion with concern for underlying ischemia. Etiology; embolic stroke of undetermined source. While there is underlying dove dove with dependency on collateralization can not entirely exclude cardioembolic / hypercoagulable state, avoid  hypotension or hypotension precipitating events.     NEURO:   -Neurologically.....   - MRI deferred per d/w IR as likely not to  at this time.  - Consider outpatient neuropsych follow up for cognitive therapy  - Continue close monitoring for neurologic deterioration, repeat urgent CT head for any change and notify neurology     - Stroke neuro checks q _ hrs.  - Hypercoagulable panel follow up.   - SBP goal permissive to keep 140-180 mm Hg for now due to concern for hypoperfusion per d/w GUICHO, avoid further hypertension as to minimize risk of hemorrhagic component. Further titration pending clinical course.   - ANTITHROMBOTIC THERAPY:  After discussion with [facility] attending Dr. Goodrich decision was made (given fluctuating/new symptoms as noted) to initiate Apixaban 5 mg BID. Anticipate 3 month course for now, long term to be determined w/ Dr. Goodrich as outpatient.    - BP goal as noted above as patient appears to be dependent on collateral flow and high risk for recurrent thrombotic event.  Monitor closely and repeat CT head for any change and notify on call neurology team.   - ASA discontinued in setting of therapeutic anticoagulation. To be reconsidered if transitioned off anticoagulation.   - titrate statin to LDL goal less than 70  - Encouraged adherence to follow up/ recommended medications   - Consideration in Outpatient NM spect w/w out Diamox w/ close outpatient follow up for consideration in bypass pending repeat imaging and clinical course   - Dysphagia screen: pass  - Physical therapy/OT/Speech eval/treatment. Cognitive evaluation. Supervision.     - CARDIOVASCULAR: TTE as noted, cardiac monitoring w/ telemetry for now, at this time defer ROCIO as to avoid precipitating hypotension due to concern for hypoperfusion, d/w cardiology team, no obvious evidence of cardiac mass/ vegetation to warrant further evaluation given overall risk / benefit.  ILR implanted. Ideally, suggest to defer BP pressure altering agents at this time due to sensitivity and concern for hypoperfusion and SBP goal as noted; if agent is  absolutely needed suggest to titrate gradually with hold parameters to hold SBP less than 150-160mmHg.                 - HEMATOLOGY: H/H 14.1/41.0, Platelets 115, close monitoring for thrombocytopenia, patient should have all age and risk appropriate malignancy screenings with PCP or sooner if clinically suspected to ensure no underlying hypercoagulable states      DVT ppx: heparin gtt    PULMONARY: protecting airway, saturating well     RENAL: BUN/Cr within range, monitor urine output, maintain adequate hydration       Na Goal:  135-145    ID: afebrile, no leukocytosis, monitor for si/sx of infection    OTHER:  condition and plan of care d/w patient, questions and concerns addressed. CIWA protcol, monitor closely for si/sx of withdrawal.    DISPOSITION: Rehab or home depending on PT eval once stable and workup is complete      CORE MEASURES:        Admission NIHSS:10      Tenecteplase : [] YES [x] NO      LDL/HDL/A1C: 115/29/8.6     Depression Screen- if depression hx and/or present      Statin Therapy: as noted      Dysphagia Screen: [x] PASS [] FAIL     Smoking [] YES [x] NO      Afib [] YES [x] NO     Stroke Education [x] YES [] NO    Obtain screening lower extremity venous ultrasound in patients who meet 1 or more of the following criteria as patient is high risk for DVT/PE on admission:   [] History of DVT/PE  []Hypercoagulable states (Factor V Leiden, Cancer, OCP, etc. )  []Prolonged immobility (hemiplegia/hemiparesis/post operative or any other extended immobilization)  [] Transferred from outside facility (Rehab or Long term care)  [] Age </= to 50 VISIT PENDING- INCOMPLETE NOTE     ASSESSMENT: Patient is a 57 y/o M with a PMHx of stroke 2022 reported at [facility] (at the time patient received thrombolysis and had full  resolution of symptoms), reported dove dove, HTN, DM, HLD nonadherent with medications, who presented following an episode of LOC while at work the evening of 10/12/23, there was reported right sided hemisensory loss and right homonomous hemianopia .  Reported unclear if definitively last known well was prior at 1815 therefor excluded from tenecteplase after CT head did not demonstrate acute infarction or hemorrhage.  Perfusion demonstrated regions of ischemia in the posterior circulation, CT angiogram head/neck showed right PCA P1-2 cutoff, possibly chronic right MCA M1 cutoff which had distal reconstitution and established collaterals, and left PCA occlusion which patient was subsequently transferred for mechanical thrombectomy. Cerebral angiogram and mechanical thrombectomy performed using aspiration with TICI 3 recanalization of left PCA.  The right occipital lobe filled from right SHANI collaterals, and the right MCA as well was thought to be chronic given noted collateralization. MR Brain with small to moderate left temporal occipital lobe infarctions. Occluded right MCA, occluded right PCA and right vertebral artery proximal segment. CT head now with new hypodensity in right hemisphere (right temporal lobe and thalamic region) possibly hypoperfusion with concern for underlying ischemia. Etiology; embolic stroke of undetermined source. While there is underlying dove dove with dependency on collateralization can not entirely exclude cardioembolic / hypercoagulable state, avoid  hypotension or hypotension precipitating events.     NEURO:   - Neurologically overall improved, now with some improvement in vision and sensation    - MRI deferred per d/w IR as likely not to  at this time.  - Consider outpatient neuropsych follow up for cognitive therapy  - Continue close monitoring for neurologic deterioration, repeat urgent CT head for any change and notify neurology     - Stroke neuro checks q 2  hrs.  - Hypercoagulable panel follow up.   - SBP goal permissive to keep 140-170 mm Hg for now due to concern for hypoperfusion per d/w GUICHO, avoid further hypertension as to minimize risk of hemorrhagic component. Further titration pending clinical course.   - ANTITHROMBOTIC THERAPY:  After discussion with [facility] attending Dr. Goodrich decision was made (given fluctuating/new symptoms as noted) to initiate Apixaban 5 mg BID. Anticipate 3 month course for now, long term to be determined w/ Dr. Goodrich as outpatient.    - BP goal as noted above as patient appears to be dependent on collateral flow and high risk for recurrent thrombotic event.  Monitor closely and repeat CT head for any change and notify on call neurology team. Avoid rapid fluctuations and relative hypotension. If titrated at any point it would need to be done very gradually with close neurological monitoring and minimal incremental dose changes.  - ASA discontinued in setting of therapeutic anticoagulation. To be reconsidered if transitioned off anticoagulation.   - titrate statin to LDL goal less than 70  - Encouraged adherence to follow up/ recommended medications   - Consideration in Outpatient NM spect w/w out Diamox w/ close outpatient follow up for consideration in bypass pending repeat imaging and clinical course   - Dysphagia screen: pass  - Physical therapy/OT/Speech eval/treatment. Cognitive evaluation. Supervision.     - CARDIOVASCULAR: TTE as noted, cardiac monitoring w/ telemetry for now, at this time defer ROCIO as to avoid precipitating hypotension due to concern for hypoperfusion, d/w cardiology team, no obvious evidence of cardiac mass/ vegetation to warrant further evaluation given overall risk / benefit.  ILR implanted. Ideally, suggest to defer BP pressure altering agents at this time due to sensitivity and concern for hypoperfusion and SBP goal as noted; if agent is  absolutely needed suggest to titrate gradually with hold parameters to hold if  SBP less than 160mmHg.                 - HEMATOLOGY: H/H 14.1/41.0, Platelets 115, close monitoring for thrombocytopenia, patient should have all age and risk appropriate malignancy screenings with PCP or sooner if clinically suspected to ensure no underlying hypercoagulable states , hyper coag panel.      DVT ppx: heparin gtt    PULMONARY: protecting airway, saturating well     RENAL: BUN/Cr within range, monitor urine output, maintain adequate hydration       Na Goal:  135-145    ID: afebrile, no leukocytosis, monitor for si/sx of infection    OTHER:  condition and plan of care d/w patient, questions and concerns addressed. CIWA protcol, monitor closely for si/sx of withdrawal.    DISPOSITION: Rehab or home depending on PT eval once stable and workup is complete      CORE MEASURES:        Admission NIHSS:10      Tenecteplase : [] YES [x] NO      LDL/HDL/A1C: 115/29/8.6     Depression Screen- if depression hx and/or present      Statin Therapy: as noted      Dysphagia Screen: [x] PASS [] FAIL     Smoking [] YES [x] NO      Afib [] YES [x] NO     Stroke Education [x] YES [] NO    Obtain screening lower extremity venous ultrasound in patients who meet 1 or more of the following criteria as patient is high risk for DVT/PE on admission:   [] History of DVT/PE  []Hypercoagulable states (Factor V Leiden, Cancer, OCP, etc. )  []Prolonged immobility (hemiplegia/hemiparesis/post operative or any other extended immobilization)  [] Transferred from outside facility (Rehab or Long term care)  [] Age </= to 50   ASSESSMENT: Patient is a 57 y/o M with a PMHx of stroke 2022 reported at [facility] (at the time patient received thrombolysis and had full  resolution of symptoms), reported dove dove, HTN, DM, HLD nonadherent with medications, who presented following an episode of LOC while at work the evening of 10/12/23, there was reported right sided hemisensory loss and right homonomous hemianopia .  Reported unclear if definitively last known well was prior at 1815 therefor excluded from tenecteplase after CT head did not demonstrate acute infarction or hemorrhage.  Perfusion demonstrated regions of ischemia in the posterior circulation, CT angiogram head/neck showed right PCA P1-2 cutoff, possibly chronic right MCA M1 cutoff which had distal reconstitution and established collaterals, and left PCA occlusion which patient was subsequently transferred for mechanical thrombectomy. Cerebral angiogram and mechanical thrombectomy performed using aspiration with TICI 3 recanalization of left PCA.  The right occipital lobe filled from right SHANI collaterals, and the right MCA as well was thought to be chronic given noted collateralization. MR Brain with small to moderate left temporal occipital lobe infarctions. Occluded right MCA, occluded right PCA and right vertebral artery proximal segment. CT head now with new hypodensity in right hemisphere (right temporal lobe and thalamic region) possibly hypoperfusion with concern for underlying ischemia. Etiology; embolic stroke of undetermined source. While there is underlying dove dove with dependency on collateralization can not entirely exclude cardioembolic / hypercoagulable state, avoid  hypotension or hypotension precipitating events.     NEURO:   - Neurologically overall improved, now with some improvement in vision and sensation    - MRI deferred per d/w IR as likely not to  at this time.  - Consider outpatient neuropsych follow up for cognitive therapy  - Continue close monitoring for neurologic deterioration, repeat urgent CT head for any change and notify neurology     - Stroke neuro checks q 2  hrs.  - Hypercoagulable panel follow up.   - SBP goal permissive to keep 140-170 mm Hg goal for now due to concern for hypoperfusion per d/w GUICHO, avoid further hypertension as to minimize risk of hemorrhagic component and hypotension to avoid hypoperfusion. Further titration pending clinical course.   - ANTITHROMBOTIC THERAPY:  After discussion with [facility] attending Dr. Goodrich decision was made (given fluctuating/new symptoms as noted) to initiate Apixaban 5 mg BID. Anticipate 3 month course for now, long term to be determined w/ Dr. Goodrich as outpatient.    - BP goal as noted above as patient appears to be dependent on collateral flow and high risk for recurrent thrombotic event.  Monitor closely and repeat CT head for any change and notify on call neurology team. Avoid rapid fluctuations and relative hypotension. If titrated at any point it would need to be done very gradually with close neurological monitoring and minimal incremental dose changes.  - ASA discontinued in setting of therapeutic anticoagulation. To be reconsidered if transitioned off anticoagulation.   - titrate statin to LDL goal less than 70  - Encourage adherence to follow up/ recommended medications, ongoing due to memory impairment   - Consideration in Outpatient NM spect w/w out Diamox w/ close outpatient follow up for consideration in bypass pending repeat imaging and clinical course   - Dysphagia screen: pass  - Physical therapy/OT/Speech eval/treatment. Cognitive evaluation. Supervision.     - CARDIOVASCULAR: TTE as noted, cardiac monitoring w/ telemetry for now, at this time defer ROCIO a this time as to avoid precipitating hypotension due to concern for hypoperfusion, d/w cardiology team, no obvious evidence of cardiac mass/ vegetation to warrant further evaluation given overall risk / benefit.  ILR implanted. Ideally, suggest to defer BP pressure altering agents at this time due to sensitivity and concern for hypoperfusion and SBP goal as noted; if agent is  absolutely needed suggest to titrate gradually with hold parameters to hold if  SBP less than 160mmHg.                 - HEMATOLOGY: H/H 14.1/41.0, Platelets 115, close monitoring of thrombocytopenia, patient should have all age and risk appropriate malignancy screenings with PCP or sooner if clinically suspected and to ensure no underlying hypercoagulable states , hyper coag panel.      DVT ppx: heparin gtt    PULMONARY: protecting airway, saturating well     RENAL: BUN/Cr within range, monitor urine output, maintain adequate hydration       Na Goal:  135-145    ID: afebrile, no leukocytosis, monitor for si/sx of infection    OTHER:  condition and plan of care d/w patient, questions and concerns addressed. CIWA protcol, monitor closely for si/sx of withdrawal. Social work follow up.     DISPOSITION: Rehab or home depending on PT eval once stable and workup is complete      CORE MEASURES:        Admission NIHSS:10      Tenecteplase : [] YES [x] NO      LDL/HDL/A1C: 115/29/8.6     Depression Screen- if depression hx and/or present      Statin Therapy: as noted      Dysphagia Screen: [x] PASS [] FAIL     Smoking [] YES [x] NO      Afib [] YES [x] NO     Stroke Education [x] YES [] NO    Obtain screening lower extremity venous ultrasound in patients who meet 1 or more of the following criteria as patient is high risk for DVT/PE on admission:   [] History of DVT/PE  []Hypercoagulable states (Factor V Leiden, Cancer, OCP, etc. )  []Prolonged immobility (hemiplegia/hemiparesis/post operative or any other extended immobilization)  [] Transferred from outside facility (Rehab or Long term care)  [] Age </= to 50

## 2023-10-23 NOTE — PROGRESS NOTE ADULT - SUBJECTIVE AND OBJECTIVE BOX
Preliminary note, offical recommendations pending attending review/signature   [facility] Stroke Team  Progress Note     HPI:  Patient is a 57 y/o M with a PMHx of stroke 2022 reported at [facility]  (at the time patient received thrombolysis and had full  resolution of symptoms), family reported dove dove, HTN, DM, HLD nonadherent with medications, who presented following an episode of LOC.  As per sister report, patient was last seen well by his niece  morning of 10/12/23  before patient went to work. At around 6:15 pm that night patient had a sudden fall at work and lost consciousness It is unclear if patient had any symptoms before he fell. NIHSS 10 on admission . Transferred to [facility] for mechanical thrombectomy.       SUBJECTIVE: No events overnight.  No new neurologic complaints.  ROS reported negative unless otherwise noted.    acetaminophen     Tablet .. 650 milliGRAM(s) Oral every 6 hours PRN  apixaban 5 milliGRAM(s) Oral two times a day  atorvastatin 80 milliGRAM(s) Oral at bedtime  bisacodyl Suppository 10 milliGRAM(s) Rectal daily PRN  chlorhexidine 2% Cloths 1 Application(s) Topical <User Schedule>  dextrose 50% Injectable 12.5 Gram(s) IV Push once  dextrose 50% Injectable 25 Gram(s) IV Push once  dextrose 50% Injectable 25 Gram(s) IV Push once  famotidine    Tablet 20 milliGRAM(s) Oral daily  folic acid 1 milliGRAM(s) Oral daily  hydrALAZINE Injectable 10 milliGRAM(s) IV Push every 4 hours PRN  influenza   Vaccine 0.5 milliLiter(s) IntraMuscular once  insulin lispro (ADMELOG) corrective regimen sliding scale   SubCutaneous Before meals and at bedtime  magnesium citrate Oral Solution 296 milliLiter(s) Oral once PRN  magnesium hydroxide Suspension 30 milliLiter(s) Oral daily PRN  multivitamin 1 Tablet(s) Oral daily  polyethylene glycol 3350 17 Gram(s) Oral every 12 hours  senna 2 Tablet(s) Oral every 12 hours  thiamine 100 milliGRAM(s) Oral daily      PHYSICAL EXAM:   Vital Signs Last 24 Hrs  T(C): 37.1 (23 Oct 2023 04:19), Max: 37.2 (23 Oct 2023 00:24)  T(F): 98.8 (23 Oct 2023 04:19), Max: 98.9 (23 Oct 2023 00:24)  HR: 64 (23 Oct 2023 04:19) (58 - 88)  BP: 99/66 (23 Oct 2023 04:19) (99/66 - 144/73)  BP(mean): --  RR: 18 (23 Oct 2023 04:19) (18 - 18)  SpO2: 95% (23 Oct 2023 04:19) (94% - 98%)    Parameters below as of 23 Oct 2023 04:19  Patient On (Oxygen Delivery Method): room air    EXAM PENDING     General: No acute distress    NEUROLOGICAL EXAM:  Mental status: Awake, alert, oriented x self, place, unknown month/year, repetition intact, IDs objects, follows simple commands, speech fluent, impaired memory  Cranial Nerves: slight left facial palsy, right superior quadrantopia in far periphery, left superior quadrantopia, no nystagmus, no dysarthria  Motor exam: Normal tone, no drift, 5/5 RUE, 5/5 RLE, 5/5 LUE proximally, 4+/5  LUE, 5/5 LLE   Sensation: decreased sensation on left   Coordination/ Gait: No dysmetria, gait not tested    LABS:             IMAGING: Reviewed by me.   CT Head No Cont (10.17.23 @ 12:19)   Multiple foci of low density right mesial temporal lobe and right   thalamus are more prominent on the current examination and likely   represent the presence of evolving cytotoxic edema.  Similar-appearing low density involving the left mesial temporal lobe   likely representing cytotoxic edema.  Redemonstration of chronic left of midline basis pontis infarct.  No acute intracranial hemorrhage.    CT Head No Cont (10.15.23 @ 13:13)   IMPRESSION: Stable medial left temporal occipital lobe infarction.    (10.14.23 @ 15:25)   CT BRAIN WITHOUT CONTRAST:  1. Decreased attenuation involving the medial aspect of the left temporal   lobe, not present on prior noncontrast CT study. However, restricted   diffusion was appreciated in the same location on the prior MRI of   10/13/2023.  2. Nonacute left ventral pontine ischemic event, present on prior CT and   MRI.    10.14.23 @ 14:58)   CT BRAIN WITHOUT CONTRAST:  1. Decreased attenuation involving the medial aspect of the left temporal   lobe, not present on prior noncontrast CT study. However, restricted   diffusion was appreciated in the same location on the prior MRI of   10/13/2023.  2. Nonacute left ventral pontine ischemic event, present on prior CT and   MRI.    CT PERFUSION:  Technical limitations: Significantly limited by patient motion during   image acquisition and suboptimal arterial waveform.   Core infartction: 0 ml; Penubra/tissue at risk for active ischemia: 75   mL involving right temporal brain parenchyma.  If symptoms persist consider follow up head CT or MRI, MRA  if no   contraindication.    CTA HEAD:  1. Poor visualization of the proximal M1 segment of the right middle   cerebral artery, as on prior.  2. Irregular pattern of flow within the P2 segment of the left posterior   cerebral artery, as on prior.  4. Cut off of the left posterior cerebral artery at the P1/P2 junction,   as on prior, with suggestion of distal reconstitution..  5. Hypoplastic poorly visualized intracranial right vertebral artery.  6. No evidence of aneurysm formation at the level of the [place] of   [place]. Tiny aneurysms can be beyond the resolution of CTA technique.    CTA NECK:  1. No evidence of significant stenosis or occlusion in association with   the bilateral common carotid arteriesor bilateral internal carotid   arteries.  2. Poorly visualized proximal left vertebral artery with suggestion of   distal reconstitution at the level of C2.     10/12/2023 19:13  IMPRESSION:  HEAD CT: No evidence of an acute intracranial hemorrhage midline shift or hydrocephalus. Mild bifrontal periventricular white matter ischemic changes. Bilateral maxillary sinusitis  NECK CTA: No hemodynamic significant narowing involving the carotid bifurcations. Hypoplastic right vertebral artery. Dominant left vertebral artery..  BRAIN CTA: Cutoff of the M1 portion of the right middle cerebral artery which may be chronic as there appears to be lenticular striate collaterals and reconstitution of more distal MCA segments. Cutoff of the right posterior cerebral artery at the P1-2 junction with reconstitution of more distal segments. Cut off of the left posterior cerebral artery at the P1-2 junction with mild distal reconstitution. Hypoplastic right vertebral artery with portions of the V4 segment not visualized  CT PERFUSION: Regions of ischemia within the posterior circulation with a volume of 121 mL without evidence of core infarction.    (10.13.23 @ 12:31)   MRI BRAIN: Acute small to moderate medial left temporal occipital lobe   infarctions. Acute punctate left occipital lobe infarction.  MRA BRAIN: Occluded right MCA. Occluded right PCA. Occluded right   vertebral artery proximal V4 segment.  MRI BRAIN NOVA: Values above.    TTE Echo Complete w/ Contrast w/ Doppler (10.13.23 @ 19:44)   Summary:   1. Technically difficult study.   2. Normal left ventricular internal cavity size.   3. Left ventricular ejection fraction, by visual estimation, is 50 to   55%.   4. Normal left atrial size.   5. Normal right atrial size.   6. Mildly enlarged right ventricle.   7. Dilatation of the ascending aorta.   8. Sclerotic aortic valve with normal opening.   9. Mild mitral annular calcification.  10. Mild thickening of the anterior mitral valve leaflet.  11. Trace mitral valve regurgitation.  12. Mild tricuspid regurgitation.  13. Trivial pericardial effusion.  14. Recommend transesophageal echocardiogram.    US Duplex Venous Lower Ext Complete, Bilateral (10.13.23 @ 22:47)   IMPRESSION:  No evidence of deep venous thrombosis in either lower extremity.         Preliminary note, offical recommendations pending attending review/signature   [facility] Stroke Team  Progress Note     HPI:  Patient is a 57 y/o M with a PMHx of stroke 2022 reported at [facility]  (at the time patient received thrombolysis and had full  resolution of symptoms), family reported dove dove, HTN, DM, HLD nonadherent with medications, who presented following an episode of LOC.  As per sister report, patient was last seen well by his niece  morning of 10/12/23  before patient went to work. At around 6:15 pm that night patient had a sudden fall at work and lost consciousness It is unclear if patient had any symptoms before he fell. NIHSS 10 on admission . Transferred to [facility] for mechanical thrombectomy.       SUBJECTIVE: No events overnight.  No new neurologic complaints. Some difficulty with recalling short term but left sided numbness continues.  Reports able to see full TV now.  ROS reported negative unless otherwise noted.    acetaminophen     Tablet .. 650 milliGRAM(s) Oral every 6 hours PRN  apixaban 5 milliGRAM(s) Oral two times a day  atorvastatin 80 milliGRAM(s) Oral at bedtime  bisacodyl Suppository 10 milliGRAM(s) Rectal daily PRN  chlorhexidine 2% Cloths 1 Application(s) Topical <User Schedule>  dextrose 50% Injectable 12.5 Gram(s) IV Push once  dextrose 50% Injectable 25 Gram(s) IV Push once  dextrose 50% Injectable 25 Gram(s) IV Push once  famotidine    Tablet 20 milliGRAM(s) Oral daily  folic acid 1 milliGRAM(s) Oral daily  hydrALAZINE Injectable 10 milliGRAM(s) IV Push every 4 hours PRN  influenza   Vaccine 0.5 milliLiter(s) IntraMuscular once  insulin lispro (ADMELOG) corrective regimen sliding scale   SubCutaneous Before meals and at bedtime  magnesium citrate Oral Solution 296 milliLiter(s) Oral once PRN  magnesium hydroxide Suspension 30 milliLiter(s) Oral daily PRN  multivitamin 1 Tablet(s) Oral daily  polyethylene glycol 3350 17 Gram(s) Oral every 12 hours  senna 2 Tablet(s) Oral every 12 hours  thiamine 100 milliGRAM(s) Oral daily      PHYSICAL EXAM:   Vital Signs Last 24 Hrs  T(C): 37.1 (23 Oct 2023 04:19), Max: 37.2 (23 Oct 2023 00:24)  T(F): 98.8 (23 Oct 2023 04:19), Max: 98.9 (23 Oct 2023 00:24)  HR: 64 (23 Oct 2023 04:19) (58 - 88)  BP: 99/66 (23 Oct 2023 04:19) (99/66 - 144/73)  BP(mean): --  RR: 18 (23 Oct 2023 04:19) (18 - 18)  SpO2: 95% (23 Oct 2023 04:19) (94% - 98%)    Parameters below as of 23 Oct 2023 04:19  Patient On (Oxygen Delivery Method): room air       General: No acute distress    NEUROLOGICAL EXAM:  Mental status: Awake, alert, oriented x self, place, unknown month (does orient when given cue) to october, oriented to year, repetition intact, IDs objects, follows simple and cross midline commands, speech fluent, impaired short term memory  Cranial Nerves: slight right facial palsy, EOMI, PERRL b/l, left superior quadrantopia (left eye > right eye) able to see shadow  in right eye, no nystagmus, no dysarthria  Motor exam: Normal tone, no drift, 5/5 RUE, 5/5 RLE, 5/5 LUE proximally, 5/5  LUE, 5/5 LLE   Sensation: decreased sensation on left (face; V1 10% reduction, V2/V3 and body 40% reduction)  Coordination/ Gait: No dysmetria, gait not tested (noted to walk ~ 10-15 ft with PT in room today)    LABS:  See [place].    IMAGING: Reviewed by me.   CT Head No Cont (10.17.23 @ 12:19)   Multiple foci of low density right mesial temporal lobe and right   thalamus are more prominent on the current examination and likely   represent the presence of evolving cytotoxic edema.  Similar-appearing low density involving the left mesial temporal lobe   likely representing cytotoxic edema.  Redemonstration of chronic left of midline basis pontis infarct.  No acute intracranial hemorrhage.    CT Head No Cont (10.15.23 @ 13:13)   IMPRESSION: Stable medial left temporal occipital lobe infarction.    (10.14.23 @ 15:25)   CT BRAIN WITHOUT CONTRAST:  1. Decreased attenuation involving the medial aspect of the left temporal   lobe, not present on prior noncontrast CT study. However, restricted   diffusion was appreciated in the same location on the prior MRI of   10/13/2023.  2. Nonacute left ventral pontine ischemic event, present on prior CT and   MRI.    10.14.23 @ 14:58)   CT BRAIN WITHOUT CONTRAST:  1. Decreased attenuation involving the medial aspect of the left temporal   lobe, not present on prior noncontrast CT study. However, restricted   diffusion was appreciated in the same location on the prior MRI of   10/13/2023.  2. Nonacute left ventral pontine ischemic event, present on prior CT and   MRI.    CT PERFUSION:  Technical limitations: Significantly limited by patient motion during   image acquisition and suboptimal arterial waveform.   Core infartction: 0 ml; Penubra/tissue at risk for active ischemia: 75   mL involving right temporal brain parenchyma.  If symptoms persist consider follow up head CT or MRI, MRA  if no   contraindication.    CTA HEAD:  1. Poor visualization of the proximal M1 segment of the right middle   cerebral artery, as on prior.  2. Irregular pattern of flow within the P2 segment of the left posterior   cerebral artery, as on prior.  4. Cut off of the left posterior cerebral artery at the P1/P2 junction,   as on prior, with suggestion of distal reconstitution..  5. Hypoplastic poorly visualized intracranial right vertebral artery.  6. No evidence of aneurysm formation at the level of the [place] of   [place]. Tiny aneurysms can be beyond the resolution of CTA technique.    CTA NECK:  1. No evidence of significant stenosis or occlusion in association with   the bilateral common carotid arteriesor bilateral internal carotid   arteries.  2. Poorly visualized proximal left vertebral artery with suggestion of   distal reconstitution at the level of C2.     10/12/2023 19:13  IMPRESSION:  HEAD CT: No evidence of an acute intracranial hemorrhage midline shift or hydrocephalus. Mild bifrontal periventricular white matter ischemic changes. Bilateral maxillary sinusitis  NECK CTA: No hemodynamic significant narowing involving the carotid bifurcations. Hypoplastic right vertebral artery. Dominant left vertebral artery..  BRAIN CTA: Cutoff of the M1 portion of the right middle cerebral artery which may be chronic as there appears to be lenticular striate collaterals and reconstitution of more distal MCA segments. Cutoff of the right posterior cerebral artery at the P1-2 junction with reconstitution of more distal segments. Cut off of the left posterior cerebral artery at the P1-2 junction with mild distal reconstitution. Hypoplastic right vertebral artery with portions of the V4 segment not visualized  CT PERFUSION: Regions of ischemia within the posterior circulation with a volume of 121 mL without evidence of core infarction.    (10.13.23 @ 12:31)   MRI BRAIN: Acute small to moderate medial left temporal occipital lobe   infarctions. Acute punctate left occipital lobe infarction.  MRA BRAIN: Occluded right MCA. Occluded right PCA. Occluded right   vertebral artery proximal V4 segment.  MRI BRAIN NOVA: Values above.    TTE Echo Complete w/ Contrast w/ Doppler (10.13.23 @ 19:44)   Summary:   1. Technically difficult study.   2. Normal left ventricular internal cavity size.   3. Left ventricular ejection fraction, by visual estimation, is 50 to   55%.   4. Normal left atrial size.   5. Normal right atrial size.   6. Mildly enlarged right ventricle.   7. Dilatation of the ascending aorta.   8. Sclerotic aortic valve with normal opening.   9. Mild mitral annular calcification.  10. Mild thickening of the anterior mitral valve leaflet.  11. Trace mitral valve regurgitation.  12. Mild tricuspid regurgitation.  13. Trivial pericardial effusion.  14. Recommend transesophageal echocardiogram.    US Duplex Venous Lower Ext Complete, Bilateral (10.13.23 @ 22:47)   IMPRESSION:  No evidence of deep venous thrombosis in either lower extremity.         Preliminary note, offical recommendations pending attending review/signature   [facility] Stroke Team  Progress Note     HPI:  Patient is a 57 y/o M with a PMHx of stroke 2022 reported at [facility]  (at the time patient received thrombolysis and had full  resolution of symptoms), family reported dove dove, HTN, DM, HLD nonadherent with medications, who presented following an episode of LOC.  As per sister report, patient was last seen well by his niece  morning of 10/12/23  before patient went to work. At around 6:15 pm that night patient had a sudden fall at work and lost consciousness It is unclear if patient had any symptoms before he fell. NIHSS 10 on admission . Transferred to [facility] for mechanical thrombectomy.     SUBJECTIVE: No events overnight.  No new neurologic complaints. Some difficulty with recalling short term but left sided numbness continues.  Reports able to see full TV now.  ROS reported negative unless otherwise noted.    acetaminophen     Tablet .. 650 milliGRAM(s) Oral every 6 hours PRN  apixaban 5 milliGRAM(s) Oral two times a day  atorvastatin 80 milliGRAM(s) Oral at bedtime  bisacodyl Suppository 10 milliGRAM(s) Rectal daily PRN  chlorhexidine 2% Cloths 1 Application(s) Topical <User Schedule>  dextrose 50% Injectable 12.5 Gram(s) IV Push once  dextrose 50% Injectable 25 Gram(s) IV Push once  dextrose 50% Injectable 25 Gram(s) IV Push once  famotidine    Tablet 20 milliGRAM(s) Oral daily  folic acid 1 milliGRAM(s) Oral daily  hydrALAZINE Injectable 10 milliGRAM(s) IV Push every 4 hours PRN  influenza   Vaccine 0.5 milliLiter(s) IntraMuscular once  insulin lispro (ADMELOG) corrective regimen sliding scale   SubCutaneous Before meals and at bedtime  magnesium citrate Oral Solution 296 milliLiter(s) Oral once PRN  magnesium hydroxide Suspension 30 milliLiter(s) Oral daily PRN  multivitamin 1 Tablet(s) Oral daily  polyethylene glycol 3350 17 Gram(s) Oral every 12 hours  senna 2 Tablet(s) Oral every 12 hours  thiamine 100 milliGRAM(s) Oral daily      PHYSICAL EXAM:   Vital Signs Last 24 Hrs  T(C): 37.1 (23 Oct 2023 04:19), Max: 37.2 (23 Oct 2023 00:24)  T(F): 98.8 (23 Oct 2023 04:19), Max: 98.9 (23 Oct 2023 00:24)  HR: 64 (23 Oct 2023 04:19) (58 - 88)  BP: 99/66 (23 Oct 2023 04:19) (99/66 - 144/73)  BP(mean): --  RR: 18 (23 Oct 2023 04:19) (18 - 18)  SpO2: 95% (23 Oct 2023 04:19) (94% - 98%)    Parameters below as of 23 Oct 2023 04:19  Patient On (Oxygen Delivery Method): room air       General: No acute distress    NEUROLOGICAL EXAM:  Mental status: Awake, alert, oriented x self, place, unknown month (does orient when given cue) to october, oriented to year, repetition intact, IDs objects, follows simple and cross midline commands, speech fluent, impaired short term memory  Cranial Nerves: slight right facial palsy, EOMI, PERRL b/l, left superior quadrantopia (left eye > right eye) able to see shadow  in right eye, no nystagmus, no dysarthria  Motor exam: Normal tone, no drift, 5/5 RUE, 5/5 RLE, 5/5 LUE proximally, 5/5  LUE, 5/5 LLE   Sensation: decreased sensation on left (face; V1 10% reduction, V2/V3 and body 40% reduction)  Coordination/ Gait: No dysmetria, gait not tested (noted to walk ~ 10-15 ft with PT in room today)    LABS:  See [place].    IMAGING: Reviewed by me.   CT Head No Cont (10.17.23 @ 12:19)   Multiple foci of low density right mesial temporal lobe and right   thalamus are more prominent on the current examination and likely   represent the presence of evolving cytotoxic edema.  Similar-appearing low density involving the left mesial temporal lobe   likely representing cytotoxic edema.  Redemonstration of chronic left of midline basis pontis infarct.  No acute intracranial hemorrhage.    CT Head No Cont (10.15.23 @ 13:13)   IMPRESSION: Stable medial left temporal occipital lobe infarction.    (10.14.23 @ 15:25)   CT BRAIN WITHOUT CONTRAST:  1. Decreased attenuation involving the medial aspect of the left temporal   lobe, not present on prior noncontrast CT study. However, restricted   diffusion was appreciated in the same location on the prior MRI of   10/13/2023.  2. Nonacute left ventral pontine ischemic event, present on prior CT and   MRI.    10.14.23 @ 14:58)   CT BRAIN WITHOUT CONTRAST:  1. Decreased attenuation involving the medial aspect of the left temporal   lobe, not present on prior noncontrast CT study. However, restricted   diffusion was appreciated in the same location on the prior MRI of   10/13/2023.  2. Nonacute left ventral pontine ischemic event, present on prior CT and   MRI.    CT PERFUSION:  Technical limitations: Significantly limited by patient motion during   image acquisition and suboptimal arterial waveform.   Core infartction: 0 ml; Penubra/tissue at risk for active ischemia: 75   mL involving right temporal brain parenchyma.  If symptoms persist consider follow up head CT or MRI, MRA  if no   contraindication.    CTA HEAD:  1. Poor visualization of the proximal M1 segment of the right middle   cerebral artery, as on prior.  2. Irregular pattern of flow within the P2 segment of the left posterior   cerebral artery, as on prior.  4. Cut off of the left posterior cerebral artery at the P1/P2 junction,   as on prior, with suggestion of distal reconstitution..  5. Hypoplastic poorly visualized intracranial right vertebral artery.  6. No evidence of aneurysm formation at the level of the [place] of   [place]. Tiny aneurysms can be beyond the resolution of CTA technique.    CTA NECK:  1. No evidence of significant stenosis or occlusion in association with   the bilateral common carotid arteriesor bilateral internal carotid   arteries.  2. Poorly visualized proximal left vertebral artery with suggestion of   distal reconstitution at the level of C2.     10/12/2023 19:13  IMPRESSION:  HEAD CT: No evidence of an acute intracranial hemorrhage midline shift or hydrocephalus. Mild bifrontal periventricular white matter ischemic changes. Bilateral maxillary sinusitis  NECK CTA: No hemodynamic significant narowing involving the carotid bifurcations. Hypoplastic right vertebral artery. Dominant left vertebral artery..  BRAIN CTA: Cutoff of the M1 portion of the right middle cerebral artery which may be chronic as there appears to be lenticular striate collaterals and reconstitution of more distal MCA segments. Cutoff of the right posterior cerebral artery at the P1-2 junction with reconstitution of more distal segments. Cut off of the left posterior cerebral artery at the P1-2 junction with mild distal reconstitution. Hypoplastic right vertebral artery with portions of the V4 segment not visualized  CT PERFUSION: Regions of ischemia within the posterior circulation with a volume of 121 mL without evidence of core infarction.    (10.13.23 @ 12:31)   MRI BRAIN: Acute small to moderate medial left temporal occipital lobe   infarctions. Acute punctate left occipital lobe infarction.  MRA BRAIN: Occluded right MCA. Occluded right PCA. Occluded right   vertebral artery proximal V4 segment.  MRI BRAIN NOVA: Values above.    TTE Echo Complete w/ Contrast w/ Doppler (10.13.23 @ 19:44)   Summary:   1. Technically difficult study.   2. Normal left ventricular internal cavity size.   3. Left ventricular ejection fraction, by visual estimation, is 50 to   55%.   4. Normal left atrial size.   5. Normal right atrial size.   6. Mildly enlarged right ventricle.   7. Dilatation of the ascending aorta.   8. Sclerotic aortic valve with normal opening.   9. Mild mitral annular calcification.  10. Mild thickening of the anterior mitral valve leaflet.  11. Trace mitral valve regurgitation.  12. Mild tricuspid regurgitation.  13. Trivial pericardial effusion.  14. Recommend transesophageal echocardiogram.    US Duplex Venous Lower Ext Complete, Bilateral (10.13.23 @ 22:47)   IMPRESSION:  No evidence of deep venous thrombosis in either lower extremity.           [facility] Stroke Team  Progress Note     HPI:  Patient is a 57 y/o M with a PMHx of stroke 2022 reported at [facility]  (at the time patient received thrombolysis and had full  resolution of symptoms), family reported dove dove, HTN, DM, HLD nonadherent with medications, who presented following an episode of LOC.  As per sister report, patient was last seen well by his niece  morning of 10/12/23  before patient went to work. At around 6:15 pm that night patient had a sudden fall at work and lost consciousness It is unclear if patient had any symptoms before he fell. NIHSS 10 on admission . Transferred to [facility] for mechanical thrombectomy.     SUBJECTIVE: No events overnight.  No new neurologic complaints. Some difficulty with recalling short term but left sided numbness continues.  Reports able to see full TV now.  ROS reported negative unless otherwise noted.    acetaminophen     Tablet .. 650 milliGRAM(s) Oral every 6 hours PRN  apixaban 5 milliGRAM(s) Oral two times a day  atorvastatin 80 milliGRAM(s) Oral at bedtime  bisacodyl Suppository 10 milliGRAM(s) Rectal daily PRN  chlorhexidine 2% Cloths 1 Application(s) Topical <User Schedule>  dextrose 50% Injectable 12.5 Gram(s) IV Push once  dextrose 50% Injectable 25 Gram(s) IV Push once  dextrose 50% Injectable 25 Gram(s) IV Push once  famotidine    Tablet 20 milliGRAM(s) Oral daily  folic acid 1 milliGRAM(s) Oral daily  hydrALAZINE Injectable 10 milliGRAM(s) IV Push every 4 hours PRN  influenza   Vaccine 0.5 milliLiter(s) IntraMuscular once  insulin lispro (ADMELOG) corrective regimen sliding scale   SubCutaneous Before meals and at bedtime  magnesium citrate Oral Solution 296 milliLiter(s) Oral once PRN  magnesium hydroxide Suspension 30 milliLiter(s) Oral daily PRN  multivitamin 1 Tablet(s) Oral daily  polyethylene glycol 3350 17 Gram(s) Oral every 12 hours  senna 2 Tablet(s) Oral every 12 hours  thiamine 100 milliGRAM(s) Oral daily      PHYSICAL EXAM:   Vital Signs Last 24 Hrs  T(C): 37.1 (23 Oct 2023 04:19), Max: 37.2 (23 Oct 2023 00:24)  T(F): 98.8 (23 Oct 2023 04:19), Max: 98.9 (23 Oct 2023 00:24)  HR: 64 (23 Oct 2023 04:19) (58 - 88)  BP: 99/66 (23 Oct 2023 04:19) (99/66 - 144/73)  BP(mean): --  RR: 18 (23 Oct 2023 04:19) (18 - 18)  SpO2: 95% (23 Oct 2023 04:19) (94% - 98%)    Parameters below as of 23 Oct 2023 04:19  Patient On (Oxygen Delivery Method): room air       General: No acute distress    NEUROLOGICAL EXAM:  Mental status: Awake, alert, oriented x self, place, unknown month (does orient when given cue) to october, oriented to year, repetition intact, IDs objects, follows simple and cross midline commands, speech fluent, impaired short term memory  Cranial Nerves: slight right facial palsy, EOMI, PERRL b/l, left superior quadrantopia (left eye > right eye) able to see shadow  in right eye, no nystagmus, no dysarthria  Motor exam: Normal tone, no drift, 5/5 RUE, 5/5 RLE, 5/5 LUE proximally, 5/5  LUE, 5/5 LLE   Sensation: decreased sensation on left (face; V1 10% reduction, V2/V3 and body 40% reduction)  Coordination/ Gait: No dysmetria, gait not tested (noted to walk ~ 10-15 ft with PT in room today)    LABS:  See [place].    IMAGING: Reviewed by me.   CT Head No Cont (10.17.23 @ 12:19)   Multiple foci of low density right mesial temporal lobe and right   thalamus are more prominent on the current examination and likely   represent the presence of evolving cytotoxic edema.  Similar-appearing low density involving the left mesial temporal lobe   likely representing cytotoxic edema.  Redemonstration of chronic left of midline basis pontis infarct.  No acute intracranial hemorrhage.    CT Head No Cont (10.15.23 @ 13:13)   IMPRESSION: Stable medial left temporal occipital lobe infarction.    (10.14.23 @ 15:25)   CT BRAIN WITHOUT CONTRAST:  1. Decreased attenuation involving the medial aspect of the left temporal   lobe, not present on prior noncontrast CT study. However, restricted   diffusion was appreciated in the same location on the prior MRI of   10/13/2023.  2. Nonacute left ventral pontine ischemic event, present on prior CT and   MRI.    10.14.23 @ 14:58)   CT BRAIN WITHOUT CONTRAST:  1. Decreased attenuation involving the medial aspect of the left temporal   lobe, not present on prior noncontrast CT study. However, restricted   diffusion was appreciated in the same location on the prior MRI of   10/13/2023.  2. Nonacute left ventral pontine ischemic event, present on prior CT and   MRI.    CT PERFUSION:  Technical limitations: Significantly limited by patient motion during   image acquisition and suboptimal arterial waveform.   Core infartction: 0 ml; [place]/tissue at risk for active ischemia: 75   mL involving right temporal brain parenchyma.  If symptoms persist consider follow up head CT or MRI, MRA  if no   contraindication.    CTA HEAD:  1. Poor visualization of the proximal M1 segment of the right middle   cerebral artery, as on prior.  2. Irregular pattern of flow within the P2 segment of the left posterior   cerebral artery, as on prior.  4. Cut off of the left posterior cerebral artery at the P1/P2 junction,   as on prior, with suggestion of distal reconstitution..  5. Hypoplastic poorly visualized intracranial right vertebral artery.  6. No evidence of aneurysm formation at the level of the [place] of   [place]. Tiny aneurysms can be beyond the resolution of CTA technique.    CTA NECK:  1. No evidence of significant stenosis or occlusion in association with   the bilateral common carotid arteriesor bilateral internal carotid   arteries.  2. Poorly visualized proximal left vertebral artery with suggestion of   distal reconstitution at the level of C2.     10/12/2023 19:13  IMPRESSION:  HEAD CT: No evidence of an acute intracranial hemorrhage midline shift or hydrocephalus. Mild bifrontal periventricular white matter ischemic changes. Bilateral maxillary sinusitis  NECK CTA: No hemodynamic significant narowing involving the carotid bifurcations. Hypoplastic right vertebral artery. Dominant left vertebral artery..  BRAIN CTA: Cutoff of the M1 portion of the right middle cerebral artery which may be chronic as there appears to be lenticular striate collaterals and reconstitution of more distal MCA segments. Cutoff of the right posterior cerebral artery at the P1-2 junction with reconstitution of more distal segments. Cut off of the left posterior cerebral artery at the P1-2 junction with mild distal reconstitution. Hypoplastic right vertebral artery with portions of the V4 segment not visualized  CT PERFUSION: Regions of ischemia within the posterior circulation with a volume of 121 mL without evidence of core infarction.    (10.13.23 @ 12:31)   MRI BRAIN: Acute small to moderate medial left temporal occipital lobe   infarctions. Acute punctate left occipital lobe infarction.  MRA BRAIN: Occluded right MCA. Occluded right PCA. Occluded right   vertebral artery proximal V4 segment.  MRI BRAIN NOVA: Values above.    TTE Echo Complete w/ Contrast w/ Doppler (10.13.23 @ 19:44)   Summary:   1. Technically difficult study.   2. Normal left ventricular internal cavity size.   3. Left ventricular ejection fraction, by visual estimation, is 50 to   55%.   4. Normal left atrial size.   5. Normal right atrial size.   6. Mildly enlarged right ventricle.   7. Dilatation of the ascending aorta.   8. Sclerotic aortic valve with normal opening.   9. Mild mitral annular calcification.  10. Mild thickening of the anterior mitral valve leaflet.  11. Trace mitral valve regurgitation.  12. Mild tricuspid regurgitation.  13. Trivial pericardial effusion.  14. Recommend transesophageal echocardiogram.    US Duplex Venous Lower Ext Complete, Bilateral (10.13.23 @ 22:47)   IMPRESSION:  No evidence of deep venous thrombosis in either lower extremity.

## 2023-10-23 NOTE — PROGRESS NOTE ADULT - NS ATTEND OPT1 GEN_ALL_CORE
I attest my time as attending is greater than 50% of the total combined time spent on qualifying patient care activities by the PA/NP and attending.
I independently performed the documented:
I independently performed the documented:

## 2023-10-23 NOTE — PROGRESS NOTE ADULT - NS ATTEND AMEND GEN_ALL_CORE FT
Seen and examined with the ACP team. Plan discussed with ACPs.    I agree with assessment and plan  as written with modifications made above.    will follow with you    Efrain Baca MD PhD   [id number]
Seen and examined with the ACP team. Plan discussed with ACPs.    I agree with assessment and plan  as written with modifications made above.    will follow with you    Efrain Baca MD PhD   [id number]
Patient was seen and examined by me. History and exam as documented above by PA/NP was confirmed by me.  Agree with plan as outlined above.
Seen and examined with the ACP team. Plan discussed with ACPs.    I agree with assessment and plan  as written with modifications made above.    will follow with you    Efrain Baca MD PhD   [id number]
Seen and examined with the ACP team. Plan discussed with ACPs.    I agree with assessment and plan  as written with modifications made above.    will follow with you    Efrain Baca MD PhD   [id number]
Patient seen and examined by me personally. 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 Overnight was found to have bradycardia with atrial periods of bigeminy. While awake he is in sinus rhythm with PVCs. No syncope noted. TTE without structural abnormality.     Neuro concerned for embolic stroke      Recommendations:   - Continue telemetry monitoring  - recommend ILR on discharge  - NPO after midnight for ROCIO in AM  - Avoid av alejandro blockers  - Will consult electrophysiology for further management    --  Denzel Zapata MD  Interventional and Structural Cardiology  [phone].
Seen and examined with the ACP team. Plan discussed with ACPs.    I agree with assessment and plan  as written with modifications made above.    will follow with you    Efrain Baca MD PhD   [id number]
Patient seen and examined by me.    T(C): 36.9 (10-16-23 @ 15:24), Max: 36.9 (10-15-23 @ 23:10)  HR: 55 (10-16-23 @ 15:00) (51 - 66)  BP: 155/89 (10-16-23 @ 15:00) (127/49 - 169/92)  RR: 14 (10-16-23 @ 15:00) (6 - 23)  SpO2: 97% (10-16-23 @ 15:00) (93% - 99%)  Patient alert and awake.  Chest- Bilateral Clear BS  Cardiac- S1 and S2  Abdomen- Soft    Assessment/Plan:  1. CVA  Per Neuro no ROCIO  Patient to f/u with a Cardiologist [place], [place], advised to f/u [facility]  I have discussed my recommendation with the PA which are outlined above.  Will sign off

## 2023-10-23 NOTE — PROGRESS NOTE ADULT - ASSESSMENT
57 y/o M with a Hx of stroke one year ago (at the time patient received TNK and had full  resolution of symptoms), HTN, DM, non-compliant with medications, who presented following an episode of LOC, as per family at around 6:15 pm patient had a sudden fall at work and lost consciousness. It is unclear if patient had any symptoms before he fell. NIHSS 10 on admission, admitted under NeuroICU on 10/12: Thrombectomy via L radial artery for TICI 3 revascularization of L P1-P2 occlusion. Noted LMCA chronic occlusion., on 0/13: Neurologic status improving. Got MR. Started on ASA and SQL, on 10/14: quadrantanopia now on L superior (originally R); Repeat stroke imaging completed and initial concern for CTP showing poss new perfusion deficit however on further review noted to be present on admission. Started on heparin drip per neurology recommendations, on 10/15: Therapeutic on heparin drip, stability HCT obtained. Cardiology consulted for further stroke work-up and downgraded under medicine.     #Acute CVA  s/p thrombectomy   TTE shows with  Normal left ventricular internal cavity size,  3. Left ventricular ejection fraction, by visual estimation, is 50 to 55%.  DVT studies negative  repeat CT head showed worsening of prior stroke  Per Neuro no need for ROCIO  Started on heparin drip given extensive atherosclerotic disease per Neuro. Switch to Eliquis. Needs for 3 months.    c/w Lipitor  BP goal 140-170 per neuro   Neuro checks Q4  Neurology follow up noted   PT/OT eval - acute rehab    #Occluded R MCA/PCA  per discussion with neuro, pt with collaterals  c/w q4hr neuro checks  outpt neuro IR followup    #ETOH abuse  not in active withdrawal, and pt denies h/o DT  c/w MVI, thiamine (for suspected deficiency) and folic acid  SW following     #Episode of bradycardia with concern for 2:1 block on tele  TTE EF 50-55%, no other structural abnormal   cards naomie recs  as per electrophysiology   - No indication for pacemaker  - Metoprolol 12.5mg BID has been discontinued   - s/p ILR on 10/20  - EP follow up noted      #HTN  No meds at home  Per neuro (Dr. Barkley), BP goal 140-170 mmHg   cont to monitor  Started Lisinopril 5 mg with holding parameters but holding it now as he is very sensitive to antihypertensive medications     #HLD  c/w Lipitor 80mg QD    #DM 2  non compliant with meds at home  A1C 8.6  Accu checks and ISS    #Elevated TSH  T4 level normal  outpt f/u    DVT Prophylaxis -- Patient is on Eliquis.     Dispo: PT/OT recommending Acute Rehab however patient's insurance is pending. CM working.

## 2023-10-24 LAB
ANION GAP SERPL CALC-SCNC: 13 MMOL/L — SIGNIFICANT CHANGE UP (ref 5–17)
ANION GAP SERPL CALC-SCNC: 13 MMOL/L — SIGNIFICANT CHANGE UP (ref 5–17)
BUN SERPL-MCNC: 15.2 MG/DL — SIGNIFICANT CHANGE UP (ref 8–20)
BUN SERPL-MCNC: 15.2 MG/DL — SIGNIFICANT CHANGE UP (ref 8–20)
CALCIUM SERPL-MCNC: 8.9 MG/DL — SIGNIFICANT CHANGE UP (ref 8.4–10.5)
CALCIUM SERPL-MCNC: 8.9 MG/DL — SIGNIFICANT CHANGE UP (ref 8.4–10.5)
CHLORIDE SERPL-SCNC: 101 MMOL/L — SIGNIFICANT CHANGE UP (ref 96–108)
CHLORIDE SERPL-SCNC: 101 MMOL/L — SIGNIFICANT CHANGE UP (ref 96–108)
CO2 SERPL-SCNC: 24 MMOL/L — SIGNIFICANT CHANGE UP (ref 22–29)
CO2 SERPL-SCNC: 24 MMOL/L — SIGNIFICANT CHANGE UP (ref 22–29)
CREAT SERPL-MCNC: 0.89 MG/DL — SIGNIFICANT CHANGE UP (ref 0.5–1.3)
CREAT SERPL-MCNC: 0.89 MG/DL — SIGNIFICANT CHANGE UP (ref 0.5–1.3)
EGFR: 99 ML/MIN/1.73M2 — SIGNIFICANT CHANGE UP
EGFR: 99 ML/MIN/1.73M2 — SIGNIFICANT CHANGE UP
GLUCOSE BLDC GLUCOMTR-MCNC: 130 MG/DL — HIGH (ref 70–99)
GLUCOSE BLDC GLUCOMTR-MCNC: 130 MG/DL — HIGH (ref 70–99)
GLUCOSE BLDC GLUCOMTR-MCNC: 162 MG/DL — HIGH (ref 70–99)
GLUCOSE BLDC GLUCOMTR-MCNC: 162 MG/DL — HIGH (ref 70–99)
GLUCOSE BLDC GLUCOMTR-MCNC: 177 MG/DL — HIGH (ref 70–99)
GLUCOSE BLDC GLUCOMTR-MCNC: 177 MG/DL — HIGH (ref 70–99)
GLUCOSE BLDC GLUCOMTR-MCNC: 213 MG/DL — HIGH (ref 70–99)
GLUCOSE BLDC GLUCOMTR-MCNC: 213 MG/DL — HIGH (ref 70–99)
GLUCOSE SERPL-MCNC: 152 MG/DL — HIGH (ref 70–99)
GLUCOSE SERPL-MCNC: 152 MG/DL — HIGH (ref 70–99)
HCT VFR BLD CALC: 40.5 % — SIGNIFICANT CHANGE UP (ref 39–50)
HCT VFR BLD CALC: 40.5 % — SIGNIFICANT CHANGE UP (ref 39–50)
HGB BLD-MCNC: 14.2 G/DL — SIGNIFICANT CHANGE UP (ref 13–17)
HGB BLD-MCNC: 14.2 G/DL — SIGNIFICANT CHANGE UP (ref 13–17)
MAGNESIUM SERPL-MCNC: 2.2 MG/DL — SIGNIFICANT CHANGE UP (ref 1.6–2.6)
MAGNESIUM SERPL-MCNC: 2.2 MG/DL — SIGNIFICANT CHANGE UP (ref 1.6–2.6)
MCHC RBC-ENTMCNC: 32.3 PG — SIGNIFICANT CHANGE UP (ref 27–34)
MCHC RBC-ENTMCNC: 32.3 PG — SIGNIFICANT CHANGE UP (ref 27–34)
MCHC RBC-ENTMCNC: 35.1 GM/DL — SIGNIFICANT CHANGE UP (ref 32–36)
MCHC RBC-ENTMCNC: 35.1 GM/DL — SIGNIFICANT CHANGE UP (ref 32–36)
MCV RBC AUTO: 92 FL — SIGNIFICANT CHANGE UP (ref 80–100)
MCV RBC AUTO: 92 FL — SIGNIFICANT CHANGE UP (ref 80–100)
PLATELET # BLD AUTO: 129 K/UL — LOW (ref 150–400)
PLATELET # BLD AUTO: 129 K/UL — LOW (ref 150–400)
POTASSIUM SERPL-MCNC: 4.8 MMOL/L — SIGNIFICANT CHANGE UP (ref 3.5–5.3)
POTASSIUM SERPL-MCNC: 4.8 MMOL/L — SIGNIFICANT CHANGE UP (ref 3.5–5.3)
POTASSIUM SERPL-SCNC: 4.8 MMOL/L — SIGNIFICANT CHANGE UP (ref 3.5–5.3)
POTASSIUM SERPL-SCNC: 4.8 MMOL/L — SIGNIFICANT CHANGE UP (ref 3.5–5.3)
RBC # BLD: 4.4 M/UL — SIGNIFICANT CHANGE UP (ref 4.2–5.8)
RBC # BLD: 4.4 M/UL — SIGNIFICANT CHANGE UP (ref 4.2–5.8)
RBC # FLD: 13.2 % — SIGNIFICANT CHANGE UP (ref 10.3–14.5)
RBC # FLD: 13.2 % — SIGNIFICANT CHANGE UP (ref 10.3–14.5)
SODIUM SERPL-SCNC: 138 MMOL/L — SIGNIFICANT CHANGE UP (ref 135–145)
SODIUM SERPL-SCNC: 138 MMOL/L — SIGNIFICANT CHANGE UP (ref 135–145)
WBC # BLD: 6.45 K/UL — SIGNIFICANT CHANGE UP (ref 3.8–10.5)
WBC # BLD: 6.45 K/UL — SIGNIFICANT CHANGE UP (ref 3.8–10.5)
WBC # FLD AUTO: 6.45 K/UL — SIGNIFICANT CHANGE UP (ref 3.8–10.5)
WBC # FLD AUTO: 6.45 K/UL — SIGNIFICANT CHANGE UP (ref 3.8–10.5)

## 2023-10-24 PROCEDURE — 99232 SBSQ HOSP IP/OBS MODERATE 35: CPT

## 2023-10-24 RX ORDER — HYDRALAZINE HCL 50 MG
25 TABLET ORAL EVERY 12 HOURS
Refills: 0 | Status: DISCONTINUED | OUTPATIENT
Start: 2023-10-24 | End: 2023-10-27

## 2023-10-24 RX ADMIN — ATORVASTATIN CALCIUM 80 MILLIGRAM(S): 80 TABLET, FILM COATED ORAL at 21:06

## 2023-10-24 RX ADMIN — Medication 1 MILLIGRAM(S): at 11:48

## 2023-10-24 RX ADMIN — Medication 4: at 13:28

## 2023-10-24 RX ADMIN — Medication 1 TABLET(S): at 11:48

## 2023-10-24 RX ADMIN — APIXABAN 5 MILLIGRAM(S): 2.5 TABLET, FILM COATED ORAL at 17:38

## 2023-10-24 RX ADMIN — Medication 2: at 18:37

## 2023-10-24 RX ADMIN — APIXABAN 5 MILLIGRAM(S): 2.5 TABLET, FILM COATED ORAL at 06:16

## 2023-10-24 RX ADMIN — Medication 100 MILLIGRAM(S): at 11:48

## 2023-10-24 RX ADMIN — FAMOTIDINE 20 MILLIGRAM(S): 10 INJECTION INTRAVENOUS at 11:48

## 2023-10-24 RX ADMIN — Medication 2: at 09:07

## 2023-10-24 RX ADMIN — CHLORHEXIDINE GLUCONATE 1 APPLICATION(S): 213 SOLUTION TOPICAL at 06:17

## 2023-10-24 NOTE — PROGRESS NOTE ADULT - SUBJECTIVE AND OBJECTIVE BOX
Cerebral infarction    HPI:  Patient is a 59 y/o M with a PMHx of stroke one year ago  at the time patient recieved TNK and had full  resolution of symptoms), HTN, DM, non-compliant with medications, who presented following an episode of LOC.   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 At around 6:15 pm patient had a sudden fall at work and lost conciousness. It is unclear if patient had any symptoms before he fell. NIHSS 10 on admission . Patient is not a current TNK candidate  due to unclear exact last known well. TRF to [facility] for thrombectomy. (12 Oct 2023 23:00)    Interval History:  Patient was seen and examined at bedside around 10:45 am.  Doing well.   No active complaints.   Weakness / numbness along with visual deficit on left side -- no change.     ROS:  As per interval history otherwise unremarkable.    PHYSICAL EXAM:  Vital Signs   T(C): 36.5 (24 Oct 2023 12:00), Max: 36.8 (23 Oct 2023 20:12)  T(F): 97.7 (24 Oct 2023 12:00), Max: 98.2 (23 Oct 2023 20:12)  HR: 67 (24 Oct 2023 12:00) (54 - 68)  BP: 141/90 (24 Oct 2023 12:00) (115/80 - 161/96)  RR: 18 (24 Oct 2023 12:00) (18 - 18)  SpO2: 94% (24 Oct 2023 12:00) (94% - 97%)  Parameters below as of 24 Oct 2023 12:00  Patient On (Oxygen Delivery Method): room air  General: Middle aged male lying in bed comfortably. No acute distress  HEENT: EOMI. Clear conjunctivae. Moist mucus membrane  Neck: Supple.   Chest: CTA bilaterally. No wheezing, rales or rhonchi. No chest wall tenderness.  Heart: Normal S1 & S2. RRR.   Abdomen: Non distended. Soft. Non-tender. + BS  Ext: No pedal edema. No calf tenderness   Neuro: Active and alert but not completely oriented. Motor 5/5 in RUE/RLE and 4-5/5 in LUE/LLE. Sensation decreased on left side. No speech disorder  Skin: Warm and Dry  Psychiatry: Normal mood and affect    LABS:  CAPILLARY BLOOD GLUCOSE  POCT Blood Glucose.: 213 mg/dL (24 Oct 2023 12:58)  POCT Blood Glucose.: 162 mg/dL (24 Oct 2023 09:02)  POCT Blood Glucose.: 228 mg/dL (23 Oct 2023 21:23)  POCT Blood Glucose.: 157 mg/dL (23 Oct 2023 18:08)             14.1   6.56  )-----------( 115      ( 20 Oct 2023 06:04 )             41.0     10-20    139  |  104  |  17.1  ----------------------------<  148<H>  4.0   |  22.0  |  1.02    Ca    9.1      20 Oct 2023 06:04  Mg     2.3     10-20    PT/INR - ( 20 Oct 2023 06:04 )   PT: 12.4 sec;   INR: 1.12 ratio       PTT - ( 20 Oct 2023 06:04 )  PTT:86.9 sec    Blood Culture: 10-18 @ 13:00  Organism --  Gram Stain Blood -- Gram Stain --  Specimen Source .Blood Blood-Peripheral  Culture-Blood --    RADIOLOGY & ADDITIONAL STUDIES:  Reviewed     MEDICATIONS  (STANDING):  apixaban 5 milliGRAM(s) Oral two times a day  atorvastatin 80 milliGRAM(s) Oral at bedtime  chlorhexidine 2% Cloths 1 Application(s) Topical <User Schedule>  dextrose 50% Injectable 12.5 Gram(s) IV Push once  dextrose 50% Injectable 25 Gram(s) IV Push once  dextrose 50% Injectable 25 Gram(s) IV Push once  famotidine    Tablet 20 milliGRAM(s) Oral daily  folic acid 1 milliGRAM(s) Oral daily  influenza   Vaccine 0.5 milliLiter(s) IntraMuscular once  insulin lispro (ADMELOG) corrective regimen sliding scale   SubCutaneous Before meals and at bedtime  multivitamin 1 Tablet(s) Oral daily  polyethylene glycol 3350 17 Gram(s) Oral every 12 hours  senna 2 Tablet(s) Oral every 12 hours  thiamine 100 milliGRAM(s) Oral daily    MEDICATIONS  (PRN):  acetaminophen     Tablet .. 650 milliGRAM(s) Oral every 6 hours PRN Mild Pain (1 - 3)  bisacodyl Suppository 10 milliGRAM(s) Rectal daily PRN Constipation  hydrALAZINE 25 milliGRAM(s) Oral every 12 hours PRN SBP > 170  hydrALAZINE Injectable 10 milliGRAM(s) IV Push every 4 hours PRN SBP>160  magnesium citrate Oral Solution 296 milliLiter(s) Oral once PRN if he does not go with suppository  magnesium hydroxide Suspension 30 milliLiter(s) Oral daily PRN Constipation

## 2023-10-24 NOTE — PROGRESS NOTE ADULT - ASSESSMENT
59 y/o M with a Hx of stroke one year ago (at the time patient received TNK and had full  resolution of symptoms), HTN, DM, non-compliant with medications, who presented following an episode of LOC, as per family at around 6:15 pm patient had a sudden fall at work and lost consciousness. It is unclear if patient had any symptoms before he fell. NIHSS 10 on admission, admitted under NeuroICU on 10/12: Thrombectomy via L radial artery for TICI 3 revascularization of L P1-P2 occlusion. Noted LMCA chronic occlusion., on 0/13: Neurologic status improving. Got MR. Started on ASA and SQL, on 10/14: quadrantanopia now on L superior (originally R); Repeat stroke imaging completed and initial concern for CTP showing poss new perfusion deficit however on further review noted to be present on admission. Started on heparin drip per neurology recommendations, on 10/15: Therapeutic on heparin drip, stability HCT obtained. Cardiology consulted for further stroke work-up and downgraded under medicine.     #Acute CVA  s/p thrombectomy   TTE shows with  Normal left ventricular internal cavity size,  3. Left ventricular ejection fraction, by visual estimation, is 50 to 55%.  DVT studies negative  repeat CT head showed worsening of prior stroke  Per Neuro no need for ROCIO  Started on heparin drip given extensive atherosclerotic disease per Neuro. Switch to Eliquis. Needs for 3 months.    c/w Lipitor  BP goal 140-170 per neuro   Neuro checks Q4  Neurology follow up noted   PT/OT eval - acute rehab    #Occluded R MCA/PCA  per discussion with neuro, pt with collaterals  c/w q4hr neuro checks  outpt neuro IR followup    #ETOH abuse  not in active withdrawal, and pt denies h/o DT  c/w MVI, thiamine (for suspected deficiency) and folic acid  SW following     #Episode of bradycardia with concern for 2:1 block on tele  TTE EF 50-55%, no other structural abnormal   cards naomie recs  as per electrophysiology   - No indication for pacemaker  - Metoprolol 12.5mg BID has been discontinued   - s/p ILR on 10/20  - EP follow up noted      #HTN  No meds at home  Per neuro (Dr. Barkley), BP goal 140-170 mmHg   cont to monitor  Started Lisinopril 5 mg with holding parameters but holding it now as he is very sensitive to antihypertensive medications     #HLD  c/w Lipitor 80mg QD    #DM 2  non compliant with meds at home  A1C 8.6  Accu checks and ISS    #Elevated TSH  T4 level normal  outpt f/u    DVT Prophylaxis -- Patient is on Eliquis.     Dispo: PT/OT recommending Acute Rehab however patient's insurance is pending. CM working.

## 2023-10-25 LAB
GLUCOSE BLDC GLUCOMTR-MCNC: 145 MG/DL — HIGH (ref 70–99)
GLUCOSE BLDC GLUCOMTR-MCNC: 145 MG/DL — HIGH (ref 70–99)
GLUCOSE BLDC GLUCOMTR-MCNC: 192 MG/DL — HIGH (ref 70–99)
GLUCOSE BLDC GLUCOMTR-MCNC: 192 MG/DL — HIGH (ref 70–99)
GLUCOSE BLDC GLUCOMTR-MCNC: 194 MG/DL — HIGH (ref 70–99)
GLUCOSE BLDC GLUCOMTR-MCNC: 194 MG/DL — HIGH (ref 70–99)
GLUCOSE BLDC GLUCOMTR-MCNC: 217 MG/DL — HIGH (ref 70–99)
GLUCOSE BLDC GLUCOMTR-MCNC: 217 MG/DL — HIGH (ref 70–99)

## 2023-10-25 PROCEDURE — 99232 SBSQ HOSP IP/OBS MODERATE 35: CPT

## 2023-10-25 RX ADMIN — POLYETHYLENE GLYCOL 3350 17 GRAM(S): 17 POWDER, FOR SOLUTION ORAL at 05:55

## 2023-10-25 RX ADMIN — APIXABAN 5 MILLIGRAM(S): 2.5 TABLET, FILM COATED ORAL at 16:56

## 2023-10-25 RX ADMIN — FAMOTIDINE 20 MILLIGRAM(S): 10 INJECTION INTRAVENOUS at 10:30

## 2023-10-25 RX ADMIN — CHLORHEXIDINE GLUCONATE 1 APPLICATION(S): 213 SOLUTION TOPICAL at 05:56

## 2023-10-25 RX ADMIN — Medication 1 TABLET(S): at 10:29

## 2023-10-25 RX ADMIN — Medication 2: at 12:49

## 2023-10-25 RX ADMIN — Medication 2: at 18:04

## 2023-10-25 RX ADMIN — ATORVASTATIN CALCIUM 80 MILLIGRAM(S): 80 TABLET, FILM COATED ORAL at 21:42

## 2023-10-25 RX ADMIN — Medication 4: at 21:42

## 2023-10-25 RX ADMIN — Medication 1 MILLIGRAM(S): at 10:29

## 2023-10-25 RX ADMIN — APIXABAN 5 MILLIGRAM(S): 2.5 TABLET, FILM COATED ORAL at 05:55

## 2023-10-25 RX ADMIN — Medication 100 MILLIGRAM(S): at 10:29

## 2023-10-25 RX ADMIN — SENNA PLUS 2 TABLET(S): 8.6 TABLET ORAL at 05:55

## 2023-10-25 NOTE — PROGRESS NOTE ADULT - SUBJECTIVE AND OBJECTIVE BOX
Cerebral infarction    HPI:  Patient is a 59 y/o M with a PMHx of stroke one year ago  at the time patient recieved TNK and had full  resolution of symptoms), HTN, DM, non-compliant with medications, who presented following an episode of LOC.   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 At around 6:15 pm patient had a sudden fall at work and lost conciousness. It is unclear if patient had any symptoms before he fell. NIHSS 10 on admission . Patient is not a current TNK candidate  due to unclear exact last known well. TRF to [facility] for thrombectomy. (12 Oct 2023 23:00)    Interval History:  Patient was seen and examined at bedside around 10:15 am.  Feels better.   Forgetful.   Has weakness / numbness along with visual deficit on left side -- no change.     ROS:  As per interval history otherwise unremarkable.    PHYSICAL EXAM:  Vital Signs   T(C): 36.8 (25 Oct 2023 12:00), Max: 37.1 (25 Oct 2023 00:12)  T(F): 98.3 (25 Oct 2023 12:00), Max: 98.7 (25 Oct 2023 00:12)  HR: 70 (25 Oct 2023 12:00) (53 - 71)  BP: 153/79 (25 Oct 2023 12:00) (138/90 - 161/94)  RR: 18 (25 Oct 2023 12:00) (17 - 19)  SpO2: 94% (25 Oct 2023 12:00) (93% - 97%)  Parameters below as of 25 Oct 2023 12:00  Patient On (Oxygen Delivery Method): room air  General: Middle aged male lying in bed comfortably. No acute distress  HEENT: EOMI. Clear conjunctivae. Moist mucus membrane  Neck: Supple.   Chest: CTA bilaterally. No wheezing, rales or rhonchi. No chest wall tenderness.  Heart: Normal S1 & S2. RRR.   Abdomen: Non distended. Soft. Non-tender. + BS  Ext: No pedal edema. No calf tenderness   Neuro: Active and alert but not completely oriented. Motor 5/5 in RUE/RLE and 4-5/5 in LUE/LLE. Sensation decreased on left side. No speech disorder  Skin: Warm and Dry  Psychiatry: Normal mood and affect    LABS:  CAPILLARY BLOOD GLUCOSE  POCT Blood Glucose.: 194 mg/dL (25 Oct 2023 12:29)  POCT Blood Glucose.: 145 mg/dL (25 Oct 2023 08:46)  POCT Blood Glucose.: 130 mg/dL (24 Oct 2023 21:06)  POCT Blood Glucose.: 177 mg/dL (24 Oct 2023 18:32)             14.1   6.56  )-----------( 115      ( 20 Oct 2023 06:04 )             41.0     10-20    139  |  104  |  17.1  ----------------------------<  148<H>  4.0   |  22.0  |  1.02    Ca    9.1      20 Oct 2023 06:04  Mg     2.3     10-20    PT/INR - ( 20 Oct 2023 06:04 )   PT: 12.4 sec;   INR: 1.12 ratio       PTT - ( 20 Oct 2023 06:04 )  PTT:86.9 sec    Blood Culture: 10-18 @ 13:00  Organism --  Gram Stain Blood -- Gram Stain --  Specimen Source .Blood Blood-Peripheral  Culture-Blood --    RADIOLOGY & ADDITIONAL STUDIES:  Reviewed     MEDICATIONS  (STANDING):  apixaban 5 milliGRAM(s) Oral two times a day  atorvastatin 80 milliGRAM(s) Oral at bedtime  chlorhexidine 2% Cloths 1 Application(s) Topical <User Schedule>  dextrose 50% Injectable 12.5 Gram(s) IV Push once  dextrose 50% Injectable 25 Gram(s) IV Push once  dextrose 50% Injectable 25 Gram(s) IV Push once  famotidine    Tablet 20 milliGRAM(s) Oral daily  folic acid 1 milliGRAM(s) Oral daily  influenza   Vaccine 0.5 milliLiter(s) IntraMuscular once  insulin lispro (ADMELOG) corrective regimen sliding scale   SubCutaneous Before meals and at bedtime  multivitamin 1 Tablet(s) Oral daily  polyethylene glycol 3350 17 Gram(s) Oral every 12 hours  senna 2 Tablet(s) Oral every 12 hours  thiamine 100 milliGRAM(s) Oral daily    MEDICATIONS  (PRN):  acetaminophen     Tablet .. 650 milliGRAM(s) Oral every 6 hours PRN Mild Pain (1 - 3)  bisacodyl Suppository 10 milliGRAM(s) Rectal daily PRN Constipation  hydrALAZINE 25 milliGRAM(s) Oral every 12 hours PRN SBP > 170  hydrALAZINE Injectable 10 milliGRAM(s) IV Push every 4 hours PRN SBP>160  magnesium citrate Oral Solution 296 milliLiter(s) Oral once PRN if he does not go with suppository  magnesium hydroxide Suspension 30 milliLiter(s) Oral daily PRN Constipation               Cerebral infarction    HPI:  Patient is a 57 y/o M with a PMHx of stroke one year ago  at the time patient recieved TNK and had full  resolution of symptoms), HTN, DM, non-compliant with medications, who presented following an episode of LOC.   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 At around 6:15 pm patient had a sudden fall at work and lost conciousness. It is unclear if patient had any symptoms before he fell. NIHSS 10 on admission . Patient is not a current TNK candidate  due to unclear exact last known well. TRF to [facility] for thrombectomy. (12 Oct 2023 23:00)    Interval History:  Patient was seen and examined at bedside around 10:15 am.  Feels better.   Forgetful.   Has weakness / numbness along with visual deficit on left side -- no change.     ROS:  As per interval history otherwise unremarkable.    PHYSICAL EXAM:  Vital Signs   T(C): 36.8 (25 Oct 2023 12:00), Max: 37.1 (25 Oct 2023 00:12)  T(F): 98.3 (25 Oct 2023 12:00), Max: 98.7 (25 Oct 2023 00:12)  HR: 70 (25 Oct 2023 12:00) (53 - 71)  BP: 153/79 (25 Oct 2023 12:00) (138/90 - 161/94)  RR: 18 (25 Oct 2023 12:00) (17 - 19)  SpO2: 94% (25 Oct 2023 12:00) (93% - 97%)  Parameters below as of 25 Oct 2023 12:00  Patient On (Oxygen Delivery Method): room air  General: Middle aged male lying in bed comfortably. No acute distress  HEENT: EOMI. Clear conjunctivae. Moist mucus membrane  Neck: Supple.   Chest: CTA bilaterally. No wheezing, rales or rhonchi. No chest wall tenderness.  Heart: Normal S1 & S2. RRR.   Abdomen: Non distended. Soft. Non-tender. + BS  Ext: No pedal edema. No calf tenderness   Neuro: Active and alert but not completely oriented. Motor 5/5 in RUE/RLE and 4-5/5 in LUE/LLE. Sensation decreased on left side. No speech disorder  Skin: Warm and Dry  Psychiatry: Normal mood and affect    LABS:  CAPILLARY BLOOD GLUCOSE  POCT Blood Glucose.: 194 mg/dL (25 Oct 2023 12:29)  POCT Blood Glucose.: 145 mg/dL (25 Oct 2023 08:46)  POCT Blood Glucose.: 130 mg/dL (24 Oct 2023 21:06)  POCT Blood Glucose.: 177 mg/dL (24 Oct 2023 18:32)                        14.2   6.45  )-----------( 129      ( 24 Oct 2023 04:35 )             40.5     10-24    138  |  101  |  15.2  ----------------------------<  152<H>  4.8   |  24.0  |  0.89    Ca    8.9      24 Oct 2023 04:35  Mg     2.2     10-24    RADIOLOGY & ADDITIONAL STUDIES:  Reviewed     MEDICATIONS  (STANDING):  apixaban 5 milliGRAM(s) Oral two times a day  atorvastatin 80 milliGRAM(s) Oral at bedtime  chlorhexidine 2% Cloths 1 Application(s) Topical <User Schedule>  dextrose 50% Injectable 12.5 Gram(s) IV Push once  dextrose 50% Injectable 25 Gram(s) IV Push once  dextrose 50% Injectable 25 Gram(s) IV Push once  famotidine    Tablet 20 milliGRAM(s) Oral daily  folic acid 1 milliGRAM(s) Oral daily  influenza   Vaccine 0.5 milliLiter(s) IntraMuscular once  insulin lispro (ADMELOG) corrective regimen sliding scale   SubCutaneous Before meals and at bedtime  multivitamin 1 Tablet(s) Oral daily  polyethylene glycol 3350 17 Gram(s) Oral every 12 hours  senna 2 Tablet(s) Oral every 12 hours  thiamine 100 milliGRAM(s) Oral daily    MEDICATIONS  (PRN):  acetaminophen     Tablet .. 650 milliGRAM(s) Oral every 6 hours PRN Mild Pain (1 - 3)  bisacodyl Suppository 10 milliGRAM(s) Rectal daily PRN Constipation  hydrALAZINE 25 milliGRAM(s) Oral every 12 hours PRN SBP > 170  hydrALAZINE Injectable 10 milliGRAM(s) IV Push every 4 hours PRN SBP>160  magnesium citrate Oral Solution 296 milliLiter(s) Oral once PRN if he does not go with suppository  magnesium hydroxide Suspension 30 milliLiter(s) Oral daily PRN Constipation

## 2023-10-25 NOTE — PROGRESS NOTE ADULT - ASSESSMENT
59 y/o M with a Hx of stroke one year ago (at the time patient received TNK and had full  resolution of symptoms), HTN, DM, non-compliant with medications, who presented following an episode of LOC, as per family at around 6:15 pm patient had a sudden fall at work and lost consciousness. It is unclear if patient had any symptoms before he fell. NIHSS 10 on admission, admitted under NeuroICU on 10/12: Thrombectomy via L radial artery for TICI 3 revascularization of L P1-P2 occlusion. Noted LMCA chronic occlusion., on 0/13: Neurologic status improving. Got MR. Started on ASA and SQL, on 10/14: quadrantanopia now on L superior (originally R); Repeat stroke imaging completed and initial concern for CTP showing poss new perfusion deficit however on further review noted to be present on admission. Started on heparin drip per neurology recommendations, on 10/15: Therapeutic on heparin drip, stability HCT obtained. Cardiology consulted for further stroke work-up and downgraded under medicine.     #Acute CVA  s/p thrombectomy   TTE shows with  Normal left ventricular internal cavity size,  3. Left ventricular ejection fraction, by visual estimation, is 50 to 55%.  DVT studies negative  repeat CT head showed worsening of prior stroke  Per Neuro no need for ROCIO  Started on heparin drip given extensive atherosclerotic disease per Neuro. Switch to Eliquis. Needs for 3 months.    c/w Lipitor  BP goal 140-170 per neuro   Neuro checks   Neurology follow up noted   PT/OT     #Occluded R MCA/PCA  per discussion with neuro, pt with collaterals  c/w neuro checks  outpt neuro IR followup    #ETOH abuse  not in active withdrawal, and pt denies h/o DT  c/w MVI, thiamine (for suspected deficiency) and folic acid  SW following     #Episode of bradycardia with concern for 2:1 block on tele  TTE EF 50-55%, no other structural abnormal   cards naomie recs  as per electrophysiology   - No indication for pacemaker  - Metoprolol 12.5mg BID has been discontinued   - s/p ILR on 10/20  - EP follow up noted      #HTN  No meds at home  Per neuro (Dr. Barkley), BP goal 140-170 mmHg   cont to monitor  Started Lisinopril 5 mg with holding parameters but holding it now as he is very sensitive to antihypertensive medications   Hydralazine 25 mg PRN if SBP > 170    #HLD  c/w Lipitor 80mg QD    #DM 2  non compliant with meds at home  A1C 8.6  Accu checks and ISS    #Elevated TSH  T4 level normal  outpt f/u    DVT Prophylaxis -- Patient is on Eliquis.     Dispo: Home vs Rehab pending arrangements.    Updated patient's sister - Selma.    57 y/o M with a Hx of stroke one year ago (at the time patient received TNK and had full  resolution of symptoms), HTN, DM, non-compliant with medications, who presented following an episode of LOC, as per family at around 6:15 pm patient had a sudden fall at work and lost consciousness. It is unclear if patient had any symptoms before he fell. NIHSS 10 on admission, admitted under NeuroICU on 10/12: Thrombectomy via L radial artery for TICI 3 revascularization of L P1-P2 occlusion. Noted LMCA chronic occlusion., on 0/13: Neurologic status improving. Got MR. Started on ASA and SQL, on 10/14: quadrantanopia now on L superior (originally R); Repeat stroke imaging completed and initial concern for CTP showing poss new perfusion deficit however on further review noted to be present on admission. Started on heparin drip per neurology recommendations, on 10/15: Therapeutic on heparin drip, stability HCT obtained. Cardiology consulted for further stroke work-up and downgraded under medicine.     #Acute CVA  s/p thrombectomy   TTE shows with  Normal left ventricular internal cavity size,  3. Left ventricular ejection fraction, by visual estimation, is 50 to 55%.  DVT studies negative  repeat CT head showed worsening of prior stroke  Per Neuro no need for ROCIO  Started on heparin drip given extensive atherosclerotic disease per Neuro. Switch to Eliquis. Needs for 3 months.    c/w Lipitor  BP goal 140-170 per neuro   Neuro checks   Neurology follow up noted   PT/OT     #Occluded R MCA/PCA  per discussion with neuro, pt with collaterals  c/w neuro checks  outpt neuro IR followup    #ETOH abuse  not in active withdrawal, and pt denies h/o DT  c/w MVI, thiamine (for suspected deficiency) and folic acid  Seen by SW      #Episode of bradycardia with concern for 2:1 block on tele  TTE EF 50-55%, no other structural abnormal   cards naomie recs  as per electrophysiology   - No indication for pacemaker  - Metoprolol 12.5mg BID has been discontinued   - s/p ILR on 10/20  - EP follow up noted      #HTN  No meds at home  Per neuro (Dr. Barkley), BP goal 140-170 mmHg   cont to monitor  Started Lisinopril 5 mg with holding parameters but holding it now as he is very sensitive to antihypertensive medications   Hydralazine 25 mg PRN if SBP > 170    #HLD  c/w Lipitor 80mg QD    #DM 2  non compliant with meds at home  A1C 8.6  Accu checks and ISS    #Elevated TSH  T4 level normal  outpt f/u    DVT Prophylaxis -- Patient is on Eliquis.     Dispo: Home vs Rehab pending arrangements.    Updated patient's sister - Selma.

## 2023-10-26 LAB
DNA PLOIDY SPEC FC-IMP: SIGNIFICANT CHANGE UP
DNA PLOIDY SPEC FC-IMP: SIGNIFICANT CHANGE UP
GLUCOSE BLDC GLUCOMTR-MCNC: 146 MG/DL — HIGH (ref 70–99)
GLUCOSE BLDC GLUCOMTR-MCNC: 146 MG/DL — HIGH (ref 70–99)
GLUCOSE BLDC GLUCOMTR-MCNC: 154 MG/DL — HIGH (ref 70–99)
GLUCOSE BLDC GLUCOMTR-MCNC: 154 MG/DL — HIGH (ref 70–99)
GLUCOSE BLDC GLUCOMTR-MCNC: 183 MG/DL — HIGH (ref 70–99)
GLUCOSE BLDC GLUCOMTR-MCNC: 183 MG/DL — HIGH (ref 70–99)
GLUCOSE BLDC GLUCOMTR-MCNC: 207 MG/DL — HIGH (ref 70–99)
GLUCOSE BLDC GLUCOMTR-MCNC: 207 MG/DL — HIGH (ref 70–99)
PTR INTERPRETATION: SIGNIFICANT CHANGE UP
PTR INTERPRETATION: SIGNIFICANT CHANGE UP

## 2023-10-26 PROCEDURE — 99232 SBSQ HOSP IP/OBS MODERATE 35: CPT

## 2023-10-26 RX ADMIN — APIXABAN 5 MILLIGRAM(S): 2.5 TABLET, FILM COATED ORAL at 05:12

## 2023-10-26 RX ADMIN — Medication 100 MILLIGRAM(S): at 10:45

## 2023-10-26 RX ADMIN — Medication 2: at 09:41

## 2023-10-26 RX ADMIN — POLYETHYLENE GLYCOL 3350 17 GRAM(S): 17 POWDER, FOR SOLUTION ORAL at 05:12

## 2023-10-26 RX ADMIN — CHLORHEXIDINE GLUCONATE 1 APPLICATION(S): 213 SOLUTION TOPICAL at 09:42

## 2023-10-26 RX ADMIN — SENNA PLUS 2 TABLET(S): 8.6 TABLET ORAL at 05:12

## 2023-10-26 RX ADMIN — Medication 1 MILLIGRAM(S): at 10:45

## 2023-10-26 RX ADMIN — ATORVASTATIN CALCIUM 80 MILLIGRAM(S): 80 TABLET, FILM COATED ORAL at 22:27

## 2023-10-26 RX ADMIN — Medication 1 TABLET(S): at 10:45

## 2023-10-26 RX ADMIN — Medication 4: at 22:28

## 2023-10-26 RX ADMIN — APIXABAN 5 MILLIGRAM(S): 2.5 TABLET, FILM COATED ORAL at 17:22

## 2023-10-26 RX ADMIN — FAMOTIDINE 20 MILLIGRAM(S): 10 INJECTION INTRAVENOUS at 10:45

## 2023-10-26 RX ADMIN — Medication 2: at 18:27

## 2023-10-26 NOTE — SPEECH LANGUAGE PATHOLOGY EVALUATION - SLP ORIENTATION
reduced, pt aware of type of place (hospital) & month/year, pt did benefit from external aids to facilitate success

## 2023-10-26 NOTE — PROGRESS NOTE ADULT - NUTRITIONAL ASSESSMENT
This patient has been assessed with a concern for Malnutrition and has been determined to have a diagnosis/diagnoses of Moderate protein-calorie malnutrition.    This patient is being managed with:   Diet Consistent Carbohydrate/No Snacks-  DASH/TLC {Sodium & Cholesterol Restricted} (DASH)  Supplement Feeding Modality:  Oral  Glucerna Shake Cans or Servings Per Day:  1       Frequency:  Two Times a day  Entered: Oct 19 2023  4:33PM

## 2023-10-26 NOTE — DIETITIAN NUTRITION RISK NOTIFICATION - TREATMENT: THE FOLLOWING DIET HAS BEEN RECOMMENDED
Diet, Consistent Carbohydrate/No Snacks:   DASH/TLC {Sodium & Cholesterol Restricted} (DASH)  Supplement Feeding Modality:  Oral  Glucerna Shake Cans or Servings Per Day:  1       Frequency:  Two Times a day (10-19-23 @ 16:34) [Active]

## 2023-10-26 NOTE — PROGRESS NOTE ADULT - ASSESSMENT
57 y/o M with a Hx of stroke one year ago (at the time patient received TNK and had full  resolution of symptoms), HTN, DM, non-compliant with medications, who presented following an episode of LOC, as per family at around 6:15 pm patient had a sudden fall at work and lost consciousness. It is unclear if patient had any symptoms before he fell. NIHSS 10 on admission, admitted under NeuroICU on 10/12: Thrombectomy via L radial artery for TICI 3 revascularization of L P1-P2 occlusion. Noted LMCA chronic occlusion., on 0/13: Neurologic status improving. Got MR. Started on ASA and SQL, on 10/14: quadrantanopia now on L superior (originally R); Repeat stroke imaging completed and initial concern for CTP showing poss new perfusion deficit however on further review noted to be present on admission. Started on heparin drip per neurology recommendations, on 10/15: Therapeutic on heparin drip, stability HCT obtained. Cardiology consulted for further stroke work-up and downgraded under medicine.     #Acute CVA  s/p thrombectomy   TTE shows with  Normal left ventricular internal cavity size,  3. Left ventricular ejection fraction, by visual estimation, is 50 to 55%.  DVT studies negative  repeat CT head showed worsening of prior stroke  Per Neuro no need for ROCIO  Started on heparin drip given extensive atherosclerotic disease per Neuro. Switch to Eliquis. Needs for 3 months.    c/w Lipitor  BP goal 140-170 per neuro   Neuro checks   Neurology follow up noted   PT/OT     #Occluded R MCA/PCA  per discussion with neuro, pt with collaterals  c/w neuro checks  outpt neuro IR followup    #ETOH abuse  not in active withdrawal, and pt denies h/o DT  c/w MVI, thiamine (for suspected deficiency) and folic acid  Seen by SW      #Episode of bradycardia with concern for 2:1 block on tele  TTE EF 50-55%, no other structural abnormal   cards naomie recs  as per electrophysiology   - No indication for pacemaker  - Metoprolol 12.5mg BID has been discontinued   - s/p ILR on 10/20  - EP follow up noted      #HTN  No meds at home  Per neuro (Dr. Barkley), BP goal 140-170 mmHg   cont to monitor  Started Lisinopril 5 mg with holding parameters but holding it now as he is very sensitive to antihypertensive medications   Hydralazine 25 mg PRN if SBP > 170    #HLD  c/w Lipitor 80mg QD    #DM 2  non compliant with meds at home  A1C 8.6  Accu checks and ISS    #Elevated TSH  T4 level normal  outpt f/u    DVT Prophylaxis -- Patient is on Eliquis.     Dispo: [facility] pending Auth

## 2023-10-26 NOTE — PROGRESS NOTE ADULT - PROVIDER SPECIALTY LIST ADULT
Electrophysiology
Electrophysiology
Hospitalist
NSICU
Neurology
Hospitalist
NSICU
Neurology
Hospitalist
Hospitalist
NSICU
Neurology
Cardiology
Cardiology

## 2023-10-26 NOTE — SPEECH LANGUAGE PATHOLOGY EVALUATION - COMMENTS
As per MD note: "57 y/o M with a Hx of stroke one year ago (at the time patient received TNK and had full  resolution of symptoms), HTN, DM, non-compliant with medications, who presented following an episode of LOC, as per family at around 6:15 pm patient had a sudden fall at work and lost consciousness. It is unclear if patient had any symptoms before he fell. NIHSS 10 on admission, admitted under NeuroICU on 10/12: Thrombectomy via L radial artery for TICI 3 revascularization of L P1-P2 occlusion. Noted LMCA chronic occlusion., on 0/13: Neurologic status improving. Got MR. Started on ASA and SQL, on 10/14: quadrantanopia now on L superior (originally R); Repeat stroke imaging completed and initial concern for CTP showing poss new perfusion deficit however on further review noted to be present on admission. Started on heparin drip per neurology recommendations, on 10/15: Therapeutic on heparin drip, stability HCT obtained. Cardiology consulted for further stroke work-up and downgraded under medicine." Speech tx services are RX following discharge from this facility, to facilitate improved cognition for home/vocational/avocational responsibilities Pt was given the [place] Cognitive Assessment Tool (MOCA) which is a screening tool for individuals with mild cognitive dysfunction. The test assesses 8 domains of cognitive functioning: attention and concentration, executive functions, memory, language, visuoconstructional skills, conceptual thinking, calculations, and orientation.  Pt scored a 17/30.  "Normal" scoring is >26/30.  The following ranges may be used to grade severity: 18-26 = mild cognitive impairment, 10-17 = moderate cognitive impairment and less than 10 = severe cognitive impairment. However, research for these severity ranges has not been established yet.   Pt achieved the following scores: visuospatial/executive: 3/5, naming: 3/3, attention: 4/6, language: 2/3, abstraction: 2/2, delayed recall: 0/5, orientation: 3/6  Of note, at time of eval, pt unable to recall personal information: age, address, including town of residence & current place of employment    Full-time supervision RX at time of eval due to cognitive deficits & potential impact on safety Skills WFL, however, reduced recall may impact ability to follow linguistically complex commands &/or complex/lengthy information WNL Speech tx services are RX following discharge from this facility, to facilitate improved cognition for home/vocational/avocational responsibilities (social work &  aware)

## 2023-10-26 NOTE — PROGRESS NOTE ADULT - SUBJECTIVE AND OBJECTIVE BOX
Hospitalist Progress Note    Chief Complaint:  Cerebral infarction    SUBJECTIVE / OVERNIGHT EVENTS:  No events overnight, patient seen at bedside, in NAD, no complaints reported today. Patient denies chest pain, SOB, abd pain, N/V, fever, chills, dysuria or any other complaints. All remainder ROS negative.     MEDICATIONS  (STANDING):  apixaban 5 milliGRAM(s) Oral two times a day  atorvastatin 80 milliGRAM(s) Oral at bedtime  chlorhexidine 2% Cloths 1 Application(s) Topical <User Schedule>  dextrose 50% Injectable 12.5 Gram(s) IV Push once  dextrose 50% Injectable 25 Gram(s) IV Push once  dextrose 50% Injectable 25 Gram(s) IV Push once  famotidine    Tablet 20 milliGRAM(s) Oral daily  folic acid 1 milliGRAM(s) Oral daily  influenza   Vaccine 0.5 milliLiter(s) IntraMuscular once  insulin lispro (ADMELOG) corrective regimen sliding scale   SubCutaneous Before meals and at bedtime  multivitamin 1 Tablet(s) Oral daily  polyethylene glycol 3350 17 Gram(s) Oral every 12 hours  senna 2 Tablet(s) Oral every 12 hours  thiamine 100 milliGRAM(s) Oral daily    MEDICATIONS  (PRN):  acetaminophen     Tablet .. 650 milliGRAM(s) Oral every 6 hours PRN Mild Pain (1 - 3)  bisacodyl Suppository 10 milliGRAM(s) Rectal daily PRN Constipation  hydrALAZINE 25 milliGRAM(s) Oral every 12 hours PRN SBP > 170  magnesium citrate Oral Solution 296 milliLiter(s) Oral once PRN if he does not go with suppository  magnesium hydroxide Suspension 30 milliLiter(s) Oral daily PRN Constipation        I&O's Summary    25 Oct 2023 07:01  -  26 Oct 2023 07:00  --------------------------------------------------------  IN: 820 mL / OUT: 850 mL / NET: -30 mL        PHYSICAL EXAM:  Vital Signs Last 24 Hrs  T(C): 36.7 (26 Oct 2023 08:14), Max: 37.3 (26 Oct 2023 00:24)  T(F): 98 (26 Oct 2023 08:14), Max: 99.1 (26 Oct 2023 00:24)  HR: 50 (26 Oct 2023 08:14) (50 - 73)  BP: 136/77 (26 Oct 2023 08:14) (135/91 - 159/99)  BP(mean): --  RR: 18 (26 Oct 2023 08:14) (17 - 18)  SpO2: 95% (26 Oct 2023 08:14) (93% - 97%)    Parameters below as of 26 Oct 2023 08:14  Patient On (Oxygen Delivery Method): room air        General: Middle aged male lying in bed comfortably. No acute distress  HEENT: EOMI. Clear conjunctivae. Moist mucus membrane  Neck: Supple.   Chest: CTA bilaterally. No wheezing, rales or rhonchi. No chest wall tenderness.  Heart: Normal S1 & S2. RRR.   Abdomen: Non distended. Soft. Non-tender. + BS  Ext: No pedal edema. No calf tenderness   Neuro: Active and alert but not completely oriented. Motor 5/5 in RUE/RLE and 4-5/5 in LUE/LLE. Sensation decreased on left side. No speech disorder  Skin: Warm and Dry  Psychiatry: Normal mood and affect      LABS:                    CAPILLARY BLOOD GLUCOSE      POCT Blood Glucose.: 183 mg/dL (26 Oct 2023 08:51)  POCT Blood Glucose.: 217 mg/dL (25 Oct 2023 21:40)  POCT Blood Glucose.: 192 mg/dL (25 Oct 2023 17:52)  POCT Blood Glucose.: 194 mg/dL (25 Oct 2023 12:29)        RADIOLOGY & ADDITIONAL TESTS:  Results Reviewed: Y  Imaging Personally Reviewed: N  Electrocardiogram Personally Reviewed: N

## 2023-10-26 NOTE — DIETITIAN NUTRITION RISK NOTIFICATION - ADDITIONAL COMMENTS/DIETITIAN RECOMMENDATIONS
Continue diet as tolerated.  Continue Glucerna BID (220 kcal, 10g protein per serving).  Continue MVI, thiamine, and folic acid supplementation.  Continue bowel regimen PRN.  Encourage po intake, monitor diet tolerance, and provide assistance at meals as needed.  Obtain daily weights to monitor trends.

## 2023-10-26 NOTE — CHART NOTE - NSCHARTNOTEFT_GEN_A_CORE
Source: Patient [ x]  Family [ ]   other [ x]    Current Diet: Diet, Consistent Carbohydrate/No Snacks:   DASH/TLC {Sodium & Cholesterol Restricted} (DASH)  Supplement Feeding Modality:  Oral  Glucerna Shake Cans or Servings Per Day:  1       Frequency:  Two Times a day (10-19-23 @ 16:34)      Patient reports [ ] nausea  [ ] vomiting [ ] diarrhea [ ] constipation  [ ]chewing problems [ ] swallowing issues  [ ] other:     PO intake:  < 50% [ x]   50-75%  [ ]   %  [ ]  other :    Source for PO intake [ x] Patient [ ] family [ x] chart [ ] staff [ ] other    Current Weight:   (10/18) 231.4 lbs  (10/15) 235.6 lbs  (10/14) 240.7 lbs  (10/13) 240.7 lbs  (10/12) 245.3 lbs  appears weight trending down, unable to obtain accurate bedscale weight a time of interview, continue to trend   no edema documented     Pertinent Medications: MEDICATIONS  (STANDING):  folic acid 1 milliGRAM(s) Oral daily  insulin lispro (ADMELOG) corrective regimen sliding scale   SubCutaneous Before meals and at bedtime  multivitamin 1 Tablet(s) Oral daily  polyethylene glycol 3350 17 Gram(s) Oral every 12 hours  senna 2 Tablet(s) Oral every 12 hours  thiamine 100 milliGRAM(s) Oral daily    MEDICATIONS  (PRN):  bisacodyl Suppository 10 milliGRAM(s) Rectal daily PRN Constipation  magnesium citrate Oral Solution 296 milliLiter(s) Oral once PRN if he does not go with suppository  magnesium hydroxide Suspension 30 milliLiter(s) Oral daily PRN Constipation    Skin: intact per documentation  Kevin: 15    Nutrition focused physical exam conducted - found signs of malnutrition [ ]absent [ x]present    Subcutaneous fat loss: [ x] Orbital fat pads region, [ ]Buccal fat region, [ ]Triceps region,  [ ]Ribs region    Muscle wasting: [x ]Temples region, [x ]Clavicle region, [ ]Shoulder region, [ ]Scapula region, [ ]Interosseous region,  [ ]thigh region, [ ]Calf region    Estimated Needs:   [x ] no change since previous assessment  [ ] recalculated:     Current Nutrition Diagnosis: Pt remains at high nutrition risk secondary to malnutrition (moderate, acute) related to inability to meet sufficient protein-energy in setting of acute CVA, lack of appetite, alcohol abuse as evidenced by meeting <75% nutrient needs >7 days, mild muscle loss of temples and clavicles, and mild fat loss of orbitals. Pt reports fair appetite/po intake. Per documentation and previous RD note, pt with poor po intake. States he is drinking Glucerna supplements. RD to follow up.     Recommendations:   1) Continue diet as tolerated.  2) Continue Glucerna BID (220 kcal, 10g protein per serving).  3) Continue MVI, thiamine, and folic acid supplementation.  4) Continue bowel regimen PRN.  5) Encourage po intake, monitor diet tolerance, and provide assistance at meals as needed.  6) Obtain daily weights to monitor trends.     Monitoring and Evaluation:   [ x] PO intake [x ] Tolerance to diet prescription [X] Weights  [X] Follow up per protocol [X] Labs: chem 8, mg, phos, H/H

## 2023-10-27 ENCOUNTER — INPATIENT (INPATIENT)
Facility: HOSPITAL | Age: 58
LOS: 38 days | Discharge: NURSING, REHAB OR LONG-TERM CARE | DRG: 57 | End: 2023-12-05
Attending: STUDENT IN AN ORGANIZED HEALTH CARE EDUCATION/TRAINING PROGRAM | Admitting: STUDENT IN AN ORGANIZED HEALTH CARE EDUCATION/TRAINING PROGRAM
Payer: MEDICAID

## 2023-10-27 VITALS
HEIGHT: 67 IN | DIASTOLIC BLOOD PRESSURE: 89 MMHG | OXYGEN SATURATION: 97 % | HEART RATE: 61 BPM | RESPIRATION RATE: 15 BRPM | SYSTOLIC BLOOD PRESSURE: 149 MMHG | TEMPERATURE: 98 F | WEIGHT: 246.48 LBS

## 2023-10-27 VITALS
HEART RATE: 74 BPM | SYSTOLIC BLOOD PRESSURE: 137 MMHG | OXYGEN SATURATION: 95 % | DIASTOLIC BLOOD PRESSURE: 89 MMHG | RESPIRATION RATE: 18 BRPM

## 2023-10-27 DIAGNOSIS — I63.9 CEREBRAL INFARCTION, UNSPECIFIED: ICD-10-CM

## 2023-10-27 DIAGNOSIS — Z98.890 OTHER SPECIFIED POSTPROCEDURAL STATES: Chronic | ICD-10-CM

## 2023-10-27 PROBLEM — I10 ESSENTIAL (PRIMARY) HYPERTENSION: Chronic | Status: ACTIVE | Noted: 2023-10-12

## 2023-10-27 LAB
ANION GAP SERPL CALC-SCNC: 14 MMOL/L — SIGNIFICANT CHANGE UP (ref 5–17)
ANION GAP SERPL CALC-SCNC: 14 MMOL/L — SIGNIFICANT CHANGE UP (ref 5–17)
BUN SERPL-MCNC: 16.4 MG/DL — SIGNIFICANT CHANGE UP (ref 8–20)
BUN SERPL-MCNC: 16.4 MG/DL — SIGNIFICANT CHANGE UP (ref 8–20)
CALCIUM SERPL-MCNC: 9.1 MG/DL — SIGNIFICANT CHANGE UP (ref 8.4–10.5)
CALCIUM SERPL-MCNC: 9.1 MG/DL — SIGNIFICANT CHANGE UP (ref 8.4–10.5)
CHLORIDE SERPL-SCNC: 103 MMOL/L — SIGNIFICANT CHANGE UP (ref 96–108)
CHLORIDE SERPL-SCNC: 103 MMOL/L — SIGNIFICANT CHANGE UP (ref 96–108)
CO2 SERPL-SCNC: 24 MMOL/L — SIGNIFICANT CHANGE UP (ref 22–29)
CO2 SERPL-SCNC: 24 MMOL/L — SIGNIFICANT CHANGE UP (ref 22–29)
CREAT SERPL-MCNC: 0.89 MG/DL — SIGNIFICANT CHANGE UP (ref 0.5–1.3)
CREAT SERPL-MCNC: 0.89 MG/DL — SIGNIFICANT CHANGE UP (ref 0.5–1.3)
EGFR: 99 ML/MIN/1.73M2 — SIGNIFICANT CHANGE UP
EGFR: 99 ML/MIN/1.73M2 — SIGNIFICANT CHANGE UP
GLUCOSE BLDC GLUCOMTR-MCNC: 176 MG/DL — HIGH (ref 70–99)
GLUCOSE BLDC GLUCOMTR-MCNC: 176 MG/DL — HIGH (ref 70–99)
GLUCOSE BLDC GLUCOMTR-MCNC: 282 MG/DL — HIGH (ref 70–99)
GLUCOSE BLDC GLUCOMTR-MCNC: 282 MG/DL — HIGH (ref 70–99)
GLUCOSE SERPL-MCNC: 150 MG/DL — HIGH (ref 70–99)
GLUCOSE SERPL-MCNC: 150 MG/DL — HIGH (ref 70–99)
HCT VFR BLD CALC: 39.1 % — SIGNIFICANT CHANGE UP (ref 39–50)
HCT VFR BLD CALC: 39.1 % — SIGNIFICANT CHANGE UP (ref 39–50)
HGB BLD-MCNC: 13.3 G/DL — SIGNIFICANT CHANGE UP (ref 13–17)
HGB BLD-MCNC: 13.3 G/DL — SIGNIFICANT CHANGE UP (ref 13–17)
MCHC RBC-ENTMCNC: 31.7 PG — SIGNIFICANT CHANGE UP (ref 27–34)
MCHC RBC-ENTMCNC: 31.7 PG — SIGNIFICANT CHANGE UP (ref 27–34)
MCHC RBC-ENTMCNC: 34 GM/DL — SIGNIFICANT CHANGE UP (ref 32–36)
MCHC RBC-ENTMCNC: 34 GM/DL — SIGNIFICANT CHANGE UP (ref 32–36)
MCV RBC AUTO: 93.3 FL — SIGNIFICANT CHANGE UP (ref 80–100)
MCV RBC AUTO: 93.3 FL — SIGNIFICANT CHANGE UP (ref 80–100)
PLATELET # BLD AUTO: 129 K/UL — LOW (ref 150–400)
PLATELET # BLD AUTO: 129 K/UL — LOW (ref 150–400)
POTASSIUM SERPL-MCNC: 4.3 MMOL/L — SIGNIFICANT CHANGE UP (ref 3.5–5.3)
POTASSIUM SERPL-MCNC: 4.3 MMOL/L — SIGNIFICANT CHANGE UP (ref 3.5–5.3)
POTASSIUM SERPL-SCNC: 4.3 MMOL/L — SIGNIFICANT CHANGE UP (ref 3.5–5.3)
POTASSIUM SERPL-SCNC: 4.3 MMOL/L — SIGNIFICANT CHANGE UP (ref 3.5–5.3)
RBC # BLD: 4.19 M/UL — LOW (ref 4.2–5.8)
RBC # BLD: 4.19 M/UL — LOW (ref 4.2–5.8)
RBC # FLD: 13.2 % — SIGNIFICANT CHANGE UP (ref 10.3–14.5)
RBC # FLD: 13.2 % — SIGNIFICANT CHANGE UP (ref 10.3–14.5)
SARS-COV-2 RNA SPEC QL NAA+PROBE: SIGNIFICANT CHANGE UP
SARS-COV-2 RNA SPEC QL NAA+PROBE: SIGNIFICANT CHANGE UP
SODIUM SERPL-SCNC: 141 MMOL/L — SIGNIFICANT CHANGE UP (ref 135–145)
SODIUM SERPL-SCNC: 141 MMOL/L — SIGNIFICANT CHANGE UP (ref 135–145)
WBC # BLD: 5.43 K/UL — SIGNIFICANT CHANGE UP (ref 3.8–10.5)
WBC # BLD: 5.43 K/UL — SIGNIFICANT CHANGE UP (ref 3.8–10.5)
WBC # FLD AUTO: 5.43 K/UL — SIGNIFICANT CHANGE UP (ref 3.8–10.5)
WBC # FLD AUTO: 5.43 K/UL — SIGNIFICANT CHANGE UP (ref 3.8–10.5)

## 2023-10-27 PROCEDURE — 93005 ELECTROCARDIOGRAM TRACING: CPT

## 2023-10-27 PROCEDURE — 84443 ASSAY THYROID STIM HORMONE: CPT

## 2023-10-27 PROCEDURE — 81241 F5 GENE: CPT

## 2023-10-27 PROCEDURE — 83036 HEMOGLOBIN GLYCOSYLATED A1C: CPT

## 2023-10-27 PROCEDURE — 80076 HEPATIC FUNCTION PANEL: CPT

## 2023-10-27 PROCEDURE — C8929: CPT

## 2023-10-27 PROCEDURE — 99253 IP/OBS CNSLTJ NEW/EST LOW 45: CPT

## 2023-10-27 PROCEDURE — 83735 ASSAY OF MAGNESIUM: CPT

## 2023-10-27 PROCEDURE — 85520 HEPARIN ASSAY: CPT

## 2023-10-27 PROCEDURE — 97110 THERAPEUTIC EXERCISES: CPT

## 2023-10-27 PROCEDURE — 92523 SPEECH SOUND LANG COMPREHEN: CPT

## 2023-10-27 PROCEDURE — C1757: CPT

## 2023-10-27 PROCEDURE — C1887: CPT

## 2023-10-27 PROCEDURE — 85613 RUSSELL VIPER VENOM DILUTED: CPT

## 2023-10-27 PROCEDURE — 84484 ASSAY OF TROPONIN QUANT: CPT

## 2023-10-27 PROCEDURE — 0042T: CPT

## 2023-10-27 PROCEDURE — 85306 CLOT INHIBIT PROT S FREE: CPT

## 2023-10-27 PROCEDURE — C1769: CPT

## 2023-10-27 PROCEDURE — 61645 PERQ ART M-THROMBECT &/NFS: CPT

## 2023-10-27 PROCEDURE — 70496 CT ANGIOGRAPHY HEAD: CPT

## 2023-10-27 PROCEDURE — 33285 INSJ SUBQ CAR RHYTHM MNTR: CPT

## 2023-10-27 PROCEDURE — 80048 BASIC METABOLIC PNL TOTAL CA: CPT

## 2023-10-27 PROCEDURE — 85027 COMPLETE CBC AUTOMATED: CPT

## 2023-10-27 PROCEDURE — 84480 ASSAY TRIIODOTHYRONINE (T3): CPT

## 2023-10-27 PROCEDURE — 97167 OT EVAL HIGH COMPLEX 60 MIN: CPT

## 2023-10-27 PROCEDURE — 85300 ANTITHROMBIN III ACTIVITY: CPT

## 2023-10-27 PROCEDURE — 70551 MRI BRAIN STEM W/O DYE: CPT

## 2023-10-27 PROCEDURE — 84100 ASSAY OF PHOSPHORUS: CPT

## 2023-10-27 PROCEDURE — 80053 COMPREHEN METABOLIC PANEL: CPT

## 2023-10-27 PROCEDURE — 85303 CLOT INHIBIT PROT C ACTIVITY: CPT

## 2023-10-27 PROCEDURE — C1764: CPT

## 2023-10-27 PROCEDURE — C1894: CPT

## 2023-10-27 PROCEDURE — 84439 ASSAY OF FREE THYROXINE: CPT

## 2023-10-27 PROCEDURE — 85307 ASSAY ACTIVATED PROTEIN C: CPT

## 2023-10-27 PROCEDURE — 85301 ANTITHROMBIN III ANTIGEN: CPT

## 2023-10-27 PROCEDURE — 87640 STAPH A DNA AMP PROBE: CPT

## 2023-10-27 PROCEDURE — 81240 F2 GENE: CPT

## 2023-10-27 PROCEDURE — 93970 EXTREMITY STUDY: CPT

## 2023-10-27 PROCEDURE — 99239 HOSP IP/OBS DSCHRG MGMT >30: CPT

## 2023-10-27 PROCEDURE — 85610 PROTHROMBIN TIME: CPT

## 2023-10-27 PROCEDURE — 87040 BLOOD CULTURE FOR BACTERIA: CPT

## 2023-10-27 PROCEDURE — 70498 CT ANGIOGRAPHY NECK: CPT

## 2023-10-27 PROCEDURE — 97116 GAIT TRAINING THERAPY: CPT

## 2023-10-27 PROCEDURE — 86147 CARDIOLIPIN ANTIBODY EA IG: CPT

## 2023-10-27 PROCEDURE — 87641 MR-STAPH DNA AMP PROBE: CPT

## 2023-10-27 PROCEDURE — 86146 BETA-2 GLYCOPROTEIN ANTIBODY: CPT

## 2023-10-27 PROCEDURE — 80061 LIPID PANEL: CPT

## 2023-10-27 PROCEDURE — 82962 GLUCOSE BLOOD TEST: CPT

## 2023-10-27 PROCEDURE — 36415 COLL VENOUS BLD VENIPUNCTURE: CPT

## 2023-10-27 PROCEDURE — 76380 CAT SCAN FOLLOW-UP STUDY: CPT

## 2023-10-27 PROCEDURE — 99223 1ST HOSP IP/OBS HIGH 75: CPT | Mod: GC

## 2023-10-27 PROCEDURE — 86803 HEPATITIS C AB TEST: CPT

## 2023-10-27 PROCEDURE — 84436 ASSAY OF TOTAL THYROXINE: CPT

## 2023-10-27 PROCEDURE — 70544 MR ANGIOGRAPHY HEAD W/O DYE: CPT

## 2023-10-27 PROCEDURE — 70450 CT HEAD/BRAIN W/O DYE: CPT

## 2023-10-27 PROCEDURE — 85730 THROMBOPLASTIN TIME PARTIAL: CPT

## 2023-10-27 PROCEDURE — 83090 ASSAY OF HOMOCYSTEINE: CPT

## 2023-10-27 RX ORDER — APIXABAN 2.5 MG/1
1 TABLET, FILM COATED ORAL
Qty: 0 | Refills: 0 | DISCHARGE
Start: 2023-10-27

## 2023-10-27 RX ORDER — DEXTROSE 50 % IN WATER 50 %
25 SYRINGE (ML) INTRAVENOUS ONCE
Refills: 0 | Status: DISCONTINUED | OUTPATIENT
Start: 2023-10-27 | End: 2023-10-27

## 2023-10-27 RX ORDER — CITALOPRAM 10 MG/1
1 TABLET, FILM COATED ORAL
Qty: 30 | Refills: 0
Start: 2023-10-27 | End: 2023-11-25

## 2023-10-27 RX ORDER — FAMOTIDINE 10 MG/ML
1 INJECTION INTRAVENOUS
Qty: 0 | Refills: 0 | DISCHARGE
Start: 2023-10-27

## 2023-10-27 RX ORDER — FOLIC ACID 0.8 MG
1 TABLET ORAL DAILY
Refills: 0 | Status: DISCONTINUED | OUTPATIENT
Start: 2023-10-27 | End: 2023-12-05

## 2023-10-27 RX ORDER — INSULIN LISPRO 100/ML
VIAL (ML) SUBCUTANEOUS
Refills: 0 | Status: DISCONTINUED | OUTPATIENT
Start: 2023-10-27 | End: 2023-11-06

## 2023-10-27 RX ORDER — FAMOTIDINE 10 MG/ML
20 INJECTION INTRAVENOUS DAILY
Refills: 0 | Status: DISCONTINUED | OUTPATIENT
Start: 2023-10-27 | End: 2023-12-05

## 2023-10-27 RX ORDER — DEXTROSE 50 % IN WATER 50 %
25 SYRINGE (ML) INTRAVENOUS ONCE
Refills: 0 | Status: DISCONTINUED | OUTPATIENT
Start: 2023-10-27 | End: 2023-12-05

## 2023-10-27 RX ORDER — INSULIN GLARGINE 100 [IU]/ML
6 INJECTION, SOLUTION SUBCUTANEOUS EVERY MORNING
Refills: 0 | Status: DISCONTINUED | OUTPATIENT
Start: 2023-10-28 | End: 2023-10-27

## 2023-10-27 RX ORDER — SODIUM CHLORIDE 9 MG/ML
1000 INJECTION, SOLUTION INTRAVENOUS
Refills: 0 | Status: DISCONTINUED | OUTPATIENT
Start: 2023-10-27 | End: 2023-10-27

## 2023-10-27 RX ORDER — POLYETHYLENE GLYCOL 3350 17 G/17G
17 POWDER, FOR SOLUTION ORAL DAILY
Refills: 0 | Status: DISCONTINUED | OUTPATIENT
Start: 2023-10-27 | End: 2023-11-28

## 2023-10-27 RX ORDER — FOLIC ACID 0.8 MG
1 TABLET ORAL
Qty: 0 | Refills: 0 | DISCHARGE
Start: 2023-10-27

## 2023-10-27 RX ORDER — ATORVASTATIN CALCIUM 80 MG/1
80 TABLET, FILM COATED ORAL AT BEDTIME
Refills: 0 | Status: DISCONTINUED | OUTPATIENT
Start: 2023-10-27 | End: 2023-12-05

## 2023-10-27 RX ORDER — DEXTROSE 50 % IN WATER 50 %
15 SYRINGE (ML) INTRAVENOUS ONCE
Refills: 0 | Status: DISCONTINUED | OUTPATIENT
Start: 2023-10-27 | End: 2023-10-27

## 2023-10-27 RX ORDER — INSULIN LISPRO 100/ML
0 VIAL (ML) SUBCUTANEOUS
Qty: 0 | Refills: 0 | DISCHARGE
Start: 2023-10-27

## 2023-10-27 RX ORDER — GLUCAGON INJECTION, SOLUTION 0.5 MG/.1ML
1 INJECTION, SOLUTION SUBCUTANEOUS ONCE
Refills: 0 | Status: DISCONTINUED | OUTPATIENT
Start: 2023-10-27 | End: 2023-10-27

## 2023-10-27 RX ORDER — THIAMINE MONONITRATE (VIT B1) 100 MG
100 TABLET ORAL DAILY
Refills: 0 | Status: DISCONTINUED | OUTPATIENT
Start: 2023-10-27 | End: 2023-12-05

## 2023-10-27 RX ORDER — CITALOPRAM 10 MG/1
20 TABLET, FILM COATED ORAL DAILY
Refills: 0 | Status: DISCONTINUED | OUTPATIENT
Start: 2023-10-27 | End: 2023-12-05

## 2023-10-27 RX ORDER — DEXTROSE 50 % IN WATER 50 %
12.5 SYRINGE (ML) INTRAVENOUS ONCE
Refills: 0 | Status: DISCONTINUED | OUTPATIENT
Start: 2023-10-27 | End: 2023-10-27

## 2023-10-27 RX ORDER — SENNA PLUS 8.6 MG/1
2 TABLET ORAL AT BEDTIME
Refills: 0 | Status: DISCONTINUED | OUTPATIENT
Start: 2023-10-27 | End: 2023-11-28

## 2023-10-27 RX ORDER — THIAMINE MONONITRATE (VIT B1) 100 MG
1 TABLET ORAL
Qty: 0 | Refills: 0 | DISCHARGE
Start: 2023-10-27

## 2023-10-27 RX ORDER — INSULIN GLARGINE 100 [IU]/ML
6 INJECTION, SOLUTION SUBCUTANEOUS
Qty: 0 | Refills: 0 | DISCHARGE
Start: 2023-10-27

## 2023-10-27 RX ORDER — ATORVASTATIN CALCIUM 80 MG/1
1 TABLET, FILM COATED ORAL
Qty: 0 | Refills: 0 | DISCHARGE
Start: 2023-10-27

## 2023-10-27 RX ORDER — APIXABAN 2.5 MG/1
5 TABLET, FILM COATED ORAL
Refills: 0 | Status: DISCONTINUED | OUTPATIENT
Start: 2023-10-27 | End: 2023-12-05

## 2023-10-27 RX ADMIN — Medication 1 TABLET(S): at 16:50

## 2023-10-27 RX ADMIN — POLYETHYLENE GLYCOL 3350 17 GRAM(S): 17 POWDER, FOR SOLUTION ORAL at 06:51

## 2023-10-27 RX ADMIN — FAMOTIDINE 20 MILLIGRAM(S): 10 INJECTION INTRAVENOUS at 16:50

## 2023-10-27 RX ADMIN — Medication 2: at 08:56

## 2023-10-27 RX ADMIN — POLYETHYLENE GLYCOL 3350 17 GRAM(S): 17 POWDER, FOR SOLUTION ORAL at 16:51

## 2023-10-27 RX ADMIN — APIXABAN 5 MILLIGRAM(S): 2.5 TABLET, FILM COATED ORAL at 06:50

## 2023-10-27 RX ADMIN — ATORVASTATIN CALCIUM 80 MILLIGRAM(S): 80 TABLET, FILM COATED ORAL at 22:13

## 2023-10-27 RX ADMIN — APIXABAN 5 MILLIGRAM(S): 2.5 TABLET, FILM COATED ORAL at 16:50

## 2023-10-27 RX ADMIN — Medication 1 MILLIGRAM(S): at 16:50

## 2023-10-27 RX ADMIN — Medication 100 MILLIGRAM(S): at 16:50

## 2023-10-27 RX ADMIN — SENNA PLUS 2 TABLET(S): 8.6 TABLET ORAL at 16:49

## 2023-10-27 RX ADMIN — CHLORHEXIDINE GLUCONATE 1 APPLICATION(S): 213 SOLUTION TOPICAL at 06:51

## 2023-10-27 RX ADMIN — SENNA PLUS 2 TABLET(S): 8.6 TABLET ORAL at 06:50

## 2023-10-27 RX ADMIN — Medication 6: at 12:27

## 2023-10-27 NOTE — CHART NOTE - NSCHARTNOTESELECT_GEN_ALL_CORE
EP PA/Event Note
Electrophysiology/Event Note
Event Note
Follow up
Nutrition Services
Nutrition Services
EP PA/Event Note
Event Note
Neuro IR/Event Note
Neuro IR/Event Note
Transfer Note
downgrade/Transfer Note

## 2023-10-27 NOTE — CONSULT NOTE ADULT - SUBJECTIVE AND OBJECTIVE BOX
HPI: 59 y/o M with a Hx of stroke one year ago (at the time patient received TNK and had full  resolution of symptoms), HTN, DM, non-compliant with medications, who presented following an episode of LOC, as per family at around 6:15 pm patient had a sudden fall at work and lost consciousness. It is unclear if patient had any symptoms before he fell. NIHSS 10 on admission, admitted under NeuroICU on 10/12: Thrombectomy via L radial artery for TICI 3 revascularization of L P1-P2 occlusion. Noted LMCA chronic occlusion., on : Neurologic status improving. Got MR. Started on ASA and SQL, on 10/14: quadrantanopia now on L superior (originally R); Repeat stroke imaging completed and initial concern for CTP showing poss new perfusion deficit however on further review noted to be present on admission. Started on heparin drip per neurology recommendations, on 10/15: Therapeutic on heparin drip, stability HCT obtained. Cardiology consulted for further stroke work-up and downgraded under medicine.    Imaging Reviewed Today:    10/12 HEAD CT: No evidence of an acute intracranial hemorhage, midline shift or hydrocephalus. Mild bifrontal periventricular white matter ischemic changes. Bilateral maxillary sinusitis  10/12 NECK CTA: No hemodynamic significant narowing involving the carotid bifurcations. Hypoplastic right vertebral artery. Dominant left vertebral artery..  10/12 BRAIN CTA: Cutoff of the M1 portion of the right middle cerebral artery which may be chronic as there appears to be lenticular striate collaterals and reconstitution of more distal MCA segments. Cutoff of the right posterior cerebral artery at the P1-2 junction with reconstitution of more distal segments. Cut off of the left posterior cerebral artery at the P1-2 junction with mild distal reconstitution. Hypoplastic right vertebral artery with portions of the V4 segment not visualized  10/12 CT PERFUSION: Regions of ischemia within the posterior circulation with a volume of 121 mL without evidence of core infarction  10/13 MRI BRAIN: Acute small to moderate medial left temporal occipital lobe infarctions. Acute punctate left occipital lobe infarction.  10/13 MRA BRAIN: Occluded right MCA. Occluded right PCA. Occluded right vertebral artery proximal V4 segment.  10/13 MRI BRAIN NOVA: Values above.    10/14 IMPRESSION:      CT BRAIN WITHOUT CONTRAST:  1. Decreased attenuation involving the medial aspect of the left temporal lobe, not present on prior noncontrast CT study. However, restricted diffusion was appreciated in the same location on the prior MRI of 10/13/2023.  2. Nonacute left ventral pontine ischemic event, present on prior CT and MRI.      Findings were communicated by Dr. Behr-Ventura to Dr. Luis Carbone, covering attending at approximately 3:08 PM on 10/14/2023..        CT PERFUSION:    Technical limitations: Significantly limited by patient motion during image acquisition and suboptimal arterial waveform.     Core infartction: 0 ml; [place]/tissue at risk for active ischemia: 75 mL involving right temporal brain parenchyma.  If symptoms persist consider follow up head CT or MRI, MRA if no contraindication.    CTA HEAD:  1. Poor visualization of the proximal M1 segment of the right middle cerebral artery, as on prior.  2. Irregular pattern of flow within the P2 segment of the left posterior cerebral artery, as on prior.  4. Cut off of the left posterior cerebral artery at the P1/P2 junction, as on prior, with suggestion of distal reconstitution..  5. Hypoplastic poorly visualized intracranial right vertebral artery.  6. No evidence of aneurysm formation at the level of the [place]. Tiny aneurysms can be beyond the resolution of CTA technique.    CTA NECK:  1. No evidence of significant stenosis or occlusion in association with the bilateral common carotid arteries or bilateral internal carotid arteries.  2. Poorly visualized proximal left vertebral artery with suggestion of distal reconstitution at the level of C2.    Consider follow-up MRI of the brain, if clinically warranted and if there are no MRI contraindications.    Remainder of the findings were communicated by Dr. Behr-Ventura to Dr. Luis Carbone, covering attending at approximately 4:18 PM on 10/14/2023.      10/17 IMPRESSION:    Multiple foci of low density right mesial temporal lobe and right thalamus are more prominent on the current examination and likely represent the presence of evolving cytotoxic edema.    Similar-appearing low density involving the left mesial temporal lobe likely representing cytotoxic edema.    Redemonstration of chronic left of midline basis pontis infarct.    No acute intracranial hemorrhage.    ----------------------------        VITALS  T(C): 36.8 (10-27-23 @ 07:42), Max: 36.8 (10-27-23 @ 07:42)  HR: 62 (10-27-23 @ 07:42) (54 - 68)  BP: 155/97 (10-27-23 @ 07:42) (130/89 - 155/97)  RR: 18 (10-27-23 @ 07:42) (18 - 18)  SpO2: 95% (10-27-23 @ 07:42) (94% - 95%)  Wt(kg): --    PAST MEDICAL & SURGICAL HISTORY  HTN (hypertension)    CVA (cerebrovascular accident)    No significant past surgical history    History of hip surgery        SOCIAL HISTORY - as per documentation/history  Smoking - None  EtOH - None  Drugs - None    FUNCTIONAL HISTORY  Lives   Independent    CURRENT FUNCTIONAL STATUS  10/26 PT  Therapeutic Interventions    Bed Mobility  Bed Mobility Training Sit-to-Supine: minimum assist (75% patient effort)  Bed Mobility Training Supine-to-Sit: minimum assist (75% patient effort)    Sit-Stand Transfer Training  Transfer Training Sit-to-Stand Transfer: minimum assist (75% patient effort)  Transfer Training Stand-to-Sit Transfer: minimum assist (75% patient effort)    Gait Training  Gait Trainin feet RW Sendy   Gait Analysis: assist for safety, decreased gait velocity and activity tolerance    Therapeutic Exercise  Therapeutic Exercise Detail: LAQ and ankle pumps     10/23 OT  Lower Body Dressing Training  Lower Body Dressing Training Assistance: pt reporting "I can't" requiring max assist but minimal effort;  decreased strength;  impaired balance;  decreased ROM    Upper Body Dressing Training  Upper Body Dressing Training Assistance: supervsion      RECENT LABS/IMAGING  REVIEWED    CBC Full  -  ( 27 Oct 2023 06:17 )  WBC Count : 5.43 K/uL  RBC Count : 4.19 M/uL  Hemoglobin : 13.3 g/dL  Hematocrit : 39.1 %  Platelet Count - Automated : 129 K/uL  Mean Cell Volume : 93.3 fl  Mean Cell Hemoglobin : 31.7 pg  Mean Cell Hemoglobin Concentration : 34.0 gm/dL  Auto Neutrophil # : x  Auto Lymphocyte # : x  Auto Monocyte # : x  Auto Eosinophil # : x  Auto Basophil # : x  Auto Neutrophil % : x  Auto Lymphocyte % : x  Auto Monocyte % : x  Auto Eosinophil % : x  Auto Basophil % : x    10-27    141  |  103  |  16.4  ----------------------------<  150<H>  4.3   |  24.0  |  0.89    Ca    9.1      27 Oct 2023 06:17      Urinalysis Basic - ( 27 Oct 2023 06:17 )    Color: x / Appearance: x / SG: x / pH: x  Gluc: 150 mg/dL / Ketone: x  / Bili: x / Urobili: x   Blood: x / Protein: x / Nitrite: x   Leuk Esterase: x / RBC: x / WBC x   Sq Epi: x / Non Sq Epi: x / Bacteria: x        ALLERGIES  No Known Allergies      MEDICATIONS   acetaminophen     Tablet .. 650 milliGRAM(s) Oral every 6 hours PRN  apixaban 5 milliGRAM(s) Oral two times a day  atorvastatin 80 milliGRAM(s) Oral at bedtime  bisacodyl Suppository 10 milliGRAM(s) Rectal daily PRN  chlorhexidine 2% Cloths 1 Application(s) Topical <User Schedule>  dextrose 50% Injectable 12.5 Gram(s) IV Push once  dextrose 50% Injectable 25 Gram(s) IV Push once  dextrose 50% Injectable 25 Gram(s) IV Push once  famotidine    Tablet 20 milliGRAM(s) Oral daily  folic acid 1 milliGRAM(s) Oral daily  hydrALAZINE 25 milliGRAM(s) Oral every 12 hours PRN  influenza   Vaccine 0.5 milliLiter(s) IntraMuscular once  insulin lispro (ADMELOG) corrective regimen sliding scale   SubCutaneous Before meals and at bedtime  magnesium citrate Oral Solution 296 milliLiter(s) Oral once PRN  magnesium hydroxide Suspension 30 milliLiter(s) Oral daily PRN  multivitamin 1 Tablet(s) Oral daily  polyethylene glycol 3350 17 Gram(s) Oral every 12 hours  senna 2 Tablet(s) Oral every 12 hours  thiamine 100 milliGRAM(s) Oral daily          ----------------------------------------------------------------------------------------  PHYSICAL EXAM  Constitutional - NAD, Comfortable  HEENT - NCAT, EOMI  Neck - Supple, No limited ROM  Chest - Breathing comfortably, No wheezing  Cardiovascular - S1S2   Abdomen - Soft   Extremities - No C/C/E, No calf tenderness   Neurologic Exam -                    Cognitive - AAO to self, place, date, year, situation     Communication - Fluent, No dysarthria     Cranial Nerves - CN 2-12 intact     FUNCTIONAL MOTOR EXAM - No focal deficits                    LEFT    UE - ShAB 5/5, EF 5/5, EE 5/5, WE 5/5,  5/5                    RIGHT UE - ShAB 5/5, EF 5/5, EE 5/5, WE 5/5,  5/5                    LEFT    LE - HF 5/5, KE 5/5, DF 5/5, PF 5/5                    RIGHT LE - HF 5/5, KE 5/5, DF 5/5, PF 5/5        Sensory - Intact to LT     Reflexes - DTR Intact, No primitive reflexive     Coordination - FTN intact     OculoVestibular - No saccades, No nystagmus, VOR         Balance - WNL Static  Psychiatric - Mood stable, Affect WNL  ----------------------------------------------------------------------------------------  ASSESSMENT/PLAN  58yMale with functional deficits after  Pain - Tylenol  DVT PPX - SCDs  Impaired Mobility/Function/Rehab Recommendations -                HPI: 59 y/o M with a Hx of stroke  reported at [facility], derrell dove (per family), HTN, DM, HLD (noncompliant with medications) who presented following an episode of LOC. As per family at around 6:15 pm patient had a sudden fall at work and lost consciousness. It is unclear if patient had any symptoms before he fell. Admitted under NeuroICU on 10/12 .Imaging revealed ischemia in the posterior circulation, CT angiogram head/neck showed right PCA P1-2 cutoff, possibly chronic right MCA M1 cutoff which had distal reconstitution and established collaterals, and left PCA occlusion which patient was subsequently transferred for mechanical thrombectomy. Cerebral angiogram and mechanical thrombectomy performed using aspiration with TICI 3 recanalization of left PCA.  The right occipital lobe filled from right SHANI collaterals, and the right MCA as well was thought to be chronic given noted collateralization. MR with small to moderate left temporal occipital lobe infarctions. Occluded right MCA, occluded right PCA and right vertebral artery proximal segment. The patient was started on a hep gtt given extensive atherosclerotic disease which was switched to apixaban 5mg BID with recommendations for a three month course. His hospital course has been complicated by memory difficulties, bradycardia (c/f 2:1 block) EP consulted ILR placed, anemia and decreased platelets. PM&R was consulted for acute CVA, acute rehab recommendations.     Today, the patient is doing well.  He admits to memory difficulties and numbness of the left face/arm/leg. He states he also has slight weakness of the left arm/leg. He denies any HA/CP/SOB. He walked with Physical Therapy using a rolling walker (50ft). He does not remember walking with the therapists. He admits to continued problems with short term memory. When asked where he lives, he cannot recall, but when asked if he lives in "[place]" he said "yes". He lives with his daughter who is 18 yo and currently goes to school. Independent prior to admission. VSS, afebrile.     Imaging Reviewed Today:    CT Head No Cont (10.17.23 @ 12:19)   Multiple foci of low density right mesial temporal lobe and right   thalamus are more prominent on the current examination and likely   represent the presence of evolving cytotoxic edema.  Similar-appearing low density involving the left mesial temporal lobe   likely representing cytotoxic edema.  Redemonstration of chronic left of midline basis pontis infarct.  No acute intracranial hemorrhage.    CT Head No Cont (10.15.23 @ 13:13)   IMPRESSION: Stable medial left temporal occipital lobe infarction.    (10.14.23 @ 15:25)   CT BRAIN WITHOUT CONTRAST:  1. Decreased attenuation involving the medial aspect of the left temporal   lobe, not present on prior noncontrast CT study. However, restricted   diffusion was appreciated in the same location on the prior MRI of   10/13/2023.  2. Nonacute left ventral pontine ischemic event, present on prior CT and   MRI.    10.14.23 @ 14:58)   CT BRAIN WITHOUT CONTRAST:  1. Decreased attenuation involving the medial aspect of the left temporal   lobe, not present on prior noncontrast CT study. However, restricted   diffusion was appreciated in the same location on the prior MRI of   10/13/2023.  2. Nonacute left ventral pontine ischemic event, present on prior CT and   MRI.    CT PERFUSION:  Technical limitations: Significantly limited by patient motion during   image acquisition and suboptimal arterial waveform.   Core infartction: 0 ml; Penubra/tissue at risk for active ischemia: 75   mL involving right temporal brain parenchyma.  If symptoms persist consider follow up head CT or MRI, MRA  if no   contraindication.    CTA HEAD:  1. Poor visualization of the proximal M1 segment of the right middle   cerebral artery, as on prior.  2. Irregular pattern of flow within the P2 segment of the left posterior   cerebral artery, as on prior.  4. Cut off of the left posterior cerebral artery at the P1/P2 junction,   as on prior, with suggestion of distal reconstitution..  5. Hypoplastic poorly visualized intracranial right vertebral artery.  6. No evidence of aneurysm formation at the level of the [place] of   [place]. Tiny aneurysms can be beyond the resolution of CTA technique.    CTA NECK:  1. No evidence of significant stenosis or occlusion in association with   the bilateral common carotid arteriesor bilateral internal carotid   arteries.  2. Poorly visualized proximal left vertebral artery with suggestion of   distal reconstitution at the level of C2.       10/12/2023 19:13  IMPRESSION:  HEAD CT: No evidence of an acute intracranial hemorhage, midline shift or hydrocephalus. Mild bifrontal periventricular white matter ischemic changes. Bilateral maxillary sinusitis  NECK CTA: No hemodynamic significant narowing involving the carotid bifurcations. Hypoplastic right vertebral artery. Dominant left vertebral artery..  BRAIN CTA: Cutoff of the M1 portion of the right middle cerebral artery which may be chronic as there appears to be lenticular striate collaterals and reconstitution of more distal MCA segments. Cutoff of the right posterior cerebral artery at the P1-2 junction with reconstitution of more distal segments. Cut off of the left posterior cerebral artery at the P1-2 junction with mild distal reconstitution. Hypoplastic right vertebral artery with portions of the V4 segment not visualized  CT PERFUSION: Regions of ischemia within the posterior circulation with a volume of 121 mL without evidence of core infarction.    (10.13.23 @ 12:31)   MRI BRAIN: Acute small to moderate medial left temporal occipital lobe   infarctions. Acute punctate left occipital lobe infarction.  MRA BRAIN: Occluded right MCA. Occluded right PCA. Occluded right   vertebral artery proximal V4 segment.  MRI BRAIN NOVA: Values above.    TTE Echo Complete w/ Contrast w/ Doppler (10.13.23 @ 19:44)   Summary:   1. Technically difficult study.   2. Normal left ventricular internal cavity size.   3. Left ventricular ejection fraction, by visual estimation, is 50 to   55%.   4. Normal left atrial size.   5. Normal right atrial size.   6. Mildly enlarged right ventricle.   7. Dilatation of the ascending aorta.   8. Sclerotic aortic valve with normal opening.   9. Mild mitral annular calcification.  10. Mild thickening of the anterior mitral valve leaflet.  11. Trace mitral valve regurgitation.  12. Mild tricuspid regurgitation.  13. Trivial pericardial effusion.  14. Recommend transesophageal echocardiogram.    ----------------------------  VITALS  T(C): 36.8 (10-27-23 @ 07:42), Max: 36.8 (10-27-23 @ 07:42)  HR: 62 (10-27-23 @ 07:42) (54 - 68)  BP: 155/97 (10-27-23 @ 07:42) (130/89 - 155/97)  RR: 18 (10-27-23 @ 07:42) (18 - 18)  SpO2: 95% (10-27-23 @ 07:42) (94% - 95%)  Wt(kg): --    PAST MEDICAL & SURGICAL HISTORY  HTN (hypertension)  CVA (cerebrovascular accident)  No significant past surgical history  History of hip surgery    FUNCTIONAL/SOCIAL HISTORY - as per documentation/history  Independent prior to admission   Lives with daughter (18 yo, goes to school)  Lives in an apartment (steps to enter, but does not know how many)  No steps within the apartment   Employed prior to presentation     CURRENT FUNCTIONAL STATUS  10/26 PT  Therapeutic Interventions    Bed Mobility  Bed Mobility Training Sit-to-Supine: minimum assist (75% patient effort)  Bed Mobility Training Supine-to-Sit: minimum assist (75% patient effort)    Sit-Stand Transfer Training  Transfer Training Sit-to-Stand Transfer: minimum assist (75% patient effort)  Transfer Training Stand-to-Sit Transfer: minimum assist (75% patient effort)    Gait Training  Gait Trainin feet RW Sendy   Gait Analysis: assist for safety, decreased gait velocity and activity tolerance    Therapeutic Exercise  Therapeutic Exercise Detail: LAQ and ankle pumps     10/23 OT  Lower Body Dressing Training  Lower Body Dressing Training Assistance: pt reporting "I can't" requiring max assist but minimal effort;  decreased strength;  impaired balance;  decreased ROM    Upper Body Dressing Training  Upper Body Dressing Training Assistance: supervsion      RECENT LABS/IMAGING  REVIEWED    CBC Full  -  ( 27 Oct 2023 06:17 )  WBC Count : 5.43 K/uL  RBC Count : 4.19 M/uL  Hemoglobin : 13.3 g/dL  Hematocrit : 39.1 %  Platelet Count - Automated : 129 K/uL  Mean Cell Volume : 93.3 fl  Mean Cell Hemoglobin : 31.7 pg  Mean Cell Hemoglobin Concentration : 34.0 gm/dL  Auto Neutrophil # : x  Auto Lymphocyte # : x  Auto Monocyte # : x  Auto Eosinophil # : x  Auto Basophil # : x  Auto Neutrophil % : x  Auto Lymphocyte % : x  Auto Monocyte % : x  Auto Eosinophil % : x  Auto Basophil % : x    10-27    141  |  103  |  16.4  ----------------------------<  150<H>  4.3   |  24.0  |  0.89    Ca    9.1      27 Oct 2023 06:17      Urinalysis Basic - ( 27 Oct 2023 06:17 )    Color: x / Appearance: x / SG: x / pH: x  Gluc: 150 mg/dL / Ketone: x  / Bili: x / Urobili: x   Blood: x / Protein: x / Nitrite: x   Leuk Esterase: x / RBC: x / WBC x   Sq Epi: x / Non Sq Epi: x / Bacteria: x    ALLERGIES  No Known Allergies    MEDICATIONS   acetaminophen     Tablet .. 650 milliGRAM(s) Oral every 6 hours PRN  apixaban 5 milliGRAM(s) Oral two times a day  atorvastatin 80 milliGRAM(s) Oral at bedtime  bisacodyl Suppository 10 milliGRAM(s) Rectal daily PRN  chlorhexidine 2% Cloths 1 Application(s) Topical <User Schedule>  dextrose 50% Injectable 12.5 Gram(s) IV Push once  dextrose 50% Injectable 25 Gram(s) IV Push once  dextrose 50% Injectable 25 Gram(s) IV Push once  famotidine    Tablet 20 milliGRAM(s) Oral daily  folic acid 1 milliGRAM(s) Oral daily  hydrALAZINE 25 milliGRAM(s) Oral every 12 hours PRN  influenza   Vaccine 0.5 milliLiter(s) IntraMuscular once  insulin lispro (ADMELOG) corrective regimen sliding scale   SubCutaneous Before meals and at bedtime  magnesium citrate Oral Solution 296 milliLiter(s) Oral once PRN  magnesium hydroxide Suspension 30 milliLiter(s) Oral daily PRN  multivitamin 1 Tablet(s) Oral daily  polyethylene glycol 3350 17 Gram(s) Oral every 12 hours  senna 2 Tablet(s) Oral every 12 hours  thiamine 100 milliGRAM(s) Oral daily    ----------------------------------------------------------------------------------------  PHYSICAL EXAM  Constitutional - NAD, Comfortable  HEENT - NCAT, EOMI  Neck - Supple, No limited ROM  Chest - Breathing comfortably, No wheezing  Cardiovascular - S1S2   Abdomen - Soft, distended   Extremities - No peripheral edema   Neurologic Exam -                    Cognitive - AAO to self, place (with prompting, situation      Communication - Fluent, No dysarthria     Cranial Nerves -No facial asymmetry, PERRL, EOMI, Left visual field deficit, Left facial numbness, No uvula deviation, No tongue deviation, 5/5 shoulder shrug bilaterally      FUNCTIONAL MOTOR EXAM -                     LEFT    UE - ShAB 4/5, EF 4/5, EE 4/5, WE 4/5,  4/5                    RIGHT UE - ShAB 5/5, EF 5/5, EE 5/5, WE 5/5,  5/5                    LEFT    LE - HF 4/5, KE 4/5, DF 5/5, PF 5/5                    RIGHT LE - HF 5/5, KE 5/5, DF 5/5, PF 5/5        Sensory - Decreased sensation Left Upper/Lower Extremities, NML sensation Right Upper/Lower Extremities      Coordination - FTN intact  Psychiatric - Depressed   ----------------------------------------------------------------------------------------  ASSESSMENT/PLAN  58yMale with functional deficits after acute CVA in multiple vascular territories.   CVA s/p thrombectomy - Eliquis   Bradycardia - Metoprolol, ILR placed (10/20)   Depression - Recommend starting Celexa 20mg daily   HTN - Lisinopril, Hydralazine   HLD - Statin   DM2 - ISS   Pain - Tylenol  Diet - Consistent carb   DVT PPX - SCDs, Eliquis   Rehab/Impaired mobility and function - Continuous hospitalization is crucial for managing the patient's acute medical issues (CVA, HTN), which have significantly impacted their mobility, quality of life, and function. Rehabilitation recommendations will be based on the patient's functional progress and their ability to participate in and tolerate therapeutic interventions, which may change over time. Maintaining ongoing mobilization by the staff is imperative to prevent secondary medical complications and associated health issues related to debility.    Patient with memory difficulties and signs of depression, would recommend starting Celexa 20mg daily. Given current functional status, would also recommend ACUTE inpatient rehabilitation where they will receive 3H/daily of PT/OT/Speech to maximize their functional outcomes. They will also be followed by a Rehabilitation specialist for medical management of ongoing comorbidities and safe discharge.     The patient will continue to actively engage in Physical Therapy (PT), Occupational Therapy (OT), and Speech Therapy (SLP) to optimize functional outcomes. The Physical Medicine and Rehabilitation (PM&R) team will closely monitor their progress, and discharge recommendations will depend on their functional improvement and overall medical stability.

## 2023-10-27 NOTE — CONSULT NOTE ADULT - CONSULT REQUESTED DATE/TIME
14-Oct-2023 13:30
27-Oct-2023 08:37
13-Oct-2023 07:57
14-Oct-2023 08:42
27-Oct-2023 11:47
16-Oct-2023 08:01

## 2023-10-27 NOTE — H&P ADULT - NSHPPHYSICALEXAM_GEN_ALL_CORE
Constitutional - NAD, Comfortable  HEENT - NCAT, EOMI  Neck - Supple, No limited ROM  Chest - good chest expansion, good respiratory effort, CTAB   Cardio - warm and well perfused, + loop recorder.   Abdomen - Obese, Mild tenderness to palpitation in all 4 quadrants. ND  Extremities - No peripheral edema, No calf tenderness. Long curling toenails.    Neurologic Exam:                    Cognitive -             Orientation: AA&Ox2 to self, year. Not to Month, day, location, Says he is in [place].             Attention:  Days of week, recall 1/3 objects without cuing. 2/3 with cuing.             Memory: Recent memory not intact. President Wander.             Thought: process, content appropriate     Speech - Fluent, Comprehensible, No dysarthria, No aphasia      Cranial Nerves - No facial asymmetry, Tongue midline, EOMI, Shoulder shrug intact. Vision Impaired on the Left upper visual field in L > R eye.      Motor -                      LEFT    UE - ShAB 4/5, EF 4-5/5, EE 4/5, WE 5/5,  WNL                     RIGHT UE - ShAB 5/5, EF 5/5, EE 5/5, WE 5/5,  WNL                    LEFT    LE - HF 5/5, KE 5/5, DF 5/5, PF 5/5                    RIGHT LE - HF 5/5, KE 5/5, DF 5/5, PF 5/5        Sensory - Impaired to LT on the left face (V2/3), arm and leg.      Reflexes - symmetric DTR +1 in LE and UE. Neg Babinski's b/l     Coordination - FTN / HTS intact b/l     OculoVestibular -  No nystagmus  Psychiatric - Mood stable, Affect WNL  Skin on admission: Callus on left knee. Constitutional - NAD, Comfortable  HEENT - NCAT, EOMI  Neck - Supple, No limited ROM  Chest - good chest expansion, good respiratory effort, CTAB   Cardio - warm and well perfused, + loop recorder.   Abdomen - Obese, Mild tenderness to palpitation in all 4 quadrants. ND  Extremities - No peripheral edema, No calf tenderness. Long curling toenails.    Neurologic Exam:                    Cognitive -             Orientation: AA&Ox2 to self, year. Not to Month, day, location, Says he is in [place].             Attention:  Days of week, recall 1/3 objects without cuing. 2/3 with cuing.             Memory: Recent memory not intact. President Wander.             Thought: process, content appropriate     Speech - Fluent, Comprehensible, No dysarthria, No aphasia      Cranial Nerves - No facial asymmetry, Tongue midline, EOMI, Shoulder shrug intact. Vision Impaired on the Left upper visual field in L > R eye.      Motor -                      LEFT    UE - ShAB 4/5, EF 4-5/5, EE 4/5, WE 5/5,  WNL                     RIGHT UE - ShAB 5/5, EF 5/5, EE 5/5, WE 5/5,  WNL                    LEFT    LE - HF 5/5, KE 5/5, DF 5/5, PF 5/5                    RIGHT LE - HF 5/5, KE 5/5, DF 5/5, PF 5/5        Sensory - Impaired to LT on the left face (V2/3), arm and leg.      Reflexes - symmetric DTR +1 in LE and UE. Neg Babinski's b/l     Coordination - FTN / HTS intact b/l     OculoVestibular -  No nystagmus  Psychiatric - Mood stable, Affect WNL  Skin on admission: Callus on left knee. Constitutional - NAD, Comfortable  HEENT - NCAT, EOMI  Neck - Supple, No limited ROM  Chest - good chest expansion, good respiratory effort, CTAB   Cardio - warm and well perfused, + loop recorder.   Abdomen - Obese, Mild tenderness to palpitation in all 4 quadrants. ND  Extremities - No peripheral edema, No calf tenderness. Long curling toenails.    Neurologic Exam:                    Cognitive -             Orientation: AA&Ox2 to self, year. Not to Month, day, location, Says he is in [place].             Attention:  Days of week, recall 1/3 objects without cuing. 2/3 with cuing.             Memory: Recent memory not intact. President Wander.             Thought: process, content appropriate     Speech - Fluent, Comprehensible, No dysarthria, No aphasia      Cranial Nerves - No facial asymmetry, Tongue midline, EOMI, Shoulder shrug intact. Vision Impaired on the Left upper visual field in L > R eye.      Motor -                      LEFT    UE - ShAB 4/5, EF 4-5/5, EE 4/5, WE 5/5,  WNL                     RIGHT UE - ShAB 5/5, EF 5/5, EE 5/5, WE 5/5,  WNL                    LEFT    LE - HF 5/5, KE 5/5, DF 5/5, PF 5/5                    RIGHT LE - HF 5/5, KE 5/5, DF 5/5, PF 5/5        Sensory - Impaired to LT on the left face (V2/3), arm and leg.      Reflexes - symmetric DTR +1 in LE and UE. Neg Babinski's b/l     Coordination - FTN / HTS intact b/l     OculoVestibular -  No nystagmus  Psychiatric - Mood stable, Affect WNL  Skin on admission: Callus on left knee.

## 2023-10-27 NOTE — H&P ADULT - NSHPSOCIALHISTORY_GEN_ALL_CORE
Smoking - Denies   ETOH - Denies   Illicit drugs - Denies     Social -   Independent prior to admission   Lives with daughter (18 yo, goes to school)  Lives in an apartment (steps to enter, but does not know how many)  No steps within the apartment   Employed prior to presentation     CURRENT FUNCTIONAL STATUS  10/26 PT  Therapeutic Interventions    Bed Mobility  Bed Mobility Training Sit-to-Supine: minimum assist (75% patient effort)  Bed Mobility Training Supine-to-Sit: minimum assist (75% patient effort)    Sit-Stand Transfer Training  Transfer Training Sit-to-Stand Transfer: minimum assist (75% patient effort)  Transfer Training Stand-to-Sit Transfer: minimum assist (75% patient effort)    Gait Training  Gait Trainin feet RW Sendy   Gait Analysis: assist for safety, decreased gait velocity and activity tolerance    Therapeutic Exercise  Therapeutic Exercise Detail: LAQ and ankle pumps     10/23 OT  Lower Body Dressing Training  Lower Body Dressing Training Assistance: pt reporting "I can't" requiring max assist but minimal effort;  decreased strength;  impaired balance;  decreased ROM    Upper Body Dressing Training  Upper Body Dressing Training Assistance: supervsion Smoking - Denies   ETOH - Vodka shots daily. No hx of ETOH withdrawal.   Illicit drugs - Denies     Social -   Independent prior to admission   Lives with daughter (16 yo, goes to school)  Lives in an apartment (steps to enter, but does not know how many)  No steps within the apartment   Employed prior to presentation     CURRENT FUNCTIONAL STATUS  10/26 PT  Therapeutic Interventions    Bed Mobility  Bed Mobility Training Sit-to-Supine: minimum assist (75% patient effort)  Bed Mobility Training Supine-to-Sit: minimum assist (75% patient effort)    Sit-Stand Transfer Training  Transfer Training Sit-to-Stand Transfer: minimum assist (75% patient effort)  Transfer Training Stand-to-Sit Transfer: minimum assist (75% patient effort)    Gait Training  Gait Trainin feet RW Sendy   Gait Analysis: assist for safety, decreased gait velocity and activity tolerance    Therapeutic Exercise  Therapeutic Exercise Detail: LAQ and ankle pumps     10/23 OT  Lower Body Dressing Training  Lower Body Dressing Training Assistance: pt reporting "I can't" requiring max assist but minimal effort;  decreased strength;  impaired balance;  decreased ROM    Upper Body Dressing Training  Upper Body Dressing Training Assistance: supervsion

## 2023-10-27 NOTE — H&P ADULT - HISTORY OF PRESENT ILLNESS
Patient is a 57 y/o M with a PHx of stroke one year ago (at the time patient received Tenecteplase and had full resolution of symptoms), HTN, DM, who presented to [facility] on 10/12 due to episode of LOC, as per family patient had a sudden fall at work and lost consciousness. NIHSS 10 on admission. Admitted under NeuroICU on 10/12. Imaging revealed ischemia in the posterior circulation, CT angiogram head/neck showed right PCA P1-2 cutoff, possibly chronic right MCA M1 cutoff which had distal reconstitution and established collaterals, and left PCA occlusion which patient was subsequently transferred for mechanical thrombectomy. Cerebral angiogram and mechanical thrombectomy performed using aspiration with TICI 3 recanalization of left PCA. The right occipital lobe filled from right SHANI collaterals, and the right MCA as well was thought to be chronic given noted collateralization. MR with small to moderate left temporal occipital lobe infarctions. Occluded right MCA, occluded right PCA and right vertebral artery proximal segment. The patient was started on a hep gtt given extensive atherosclerotic disease which was switched to apixaban 5mg BID with recommendations for a three month course. H&H has remained stable. TTE with normal left ventricular internal cavity size. Left ventricular ejection fraction, by visual estimation, is 50 to 55%. Per Neuro, no need for ROCIO at this time. Pt had an episode of bradycardia during his stay and there were concern for 2:1 block. ILR placed 10/20 and no indication for PPM at this time.     Patient was evaluated by PM&R and therapy for functional deficits, gait/ADL impairments and acute rehabilitation was recommended. Patient was medically optimized for discharge to [facility] IRU on 10/27/23.  Patient is a 57 y/o M with a PHx of stroke one year ago (at the time patient received Tenecteplase and had full resolution of symptoms), HTN, DM, who presented to [facility] on 10/12 due to episode of LOC, as per family patient had a sudden fall at work and lost consciousness. NIHSS 10 on admission. Admitted under NeuroICU on 10/12. Imaging revealed ischemia in the posterior circulation, CT angiogram head/neck showed right PCA P1-2 cutoff, possibly chronic right MCA M1 cutoff which had distal reconstitution and established collaterals, and left PCA occlusion which patient was subsequently transferred for mechanical thrombectomy. Cerebral angiogram and mechanical thrombectomy performed using aspiration with TICI 3 recanalization of left PCA. The right occipital lobe filled from right SHANI collaterals, and the right MCA as well was thought to be chronic given noted collateralization. MR with small to moderate left temporal occipital lobe infarctions. Occluded right MCA, occluded right PCA and right vertebral artery proximal segment. The patient was started on a hep gtt given extensive atherosclerotic disease which was switched to apixaban 5mg BID with recommendations for a three month course. H&H has remained stable. TTE with normal left ventricular internal cavity size. Left ventricular ejection fraction, by visual estimation, is 50 to 55%. Per Neuro, no need for ROCIO at this time. Pt had an episode of bradycardia during his stay and there were concern for 2:1 block. ILR placed 10/20 and no indication for PPM at this time.     Patient was evaluated by PM&R and therapy for functional deficits, gait/ADL impairments and acute rehabilitation was recommended. Patient was medically optimized for discharge to [facility] IRU on 10/27/23.  Patient is a 59 y/o M with a PHx of stroke one year ago (at the time patient received Tenecteplase and had full resolution of symptoms), HTN, DM, who presented to [facility] on 10/12 due to episode of LOC, as per family patient had a sudden fall at work and lost consciousness. NIHSS 10 on admission. Admitted under NeuroICU on 10/12. Imaging revealed ischemia in the posterior circulation, CT angiogram head/neck showed right PCA P1-2 cutoff, possibly chronic right MCA M1 cutoff which had distal reconstitution and established collaterals, and left PCA occlusion which patient was subsequently transferred for mechanical thrombectomy. Cerebral angiogram and mechanical thrombectomy performed using aspiration with TICI 3 recanalization of left PCA. The right occipital lobe filled from right SHANI collaterals, and the right MCA as well was thought to be chronic given noted collateralization. MR with small to moderate left temporal occipital lobe infarctions. Occluded right MCA, occluded right PCA and right vertebral artery proximal segment. The patient was started on a hep gtt given extensive atherosclerotic disease which was switched to apixaban 5mg BID with recommendations for a three month course. H&H has remained stable. TTE with normal left ventricular internal cavity size. Left ventricular ejection fraction, by visual estimation, is 50 to 55%. Per Neuro, no need for ROCIO at this time. Pt had an episode of bradycardia during his stay and there were concern for 2:1 block. ILR placed 10/20 and no indication for PPM at this time.     Patient was evaluated by PM&R and therapy for functional deficits, gait/ADL impairments and acute rehabilitation was recommended. Patient was medically optimized for discharge to [facility] IRU on 10/27/23.  Patient is a 57 y/o M with a PHx HTN, DM, stroke one year ago (at the time patient received Tenecteplase and had full resolution of symptoms), who presented to [facility] on 10/12/23 following sudden fall at work and lost consciousness. NIHSS 10 on admission. Admitted under NeuroICU on 10/12. Imaging revealed ischemia in the posterior circulation, CT angiogram head/neck showed right PCA P1-2 cutoff, possibly chronic right MCA M1 cutoff which had distal reconstitution and established collaterals, and left PCA occlusion which patient was subsequently transferred for mechanical thrombectomy.    Cerebral angiogram and mechanical thrombectomy performed using aspiration with TICI 3 recanalization of left PCA. The right occipital lobe filled from right SHANI collaterals, and the right MCA as well was thought to be chronic given noted collateralization. MR with small to moderate left temporal occipital lobe infarctions. Occluded right MCA, occluded right PCA and right vertebral artery proximal segment. The patient was started on a hep gtt given extensive atherosclerotic disease which was switched to apixaban 5mg BID with recommendations for a three month course.    TTE with normal left ventricular internal cavity size. Left ventricular ejection fractio 50 to 55%. Per Neuro, no need for ROCIO at this time. Pt had an episode of bradycardia during his stay and there were concern for 2:1 block. ILR placed 10/20 and no indication for PPM at this time.     Patient was evaluated by PM&R and therapy for functional deficits, gait/ADL impairments and acute rehabilitation was recommended. Patient was medically optimized for discharge to [facility] IRF on 10/27/23.  Patient is a 57 y/o M with a PHx HTN, DM, stroke one year ago (at the time patient received Tenecteplase and had full resolution of symptoms), who presented to [facility] on 10/12/23 following sudden fall at work and lost consciousness. NIHSS 10 on admission. Admitted under NeuroICU on 10/12. Imaging revealed ischemia in the posterior circulation, CT angiogram head/neck showed right PCA P1-2 cutoff, possibly chronic right MCA M1 cutoff which had distal reconstitution and established collaterals, and left PCA occlusion which patient was subsequently transferred for mechanical thrombectomy.    Cerebral angiogram and mechanical thrombectomy performed using aspiration with TICI 3 recanalization of left PCA. The right occipital lobe filled from right SHANI collaterals, and the right MCA as well was thought to be chronic given noted collateralization. MR with small to moderate left temporal occipital lobe infarctions. Occluded right MCA, occluded right PCA and right vertebral artery proximal segment. The patient was started on a hep gtt given extensive atherosclerotic disease which was switched to apixaban 5mg BID with recommendations for a three month course.    TTE with normal left ventricular internal cavity size. Left ventricular ejection fractio 50 to 55%. Per Neuro, no need for ROCIO at this time. Pt had an episode of bradycardia during his stay and there were concern for 2:1 block. ILR placed 10/20 and no indication for PPM at this time.     Patient was evaluated by PM&R and therapy for functional deficits, gait/ADL impairments and acute rehabilitation was recommended. Patient was medically optimized for discharge to [facility] IRF on 10/27/23.  Patient is a 59 y/o M with a PHx HTN, DM, stroke one year ago (at the time patient received Tenecteplase and had full resolution of symptoms), who presented to [facility] on 10/12/23 following sudden fall at work and lost consciousness. NIHSS 10 on admission. Admitted under NeuroICU on 10/12. Imaging revealed ischemia in the posterior circulation, CT angiogram head/neck showed right PCA P1-2 cutoff, possibly chronic right MCA M1 cutoff which had distal reconstitution and established collaterals, and left PCA occlusion which patient was subsequently transferred for mechanical thrombectomy.    Cerebral angiogram and mechanical thrombectomy performed using aspiration with TICI 3 recanalization of left PCA. The right occipital lobe filled from right SHANI collaterals, and the right MCA as well was thought to be chronic given noted collateralization. MR with small to moderate left temporal occipital lobe infarctions. Occluded right MCA, occluded right PCA and right vertebral artery proximal segment. The patient was started on a hep gtt given extensive atherosclerotic disease which was switched to apixaban 5mg BID with recommendations for a three month course.    TTE with normal left ventricular internal cavity size. Left ventricular ejection fractio 50 to 55%. Per Neuro, no need for ROCIO at this time. Pt had an episode of bradycardia during his stay and there were concern for 2:1 block. ILR placed 10/20 and no indication for PPM at this time.     Patient was evaluated by PM&R and therapy for functional deficits, gait/ADL impairments and acute rehabilitation was recommended. Patient was medically optimized for discharge to [facility] IRF on 10/27/23.

## 2023-10-27 NOTE — CONSULT NOTE ADULT - ATTENDING COMMENTS
Patient seen and examined, discussed patient with Dr. Martinez and agree with recommendations.    Rehab/Impaired mobility and function - Patient continues to require hospitalization for the above diagnoses and ongoing active management of comorbid complications that are substantially impairing functional ability and impairing quality of life necessitating ongoing medical management of these complications necessitating acute rehab.     RECOMMEND   Given mood/adjustment/cognitive deficits, Dr. Chatman to assist with intervention/assessment.     When medically optimized, based on the patient's diagnosis, current functional status and potential for progress, recommend ACUTE inpatient rehabilitation for the functional deficits consisting of 3 hours of therapy/day & 24 hour RN/daily PMR physician for comorbid medical management. Patient will be able to tolerate 3 hours a day.     Will continue to follow. Rehab recommendations are dependent on how functional progress changes as well as how patient continues to participate and tolerate therapeutic interventions, which may change. Recommend ongoing mobilization by staff to maintain cardiopulmonary function and prevention of secondary complications related to debility. Discussed the specific management and recommendations above with rehab clinical care team/rehab liaison.      Total Time Spent on Encounter (reviewing clinical notes, labs, radiology, medications, patient history/exam, assessment and plan) - 75 minutes

## 2023-10-27 NOTE — CONSULT NOTE ADULT - ASSESSMENT
Results from the current brief evaluation revealed most prominent weaknesses in the recall of verbal and nonverbal information. The pattern of poor delayed recall of information suggests a primary weakness in the retention of information over time. Findings are most consistent with the patient's recent stroke, which appears to have disrupted structures involved in memory (namely mesial temporal lobe structures).     Regarding mood, the patient appears to be experiencing a normative reaction to a significant illness and hospitalization. Nonetheless, given his reported worry related to his daughter, the patient will continue to benefit from Neuropsychology follow-up in order to monitor any changes in his mood (pending patient's discharge date).     Writer provided psychoeducation related to risk of emotional changes after a stroke, and encouraged patient to seek psychological support if needed after discharge.    Recommendations:   1. The patient is strongly encouraged to write down information for later reference, given weaknesses observed in memory.   2. He may benefit from recognition-based cues to support the retrieval of information. For example, the patient's communication partners are encouraged to provide the patient with contextual cues to facilitate memory of information.   3. The patient is strongly encouraged to use digital or written reminders whenever possible (e.g., alarms, written signs), to lessen the cognitive burden on his memory throughout the day.

## 2023-10-27 NOTE — H&P ADULT - ATTENDING COMMENTS
Progress note amended to include my discussions with patient, resident, hospitalist, RN    Patient is RH dominant male. He remembers being at work and was able to report that he passed out and he had a stroke "there was a clot in my brain". he states he feels weak on the left side, and the most bothersome thing is the numbness on the left side of his face, hand and leg. He reports the numbess is disturbing and interferes with his sleep. He lives with his daughter, who is being cared for by some of his family members, in apartment. He has difficulty recalling/reporting the details of the building including stairs, and is occasionally distracted. mood is fairly, mildly anxious, frustrated. He is grossly O x 4 and follows 1-2 step commands independently    BUe and LE normal tone and ROM. right UE shoulder, elbow flexion, extension, wrist flexion/ext and gross grasp 5/5 right HF, quad, ham , ankle PF and DF 5/5. left shoulder 5/5 elbow flexoin 5-/5 extension 5/5 gross grasp 4+/5/ left HF 4+/5 quad 5/5 ankle PF 5/5 Df 5-/5/ no calf swelling. Able to localized to Lt, right and left discrimination intact, intact to temperature. +reduced left lopez facial/Ue and LE sensation to LT.  no facial droop or dysarthria,     - add neuropsychology and rec therapy services for suportive counseling  - monitor mood and for need of possible antidepressant  - continue education re: stroke recovery and prevention  - labs reviewed, stable   - secondary stroke prevention education given HTn, DM risk factors  - BP sl above optimal range, reportedly had poor tolerance anti HTn in past. hospitalist consult  - comprhensive rehab evaluation in progress    RECENT LABS    Vital Signs Last 24 Hrs  T(C): 36.6 (28 Oct 2023 09:11), Max: 36.8 (27 Oct 2023 19:38)  T(F): 97.8 (28 Oct 2023 09:11), Max: 98.3 (27 Oct 2023 19:38)  HR: 55 (28 Oct 2023 09:11) (55 - 74)  BP: 157/75 (28 Oct 2023 09:11) (137/89 - 157/75)  BP(mean): --  RR: 15 (28 Oct 2023 09:11) (15 - 18)  SpO2: 97% (28 Oct 2023 09:11) (95% - 97%)    Parameters below as of 28 Oct 2023 09:11  Patient On (Oxygen Delivery Method): room air                              14.0   5.75  )-----------( 154      ( 28 Oct 2023 07:55 )             41.0     10-28    137  |  102  |  15  ----------------------------<  145<H>  4.5   |  29  |  1.05    Ca    9.2      28 Oct 2023 07:55    TPro  7.1  /  Alb  3.6  /  TBili  0.8  /  DBili  x   /  AST  33  /  ALT  55<H>  /  AlkPhos  84  10-28      Urinalysis Basic - ( 28 Oct 2023 07:55 )    Color: x / Appearance: x / SG: x / pH: x  Gluc: 145 mg/dL / Ketone: x  / Bili: x / Urobili: x   Blood: x / Protein: x / Nitrite: x   Leuk Esterase: x / RBC: x / WBC x   Sq Epi: x / Non Sq Epi: x / Bacteria: x      CAPILLARY BLOOD GLUCOSE      POCT Blood Glucose.: 198 mg/dL (28 Oct 2023 11:42)  POCT Blood Glucose.: 142 mg/dL (28 Oct 2023 07:53)

## 2023-10-27 NOTE — H&P ADULT - NSHPREVIEWOFSYSTEMS_GEN_ALL_CORE
REVIEW OF SYSTEMS  Constitutional: No fever, No Chills, No fatigue  HEENT: + visual disturbances. No eye pain,  No difficulty hearing  Pulm: No cough,  No shortness of breath  Cardio: + loop recorder. No chest pain, No palpitations  GI:  + abdominal pain, + constipation. No nausea, No vomiting, No diarrhea,  : No dysuria, No frequency, No hematuria  Neuro: + memory loss. + loss of strength. + left sided numbness. No headaches,  No tremors  Skin: No itching, No rashes, No lesions   MSK: No joint pain, No joint swelling, No muscle pain, No Neck or back pain  Psych:  No depression, No anxiety REVIEW OF SYSTEMS  Constitutional: No fever, No Chills, No fatigue Rh dominant  HEENT: + visual disturbances. No eye pain,  No difficulty hearing (non compliant with reading glasses)  Pulm: No cough,  No shortness of breath  Cardio: + loop recorder. No chest pain, No palpitations  GI:  + abdominal pain, + constipation. No nausea, No vomiting, No diarrhea,  : No dysuria, No frequency, No hematuria  Neuro: + memory loss. + loss of strength. + left sided numbness. No headaches,  No tremors  Skin: No itching, No rashes, No lesions   MSK: No joint pain, No joint swelling, No muscle pain, No Neck or back pain  Psych:  No depression, +anxious about stroke and management, left side numbness

## 2023-10-27 NOTE — CONSULT NOTE ADULT - SUBJECTIVE AND OBJECTIVE BOX
Name: CINDY DUARTE  Age: 58y  Gender: Male  Admitting Diagnosis: Cerebral infarction [I63.9]  CEREBRAL INFARCTION, UNSPECIFIED    Current Diagnoses:   HTN (hypertension)  CVA (cerebrovascular accident)  Acute cerebrovascular accident (CVA)  Stroke  Abnormal EKG  Junctional premature beats  ADRIANO (obstructive sleep apnea)  Unifocal PVCs  History of hip surgery  ETOH abuse  Bradycardia  Depression, major  DM (diabetes mellitus)  Elevated TSH    HPI (Per PMR consult with Ale Martinez and Sheila): 57 y/o M with a Hx of stroke 2022 reported at [facility], derrell bowdenya (per family), HTN, DM, HLD (noncompliant with medications) who presented following an episode of LOC. As per family at around 6:15 pm patient had a sudden fall at work and lost consciousness. It is unclear if patient had any symptoms before he fell. Admitted under NeuroICU on 10/12. Imaging revealed ischemia in the posterior circulation, CT angiogram head/neck showed right PCA P1-2 cutoff, possibly chronic right MCA M1 cutoff which had distal reconstitution and established collaterals, and left PCA occlusion which patient was subsequently transferred for mechanical thrombectomy. Cerebral angiogram and mechanical thrombectomy performed using aspiration with TICI 3 recanalization of left PCA.  The right occipital lobe filled from right SHANI collaterals, and the right MCA as well was thought to be chronic given noted collateralization. MR with small to moderate left temporal occipital lobe infarctions. Occluded right MCA, occluded right PCA and right vertebral artery proximal segment. The patient was started on a hep gtt given extensive atherosclerotic disease which was switched to apixaban 5mg BID with recommendations for a three month course. His hospital course has been complicated by memory difficulties, bradycardia (c/f 2:1 block) EP consulted ILR placed, anemia and decreased platelets. PM&R was consulted for acute CVA, acute rehab recommendations.     National Institutes of Health (NIH) Stroke Scale  .....................................................................................................  1a. Assess Level of Consciousness (Alert=0, Coma=3)  Alert (0 points)  1b. Assess Orientation: Month, Age (1 point per bad answer)  Answers one question correctly (1 point)  1c. Follow Commands: Open and close eyes, make fist and release (1   point per command NOT obeyed  )  Performs one task correctly (1 point)  2. Follow my finger (Normal=0, Forced deviation=2)  Normal (0 points)  3. Visual field   (Normal=0, hemianopia=2, bilateral loss=3)  Complete hemianopia (2 points)  4. Facial palsy: Show teeth, Raise eyebrows, Squeeze eyes shut (Normal=0, Complete=3)  Normal (0 points)  5a. Motor Strength Left Arm  : Elevate to 90 degrees  No drift (0 points)  5b. Motor Strength Right Arm  : Elevate to 90 degrees  Some effort against gravity   (2 points)  6a. Motor Strength Left Leg: Elevate to 30 degrees  No drift (0 points)  6b. Motor Strength Right Leg: Elevate to 30 degrees  Drift (1 point)  7. Coordination or limb ataxia: Finger-nose-finger, Heel-knee-shin (Absent=0, both limbs=2)  Absent (0 points)  8. Sensory: Pin prick to face, arm, trunk, and legs, Compare sides (Normal=0, Severe loss=2)  Mild to moderate loss (1 point)  9. Language: Name items, Describe picture, Read sentences ((No Aphasia=0, Mute=3)  Mild to moderate aphasa (1 point)   10.10. Dysarthria: Speech clarity while reading word list (Normal=0, Nearly unintelligible=2)  Mild to moderate dysarthria (1 point)  11Extinction and Inattention: Formerly called 'Neglect' (None=0, Complete=2)  No abnormality (0 points)   (12 Oct 2023 23:00)    MEDICATIONS  (STANDING):  apixaban 5 milliGRAM(s) Oral two times a day  atorvastatin 80 milliGRAM(s) Oral at bedtime  chlorhexidine 2% Cloths 1 Application(s) Topical <User Schedule>  dextrose 50% Injectable 12.5 Gram(s) IV Push once  dextrose 50% Injectable 25 Gram(s) IV Push once  dextrose 50% Injectable 25 Gram(s) IV Push once  famotidine    Tablet 20 milliGRAM(s) Oral daily  folic acid 1 milliGRAM(s) Oral daily  influenza   Vaccine 0.5 milliLiter(s) IntraMuscular once  insulin lispro (ADMELOG) corrective regimen sliding scale   SubCutaneous Before meals and at bedtime  multivitamin 1 Tablet(s) Oral daily  polyethylene glycol 3350 17 Gram(s) Oral every 12 hours  senna 2 Tablet(s) Oral every 12 hours  thiamine 100 milliGRAM(s) Oral daily    MEDICATIONS  (PRN):  acetaminophen     Tablet .. 650 milliGRAM(s) Oral every 6 hours PRN Mild Pain (1 - 3)  bisacodyl Suppository 10 milliGRAM(s) Rectal daily PRN Constipation  hydrALAZINE 25 milliGRAM(s) Oral every 12 hours PRN SBP > 170  magnesium citrate Oral Solution 296 milliLiter(s) Oral once PRN if he does not go with suppository  magnesium hydroxide Suspension 30 milliLiter(s) Oral daily PRN Constipation    Relevant Neuroimaging:   ACC: [id number] EXAM: MR BRAIN ORDERED BY: ROXIE HORN  PROCEDURE DATE: 10/13/2023  IMPRESSION:  MRI BRAIN: Acute small to moderate medial left temporal occipital lobe infarctions. Acute punctate left occipital lobe infarction.  MRA BRAIN: Occluded right MCA. Occluded right PCA. Occluded right vertebral artery proximal V4 segment.  MRI BRAIN NOVA: Values above.  --- End of Report ---  SOCRATES GRAY MD; Attending Radiologist  This document has been electronically signed. Oct 13 2023 12:53PM    ACC: [id number] EXAM: CT BRAIN STROKE PROTOCOL ORDERED BY: RUSLAN ESPINAL  PROCEDURE DATE: 10/14/2023  CT BRAIN WITHOUT CONTRAST:  1. Decreased attenuation involving the medial aspect of the left temporal lobe, not present on prior noncontrast CT study. However, restricted diffusion was appreciated in the same location on the prior MRI of 10/13/2023.  2. Nonacute left ventral pontine ischemic event, present on prior CT and MRI.  Findings were communicated by Dr. Behr-Ventura to Dr. Luis Carbone, covering attending at approximately 3:08 PM on 10/14/2023.    ACC: [id number] EXAM: CT BRAIN ORDERED BY: BRIAN SHARMA  PROCEDURE DATE: 10/17/2023  IMPRESSION:  Multiple foci of low density right mesial temporal lobe and right thalamus are more prominent on the current examination and likely represent the presence of evolving cytotoxic edema.  Similar-appearing low density involving the left mesial temporal lobe likely representing cytotoxic edema.  Redemonstration of chronic left of midline basis pontis infarct.  No acute intracranial hemorrhage.  Dr. Montejo discussed these findings with Dr. Abran Calabrese on 10/17/2023 12:37 PM with read back.  --- End of Report ---  RENETTA MONTEJO MD; Attending Radiologist  This document has been electronically signed. Oct 17 2023 1:00PM    Clinical Interview: Patient was seen at bedside, and writer provided introduction to neuropsychological services. The patient's brother was also present toward the end of the current session.     The patient was aware of his reason for hospitalization. He was unable to recall the events that occurred immediately prior to his stroke. He denied cognitive difficulties prior to his stroke (though noted that he may have longstanding attentional weaknesses), and endorsed prominent memory difficulties since his stroke. He reportedly worked as a  at a [employer] prior to his hospitalization; he was unable to recall his primary job responsibilities.     Regarding his mood, he endorsed gratitude for being alive. The patient expressed that he is worried about his daughter, with whom he resides. He was unable to recall her age, or where she is currently staying. Per patient's brother, the patient's daughter is currently staying with the patient's sister. Patient believes he may have experienced a period of depression in the past, though he was unable to recall any further information or details. Pt stated that he is highly motivated to recover, and cited his relationship with his daughter as a primary motivating factor. Pt denied current suicidal ideation.     MSE:  Appearance- Appropriately dressed and groomed.  Behavior- Polite and cooperative.  Motor- Pt reported numbness on right side  Affect- Dysphoric; pt brightened at times.  Mood- "Happy to be alive"  Thought Process- Linear  Thought Content- WNL    Measures Administered: Digit span task; Oral Trail Making Test B; Drilled Word Span Task; Go/No-Go; praxis screen; verbal abstraction task; visual figure copy, recall, and recognition; clock drawing test; language screen.    Summary of Results: Based on results obtained during the current brief cognitive screening tool that was used for both qualitative and quantitative purposes, the patient demonstrated primary weaknesses in recall of verbal and nonverbal information. Notably, his immediate recall of a word list appeared to be within normal limits, suggesting that basic attention was intact. He benefitted somewhat from recognition-based cues (e.g., when information was posed in a Y/N format), though he made several false positive errors. A subtle weakness was noted in his conceptualization of a clock. Many cognitive abilities appeared to be within normal limits, including basic attention, working memory, executive functioning (verbal abstraction, rapid set-shifting, motor inhibition), praxis, visuospatial construction of simple geometric information, comprehension, and repetition. The patient evidenced both general and online awareness of his weaknesses.

## 2023-10-27 NOTE — H&P ADULT - NSHPLABSRESULTS_GEN_ALL_CORE
Radiology       CT Head No Cont (10.17.23 @ 12:19)   Multiple foci of low density right mesial temporal lobe and right   thalamus are more prominent on the current examination and likely   represent the presence of evolving cytotoxic edema.  Similar-appearing low density involving the left mesial temporal lobe   likely representing cytotoxic edema.  Redemonstration of chronic left of midline basis pontis infarct.  No acute intracranial hemorrhage.    CT Head No Cont (10.15.23 @ 13:13)   IMPRESSION: Stable medial left temporal occipital lobe infarction.    (10.14.23 @ 15:25)   CT BRAIN WITHOUT CONTRAST:  1. Decreased attenuation involving the medial aspect of the left temporal   lobe, not present on prior noncontrast CT study. However, restricted   diffusion was appreciated in the same location on the prior MRI of   10/13/2023.  2. Nonacute left ventral pontine ischemic event, present on prior CT and   MRI.    10.14.23 @ 14:58)   CT BRAIN WITHOUT CONTRAST:  1. Decreased attenuation involving the medial aspect of the left temporal   lobe, not present on prior noncontrast CT study. However, restricted   diffusion was appreciated in the same location on the prior MRI of   10/13/2023.  2. Nonacute left ventral pontine ischemic event, present on prior CT and   MRI.    CT PERFUSION:  Technical limitations: Significantly limited by patient motion during   image acquisition and suboptimal arterial waveform.   Core infartction: 0 ml; Penubra/tissue at risk for active ischemia: 75   mL involving right temporal brain parenchyma.  If symptoms persist consider follow up head CT or MRI, MRA  if no   contraindication.    CTA HEAD:  1. Poor visualization of the proximal M1 segment of the right middle   cerebral artery, as on prior.  2. Irregular pattern of flow within the P2 segment of the left posterior   cerebral artery, as on prior.  4. Cut off of the left posterior cerebral artery at the P1/P2 junction,   as on prior, with suggestion of distal reconstitution..  5. Hypoplastic poorly visualized intracranial right vertebral artery.  6. No evidence of aneurysm formation at the level of the [place] of   [place]. Tiny aneurysms can be beyond the resolution of CTA technique.    CTA NECK:  1. No evidence of significant stenosis or occlusion in association with   the bilateral common carotid arteriesor bilateral internal carotid   arteries.  2. Poorly visualized proximal left vertebral artery with suggestion of   distal reconstitution at the level of C2.       10/12/2023 19:13  IMPRESSION:  HEAD CT: No evidence of an acute intracranial hemorhage, midline shift or hydrocephalus. Mild bifrontal periventricular white matter ischemic changes. Bilateral maxillary sinusitis  NECK CTA: No hemodynamic significant narowing involving the carotid bifurcations. Hypoplastic right vertebral artery. Dominant left vertebral artery..  BRAIN CTA: Cutoff of the M1 portion of the right middle cerebral artery which may be chronic as there appears to be lenticular striate collaterals and reconstitution of more distal MCA segments. Cutoff of the right posterior cerebral artery at the P1-2 junction with reconstitution of more distal segments. Cut off of the left posterior cerebral artery at the P1-2 junction with mild distal reconstitution. Hypoplastic right vertebral artery with portions of the V4 segment not visualized  CT PERFUSION: Regions of ischemia within the posterior circulation with a volume of 121 mL without evidence of core infarction.    (10.13.23 @ 12:31)   MRI BRAIN: Acute small to moderate medial left temporal occipital lobe   infarctions. Acute punctate left occipital lobe infarction.  MRA BRAIN: Occluded right MCA. Occluded right PCA. Occluded right   vertebral artery proximal V4 segment.  MRI BRAIN NOVA: Values above.      TTE Echo Complete w/ Contrast w/ Doppler (10.13.23 @ 19:44)   Summary:   1. Technically difficult study.   2. Normal left ventricular internal cavity size.   3. Left ventricular ejection fraction, by visual estimation, is 50 to   55%. Radiology       CT Head No Cont (10.17.23 @ 12:19)   Multiple foci of low density right mesial temporal lobe and right   thalamus are more prominent on the current examination and likely   represent the presence of evolving cytotoxic edema.  Similar-appearing low density involving the left mesial temporal lobe   likely representing cytotoxic edema.  Redemonstration of chronic left of midline basis pontis infarct.  No acute intracranial hemorrhage.    CT Head No Cont (10.15.23 @ 13:13)   IMPRESSION: Stable medial left temporal occipital lobe infarction.    (10.14.23 @ 15:25)   CT BRAIN WITHOUT CONTRAST:  1. Decreased attenuation involving the medial aspect of the left temporal   lobe, not present on prior noncontrast CT study. However, restricted   diffusion was appreciated in the same location on the prior MRI of   10/13/2023.  2. Nonacute left ventral pontine ischemic event, present on prior CT and   MRI.    10.14.23 @ 14:58)   CT BRAIN WITHOUT CONTRAST:  1. Decreased attenuation involving the medial aspect of the left temporal   lobe, not present on prior noncontrast CT study. However, restricted   diffusion was appreciated in the same location on the prior MRI of   10/13/2023.  2. Nonacute left ventral pontine ischemic event, present on prior CT and   MRI.    CT PERFUSION:  Technical limitations: Significantly limited by patient motion during   image acquisition and suboptimal arterial waveform.   Core infartction: 0 ml; Penubra/tissue at risk for active ischemia: 75   mL involving right temporal brain parenchyma.  If symptoms persist consider follow up head CT or MRI, MRA  if no   contraindication.    CTA HEAD:  1. Poor visualization of the proximal M1 segment of the right middle   cerebral artery, as on prior.  2. Irregular pattern of flow within the P2 segment of the left posterior   cerebral artery, as on prior.  4. Cut off of the left posterior cerebral artery at the P1/P2 junction,   as on prior, with suggestion of distal reconstitution..  5. Hypoplastic poorly visualized intracranial right vertebral artery.  6. No evidence of aneurysm formation at the level of the [place] of   [place]. Tiny aneurysms can be beyond the resolution of CTA technique.    CTA NECK:  1. No evidence of significant stenosis or occlusion in association with   the bilateral common carotid arteriesor bilateral internal carotid   arteries.  2. Poorly visualized proximal left vertebral artery with suggestion of   distal reconstitution at the level of C2.       10/12/2023 19:13  IMPRESSION:  HEAD CT: No evidence of an acute intracranial hemorhage, midline shift or hydrocephalus. Mild bifrontal periventricular white matter ischemic changes. Bilateral maxillary sinusitis  NECK CTA: No hemodynamic significant narowing involving the carotid bifurcations. Hypoplastic right vertebral artery. Dominant left vertebral artery..  BRAIN CTA: Cutoff of the M1 portion of the right middle cerebral artery which may be chronic as there appears to be lenticular striate collaterals and reconstitution of more distal MCA segments. Cutoff of the right posterior cerebral artery at the P1-2 junction with reconstitution of more distal segments. Cut off of the left posterior cerebral artery at the P1-2 junction with mild distal reconstitution. Hypoplastic right vertebral artery with portions of the V4 segment not visualized  CT PERFUSION: Regions of ischemia within the posterior circulation with a volume of 121 mL without evidence of core infarction.    (10.13.23 @ 12:31)   MRI BRAIN: Acute small to moderate medial left temporal occipital lobe   infarctions. Acute punctate left occipital lobe infarction.  MRA BRAIN: Occluded right MCA. Occluded right PCA. Occluded right   vertebral artery proximal V4 segment.  MRI BRAIN NOVA: Values above.      TTE Echo Complete w/ Contrast w/ Doppler (10.13.23 @ 19:44)   Summary:   1. Technically difficult study.   2. Normal left ventricular internal cavity size.   3. Left ventricular ejection fraction, by visual estimation, is 50 to   55%. Radiology       CT Head No Cont (10.17.23 @ 12:19)   Multiple foci of low density right mesial temporal lobe and right   thalamus are more prominent on the current examination and likely   represent the presence of evolving cytotoxic edema.  Similar-appearing low density involving the left mesial temporal lobe   likely representing cytotoxic edema.  Redemonstration of chronic left of midline basis pontis infarct.  No acute intracranial hemorrhage.    CT Head No Cont (10.15.23 @ 13:13)   IMPRESSION: Stable medial left temporal occipital lobe infarction.    (10.14.23 @ 15:25)   CT BRAIN WITHOUT CONTRAST:  1. Decreased attenuation involving the medial aspect of the left temporal   lobe, not present on prior noncontrast CT study. However, restricted   diffusion was appreciated in the same location on the prior MRI of   10/13/2023.  2. Nonacute left ventral pontine ischemic event, present on prior CT and   MRI.    10.14.23 @ 14:58)   CT BRAIN WITHOUT CONTRAST:  1. Decreased attenuation involving the medial aspect of the left temporal   lobe, not present on prior noncontrast CT study. However, restricted   diffusion was appreciated in the same location on the prior MRI of   10/13/2023.  2. Nonacute left ventral pontine ischemic event, present on prior CT and   MRI.    CT PERFUSION:  Technical limitations: Significantly limited by patient motion during   image acquisition and suboptimal arterial waveform.   Core infartction: 0 ml; Penubra/tissue at risk for active ischemia: 75   mL involving right temporal brain parenchyma.  If symptoms persist consider follow up head CT or MRI, MRA  if no   contraindication.    CTA HEAD:  1. Poor visualization of the proximal M1 segment of the right middle   cerebral artery, as on prior.  2. Irregular pattern of flow within the P2 segment of the left posterior   cerebral artery, as on prior.  4. Cut off of the left posterior cerebral artery at the P1/P2 junction,   as on prior, with suggestion of distal reconstitution..  5. Hypoplastic poorly visualized intracranial right vertebral artery.  6. No evidence of aneurysm formation at the level of the [place] of   [place]. Tiny aneurysms can be beyond the resolution of CTA technique.    CTA NECK:  1. No evidence of significant stenosis or occlusion in association with   the bilateral common carotid arteriesor bilateral internal carotid   arteries.  2. Poorly visualized proximal left vertebral artery with suggestion of   distal reconstitution at the level of C2.       10/12/2023 19:13  IMPRESSION:  HEAD CT: No evidence of an acute intracranial hemorhage, midline shift or hydrocephalus. Mild bifrontal periventricular white matter ischemic changes. Bilateral maxillary sinusitis  NECK CTA: No hemodynamic significant narowing involving the carotid bifurcations. Hypoplastic right vertebral artery. Dominant left vertebral artery..  BRAIN CTA: Cutoff of the M1 portion of the right middle cerebral artery which may be chronic as there appears to be lenticular striate collaterals and reconstitution of more distal MCA segments. Cutoff of the right posterior cerebral artery at the P1-2 junction with reconstitution of more distal segments. Cut off of the left posterior cerebral artery at the P1-2 junction with mild distal reconstitution. Hypoplastic right vertebral artery with portions of the V4 segment not visualized  CT PERFUSION: Regions of ischemia within the posterior circulation with a volume of 121 mL without evidence of core infarction.    (10.13.23 @ 12:31)   MRI BRAIN: Acute small to moderate medial left temporal occipital lobe   infarctions. Acute punctate left occipital lobe infarction.  MRA BRAIN: Occluded right MCA. Occluded right PCA. Occluded right   vertebral artery proximal V4 segment.  MRI BRAIN NOVA: Values above.      TTE Echo Complete w/ Contrast w/ Doppler (10.13.23 @ 19:44)   Summary:   1. Technically difficult study.   2. Normal left ventricular internal cavity size.   3. Left ventricular ejection fraction, by visual estimation, is 50 to   55%.

## 2023-10-27 NOTE — PATIENT PROFILE ADULT - FALL HARM RISK - HARM RISK INTERVENTIONS

## 2023-10-27 NOTE — H&P ADULT - ASSESSMENT
Patient is a 57 y/o M with a PHx of stroke one year ago (at the time patient received Tenecteplase and had full resolution of symptoms), HTN, DM, who presented to [facility] on 10/12 due to episode of LOC, as per family patient had a sudden fall at work and lost consciousness.   MRI brain showed left temporal occipital lobe infarctions and acute punctate left occipital lobe infarction now s/p thrombectomy.       # Left temporal occipital lobe CVA s/p thrombectomy.   - Eliquis 5mg BID given extensive atherosclerotic disease. Continue for 3 months.    - Atorvastatin 80mg qhs.   - Occluded R MCA/PCA but with good collaterals.   - Breathing exercises with Incentive Spirometry.   - Hospitalist consult  - begin comprehensive rehab program OT, PT, SLP  3 hours daily 5 x week  - neuropsychology evaluation and support, recreation therapy  - Precautions: cardiac, DM, respiratory, breast CA, fall, aspiration.    #Episode of bradycardia with concern for 2:1 block on tele  - No indication for pacemaker  - Metoprolol 12.5mg BID has been discontinued   - s/p ILR on 10/20    #HTN  - BP goal 140-170 per neuro   - monitor vitals, hospitalist consult  - No medication at home and patient is sensitive to HTN medication. Was previously started on lisinopril.     #HLD  - Atorvastatin 80 mg daily    #DM 2  - A1C 8.6  - Accu checks and ISS    #ETOH abuse  - Not in active withdrawal.  - Continue MVI, thiamine and folic acid    #Pain management  - Tylenol PRN    GI/Bowel:  - At risk for constipation due to neurologic diagnosis, immobility and/or medication use  - Senna QHS, Miralax Daily  - GI ppx: Protonix    /Bladder:   - At risk for incontinence and retention due to neurologic diagnosis and limited mobility  - Continue catheter/bladder nursing protocol with bladder scans per routine with straight cath for >400cc.  - Encourage timed voids every 4 hours while awake for independence and to promote continence during therapy.      Skin/Pressure Injury:   - At risk for pressure injury due to neurologic diagnosis and relative immobility.  - Skin assessment on admission: ***  - Turn every 2 hours while in bed, air mattress  - Soft heel protectors  - Skin barrier cream as needed  - Nursing to monitor skin Qshift    #FEN   - Diet: DASH DIET   - nutrition consult    #DVT ppx  - Eliquis 5mg BID    Outpatient Follow-up (Specialty/Name of physician):    Rj Delgado  Cardiac Electrophysiology  39 [facility], [address] [address]  Phone: [phone]  Fax: [phone]  Follow Up Time:     Wily Barkley  Neurology  [address] [place] [address]  Phone: [phone]  Fax: [phone]  Follow Up Time: 1 week    Denzel Zapata  Interventional Cardiology  [address] [address]  Phone: [phone]  Fax: [phone]  Follow Up Time: 1 week    PCP,   Phone: (   )    -  Fax: (   )    -  Follow Up Time: 1 week    Arsenio Goodrich  Interventional Neuroradiology  [address] [place] [address]  Phone: [phone]  Fax: [phone]    MEDICAL PROGNOSIS: GOOD            REHAB POTENTIAL: GOOD             ESTIMATED DISPOSITION: HOME WITH HOME CARE            ELOS: 17-21 Days   EXPECTED THERAPY:     P.T. 1hr/day       O.T. 1hr/day      S.L.P. 1hr/day     P&O Unnecessary     EXP FREQUENCY: 5 days per 7 day period     PRESCREEN COMPARISON:   I have reviewed the prescreen information and I have found no relevant changes between the preadmission screening and my post admission evaluation     RATIONALE FOR INPATIENT ADMISSION - Patient demonstrates the following: (check all that apply)  [X] Medically appropriate for rehabilitation admission  [X] Has attainable rehab goals with an appropriate initial discharge plan  [X] Has rehabilitation potential (expected to make a significant improvement within a reasonable period of time)   [X] Requires close medical management by a rehab physician, rehab nursing care, Hospitalist and comprehensive interdisciplinary team (including PT, OT, & or SLP, Prosthetics and Orthotics)       Patient is a 59 y/o M with a PHx of stroke one year ago (at the time patient received Tenecteplase and had full resolution of symptoms), HTN, DM, who presented to [facility] on 10/12 due to episode of LOC, as per family patient had a sudden fall at work and lost consciousness.   MRI brain showed left temporal occipital lobe infarctions and acute punctate left occipital lobe infarction now s/p thrombectomy.       # Left temporal occipital lobe CVA s/p thrombectomy.   - Eliquis 5mg BID given extensive atherosclerotic disease. Continue for 3 months.    - Atorvastatin 80mg qhs.   - Occluded R MCA/PCA but with good collaterals.   - Breathing exercises with Incentive Spirometry.   - Hospitalist consult  - begin comprehensive rehab program OT, PT, SLP  3 hours daily 5 x week  - neuropsychology evaluation and support, recreation therapy  - Precautions: cardiac, DM, respiratory, breast CA, fall, aspiration.    #Episode of bradycardia with concern for 2:1 block on tele  - No indication for pacemaker  - Metoprolol 12.5mg BID has been discontinued   - s/p ILR on 10/20    #HTN  - BP goal 140-170 per neuro   - monitor vitals, hospitalist consult  - No medication at home and patient is sensitive to HTN medication. Was previously started on lisinopril.     #HLD  - Atorvastatin 80 mg daily    #DM 2  - A1C 8.6  - Accu checks and ISS    #ETOH abuse  - Not in active withdrawal.  - Continue MVI, thiamine and folic acid    #Pain management  - Tylenol PRN    GI/Bowel:  - At risk for constipation due to neurologic diagnosis, immobility and/or medication use  - Senna QHS, Miralax Daily  - GI ppx: Protonix    /Bladder:   - At risk for incontinence and retention due to neurologic diagnosis and limited mobility  - Continue catheter/bladder nursing protocol with bladder scans per routine with straight cath for >400cc.  - Encourage timed voids every 4 hours while awake for independence and to promote continence during therapy.      Skin/Pressure Injury:   - At risk for pressure injury due to neurologic diagnosis and relative immobility.  - Skin assessment on admission: ***  - Turn every 2 hours while in bed, air mattress  - Soft heel protectors  - Skin barrier cream as needed  - Nursing to monitor skin Qshift    #FEN   - Diet: DASH DIET   - nutrition consult    #DVT ppx  - Eliquis 5mg BID    Outpatient Follow-up (Specialty/Name of physician):    Rj Delgado  Cardiac Electrophysiology  39 [facility], [address] [address]  Phone: [phone]  Fax: [phone]  Follow Up Time:     Wily Barkley  Neurology  [address] [place] [address]  Phone: [phone]  Fax: [phone]  Follow Up Time: 1 week    Denzel Zapata  Interventional Cardiology  [address] [address]  Phone: [phone]  Fax: [phone]  Follow Up Time: 1 week    PCP,   Phone: (   )    -  Fax: (   )    -  Follow Up Time: 1 week    Arsenio Goodrich  Interventional Neuroradiology  [address] [place] [address]  Phone: [phone]  Fax: [phone]    MEDICAL PROGNOSIS: GOOD            REHAB POTENTIAL: GOOD             ESTIMATED DISPOSITION: HOME WITH HOME CARE            ELOS: 17-21 Days   EXPECTED THERAPY:     P.T. 1hr/day       O.T. 1hr/day      S.L.P. 1hr/day     P&O Unnecessary     EXP FREQUENCY: 5 days per 7 day period     PRESCREEN COMPARISON:   I have reviewed the prescreen information and I have found no relevant changes between the preadmission screening and my post admission evaluation     RATIONALE FOR INPATIENT ADMISSION - Patient demonstrates the following: (check all that apply)  [X] Medically appropriate for rehabilitation admission  [X] Has attainable rehab goals with an appropriate initial discharge plan  [X] Has rehabilitation potential (expected to make a significant improvement within a reasonable period of time)   [X] Requires close medical management by a rehab physician, rehab nursing care, Hospitalist and comprehensive interdisciplinary team (including PT, OT, & or SLP, Prosthetics and Orthotics)       Patient is a 59 y/o M with a PHx of stroke one year ago (at the time patient received Tenecteplase and had full resolution of symptoms), HTN, DM, who presented to [facility] on 10/12 due to episode of LOC, as per family patient had a sudden fall at work and lost consciousness.   MRI brain showed left temporal occipital lobe infarctions and acute punctate left occipital lobe infarction now s/p thrombectomy.       # Left temporal occipital lobe CVA s/p thrombectomy.   - Eliquis 5mg BID given extensive atherosclerotic disease. Continue for 3 months.    - Atorvastatin 80mg qhs.   - Occluded R MCA/PCA but with good collaterals.   - Breathing exercises with Incentive Spirometry.   - Hospitalist consult  - begin comprehensive rehab program OT, PT, SLP  3 hours daily 5 x week  - neuropsychology evaluation and support, recreation therapy  - Precautions: cardiac, DM, respiratory, breast CA, fall, aspiration.    #Episode of bradycardia with concern for 2:1 block on tele  - No indication for pacemaker  - Metoprolol 12.5mg BID has been discontinued   - s/p ILR on 10/20    #HTN  - BP goal 140-170 per neuro   - monitor vitals, hospitalist consult  - No medication at home and patient is sensitive to HTN medication. Was previously started on lisinopril.     #HLD  - Atorvastatin 80 mg daily    #DM 2  - A1C 8.6  - Accu checks and ISS    #ETOH abuse  - Not in active withdrawal.  - Continue MVI, thiamine and folic acid    #Pain management  - Tylenol PRN    GI/Bowel:  - At risk for constipation due to neurologic diagnosis, immobility and/or medication use  - Senna QHS, Miralax Daily  - GI ppx: Protonix    /Bladder:   - At risk for incontinence and retention due to neurologic diagnosis and limited mobility  - Continue catheter/bladder nursing protocol with bladder scans per routine with straight cath for >400cc.  - Encourage timed voids every 4 hours while awake for independence and to promote continence during therapy.      Skin/Pressure Injury:   - At risk for pressure injury due to neurologic diagnosis and relative immobility.  - Skin assessment on admission: ***  - Turn every 2 hours while in bed, air mattress  - Soft heel protectors  - Skin barrier cream as needed  - Nursing to monitor skin Qshift    #FEN   - Diet: DASH DIET   - nutrition consult    #DVT ppx  - Eliquis 5mg BID    Outpatient Follow-up (Specialty/Name of physician):    Rj Delgado  Cardiac Electrophysiology  39 [facility], [address] [address]  Phone: [phone]  Fax: [phone]  Follow Up Time:     Wily Barkley  Neurology  [address] [place] [address]  Phone: [phone]  Fax: [phone]  Follow Up Time: 1 week    Denzel Zapata  Interventional Cardiology  [address] [address]  Phone: [phone]  Fax: [phone]  Follow Up Time: 1 week    PCP,   Phone: (   )    -  Fax: (   )    -  Follow Up Time: 1 week    Arsenio Goodrich  Interventional Neuroradiology  [address] [place] [address]  Phone: [phone]  Fax: [phone]    MEDICAL PROGNOSIS: GOOD            REHAB POTENTIAL: GOOD             ESTIMATED DISPOSITION: HOME WITH HOME CARE            ELOS: 17-21 Days   EXPECTED THERAPY:     P.T. 1hr/day       O.T. 1hr/day      S.L.P. 1hr/day     P&O Unnecessary     EXP FREQUENCY: 5 days per 7 day period     PRESCREEN COMPARISON:   I have reviewed the prescreen information and I have found no relevant changes between the preadmission screening and my post admission evaluation     RATIONALE FOR INPATIENT ADMISSION - Patient demonstrates the following: (check all that apply)  [X] Medically appropriate for rehabilitation admission  [X] Has attainable rehab goals with an appropriate initial discharge plan  [X] Has rehabilitation potential (expected to make a significant improvement within a reasonable period of time)   [X] Requires close medical management by a rehab physician, rehab nursing care, Hospitalist and comprehensive interdisciplinary team (including PT, OT, & or SLP, Prosthetics and Orthotics)       Patient is a 59 y/o M with a PHx of stroke one year ago (at the time patient received Tenecteplase and had full resolution of symptoms), HTN, DM, who presented to [facility] on 10/12 due to episode of LOC, as per family patient had a sudden fall at work and lost consciousness.   MRI brain showed left temporal occipital lobe infarctions and acute punctate left occipital lobe infarction now s/p thrombectomy.       # Left temporal occipital lobe CVA s/p thrombectomy.   - Eliquis 5mg BID given extensive atherosclerotic disease. Continue for 3 months.    - Atorvastatin 80mg qhs.   - Occluded R MCA/PCA but with good collaterals.   - Breathing exercises with Incentive Spirometry.   - Hospitalist consult  - begin comprehensive rehab program OT, PT, SLP  3 hours daily 5 x week  - neuropsychology evaluation and support, recreation therapy  - Precautions: cardiac, DM, respiratory, breast CA, fall, aspiration.    #Bradycardia  - No indication for pacemaker  - Metoprolol 12.5mg BID has been discontinued   - s/p ILR on 10/20    #HTN  - BP goal 140-170 per neuro   - monitor vitals, hospitalist consult  - No medication at home and patient is sensitive to HTN medication. Was previously started on lisinopril.     #HLD  - Atorvastatin 80 mg daily    #DM 2  - A1C 8.6  - Accu checks and ISS    #ETOH abuse  - Not in active withdrawal.  - Continue MVI, thiamine and folic acid    #Pain management  - Tylenol PRN    GI/Bowel:  - At risk for constipation due to neurologic diagnosis, immobility and/or medication use  - Senna QHS, Miralax Daily  - GI ppx: Protonix    /Bladder:   - At risk for incontinence and retention due to neurologic diagnosis and limited mobility  - Continue catheter/bladder nursing protocol with bladder scans per routine with straight cath for >400cc.  - Encourage timed voids every 4 hours while awake for independence and to promote continence during therapy.      Skin/Pressure Injury:   - At risk for pressure injury due to neurologic diagnosis and relative immobility.  - Skin assessment on admission: ***  - Turn every 2 hours while in bed, air mattress  - Soft heel protectors  - Skin barrier cream as needed  - Nursing to monitor skin Qshift    #FEN   - Diet: DASH DIET   - nutrition consult    #DVT ppx  - Eliquis 5mg BID    Outpatient Follow-up (Specialty/Name of physician):    Rj Delgado  Cardiac Electrophysiology  39 [facility], [address] [address]  Phone: [phone]  Fax: [phone]  Follow Up Time:     Wily Barkley  Neurology  [address] [place] [address]  Phone: [phone]  Fax: [phone]  Follow Up Time: 1 week    Denzel Zapata  Interventional Cardiology  39 [facility], [address] [address]  Phone: [phone]  Fax: [phone]  Follow Up Time: 1 week    PCP,   Phone: (   )    -  Fax: (   )    -  Follow Up Time: 1 week    Arsenio Goodrich  Interventional Neuroradiology  [address] [place] [address]  Phone: [phone]  Fax: [phone]    MEDICAL PROGNOSIS: GOOD            REHAB POTENTIAL: GOOD             ESTIMATED DISPOSITION: HOME WITH HOME CARE            ELOS: 17-21 Days   EXPECTED THERAPY:     P.T. 1hr/day       O.T. 1hr/day      S.L.P. 1hr/day     P&O Unnecessary     EXP FREQUENCY: 5 days per 7 day period     PRESCREEN COMPARISON:   I have reviewed the prescreen information and I have found no relevant changes between the preadmission screening and my post admission evaluation     RATIONALE FOR INPATIENT ADMISSION - Patient demonstrates the following: (check all that apply)  [X] Medically appropriate for rehabilitation admission  [X] Has attainable rehab goals with an appropriate initial discharge plan  [X] Has rehabilitation potential (expected to make a significant improvement within a reasonable period of time)   [X] Requires close medical management by a rehab physician, rehab nursing care, Hospitalist and comprehensive interdisciplinary team (including PT, OT, & or SLP, Prosthetics and Orthotics)       Patient is a 59 y/o M with a PHx of stroke one year ago (at the time patient received Tenecteplase and had full resolution of symptoms), HTN, DM, who presented to [facility] on 10/12 due to episode of LOC, as per family patient had a sudden fall at work and lost consciousness.   MRI brain showed left temporal occipital lobe infarctions and acute punctate left occipital lobe infarction now s/p thrombectomy.       # Left temporal occipital lobe CVA s/p thrombectomy.   - Eliquis 5mg BID given extensive atherosclerotic disease. Continue for 3 months.    - Atorvastatin 80mg qhs.   - Occluded R MCA/PCA but with good collaterals.   - Breathing exercises with Incentive Spirometry.   - Hospitalist consult  - begin comprehensive rehab program OT, PT, SLP  3 hours daily 5 x week  - neuropsychology evaluation and support, recreation therapy  - Precautions: cardiac, DM, respiratory, breast CA, fall, aspiration.    #Bradycardia  - No indication for pacemaker  - Metoprolol 12.5mg BID has been discontinued   - s/p ILR on 10/20    #HTN  - BP goal 140-170 per neuro   - monitor vitals, hospitalist consult  - No medication at home and patient is sensitive to HTN medication. Was previously started on lisinopril.     #HLD  - Atorvastatin 80 mg daily    #DM 2  - A1C 8.6  - Accu checks and ISS    #ETOH abuse  - Not in active withdrawal.  - Continue MVI, thiamine and folic acid    #Pain management  - Tylenol PRN    GI/Bowel:  - At risk for constipation due to neurologic diagnosis, immobility and/or medication use  - Senna QHS, Miralax Daily  - GI ppx: Protonix    /Bladder:   - At risk for incontinence and retention due to neurologic diagnosis and limited mobility  - Continue catheter/bladder nursing protocol with bladder scans per routine with straight cath for >400cc.  - Encourage timed voids every 4 hours while awake for independence and to promote continence during therapy.      Skin/Pressure Injury:   - At risk for pressure injury due to neurologic diagnosis and relative immobility.  - Skin assessment on admission: ***  - Turn every 2 hours while in bed, air mattress  - Soft heel protectors  - Skin barrier cream as needed  - Nursing to monitor skin Qshift    #FEN   - Diet: DASH DIET   - nutrition consult    #DVT ppx  - Eliquis 5mg BID    Outpatient Follow-up (Specialty/Name of physician):    Rj Delgado  Cardiac Electrophysiology  39 [facility], [address] [address]  Phone: [phone]  Fax: [phone]  Follow Up Time:     Wily Barkley  Neurology  [address] [place] [address]  Phone: [phone]  Fax: [phone]  Follow Up Time: 1 week    Denzel Zapata  Interventional Cardiology  39 [facility], [address] [address]  Phone: [phone]  Fax: [phone]  Follow Up Time: 1 week    PCP,   Phone: (   )    -  Fax: (   )    -  Follow Up Time: 1 week    Arsenio Goodrich  Interventional Neuroradiology  [address] [place] [address]  Phone: [phone]  Fax: [phone]    MEDICAL PROGNOSIS: GOOD            REHAB POTENTIAL: GOOD             ESTIMATED DISPOSITION: HOME WITH HOME CARE            ELOS: 17-21 Days   EXPECTED THERAPY:     P.T. 1hr/day       O.T. 1hr/day      S.L.P. 1hr/day     P&O Unnecessary     EXP FREQUENCY: 5 days per 7 day period     PRESCREEN COMPARISON:   I have reviewed the prescreen information and I have found no relevant changes between the preadmission screening and my post admission evaluation     RATIONALE FOR INPATIENT ADMISSION - Patient demonstrates the following: (check all that apply)  [X] Medically appropriate for rehabilitation admission  [X] Has attainable rehab goals with an appropriate initial discharge plan  [X] Has rehabilitation potential (expected to make a significant improvement within a reasonable period of time)   [X] Requires close medical management by a rehab physician, rehab nursing care, Hospitalist and comprehensive interdisciplinary team (including PT, OT, & or SLP, Prosthetics and Orthotics)       Patient is a 59 y/o M with a PHx of stroke one year ago (at the time patient received Tenecteplase and had full resolution of symptoms), HTN, DM, who presented to [facility] on 10/12 due to episode of LOC, as per family patient had a sudden fall at work and lost consciousness.   MRI brain showed left temporal occipital lobe infarctions and acute punctate left occipital lobe infarction now s/p thrombectomy.       # Left temporal occipital lobe CVA s/p thrombectomy.   - Eliquis 5mg BID given extensive atherosclerotic disease. Continue for 3 months.    - Atorvastatin 80mg qhs.   - Occluded R MCA/PCA but with good collaterals.   - Breathing exercises with Incentive Spirometry.   - Hospitalist consult  - begin comprehensive rehab program OT, PT, SLP  3 hours daily 5 x week  - neuropsychology evaluation and support, recreation therapy  - Precautions: cardiac, DM, respiratory, breast CA, fall, aspiration.    #Bradycardia  - No indication for pacemaker  - Metoprolol 12.5mg BID has been discontinued   - s/p ILR on 10/20    #HTN  - BP goal 140-170 per neuro   - monitor vitals, hospitalist consult  - No medication at home and patient is sensitive to HTN medication. Was previously started on lisinopril.     #HLD  - Atorvastatin 80 mg daily    #DM 2  - A1C 8.6  - Accu checks and ISS    #ETOH abuse  - Not in active withdrawal.  - Continue MVI, thiamine and folic acid    #Pain management  - Tylenol PRN    GI/Bowel:  - At risk for constipation due to neurologic diagnosis, immobility and/or medication use  - Senna QHS, Miralax Daily  - GI ppx: Protonix    /Bladder:   - At risk for incontinence and retention due to neurologic diagnosis and limited mobility  - Continue catheter/bladder nursing protocol with bladder scans per routine with straight cath for >400cc.  - Encourage timed voids every 4 hours while awake for independence and to promote continence during therapy.      Skin/Pressure Injury:   - At risk for pressure injury due to neurologic diagnosis and relative immobility.  - Skin assessment on admission: ***  - Turn every 2 hours while in bed, air mattress  - Soft heel protectors  - Skin barrier cream as needed  - Nursing to monitor skin Qshift    #FEN   - Diet: DASH DIET   - nutrition consult    #DVT ppx  - Eliquis 5mg BID    Outpatient Follow-up (Specialty/Name of physician):    Rj Delgado  Cardiac Electrophysiology  39 [facility], [address] [address]  Phone: [phone]  Fax: [phone]  Follow Up Time:     Wily Barkley  Neurology  [address] [place] [address]  Phone: [phone]  Fax: [phone]  Follow Up Time: 1 week    Denzel Zapata  Interventional Cardiology  39 [facility], [address] [address]  Phone: [phone]  Fax: [phone]  Follow Up Time: 1 week    PCP,   Phone: (   )    -  Fax: (   )    -  Follow Up Time: 1 week    Arsenio Goodrich  Interventional Neuroradiology  [address] [place] [address]  Phone: [phone]  Fax: [phone]    MEDICAL PROGNOSIS: GOOD            REHAB POTENTIAL: GOOD             ESTIMATED DISPOSITION: HOME WITH HOME CARE            ELOS: 17-21 Days   EXPECTED THERAPY:     P.T. 1hr/day       O.T. 1hr/day      S.L.P. 1hr/day     P&O Unnecessary     EXP FREQUENCY: 5 days per 7 day period     PRESCREEN COMPARISON:   I have reviewed the prescreen information and I have found no relevant changes between the preadmission screening and my post admission evaluation     RATIONALE FOR INPATIENT ADMISSION - Patient demonstrates the following: (check all that apply)  [X] Medically appropriate for rehabilitation admission  [X] Has attainable rehab goals with an appropriate initial discharge plan  [X] Has rehabilitation potential (expected to make a significant improvement within a reasonable period of time)   [X] Requires close medical management by a rehab physician, rehab nursing care, Hospitalist and comprehensive interdisciplinary team (including PT, OT, & or SLP, Prosthetics and Orthotics)       Patient is a 57 y/o M with a PHx HTN, DM, CVA sp tnK with full resolution symptoms, who presented to [facility] on 10/12 due to esudden fall at work and lost consciousness.  MRI brain showed left temporal occipital lobe infarctions and acute punctate left occipital lobe infarction now s/p thrombectomy.     # Left temporal occipital lobe CVA s/p thrombectomy.   - Occluded R MCA/PCA but with good collaterals.   - Eliquis 5mg BID given extensive atherosclerotic disease. Continue for 3 months.    - Atorvastatin 80mg qhs.   - begin comprehensive rehab program OT, PT, SLP  3 hours daily 5 x week  - neuropsychology evaluation and support, recreation therapy  - Precautions: cardiac, DM, respiratory, breast CA, fall, aspiration.    # Bradycardia  - No indication for pacemaker  - Metoprolol 12.5mg BID has been discontinued   - s/p ILR on 10/20  - hospitalist consult    # HTN  - No medication at home and patient is sensitive to HTN medication. Was previously started on lisinopril.   - monitor vitals, hospitalist consult    # HLD  - Atorvastatin 80 mg daily    # DM 2  - A1C 8.6  - Accu checks and ISS  - nutrition and hospitalist consults    # ETOH abuse  - Not in active withdrawal.  - Continue MVI, thiamine and folic acid    # Pain management  - Tylenol PRN    # GI/Bowel:  - Senna QHS, Miralax Daily  - GI ppx: Protonix    # /Bladder:   - At risk for incontinence and retention due to neurologic diagnosis and limited mobility  - Continue catheter/bladder nursing protocol with bladder scans per routine with straight cath for >400cc.  - Encourage timed voids every 4 hours while awake for independence and to promote continence during therapy.    # Skin/Pressure Injury:   - At risk for pressure injury due to neurologic diagnosis and relative immobility.  - Skin assessment on admission: ***  - Turn every 2 hours while in bed, air mattress  - Soft heel protectors  - Skin barrier cream as needed  - Nursing to monitor skin Qshift    # FEN   - Diet: DASH DIET   - nutrition consult    # DVT ppx  - Eliquis 5mg BID    Outpatient Follow-up (Specialty/Name of physician):    Rj Delgado  Cardiac Electrophysiology  [address] [facility], [address]  [place] [address]  Phone: [phone]  Fax: [phone]  Follow Up Time:     Wily Barkley  Neurology  [address] [place] [address]  Phone: [phone]  Fax: [phone]  Follow Up Time: 1 week    Denzel Zapata  Interventional Cardiology  [address] [facility], [address]  [place] [address]  Phone: [phone]  Fax: [phone]  Follow Up Time: 1 week    PCP,   Phone: (   )    -  Fax: (   )    -  Follow Up Time: 1 week    Arsenio Goodrich  Interventional Neuroradiology  [address] [place] [address]  Phone: [phone]  Fax: [phone]    MEDICAL PROGNOSIS: GOOD            REHAB POTENTIAL: GOOD             ESTIMATED DISPOSITION: HOME WITH HOME CARE            ELOS: 17-21 Days   EXPECTED THERAPY:     P.T. 1hr/day       O.T. 1hr/day      S.L.P. 1hr/day     P&O Unnecessary     EXP FREQUENCY: 5 days per 7 day period     PRESCREEN COMPARISON:   I have reviewed the prescreen information and I have found no relevant changes between the preadmission screening and my post admission evaluation     RATIONALE FOR INPATIENT ADMISSION - Patient demonstrates the following: (check all that apply)  [X] Medically appropriate for rehabilitation admission  [X] Has attainable rehab goals with an appropriate initial discharge plan  [X] Has rehabilitation potential (expected to make a significant improvement within a reasonable period of time)   [X] Requires close medical management by a rehab physician, rehab nursing care, Hospitalist and comprehensive interdisciplinary team (including PT, OT, & or SLP, Prosthetics and Orthotics)       Patient is a 57 y/o M with a PHx HTN, DM, CVA sp tnK with full resolution symptoms, who presented to [facility] on 10/12 due to esudden fall at work and lost consciousness.  MRI brain showed left temporal occipital lobe infarctions and acute punctate left occipital lobe infarction now s/p thrombectomy.     # Left temporal occipital lobe CVA s/p thrombectomy.   - Occluded R MCA/PCA but with good collaterals.   - Eliquis 5mg BID given extensive atherosclerotic disease. Continue for 3 months.    - Atorvastatin 80mg qhs.   - begin comprehensive rehab program OT, PT, SLP  3 hours daily 5 x week  - neuropsychology evaluation and support, recreation therapy  - Precautions: cardiac, DM, respiratory, breast CA, fall, aspiration.    # Bradycardia  - No indication for pacemaker  - Metoprolol 12.5mg BID has been discontinued   - s/p ILR on 10/20  - hospitalist consult    # HTN  - No medication at home and patient is sensitive to HTN medication. Was previously started on lisinopril.   - monitor vitals, hospitalist consult    # HLD  - Atorvastatin 80 mg daily    # DM 2  - A1C 8.6  - Accu checks and ISS  - nutrition and hospitalist consults    # ETOH abuse  - Not in active withdrawal.  - Continue MVI, thiamine and folic acid    # Pain management  - Tylenol PRN    # GI/Bowel:  - Senna QHS, Miralax Daily  - GI ppx: Protonix    # /Bladder:   - At risk for incontinence and retention due to neurologic diagnosis and limited mobility  - Continue catheter/bladder nursing protocol with bladder scans per routine with straight cath for >400cc.  - Encourage timed voids every 4 hours while awake for independence and to promote continence during therapy.    # Skin/Pressure Injury:   - At risk for pressure injury due to neurologic diagnosis and relative immobility.  - Skin assessment on admission: ***  - Turn every 2 hours while in bed, air mattress  - Soft heel protectors  - Skin barrier cream as needed  - Nursing to monitor skin Qshift    # FEN   - Diet: DASH DIET   - nutrition consult    # DVT ppx  - Eliquis 5mg BID    Outpatient Follow-up (Specialty/Name of physician):    Rj Delgado  Cardiac Electrophysiology  [address] [facility], [address]  [place] [address]  Phone: [phone]  Fax: [phone]  Follow Up Time:     Wily Barkley  Neurology  [address] [place] [address]  Phone: [phone]  Fax: [phone]  Follow Up Time: 1 week    Denzel Zapata  Interventional Cardiology  [address] [facility], [address]  [place] [address]  Phone: [phone]  Fax: [phone]  Follow Up Time: 1 week    PCP,   Phone: (   )    -  Fax: (   )    -  Follow Up Time: 1 week    Arsenio Goodrich  Interventional Neuroradiology  [address] [place] [address]  Phone: [phone]  Fax: [phone]    MEDICAL PROGNOSIS: GOOD            REHAB POTENTIAL: GOOD             ESTIMATED DISPOSITION: HOME WITH HOME CARE            ELOS: 17-21 Days   EXPECTED THERAPY:     P.T. 1hr/day       O.T. 1hr/day      S.L.P. 1hr/day     P&O Unnecessary     EXP FREQUENCY: 5 days per 7 day period     PRESCREEN COMPARISON:   I have reviewed the prescreen information and I have found no relevant changes between the preadmission screening and my post admission evaluation     RATIONALE FOR INPATIENT ADMISSION - Patient demonstrates the following: (check all that apply)  [X] Medically appropriate for rehabilitation admission  [X] Has attainable rehab goals with an appropriate initial discharge plan  [X] Has rehabilitation potential (expected to make a significant improvement within a reasonable period of time)   [X] Requires close medical management by a rehab physician, rehab nursing care, Hospitalist and comprehensive interdisciplinary team (including PT, OT, & or SLP, Prosthetics and Orthotics)       Patient is a 57 y/o M with a PHx HTN, DM, CVA sp tnK with full resolution symptoms, who presented to [facility] on 10/12 due to esudden fall at work and lost consciousness.  MRI brain showed left temporal occipital lobe infarctions and acute punctate left occipital lobe infarction now s/p thrombectomy.     # Left temporal occipital lobe CVA s/p thrombectomy.   - Occluded R MCA/PCA but with good collaterals.   - Eliquis 5mg BID given extensive atherosclerotic disease. Continue for 3 months.    - Atorvastatin 80mg qhs.   - begin comprehensive rehab program OT, PT, SLP  3 hours daily 5 x week  - neuropsychology evaluation and support, recreation therapy  - Precautions: cardiac, DM, respiratory, breast CA, fall, aspiration.    # Bradycardia  - No indication for pacemaker  - Metoprolol 12.5mg BID has been discontinued   - s/p ILR on 10/20  - hospitalist consult    # HTN  - No medication at home and patient is sensitive to HTN medication. Was previously started on lisinopril.   - monitor vitals, hospitalist consult    # HLD  - Atorvastatin 80 mg daily    # DM 2  - A1C 8.6  - Accu checks and ISS  - nutrition and hospitalist consults    # ETOH abuse  - Not in active withdrawal.  - Continue MVI, thiamine and folic acid    # Pain management  - Tylenol PRN    # GI/Bowel:  - Senna QHS, Miralax Daily  - GI ppx: Protonix    # /Bladder:   - At risk for incontinence and retention due to neurologic diagnosis and limited mobility  - Continue catheter/bladder nursing protocol with bladder scans per routine with straight cath for >400cc.  - Encourage timed voids every 4 hours while awake for independence and to promote continence during therapy.    # Skin/Pressure Injury:   - At risk for pressure injury due to neurologic diagnosis and relative immobility.  - Skin assessment on admission: ***  - Turn every 2 hours while in bed, air mattress  - Soft heel protectors  - Skin barrier cream as needed  - Nursing to monitor skin Qshift    # FEN   - Diet: DASH DIET   - nutrition consult    # DVT ppx  - Eliquis 5mg BID    Outpatient Follow-up (Specialty/Name of physician):    Rj Delgado  Cardiac Electrophysiology  [address] [facility], [address]  [place] [address]  Phone: [phone]  Fax: [phone]  Follow Up Time:     Wily Barkley  Neurology  [address] [place] [address]  Phone: [phone]  Fax: [phone]  Follow Up Time: 1 week    Denzel Zapata  Interventional Cardiology  [address] [facility], [address]  [place] [address]  Phone: [phone]  Fax: [phone]  Follow Up Time: 1 week    PCP,   Phone: (   )    -  Fax: (   )    -  Follow Up Time: 1 week    Arsenio Goodrich  Interventional Neuroradiology  [address] [place] [address]  Phone: [phone]  Fax: [phone]    MEDICAL PROGNOSIS: GOOD            REHAB POTENTIAL: GOOD             ESTIMATED DISPOSITION: HOME WITH HOME CARE            ELOS: 17-21 Days   EXPECTED THERAPY:     P.T. 1hr/day       O.T. 1hr/day      S.L.P. 1hr/day     P&O Unnecessary     EXP FREQUENCY: 5 days per 7 day period     PRESCREEN COMPARISON:   I have reviewed the prescreen information and I have found no relevant changes between the preadmission screening and my post admission evaluation     RATIONALE FOR INPATIENT ADMISSION - Patient demonstrates the following: (check all that apply)  [X] Medically appropriate for rehabilitation admission  [X] Has attainable rehab goals with an appropriate initial discharge plan  [X] Has rehabilitation potential (expected to make a significant improvement within a reasonable period of time)   [X] Requires close medical management by a rehab physician, rehab nursing care, Hospitalist and comprehensive interdisciplinary team (including PT, OT, & or SLP, Prosthetics and Orthotics)       Patient is a 59 y/o M with a PHx HTN, DM, CVA sp tnK with full resolution symptoms, who presented to [facility] on 10/12 due to esudden fall at work and lost consciousness.  MRI brain showed left temporal occipital lobe infarctions and acute punctate left occipital lobe infarction now s/p thrombectomy.     # Left temporal occipital lobe CVA s/p thrombectomy.   - Occluded R MCA/PCA but with good collaterals.   - Eliquis 5mg BID given extensive atherosclerotic disease. Continue for 3 months.    - Atorvastatin 80mg qhs.   - begin comprehensive rehab program OT, PT, SLP  3 hours daily 5 x week  - neuropsychology evaluation and support, recreation therapy  - Precautions: cardiac, DM, respiratory, breast CA, fall, aspiration.    # Bradycardia  - No indication for pacemaker  - Metoprolol 12.5mg BID has been discontinued   - s/p ILR on 10/20  - hospitalist consult    # HTN  - No medication at home and patient is sensitive to HTN medication. Was previously started on lisinopril.   - monitor vitals, hospitalist consult    # HLD  - Atorvastatin 80 mg daily    # DM 2  - A1C 8.6  - Accu checks and ISS  - nutrition and hospitalist consults    # ETOH abuse  - Not in active withdrawal.  - Continue MVI, thiamine and folic acid    # Pain management  - Tylenol PRN    # GI/Bowel:  - unknown when his last bowel movement was.   - Senna QHS, Miralax Daily  - dulcolax PO now. As patient denies suppository.   - GI ppx: Protonix    # /Bladder:   - At risk for incontinence and retention due to neurologic diagnosis and limited mobility  - Continue catheter/bladder nursing protocol with bladder scans per routine with straight cath for >400cc.  - Encourage timed voids every 4 hours while awake for independence and to promote continence during therapy.    # Skin/Pressure Injury:   - At risk for pressure injury due to neurologic diagnosis and relative immobility.  - Skin assessment on admission: No sacral ulcer. Left patella callus. Long toe nails.   - Turn every 2 hours while in bed, air mattress  - Soft heel protectors  - Skin barrier cream as needed  - Nursing to monitor skin Qshift    # FEN   - Diet: DASH DIET   - nutrition consult    # DVT ppx  - Eliquis 5mg BID    Outpatient Follow-up (Specialty/Name of physician):    Rj Delgado  Cardiac Electrophysiology  [address] [place] [address], [address] [address]  Phone: [phone]  Fax: [phone]  Follow Up Time:     Wily Barkley  Neurology  [address] [id number]  Phone: [phone]  Fax: [phone]  Follow Up Time: 1 week    Denzel Zapata  Interventional Cardiology  [address] [facility], [address] [address]  Phone: [phone]  Fax: [phone]  Follow Up Time: 1 week    PCP,   Phone: (   )    -  Fax: (   )    -  Follow Up Time: 1 week    Arsenio Goodrich  Interventional Neuroradiology  [address] [place] [address]  Phone: [phone]  Fax: [phone]    MEDICAL PROGNOSIS: GOOD            REHAB POTENTIAL: GOOD             ESTIMATED DISPOSITION: HOME WITH HOME CARE            ELOS: 17-21 Days   EXPECTED THERAPY:     P.T. 1hr/day       O.T. 1hr/day      S.L.P. 1hr/day     P&O Unnecessary     EXP FREQUENCY: 5 days per 7 day period     PRESCREEN COMPARISON:   I have reviewed the prescreen information and I have found no relevant changes between the preadmission screening and my post admission evaluation     RATIONALE FOR INPATIENT ADMISSION - Patient demonstrates the following: (check all that apply)  [X] Medically appropriate for rehabilitation admission  [X] Has attainable rehab goals with an appropriate initial discharge plan  [X] Has rehabilitation potential (expected to make a significant improvement within a reasonable period of time)   [X] Requires close medical management by a rehab physician, rehab nursing care, Hospitalist and comprehensive interdisciplinary team (including PT, OT, & or SLP, Prosthetics and Orthotics)       Patient is a 59 y/o M with a PHx HTN, DM, CVA sp tnK with full resolution symptoms, who presented to [facility] on 10/12 due to esudden fall at work and lost consciousness.  MRI brain showed left temporal occipital lobe infarctions and acute punctate left occipital lobe infarction now s/p thrombectomy.     # Left temporal occipital lobe CVA s/p thrombectomy.   - Occluded R MCA/PCA but with good collaterals.   - Eliquis 5mg BID given extensive atherosclerotic disease. Continue for 3 months.    - Atorvastatin 80mg qhs.   - begin comprehensive rehab program OT, PT, SLP  3 hours daily 5 x week  - neuropsychology evaluation and support, recreation therapy  - Precautions: cardiac, DM, respiratory, breast CA, fall, aspiration.    # Bradycardia  - No indication for pacemaker  - Metoprolol 12.5mg BID has been discontinued   - s/p ILR on 10/20  - hospitalist consult    # HTN  - No medication at home and patient is sensitive to HTN medication. Was previously started on lisinopril.   - monitor vitals, hospitalist consult    # HLD  - Atorvastatin 80 mg daily    # DM 2  - A1C 8.6  - Accu checks and ISS  - nutrition and hospitalist consults    # ETOH abuse  - Not in active withdrawal.  - Continue MVI, thiamine and folic acid    # Pain management  - Tylenol PRN    # GI/Bowel:  - unknown when his last bowel movement was.   - Senna QHS, Miralax Daily  - dulcolax PO now. As patient denies suppository.   - GI ppx: Protonix    # /Bladder:   - At risk for incontinence and retention due to neurologic diagnosis and limited mobility  - Continue catheter/bladder nursing protocol with bladder scans per routine with straight cath for >400cc.  - Encourage timed voids every 4 hours while awake for independence and to promote continence during therapy.    # Skin/Pressure Injury:   - At risk for pressure injury due to neurologic diagnosis and relative immobility.  - Skin assessment on admission: No sacral ulcer. Left patella callus. Long toe nails.   - Turn every 2 hours while in bed, air mattress  - Soft heel protectors  - Skin barrier cream as needed  - Nursing to monitor skin Qshift    # FEN   - Diet: DASH DIET   - nutrition consult    # DVT ppx  - Eliquis 5mg BID    Outpatient Follow-up (Specialty/Name of physician):    Rj Delgado  Cardiac Electrophysiology  [address] [place] [address], [address] [address]  Phone: [phone]  Fax: [phone]  Follow Up Time:     Wily Barkley  Neurology  [address] [id number]  Phone: [phone]  Fax: [phone]  Follow Up Time: 1 week    Denzel Zapata  Interventional Cardiology  [address] [facility], [address] [address]  Phone: [phone]  Fax: [phone]  Follow Up Time: 1 week    PCP,   Phone: (   )    -  Fax: (   )    -  Follow Up Time: 1 week    Arsenio Goodrich  Interventional Neuroradiology  [address] [place] [address]  Phone: [phone]  Fax: [phone]    MEDICAL PROGNOSIS: GOOD            REHAB POTENTIAL: GOOD             ESTIMATED DISPOSITION: HOME WITH HOME CARE            ELOS: 17-21 Days   EXPECTED THERAPY:     P.T. 1hr/day       O.T. 1hr/day      S.L.P. 1hr/day     P&O Unnecessary     EXP FREQUENCY: 5 days per 7 day period     PRESCREEN COMPARISON:   I have reviewed the prescreen information and I have found no relevant changes between the preadmission screening and my post admission evaluation     RATIONALE FOR INPATIENT ADMISSION - Patient demonstrates the following: (check all that apply)  [X] Medically appropriate for rehabilitation admission  [X] Has attainable rehab goals with an appropriate initial discharge plan  [X] Has rehabilitation potential (expected to make a significant improvement within a reasonable period of time)   [X] Requires close medical management by a rehab physician, rehab nursing care, Hospitalist and comprehensive interdisciplinary team (including PT, OT, & or SLP, Prosthetics and Orthotics)       Patient is a 59 y/o M with a PHx HTN, DM, CVA sp tnK with full resolution symptoms, who presented to [facility] on 10/12 due to esudden fall at work and lost consciousness.  MRI brain showed left temporal occipital lobe infarctions and acute punctate left occipital lobe infarction now s/p thrombectomy.     # Left temporal occipital lobe CVA s/p thrombectomy.   - Occluded R MCA/PCA but with good collaterals.   - Eliquis 5mg BID given extensive atherosclerotic disease. Continue for 3 months.    - Atorvastatin 80mg qhs.   - begin comprehensive rehab program OT, PT, SLP  3 hours daily 5 x week  - neuropsychology evaluation and support, recreation therapy  - Precautions: cardiac, DM, respiratory, breast CA, fall, aspiration.    # Bradycardia  - No indication for pacemaker  - Metoprolol 12.5mg BID has been discontinued   - s/p ILR on 10/20  - hospitalist consult    # HTN  - No medication at home and patient is sensitive to HTN medication. Was previously started on lisinopril.   - monitor vitals, hospitalist consult    # HLD  - Atorvastatin 80 mg daily    # DM 2  - A1C 8.6  - Accu checks and ISS  - nutrition and hospitalist consults    # ETOH abuse  - Not in active withdrawal.  - Continue MVI, thiamine and folic acid    # Pain management  - Tylenol PRN    # GI/Bowel:  - unknown when his last bowel movement was.   - Senna QHS, Miralax Daily  - dulcolax PO now. As patient denies suppository.   - GI ppx: Protonix    # /Bladder:   - At risk for incontinence and retention due to neurologic diagnosis and limited mobility  - Continue catheter/bladder nursing protocol with bladder scans per routine with straight cath for >400cc.  - Encourage timed voids every 4 hours while awake for independence and to promote continence during therapy.    # Skin/Pressure Injury:   - At risk for pressure injury due to neurologic diagnosis and relative immobility.  - Skin assessment on admission: No sacral ulcer. Left patella callus. Long toe nails.   - Turn every 2 hours while in bed, air mattress  - Soft heel protectors  - Skin barrier cream as needed  - Nursing to monitor skin Qshift    # FEN   - Diet: DASH DIET   - nutrition consult    # DVT ppx  - Eliquis 5mg BID    Outpatient Follow-up (Specialty/Name of physician):    Rj Delgado  Cardiac Electrophysiology  [address] [place] [address], [address] [address]  Phone: [phone]  Fax: [phone]  Follow Up Time:     Wily Barkley  Neurology  [address] [id number]  Phone: [phone]  Fax: [phone]  Follow Up Time: 1 week    Denzel Zapata  Interventional Cardiology  [address] [facility], [address] [address]  Phone: [phone]  Fax: [phone]  Follow Up Time: 1 week    PCP,   Phone: (   )    -  Fax: (   )    -  Follow Up Time: 1 week    Arsenio Goodrich  Interventional Neuroradiology  [address] [place] [address]  Phone: [phone]  Fax: [phone]    MEDICAL PROGNOSIS: GOOD            REHAB POTENTIAL: GOOD             ESTIMATED DISPOSITION: HOME WITH HOME CARE            ELOS: 17-21 Days   EXPECTED THERAPY:     P.T. 1hr/day       O.T. 1hr/day      S.L.P. 1hr/day     P&O Unnecessary     EXP FREQUENCY: 5 days per 7 day period     PRESCREEN COMPARISON:   I have reviewed the prescreen information and I have found no relevant changes between the preadmission screening and my post admission evaluation     RATIONALE FOR INPATIENT ADMISSION - Patient demonstrates the following: (check all that apply)  [X] Medically appropriate for rehabilitation admission  [X] Has attainable rehab goals with an appropriate initial discharge plan  [X] Has rehabilitation potential (expected to make a significant improvement within a reasonable period of time)   [X] Requires close medical management by a rehab physician, rehab nursing care, Hospitalist and comprehensive interdisciplinary team (including PT, OT, & or SLP, Prosthetics and Orthotics)       Patient is a 59 y/o M with a PHx HTN, DM, CVA sp tnK with full resolution symptoms, who presented to [facility] on 10/12 due to esudden fall at work and lost consciousness.  MRI brain showed left temporal occipital lobe infarctions and acute punctate left occipital lobe infarction now s/p thrombectomy.     # Left temporal occipital lobe CVA s/p thrombectomy.   - Occluded R MCA/PCA but with good collaterals.   - Eliquis 5mg BID given extensive atherosclerotic disease. Continue for 3 months.    - Atorvastatin 80mg qhs.   - begin comprehensive rehab program OT, PT, SLP  3 hours daily 5 x week  - neuropsychology evaluation and support, recreation therapy  - Precautions: cardiac, DM, respiratory, breast CA, fall, aspiration.    # Bradycardia  - No indication for pacemaker  - Metoprolol 12.5mg BID has been discontinued   - s/p ILR on 10/20  - hospitalist consult    # HTN  - No medication at home and patient is sensitive to HTN medication. Was previously started on lisinopril.   - monitor vitals, hospitalist consult    # HLD  - Atorvastatin 80 mg daily    # DM 2  - A1C 8.6  - Accu checks and ISS  - nutrition and hospitalist consults    # ETOH abuse  - Not in active withdrawal.  - Continue MVI, thiamine and folic acid    # Pain management  - Tylenol PRN    # GI/Bowel:  - unknown when his last bowel movement was.   - Senna QHS, Miralax Daily  - dulcolax PO now. As patient denies suppository.   - GI ppx: Protonix    # /Bladder:   - At risk for incontinence and retention due to neurologic diagnosis and limited mobility  - Continue catheter/bladder nursing protocol with bladder scans per routine with straight cath for >400cc.  - Encourage timed voids every 4 hours while awake for independence and to promote continence during therapy.    # Skin/Pressure Injury:   - At risk for pressure injury due to neurologic diagnosis and relative immobility.  - Skin assessment on admission: No sacral ulcer. Left patella callus. Long toe nails.   - Turn every 2 hours while in bed, air mattress  - Soft heel protectors  - Skin barrier cream as needed  - Nursing to monitor skin Qshift    # FEN   - Diet: DASH DIET   - nutrition consult    # DVT ppx  - Eliquis 5mg BID    Outpatient Follow-up (Specialty/Name of physician):    Rj Delgado  Cardiac Electrophysiology  [address] [place] [address], [address] [address]  Phone: [phone]  Fax: [phone]  Follow Up Time:     Wily Barkley  Neurology  [address] [id number]  Phone: [phone]  Fax: [phone]  Follow Up Time: 1 week    Denzel Zapata  Interventional Cardiology  [address] [facility], [address] [address]  Phone: [phone]  Fax: [phone]  Follow Up Time: 1 week    PCP,   Phone: (   )    -  Fax: (   )    -  Follow Up Time: 1 week    Arsenio Goodrich  Interventional Neuroradiology  [address] [place] [address]  Phone: [phone]  Fax: [phone]    MEDICAL PROGNOSIS: GOOD            REHAB POTENTIAL: GOOD             ESTIMATED DISPOSITION: HOME WITH HOME CARE            ELOS: 17-21 Days   EXPECTED THERAPY:     P.T. 1hr/day       O.T. 1hr/day      S.L.P. 1hr/day     P&O Unnecessary     EXP FREQUENCY: 5 days per 7 day period     PRESCREEN COMPARISON:   I have reviewed the prescreen information and I have found no relevant changes between the preadmission screening and my post admission evaluation     RATIONALE FOR INPATIENT ADMISSION - Patient demonstrates the following: (check all that apply)  [X] Medically appropriate for rehabilitation admission  [X] Has attainable rehab goals with an appropriate initial discharge plan  [X] Has rehabilitation potential (expected to make a significant improvement within a reasonable period of time)   [X] Requires close medical management by a rehab physician, rehab nursing care, Hospitalist and comprehensive interdisciplinary team (including PT, OT, & or SLP, Prosthetics and Orthotics)       Patient is a 57 y/o M with a PHx HTN, DM, CVA sp tnK with full resolution symptoms, who presented to [facility] on 10/12 due to esudden fall at work and lost consciousness.  MRI brain showed left temporal occipital lobe infarctions and acute punctate left occipital lobe infarction now s/p thrombectomy.     # Left temporal occipital lobe CVA s/p thrombectomy.   - Occluded R MCA/PCA but with good collaterals.   - Eliquis 5mg BID given extensive atherosclerotic disease. Continue for 3 months.    - Atorvastatin 80mg qhs.   - begin comprehensive rehab program OT, PT, SLP  3 hours daily 5 x week  - neuropsychology evaluation and support, recreation therapy  - Precautions: cardiac, DM, respiratory, breast CA, fall, aspiration.    # Bradycardia  - No indication for pacemaker  - Metoprolol 12.5mg BID has been discontinued   - s/p ILR on 10/20  - hospitalist consult    # HTN  - No medication at home and patient is sensitive to HTN medication. Was previously started on lisinopril.   - monitor vitals, hospitalist consult    # HLD  - Atorvastatin 80 mg daily    # DM 2  - A1C 8.6  - Accu checks and ISS  - nutrition and hospitalist consults    # ETOH abuse  - Not in active withdrawal.  - Continue MVI, thiamine and folic acid    # Pain management  - Tylenol PRN    # GI/Bowel:  - unknown when his last bowel movement was.   - Senna QHS, Miralax Daily  - dulcolax PO now. As patient denies suppository.   - GI ppx: Protonix    # /Bladder:   - At risk for incontinence and retention due to neurologic diagnosis and limited mobility  - Continue catheter/bladder nursing protocol with bladder scans per routine with straight cath for >400cc.  - Encourage timed voids every 4 hours while awake for independence and to promote continence during therapy.    # Skin/Pressure Injury:   - At risk for pressure injury due to neurologic diagnosis and relative immobility.  - Skin assessment on admission: No sacral ulcer. Left patella callus. Long toe nails.   - Turn every 2 hours while in bed, air mattress  - Soft heel protectors  - Skin barrier cream as needed  - Nursing to monitor skin Qshift    # FEN   - Diet: DASH DIET   - nutrition consult    # DVT ppx  - Eliquis 5mg BID    Outpatient Follow-up (Specialty/Name of physician):    Rj Delgado  Cardiac Electrophysiology  [address] [place] [address], [address] [address]  Phone: [phone]  Fax: [phone]  Follow Up Time:     Wily Barkley  Neurology  [address] [id number]  Phone: [phone]  Fax: [phone]  Follow Up Time: 1 week    Denzel Zapata  Interventional Cardiology  39 [facility], [address] [address]  Phone: [phone]  Fax: [phone]  Follow Up Time: 1 week    PCP,   Phone: (   )    -  Fax: (   )    -  Follow Up Time: 1 week    Arsenio Goodrich  Interventional Neuroradiology  [address] [place] [address]  Phone: [phone]  Fax: [phone]    MEDICAL PROGNOSIS: GOOD            REHAB POTENTIAL: GOOD             ESTIMATED DISPOSITION: HOME WITH HOME CARE            ELOS: 17-21 Days   EXPECTED THERAPY:     P.T. 1hr/day       O.T. 1hr/day      S.L.P. 1hr/day     P&O Unnecessary     EXP FREQUENCY: 5 days per 7 day period     PRESCREEN COMPARISON:   I have reviewed the prescreen information and I have found no relevant changes between the preadmission screening and my post admission evaluation     RATIONALE FOR INPATIENT ADMISSION - Patient demonstrates the following: (check all that apply)  [X] Medically appropriate for rehabilitation admission  [X] Has attainable rehab goals with an appropriate initial discharge plan  [X] Has rehabilitation potential (expected to make a significant improvement within a reasonable period of time)   [X] Requires close medical management by a rehab physician, rehab nursing care, Hospitalist and comprehensive interdisciplinary team (including PT, OT, & or SLP, Prosthetics and Orthotics)       Patient is a 59 y/o M with a PHx HTN, DM, CVA sp tnK with full resolution symptoms, who presented to [facility] on 10/12 due to esudden fall at work and lost consciousness.  MRI brain showed left temporal occipital lobe infarctions and acute punctate left occipital lobe infarction now s/p thrombectomy.     # Left temporal occipital lobe CVA s/p thrombectomy.   - Occluded R MCA/PCA but with good collaterals.   - Eliquis 5mg BID given extensive atherosclerotic disease. Continue for 3 months.    - Atorvastatin 80mg qhs.   - begin comprehensive rehab program OT, PT, SLP  3 hours daily 5 x week  - neuropsychology evaluation and support, recreation therapy  - Precautions: cardiac, DM, respiratory, breast CA, fall, aspiration.    # Bradycardia  - No indication for pacemaker  - Metoprolol 12.5mg BID has been discontinued   - s/p ILR on 10/20  - hospitalist consult    # HTN  - No medication at home and patient is sensitive to HTN medication. Was previously started on lisinopril.   - monitor vitals, hospitalist consult    # HLD  - Atorvastatin 80 mg daily    # DM 2  - A1C 8.6  - Accu checks and ISS  - nutrition and hospitalist consults    # ETOH abuse  - Not in active withdrawal.  - Continue MVI, thiamine and folic acid    # Pain management  - Tylenol PRN    # GI/Bowel:  - unknown when his last bowel movement was.   - Senna QHS, Miralax Daily  - dulcolax PO now. As patient denies suppository.   - GI ppx: Protonix    # /Bladder:   - At risk for incontinence and retention due to neurologic diagnosis and limited mobility  - Continue catheter/bladder nursing protocol with bladder scans per routine with straight cath for >400cc.  - Encourage timed voids every 4 hours while awake for independence and to promote continence during therapy.    # Skin/Pressure Injury:   - At risk for pressure injury due to neurologic diagnosis and relative immobility.  - Skin assessment on admission: No sacral ulcer. Left patella callus. Long toe nails.   - Turn every 2 hours while in bed, air mattress  - Soft heel protectors  - Skin barrier cream as needed  - Nursing to monitor skin Qshift    # FEN   - Diet: DASH DIET   - nutrition consult    # DVT ppx  - Eliquis 5mg BID    Outpatient Follow-up (Specialty/Name of physician):    Rj Delgado  Cardiac Electrophysiology  [address] [place] [address], [address] [address]  Phone: [phone]  Fax: [phone]  Follow Up Time:     Wily Barkley  Neurology  [address] [id number]  Phone: [phone]  Fax: [phone]  Follow Up Time: 1 week    Denzel Zapata  Interventional Cardiology  39 [facility], [address] [address]  Phone: [phone]  Fax: [phone]  Follow Up Time: 1 week    PCP,   Phone: (   )    -  Fax: (   )    -  Follow Up Time: 1 week    Arsenio Goodrich  Interventional Neuroradiology  [address] [place] [address]  Phone: [phone]  Fax: [phone]    MEDICAL PROGNOSIS: GOOD            REHAB POTENTIAL: GOOD             ESTIMATED DISPOSITION: HOME WITH HOME CARE            ELOS: 17-21 Days   EXPECTED THERAPY:     P.T. 1hr/day       O.T. 1hr/day      S.L.P. 1hr/day     P&O Unnecessary     EXP FREQUENCY: 5 days per 7 day period     PRESCREEN COMPARISON:   I have reviewed the prescreen information and I have found no relevant changes between the preadmission screening and my post admission evaluation     RATIONALE FOR INPATIENT ADMISSION - Patient demonstrates the following: (check all that apply)  [X] Medically appropriate for rehabilitation admission  [X] Has attainable rehab goals with an appropriate initial discharge plan  [X] Has rehabilitation potential (expected to make a significant improvement within a reasonable period of time)   [X] Requires close medical management by a rehab physician, rehab nursing care, Hospitalist and comprehensive interdisciplinary team (including PT, OT, & or SLP, Prosthetics and Orthotics)       Patient is a 57 y/o M with a PHx HTN, DM, CVA sp tnK with full resolution symptoms, who presented to [facility] on 10/12 due to esudden fall at work and lost consciousness.  MRI brain showed left temporal occipital lobe infarctions and acute punctate left occipital lobe infarction now s/p thrombectomy.     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 Continue for 3 months.    - Atorvastatin 80mg qhs.   - begin comprehensive rehab program OT, PT, SLP  3 hours daily 5 x week  - neuropsychology evaluation and support, recreation therapy  - Precautions: cardiac, DM, respiratory, breast CA, fall, aspiration.    # Bradycardia  - No indication for pacemaker  - Metoprolol 12.5mg BID has been discontinued   - s/p ILR on 10/20  - hospitalist consult    # HTN  - No medication at home and patient is sensitive to HTN medication. Was previously started on lisinopril.   - monitor vitals, hospitalist consult    # HLD  - Atorvastatin 80 mg daily    # DM 2  - A1C 8.6  - Accu checks and ISS  - nutrition and hospitalist consults    # ETOH abuse  - Not in active withdrawal.  - Continue MVI, thiamine and folic acid    # Pain management  - Tylenol PRN    # GI/Bowel:  - unknown when his last bowel movement was.   - Senna QHS, Miralax Daily  - dulcolax PO now. As patient denies suppository.   - GI ppx: Protonix    # /Bladder:   - At risk for incontinence and retention due to neurologic diagnosis and limited mobility  - Continue catheter/bladder nursing protocol with bladder scans per routine with straight cath for >400cc.  - Encourage timed voids every 4 hours while awake for independence and to promote continence during therapy.    # Skin/Pressure Injury:   - At risk for pressure injury due to neurologic diagnosis and relative immobility.  - Skin assessment on admission: No sacral ulcer. Left patella callus. Long toe nails.   - Turn every 2 hours while in bed, air mattress  - Soft heel protectors  - Skin barrier cream as needed  - Nursing to monitor skin Qshift    # FEN   - Diet: DASH DIET   - nutrition consult    # DVT ppx  - Eliquis 5mg BID    Outpatient Follow-up (Specialty/Name of physician):    Rj Delgado  Cardiac Electrophysiology  [address] [place] [address], [address] [place], [place] [address]  Phone: [phone]  Fax: [phone]  Follow Up Time:     Wily Barkley  Neurology  [address] [place] [address]  Phone: [phone]  Fax: [phone]  Follow Up Time: 1 week    Denzel Zapata  Interventional Cardiology  39 [facility], [address] [address]  Phone: [phone]  Fax: [phone]  Follow Up Time: 1 week    PCP,   Phone: (   )    -  Fax: (   )    -  Follow Up Time: 1 week    Arsenio Goodrich  Interventional Neuroradiology  [address] [id number]  Phone: [phone]  Fax: [phone]    MEDICAL PROGNOSIS: GOOD            REHAB POTENTIAL: GOOD             ESTIMATED DISPOSITION: HOME WITH HOME CARE            ELOS: 17-21 Days   EXPECTED THERAPY:     P.T. 1hr/day       O.T. 1hr/day      S.L.P. 1hr/day     P&O Unnecessary     EXP FREQUENCY: 5 days per 7 day period     PRESCREEN COMPARISON:   I have reviewed the prescreen information and I have found no relevant changes between the preadmission screening and my post admission evaluation     RATIONALE FOR INPATIENT ADMISSION - Patient demonstrates the following: (check all that apply)  [X] Medically appropriate for rehabilitation admission  [X] Has attainable rehab goals with an appropriate initial discharge plan  [X] Has rehabilitation potential (expected to make a significant improvement within a reasonable period of time)   [X] Requires close medical management by a rehab physician, rehab nursing care, Hospitalist and comprehensive interdisciplinary team (including PT, OT, & or SLP, Prosthetics and Orthotics)       Patient is a 59 y/o M with a PHx HTN, DM, CVA sp tnK with full resolution symptoms, who presented to [facility] on 10/12 due to esudden fall at work and lost consciousness.  MRI brain showed left temporal occipital lobe infarctions and acute punctate left occipital lobe infarction now s/p thrombectomy.     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 Continue for 3 months.    - Atorvastatin 80mg qhs.   - begin comprehensive rehab program OT, PT, SLP  3 hours daily 5 x week  - neuropsychology evaluation and support, recreation therapy  - Precautions: cardiac, DM, respiratory, breast CA, fall, aspiration.    # Bradycardia  - No indication for pacemaker  - Metoprolol 12.5mg BID has been discontinued   - s/p ILR on 10/20  - hospitalist consult    # HTN  - No medication at home and patient is sensitive to HTN medication. Was previously started on lisinopril.   - monitor vitals, hospitalist consult    # HLD  - Atorvastatin 80 mg daily    # DM 2  - A1C 8.6  - Accu checks and ISS  - nutrition and hospitalist consults    # ETOH abuse  - Not in active withdrawal.  - Continue MVI, thiamine and folic acid    # Pain management  - Tylenol PRN    # GI/Bowel:  - unknown when his last bowel movement was.   - Senna QHS, Miralax Daily  - dulcolax PO now. As patient denies suppository.   - GI ppx: Protonix    # /Bladder:   - At risk for incontinence and retention due to neurologic diagnosis and limited mobility  - Continue catheter/bladder nursing protocol with bladder scans per routine with straight cath for >400cc.  - Encourage timed voids every 4 hours while awake for independence and to promote continence during therapy.    # Skin/Pressure Injury:   - At risk for pressure injury due to neurologic diagnosis and relative immobility.  - Skin assessment on admission: No sacral ulcer. Left patella callus. Long toe nails.   - Turn every 2 hours while in bed, air mattress  - Soft heel protectors  - Skin barrier cream as needed  - Nursing to monitor skin Qshift    # FEN   - Diet: DASH DIET   - nutrition consult    # DVT ppx  - Eliquis 5mg BID    Outpatient Follow-up (Specialty/Name of physician):    Rj Delgado  Cardiac Electrophysiology  [address] [place] [address], [address] [place], [place] [address]  Phone: [phone]  Fax: [phone]  Follow Up Time:     Wily Barkley  Neurology  [address] [place] [address]  Phone: [phone]  Fax: [phone]  Follow Up Time: 1 week    Denzel Zapata  Interventional Cardiology  39 [facility], [address] [address]  Phone: [phone]  Fax: [phone]  Follow Up Time: 1 week    PCP,   Phone: (   )    -  Fax: (   )    -  Follow Up Time: 1 week    Arsenio Goodrich  Interventional Neuroradiology  [address] [id number]  Phone: [phone]  Fax: [phone]    MEDICAL PROGNOSIS: GOOD            REHAB POTENTIAL: GOOD             ESTIMATED DISPOSITION: HOME WITH HOME CARE            ELOS: 17-21 Days   EXPECTED THERAPY:     P.T. 1hr/day       O.T. 1hr/day      S.L.P. 1hr/day     P&O Unnecessary     EXP FREQUENCY: 5 days per 7 day period     PRESCREEN COMPARISON:   I have reviewed the prescreen information and I have found no relevant changes between the preadmission screening and my post admission evaluation     RATIONALE FOR INPATIENT ADMISSION - Patient demonstrates the following: (check all that apply)  [X] Medically appropriate for rehabilitation admission  [X] Has attainable rehab goals with an appropriate initial discharge plan  [X] Has rehabilitation potential (expected to make a significant improvement within a reasonable period of time)   [X] Requires close medical management by a rehab physician, rehab nursing care, Hospitalist and comprehensive interdisciplinary team (including PT, OT, & or SLP, Prosthetics and Orthotics)       Patient is a 59 y/o M with a PHx HTN, DM, CVA sp tnK with full resolution symptoms, who presented to [facility] on 10/12 due to esudden fall at work and lost consciousness.  MRI brain showed left temporal occipital lobe infarctions and acute punctate left occipital lobe infarction now s/p thrombectomy.     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 Continue for 3 months.    - Atorvastatin 80mg qhs.   - begin comprehensive rehab program OT, PT, SLP  3 hours daily 5 x week  - neuropsychology evaluation and support, recreation therapy  - Precautions: cardiac, DM, respiratory, breast CA, fall, aspiration.    # Bradycardia  - No indication for pacemaker  - Metoprolol 12.5mg BID has been discontinued   - s/p ILR on 10/20  - hospitalist consult    # HTN  - No medication at home and patient is sensitive to HTN medication. Was previously started on lisinopril.   - monitor vitals, hospitalist consult    # HLD  - Atorvastatin 80 mg daily    # DM 2  - A1C 8.6  - Accu checks and ISS  - nutrition and hospitalist consults    # ETOH abuse  - Not in active withdrawal.  - Continue MVI, thiamine and folic acid    # Pain management  - Tylenol PRN    # GI/Bowel:  - unknown when his last bowel movement was.   - Senna QHS, Miralax Daily  - dulcolax PO now. As patient denies suppository.   - GI ppx: Protonix    # /Bladder:   - At risk for incontinence and retention due to neurologic diagnosis and limited mobility  - Continue catheter/bladder nursing protocol with bladder scans per routine with straight cath for >400cc.  - Encourage timed voids every 4 hours while awake for independence and to promote continence during therapy.    # Skin/Pressure Injury:   - At risk for pressure injury due to neurologic diagnosis and relative immobility.  - Skin assessment on admission: No sacral ulcer. Left patella callus. Long toe nails.   - Turn every 2 hours while in bed, air mattress  - Soft heel protectors  - Skin barrier cream as needed  - Nursing to monitor skin Qshift    # FEN   - Diet: DASH DIET   - nutrition consult    # DVT ppx  - Eliquis 5mg BID    Outpatient Follow-up (Specialty/Name of physician):    Rj Delgado  Cardiac Electrophysiology  [address] [place] [address], [address] [place], [place] [address]  Phone: [phone]  Fax: [phone]  Follow Up Time:     Wily Barkley  Neurology  [address] [place] [address]  Phone: [phone]  Fax: [phone]  Follow Up Time: 1 week    Denzel Zapata  Interventional Cardiology  39 [facility], [address] [address]  Phone: [phone]  Fax: [phone]  Follow Up Time: 1 week    PCP,   Phone: (   )    -  Fax: (   )    -  Follow Up Time: 1 week    Arsenio Goodrich  Interventional Neuroradiology  [address] [id number]  Phone: [phone]  Fax: [phone]    MEDICAL PROGNOSIS: GOOD            REHAB POTENTIAL: GOOD             ESTIMATED DISPOSITION: HOME WITH HOME CARE            ELOS: 17-21 Days   EXPECTED THERAPY:     P.T. 1hr/day       O.T. 1hr/day      S.L.P. 1hr/day     P&O Unnecessary     EXP FREQUENCY: 5 days per 7 day period     PRESCREEN COMPARISON:   I have reviewed the prescreen information and I have found no relevant changes between the preadmission screening and my post admission evaluation     RATIONALE FOR INPATIENT ADMISSION - Patient demonstrates the following: (check all that apply)  [X] Medically appropriate for rehabilitation admission  [X] Has attainable rehab goals with an appropriate initial discharge plan  [X] Has rehabilitation potential (expected to make a significant improvement within a reasonable period of time)   [X] Requires close medical management by a rehab physician, rehab nursing care, Hospitalist and comprehensive interdisciplinary team (including PT, OT, & or SLP, Prosthetics and Orthotics)

## 2023-10-28 LAB
ALBUMIN SERPL ELPH-MCNC: 3.6 G/DL — SIGNIFICANT CHANGE UP (ref 3.3–5)
ALBUMIN SERPL ELPH-MCNC: 3.6 G/DL — SIGNIFICANT CHANGE UP (ref 3.3–5)
ALP SERPL-CCNC: 84 U/L — SIGNIFICANT CHANGE UP (ref 40–120)
ALP SERPL-CCNC: 84 U/L — SIGNIFICANT CHANGE UP (ref 40–120)
ALT FLD-CCNC: 55 U/L — HIGH (ref 10–45)
ALT FLD-CCNC: 55 U/L — HIGH (ref 10–45)
ANION GAP SERPL CALC-SCNC: 6 MMOL/L — SIGNIFICANT CHANGE UP (ref 5–17)
ANION GAP SERPL CALC-SCNC: 6 MMOL/L — SIGNIFICANT CHANGE UP (ref 5–17)
AST SERPL-CCNC: 33 U/L — SIGNIFICANT CHANGE UP (ref 10–40)
AST SERPL-CCNC: 33 U/L — SIGNIFICANT CHANGE UP (ref 10–40)
BILIRUB SERPL-MCNC: 0.8 MG/DL — SIGNIFICANT CHANGE UP (ref 0.2–1.2)
BILIRUB SERPL-MCNC: 0.8 MG/DL — SIGNIFICANT CHANGE UP (ref 0.2–1.2)
BUN SERPL-MCNC: 15 MG/DL — SIGNIFICANT CHANGE UP (ref 7–23)
BUN SERPL-MCNC: 15 MG/DL — SIGNIFICANT CHANGE UP (ref 7–23)
CALCIUM SERPL-MCNC: 9.2 MG/DL — SIGNIFICANT CHANGE UP (ref 8.4–10.5)
CALCIUM SERPL-MCNC: 9.2 MG/DL — SIGNIFICANT CHANGE UP (ref 8.4–10.5)
CHLORIDE SERPL-SCNC: 102 MMOL/L — SIGNIFICANT CHANGE UP (ref 96–108)
CHLORIDE SERPL-SCNC: 102 MMOL/L — SIGNIFICANT CHANGE UP (ref 96–108)
CO2 SERPL-SCNC: 29 MMOL/L — SIGNIFICANT CHANGE UP (ref 22–31)
CO2 SERPL-SCNC: 29 MMOL/L — SIGNIFICANT CHANGE UP (ref 22–31)
CREAT SERPL-MCNC: 1.05 MG/DL — SIGNIFICANT CHANGE UP (ref 0.5–1.3)
CREAT SERPL-MCNC: 1.05 MG/DL — SIGNIFICANT CHANGE UP (ref 0.5–1.3)
EGFR: 83 ML/MIN/1.73M2 — SIGNIFICANT CHANGE UP
EGFR: 83 ML/MIN/1.73M2 — SIGNIFICANT CHANGE UP
GLUCOSE BLDC GLUCOMTR-MCNC: 142 MG/DL — HIGH (ref 70–99)
GLUCOSE BLDC GLUCOMTR-MCNC: 142 MG/DL — HIGH (ref 70–99)
GLUCOSE BLDC GLUCOMTR-MCNC: 161 MG/DL — HIGH (ref 70–99)
GLUCOSE BLDC GLUCOMTR-MCNC: 161 MG/DL — HIGH (ref 70–99)
GLUCOSE BLDC GLUCOMTR-MCNC: 183 MG/DL — HIGH (ref 70–99)
GLUCOSE BLDC GLUCOMTR-MCNC: 183 MG/DL — HIGH (ref 70–99)
GLUCOSE BLDC GLUCOMTR-MCNC: 198 MG/DL — HIGH (ref 70–99)
GLUCOSE BLDC GLUCOMTR-MCNC: 198 MG/DL — HIGH (ref 70–99)
GLUCOSE SERPL-MCNC: 145 MG/DL — HIGH (ref 70–99)
GLUCOSE SERPL-MCNC: 145 MG/DL — HIGH (ref 70–99)
HCT VFR BLD CALC: 41 % — SIGNIFICANT CHANGE UP (ref 39–50)
HCT VFR BLD CALC: 41 % — SIGNIFICANT CHANGE UP (ref 39–50)
HGB BLD-MCNC: 14 G/DL — SIGNIFICANT CHANGE UP (ref 13–17)
HGB BLD-MCNC: 14 G/DL — SIGNIFICANT CHANGE UP (ref 13–17)
MCHC RBC-ENTMCNC: 32.1 PG — SIGNIFICANT CHANGE UP (ref 27–34)
MCHC RBC-ENTMCNC: 32.1 PG — SIGNIFICANT CHANGE UP (ref 27–34)
MCHC RBC-ENTMCNC: 34.1 GM/DL — SIGNIFICANT CHANGE UP (ref 32–36)
MCHC RBC-ENTMCNC: 34.1 GM/DL — SIGNIFICANT CHANGE UP (ref 32–36)
MCV RBC AUTO: 94 FL — SIGNIFICANT CHANGE UP (ref 80–100)
MCV RBC AUTO: 94 FL — SIGNIFICANT CHANGE UP (ref 80–100)
NRBC # BLD: 0 /100 WBCS — SIGNIFICANT CHANGE UP (ref 0–0)
NRBC # BLD: 0 /100 WBCS — SIGNIFICANT CHANGE UP (ref 0–0)
PLATELET # BLD AUTO: 154 K/UL — SIGNIFICANT CHANGE UP (ref 150–400)
PLATELET # BLD AUTO: 154 K/UL — SIGNIFICANT CHANGE UP (ref 150–400)
POTASSIUM SERPL-MCNC: 4.5 MMOL/L — SIGNIFICANT CHANGE UP (ref 3.5–5.3)
POTASSIUM SERPL-MCNC: 4.5 MMOL/L — SIGNIFICANT CHANGE UP (ref 3.5–5.3)
POTASSIUM SERPL-SCNC: 4.5 MMOL/L — SIGNIFICANT CHANGE UP (ref 3.5–5.3)
POTASSIUM SERPL-SCNC: 4.5 MMOL/L — SIGNIFICANT CHANGE UP (ref 3.5–5.3)
PROT SERPL-MCNC: 7.1 G/DL — SIGNIFICANT CHANGE UP (ref 6–8.3)
PROT SERPL-MCNC: 7.1 G/DL — SIGNIFICANT CHANGE UP (ref 6–8.3)
RBC # BLD: 4.36 M/UL — SIGNIFICANT CHANGE UP (ref 4.2–5.8)
RBC # BLD: 4.36 M/UL — SIGNIFICANT CHANGE UP (ref 4.2–5.8)
RBC # FLD: 13 % — SIGNIFICANT CHANGE UP (ref 10.3–14.5)
RBC # FLD: 13 % — SIGNIFICANT CHANGE UP (ref 10.3–14.5)
SODIUM SERPL-SCNC: 137 MMOL/L — SIGNIFICANT CHANGE UP (ref 135–145)
SODIUM SERPL-SCNC: 137 MMOL/L — SIGNIFICANT CHANGE UP (ref 135–145)
WBC # BLD: 5.75 K/UL — SIGNIFICANT CHANGE UP (ref 3.8–10.5)
WBC # BLD: 5.75 K/UL — SIGNIFICANT CHANGE UP (ref 3.8–10.5)
WBC # FLD AUTO: 5.75 K/UL — SIGNIFICANT CHANGE UP (ref 3.8–10.5)
WBC # FLD AUTO: 5.75 K/UL — SIGNIFICANT CHANGE UP (ref 3.8–10.5)

## 2023-10-28 PROCEDURE — 99223 1ST HOSP IP/OBS HIGH 75: CPT

## 2023-10-28 RX ORDER — METFORMIN HYDROCHLORIDE 850 MG/1
500 TABLET ORAL
Refills: 0 | Status: DISCONTINUED | OUTPATIENT
Start: 2023-10-28 | End: 2023-12-05

## 2023-10-28 RX ADMIN — POLYETHYLENE GLYCOL 3350 17 GRAM(S): 17 POWDER, FOR SOLUTION ORAL at 11:56

## 2023-10-28 RX ADMIN — FAMOTIDINE 20 MILLIGRAM(S): 10 INJECTION INTRAVENOUS at 11:57

## 2023-10-28 RX ADMIN — ATORVASTATIN CALCIUM 80 MILLIGRAM(S): 80 TABLET, FILM COATED ORAL at 21:20

## 2023-10-28 RX ADMIN — Medication 1 MILLIGRAM(S): at 11:56

## 2023-10-28 RX ADMIN — Medication 2: at 16:58

## 2023-10-28 RX ADMIN — APIXABAN 5 MILLIGRAM(S): 2.5 TABLET, FILM COATED ORAL at 05:42

## 2023-10-28 RX ADMIN — CITALOPRAM 20 MILLIGRAM(S): 10 TABLET, FILM COATED ORAL at 11:57

## 2023-10-28 RX ADMIN — Medication 2: at 11:55

## 2023-10-28 RX ADMIN — APIXABAN 5 MILLIGRAM(S): 2.5 TABLET, FILM COATED ORAL at 19:03

## 2023-10-28 RX ADMIN — Medication 2: at 21:20

## 2023-10-28 RX ADMIN — Medication 1 TABLET(S): at 11:56

## 2023-10-28 RX ADMIN — Medication 100 MILLIGRAM(S): at 11:56

## 2023-10-28 RX ADMIN — METFORMIN HYDROCHLORIDE 500 MILLIGRAM(S): 850 TABLET ORAL at 19:03

## 2023-10-28 NOTE — DIETITIAN INITIAL EVALUATION ADULT - PERTINENT MEDS FT
MEDICATIONS  (STANDING):  apixaban 5 milliGRAM(s) Oral two times a day  atorvastatin 80 milliGRAM(s) Oral at bedtime  bisacodyl 5 milliGRAM(s) Oral at bedtime  citalopram 20 milliGRAM(s) Oral daily  dextrose 50% Injectable 25 Gram(s) IV Push once  famotidine    Tablet 20 milliGRAM(s) Oral daily  folic acid 1 milliGRAM(s) Oral daily  insulin lispro (ADMELOG) corrective regimen sliding scale   SubCutaneous Before meals and at bedtime  metFORMIN 500 milliGRAM(s) Oral two times a day  multivitamin 1 Tablet(s) Oral daily  polyethylene glycol 3350 17 Gram(s) Oral daily  senna 2 Tablet(s) Oral at bedtime  thiamine 100 milliGRAM(s) Oral daily    MEDICATIONS  (PRN):

## 2023-10-28 NOTE — DIETITIAN INITIAL EVALUATION ADULT - PERSON TAUGHT/METHOD
Provided Heart Healthy Consistent Carbohydrate nutrition education/verbal instruction/teach back - (Patient repeats in own words)/patient instructed

## 2023-10-28 NOTE — DIETITIAN INITIAL EVALUATION ADULT - ADD RECOMMEND
1. Continue Provided Heart Healthy Consistent Carbohydrate diet.   2. Recommend continue Glucerna 8oz PO BID (Provides 440kcal-20grams of Protein) BID to assist pt in meeting estimated protein energy needs.  3. Continue Multivitamin, Folic acid and Thiamine to ensure 100% RDA met   4. Monitor PO intake, GI tolerance, skin integrity, labs, weight, and bowel movement regularity.   5. Honor food preferences as feasible. Assist with meals PRN and encourage PO intake.  6. Provide ongoing diet education as needed  7. RD remains available upon request and will follow-up per protocol

## 2023-10-28 NOTE — DIETITIAN INITIAL EVALUATION ADULT - PERTINENT LABORATORY DATA
10-28    137  |  102  |  15  ----------------------------<  145<H>  4.5   |  29  |  1.05    Ca    9.2      28 Oct 2023 07:55    TPro  7.1  /  Alb  3.6  /  TBili  0.8  /  DBili  x   /  AST  33  /  ALT  55<H>  /  AlkPhos  84  10-28  POCT Blood Glucose.: 198 mg/dL (10-28-23 @ 11:42)  A1C with Estimated Average Glucose Result: 8.6 % (10-13-23 @ 03:10)

## 2023-10-28 NOTE — CONSULT NOTE ADULT - SUBJECTIVE AND OBJECTIVE BOX
HPI:  57yo male with hx of HTN, DM, CVA one year ago (at the time patient received Tenecteplase and had full resolution of symptoms), who presented to [facility] for acute rehab from [facility] where he p/w fall at work and LOC, noted to have ischemia in the posterior circulation as well as left PCA occlusion. Pt s/p Cerebral angiogram and mechanical thrombectomy using aspiration with TICI 3 recanalization of left PCA. The right occipital lobe filled from right SHANI collaterals, and the right MCA as well was thought to be chronic given noted collateralization. MR with small to moderate left temporal occipital lobe infarctions. Occluded right MCA, occluded right PCA and right vertebral artery proximal segment. The patient was started on a hep gtt given extensive atherosclerotic disease and then eventually transitioned to [facility] with recommendations for a three month course.  Hospital course remarkable for episode of bradycardia concern for 2:1 block. ILR placed 10/20 and no indication for PPM at this time. Pt deemed stable for discharge to rehab    Pt seen and examined at bedside.  No acute events overnight.  Pt denies cp, palpitations, sob, abd pain, N/V, fever, chills    Home Medications:  apixaban 5 mg oral tablet: 1 tab(s) orally 2 times a day (27 Oct 2023 13:06)  atorvastatin 80 mg oral tablet: 1 tab(s) orally once a day (at bedtime) (27 Oct 2023 13:06)  famotidine 20 mg oral tablet: 1 tab(s) orally once a day (27 Oct 2023 13:06)  folic acid 1 mg oral tablet: 1 tab(s) orally once a day (27 Oct 2023 13:06)  insulin glargine 100 units/mL subcutaneous solution: 6 unit(s) subcutaneous once a day (at bedtime) (27 Oct 2023 16:24)  insulin lispro 100 units/mL injectable solution: injectable 3 times a day (before meals) 2 Unit(s) if Glucose 151 - 200      4 Unit(s) if Glucose 201 - 250      6 Unit(s) if Glucose 251 - 300      8 Unit(s) if Glucose 301 - 350      10 Unit(s) if Glucose 351 - 400      12 Unit(s) if Glucose Greater Than 400 + Contact MD  SubCutaneous, Before meals (27 Oct 2023 16:24)  Multiple Vitamins oral tablet: 1 tab(s) orally once a day (27 Oct 2023 13:06)  thiamine 100 mg oral tablet: 1 tab(s) orally once a day (27 Oct 2023 13:06)      PAST MEDICAL & SURGICAL HISTORY:  HTN (hypertension)      CVA (cerebrovascular accident)      History of hip surgery          Review of Systems:   CONSTITUTIONAL: No fever, weight loss, or fatigue  EYES: No eye pain, visual disturbances, or discharge  ENMT:  No difficulty hearing, tinnitus, vertigo; No sinus or throat pain  NECK: No pain or stiffness  BREASTS: No pain, masses, or nipple discharge  RESPIRATORY: No cough, wheezing, chills or hemoptysis; No shortness of breath  CARDIOVASCULAR: No chest pain, palpitations, dizziness, or leg swelling  GASTROINTESTINAL: No abdominal or epigastric pain. No nausea, vomiting, or hematemesis; No diarrhea or constipation. No melena or hematochezia.  GENITOURINARY: No dysuria, frequency, hematuria, or incontinence  NEUROLOGICAL: No headaches, memory loss, loss of strength, numbness, or tremors  SKIN: No itching, burning, rashes, or lesions   LYMPH NODES: No enlarged glands  ENDOCRINE: No heat or cold intolerance; No hair loss  MUSCULOSKELETAL: No joint pain or swelling; No muscle, back, or extremity pain  PSYCHIATRIC: No depression, anxiety, mood swings, or difficulty sleeping  HEME/LYMPH: No easy bruising, or bleeding gums  ALLERY AND IMMUNOLOGIC: No hives or eczema    Allergies    No Known Allergies    Intolerances        Social History:   Lives w/ daughter  No smoking  +EtOH use    FAMILY HISTORY:   Noncontributory    MEDICATIONS  (STANDING):  apixaban 5 milliGRAM(s) Oral two times a day  atorvastatin 80 milliGRAM(s) Oral at bedtime  bisacodyl 5 milliGRAM(s) Oral at bedtime  citalopram 20 milliGRAM(s) Oral daily  dextrose 50% Injectable 25 Gram(s) IV Push once  famotidine    Tablet 20 milliGRAM(s) Oral daily  folic acid 1 milliGRAM(s) Oral daily  insulin lispro (ADMELOG) corrective regimen sliding scale   SubCutaneous Before meals and at bedtime  multivitamin 1 Tablet(s) Oral daily  polyethylene glycol 3350 17 Gram(s) Oral daily  senna 2 Tablet(s) Oral at bedtime  thiamine 100 milliGRAM(s) Oral daily    MEDICATIONS  (PRN):      Vital Signs Last 24 Hrs  T(C): 36.6 (28 Oct 2023 09:11), Max: 36.8 (27 Oct 2023 19:38)  T(F): 97.8 (28 Oct 2023 09:11), Max: 98.3 (27 Oct 2023 19:38)  HR: 55 (28 Oct 2023 09:11) (55 - 74)  BP: 157/75 (28 Oct 2023 09:11) (118/82 - 157/75)  BP(mean): --  RR: 15 (28 Oct 2023 09:11) (15 - 18)  SpO2: 97% (28 Oct 2023 09:11) (95% - 97%)    Parameters below as of 28 Oct 2023 09:11  Patient On (Oxygen Delivery Method): room air      CAPILLARY BLOOD GLUCOSE      POCT Blood Glucose.: 142 mg/dL (28 Oct 2023 07:53)  POCT Blood Glucose.: 282 mg/dL (27 Oct 2023 12:23)        PHYSICAL EXAM:  GENERAL: NAD, obese male  HEAD:  Atraumatic, Normocephalic  EYES: EOMI, PERRLA, conjunctiva and sclera clear  NECK: Supple, No JVD  CHEST/LUNG: Clear to auscultation bilaterally; No wheeze, nonlabored breathing  HEART: Regular rate and rhythm; No murmurs, rubs, or gallops  ABDOMEN: Soft, Nontender, Nondistended; Bowel sounds present  EXTREMITIES:  2+ Peripheral Pulses, No clubbing, cyanosis, or edema  NEUROLOGY: AAOx3, Left sided weakness diminished sensation in comparison to right side  PSYCH: calm, appropriate mood  SKIN: No rashes or lesions, warm intact    LABS:                        14.0   5.75  )-----------( 154      ( 28 Oct 2023 07:55 )             41.0     10-28    137  |  102  |  15  ----------------------------<  145<H>  4.5   |  29  |  1.05    Ca    9.2      28 Oct 2023 07:55    TPro  7.1  /  Alb  3.6  /  TBili  0.8  /  DBili  x   /  AST  33  /  ALT  55<H>  /  AlkPhos  84  10-28          Urinalysis Basic - ( 28 Oct 2023 07:55 )    Color: x / Appearance: x / SG: x / pH: x  Gluc: 145 mg/dL / Ketone: x  / Bili: x / Urobili: x   Blood: x / Protein: x / Nitrite: x   Leuk Esterase: x / RBC: x / WBC x   Sq Epi: x / Non Sq Epi: x / Bacteria: x          RADIOLOGY & ADDITIONAL TESTS:    Imaging Personally Reviewed:    Consultant(s) Notes Reviewed:      Care Discussed with Consultants/Other Providers:

## 2023-10-28 NOTE — DIETITIAN INITIAL EVALUATION ADULT - ORAL INTAKE PTA/DIET HISTORY
Pt endorses good appetite and PO intake PTA, does not follow any therapeutic meal patterns. NKFA nor food intolerances reported   Per previous RD note 10/26: Pt remains at high nutrition risk secondary to malnutrition (moderate, acute) related to inability to meet sufficient protein-energy in setting of acute CVA, lack of appetite, alcohol abuse as evidenced by meeting <75% nutrient needs >7 days, mild muscle loss of temples and clavicles, and mild fat loss of orbitals.

## 2023-10-28 NOTE — DIETITIAN INITIAL EVALUATION ADULT - OTHER INFO
Reason for Admission: CVA  History of Present Illness:   Patient is a 57 y/o M with a PHx HTN, DM, stroke one year ago (at the time patient received Tenecteplase and had full resolution of symptoms), who presented to [facility] on 10/12/23 following sudden fall at work and lost consciousness. NIHSS 10 on admission. Admitted under NeuroICU on 10/12. Imaging revealed ischemia in the posterior circulation, CT angiogram head/neck showed right PCA P1-2 cutoff, possibly chronic right MCA M1 cutoff which had distal reconstitution and established collaterals, and left PCA occlusion which patient was subsequently transferred for mechanical thrombectomy.    Cerebral angiogram and mechanical thrombectomy performed using aspiration with TICI 3 recanalization of left PCA. The right occipital lobe filled from right SHANI collaterals, and the right MCA as well was thought to be chronic given noted collateralization. MR with small to moderate left temporal occipital lobe infarctions. Occluded right MCA, occluded right PCA and right vertebral artery proximal segment. The patient was started on a hep gtt given extensive atherosclerotic disease which was switched to apixaban 5mg BID with recommendations for a three month course.    TTE with normal left ventricular internal cavity size. Left ventricular ejection fractio 50 to 55%. Per Neuro, no need for ROCIO at this time. Pt had an episode of bradycardia during his stay and there were concern for 2:1 block. ILR placed 10/20 and no indication for PPM at this time.     Patient was evaluated by PM&R and therapy for functional deficits, gait/ADL impairments and acute rehabilitation was recommended. Patient was medically optimized for discharge to [facility] IRF on 10/27/23.     At this time patient tolerating current diet, w/ fair appetite/intake. Recommend Glucerna 8oz PO BID (Provides 440kcal-20grams of Protein) to assist patient in meeting estimated energy requirements.  No issues w/ dentition or chewing and swallowing current diet texture. Denies N/V/D, however w/ constipation, last BM 10/27. Encouraged fluid/fiber at meals to assist w/ regular BM. Per chart review w/ recent weight loss/gain (10/18) 231.4 lbs 10/15) 235.6 lbs  (10/14) 240.7 lbs, (10/13) 240.7 lbs, (10/12) 245.3 lbs, CBW 246lb 10/27) Reason for Admission: CVA  History of Present Illness:   Patient is a 57 y/o M with a PHx HTN, DM, stroke one year ago (at the time patient received Tenecteplase and had full resolution of symptoms), who presented to [facility] on 10/12/23 following sudden fall at work and lost consciousness. NIHSS 10 on admission. Admitted under NeuroICU on 10/12. Imaging revealed ischemia in the posterior circulation, CT angiogram head/neck showed right PCA P1-2 cutoff, possibly chronic right MCA M1 cutoff which had distal reconstitution and established collaterals, and left PCA occlusion which patient was subsequently transferred for mechanical thrombectomy.    Cerebral angiogram and mechanical thrombectomy performed using aspiration with TICI 3 recanalization of left PCA. The right occipital lobe filled from right SHANI collaterals, and the right MCA as well was thought to be chronic given noted collateralization. MR with small to moderate left temporal occipital lobe infarctions. Occluded right MCA, occluded right PCA and right vertebral artery proximal segment. The patient was started on a hep gtt given extensive atherosclerotic disease which was switched to apixaban 5mg BID with recommendations for a three month course.    TTE with normal left ventricular internal cavity size. Left ventricular ejection fractio 50 to 55%. Per Neuro, no need for ROCIO at this time. Pt had an episode of bradycardia during his stay and there were concern for 2:1 block. ILR placed 10/20 and no indication for PPM at this time.     Patient was evaluated by PM&R and therapy for functional deficits, gait/ADL impairments and acute rehabilitation was recommended. Patient was medically optimized for discharge to [facility] IRF on 10/27/23.     At this time patient tolerating current diet, w/ fair appetite/intake. Recommend continue Glucerna 8oz PO BID (Provides 440kcal-20grams of Protein) to assist patient in meeting estimated energy requirements.  No issues w/ dentition or chewing and swallowing current diet texture. Denies N/V/D, however w/ constipation, last BM 10/27. Encouraged fluid/fiber at meals to assist w/ regular BM. Per chart review w/ recent weight loss/gain (10/18) 231.4 lbs 10/15) 235.6 lbs(10/14) 240.7 lbs, (10/13) 240.7 lbs, (10/12) 245.3 lbs, CBW 246lb 10/27). T2DM noted. A1C: 8.6% (10/13/23). POCT & insulin regimen reviewed. Addressed with Heart-Healthy DASH/TLC CCHO diet. Diet education provided on Heart-Healthy DASH/TLC CCHO diet.  Reason for Admission: CVA  History of Present Illness:   Patient is a 59 y/o M with a PHx HTN, DM, stroke one year ago (at the time patient received Tenecteplase and had full resolution of symptoms), who presented to [facility] on 10/12/23 following sudden fall at work and lost consciousness. NIHSS 10 on admission. Admitted under NeuroICU on 10/12. Imaging revealed ischemia in the posterior circulation, CT angiogram head/neck showed right PCA P1-2 cutoff, possibly chronic right MCA M1 cutoff which had distal reconstitution and established collaterals, and left PCA occlusion which patient was subsequently transferred for mechanical thrombectomy.    Cerebral angiogram and mechanical thrombectomy performed using aspiration with TICI 3 recanalization of left PCA. The right occipital lobe filled from right SHANI collaterals, and the right MCA as well was thought to be chronic given noted collateralization. MR with small to moderate left temporal occipital lobe infarctions. Occluded right MCA, occluded right PCA and right vertebral artery proximal segment. The patient was started on a hep gtt given extensive atherosclerotic disease which was switched to apixaban 5mg BID with recommendations for a three month course.    TTE with normal left ventricular internal cavity size. Left ventricular ejection fractio 50 to 55%. Per Neuro, no need for ROCIO at this time. Pt had an episode of bradycardia during his stay and there were concern for 2:1 block. ILR placed 10/20 and no indication for PPM at this time.     Patient was evaluated by PM&R and therapy for functional deficits, gait/ADL impairments and acute rehabilitation was recommended. Patient was medically optimized for discharge to [facility] IRF on 10/27/23.     At this time patient tolerating current diet, w/ improved appetite/intake. Observed during lunch with good PO intake. Recommend continue Glucerna 8oz PO BID (Provides 440kcal-20grams of Protein) to assist patient in meeting estimated energy requirements.  No issues w/ dentition or chewing and swallowing current diet texture. Denies N/V/D, however w/ constipation, last BM 10/27. Encouraged fluid/fiber at meals to assist w/ regular BM. Per chart review w/ recent weight loss/gain (10/18) 231.4 lbs 10/15) 235.6 lbs(10/14) 240.7 lbs, (10/13) 240.7 lbs, (10/12) 245.3 lbs, CBW 246lb 10/27). T2DM noted. A1C: 8.6% (10/13/23). POCT & insulin regimen reviewed. Addressed with Heart-Healthy DASH/TLC CCHO diet. Diet education provided on Heart-Healthy DASH/TLC CCHO diet.

## 2023-10-28 NOTE — DIETITIAN INITIAL EVALUATION ADULT - NS FNS DIET ORDER
Diet, Consistent Carbohydrate/No Snacks:   DASH/TLC {Sodium & Cholesterol Restricted} (DASH)  Supplement Feeding Modality:  Oral  Glucerna Shake Cans or Servings Per Day:  1       Frequency:  Two Times a day (10-27-23 @ 14:29)

## 2023-10-29 LAB
GLUCOSE BLDC GLUCOMTR-MCNC: 121 MG/DL — HIGH (ref 70–99)
GLUCOSE BLDC GLUCOMTR-MCNC: 121 MG/DL — HIGH (ref 70–99)
GLUCOSE BLDC GLUCOMTR-MCNC: 128 MG/DL — HIGH (ref 70–99)
GLUCOSE BLDC GLUCOMTR-MCNC: 128 MG/DL — HIGH (ref 70–99)
GLUCOSE BLDC GLUCOMTR-MCNC: 155 MG/DL — HIGH (ref 70–99)
GLUCOSE BLDC GLUCOMTR-MCNC: 155 MG/DL — HIGH (ref 70–99)
GLUCOSE BLDC GLUCOMTR-MCNC: 187 MG/DL — HIGH (ref 70–99)
GLUCOSE BLDC GLUCOMTR-MCNC: 187 MG/DL — HIGH (ref 70–99)

## 2023-10-29 PROCEDURE — 99232 SBSQ HOSP IP/OBS MODERATE 35: CPT

## 2023-10-29 RX ADMIN — Medication 2: at 12:02

## 2023-10-29 RX ADMIN — METFORMIN HYDROCHLORIDE 500 MILLIGRAM(S): 850 TABLET ORAL at 18:07

## 2023-10-29 RX ADMIN — Medication 5 MILLIGRAM(S): at 21:50

## 2023-10-29 RX ADMIN — CITALOPRAM 20 MILLIGRAM(S): 10 TABLET, FILM COATED ORAL at 11:58

## 2023-10-29 RX ADMIN — FAMOTIDINE 20 MILLIGRAM(S): 10 INJECTION INTRAVENOUS at 11:59

## 2023-10-29 RX ADMIN — APIXABAN 5 MILLIGRAM(S): 2.5 TABLET, FILM COATED ORAL at 05:56

## 2023-10-29 RX ADMIN — Medication 2: at 08:16

## 2023-10-29 RX ADMIN — SENNA PLUS 2 TABLET(S): 8.6 TABLET ORAL at 21:50

## 2023-10-29 RX ADMIN — Medication 1 MILLIGRAM(S): at 11:59

## 2023-10-29 RX ADMIN — METFORMIN HYDROCHLORIDE 500 MILLIGRAM(S): 850 TABLET ORAL at 08:16

## 2023-10-29 RX ADMIN — Medication 1 TABLET(S): at 11:59

## 2023-10-29 RX ADMIN — Medication 100 MILLIGRAM(S): at 11:58

## 2023-10-29 RX ADMIN — ATORVASTATIN CALCIUM 80 MILLIGRAM(S): 80 TABLET, FILM COATED ORAL at 21:50

## 2023-10-29 RX ADMIN — APIXABAN 5 MILLIGRAM(S): 2.5 TABLET, FILM COATED ORAL at 18:07

## 2023-10-29 NOTE — PROGRESS NOTE ADULT - COMMENTS
Patient states that he slept much better last night. He still feels the numbness in left side which is distracting. We discussed some potential medication options ; he would like to montior for now    no B/B complaints, no H./A

## 2023-10-29 NOTE — PROGRESS NOTE ADULT - SUBJECTIVE AND OBJECTIVE BOX
Patient is a 58y old  Male who presents with a chief complaint of Cerebral infarction     (28 Oct 2023 12:27)      HPI:  Patient is a 59 y/o M with a PHx HTN, DM, stroke one year ago (at the time patient received Tenecteplase and had full resolution of symptoms), who presented to [facility] on 10/12/23 following sudden fall at work and lost consciousness. NIHSS 10 on admission. Admitted under NeuroICU on 10/12. Imaging revealed ischemia in the posterior circulation, CT angiogram head/neck showed right PCA P1-2 cutoff, possibly chronic right MCA M1 cutoff which had distal reconstitution and established collaterals, and left PCA occlusion which patient was subsequently transferred for mechanical thrombectomy.    Cerebral angiogram and mechanical thrombectomy performed using aspiration with TICI 3 recanalization of left PCA. The right occipital lobe filled from right SHANI collaterals, and the right MCA as well was thought to be chronic given noted collateralization. MR with small to moderate left temporal occipital lobe infarctions. Occluded right MCA, occluded right PCA and right vertebral artery proximal segment. The patient was started on a hep gtt given extensive atherosclerotic disease which was switched to apixaban 5mg BID with recommendations for a three month course.    TTE with normal left ventricular internal cavity size. Left ventricular ejection fractio 50 to 55%. Per Neuro, no need for ROCIO at this time. Pt had an episode of bradycardia during his stay and there were concern for 2:1 block. ILR placed 10/20 and no indication for PPM at this time.     Patient was evaluated by PM&R and therapy for functional deficits, gait/ADL impairments and acute rehabilitation was recommended. Patient was medically optimized for discharge to [facility] on 10/27/23.  (27 Oct 2023 12:30)      PAST MEDICAL & SURGICAL HISTORY:  HTN (hypertension)      CVA (cerebrovascular accident)      History of hip surgery          MEDICATIONS  (STANDING):  apixaban 5 milliGRAM(s) Oral two times a day  atorvastatin 80 milliGRAM(s) Oral at bedtime  bisacodyl 5 milliGRAM(s) Oral at bedtime  citalopram 20 milliGRAM(s) Oral daily  dextrose 50% Injectable 25 Gram(s) IV Push once  famotidine    Tablet 20 milliGRAM(s) Oral daily  folic acid 1 milliGRAM(s) Oral daily  insulin lispro (ADMELOG) corrective regimen sliding scale   SubCutaneous Before meals and at bedtime  metFORMIN 500 milliGRAM(s) Oral two times a day  multivitamin 1 Tablet(s) Oral daily  polyethylene glycol 3350 17 Gram(s) Oral daily  senna 2 Tablet(s) Oral at bedtime  thiamine 100 milliGRAM(s) Oral daily    MEDICATIONS  (PRN):      Allergies    No Known Allergies    Intolerances          VITALS  58y  Vital Signs Last 24 Hrs  T(C): 36.5 (29 Oct 2023 08:04), Max: 36.6 (28 Oct 2023 21:16)  T(F): 97.7 (29 Oct 2023 08:04), Max: 97.8 (28 Oct 2023 21:16)  HR: 58 (29 Oct 2023 08:04) (58 - 64)  BP: 158/72 (29 Oct 2023 08:04) (158/72 - 166/88)  BP(mean): --  RR: 16 (29 Oct 2023 08:04) (16 - 16)  SpO2: 98% (29 Oct 2023 08:04) (96% - 98%)    Parameters below as of 29 Oct 2023 08:04  Patient On (Oxygen Delivery Method): room air      Daily     Daily         RECENT LABS:                          14.0   5.75  )-----------( 154      ( 28 Oct 2023 07:55 )             41.0     10-28    137  |  102  |  15  ----------------------------<  145<H>  4.5   |  29  |  1.05    Ca    9.2      28 Oct 2023 07:55    TPro  7.1  /  Alb  3.6  /  TBili  0.8  /  DBili  x   /  AST  33  /  ALT  55<H>  /  AlkPhos  84  10-28    LIVER FUNCTIONS - ( 28 Oct 2023 07:55 )  Alb: 3.6 g/dL / Pro: 7.1 g/dL / ALK PHOS: 84 U/L / ALT: 55 U/L / AST: 33 U/L / GGT: x             Urinalysis Basic - ( 28 Oct 2023 07:55 )    Color: x / Appearance: x / SG: x / pH: x  Gluc: 145 mg/dL / Ketone: x  / Bili: x / Urobili: x   Blood: x / Protein: x / Nitrite: x   Leuk Esterase: x / RBC: x / WBC x   Sq Epi: x / Non Sq Epi: x / Bacteria: x          CAPILLARY BLOOD GLUCOSE      POCT Blood Glucose.: 187 mg/dL (29 Oct 2023 11:57)  POCT Blood Glucose.: 155 mg/dL (29 Oct 2023 08:10)  POCT Blood Glucose.: 183 mg/dL (28 Oct 2023 21:18)  POCT Blood Glucose.: 161 mg/dL (28 Oct 2023 16:56)

## 2023-10-29 NOTE — PROGRESS NOTE ADULT - ASSESSMENT
Patient is a 59 y/o M with a PHx HTN, DM, CVA sp tnK with full resolution symptoms, who presented to [facility] on 10/12 due to esudden fall at work and lost consciousness.  MRI brain showed left temporal occipital lobe infarctions and acute punctate left occipital lobe infarction now s/p thrombectomy.     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 Continue for 3 months.    - Atorvastatin 80mg qhs.   - continue comprehensive rehab program OT, PT, SLP  3 hours daily 5 x week  - neuropsychology evaluation and support, recreation therapy  - Precautions: cardiac, DM, respiratory, breast CA, fall, aspiration.    # Bradycardia  - Metoprolol 12.5mg BID has been discontinued   - s/p ILR on 10/20  - HR 58-64 10/29    # HTN  - No medication at home and patient is sensitive to HTN medication. Was previously started on lisinopril.   - (158/72 - 166/88) 10/29./ hospitalist f/u    # HLD  - Atorvastatin 80 mg daily    # DM 2  - A1C 8.6  - Accu checks and ISS    # ETOH abuse  -  MVI, thiamine and folic acid    # Pain management  - Tylenol PRN    # GI/Bowel:  - Senna QHS, Miralax Daily  - dulcolax PRN  - GI ppx: Protonix    # FEN   - Diet: DASH DIET       # DVT ppx  - Eliquis 5mg BID    Outpatient Follow-up (Specialty/Name of physician):    Rj Delgado  Cardiac Electrophysiology  [address] [place] [address], [address] [address]  Phone: [phone]  Fax: [phone]  Follow Up Time:     Wily Barkley  Neurology  [address] [place] [address]  Phone: [phone]  Fax: [phone]  Follow Up Time: 1 week    Denzel Zapata  Interventional Cardiology  [address] [place] [address], [address] [address]  Phone: [phone]  Fax: [phone]  Follow Up Time: 1 week    PCP,   Phone: (   )    -  Fax: (   )    -  Follow Up Time: 1 week    Arsenio Goodrich  Interventional Neuroradiology  [address] [place] [id number]  Phone: [phone]  Fax: [phone]

## 2023-10-30 LAB
ALBUMIN SERPL ELPH-MCNC: 3.4 G/DL — SIGNIFICANT CHANGE UP (ref 3.3–5)
ALBUMIN SERPL ELPH-MCNC: 3.4 G/DL — SIGNIFICANT CHANGE UP (ref 3.3–5)
ALP SERPL-CCNC: 76 U/L — SIGNIFICANT CHANGE UP (ref 40–120)
ALP SERPL-CCNC: 76 U/L — SIGNIFICANT CHANGE UP (ref 40–120)
ALT FLD-CCNC: 47 U/L — HIGH (ref 10–45)
ALT FLD-CCNC: 47 U/L — HIGH (ref 10–45)
ANION GAP SERPL CALC-SCNC: 9 MMOL/L — SIGNIFICANT CHANGE UP (ref 5–17)
ANION GAP SERPL CALC-SCNC: 9 MMOL/L — SIGNIFICANT CHANGE UP (ref 5–17)
AST SERPL-CCNC: 24 U/L — SIGNIFICANT CHANGE UP (ref 10–40)
AST SERPL-CCNC: 24 U/L — SIGNIFICANT CHANGE UP (ref 10–40)
BILIRUB SERPL-MCNC: 0.6 MG/DL — SIGNIFICANT CHANGE UP (ref 0.2–1.2)
BILIRUB SERPL-MCNC: 0.6 MG/DL — SIGNIFICANT CHANGE UP (ref 0.2–1.2)
BUN SERPL-MCNC: 20 MG/DL — SIGNIFICANT CHANGE UP (ref 7–23)
BUN SERPL-MCNC: 20 MG/DL — SIGNIFICANT CHANGE UP (ref 7–23)
CALCIUM SERPL-MCNC: 9 MG/DL — SIGNIFICANT CHANGE UP (ref 8.4–10.5)
CALCIUM SERPL-MCNC: 9 MG/DL — SIGNIFICANT CHANGE UP (ref 8.4–10.5)
CHLORIDE SERPL-SCNC: 104 MMOL/L — SIGNIFICANT CHANGE UP (ref 96–108)
CHLORIDE SERPL-SCNC: 104 MMOL/L — SIGNIFICANT CHANGE UP (ref 96–108)
CO2 SERPL-SCNC: 27 MMOL/L — SIGNIFICANT CHANGE UP (ref 22–31)
CO2 SERPL-SCNC: 27 MMOL/L — SIGNIFICANT CHANGE UP (ref 22–31)
CREAT SERPL-MCNC: 0.91 MG/DL — SIGNIFICANT CHANGE UP (ref 0.5–1.3)
CREAT SERPL-MCNC: 0.91 MG/DL — SIGNIFICANT CHANGE UP (ref 0.5–1.3)
EGFR: 97 ML/MIN/1.73M2 — SIGNIFICANT CHANGE UP
EGFR: 97 ML/MIN/1.73M2 — SIGNIFICANT CHANGE UP
GLUCOSE BLDC GLUCOMTR-MCNC: 110 MG/DL — HIGH (ref 70–99)
GLUCOSE BLDC GLUCOMTR-MCNC: 110 MG/DL — HIGH (ref 70–99)
GLUCOSE BLDC GLUCOMTR-MCNC: 130 MG/DL — HIGH (ref 70–99)
GLUCOSE BLDC GLUCOMTR-MCNC: 130 MG/DL — HIGH (ref 70–99)
GLUCOSE BLDC GLUCOMTR-MCNC: 141 MG/DL — HIGH (ref 70–99)
GLUCOSE BLDC GLUCOMTR-MCNC: 141 MG/DL — HIGH (ref 70–99)
GLUCOSE BLDC GLUCOMTR-MCNC: 159 MG/DL — HIGH (ref 70–99)
GLUCOSE BLDC GLUCOMTR-MCNC: 159 MG/DL — HIGH (ref 70–99)
GLUCOSE SERPL-MCNC: 128 MG/DL — HIGH (ref 70–99)
GLUCOSE SERPL-MCNC: 128 MG/DL — HIGH (ref 70–99)
HCT VFR BLD CALC: 37.6 % — LOW (ref 39–50)
HCT VFR BLD CALC: 37.6 % — LOW (ref 39–50)
HGB BLD-MCNC: 13.1 G/DL — SIGNIFICANT CHANGE UP (ref 13–17)
HGB BLD-MCNC: 13.1 G/DL — SIGNIFICANT CHANGE UP (ref 13–17)
MCHC RBC-ENTMCNC: 32.4 PG — SIGNIFICANT CHANGE UP (ref 27–34)
MCHC RBC-ENTMCNC: 32.4 PG — SIGNIFICANT CHANGE UP (ref 27–34)
MCHC RBC-ENTMCNC: 34.8 GM/DL — SIGNIFICANT CHANGE UP (ref 32–36)
MCHC RBC-ENTMCNC: 34.8 GM/DL — SIGNIFICANT CHANGE UP (ref 32–36)
MCV RBC AUTO: 93.1 FL — SIGNIFICANT CHANGE UP (ref 80–100)
MCV RBC AUTO: 93.1 FL — SIGNIFICANT CHANGE UP (ref 80–100)
NRBC # BLD: 0 /100 WBCS — SIGNIFICANT CHANGE UP (ref 0–0)
NRBC # BLD: 0 /100 WBCS — SIGNIFICANT CHANGE UP (ref 0–0)
PLATELET # BLD AUTO: 130 K/UL — LOW (ref 150–400)
PLATELET # BLD AUTO: 130 K/UL — LOW (ref 150–400)
POTASSIUM SERPL-MCNC: 4.1 MMOL/L — SIGNIFICANT CHANGE UP (ref 3.5–5.3)
POTASSIUM SERPL-MCNC: 4.1 MMOL/L — SIGNIFICANT CHANGE UP (ref 3.5–5.3)
POTASSIUM SERPL-SCNC: 4.1 MMOL/L — SIGNIFICANT CHANGE UP (ref 3.5–5.3)
POTASSIUM SERPL-SCNC: 4.1 MMOL/L — SIGNIFICANT CHANGE UP (ref 3.5–5.3)
PROT SERPL-MCNC: 6.7 G/DL — SIGNIFICANT CHANGE UP (ref 6–8.3)
PROT SERPL-MCNC: 6.7 G/DL — SIGNIFICANT CHANGE UP (ref 6–8.3)
RBC # BLD: 4.04 M/UL — LOW (ref 4.2–5.8)
RBC # BLD: 4.04 M/UL — LOW (ref 4.2–5.8)
RBC # FLD: 12.8 % — SIGNIFICANT CHANGE UP (ref 10.3–14.5)
RBC # FLD: 12.8 % — SIGNIFICANT CHANGE UP (ref 10.3–14.5)
SODIUM SERPL-SCNC: 140 MMOL/L — SIGNIFICANT CHANGE UP (ref 135–145)
SODIUM SERPL-SCNC: 140 MMOL/L — SIGNIFICANT CHANGE UP (ref 135–145)
WBC # BLD: 4.98 K/UL — SIGNIFICANT CHANGE UP (ref 3.8–10.5)
WBC # BLD: 4.98 K/UL — SIGNIFICANT CHANGE UP (ref 3.8–10.5)
WBC # FLD AUTO: 4.98 K/UL — SIGNIFICANT CHANGE UP (ref 3.8–10.5)
WBC # FLD AUTO: 4.98 K/UL — SIGNIFICANT CHANGE UP (ref 3.8–10.5)

## 2023-10-30 PROCEDURE — 99232 SBSQ HOSP IP/OBS MODERATE 35: CPT

## 2023-10-30 PROCEDURE — 90832 PSYTX W PT 30 MINUTES: CPT

## 2023-10-30 RX ADMIN — FAMOTIDINE 20 MILLIGRAM(S): 10 INJECTION INTRAVENOUS at 12:13

## 2023-10-30 RX ADMIN — Medication 2: at 12:13

## 2023-10-30 RX ADMIN — Medication 100 MILLIGRAM(S): at 12:13

## 2023-10-30 RX ADMIN — METFORMIN HYDROCHLORIDE 500 MILLIGRAM(S): 850 TABLET ORAL at 18:05

## 2023-10-30 RX ADMIN — METFORMIN HYDROCHLORIDE 500 MILLIGRAM(S): 850 TABLET ORAL at 08:16

## 2023-10-30 RX ADMIN — ATORVASTATIN CALCIUM 80 MILLIGRAM(S): 80 TABLET, FILM COATED ORAL at 21:51

## 2023-10-30 RX ADMIN — APIXABAN 5 MILLIGRAM(S): 2.5 TABLET, FILM COATED ORAL at 18:05

## 2023-10-30 RX ADMIN — APIXABAN 5 MILLIGRAM(S): 2.5 TABLET, FILM COATED ORAL at 05:24

## 2023-10-30 RX ADMIN — CITALOPRAM 20 MILLIGRAM(S): 10 TABLET, FILM COATED ORAL at 12:14

## 2023-10-30 RX ADMIN — Medication 1 MILLIGRAM(S): at 12:13

## 2023-10-30 RX ADMIN — Medication 1 TABLET(S): at 12:13

## 2023-10-30 NOTE — PROGRESS NOTE ADULT - SUBJECTIVE AND OBJECTIVE BOX
no acute events overnight      PHYSICAL EXAM:    Vital Signs Last 24 Hrs  T(F): 98 (30 Oct 2023 10:07), Max: 98.4 (29 Oct 2023 21:45)  HR: 56 (30 Oct 2023 10:07) (52 - 56)  BP: 140/72 (30 Oct 2023 10:07) (140/72 - 145/76)  RR: 16 (30 Oct 2023 10:07) (16 - 16)  SpO2: 95% (30 Oct 2023 10:07) (95% - 97%)  I&O's Summary      GENERAL: NAD  HEENT: NCAT  CHEST/LUNG: No increased WOB, Clear to percussion bilaterally; No rales, rhonchi, wheezing  HEART: Regular rate and rhythm; No murmurs  ABDOMEN: Soft, Nontender, Nondistended; Bowel sounds present  MUSCULOSKELETAL/EXTREMITIES:  2+ Peripheral Pulses, No LE edema  PSYCH: Appropriate affect, Alert & Oriented to person and year not location    LABS:                        13.1   4.98  )-----------( 130      ( 30 Oct 2023 06:22 )             37.6       10-30    140  |  104  |  20  ----------------------------<  128  4.1   |  27  |  0.91    Ca    9.0      30 Oct 2023 06:22    TPro  6.7  /  Alb  3.4  /  TBili  0.6  /  DBili  x   /  AST  24  /  ALT  47  /  AlkPhos  76  10-30                10-13 Chol 199 mg/dL LDL -- HDL 29 mg/dL Trig 277 mg/dL              POCT Blood Glucose.: 159 mg/dL (30 Oct 2023 12:11)  POCT Blood Glucose.: 130 mg/dL (30 Oct 2023 08:15)  POCT Blood Glucose.: 121 mg/dL (29 Oct 2023 21:49)  POCT Blood Glucose.: 128 mg/dL (29 Oct 2023 16:49)      Urinalysis Basic - ( 30 Oct 2023 06:22 )    Color: x / Appearance: x / SG: x / pH: x  Gluc: 128 mg/dL / Ketone: x  / Bili: x / Urobili: x   Blood: x / Protein: x / Nitrite: x   Leuk Esterase: x / RBC: x / WBC x   Sq Epi: x / Non Sq Epi: x / Bacteria: x        COVID-19 PCR: NotCrystal (10-27-23 @ 22:21)      MEDICATIONS  (STANDING):  apixaban 5 milliGRAM(s) Oral two times a day  atorvastatin 80 milliGRAM(s) Oral at bedtime  bisacodyl 5 milliGRAM(s) Oral at bedtime  citalopram 20 milliGRAM(s) Oral daily  dextrose 50% Injectable 25 Gram(s) IV Push once  famotidine    Tablet 20 milliGRAM(s) Oral daily  folic acid 1 milliGRAM(s) Oral daily  insulin lispro (ADMELOG) corrective regimen sliding scale   SubCutaneous Before meals and at bedtime  metFORMIN 500 milliGRAM(s) Oral two times a day  multivitamin 1 Tablet(s) Oral daily  polyethylene glycol 3350 17 Gram(s) Oral daily  senna 2 Tablet(s) Oral at bedtime  thiamine 100 milliGRAM(s) Oral daily    MEDICATIONS  (PRN):      Care Discussed with Consultants/Other Providers: Yes

## 2023-10-30 NOTE — PROGRESS NOTE ADULT - ASSESSMENT
59 y/o M with a Hx of stroke one year ago (at the time patient received TNK and had full  resolution of symptoms), HTN, DM, non-compliant with medications, who presented following an episode of LOC, as per family at around 6:15 pm patient had a sudden fall at work and lost consciousness. It is unclear if patient had any symptoms before he fell. NIHSS 10 on admission, admitted under NeuroICU on 10/12: Thrombectomy via L radial artery for TICI 3 revascularization of L P1-P2 occlusion. Noted LMCA chronic occlusion., on 0/13: Neurologic status improving. Got MR. Started on ASA and SQL, on 10/14: quadrantanopia now on L superior (originally R); Repeat stroke imaging completed and initial concern for CTP showing poss new perfusion deficit however on further review noted to be present on admission. Started on heparin drip per neurology recommendations, on 10/15: Therapeutic on heparin drip, stability HCT obtained. Cardiology consulted for further stroke work-up and downgraded under medicine.     #Acute CVA  s/p thrombectomy   TTE shows with  Normal left ventricular internal cavity size,  3. Left ventricular ejection fraction, by visual estimation, is 50 to 55%.  DVT studies negative  repeat CT head showed worsening of prior stroke  Per Neuro no need for ROCIO  Started on heparin drip given extensive atherosclerotic disease per Neuro. Switch to Eliquis. Needs for 3 months.    c/w Lipitor  BP goal 140-170 per neuro   Neuro checks   Neurology follow up noted   PT/OT     #Occluded R MCA/PCA  per discussion with neuro, pt with collaterals  c/w neuro checks  outpt neuro IR followup    #ETOH abuse  not in active withdrawal, and pt denies h/o DT  c/w MVI, thiamine (for suspected deficiency) and folic acid  Seen by SW      #Episode of bradycardia with concern for 2:1 block on tele  TTE EF 50-55%, no other structural abnormal   cards naomie recs  as per electrophysiology   - No indication for pacemaker  - Metoprolol 12.5mg BID has been discontinued   - s/p ILR on 10/20  - EP follow up noted      #HTN  No meds at home  Per neuro (Dr. Barkley), BP goal 140-170 mmHg   cont to monitor  Started Lisinopril 5 mg with holding parameters but holding it now as he is very sensitive to antihypertensive medications   Hydralazine 25 mg PRN if SBP > 170    #HLD  c/w Lipitor 80mg QD    #DM 2  non compliant with meds at home  A1C 8.6  Accu checks and ISS    #Elevated TSH  T4 level normal  outpt f/u    DVT Prophylaxis -- Patient is on Eliquis.     Dispo: [facility] pending Auth

## 2023-10-30 NOTE — PROGRESS NOTE ADULT - ASSESSMENT
Pt alert, fair attention, relatively intact expressive language, thought processes goal-directed, no abnormal thought contents noted, depressed affect, dysphoric mood, denied AH/VH, denied SI/HI/I/P, calm behavior. Plan: Continue the assessment process. Provide supportive tx.

## 2023-10-30 NOTE — PROGRESS NOTE ADULT - SUBJECTIVE AND OBJECTIVE BOX
HPI:    Patient is a 59 y/o M with a PHx HTN, DM, stroke one year ago (at the time patient received Tenecteplase and had full resolution of symptoms), who presented to [facility] on 10/12/23 following sudden fall at work and lost consciousness. NIHSS 10 on admission. Admitted under NeuroICU on 10/12. Imaging revealed ischemia in the posterior circulation, CT angiogram head/neck showed right PCA P1-2 cutoff, possibly chronic right MCA M1 cutoff which had distal reconstitution and established collaterals, and left PCA occlusion which patient was subsequently transferred for mechanical thrombectomy.    Cerebral angiogram and mechanical thrombectomy performed using aspiration with TICI 3 recanalization of left PCA. The right occipital lobe filled from right SHANI collaterals, and the right MCA as well was thought to be chronic given noted collateralization. MR with small to moderate left temporal occipital lobe infarctions. Occluded right MCA, occluded right PCA and right vertebral artery proximal segment. The patient was started on a hep gtt given extensive atherosclerotic disease which was switched to apixaban 5mg BID with recommendations for a three month course.    TTE with normal left ventricular internal cavity size. Left ventricular ejection fractio 50 to 55%. Per Neuro, no need for ROCIO at this time. Pt had an episode of bradycardia during his stay and there were concern for 2:1 block. ILR placed 10/20 and no indication for PPM at this time.     Patient was evaluated by PM&R and therapy for functional deficits, gait/ADL impairments and acute rehabilitation was recommended. Patient was medically optimized for discharge to [facility] IRF on 10/27/23.       Subjective:    Patient seen and examined at bedside.  No events overnight.       Denies any pain at this time.   Denies any-other complaints at this time.   Denies any fever, chills, body aches, cough, congestion or anyother symptoms at this time.   Participating in therapy.    ICU Vital Signs Last 24 Hrs  T(C): 36.9 (29 Oct 2023 21:45), Max: 36.9 (29 Oct 2023 21:45)  T(F): 98.4 (29 Oct 2023 21:45), Max: 98.4 (29 Oct 2023 21:45)  HR: 52 (29 Oct 2023 21:45) (52 - 52)  BP: 145/76 (29 Oct 2023 21:45) (145/76 - 145/76)  BP(mean): --  ABP: --  ABP(mean): --  RR: 16 (29 Oct 2023 21:45) (16 - 16)  SpO2: 97% (29 Oct 2023 21:45) (97% - 97%)    O2 Parameters below as of 29 Oct 2023 21:45  Patient On (Oxygen Delivery Method): room air      Review of system     Neurological deficits     Physical Exam:  Constitutional - NAD, Comfortable  HEENT - NCAT, EOMI  Neck - Supple, No limited ROM  Chest - good chest expansion, good respiratory effort, CTAB   Cardio - warm and well perfused, + loop recorder.   Abdomen - Obese, Mild tenderness to palpitation in all 4 quadrants. ND  Extremities - No peripheral edema, No calf tenderness. Long curling toenails.    Neurologic Exam:                    Cognitive -             Orientation: AA&Ox2 to self, year. Not to Month, day, location, Says he is in [place].             Attention:  Days of week, recall 1/3 objects without cuing. 2/3 with cuing.             Memory: Recent memory not intact. [employer] [place].             Thought: process, content appropriate     Speech - Fluent, Comprehensible, No dysarthria, No aphasia      Cranial Nerves - No facial asymmetry, Tongue midline, EOMI, Shoulder shrug intact. Vision Impaired on the Left upper visual field in L > R eye.      Motor -                      LEFT    UE - ShAB 4/5, EF 4-5/5, EE 4/5, WE 5/5,  WNL                     RIGHT UE - ShAB 5/5, EF 5/5, EE 5/5, WE 5/5,  WNL                    LEFT    LE - HF 5/5, KE 5/5, DF 5/5, PF 5/5                    RIGHT LE - HF 5/5, KE 5/5, DF 5/5, PF 5/5        Sensory - Impaired to LT on the left face (V2/3), arm and leg.      Reflexes - symmetric DTR +1 in LE and UE. Neg Babinski's b/l     Coordination - FTN / HTS intact b/l     OculoVestibular -  No nystagmus    LABS                           13.1   4.98  )-----------( 130      ( 30 Oct 2023 06:22 )             37.6     10-30    140  |  104  |  20  ----------------------------<  128<H>  4.1   |  27  |  0.91    Ca    9.0      30 Oct 2023 06:22    TPro  6.7  /  Alb  3.4  /  TBili  0.6  /  DBili  x   /  AST  24  /  ALT  47<H>  /  AlkPhos  76  10-30      MEDICATIONS  (STANDING):  apixaban 5 milliGRAM(s) Oral two times a day  atorvastatin 80 milliGRAM(s) Oral at bedtime  bisacodyl 5 milliGRAM(s) Oral at bedtime  citalopram 20 milliGRAM(s) Oral daily  dextrose 50% Injectable 25 Gram(s) IV Push once  famotidine    Tablet 20 milliGRAM(s) Oral daily  folic acid 1 milliGRAM(s) Oral daily  insulin lispro (ADMELOG) corrective regimen sliding scale   SubCutaneous Before meals and at bedtime  metFORMIN 500 milliGRAM(s) Oral two times a day  multivitamin 1 Tablet(s) Oral daily  polyethylene glycol 3350 17 Gram(s) Oral daily  senna 2 Tablet(s) Oral at bedtime  thiamine 100 milliGRAM(s) Oral daily    MEDICATIONS  (PRN):   HPI:    Patient is a 59 y/o M with a PHx HTN, DM, stroke one year ago (at the time patient received Tenecteplase and had full resolution of symptoms), who presented to [facility] on 10/12/23 following sudden fall at work and lost consciousness. NIHSS 10 on admission. Admitted under NeuroICU on 10/12. Imaging revealed ischemia in the posterior circulation, CT angiogram head/neck showed right PCA P1-2 cutoff, possibly chronic right MCA M1 cutoff which had distal reconstitution and established collaterals, and left PCA occlusion which patient was subsequently transferred for mechanical thrombectomy.    Cerebral angiogram and mechanical thrombectomy performed using aspiration with TICI 3 recanalization of left PCA. The right occipital lobe filled from right SHANI collaterals, and the right MCA as well was thought to be chronic given noted collateralization. MR with small to moderate left temporal occipital lobe infarctions. Occluded right MCA, occluded right PCA and right vertebral artery proximal segment. The patient was started on a hep gtt given extensive atherosclerotic disease which was switched to apixaban 5mg BID with recommendations for a three month course.    TTE with normal left ventricular internal cavity size. Left ventricular ejection fractio 50 to 55%. Per Neuro, no need for ROCIO at this time. Pt had an episode of bradycardia during his stay and there were concern for 2:1 block. ILR placed 10/20 and no indication for PPM at this time.     Patient was evaluated by PM&R and therapy for functional deficits, gait/ADL impairments and acute rehabilitation was recommended. Patient was medically optimized for discharge to [facility] IRF on 10/27/23.       Subjective:    Patient seen and examined at bedside.  No events overnight or over the weekend.   He has numbness on the left side - face, LUE and LLE. Denies any pain.   Denies any other pain at this time.   Denies any-other complaints at this time.   Denies any fever, chills, body aches, cough, congestion or anyother symptoms at this time.   Participating in therapy.  Last BM ( 10/30 )     ICU Vital Signs Last 24 Hrs  T(C): 36.9 (29 Oct 2023 21:45), Max: 36.9 (29 Oct 2023 21:45)  T(F): 98.4 (29 Oct 2023 21:45), Max: 98.4 (29 Oct 2023 21:45)  HR: 52 (29 Oct 2023 21:45) (52 - 52)  BP: 145/76 (29 Oct 2023 21:45) (145/76 - 145/76)  BP(mean): --  ABP: --  ABP(mean): --  RR: 16 (29 Oct 2023 21:45) (16 - 16)  SpO2: 97% (29 Oct 2023 21:45) (97% - 97%)    O2 Parameters below as of 29 Oct 2023 21:45  Patient On (Oxygen Delivery Method): room air      Review of system     Neurological deficits     Physical Exam:  Constitutional - NAD, Comfortable  HEENT - NCAT, EOMI  Neck - Supple, No limited ROM  Chest - good chest expansion, good respiratory effort, CTAB   Cardio - warm and well perfused, + loop recorder.   Abdomen - Obese. + BS.   Extremities - No peripheral edema, No calf tenderness. Long curling toenails.    Neurologic Exam:                    Cognitive -             Orientation: AA&Ox2 to self, year. not to Month, day, location.            Attention:  Days of week, recall 1/3 objects without cuing. 2/3 with cuing.             Memory: Recent memory not intact.             Thought: process, content appropriate     Speech - Fluent, Comprehensible, No dysarthria, No aphasia      Cranial Nerves - No facial asymmetry, Tongue midline, EOMI, Shoulder shrug intact. Vision Impaired on the Left upper visual field in L > R eye.      Motor -                      LEFT    UE - ShAB 4/5, EF 4-5/5, EE 4/5, WE 5/5,  WNL                     RIGHT UE - ShAB 5/5, EF 5/5, EE 5/5, WE 5/5,  WNL                    LEFT    LE - HF 5/5, KE 5/5, DF 5/5, PF 5/5                    RIGHT LE - HF 5/5, KE 5/5, DF 5/5, PF 5/5        Sensory - Impaired to LT on the left face (V2/3), arm and leg.      Reflexes - symmetric DTR +1 in LE and UE. Neg Babinski's b/l     Coordination - FTN / HTS intact b/l     OculoVestibular -  No nystagmus    LABS                           13.1   4.98  )-----------( 130      ( 30 Oct 2023 06:22 )             37.6     10-30    140  |  104  |  20  ----------------------------<  128<H>  4.1   |  27  |  0.91    Ca    9.0      30 Oct 2023 06:22    TPro  6.7  /  Alb  3.4  /  TBili  0.6  /  DBili  x   /  AST  24  /  ALT  47<H>  /  AlkPhos  76  10-30    CAPILLARY BLOOD GLUCOSE      POCT Blood Glucose.: 130 mg/dL (30 Oct 2023 08:15)  POCT Blood Glucose.: 121 mg/dL (29 Oct 2023 21:49)  POCT Blood Glucose.: 128 mg/dL (29 Oct 2023 16:49)  POCT Blood Glucose.: 187 mg/dL (29 Oct 2023 11:57)        MEDICATIONS  (STANDING):  apixaban 5 milliGRAM(s) Oral two times a day  atorvastatin 80 milliGRAM(s) Oral at bedtime  bisacodyl 5 milliGRAM(s) Oral at bedtime  citalopram 20 milliGRAM(s) Oral daily  dextrose 50% Injectable 25 Gram(s) IV Push once  famotidine    Tablet 20 milliGRAM(s) Oral daily  folic acid 1 milliGRAM(s) Oral daily  insulin lispro (ADMELOG) corrective regimen sliding scale   SubCutaneous Before meals and at bedtime  metFORMIN 500 milliGRAM(s) Oral two times a day  multivitamin 1 Tablet(s) Oral daily  polyethylene glycol 3350 17 Gram(s) Oral daily  senna 2 Tablet(s) Oral at bedtime  thiamine 100 milliGRAM(s) Oral daily    MEDICATIONS  (PRN):   HPI:    Patient is a 57 y/o M with a PHx HTN, DM, stroke one year ago (at the time patient received Tenecteplase and had full resolution of symptoms), who presented to [facility] on 10/12/23 following sudden fall at work and lost consciousness. NIHSS 10 on admission. Admitted under NeuroICU on 10/12. Imaging revealed ischemia in the posterior circulation, CT angiogram head/neck showed right PCA P1-2 cutoff, possibly chronic right MCA M1 cutoff which had distal reconstitution and established collaterals, and left PCA occlusion which patient was subsequently transferred for mechanical thrombectomy.    Cerebral angiogram and mechanical thrombectomy performed using aspiration with TICI 3 recanalization of left PCA. The right occipital lobe filled from right SHANI collaterals, and the right MCA as well was thought to be chronic given noted collateralization. MR with small to moderate left temporal occipital lobe infarctions. Occluded right MCA, occluded right PCA and right vertebral artery proximal segment. The patient was started on a hep gtt given extensive atherosclerotic disease which was switched to apixaban 5mg BID with recommendations for a three month course.    TTE with normal left ventricular internal cavity size. Left ventricular ejection fractio 50 to 55%. Per Neuro, no need for ROCIO at this time. Pt had an episode of bradycardia during his stay and there were concern for 2:1 block. ILR placed 10/20 and no indication for PPM at this time.     Patient was evaluated by PM&R and therapy for functional deficits, gait/ADL impairments and acute rehabilitation was recommended. Patient was medically optimized for discharge to [facility] IR[facility] on 10/27/23.       Subjective:    Patient seen and examined at bedside.  No events overnight or over the weekend. Voiding with PVR=10  He has numbness on the left side - face, LUE and LLE. Denies any pain.   Denies any other pain at this time.   Denies any-other complaints at this time.   Denies any fever, chills, body aches, cough, congestion or any other symptoms at this time.   Participating in therapy.  Last BM ( 10/30 )     ICU Vital Signs Last 24 Hrs  T(C): 36.9 (29 Oct 2023 21:45), Max: 36.9 (29 Oct 2023 21:45)  T(F): 98.4 (29 Oct 2023 21:45), Max: 98.4 (29 Oct 2023 21:45)  HR: 52 (29 Oct 2023 21:45) (52 - 52)  BP: 145/76 (29 Oct 2023 21:45) (145/76 - 145/76)  BP(mean): --  ABP: --  ABP(mean): --  RR: 16 (29 Oct 2023 21:45) (16 - 16)  SpO2: 97% (29 Oct 2023 21:45) (97% - 97%)    O2 Parameters below as of 29 Oct 2023 21:45  Patient On (Oxygen Delivery Method): room air      Review of system     Neurological deficits     Physical Exam:  Constitutional - NAD, Comfortable  HEENT - NCAT, EOMI  Neck - Supple, No limited ROM  Chest - good chest expansion, good respiratory effort, CTAB   Cardio - warm and well perfused, + loop recorder.   Abdomen - Obese. + BS.   Extremities - No peripheral edema, No calf tenderness. Long curling toenails.    Neurologic Exam:                    Cognitive -             Orientation: AA&Ox2 to self, year. not to Month, day, location.            Attention:  Days of week, recall 1/3 objects without cuing. 2/3 with cuing.             Memory: Recent memory not intact.             Thought: process, content appropriate     Speech - Fluent, Comprehensible, No dysarthria, No aphasia      Cranial Nerves - No facial asymmetry, Tongue midline, EOMI, Shoulder shrug intact. Vision Impaired on the Left upper visual field in L > R eye.      Motor -                      LEFT    UE - ShAB 4/5, EF 4-5/5, EE 4/5, WE 5/5,  WNL                     RIGHT UE - ShAB 5/5, EF 5/5, EE 5/5, WE 5/5,  WNL                    LEFT    LE - HF 5/5, KE 5/5, DF 5/5, PF 5/5                    RIGHT LE - HF 5/5, KE 5/5, DF 5/5, PF 5/5        Sensory - Impaired to LT on the left face (V2/3), arm and leg.      Reflexes - symmetric DTR +1 in LE and UE. Neg Babinski's b/l     Coordination - FTN / HTS intact b/l      LABS                           13.1   4.98  )-----------( 130      ( 30 Oct 2023 06:22 )             37.6     10-30    140  |  104  |  20  ----------------------------<  128<H>  4.1   |  27  |  0.91    Ca    9.0      30 Oct 2023 06:22    TPro  6.7  /  Alb  3.4  /  TBili  0.6  /  DBili  x   /  AST  24  /  ALT  47<H>  /  AlkPhos  76  10-30    CAPILLARY BLOOD GLUCOSE      POCT Blood Glucose.: 130 mg/dL (30 Oct 2023 08:15)  POCT Blood Glucose.: 121 mg/dL (29 Oct 2023 21:49)  POCT Blood Glucose.: 128 mg/dL (29 Oct 2023 16:49)  POCT Blood Glucose.: 187 mg/dL (29 Oct 2023 11:57)        MEDICATIONS  (STANDING):  apixaban 5 milliGRAM(s) Oral two times a day  atorvastatin 80 milliGRAM(s) Oral at bedtime  bisacodyl 5 milliGRAM(s) Oral at bedtime  citalopram 20 milliGRAM(s) Oral daily  dextrose 50% Injectable 25 Gram(s) IV Push once  famotidine    Tablet 20 milliGRAM(s) Oral daily  folic acid 1 milliGRAM(s) Oral daily  insulin lispro (ADMELOG) corrective regimen sliding scale   SubCutaneous Before meals and at bedtime  metFORMIN 500 milliGRAM(s) Oral two times a day  multivitamin 1 Tablet(s) Oral daily  polyethylene glycol 3350 17 Gram(s) Oral daily  senna 2 Tablet(s) Oral at bedtime  thiamine 100 milliGRAM(s) Oral daily    MEDICATIONS  (PRN):

## 2023-10-30 NOTE — PROGRESS NOTE ADULT - ATTENDING COMMENTS
Pt. seen with resident.  Agree with documentation above as per resident with amendments made as appropriate. Patient medically stable. Making progress towards rehab goals.       left temporal occipital lobe infarctions and acute punctate left occipital lobe infarction now s/p thrombectomy and Right MCA/PCA occlusion  Labs and VS reviewed-- stable  --voiding well with low PVRs

## 2023-10-30 NOTE — PROGRESS NOTE ADULT - SUBJECTIVE AND OBJECTIVE BOX
Pt was seen for 30 min for supportive tx. Pt c/o fatigue, memory loss. Reviewed chart, approached Pt for an initial consult, introduced the role of neuropsychology in the rehab team, and explained the nature and purpose of the consult. Pt is a 58 y/ male who presented to [facility] on 10/12/23 following sudden fall at work and lost consciousness. NIHSS 10 on admission. Admitted under NeuroICU on 10/12. Imaging revealed ischemia in the posterior circulation, CT angiogram head/neck showed right PCA P1-2 cutoff, possibly chronic right MCA M1 cutoff which had distal reconstitution and established collaterals, and left PCA occlusion. Cerebral angiogram and mechanical thrombectomy performed using aspiration with TICI 3 recanalization of left PCA. MR with small to moderate left temporal occipital lobe infarctions. Occluded right MCA, occluded right PCA and right vertebral artery proximal segment. Pt agreed to participate. Session focused on establishing rapport with Pt, getting familiarized with his personal hx, and assessing his emotional functioning. Pt was a fair historian in regards to his social hx, but had great difficulty providing information about his present illness. However, he was able to answer all questions asked to explore his present mental state. Pt expressed concern about his daughter, a teenager, who is currently under the care of his siblings (his wife passed away some time ago). Pt reported feeling exhausted, and also reported sad mood, anxiety, helplessness and hopelessness. Agreed to continue working on his cognitive evaluation in our next meeting. Support and encouragement were provided.

## 2023-10-30 NOTE — PROGRESS NOTE ADULT - ASSESSMENT
Patient is a 59 y/o M with a PHx HTN, DM, CVA sp tnK with full resolution symptoms, who presented to [facility] on 10/12 due to esudden fall at work and lost consciousness.  MRI brain showed left temporal occipital lobe infarctions and acute punctate left occipital lobe infarction now s/p thrombectomy.     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 Continue for 3 months.    - Atorvastatin 80mg qhs.   - begin comprehensive rehab program OT, PT, SLP  3 hours daily 5 x week  - neuropsychology evaluation and support, recreation therapy  - Precautions: cardiac, DM, respiratory, breast CA, fall, aspiration.    # Bradycardia  - No indication for pacemaker  - Metoprolol 12.5mg BID has been discontinued   - s/p ILR on 10/20  - hospitalist consult    # HTN  - No medication at home and patient is sensitive to HTN medication. Was previously started on lisinopril.   - monitor vitals, hospitalist consult    # HLD  - Atorvastatin 80 mg daily    # DM 2  - A1C 8.6  - Accu checks and ISS  - nutrition and hospitalist consults    # ETOH abuse  - Not in active withdrawal.  - Continue MVI, thiamine and folic acid    # Pain management  - Tylenol PRN    # GI/Bowel:  - unknown when his last bowel movement was.   - Senna QHS, Miralax Daily  - dulcolax PO now. As patient denies suppository.   - GI ppx: Protonix    # /Bladder:   - At risk for incontinence and retention due to neurologic diagnosis and limited mobility  - Continue catheter/bladder nursing protocol with bladder scans per routine with straight cath for >400cc.  - Encourage timed voids every 4 hours while awake for independence and to promote continence during therapy.    # Skin/Pressure Injury:   - At risk for pressure injury due to neurologic diagnosis and relative immobility.  - Skin assessment on admission: No sacral ulcer. Left patella callus. Long toe nails.   - Turn every 2 hours while in bed, air mattress  - Soft heel protectors  - Skin barrier cream as needed  - Nursing to monitor skin Qshift    # FEN   - Diet: DASH DIET   - nutrition consult    # DVT ppx  - Eliquis 5mg BID    Outpatient Follow-up (Specialty/Name of physician):    Rj Delgado  Cardiac Electrophysiology  [address] [place] [address], [address] [place], [place] [id number]  Phone: [phone]  Fax: [phone]  Follow Up Time:     Wily Barkley  Neurology  [address] [place] [address]  Phone: [phone]  Fax: [phone]  Follow Up Time: 1 week    Denzel Zapata  Interventional Cardiology  [address] [facility], [place] [id number]  Phone: [phone]  Fax: [phone]  Follow Up Time: 1 week    PCP,   Phone: (   )    -  Fax: (   )    -  Follow Up Time: 1 week    Arsenio Goodrich  Interventional Neuroradiology  [address] [place] [id number]  Phone: [phone]  Fax: [phone]   Patient is a 59 y/o M with a PHx HTN, DM, CVA sp tnK with full resolution symptoms, who presented to [facility] on 10/12 due to esudden fall at work and lost consciousness.  MRI brain showed left temporal occipital lobe infarctions and acute punctate left occipital lobe infarction now s/p thrombectomy.     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 Continue for 3 months.    - Atorvastatin 80mg qhs.   - begin comprehensive rehab program OT, PT, SLP  3 hours daily 5 x week  - neuropsychology evaluation and support, recreation therapy  - Precautions: cardiac, DM, respiratory, breast CA, fall, aspiration.    # Bradycardia  - No indication for pacemaker  - Metoprolol 12.5mg BID has been discontinued   - s/p ILR on 10/20  - HR -  (52 - 52) - 10/30   - Monitor.     # HTN  - No medication at home and patient is sensitive to HTN medication. Was previously started on lisinopril.   - monitor vitals, hospitalist consult    # HLD  - Atorvastatin 80 mg daily    # DM 2  - A1C 8.6  - Accu checks and ISS  - nutrition and hospitalist consults    # ETOH abuse  - Not in active withdrawal.  - Continue MVI, thiamine and folic acid    # Pain management  - Tylenol PRN    # GI/Bowel:  - unknown when his last bowel movement was.   - Senna QHS, Miralax Daily  - dulcolax PO now. As patient denies suppository.   - GI ppx: Protonix    # /Bladder:   - At risk for incontinence and retention due to neurologic diagnosis and limited mobility  - Continue catheter/bladder nursing protocol with bladder scans per routine with straight cath for >400cc.  - Encourage timed voids every 4 hours while awake for independence and to promote continence during therapy.    # Skin/Pressure Injury:   - At risk for pressure injury due to neurologic diagnosis and relative immobility.  - Skin assessment on admission: No sacral ulcer. Left patella callus. Long toe nails.   - Turn every 2 hours while in bed, air mattress  - Soft heel protectors  - Skin barrier cream as needed  - Nursing to monitor skin Qshift    # FEN   - Diet: DASH DIET   - nutrition consult    # DVT ppx  - Eliquis 5mg BID    Outpatient Follow-up (Specialty/Name of physician):    Rj Delgado  Cardiac Electrophysiology  [address] [address]  Phone: [phone]  Fax: [phone]  Follow Up Time:     Wily Barkley  Neurology  [address] [place] [address]  Phone: [phone]  Fax: [phone]  Follow Up Time: 1 week    Denzel Zapata  Interventional Cardiology  [address] [place] [id number]  Phone: [phone]  Fax: [phone]  Follow Up Time: 1 week    PCP,   Phone: (   )    -  Fax: (   )    -  Follow Up Time: 1 week    Arsenio Goodrich  Interventional Neuroradiology  [address] [place] [id number]  Phone: [phone]  Fax: [phone]   Patient is a 57 y/o M with a PHx HTN, DM, CVA sp tnK with full resolution symptoms, who presented to [facility] on 10/12 due to esudden fall at work and lost consciousness.  MRI brain showed left temporal occipital lobe infarctions and acute punctate left occipital lobe infarction now s/p thrombectomy.     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 Continue for 3 months.    - Atorvastatin 80mg qhs.   - begin comprehensive rehab program OT, PT, SLP  3 hours daily 5 x week  - neuropsychology evaluation and support, recreation therapy  - Precautions: cardiac, DM, respiratory, breast CA, fall, aspiration.    # Bradycardia  - No indication for pacemaker  - Metoprolol 12.5mg BID has been discontinued   - s/p ILR on 10/20  - HR -  (52 - 52) - 10/30   - Monitor.     # HTN  - No medication at home and patient is sensitive to HTN medication. Was previously started on lisinopril.   - BP - 145/76 - 145/76 - 10/30   - monitor vitals.    # HLD  - Atorvastatin 80 mg daily    # DM 2  - A1C 8.6  - Accu checks and ISS  - nutrition and hospitalist consults    # ETOH abuse  - Not in active withdrawal.  - Continue MVI, thiamine and folic acid    # Pain management  - Tylenol PRN    # GI/Bowel:  - unknown when his last bowel movement was.   - Senna QHS, Miralax Daily  - dulcolax PO now. As patient denies suppository.   - GI ppx: Protonix    # /Bladder:   - Encourage timed voids every 4 hours while awake for independence and to promote continence during therapy.    # Skin/Pressure Injury:   - Turn every 2 hours while in bed, air mattress    # FEN   - Diet: DASH DIET       # DVT ppx  - Eliquis 5mg BID    Outpatient Follow-up (Specialty/Name of physician):    Rj Delgado  Cardiac Electrophysiology  [address] [facility], [address]  [place] [address]  Phone: [phone]  Fax: [phone]  Follow Up Time:     Wily Barkley  Neurology  [address] [place] [address]  Phone: [phone]  Fax: [phone]  Follow Up Time: 1 week    Denzel Zapata  Interventional Cardiology  [address] [facility], [address]  [place] [address]  Phone: [phone]  Fax: [phone]  Follow Up Time: 1 week    PCP,   Phone: (   )    -  Fax: (   )    -  Follow Up Time: 1 week    Arsenio Goodrich  Interventional Neuroradiology  [address] [place] [address]  Phone: [phone]  Fax: [phone]   Patient is a 59 y/o M with a PHx HTN, DM, CVA sp tnK with full resolution symptoms, who presented to [facility] on 10/12 due to esudden fall at work and lost consciousness.  MRI brain showed left temporal occipital lobe infarctions and acute punctate left occipital lobe infarction now s/p thrombectomy.     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 Continue for 3 months.    - Atorvastatin 80mg qhs.   - begin comprehensive rehab program OT, PT, SLP  3 hours daily 5 x week  - neuropsychology evaluation and support, recreation therapy  - Precautions: cardiac, DM, respiratory, breast CA, fall, aspiration.    # Bradycardia  - No indication for pacemaker  - Metoprolol 12.5mg BID has been discontinued   - s/p ILR on 10/20  - HR -  (52 - 52) - 10/30   - Monitor.     # HTN  - No medication at home and patient is sensitive to HTN medication. Was previously started on lisinopril.   - BP - 145/76 - 145/76 - 10/30   - monitor vitals.    # HLD  - Atorvastatin 80 mg daily    # DM 2  - A1C 8.6  - Accu checks and ISS    # ETOH abuse  - Not in active withdrawal.  - Continue MVI, thiamine and folic acid    # Pain management  - Tylenol PRN    # GI/Bowel:  - Senna QHS, Miralax Daily  - dulcolax PO.   - GI ppx: Protonix    # /Bladder:   - Encourage timed voids every 4 hours while awake for independence and to promote continence during therapy.    # FEN   - Diet: DASH DIET       # DVT ppx  - Eliquis 5mg BID    Outpatient Follow-up (Specialty/Name of physician):    Rj Delgado  Cardiac Electrophysiology  [address] [place] [address], [address] [address]  Phone: [phone]  Fax: [phone]  Follow Up Time:     Wily Barkley  Neurology  [address] [place] [address]  Phone: [phone]  Fax: [phone]  Follow Up Time: 1 week    Denzel Zapata  Interventional Cardiology  [address] [place] [address], [address] [address]  Phone: [phone]  Fax: [phone]  Follow Up Time: 1 week    PCP,   Phone: (   )    -  Fax: (   )    -  Follow Up Time: 1 week    Arsenio Goodrich  Interventional Neuroradiology  [address] [place] [address]  Phone: [phone]  Fax: [phone]   Patient is a 59 y/o M with a PHx HTN, DM, CVA sp tnK with full resolution symptoms, who presented to [facility] on 10/12 due to sudden fall at work and lost consciousness.  MRI brain showed left temporal occipital lobe infarctions and acute punctate left occipital lobe infarction now s/p thrombectomy and Right MCA/PCA oclusion with left sided weakness, sensory impairment, left homonymous hemianopsia, gait and ADL impairments.     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 Continue for 3 months.    - Atorvastatin 80mg qhs.   - begin comprehensive rehab program OT, PT, SLP  3 hours daily 5 x week  - neuropsychology evaluation and support, recreation therapy  - Precautions: cardiac, DM, respiratory, breast CA, fall, aspiration.    # Bradycardia  - No indication for pacemaker  - Metoprolol 12.5mg BID has been discontinued   - s/p ILR on 10/20  - HR -  (52 - 52) - 10/30   - Monitor.     # HTN  - No medication at home and patient is sensitive to HTN medication. Was previously started on lisinopril.   - BP - 145/76 - 145/76 - 10/30   - monitor vitals.    # HLD  - Atorvastatin 80 mg daily    # DM 2  - A1C 8.6  - Accu checks and ISS    # ETOH abuse  - Not in active withdrawal.  - Continue MVI, thiamine and folic acid    # Pain management  - Tylenol PRN    # GI/Bowel:  - Senna QHS, Miralax Daily  - dulcolax PO.   - GI ppx: Protonix    # /Bladder:   -Voiding with low PVRs-- d/c monitoring  --continent    # FEN   - Diet: DASH DIET       # DVT ppx  - Eliquis 5mg BID    Outpatient Follow-up (Specialty/Name of physician):    Rj Delgado  Cardiac Electrophysiology  [address] [facility], [address] [place] [address]  Phone: [phone]  Fax: [phone]  Follow Up Time:     Wily Barkley  Neurology  [address] [place] [address]  Phone: [phone]  Fax: [phone]  Follow Up Time: 1 week    Denzel Zapata  Interventional Cardiology  [address] [facility], [address]  [place] [address]  Phone: [phone]  Fax: [phone]  Follow Up Time: 1 week    PCP,   Phone: (   )    -  Fax: (   )    -  Follow Up Time: 1 week    Arsenio Goodrich  Interventional Neuroradiology  [address] [place] [address]  Phone: [phone]  Fax: [phone]

## 2023-10-31 LAB
GLUCOSE BLDC GLUCOMTR-MCNC: 108 MG/DL — HIGH (ref 70–99)
GLUCOSE BLDC GLUCOMTR-MCNC: 108 MG/DL — HIGH (ref 70–99)
GLUCOSE BLDC GLUCOMTR-MCNC: 131 MG/DL — HIGH (ref 70–99)
GLUCOSE BLDC GLUCOMTR-MCNC: 131 MG/DL — HIGH (ref 70–99)
GLUCOSE BLDC GLUCOMTR-MCNC: 134 MG/DL — HIGH (ref 70–99)
GLUCOSE BLDC GLUCOMTR-MCNC: 134 MG/DL — HIGH (ref 70–99)
GLUCOSE BLDC GLUCOMTR-MCNC: 146 MG/DL — HIGH (ref 70–99)
GLUCOSE BLDC GLUCOMTR-MCNC: 146 MG/DL — HIGH (ref 70–99)

## 2023-10-31 PROCEDURE — 99232 SBSQ HOSP IP/OBS MODERATE 35: CPT

## 2023-10-31 PROCEDURE — 93010 ELECTROCARDIOGRAM REPORT: CPT

## 2023-10-31 RX ADMIN — ATORVASTATIN CALCIUM 80 MILLIGRAM(S): 80 TABLET, FILM COATED ORAL at 21:28

## 2023-10-31 RX ADMIN — Medication 100 MILLIGRAM(S): at 11:17

## 2023-10-31 RX ADMIN — METFORMIN HYDROCHLORIDE 500 MILLIGRAM(S): 850 TABLET ORAL at 17:00

## 2023-10-31 RX ADMIN — CITALOPRAM 20 MILLIGRAM(S): 10 TABLET, FILM COATED ORAL at 11:17

## 2023-10-31 RX ADMIN — FAMOTIDINE 20 MILLIGRAM(S): 10 INJECTION INTRAVENOUS at 11:17

## 2023-10-31 RX ADMIN — Medication 1 MILLIGRAM(S): at 11:17

## 2023-10-31 RX ADMIN — APIXABAN 5 MILLIGRAM(S): 2.5 TABLET, FILM COATED ORAL at 17:00

## 2023-10-31 RX ADMIN — APIXABAN 5 MILLIGRAM(S): 2.5 TABLET, FILM COATED ORAL at 05:30

## 2023-10-31 RX ADMIN — Medication 1 TABLET(S): at 11:17

## 2023-10-31 RX ADMIN — METFORMIN HYDROCHLORIDE 500 MILLIGRAM(S): 850 TABLET ORAL at 07:46

## 2023-10-31 NOTE — PROGRESS NOTE ADULT - SUBJECTIVE AND OBJECTIVE BOX
worked with PT today with HR intermittently in the 30's      PHYSICAL EXAM:    Vital Signs Last 24 Hrs  T(F): 98 (31 Oct 2023 10:25), Max: 98 (31 Oct 2023 10:25)  HR: 54 (31 Oct 2023 10:25) (49 - 54)  BP: 151/82 (31 Oct 2023 10:25) (151/82 - 154/97)  RR: 16 (31 Oct 2023 10:25) (16 - 16)  SpO2: 98% (31 Oct 2023 10:25) (97% - 98%)  I&O's Summary      GENERAL: NAD  HEENT: NCAT  CHEST/LUNG: No increased WOB, Clear to percussion bilaterally; No rales, rhonchi, wheezing  HEART: +bradycardia S1 and S2, No murmurs  ABDOMEN: Soft, Nontender, Nondistended; Bowel sounds present  MUSCULOSKELETAL/EXTREMITIES:  2+ Peripheral Pulses, No LE edema  PSYCH: Appropriate affect, Alert & Oriented    LABS:                        13.1   4.98  )-----------( 130      ( 30 Oct 2023 06:22 )             37.6       10-30    140  |  104  |  20  ----------------------------<  128  4.1   |  27  |  0.91    Ca    9.0      30 Oct 2023 06:22    TPro  6.7  /  Alb  3.4  /  TBili  0.6  /  DBili  x   /  AST  24  /  ALT  47  /  AlkPhos  76  10-30                10-13 Chol 199 mg/dL LDL -- HDL 29 mg/dL Trig 277 mg/dL              POCT Blood Glucose.: 146 mg/dL (31 Oct 2023 11:17)  POCT Blood Glucose.: 134 mg/dL (31 Oct 2023 07:41)  POCT Blood Glucose.: 110 mg/dL (30 Oct 2023 21:50)  POCT Blood Glucose.: 141 mg/dL (30 Oct 2023 16:34)      Urinalysis Basic - ( 30 Oct 2023 06:22 )    Color: x / Appearance: x / SG: x / pH: x  Gluc: 128 mg/dL / Ketone: x  / Bili: x / Urobili: x   Blood: x / Protein: x / Nitrite: x   Leuk Esterase: x / RBC: x / WBC x   Sq Epi: x / Non Sq Epi: x / Bacteria: x        COVID-19 PCR: Ml (10-27-23 @ 22:21)      MEDICATIONS  (STANDING):  apixaban 5 milliGRAM(s) Oral two times a day  atorvastatin 80 milliGRAM(s) Oral at bedtime  bisacodyl 5 milliGRAM(s) Oral at bedtime  citalopram 20 milliGRAM(s) Oral daily  dextrose 50% Injectable 25 Gram(s) IV Push once  famotidine    Tablet 20 milliGRAM(s) Oral daily  folic acid 1 milliGRAM(s) Oral daily  insulin lispro (ADMELOG) corrective regimen sliding scale   SubCutaneous Before meals and at bedtime  metFORMIN 500 milliGRAM(s) Oral two times a day  multivitamin 1 Tablet(s) Oral daily  polyethylene glycol 3350 17 Gram(s) Oral daily  senna 2 Tablet(s) Oral at bedtime  thiamine 100 milliGRAM(s) Oral daily    MEDICATIONS  (PRN):      Care Discussed with Consultants/Other Providers: Yes

## 2023-10-31 NOTE — PROGRESS NOTE ADULT - SUBJECTIVE AND OBJECTIVE BOX
HPI:    Patient is a 59 y/o M with a PHx HTN, DM, stroke one year ago (at the time patient received Tenecteplase and had full resolution of symptoms), who presented to [facility] on 10/12/23 following sudden fall at work and lost consciousness. NIHSS 10 on admission. Admitted under NeuroICU on 10/12. Imaging revealed ischemia in the posterior circulation, CT angiogram head/neck showed right PCA P1-2 cutoff, possibly chronic right MCA M1 cutoff which had distal reconstitution and established collaterals, and left PCA occlusion which patient was subsequently transferred for mechanical thrombectomy.    Cerebral angiogram and mechanical thrombectomy performed using aspiration with TICI 3 recanalization of left PCA. The right occipital lobe filled from right SHANI collaterals, and the right MCA as well was thought to be chronic given noted collateralization. MR with small to moderate left temporal occipital lobe infarctions. Occluded right MCA, occluded right PCA and right vertebral artery proximal segment. The patient was started on a hep gtt given extensive atherosclerotic disease which was switched to apixaban 5mg BID with recommendations for a three month course.    TTE with normal left ventricular internal cavity size. Left ventricular ejection fractio 50 to 55%. Per Neuro, no need for ROCIO at this time. Pt had an episode of bradycardia during his stay and there were concern for 2:1 block. ILR placed 10/20 and no indication for PPM at this time.     Patient was evaluated by PM&R and therapy for functional deficits, gait/ADL impairments and acute rehabilitation was recommended. Patient was medically optimized for discharge to [facility] IRF on 10/27/23.       Subjective:    Patient seen and examined at bedside.  No events overnight or over the weekend. Voiding with PVR=10  He has numbness on the left side - face, LUE and LLE. Denies any pain.   Denies any other pain at this time.   Denies any-other complaints at this time.   Denies any fever, chills, body aches, cough, congestion or any other symptoms at this time.   Participating in therapy.  Last BM ( 10/30 )     ICU Vital Signs Last 24 Hrs  T(C): 36.6 (30 Oct 2023 19:30), Max: 36.7 (30 Oct 2023 10:07)  T(F): 97.9 (30 Oct 2023 19:30), Max: 98 (30 Oct 2023 10:07)  HR: 49 (30 Oct 2023 19:30) (49 - 56)  BP: 154/97 (30 Oct 2023 19:30) (140/72 - 154/97)  BP(mean): --  ABP: --  ABP(mean): --  RR: 16 (30 Oct 2023 19:30) (16 - 16)  SpO2: 97% (30 Oct 2023 19:30) (95% - 97%)    O2 Parameters below as of 30 Oct 2023 19:30  Patient On (Oxygen Delivery Method): room air        Review of system     Neurological deficits     Physical Exam:  Constitutional - NAD, Comfortable  HEENT - NCAT, EOMI  Neck - Supple, No limited ROM  Chest - good chest expansion, good respiratory effort, CTAB   Cardio - warm and well perfused, + loop recorder.   Abdomen - Obese. + BS.   Extremities - No peripheral edema, No calf tenderness. Long curling toenails.    Neurologic Exam:                    Cognitive -             Orientation: AA&Ox2 to self, year. not to Month, day, location.            Attention:  Days of week, recall 1/3 objects without cuing. 2/3 with cuing.             Memory: Recent memory not intact.             Thought: process, content appropriate     Speech - Fluent, Comprehensible, No dysarthria, No aphasia      Cranial Nerves - No facial asymmetry, Tongue midline, EOMI, Shoulder shrug intact. Vision Impaired on the Left upper visual field in L > R eye.      Motor -                      LEFT    UE - ShAB 4/5, EF 4-5/5, EE 4/5, WE 5/5,  WNL                     RIGHT UE - ShAB 5/5, EF 5/5, EE 5/5, WE 5/5,  WNL                    LEFT    LE - HF 5/5, KE 5/5, DF 5/5, PF 5/5                    RIGHT LE - HF 5/5, KE 5/5, DF 5/5, PF 5/5        Sensory - Impaired to LT on the left face (V2/3), arm and leg.      Reflexes - symmetric DTR +1 in LE and UE. Neg Babinski's b/l     Coordination - FTN / HTS intact b/l      LABS     CAPILLARY BLOOD GLUCOSE      POCT Blood Glucose.: 134 mg/dL (31 Oct 2023 07:41)  POCT Blood Glucose.: 110 mg/dL (30 Oct 2023 21:50)  POCT Blood Glucose.: 141 mg/dL (30 Oct 2023 16:34)  POCT Blood Glucose.: 159 mg/dL (30 Oct 2023 12:11)        MEDICATIONS  (STANDING):  apixaban 5 milliGRAM(s) Oral two times a day  atorvastatin 80 milliGRAM(s) Oral at bedtime  bisacodyl 5 milliGRAM(s) Oral at bedtime  citalopram 20 milliGRAM(s) Oral daily  dextrose 50% Injectable 25 Gram(s) IV Push once  famotidine    Tablet 20 milliGRAM(s) Oral daily  folic acid 1 milliGRAM(s) Oral daily  insulin lispro (ADMELOG) corrective regimen sliding scale   SubCutaneous Before meals and at bedtime  metFORMIN 500 milliGRAM(s) Oral two times a day  multivitamin 1 Tablet(s) Oral daily  polyethylene glycol 3350 17 Gram(s) Oral daily  senna 2 Tablet(s) Oral at bedtime  thiamine 100 milliGRAM(s) Oral daily    MEDICATIONS  (PRN):   HPI:    Patient is a 59 y/o M with a PHx HTN, DM, stroke one year ago (at the time patient received Tenecteplase and had full resolution of symptoms), who presented to [facility] on 10/12/23 following sudden fall at work and lost consciousness. NIHSS 10 on admission. Admitted under NeuroICU on 10/12. Imaging revealed ischemia in the posterior circulation, CT angiogram head/neck showed right PCA P1-2 cutoff, possibly chronic right MCA M1 cutoff which had distal reconstitution and established collaterals, and left PCA occlusion which patient was subsequently transferred for mechanical thrombectomy.    Cerebral angiogram and mechanical thrombectomy performed using aspiration with TICI 3 recanalization of left PCA. The right occipital lobe filled from right SHANI collaterals, and the right MCA as well was thought to be chronic given noted collateralization. MR with small to moderate left temporal occipital lobe infarctions. Occluded right MCA, occluded right PCA and right vertebral artery proximal segment. The patient was started on a hep gtt given extensive atherosclerotic disease which was switched to apixaban 5mg BID with recommendations for a three month course.    TTE with normal left ventricular internal cavity size. Left ventricular ejection fractio 50 to 55%. Per Neuro, no need for ROCIO at this time. Pt had an episode of bradycardia during his stay and there were concern for 2:1 block. ILR placed 10/20 and no indication for PPM at this time.     Patient was evaluated by PM&R and therapy for functional deficits, gait/ADL impairments and acute rehabilitation was recommended. Patient was medically optimized for discharge to [facility] IRF on 10/27/23.       Subjective:    Patient seen and examined at bedside.  No events overnight or over the weekend. Voiding with PVR=10  He has numbness on the left side - face, LUE and LLE. Denies any pain.   Denies any other pain at this time.   Denies any-other complaints at this time.   Denies any fever, chills, body aches, cough, congestion or any other symptoms at this time.   Participating in therapy.  Last BM ( 10/30 )     ICU Vital Signs Last 24 Hrs  T(C): 36.6 (30 Oct 2023 19:30), Max: 36.7 (30 Oct 2023 10:07)  T(F): 97.9 (30 Oct 2023 19:30), Max: 98 (30 Oct 2023 10:07)  HR: 49 (30 Oct 2023 19:30) (49 - 56)  BP: 154/97 (30 Oct 2023 19:30) (140/72 - 154/97)  BP(mean): --  ABP: --  ABP(mean): --  RR: 16 (30 Oct 2023 19:30) (16 - 16)  SpO2: 97% (30 Oct 2023 19:30) (95% - 97%)    O2 Parameters below as of 30 Oct 2023 19:30  Patient On (Oxygen Delivery Method): room air        Review of system     Neurological deficits     Physical Exam:  Constitutional - NAD, Comfortable  HEENT - NCAT, EOMI  Neck - Supple, No limited ROM  Chest - good chest expansion, good respiratory effort, CTAB   Cardio - warm and well perfused, + loop recorder.   Abdomen - Obese. + BS.   Extremities - No peripheral edema, No calf tenderness. Long curling toenails.    Neurologic Exam:                    Cognitive -             Orientation: AA&Ox2 to self, year. not to Month, day, location.            Attention:  Days of week, recall 1/3 objects without cuing. 2/3 with cuing.             Memory: Recent memory not intact.             Thought: process, content appropriate     Speech - Fluent, Comprehensible, No dysarthria, No aphasia      Cranial Nerves - No facial asymmetry, Tongue midline, EOMI, Shoulder shrug intact. Vision Impaired on the Left upper visual field in L > R eye.      Motor -                      LEFT    UE - ShAB 4/5, EF 4-5/5, EE 4/5, WE 5/5,  WNL                     RIGHT UE - ShAB 5/5, EF 5/5, EE 5/5, WE 5/5,  WNL                    LEFT    LE - HF 5/5, KE 5/5, DF 5/5, PF 5/5                    RIGHT LE - HF 5/5, KE 5/5, DF 5/5, PF 5/5        Sensory - Impaired to LT on the left face (V2/3), arm and leg.      Reflexes - symmetric DTR +1 in LE and UE. Neg Babinski's b/l     Coordination - FTN / HTS intact b/l      LABS     CAPILLARY BLOOD GLUCOSE      POCT Blood Glucose.: 134 mg/dL (31 Oct 2023 07:41)  POCT Blood Glucose.: 110 mg/dL (30 Oct 2023 21:50)  POCT Blood Glucose.: 141 mg/dL (30 Oct 2023 16:34)  POCT Blood Glucose.: 159 mg/dL (30 Oct 2023 12:11)        MEDICATIONS  (STANDING):  apixaban 5 milliGRAM(s) Oral two times a day  atorvastatin 80 milliGRAM(s) Oral at bedtime  bisacodyl 5 milliGRAM(s) Oral at bedtime  citalopram 20 milliGRAM(s) Oral daily  dextrose 50% Injectable 25 Gram(s) IV Push once  famotidine    Tablet 20 milliGRAM(s) Oral daily  folic acid 1 milliGRAM(s) Oral daily  insulin lispro (ADMELOG) corrective regimen sliding scale   SubCutaneous Before meals and at bedtime  metFORMIN 500 milliGRAM(s) Oral two times a day  multivitamin 1 Tablet(s) Oral daily  polyethylene glycol 3350 17 Gram(s) Oral daily  senna 2 Tablet(s) Oral at bedtime  thiamine 100 milliGRAM(s) Oral daily    MEDICATIONS  (PRN):   HPI:    Patient is a 59 y/o M with a PHx HTN, DM, stroke one year ago (at the time patient received Tenecteplase and had full resolution of symptoms), who presented to [facility] on 10/12/23 following sudden fall at work and lost consciousness. NIHSS 10 on admission. Admitted under NeuroICU on 10/12. Imaging revealed ischemia in the posterior circulation, CT angiogram head/neck showed right PCA P1-2 cutoff, possibly chronic right MCA M1 cutoff which had distal reconstitution and established collaterals, and left PCA occlusion which patient was subsequently transferred for mechanical thrombectomy.    Cerebral angiogram and mechanical thrombectomy performed using aspiration with TICI 3 recanalization of left PCA. The right occipital lobe filled from right SHANI collaterals, and the right MCA as well was thought to be chronic given noted collateralization. MR with small to moderate left temporal occipital lobe infarctions. Occluded right MCA, occluded right PCA and right vertebral artery proximal segment. The patient was started on a hep gtt given extensive atherosclerotic disease which was switched to apixaban 5mg BID with recommendations for a three month course.    TTE with normal left ventricular internal cavity size. Left ventricular ejection fractio 50 to 55%. Per Neuro, no need for ROCIO at this time. Pt had an episode of bradycardia during his stay and there were concern for 2:1 block. ILR placed 10/20 and no indication for PPM at this time.     Patient was evaluated by PM&R and therapy for functional deficits, gait/ADL impairments and acute rehabilitation was recommended. Patient was medically optimized for discharge to [facility] IRF on 10/27/23.       Subjective:    Patient seen and examined during OT  No events overnight.   He states he continues to have short term memory loss. He states the long-term memory is intact.   Denies any other pain at this time.   Denies any-other complaints at this time.   Denies any fever, chills, body aches, cough, congestion or any other symptoms at this time.   Participating in therapy.  Last BM ( 10/30 )     ICU Vital Signs Last 24 Hrs  T(C): 36.6 (30 Oct 2023 19:30), Max: 36.7 (30 Oct 2023 10:07)  T(F): 97.9 (30 Oct 2023 19:30), Max: 98 (30 Oct 2023 10:07)  HR: 49 (30 Oct 2023 19:30) (49 - 56)  BP: 154/97 (30 Oct 2023 19:30) (140/72 - 154/97)  BP(mean): --  ABP: --  ABP(mean): --  RR: 16 (30 Oct 2023 19:30) (16 - 16)  SpO2: 97% (30 Oct 2023 19:30) (95% - 97%)    O2 Parameters below as of 30 Oct 2023 19:30  Patient On (Oxygen Delivery Method): room air        Review of system     Neurological deficits     Physical Exam:  Constitutional - NAD, Comfortable  HEENT - NCAT, EOMI  Neck - Supple, No limited ROM  Chest - good chest expansion, good respiratory effort, CTAB   Cardio - warm and well perfused, + loop recorder.   Abdomen - Obese. + BS.   Extremities - No peripheral edema, No calf tenderness. Long curling toenails.    Neurologic Exam:                    Cognitive -             Orientation: AA&Ox2-3 to self, year and location Not to Month, day.            Attention:   3/3 without cuing.             Memory: Recent memory not intact. Long-term memory is intact.             Thought: process, content appropriate     Speech - Fluent, Comprehensible, No dysarthria, No aphasia      Cranial Nerves - No facial asymmetry, Tongue midline, EOMI, Shoulder shrug intact. Vision Impaired on the Left upper visual field in L > R eye.      Motor -                      LEFT    UE - ShAB 4/5, EF 4-5/5, EE 4/5, WE 5/5,  WNL                     RIGHT UE - ShAB 5/5, EF 5/5, EE 5/5, WE 5/5,  WNL                    LEFT    LE - HF 5/5, KE 5/5, DF 5/5, PF 5/5                    RIGHT LE - HF 5/5, KE 5/5, DF 5/5, PF 5/5        Sensory - Impaired to LT on the left face (V2/3), arm and leg.      Reflexes - symmetric DTR +1 in LE and UE. Neg Babinski's b/l     Coordination - FTN / HTS intact b/l      LABS     CAPILLARY BLOOD GLUCOSE      POCT Blood Glucose.: 134 mg/dL (31 Oct 2023 07:41)  POCT Blood Glucose.: 110 mg/dL (30 Oct 2023 21:50)  POCT Blood Glucose.: 141 mg/dL (30 Oct 2023 16:34)  POCT Blood Glucose.: 159 mg/dL (30 Oct 2023 12:11)        MEDICATIONS  (STANDING):  apixaban 5 milliGRAM(s) Oral two times a day  atorvastatin 80 milliGRAM(s) Oral at bedtime  bisacodyl 5 milliGRAM(s) Oral at bedtime  citalopram 20 milliGRAM(s) Oral daily  dextrose 50% Injectable 25 Gram(s) IV Push once  famotidine    Tablet 20 milliGRAM(s) Oral daily  folic acid 1 milliGRAM(s) Oral daily  insulin lispro (ADMELOG) corrective regimen sliding scale   SubCutaneous Before meals and at bedtime  metFORMIN 500 milliGRAM(s) Oral two times a day  multivitamin 1 Tablet(s) Oral daily  polyethylene glycol 3350 17 Gram(s) Oral daily  senna 2 Tablet(s) Oral at bedtime  thiamine 100 milliGRAM(s) Oral daily    MEDICATIONS  (PRN):   HPI:    Patient is a 59 y/o M with a PHx HTN, DM, stroke one year ago (at the time patient received Tenecteplase and had full resolution of symptoms), who presented to [facility] on 10/12/23 following sudden fall at work and lost consciousness. NIHSS 10 on admission. Admitted under NeuroICU on 10/12. Imaging revealed ischemia in the posterior circulation, CT angiogram head/neck showed right PCA P1-2 cutoff, possibly chronic right MCA M1 cutoff which had distal reconstitution and established collaterals, and left PCA occlusion which patient was subsequently transferred for mechanical thrombectomy.    Cerebral angiogram and mechanical thrombectomy performed using aspiration with TICI 3 recanalization of left PCA. The right occipital lobe filled from right SAHNI collaterals, and the right MCA as well was thought to be chronic given noted collateralization. MR with small to moderate left temporal occipital lobe infarctions. Occluded right MCA, occluded right PCA and right vertebral artery proximal segment. The patient was started on a hep gtt given extensive atherosclerotic disease which was switched to apixaban 5mg BID with recommendations for a three month course.    TTE with normal left ventricular internal cavity size. Left ventricular ejection fractio 50 to 55%. Per Neuro, no need for ROCIO at this time. Pt had an episode of bradycardia during his stay and there were concern for 2:1 block. ILR placed 10/20 and no indication for PPM at this time.     Patient was evaluated by PM&R and therapy for functional deficits, gait/ADL impairments and acute rehabilitation was recommended. Patient was medically optimized for discharge to [facility] IRF on 10/27/23.       Subjective:    Patient seen and examined during OT  No events overnight.   He states he continues to have short term memory loss. He states the long-term memory is intact.   Noted during PT of bradycardia to 35-40. Will get another EKG. History for 2:1 block.   Denies any other pain at this time.   Denies any-other complaints at this time.   Denies any fever, chills, body aches, cough, congestion or any other symptoms at this time.   Participating in therapy.  Last BM ( 10/30 )     ICU Vital Signs Last 24 Hrs  T(C): 36.6 (30 Oct 2023 19:30), Max: 36.7 (30 Oct 2023 10:07)  T(F): 97.9 (30 Oct 2023 19:30), Max: 98 (30 Oct 2023 10:07)  HR: 49 (30 Oct 2023 19:30) (49 - 56)  BP: 154/97 (30 Oct 2023 19:30) (140/72 - 154/97)  BP(mean): --  ABP: --  ABP(mean): --  RR: 16 (30 Oct 2023 19:30) (16 - 16)  SpO2: 97% (30 Oct 2023 19:30) (95% - 97%)    O2 Parameters below as of 30 Oct 2023 19:30  Patient On (Oxygen Delivery Method): room air        Review of system     Neurological deficits     Physical Exam:  Constitutional - NAD, Comfortable  HEENT - NCAT, EOMI  Neck - Supple, No limited ROM  Chest - good chest expansion, good respiratory effort, CTAB   Cardio - warm and well perfused, + loop recorder.   Abdomen - Obese. + BS.   Extremities - No peripheral edema, No calf tenderness. Long curling toenails.    Neurologic Exam:                    Cognitive -             Orientation: AA&Ox2-3 to self, year and location Not to Month, day.            Attention:   3/3 without cuing.             Memory: Recent memory not intact. Long-term memory is intact.             Thought: process, content appropriate     Speech - Fluent, Comprehensible, No dysarthria, No aphasia      Cranial Nerves - No facial asymmetry, Tongue midline, EOMI, Shoulder shrug intact. Vision Impaired on the Left upper visual field in L > R eye.      Motor -                      LEFT    UE - ShAB 4/5, EF 4-5/5, EE 4/5, WE 5/5,  WNL                     RIGHT UE - ShAB 5/5, EF 5/5, EE 5/5, WE 5/5,  WNL                    LEFT    LE - HF 5/5, KE 5/5, DF 5/5, PF 5/5                    RIGHT LE - HF 5/5, KE 5/5, DF 5/5, PF 5/5        Sensory - Impaired to LT on the left face (V2/3), arm and leg.      Reflexes - symmetric DTR +1 in LE and UE. Neg Babinski's b/l     Coordination - FTN / HTS intact b/l      LABS     CAPILLARY BLOOD GLUCOSE      POCT Blood Glucose.: 134 mg/dL (31 Oct 2023 07:41)  POCT Blood Glucose.: 110 mg/dL (30 Oct 2023 21:50)  POCT Blood Glucose.: 141 mg/dL (30 Oct 2023 16:34)  POCT Blood Glucose.: 159 mg/dL (30 Oct 2023 12:11)        MEDICATIONS  (STANDING):  apixaban 5 milliGRAM(s) Oral two times a day  atorvastatin 80 milliGRAM(s) Oral at bedtime  bisacodyl 5 milliGRAM(s) Oral at bedtime  citalopram 20 milliGRAM(s) Oral daily  dextrose 50% Injectable 25 Gram(s) IV Push once  famotidine    Tablet 20 milliGRAM(s) Oral daily  folic acid 1 milliGRAM(s) Oral daily  insulin lispro (ADMELOG) corrective regimen sliding scale   SubCutaneous Before meals and at bedtime  metFORMIN 500 milliGRAM(s) Oral two times a day  multivitamin 1 Tablet(s) Oral daily  polyethylene glycol 3350 17 Gram(s) Oral daily  senna 2 Tablet(s) Oral at bedtime  thiamine 100 milliGRAM(s) Oral daily    MEDICATIONS  (PRN):   HPI:    Patient is a 57 y/o M with a PHx HTN, DM, stroke one year ago (at the time patient received Tenecteplase and had full resolution of symptoms), who presented to [facility] on 10/12/23 following sudden fall at work and lost consciousness. NIHSS 10 on admission. Admitted under NeuroICU on 10/12. Imaging revealed ischemia in the posterior circulation, CT angiogram head/neck showed right PCA P1-2 cutoff, possibly chronic right MCA M1 cutoff which had distal reconstitution and established collaterals, and left PCA occlusion which patient was subsequently transferred for mechanical thrombectomy.    Cerebral angiogram and mechanical thrombectomy performed using aspiration with TICI 3 recanalization of left PCA. The right occipital lobe filled from right SHANI collaterals, and the right MCA as well was thought to be chronic given noted collateralization. MR with small to moderate left temporal occipital lobe infarctions. Occluded right MCA, occluded right PCA and right vertebral artery proximal segment. The patient was started on a hep gtt given extensive atherosclerotic disease which was switched to apixaban 5mg BID with recommendations for a three month course.    TTE with normal left ventricular internal cavity size. Left ventricular ejection fractio 50 to 55%. Per Neuro, no need for ROCIO at this time. Pt had an episode of bradycardia during his stay and there were concern for 2:1 block. ILR placed 10/20 and no indication for PPM at this time.     Patient was evaluated by PM&R and therapy for functional deficits, gait/ADL impairments and acute rehabilitation was recommended. Patient was medically optimized for discharge to [facility] IRF on 10/27/23.       Subjective:    Patient seen and examined during OT  No events overnight.   He states he continues to have short term memory loss. He states the long-term memory is intact.   Noted during PT of bradycardia to 35-40. Will get another EKG. History for 2:1 block.   Denies any other pain at this time.   Denies any-other complaints at this time.   Denies any fever, chills, body aches, cough, congestion or any other symptoms at this time.   Participating in therapy.  Last BM ( 10/30 )     ICU Vital Signs Last 24 Hrs  T(C): 36.6 (30 Oct 2023 19:30), Max: 36.7 (30 Oct 2023 10:07)  T(F): 97.9 (30 Oct 2023 19:30), Max: 98 (30 Oct 2023 10:07)  HR: 49 (30 Oct 2023 19:30) (49 - 56)  BP: 154/97 (30 Oct 2023 19:30) (140/72 - 154/97)  BP(mean): --  ABP: --  ABP(mean): --  RR: 16 (30 Oct 2023 19:30) (16 - 16)  SpO2: 97% (30 Oct 2023 19:30) (95% - 97%)    O2 Parameters below as of 30 Oct 2023 19:30  Patient On (Oxygen Delivery Method): room air        Review of system     Neurological deficits     Physical Exam:  Constitutional - NAD, Comfortable  HEENT - NCAT, EOMI  Neck - Supple, No limited ROM  Chest - good chest expansion, good respiratory effort, CTAB   Cardio - warm and well perfused, + loop recorder.   Abdomen - Obese. + BS.   Extremities - No peripheral edema, No calf tenderness. Long curling toenails.    Neurologic Exam:                    Cognitive -             Orientation: AA&Ox2-3 to self, year and location Not to Month, day.            Attention:   able to state days of week backward, difficulty with serial 7s            Memory: recall 3/3 without cuing.      Speech - Fluent, Comprehensible, No dysarthria, No aphasia      Cranial Nerves - No facial asymmetry, Tongue midline, EOMI, Shoulder shrug intact. Vision Impaired on the Left upper visual field in L > R eye.      Motor -                      LEFT    UE - ShAB 4/5, EF 4-5/5, EE 4/5, WE 5/5,  WNL                     RIGHT UE - ShAB 5/5, EF 5/5, EE 5/5, WE 5/5,  WNL                    LEFT    LE - HF 5/5, KE 5/5, DF 5/5, PF 5/5                    RIGHT LE - HF 5/5, KE 5/5, DF 5/5, PF 5/5        Sensory - Impaired to LT on the left face (V2/3), arm and leg.      Reflexes - symmetric DTR +1 in LE and UE. Neg Babinski's b/l     Coordination - FTN / HTS intact b/l      LABS     CAPILLARY BLOOD GLUCOSE      POCT Blood Glucose.: 134 mg/dL (31 Oct 2023 07:41)  POCT Blood Glucose.: 110 mg/dL (30 Oct 2023 21:50)  POCT Blood Glucose.: 141 mg/dL (30 Oct 2023 16:34)  POCT Blood Glucose.: 159 mg/dL (30 Oct 2023 12:11)        MEDICATIONS  (STANDING):  apixaban 5 milliGRAM(s) Oral two times a day  atorvastatin 80 milliGRAM(s) Oral at bedtime  bisacodyl 5 milliGRAM(s) Oral at bedtime  citalopram 20 milliGRAM(s) Oral daily  dextrose 50% Injectable 25 Gram(s) IV Push once  famotidine    Tablet 20 milliGRAM(s) Oral daily  folic acid 1 milliGRAM(s) Oral daily  insulin lispro (ADMELOG) corrective regimen sliding scale   SubCutaneous Before meals and at bedtime  metFORMIN 500 milliGRAM(s) Oral two times a day  multivitamin 1 Tablet(s) Oral daily  polyethylene glycol 3350 17 Gram(s) Oral daily  senna 2 Tablet(s) Oral at bedtime  thiamine 100 milliGRAM(s) Oral daily    MEDICATIONS  (PRN):   HPI:    Patient is a 57 y/o M with a PHx HTN, DM, stroke one year ago (at the time patient received Tenecteplase and had full resolution of symptoms), who presented to [facility] on 10/12/23 following sudden fall at work and lost consciousness. NIHSS 10 on admission. Admitted under NeuroICU on 10/12. Imaging revealed ischemia in the posterior circulation, CT angiogram head/neck showed right PCA P1-2 cutoff, possibly chronic right MCA M1 cutoff which had distal reconstitution and established collaterals, and left PCA occlusion which patient was subsequently transferred for mechanical thrombectomy.    Cerebral angiogram and mechanical thrombectomy performed using aspiration with TICI 3 recanalization of left PCA. The right occipital lobe filled from right SHANI collaterals, and the right MCA as well was thought to be chronic given noted collateralization. MR with small to moderate left temporal occipital lobe infarctions. Occluded right MCA, occluded right PCA and right vertebral artery proximal segment. The patient was started on a hep gtt given extensive atherosclerotic disease which was switched to apixaban 5mg BID with recommendations for a three month course.    TTE with normal left ventricular internal cavity size. Left ventricular ejection fractio 50 to 55%. Per Neuro, no need for ROCIO at this time. Pt had an episode of bradycardia during his stay and there were concern for 2:1 block. ILR placed 10/20 and no indication for PPM at this time.     Patient was evaluated by PM&R and therapy for functional deficits, gait/ADL impairments and acute rehabilitation was recommended. Patient was medically optimized for discharge to [facility] on 10/27/23.       Subjective:    Patient seen and examined during OT  No events overnight.   He states he continues to have short term memory loss. He states the long-term memory is intact.   Noted during PT of bradycardia to 35-40. Will get another EKG. History for 2:1 block.   Denies any other pain at this time.   Denies any-other complaints at this time.   Denies any fever, chills, body aches, cough, congestion or any other symptoms at this time.   Participating in therapy.  Last BM ( 10/30 )     ICU Vital Signs Last 24 Hrs  T(C): 36.6 (30 Oct 2023 19:30), Max: 36.7 (30 Oct 2023 10:07)  T(F): 97.9 (30 Oct 2023 19:30), Max: 98 (30 Oct 2023 10:07)  HR: 49 (30 Oct 2023 19:30) (49 - 56)  BP: 154/97 (30 Oct 2023 19:30) (140/72 - 154/97)  BP(mean): --  ABP: --  ABP(mean): --  RR: 16 (30 Oct 2023 19:30) (16 - 16)  SpO2: 97% (30 Oct 2023 19:30) (95% - 97%)    O2 Parameters below as of 30 Oct 2023 19:30  Patient On (Oxygen Delivery Method): room air        Review of system     Neurological deficits     Physical Exam:  Constitutional - NAD, Comfortable  HEENT - NCAT, EOMI  Chest -breathing comfortably on RA  Cardio - warm and well perfused, + loop recorder.   Abdomen - Obese. + BS.   Extremities - No peripheral edema, No calf tenderness. Long curling toenails.    Neurologic Exam:                    Cognitive -             Orientation: AA&Ox2-3 to self, year and location Not to Month, day.            Attention:   able to state days of week backward, difficulty with serial 7s            Memory: recall 3/3 without cuing.      Speech - Fluent, Comprehensible, No dysarthria, No aphasia      Cranial Nerves - No facial asymmetry, Tongue midline, EOMI, Shoulder shrug intact. Vision Impaired on the Left upper visual field in L > R eye.      Motor -                      LEFT    UE - ShAB 4/5, EF 4-5/5, EE 4/5, WE 5/5,  WNL                     RIGHT UE - ShAB 5/5, EF 5/5, EE 5/5, WE 5/5,  WNL                    LEFT    LE - HF 5/5, KE 5/5, DF 5/5, PF 5/5                    RIGHT LE - HF 5/5, KE 5/5, DF 5/5, PF 5/5        Sensory - Impaired to LT on the left face (V2/3), arm and leg.      Reflexes - symmetric DTR +1 in LE and UE. Neg Babinski's b/l     Coordination - FTN / HTS intact b/l      LABS     CAPILLARY BLOOD GLUCOSE      POCT Blood Glucose.: 134 mg/dL (31 Oct 2023 07:41)  POCT Blood Glucose.: 110 mg/dL (30 Oct 2023 21:50)  POCT Blood Glucose.: 141 mg/dL (30 Oct 2023 16:34)  POCT Blood Glucose.: 159 mg/dL (30 Oct 2023 12:11)        MEDICATIONS  (STANDING):  apixaban 5 milliGRAM(s) Oral two times a day  atorvastatin 80 milliGRAM(s) Oral at bedtime  bisacodyl 5 milliGRAM(s) Oral at bedtime  citalopram 20 milliGRAM(s) Oral daily  dextrose 50% Injectable 25 Gram(s) IV Push once  famotidine    Tablet 20 milliGRAM(s) Oral daily  folic acid 1 milliGRAM(s) Oral daily  insulin lispro (ADMELOG) corrective regimen sliding scale   SubCutaneous Before meals and at bedtime  metFORMIN 500 milliGRAM(s) Oral two times a day  multivitamin 1 Tablet(s) Oral daily  polyethylene glycol 3350 17 Gram(s) Oral daily  senna 2 Tablet(s) Oral at bedtime  thiamine 100 milliGRAM(s) Oral daily    MEDICATIONS  (PRN):

## 2023-10-31 NOTE — PROGRESS NOTE ADULT - ASSESSMENT
Patient is a 59 y/o M with a PHx HTN, DM, CVA sp tnK with full resolution symptoms, who presented to [facility] on 10/12 due to sudden fall at work and lost consciousness.  MRI brain showed left temporal occipital lobe infarctions and acute punctate left occipital lobe infarction now s/p thrombectomy and Right MCA/PCA oclusion with left sided weakness, sensory impairment, left homonymous hemianopsia, gait and ADL impairments.     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 Continue for 3 months.    - Atorvastatin 80mg qhs.   - begin comprehensive rehab program OT, PT, SLP  3 hours daily 5 x week  - neuropsychology evaluation and support, recreation therapy  - Precautions: cardiac, DM, respiratory, breast CA, fall, aspiration.    # Bradycardia  - No indication for pacemaker  - Metoprolol 12.5mg BID has been discontinued   - s/p ILR on 10/20  - HR -  (52 - 52) - 10/30   - Monitor.     # HTN  - No medication at home and patient is sensitive to HTN medication. Was previously started on lisinopril.   - BP - 145/76 - 145/76 - 10/30   - monitor vitals.    # HLD  - Atorvastatin 80 mg daily    # DM 2  - A1C 8.6  - Accu checks and ISS    # ETOH abuse  - Not in active withdrawal.  - Continue MVI, thiamine and folic acid    # Pain management  - Tylenol PRN    # GI/Bowel:  - Senna QHS, Miralax Daily  - dulcolax PO.   - GI ppx: Protonix    # /Bladder:   -Voiding with low PVRs-- d/c monitoring  --continent    # FEN   - Diet: DASH DIET       # DVT ppx  - Eliquis 5mg BID    Outpatient Follow-up (Specialty/Name of physician):    Rj Delgado  Cardiac Electrophysiology  [address], [address] [place] [address]  Phone: [phone]  Fax: [phone]  Follow Up Time:     Wily Barkley  Neurology  [address] [place] [address]  Phone: [phone]  Fax: [phone]  Follow Up Time: 1 week    Denzel Zapata  Interventional Cardiology  [address], [address]  [place] [address]  Phone: [phone]  Fax: [phone]  Follow Up Time: 1 week    PCP,   Phone: (   )    -  Fax: (   )    -  Follow Up Time: 1 week    Arsenio Goodrich  Interventional Neuroradiology  [address] [place] [address]  Phone: [phone]  Fax: [phone]   Patient is a 59 y/o M with a PHx HTN, DM, CVA sp tnK with full resolution symptoms, who presented to [facility] on 10/12 due to sudden fall at work and lost consciousness.  MRI brain showed left temporal occipital lobe infarctions and acute punctate left occipital lobe infarction now s/p thrombectomy and Right MCA/PCA oclusion with left sided weakness, sensory impairment, left homonymous hemianopsia, gait and ADL impairments.     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 Continue for 3 months.    - Atorvastatin 80mg qhs.   - begin comprehensive rehab program OT, PT, SLP  3 hours daily 5 x week  - neuropsychology evaluation and support, recreation therapy  - Precautions: cardiac, DM, respiratory, breast CA, fall, aspiration.    # Bradycardia  - No indication for pacemaker  - Metoprolol 12.5mg BID has been discontinued   - s/p ILR on 10/20  - HR -  (52 - 52) - 10/30   - Monitor.     # HTN  - No medication at home and patient is sensitive to HTN medication. Was previously started on lisinopril.   - BP - 145/76 - 145/76 - 10/30   - monitor vitals - Goal is 140-170.     # HLD  - Atorvastatin 80 mg daily    # DM 2  - A1C 8.6  - Accu checks and ISS    # ETOH abuse  - Not in active withdrawal.  - Continue MVI, thiamine and folic acid    # Pain management  - Tylenol PRN    # GI/Bowel:  - Senna QHS, Miralax Daily  - dulcolax PO.   - GI ppx: Protonix    # /Bladder:   -Voiding with low PVRs-- d/c monitoring  --continent    # FEN   - Diet: DASH DIET       # DVT ppx  - Eliquis 5mg BID    Outpatient Follow-up (Specialty/Name of physician):    Rj Delgado  Cardiac Electrophysiology  [address] [facility], [address]  [place] [address]  Phone: [phone]  Fax: [phone]  Follow Up Time:     Wily Barkley  Neurology  [address] [place] [address]  Phone: [phone]  Fax: [phone]  Follow Up Time: 1 week    Denzel Zapata  Interventional Cardiology  [address] [facility], [address]  [place] [address]  Phone: [phone]  Fax: [phone]  Follow Up Time: 1 week    PCP,   Phone: (   )    -  Fax: (   )    -  Follow Up Time: 1 week    Arsenio Goodrich  Interventional Neuroradiology  [address] [place] [address]  Phone: [phone]  Fax: [phone]   Patient is a 57 y/o M with a PHx HTN, DM, CVA sp tnK with full resolution symptoms, who presented to [facility] on 10/12 due to sudden fall at work and lost consciousness.  MRI brain showed left temporal occipital lobe infarctions and acute punctate left occipital lobe infarction now s/p thrombectomy and Right MCA/PCA oclusion with left sided weakness, sensory impairment, left homonymous hemianopsia, gait and ADL impairments.     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 Continue for 3 months.    - Atorvastatin 80mg qhs.   - begin comprehensive rehab program OT, PT, SLP  3 hours daily 5 x week  - neuropsychology evaluation and support, recreation therapy  - Precautions: cardiac, DM, respiratory, breast CA, fall, aspiration.    # Bradycardia  - No indication for pacemaker  - Metoprolol 12.5mg BID has been discontinued   - s/p ILR on 10/20  - HR -  (52 - 52) - 10/30   - Monitor.     # HTN  - No medication at home and patient is sensitive to HTN medication. Was previously started on lisinopril.   - BP - 145/76 - 145/76 - 10/30   - monitor vitals - Goal is 140-170.   - Can treat with hydralazine 25mg PRN for >170.     # HLD  - Atorvastatin 80 mg daily    # DM 2  - A1C 8.6  - Accu checks and ISS    # ETOH abuse  - Not in active withdrawal.  - Continue MVI, thiamine and folic acid    # Pain management  - Tylenol PRN    # GI/Bowel:  - Senna QHS, Miralax Daily  - dulcolax PO.   - GI ppx: Protonix    # /Bladder:   -Voiding with low PVRs-- d/c monitoring  --continent    # FEN   - Diet: DASH DIET       # DVT ppx  - Eliquis 5mg BID    Outpatient Follow-up (Specialty/Name of physician):    Rj Delgado  Cardiac Electrophysiology  [address] [facility], [address]  [place] [address]  Phone: [phone]  Fax: [phone]  Follow Up Time:     Wily Barkley  Neurology  [address] [place] [address]  Phone: [phone]  Fax: [phone]  Follow Up Time: 1 week    Denzel Zapata  Interventional Cardiology  [address] [facility], [address]  [place] [address]  Phone: [phone]  Fax: [phone]  Follow Up Time: 1 week    PCP,   Phone: (   )    -  Fax: (   )    -  Follow Up Time: 1 week    Arsenio Goodrich  Interventional Neuroradiology  [address] [place] [address]  Phone: [phone]  Fax: [phone]   Patient is a 59 y/o M with a PHx HTN, DM, CVA sp tnK with full resolution symptoms, who presented to [facility] on 10/12 due to sudden fall at work and lost consciousness.  MRI brain showed left temporal occipital lobe infarctions and acute punctate left occipital lobe infarction now s/p thrombectomy and Right MCA/PCA oclusion with left sided weakness, sensory impairment, left homonymous hemianopsia, gait and ADL impairments.     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 Continue for 3 months.    - Atorvastatin 80mg qhs.   - begin comprehensive rehab program OT, PT, SLP  3 hours daily 5 x week  - neuropsychology evaluation and support, recreation therapy  - Precautions: cardiac, DM, respiratory, breast CA, fall, aspiration.    # Bradycardia  - No indication for pacemaker  - Metoprolol 12.5mg BID has been discontinued   - s/p ILR on 10/20  - HR - (49 - 56) - 10/31  - Monitor.     # HTN  - No medication at home and patient is sensitive to HTN medication. Was previously started on lisinopril.   - BP - (140/72 - 154/97) - 10/31  - monitor vitals - Goal is 140-170 as per neuro.   - Can treat with hydralazine 25mg PRN for >170.     # HLD  - Atorvastatin 80 mg daily    # DM 2  - A1C 8.6  - Accu checks and ISS    # ETOH abuse  - Not in active withdrawal.  - Continue MVI, thiamine and folic acid    # Pain management  - Tylenol PRN    # GI/Bowel:  - Senna QHS, Miralax Daily  - dulcolax PO.   - GI ppx: Protonix    # /Bladder:   -Voiding with low PVRs-- d/c monitoring  --continent    # FEN   - Diet: DASH DIET       # DVT ppx  - Eliquis 5mg BID    Outpatient Follow-up (Specialty/Name of physician):    Rj Delgado  Cardiac Electrophysiology  [address] [place] [address], [address]  [place] [address]  Phone: [phone]  Fax: [phone]  Follow Up Time:     Wily Barkley  Neurology  [address] [place] [address]  Phone: [phone]  Fax: [phone]  Follow Up Time: 1 week    Denzel Zapata  Interventional Cardiology  [address] [facility] [address]  [place] [address]  Phone: [phone]  Fax: [phone]  Follow Up Time: 1 week    PCP,   Phone: (   )    -  Fax: (   )    -  Follow Up Time: 1 week    Arsenio Goodrich  Interventional Neuroradiology  [address] [place] [address]  Phone: [phone]  Fax: [phone]   Patient is a 59 y/o M with a PHx HTN, DM, CVA sp tnK with full resolution symptoms, who presented to [facility] on 10/12 due to sudden fall at work and lost consciousness.  MRI brain showed left temporal occipital lobe infarctions and acute punctate left occipital lobe infarction now s/p thrombectomy and Right MCA/PCA oclusion with left sided weakness, sensory impairment, left homonymous hemianopsia, gait and ADL impairments.     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 Continue for 3 months.    - Atorvastatin 80mg qhs.   - begin comprehensive rehab program OT, PT, SLP  3 hours daily 5 x week  - neuropsychology evaluation and support, recreation therapy  - Precautions: cardiac, DM, respiratory, breast CA, fall, aspiration.    # Bradycardia  - No indication for pacemaker  - Metoprolol 12.5mg BID has been discontinued   - s/p ILR on 10/20  - HR - (49 - 56) - 10/31  - Monitor. f/u EKG--   --d/w hospitalist     # HTN  - No medication at home and patient is sensitive to HTN medication. Was previously started on lisinopril.   - BP - (140/72 - 154/97) - 10/31  - monitor vitals - Goal is 140-170 as per neuro.   - Can treat with hydralazine 25mg PRN for >170.   --Reached out to Neurology, Dr. Barkley, to inquire how long pt. to remain with permissive HTN.      # HLD  - Atorvastatin 80 mg daily    # DM 2  - A1C 8.6  - Accu checks and ISS    # ETOH abuse  - Not in active withdrawal.  - Continue MVI, thiamine and folic acid    # Pain management  - Tylenol PRN    # GI/Bowel:  - Senna QHS, Miralax Daily  - dulcolax PO.   - GI ppx: Protonix    # /Bladder:   -Voiding with low PVRs-- d/c monitoring  --continent    # FEN   - Diet: DASH DIET       # DVT ppx  - Eliquis 5mg BID    Outpatient Follow-up (Specialty/Name of physician):    Rj Delgado  Cardiac Electrophysiology  [address] [facility], [address]  [place] [address]  Phone: [phone]  Fax: [phone]  Follow Up Time:     Wily Barkley  Neurology  [address] [place] [address]  Phone: [phone]  Fax: [phone]  Follow Up Time: 1 week    Denzel Zapata  Interventional Cardiology  [address] [facility], [address]  [place] [address]  Phone: [phone]  Fax: [phone]  Follow Up Time: 1 week    PCP,   Phone: (   )    -  Fax: (   )    -  Follow Up Time: 1 week    Arsenio Goodrich  Interventional Neuroradiology  [address] [place] [address]  Phone: [phone]  Fax: [phone]   Patient is a 59 y/o M with a PHx HTN, DM, CVA sp tnK with full resolution symptoms, who presented to [facility] on 10/12 due to sudden fall at work and lost consciousness.  MRI brain showed left temporal occipital lobe infarctions and acute punctate left occipital lobe infarction now s/p thrombectomy and Right MCA/PCA oclusion with left sided weakness, sensory impairment, left homonymous hemianopsia, gait and ADL impairments.     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 Continue for 3 months.    - Atorvastatin 80mg qhs.   - begin comprehensive rehab program OT, PT, SLP  3 hours daily 5 x week  - neuropsychology evaluation and support, recreation therapy  - Precautions: cardiac, DM, respiratory, breast CA, fall, aspiration.    # Bradycardia  - No indication for pacemaker  - Metoprolol 12.5mg BID has been discontinued   - s/p ILR on 10/20  - HR - (49 - 56) - 10/31  - Monitor. f/u EKG--   --d/w hospitalist     # HTN  - No medication at home and patient is sensitive to HTN medication. Was previously started on lisinopril.   - BP - (140/72 - 154/97) - 10/31  - monitor vitals - Goal is 140-170 as per neuro.   - Can treat with hydralazine 25mg PRN for >170.   --Reached out to Neurology, Dr. Barkley, to inquire how long pt. to remain with permissive HTN.      # HLD  - Atorvastatin 80 mg daily    # DM 2  - A1C 8.6  - Accu checks and ISS    # ETOH abuse  - Not in active withdrawal.  - Continue MVI, thiamine and folic acid    # Pain management  - Tylenol PRN    # GI/Bowel:  - Senna QHS, Miralax Daily  - dulcolax PO.   - GI ppx: Protonix    # /Bladder:   -Voiding with low PVRs-- d/c monitoring  --continent    # FEN   - Diet: DASH DIET       # DVT ppx  - Eliquis 5mg BID    IDT: 10/31  TDD:  11/10 to home.   RN: continent with bowel and continent with bladder   SW: Apartment with his daughter. On the main floor. He was independent before.   SP: Regular with thin. Mild cognitive deficits . Recall and short term memory deficits.  OT: Setup - eating. CG - grooming, toileting and LBD. Supervision for UBD. Max assist for foot wear.   PT : CG - transfer. 75 feet with RW and CG. 8 steps with 2 hand rail with CG.   Goals:1.  Supervision for toileting.   2.  Supervision for ambulation.   3. Improvement in memory recall.       Outpatient Follow-up (Specialty/Name of physician):    Rj Delgado  Cardiac Electrophysiology  [address] [id number]  Phone: [phone]  Fax: [phone]  Follow Up Time:     Wily Barkley  Neurology  [address] [place] [address]  Phone: [phone]  Fax: [phone]  Follow Up Time: 1 week    Denzel Zapata  Interventional Cardiology  [address] [id number]  Phone: [phone]  Fax: [phone]  Follow Up Time: 1 week    PCP,   Phone: (   )    -  Fax: (   )    -  Follow Up Time: 1 week    Arsenio Goodrich  Interventional Neuroradiology  [address] [place] [address]  Phone: [phone]  Fax: [phone]   Patient is a 57 y/o M with a PHx HTN, DM, CVA sp tnK with full resolution symptoms, who presented to [facility] on 10/12 due to sudden fall at work and lost consciousness.  MRI brain showed left temporal occipital lobe infarctions and acute punctate left occipital lobe infarction now s/p thrombectomy and Right MCA/PCA oclusion with left sided weakness, sensory impairment, left homonymous hemianopsia, gait and ADL impairments.     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 Continue for 3 months.    - Atorvastatin 80mg qhs.   - begin comprehensive rehab program OT, PT, SLP  3 hours daily 5 x week  - neuropsychology evaluation and support, recreation therapy  - Precautions: cardiac, DM, respiratory, breast CA, fall, aspiration.    # Bradycardia  - No indication for pacemaker  - Metoprolol 12.5mg BID has been discontinued   - s/p ILR on 10/20  - HR - (49 - 56) - 10/31  - Monitor. f/u EKG--   --d/w hospitalist     # HTN  - No medication at home and patient is sensitive to HTN medication. Was previously started on lisinopril.   - BP - (140/72 - 154/97) - 10/31  - monitor vitals - Goal is 140-170 as per neuro.   - Can treat with hydralazine 25mg PRN for >170.   --Reached out to Neurology, Dr. Barkley, to inquire how long pt. to remain with permissive HTN.      # HLD  - Atorvastatin 80 mg daily    # DM 2  - A1C 8.6  - Accu checks and ISS    # ETOH abuse  - Not in active withdrawal.  - Continue MVI, thiamine and folic acid    # Pain management  - Tylenol PRN    # GI/Bowel:  - Senna QHS, Miralax Daily  - dulcolax PO.   - GI ppx: Protonix    # /Bladder:   -Voiding with low PVRs-- d/c monitoring  --continent    # FEN   - Diet: DASH DIET       # DVT ppx  - Eliquis 5mg BID    IDT: 10/31  TDD:  11/11 to home.   RN: continent with bowel and continent with bladder   SW: Lives in Apartment with his daughter. On the main floor. He was independent before. Sister is supportive  SP: Regular with thin. Mild cognitive deficits-- . Recall and short term memory deficits, reasoning, attention, decreased mental flexibility.  OT: Setup - eating. CG - grooming, toileting, toilet transfers and LBD. Supervision for UBD. Max assist for foot wear.   PT : CG - transfer. 75 feet with RW and CG. 8 steps with 2 hand rail with CG.   Goals:1.  Supervision for toileting.   2.  Supervision for ambulation.   3. Recall salient information with external aides    Outpatient Follow-up (Specialty/Name of physician):    Rj Delgado  Cardiac Electrophysiology  [address] [address]  Phone: [phone]  Fax: [phone]  Follow Up Time:     Wily Barkley  Neurology  [address] [place] [address]  Phone: [phone]  Fax: [phone]  Follow Up Time: 1 week    Denzel Zapata  Interventional Cardiology  [address] [id number]  Phone: [phone]  Fax: [phone]  Follow Up Time: 1 week    PCP,   Phone: (   )    -  Fax: (   )    -  Follow Up Time: 1 week    Arsenio Goodrich  Interventional Neuroradiology  [address] [place] [address]  Phone: [phone]  Fax: [phone]

## 2023-10-31 NOTE — PROGRESS NOTE ADULT - ASSESSMENT
57 y/o M with a Hx of stroke one year ago (at the time patient received TNK and had full  resolution of symptoms), HTN, DM, non-compliant with medications, who presented following an episode of LOC, as per family at around 6:15 pm patient had a sudden fall at work and lost consciousness. It is unclear if patient had any symptoms before he fell. NIHSS 10 on admission, admitted under NeuroICU on 10/12: Thrombectomy via L radial artery for TICI 3 revascularization of L P1-P2 occlusion. Noted LMCA chronic occlusion., on 0/13: Neurologic status improving. Got MR. Started on ASA and SQL, on 10/14: quadrantanopia now on L superior (originally R); Repeat stroke imaging completed and initial concern for CTP showing poss new perfusion deficit however on further review noted to be present on admission. Started on heparin drip per neurology recommendations, on 10/15: Therapeutic on heparin drip, stability HCT obtained. Cardiology consulted for further stroke work-up and downgraded under medicine.       #Bradycardia, episodes of 2:1 block  TTE EF 50-55%, no other structural abnormal   - EKG reviewed 10/31, HR 51 with sinus bradycardia, unchanged RBBB with PVCs, TWI in lateral leads.  - per electrophysiology notes (reviewed), no indication for pacemaker, s/p ILR on 10/20  - needs EP follow up as an outpatient.    #Acute CVA  #Occluded R MCA/PCA  s/p thrombectomy   TTE shows with  Normal left ventricular internal cavity size,  3. Left ventricular ejection fraction, by visual estimation, is 50 to 55%.  DVT studies negative  Per Neuro no need for ROCIO  Started on heparin drip given extensive atherosclerotic disease per Neuro. Switch to Eliquis. Needs for 3 months.    c/w Lipitor  BP goal 140-170 per neuro   Neuro checks   Neurology follow up noted   PT/OT   c/w neuro checks  outpt neuro IR followup    #ETOH use disorder  -  in remission, stable.      #HTN  No meds at home  Per neuro (Dr. Barkley), BP goal 140-170 mmHg   - did not tolerate lisinopril 5mg during hospitalization (intermittent hypotension)  Hydralazine 25 mg PRN if SBP > 170    #HLD  c/w Lipitor 80mg QD    #DM 2  non compliant with meds at home  A1C 8.6  Accu checks and ISS    #Elevated TSH  T4 level normal  outpt f/u    DVT Prophylaxis -- Patient is on Eliquis.

## 2023-10-31 NOTE — PROGRESS NOTE ADULT - ATTENDING COMMENTS
Pt. seen with resident.  Agree with documentation above as per resident with amendments made as appropriate. Patient medically stable. Making progress towards rehab goals.     left and right PCA infarcts--   Bradycardia  - No indication for pacemaker  - Metoprolol 12.5mg BID has been discontinued   - s/p ILR on 10/20  - HR - (49 - 56) - 10/31  - Monitor. f/u EKG--   --d/w hospitalist    HTN  - No medication at home and patient is sensitive to HTN medication. Was previously started on lisinopril.   - BP - (140/72 - 154/97) - 10/31  - monitor vitals - Goal is 140-170 as per neuro.   - Can treat with hydralazine 25mg PRN for >170.   --Reached out to Neurology, Dr. Barkley, to inquire how long pt. to remain with permissive HTN.

## 2023-11-01 LAB
GLUCOSE BLDC GLUCOMTR-MCNC: 108 MG/DL — HIGH (ref 70–99)
GLUCOSE BLDC GLUCOMTR-MCNC: 108 MG/DL — HIGH (ref 70–99)
GLUCOSE BLDC GLUCOMTR-MCNC: 115 MG/DL — HIGH (ref 70–99)
GLUCOSE BLDC GLUCOMTR-MCNC: 115 MG/DL — HIGH (ref 70–99)
GLUCOSE BLDC GLUCOMTR-MCNC: 127 MG/DL — HIGH (ref 70–99)
GLUCOSE BLDC GLUCOMTR-MCNC: 127 MG/DL — HIGH (ref 70–99)
GLUCOSE BLDC GLUCOMTR-MCNC: 97 MG/DL — SIGNIFICANT CHANGE UP (ref 70–99)
GLUCOSE BLDC GLUCOMTR-MCNC: 97 MG/DL — SIGNIFICANT CHANGE UP (ref 70–99)

## 2023-11-01 PROCEDURE — 99232 SBSQ HOSP IP/OBS MODERATE 35: CPT

## 2023-11-01 RX ADMIN — METFORMIN HYDROCHLORIDE 500 MILLIGRAM(S): 850 TABLET ORAL at 17:31

## 2023-11-01 RX ADMIN — ATORVASTATIN CALCIUM 80 MILLIGRAM(S): 80 TABLET, FILM COATED ORAL at 21:58

## 2023-11-01 RX ADMIN — APIXABAN 5 MILLIGRAM(S): 2.5 TABLET, FILM COATED ORAL at 17:31

## 2023-11-01 RX ADMIN — Medication 1 MILLIGRAM(S): at 11:59

## 2023-11-01 RX ADMIN — METFORMIN HYDROCHLORIDE 500 MILLIGRAM(S): 850 TABLET ORAL at 07:52

## 2023-11-01 RX ADMIN — Medication 1 TABLET(S): at 11:58

## 2023-11-01 RX ADMIN — CITALOPRAM 20 MILLIGRAM(S): 10 TABLET, FILM COATED ORAL at 11:58

## 2023-11-01 RX ADMIN — Medication 100 MILLIGRAM(S): at 11:58

## 2023-11-01 RX ADMIN — FAMOTIDINE 20 MILLIGRAM(S): 10 INJECTION INTRAVENOUS at 11:58

## 2023-11-01 RX ADMIN — APIXABAN 5 MILLIGRAM(S): 2.5 TABLET, FILM COATED ORAL at 05:20

## 2023-11-01 NOTE — SBIRT NOTE ADULT - NSSBIRTBRIEFINTDET_GEN_A_CORE
Patient states that he consumes vodka drinks/shots every few days with varying quantity (days of 2 or 3, other days up 7-9). States he has participated in inpatient ETOH treatment a few years ago.  educated on recommended limitations of alcohol use and offered resources, patient declining at this time

## 2023-11-01 NOTE — PROGRESS NOTE ADULT - ATTENDING COMMENTS
Pt. seen with fellow.  Agree with documentation above as per fellow with amendments made as appropriate. Patient medically stable. Making progress towards rehab goals.     Left PCA infarct, right MCA and PCA occlusions--  Was notified by OT that patient felt "dizzy" this AM. Examined patient by bedside. He states  "dizziness" started after he got into his wheelchair. He ate breakfast and drank a glucerna and water bottle this AM. He denies any new numbness/tingling/ weakness. He continues to have numbness on the left UE/LE, but that has been present since his stroke. He denies CP/SOB/NV. His BP has been ranging from -160s. HR fluctuating between 40-70s.  Manual BP sitting 125/89, and manual HR 48.   Unclear, if drop in blood pressure caused symptoms or not. He did not seem to be more bradycardic than usual at the time either. Patient provided  more water to drink.  Medicine was notified. On reexamination in physical  therapy when walking, patient stated that the "dizziness" had gone away.    -Encouraged fluid intake --improved today after additional fluid intake  -Consider Cardiology consult if continues  -d/w Hospitalist     Permissive HTN-- d/w Neurologist, Dr. Barkley--  - Dr. Curry suggests 10 point decrease in BP parameters based on clinical exam  - 11/1 Titrate -160   - Consider titrating to -150 on weekend or early next week as tolerated.

## 2023-11-01 NOTE — SBIRT NOTE ADULT - NSSBIRTDRGPOSREINDET_GEN_A_CORE
Patient admits to past drug use but states he hasn't used in 2-3 years. Positive reinforcement provided.

## 2023-11-01 NOTE — PROGRESS NOTE ADULT - ASSESSMENT
57 y/o M with a Hx of stroke one year ago (at the time patient received TNK and had full  resolution of symptoms), HTN, DM, non-compliant with medications, who presented following an episode of LOC, as per family at around 6:15 pm patient had a sudden fall at work and lost consciousness. It is unclear if patient had any symptoms before he fell. NIHSS 10 on admission, admitted under NeuroICU on 10/12: Thrombectomy via L radial artery for TICI 3 revascularization of L P1-P2 occlusion. Noted LMCA chronic occlusion., on 0/13: Neurologic status improving. Got MR. Started on ASA and SQL, on 10/14: quadrantanopia now on L superior (originally R); Repeat stroke imaging completed and initial concern for CTP showing poss new perfusion deficit however on further review noted to be present on admission. Started on heparin drip per neurology recommendations, on 10/15: Therapeutic on heparin drip, stability HCT obtained. Cardiology consulted for further stroke work-up and downgraded under medicine.       #Bradycardia, episodes of 2:1 block  TTE EF 50-55%, no other structural abnormalities  - EP f/u re ILR interrogation given reported dizziness today.  - repeat EKG with any subsequent episodes; his HR was reportedly in 60's and BP stable during dizziness episode.  - EKG 10/31, HR 51 with sinus bradycardia, unchanged RBBB with PVCs, TWI in lateral leads.  - per electrophysiology notes (reviewed), no indication for pacemaker, s/p ILR on 10/20  - needs EP follow up as an outpatient.    #Acute CVA  #Occluded R MCA/PCA  s/p thrombectomy   TTE shows with  Normal left ventricular internal cavity size,  3. Left ventricular ejection fraction, by visual estimation, is 50 to 55%.  DVT studies negative  Per Neuro no need for ROCIO  continue Eliquis. Needs for 3 months.    c/w Lipitor  BP goal per NSGY can be titrated down 130-160 today, then 120-150 tomorrow and by 10 points daily  outpatient neuro IR follow up.    #ETOH use disorder  -  in remission, stable.      #HTN  No meds at home  BP goals as above.  - did not tolerate lisinopril 5mg during hospitalization (intermittent hypotension)  Hydralazine 25 mg PRN if SBP > 170    #HLD  c/w Lipitor 80mg QD    #DM 2  non compliant with meds at home  A1C 8.6  Accu checks and ISS    #Elevated TSH  T4 level normal  outpt f/u    DVT Prophylaxis -- Patient is on Eliquis.

## 2023-11-01 NOTE — PROGRESS NOTE ADULT - SUBJECTIVE AND OBJECTIVE BOX
HPI:  Patient is a 59 y/o M with a PHx HTN, DM, stroke one year ago (at the time patient received Tenecteplase and had full resolution of symptoms), who presented to [facility] on 10/12/23 following sudden fall at work and lost consciousness. NIHSS 10 on admission. Admitted under NeuroICU on 10/12. Imaging revealed ischemia in the posterior circulation, CT angiogram head/neck showed right PCA P1-2 cutoff, possibly chronic right MCA M1 cutoff which had distal reconstitution and established collaterals, and left PCA occlusion which patient was subsequently transferred for mechanical thrombectomy.    Cerebral angiogram and mechanical thrombectomy performed using aspiration with TICI 3 recanalization of left PCA. The right occipital lobe filled from right SHANI collaterals, and the right MCA as well was thought to be chronic given noted collateralization. MR with small to moderate left temporal occipital lobe infarctions. Occluded right MCA, occluded right PCA and right vertebral artery proximal segment. The patient was started on a hep gtt given extensive atherosclerotic disease which was switched to apixaban 5mg BID with recommendations for a three month course.    TTE with normal left ventricular internal cavity size. Left ventricular ejection fractio 50 to 55%. Per Neuro, no need for ROCIO at this time. Pt had an episode of bradycardia during his stay and there were concern for 2:1 block. ILR placed 10/20 and no indication for PPM at this time.     Patient was evaluated by PM&R and therapy for functional deficits, gait/ADL impairments and acute rehabilitation was recommended. Patient was medically optimized for discharge to [facility] IRF on 10/27/23.       Subjective:    Patient seen and examined during OT  No events overnight.   He states he continues to have short term memory loss. He states the long-term memory is intact.   Noted during PT of bradycardia to 35-40. Will get another EKG. History for 2:1 block.   Denies any other pain at this time.   Denies any-other complaints at this time.   Denies any fever, chills, body aches, cough, congestion or any other symptoms at this time.   Participating in therapy.  Last BM ( 10/30 )     ICU Vital Signs Last 24 Hrs  T(C): 36.6 (30 Oct 2023 19:30), Max: 36.7 (30 Oct 2023 10:07)  T(F): 97.9 (30 Oct 2023 19:30), Max: 98 (30 Oct 2023 10:07)  HR: 49 (30 Oct 2023 19:30) (49 - 56)  BP: 154/97 (30 Oct 2023 19:30) (140/72 - 154/97)  BP(mean): --  ABP: --  ABP(mean): --  RR: 16 (30 Oct 2023 19:30) (16 - 16)  SpO2: 97% (30 Oct 2023 19:30) (95% - 97%)    O2 Parameters below as of 30 Oct 2023 19:30  Patient On (Oxygen Delivery Method): room air        Review of system     Neurological deficits     Physical Exam:  Constitutional - NAD, Comfortable  HEENT - NCAT, EOMI  Chest -breathing comfortably on RA  Cardio - warm and well perfused, + loop recorder.   Abdomen - Obese. + BS.   Extremities - No peripheral edema, No calf tenderness. Long curling toenails.    Neurologic Exam:                    Cognitive -             Orientation: AA&Ox2-3 to self, year and location Not to Month, day.            Attention:   able to state days of week backward, difficulty with serial 7s            Memory: recall 3/3 without cuing.      Speech - Fluent, Comprehensible, No dysarthria, No aphasia      Cranial Nerves - No facial asymmetry, Tongue midline, EOMI, Shoulder shrug intact. Vision Impaired on the Left upper visual field in L > R eye.      Motor -                      LEFT    UE - ShAB 4/5, EF 4-5/5, EE 4/5, WE 5/5,  WNL                     RIGHT UE - ShAB 5/5, EF 5/5, EE 5/5, WE 5/5,  WNL                    LEFT    LE - HF 5/5, KE 5/5, DF 5/5, PF 5/5                    RIGHT LE - HF 5/5, KE 5/5, DF 5/5, PF 5/5        Sensory - Impaired to LT on the left face (V2/3), arm and leg.      Reflexes - symmetric DTR +1 in LE and UE. Neg Babinski's b/l     Coordination - FTN / HTS intact b/l      LABS     CAPILLARY BLOOD GLUCOSE      POCT Blood Glucose.: 134 mg/dL (31 Oct 2023 07:41)  POCT Blood Glucose.: 110 mg/dL (30 Oct 2023 21:50)  POCT Blood Glucose.: 141 mg/dL (30 Oct 2023 16:34)  POCT Blood Glucose.: 159 mg/dL (30 Oct 2023 12:11)        MEDICATIONS  (STANDING):  apixaban 5 milliGRAM(s) Oral two times a day  atorvastatin 80 milliGRAM(s) Oral at bedtime  bisacodyl 5 milliGRAM(s) Oral at bedtime  citalopram 20 milliGRAM(s) Oral daily  dextrose 50% Injectable 25 Gram(s) IV Push once  famotidine    Tablet 20 milliGRAM(s) Oral daily  folic acid 1 milliGRAM(s) Oral daily  insulin lispro (ADMELOG) corrective regimen sliding scale   SubCutaneous Before meals and at bedtime  metFORMIN 500 milliGRAM(s) Oral two times a day  multivitamin 1 Tablet(s) Oral daily  polyethylene glycol 3350 17 Gram(s) Oral daily  senna 2 Tablet(s) Oral at bedtime  thiamine 100 milliGRAM(s) Oral daily    MEDICATIONS  (PRN):    Assessment and Plan:   · Assessment	  Patient is a 59 y/o M with a PHx HTN, DM, CVA sp tnK with full resolution symptoms, who presented to [facility] on 10/12 due to sudden fall at work and lost consciousness.  MRI brain showed left temporal occipital lobe infarctions and acute punctate left occipital lobe infarction now s/p thrombectomy and Right MCA/PCA oclusion with left sided weakness, sensory impairment, left homonymous hemianopsia, gait and ADL impairments.     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 Continue for 3 months.    - Atorvastatin 80mg qhs.   - begin comprehensive rehab program OT, PT, SLP  3 hours daily 5 x week  - neuropsychology evaluation and support, recreation therapy  - Precautions: cardiac, DM, respiratory, breast CA, fall, aspiration.    # Bradycardia  - No indication for pacemaker  - Metoprolol 12.5mg BID has been discontinued   - s/p ILR on 10/20  - HR - (49 - 56) - 10/31  - Monitor. f/u EKG--   --d/w hospitalist     # HTN  - No medication at home and patient is sensitive to HTN medication. Was previously started on lisinopril.   - BP - (140/72 - 154/97) - 10/31  - monitor vitals - Goal is 140-170 as per neuro.   - Can treat with hydralazine 25mg PRN for >170.   --Reached out to Neurology, Dr. Barkley, to inquire how long pt. to remain with permissive HTN.      # HLD  - Atorvastatin 80 mg daily    # DM 2  - A1C 8.6  - Accu checks and ISS    # ETOH abuse  - Not in active withdrawal.  - Continue MVI, thiamine and folic acid    # Pain management  - Tylenol PRN    # GI/Bowel:  - Senna QHS, Miralax Daily  - dulcolax PO.   - GI ppx: Protonix    # /Bladder:   -Voiding with low PVRs-- d/c monitoring  --continent    # FEN   - Diet: DASH DIET       # DVT ppx  - Eliquis 5mg BID    IDT: 10/31  TDD:  11/11 to home.   RN: continent with bowel and continent with bladder   SW: Lives in Apartment with his daughter. On the main floor. He was independent before. Sister is supportive  SP: Regular with thin. Mild cognitive deficits-- . Recall and short term memory deficits, reasoning, attention, decreased mental flexibility.  OT: Setup - eating. CG - grooming, toileting, toilet transfers and LBD. Supervision for UBD. Max assist for foot wear.   PT : CG - transfer. 75 feet with RW and CG. 8 steps with 2 hand rail with CG.   Goals:1.  Supervision for toileting.   2.  Supervision for ambulation.   3. Recall salient information with external aides    Outpatient Follow-up (Specialty/Name of physician):    Rj Delgado  Cardiac Electrophysiology  [address] [address], [address] [address]  Phone: [phone]  Fax: [phone]  Follow Up Time:     Wily Barkley  Neurology  [address] [place] [address]  Phone: [phone]  Fax: [phone]  Follow Up Time: 1 week    Denzel Zapata  Interventional Cardiology  [address] [place] [address], [address] [address]  Phone: [phone]  Fax: [phone]  Follow Up Time: 1 week    PCP,   Phone: (   )    -  Fax: (   )    -  Follow Up Time: 1 week    Arsenio Goodrich  Interventional Neuroradiology  [address] [place] [address]  Phone: [phone]  Fax: [phone]     HPI:  Patient is a 59 y/o M with a PHx HTN, DM, stroke one year ago (at the time patient received Tenecteplase and had full resolution of symptoms), who presented to [facility] on 10/12/23 following sudden fall at work and lost consciousness. NIHSS 10 on admission. Admitted under NeuroICU on 10/12. Imaging revealed ischemia in the posterior circulation, CT angiogram head/neck showed right PCA P1-2 cutoff, possibly chronic right MCA M1 cutoff which had distal reconstitution and established collaterals, and left PCA occlusion which patient was subsequently transferred for mechanical thrombectomy.    Cerebral angiogram and mechanical thrombectomy performed using aspiration with TICI 3 recanalization of left PCA. The right occipital lobe filled from right SHANI collaterals, and the right MCA as well was thought to be chronic given noted collateralization. MR with small to moderate left temporal occipital lobe infarctions. Occluded right MCA, occluded right PCA and right vertebral artery proximal segment. The patient was started on a hep gtt given extensive atherosclerotic disease which was switched to apixaban 5mg BID with recommendations for a three month course.    TTE with normal left ventricular internal cavity size. Left ventricular ejection fractio 50 to 55%. Per Neuro, no need for ROCIO at this time. Pt had an episode of bradycardia during his stay and there were concern for 2:1 block. ILR placed 10/20 and no indication for PPM at this time.     Patient was evaluated by PM&R and therapy for functional deficits, gait/ADL impairments and acute rehabilitation was recommended. Patient was medically optimized for discharge to [facility] IR[facility] on 10/27/23.       Subjective:    No acute overnight events. Was notified by OT that patient felt "dizzy" this AM. Examined patient by bedside. He states he does not remember when the "dizziness" started, he thinks when he got into his wheelchair. He ate breakfast and has been staying hydrated. He denies any new numbness/tingling/ weakness. He continues to have numbness on the left UE/LE, but that has been present since his stroke. He denies CP/SOB/NV. His BP has been ranging from -160s. HR fluctuating between 40-70s. Unclear, if drop in blood pressure caused symptoms or not. He did not seem to be bradycardic at the time either. Medicine was notified. On reexamination in therapy, patient stated that the "dizziness" had gone away. He was working on the parallel bars and doing well. Afebrile. Will continue to monitor.     ICU Vital Signs Last 24 Hrs  T(C): 36.6 (30 Oct 2023 19:30), Max: 36.7 (30 Oct 2023 10:07)  T(F): 97.9 (30 Oct 2023 19:30), Max: 98 (30 Oct 2023 10:07)  HR: 49 (30 Oct 2023 19:30) (49 - 56)  BP: 154/97 (30 Oct 2023 19:30) (140/72 - 154/97)  BP(mean): --  ABP: --  ABP(mean): --  RR: 16 (30 Oct 2023 19:30) (16 - 16)  SpO2: 97% (30 Oct 2023 19:30) (95% - 97%)    O2 Parameters below as of 30 Oct 2023 19:30  Patient On (Oxygen Delivery Method): room air    Review of system     Neurological deficits     Physical Exam:  Constitutional - NAD, Comfortable  HEENT - NCAT, EOMI  Chest -breathing comfortably on RA  Cardio - warm and well perfused, + loop recorder.   Abdomen - Obese. + BS.   Extremities - No peripheral edema, No calf tenderness. Long curling toenails.    Neurologic Exam:                    Cognitive -             Orientation: AA&Ox2-3 to self, year and location, not to month or day             Attention:   able to state days of week backward, difficulty with serial 7s            Memory: short term memory deficits      Speech - Fluent, Comprehensible, No dysarthria, No aphasia      Cranial Nerves - No facial asymmetry, Tongue midline, EOMI, Shoulder shrug intact. Vision Impaired on the Left upper visual field in L > R eye.      Motor -                      LEFT    UE - ShAB 4/5, EF 4-5/5, EE 4/5, WE 5/5,  WNL                     RIGHT UE - ShAB 5/5, EF 5/5, EE 5/5, WE 5/5,  WNL                    LEFT    LE - HF 5/5, KE 5/5, DF 5/5, PF 5/5                    RIGHT LE - HF 5/5, KE 5/5, DF 5/5, PF 5/5        Sensory - Impaired to LT on the left face (V2/3), arm and leg.      Reflexes - symmetric DTR +1 in LE and UE. Neg Babinski's b/l     Coordination - FTN / HTS intact b/l    LABS   Reviewed     CAPILLARY BLOOD GLUCOSE  POCT Blood Glucose.: 134 mg/dL (31 Oct 2023 07:41)  POCT Blood Glucose.: 110 mg/dL (30 Oct 2023 21:50)  POCT Blood Glucose.: 141 mg/dL (30 Oct 2023 16:34)  POCT Blood Glucose.: 159 mg/dL (30 Oct 2023 12:11)    MEDICATIONS  (STANDING):  apixaban 5 milliGRAM(s) Oral two times a day  atorvastatin 80 milliGRAM(s) Oral at bedtime  bisacodyl 5 milliGRAM(s) Oral at bedtime  citalopram 20 milliGRAM(s) Oral daily  dextrose 50% Injectable 25 Gram(s) IV Push once  famotidine    Tablet 20 milliGRAM(s) Oral daily  folic acid 1 milliGRAM(s) Oral daily  insulin lispro (ADMELOG) corrective regimen sliding scale   SubCutaneous Before meals and at bedtime  metFORMIN 500 milliGRAM(s) Oral two times a day  multivitamin 1 Tablet(s) Oral daily  polyethylene glycol 3350 17 Gram(s) Oral daily  senna 2 Tablet(s) Oral at bedtime  thiamine 100 milliGRAM(s) Oral daily    MEDICATIONS  (PRN):    Assessment and Plan:   Patient is a 59 y/o M with a PHx HTN, DM, CVA sp tnK with full resolution symptoms, who presented to [facility] on 10/12 due to sudden fall at work and lost consciousness.  MRI brain showed left temporal occipital lobe infarctions and acute punctate left occipital lobe infarction now s/p thrombectomy and Right MCA/PCA oclusion with left sided weakness, sensory impairment, left homonymous hemianopsia, gait and ADL impairments.     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 Continue for 3 months.    - Atorvastatin 80mg qhs.   - begin comprehensive rehab program OT, PT, SLP  3 hours daily 5 x week  - neuropsychology evaluation and support, recreation therapy  - Precautions: cardiac, DM, respiratory, breast CA, fall, aspiration.  - Improving in therapies     #Dizziness  -11/1 reports of increased dizziness c/f dehydration vs decrease in cerebral perfusion pressure vs bradycardia   -Encouraged fluid intake   -Consider Cardiology consult   -Appreciate Hospitalist recommendations     # Bradycardia  - No indication for pacemaker  - Metoprolol 12.5mg BID has been discontinued   - S/p ILR on 10/20  - Consider consulting Cardiology if continued bradycardia with symptoms of "dizziness"     # HTN  - No medication at home and patient is sensitive to HTN medication. Was previously started on lisinopril.   - Can treat with hydralazine 25mg PRN for >170.   - Dr. Curry suggests 10 point decrease in BP parameters based on clinical exam  - 11/1 Titrate -160   - Consider titrating to -150 on 11/2     # HLD  - Atorvastatin 80 mg daily    # DM 2  - A1C 8.6  - Accu checks and ISS    # ETOH abuse  - Not in active withdrawal.  - Continue MVI, thiamine and folic acid    # Pain management  - Tylenol PRN    # GI/Bowel:  - Senna QHS, Miralax Daily  - dulcolax PO   - GI ppx: Protonix    # /Bladder:   -Continent    # FEN   - Diet: DASH DIET     # DVT ppx  - Eliquis 5mg BID    IDT: 10/31  TDD:  11/11 to home.   RN: continent with bowel and continent with bladder   SW: Lives in Apartment with his daughter. On the main floor. He was independent before. Sister is supportive  SP: Regular with thin. Mild cognitive deficits-- . Recall and short term memory deficits, reasoning, attention, decreased mental flexibility.  OT: Setup - eating. CG - grooming, toileting, toilet transfers and LBD. Supervision for UBD. Max assist for foot wear.   PT : CG - transfer. 75 feet with RW and CG. 8 steps with 2 hand rail with CG.   Goals:1.  Supervision for toileting.   2.  Supervision for ambulation.   3. Recall salient information with external aides    Outpatient Follow-up (Specialty/Name of physician):    Rj Delgado  Cardiac Electrophysiology  [address] [facility], [address]  [place] [address]  Phone: [phone]  Fax: [phone]  Follow Up Time:     Wily Barkley  Neurology  [address] [place] [address]  Phone: [phone]  Fax: [phone]  Follow Up Time: 1 week    Denzel Zapata  Interventional Cardiology  [address] [facility], [address]  [place] [address]  Phone: [phone]  Fax: [phone]  Follow Up Time: 1 week    PCP,   Phone: (   )    -  Fax: (   )    -  Follow Up Time: 1 week    Arsenio Goodrich  Interventional Neuroradiology  [address] [place] [address]  Phone: [phone]  Fax: [phone]     HPI:  Patient is a 57 y/o M with a PHx HTN, DM, stroke one year ago (at the time patient received Tenecteplase and had full resolution of symptoms), who presented to [facility] on 10/12/23 following sudden fall at work and lost consciousness. NIHSS 10 on admission. Admitted under NeuroICU on 10/12. Imaging revealed ischemia in the posterior circulation, CT angiogram head/neck showed right PCA P1-2 cutoff, possibly chronic right MCA M1 cutoff which had distal reconstitution and established collaterals, and left PCA occlusion which patient was subsequently transferred for mechanical thrombectomy.    Cerebral angiogram and mechanical thrombectomy performed using aspiration with TICI 3 recanalization of left PCA. The right occipital lobe filled from right SHANI collaterals, and the right MCA as well was thought to be chronic given noted collateralization. MR with small to moderate left temporal occipital lobe infarctions. Occluded right MCA, occluded right PCA and right vertebral artery proximal segment. The patient was started on a hep gtt given extensive atherosclerotic disease which was switched to apixaban 5mg BID with recommendations for a three month course.    TTE with normal left ventricular internal cavity size. Left ventricular ejection fractio 50 to 55%. Per Neuro, no need for ROCIO at this time. Pt had an episode of bradycardia during his stay and there were concern for 2:1 block. ILR placed 10/20 and no indication for PPM at this time.     Patient was evaluated by PM&R and therapy for functional deficits, gait/ADL impairments and acute rehabilitation was recommended. Patient was medically optimized for discharge to [facility] IRF on 10/27/23.       Subjective:    No acute overnight events. Was notified by OT that patient felt "dizzy" this AM. Examined patient by bedside. He states  "dizziness" started after he got into his wheelchair. He ate breakfast and drank a glucerna and water bottle this AM. He denies any new numbness/tingling/ weakness. He continues to have numbness on the left UE/LE, but that has been present since his stroke. He denies CP/SOB/NV. His BP has been ranging from -160s. HR fluctuating between 40-70s.  Manual BP sitting 125/89, and manual HR 48.   Unclear, if drop in blood pressure caused symptoms or not. He did not seem to be more bradycardic than usual at the time either. Patient provided  more water to drink.  Medicine was notified. On reexamination in physical  therapy when walking, patient stated that the "dizziness" had gone away. He was working on the parallel bars and doing well. Afebrile. Will continue to monitor.     ICU Vital Signs Last 24 Hrs  T(C): 36.6 (30 Oct 2023 19:30), Max: 36.7 (30 Oct 2023 10:07)  T(F): 97.9 (30 Oct 2023 19:30), Max: 98 (30 Oct 2023 10:07)  HR: 49 (30 Oct 2023 19:30) (49 - 56)  BP: 154/97 (30 Oct 2023 19:30) (140/72 - 154/97)  BP(mean): --  ABP: --  ABP(mean): --  RR: 16 (30 Oct 2023 19:30) (16 - 16)  SpO2: 97% (30 Oct 2023 19:30) (95% - 97%)    O2 Parameters below as of 30 Oct 2023 19:30  Patient On (Oxygen Delivery Method): room air    Review of system     Neurological deficits     Physical Exam:  Constitutional - NAD, Comfortable  HEENT - NCAT, EOMI  Chest -breathing comfortably on RA  Cardio - warm and well perfused, + loop recorder.   Abdomen - Obese. + BS.   Extremities - No peripheral edema, No calf tenderness. Long curling toenails.    Neurologic Exam:                    Cognitive -             Orientation: AA&Ox2-3 to self, year and location, not to month or day             Attention:   able to state days of week backward, difficulty with serial 7s            Memory: short term memory deficits      Speech - Fluent, Comprehensible, No dysarthria, No aphasia      Cranial Nerves - No facial asymmetry, Tongue midline, EOMI, Shoulder shrug intact. Vision Impaired on the Left upper visual field in L > R eye.      Motor -                      LEFT    UE - ShAB 4/5, EF 4-5/5, EE 4/5, WE 5/5,  WNL                     RIGHT UE - ShAB 5/5, EF 5/5, EE 5/5, WE 5/5,  WNL                    LEFT    LE - HF 5/5, KE 5/5, DF 5/5, PF 5/5                    RIGHT LE - HF 5/5, KE 5/5, DF 5/5, PF 5/5        Sensory - Impaired to LT on the left face (V2/3), arm and leg.      Reflexes - symmetric DTR +1 in LE and UE. Neg Babinski's b/l     Coordination - FTN / HTS intact b/l    LABS   Reviewed     CAPILLARY BLOOD GLUCOSE  POCT Blood Glucose.: 134 mg/dL (31 Oct 2023 07:41)  POCT Blood Glucose.: 110 mg/dL (30 Oct 2023 21:50)  POCT Blood Glucose.: 141 mg/dL (30 Oct 2023 16:34)  POCT Blood Glucose.: 159 mg/dL (30 Oct 2023 12:11)    MEDICATIONS  (STANDING):  apixaban 5 milliGRAM(s) Oral two times a day  atorvastatin 80 milliGRAM(s) Oral at bedtime  bisacodyl 5 milliGRAM(s) Oral at bedtime  citalopram 20 milliGRAM(s) Oral daily  dextrose 50% Injectable 25 Gram(s) IV Push once  famotidine    Tablet 20 milliGRAM(s) Oral daily  folic acid 1 milliGRAM(s) Oral daily  insulin lispro (ADMELOG) corrective regimen sliding scale   SubCutaneous Before meals and at bedtime  metFORMIN 500 milliGRAM(s) Oral two times a day  multivitamin 1 Tablet(s) Oral daily  polyethylene glycol 3350 17 Gram(s) Oral daily  senna 2 Tablet(s) Oral at bedtime  thiamine 100 milliGRAM(s) Oral daily    MEDICATIONS  (PRN):    Assessment and Plan:   Patient is a 57 y/o M with a PHx HTN, DM, CVA sp tnK with full resolution symptoms, who presented to [facility] on 10/12 due to sudden fall at work and lost consciousness.  MRI brain showed left temporal occipital lobe infarctions and acute punctate left occipital lobe infarction now s/p thrombectomy and Right MCA/PCA oclusion with left sided weakness, sensory impairment, left homonymous hemianopsia, gait and ADL impairments.     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 Continue for 3 months.    - Atorvastatin 80mg qhs.   - begin comprehensive rehab program OT, PT, SLP  3 hours daily 5 x week  - neuropsychology evaluation and support, recreation therapy  - Precautions: cardiac, DM, respiratory, breast CA, fall, aspiration.  - Improving in therapies     #Dizziness  -11/1 reports of increased dizziness c/f dehydration vs decrease in cerebral perfusion pressure vs bradycardia   -Encouraged fluid intake --improved today after additional fluid intake  -Consider Cardiology consult if continues  -Appreciate Hospitalist recommendations     # Bradycardia  - No indication for pacemaker  - Metoprolol 12.5mg BID has been discontinued   - S/p ILR on 10/20  - Consider consulting Cardiology if continued bradycardia with symptoms of "dizziness"     # HTN  - No medication at home and patient is sensitive to HTN medication. Was previously started on lisinopril.   - Can treat with hydralazine 25mg PRN for >170.   - Dr. Curry suggests 10 point decrease in BP parameters based on clinical exam  - 11/1 Titrate -160   - Consider titrating to -150 on weekend or early next week as tolerated.      # HLD  - Atorvastatin 80 mg daily    # DM 2  - A1C 8.6  - Accu checks and ISS    # ETOH abuse  - Not in active withdrawal.  - Continue MVI, thiamine and folic acid    # Pain management  - Tylenol PRN    # GI/Bowel:  - Senna QHS, Miralax Daily  - dulcolax PO   - GI ppx: Protonix    # /Bladder:   -Continent    # FEN   - Diet: DASH DIET     # DVT ppx  - Eliquis 5mg BID    IDT: 10/31  TDD:  11/11 to home.   RN: continent with bowel and continent with bladder   SW: Lives in Apartment with his daughter. On the main floor. He was independent before. Sister is supportive  SP: Regular with thin. Mild cognitive deficits-- . Recall and short term memory deficits, reasoning, attention, decreased mental flexibility.  OT: Setup - eating. CG - grooming, toileting, toilet transfers and LBD. Supervision for UBD. Max assist for foot wear.   PT : CG - transfer. 75 feet with RW and CG. 8 steps with 2 hand rail with CG.   Goals:1.  Supervision for toileting.   2.  Supervision for ambulation.   3. Recall salient information with external aides    Outpatient Follow-up (Specialty/Name of physician):    Rj Delgado  Cardiac Electrophysiology  [address] [facility], [address]  [place] [address]  Phone: [phone]  Fax: [phone]  Follow Up Time:     Wily Barkley  Neurology  [address] [place] [address]  Phone: [phone]  Fax: [phone]  Follow Up Time: 1 week    Denzel Zapata  Interventional Cardiology  [address] [facility], [address]  [place] [address]  Phone: [phone]  Fax: [phone]  Follow Up Time: 1 week    PCP,   Phone: (   )    -  Fax: (   )    -  Follow Up Time: 1 week    Arsenio Goodrich  Interventional Neuroradiology  [address] [place] [address]  Phone: [phone]  Fax: [phone]

## 2023-11-01 NOTE — PROGRESS NOTE ADULT - SUBJECTIVE AND OBJECTIVE BOX
intermittent dizziness, not correlating with bradycardia      PHYSICAL EXAM:    Vital Signs Last 24 Hrs  T(F): 97.9 (01 Nov 2023 08:11), Max: 98.3 (31 Oct 2023 19:50)  HR: 56 (01 Nov 2023 08:11) (53 - 56)  BP: 149/81 (01 Nov 2023 08:11) (143/82 - 149/81)  RR: 16 (01 Nov 2023 08:11) (16 - 16)  SpO2: 98% (01 Nov 2023 08:11) (98% - 98%)  I&O's Summary    01 Nov 2023 07:01  -  01 Nov 2023 15:51  --------------------------------------------------------  IN: 480 mL / OUT: 303 mL / NET: 177 mL        GENERAL: NAD  HEENT: NCAT  CHEST/LUNG: No increased WOB, Clear to percussion bilaterally; No rales, rhonchi, wheezing  HEART: Regular rate and rhythm; No murmurs  ABDOMEN: Soft, Nontender, Nondistended; Bowel sounds present  MUSCULOSKELETAL/EXTREMITIES:  2+ Peripheral Pulses, No LE edema  PSYCH: Appropriate affect, Alert & Oriented    LABS:                        13.1   4.98  )-----------( 130      ( 30 Oct 2023 06:22 )             37.6       10-30    140  |  104  |  20  ----------------------------<  128  4.1   |  27  |  0.91    Ca    9.0      30 Oct 2023 06:22    TPro  6.7  /  Alb  3.4  /  TBili  0.6  /  DBili  x   /  AST  24  /  ALT  47  /  AlkPhos  76  10-30                10-13 Chol 199 mg/dL LDL -- HDL 29 mg/dL Trig 277 mg/dL              POCT Blood Glucose.: 127 mg/dL (01 Nov 2023 11:57)  POCT Blood Glucose.: 115 mg/dL (01 Nov 2023 07:47)  POCT Blood Glucose.: 108 mg/dL (31 Oct 2023 21:27)  POCT Blood Glucose.: 131 mg/dL (31 Oct 2023 16:57)      Urinalysis Basic - ( 30 Oct 2023 06:22 )    Color: x / Appearance: x / SG: x / pH: x  Gluc: 128 mg/dL / Ketone: x  / Bili: x / Urobili: x   Blood: x / Protein: x / Nitrite: x   Leuk Esterase: x / RBC: x / WBC x   Sq Epi: x / Non Sq Epi: x / Bacteria: x        COVID-19 PCR: NotDetec (10-27-23 @ 22:21)      MEDICATIONS  (STANDING):  apixaban 5 milliGRAM(s) Oral two times a day  atorvastatin 80 milliGRAM(s) Oral at bedtime  bisacodyl 5 milliGRAM(s) Oral at bedtime  citalopram 20 milliGRAM(s) Oral daily  dextrose 50% Injectable 25 Gram(s) IV Push once  famotidine    Tablet 20 milliGRAM(s) Oral daily  folic acid 1 milliGRAM(s) Oral daily  insulin lispro (ADMELOG) corrective regimen sliding scale   SubCutaneous Before meals and at bedtime  metFORMIN 500 milliGRAM(s) Oral two times a day  multivitamin 1 Tablet(s) Oral daily  polyethylene glycol 3350 17 Gram(s) Oral daily  senna 2 Tablet(s) Oral at bedtime  thiamine 100 milliGRAM(s) Oral daily    MEDICATIONS  (PRN):      Care Discussed with Consultants/Other Providers: Yes

## 2023-11-02 LAB
ALBUMIN SERPL ELPH-MCNC: 3.8 G/DL — SIGNIFICANT CHANGE UP (ref 3.3–5)
ALBUMIN SERPL ELPH-MCNC: 3.8 G/DL — SIGNIFICANT CHANGE UP (ref 3.3–5)
ALP SERPL-CCNC: 84 U/L — SIGNIFICANT CHANGE UP (ref 40–120)
ALP SERPL-CCNC: 84 U/L — SIGNIFICANT CHANGE UP (ref 40–120)
ALT FLD-CCNC: 51 U/L — HIGH (ref 10–45)
ALT FLD-CCNC: 51 U/L — HIGH (ref 10–45)
ANION GAP SERPL CALC-SCNC: 4 MMOL/L — LOW (ref 5–17)
ANION GAP SERPL CALC-SCNC: 4 MMOL/L — LOW (ref 5–17)
AST SERPL-CCNC: 27 U/L — SIGNIFICANT CHANGE UP (ref 10–40)
AST SERPL-CCNC: 27 U/L — SIGNIFICANT CHANGE UP (ref 10–40)
BILIRUB SERPL-MCNC: 1.1 MG/DL — SIGNIFICANT CHANGE UP (ref 0.2–1.2)
BILIRUB SERPL-MCNC: 1.1 MG/DL — SIGNIFICANT CHANGE UP (ref 0.2–1.2)
BUN SERPL-MCNC: 20 MG/DL — SIGNIFICANT CHANGE UP (ref 7–23)
BUN SERPL-MCNC: 20 MG/DL — SIGNIFICANT CHANGE UP (ref 7–23)
CALCIUM SERPL-MCNC: 9.4 MG/DL — SIGNIFICANT CHANGE UP (ref 8.4–10.5)
CALCIUM SERPL-MCNC: 9.4 MG/DL — SIGNIFICANT CHANGE UP (ref 8.4–10.5)
CHLORIDE SERPL-SCNC: 103 MMOL/L — SIGNIFICANT CHANGE UP (ref 96–108)
CHLORIDE SERPL-SCNC: 103 MMOL/L — SIGNIFICANT CHANGE UP (ref 96–108)
CO2 SERPL-SCNC: 31 MMOL/L — SIGNIFICANT CHANGE UP (ref 22–31)
CO2 SERPL-SCNC: 31 MMOL/L — SIGNIFICANT CHANGE UP (ref 22–31)
CREAT SERPL-MCNC: 1.2 MG/DL — SIGNIFICANT CHANGE UP (ref 0.5–1.3)
CREAT SERPL-MCNC: 1.2 MG/DL — SIGNIFICANT CHANGE UP (ref 0.5–1.3)
EGFR: 70 ML/MIN/1.73M2 — SIGNIFICANT CHANGE UP
EGFR: 70 ML/MIN/1.73M2 — SIGNIFICANT CHANGE UP
GLUCOSE BLDC GLUCOMTR-MCNC: 111 MG/DL — HIGH (ref 70–99)
GLUCOSE BLDC GLUCOMTR-MCNC: 111 MG/DL — HIGH (ref 70–99)
GLUCOSE BLDC GLUCOMTR-MCNC: 113 MG/DL — HIGH (ref 70–99)
GLUCOSE BLDC GLUCOMTR-MCNC: 113 MG/DL — HIGH (ref 70–99)
GLUCOSE BLDC GLUCOMTR-MCNC: 117 MG/DL — HIGH (ref 70–99)
GLUCOSE BLDC GLUCOMTR-MCNC: 117 MG/DL — HIGH (ref 70–99)
GLUCOSE BLDC GLUCOMTR-MCNC: 123 MG/DL — HIGH (ref 70–99)
GLUCOSE BLDC GLUCOMTR-MCNC: 123 MG/DL — HIGH (ref 70–99)
GLUCOSE SERPL-MCNC: 129 MG/DL — HIGH (ref 70–99)
GLUCOSE SERPL-MCNC: 129 MG/DL — HIGH (ref 70–99)
HCT VFR BLD CALC: 41.2 % — SIGNIFICANT CHANGE UP (ref 39–50)
HCT VFR BLD CALC: 41.2 % — SIGNIFICANT CHANGE UP (ref 39–50)
HGB BLD-MCNC: 14.1 G/DL — SIGNIFICANT CHANGE UP (ref 13–17)
HGB BLD-MCNC: 14.1 G/DL — SIGNIFICANT CHANGE UP (ref 13–17)
MCHC RBC-ENTMCNC: 32 PG — SIGNIFICANT CHANGE UP (ref 27–34)
MCHC RBC-ENTMCNC: 32 PG — SIGNIFICANT CHANGE UP (ref 27–34)
MCHC RBC-ENTMCNC: 34.2 GM/DL — SIGNIFICANT CHANGE UP (ref 32–36)
MCHC RBC-ENTMCNC: 34.2 GM/DL — SIGNIFICANT CHANGE UP (ref 32–36)
MCV RBC AUTO: 93.4 FL — SIGNIFICANT CHANGE UP (ref 80–100)
MCV RBC AUTO: 93.4 FL — SIGNIFICANT CHANGE UP (ref 80–100)
NRBC # BLD: 0 /100 WBCS — SIGNIFICANT CHANGE UP (ref 0–0)
NRBC # BLD: 0 /100 WBCS — SIGNIFICANT CHANGE UP (ref 0–0)
PLATELET # BLD AUTO: 139 K/UL — LOW (ref 150–400)
PLATELET # BLD AUTO: 139 K/UL — LOW (ref 150–400)
POTASSIUM SERPL-MCNC: 4.3 MMOL/L — SIGNIFICANT CHANGE UP (ref 3.5–5.3)
POTASSIUM SERPL-MCNC: 4.3 MMOL/L — SIGNIFICANT CHANGE UP (ref 3.5–5.3)
POTASSIUM SERPL-SCNC: 4.3 MMOL/L — SIGNIFICANT CHANGE UP (ref 3.5–5.3)
POTASSIUM SERPL-SCNC: 4.3 MMOL/L — SIGNIFICANT CHANGE UP (ref 3.5–5.3)
PROT SERPL-MCNC: 7.2 G/DL — SIGNIFICANT CHANGE UP (ref 6–8.3)
PROT SERPL-MCNC: 7.2 G/DL — SIGNIFICANT CHANGE UP (ref 6–8.3)
RBC # BLD: 4.41 M/UL — SIGNIFICANT CHANGE UP (ref 4.2–5.8)
RBC # BLD: 4.41 M/UL — SIGNIFICANT CHANGE UP (ref 4.2–5.8)
RBC # FLD: 12.7 % — SIGNIFICANT CHANGE UP (ref 10.3–14.5)
RBC # FLD: 12.7 % — SIGNIFICANT CHANGE UP (ref 10.3–14.5)
SODIUM SERPL-SCNC: 138 MMOL/L — SIGNIFICANT CHANGE UP (ref 135–145)
SODIUM SERPL-SCNC: 138 MMOL/L — SIGNIFICANT CHANGE UP (ref 135–145)
WBC # BLD: 5.59 K/UL — SIGNIFICANT CHANGE UP (ref 3.8–10.5)
WBC # BLD: 5.59 K/UL — SIGNIFICANT CHANGE UP (ref 3.8–10.5)
WBC # FLD AUTO: 5.59 K/UL — SIGNIFICANT CHANGE UP (ref 3.8–10.5)
WBC # FLD AUTO: 5.59 K/UL — SIGNIFICANT CHANGE UP (ref 3.8–10.5)

## 2023-11-02 PROCEDURE — 99233 SBSQ HOSP IP/OBS HIGH 50: CPT

## 2023-11-02 PROCEDURE — 96116 NUBHVL XM PHYS/QHP 1ST HR: CPT

## 2023-11-02 RX ADMIN — APIXABAN 5 MILLIGRAM(S): 2.5 TABLET, FILM COATED ORAL at 17:20

## 2023-11-02 RX ADMIN — ATORVASTATIN CALCIUM 80 MILLIGRAM(S): 80 TABLET, FILM COATED ORAL at 21:39

## 2023-11-02 RX ADMIN — Medication 100 MILLIGRAM(S): at 12:16

## 2023-11-02 RX ADMIN — Medication 1 TABLET(S): at 12:17

## 2023-11-02 RX ADMIN — Medication 1 MILLIGRAM(S): at 12:16

## 2023-11-02 RX ADMIN — FAMOTIDINE 20 MILLIGRAM(S): 10 INJECTION INTRAVENOUS at 12:16

## 2023-11-02 RX ADMIN — POLYETHYLENE GLYCOL 3350 17 GRAM(S): 17 POWDER, FOR SOLUTION ORAL at 12:17

## 2023-11-02 RX ADMIN — METFORMIN HYDROCHLORIDE 500 MILLIGRAM(S): 850 TABLET ORAL at 17:20

## 2023-11-02 RX ADMIN — CITALOPRAM 20 MILLIGRAM(S): 10 TABLET, FILM COATED ORAL at 12:16

## 2023-11-02 RX ADMIN — METFORMIN HYDROCHLORIDE 500 MILLIGRAM(S): 850 TABLET ORAL at 07:43

## 2023-11-02 RX ADMIN — APIXABAN 5 MILLIGRAM(S): 2.5 TABLET, FILM COATED ORAL at 05:16

## 2023-11-02 NOTE — CONSULT NOTE ADULT - ASSESSMENT
Patient is a 59 y/o M with a PHx HTN, DM, CVA sp tnK with full resolution symptoms, who presented to [facility] on 10/12 due to esudden fall at work and lost consciousness.  MRI brain showed left temporal occipital lobe infarctions and acute punctate left occipital lobe infarction now s/p thrombectomy.     # Left temporal occipital lobe CVA   - S/p thrombectomy  - Occluded R MCA/PCA but with good collaterals  - Eliquis 5mg BID x 3 months  - Atorvastatin 80mg qhs    # Bradycardia  - Asymptomatic   - No indication for pacemaker  - s/p ILR on 10/20  - Not on BB or other AV Lilly blocking agents  - Monitor for now     # HTN  - Vitals reviewed  - Hold off on antihypertensive agents as currently appropriate BP  - Monitor orthostatics prior to starting meds     # HLD  - Atorvastatin 80 mg daily    # DM 2  - A1C 8.6  - Start Metformin 500mg BID  - Accu checks and ISS    # ETOH abuse  - Not in active withdrawal.  - Continue MVI, thiamine and folic acid    # DVT ppx  - Eliquis 5mg BID
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 Pt evidences difficulties with short-term memory for verbal information with loss of information over time and limited recovery in response to cueing, long-term/declarative memory, visuospatial skills (including visuomotor integration and figure-ground discrimination), and aspects of executive functions (including abstract reasoning and problem solving). His affect is depressed, and he reports several anxiety and depression symptoms in response to his current medical condition. FIM scores: Social Interaction 4; Problem Solving 5; Memory 5.    Plan: Individual supportive psychotherapy to monitor cognition, affect/mood, and behavior. Continue with his current antidepressant medication. Cognitive remediation during speech therapy and occupational therapy sessions is recommended. Participation in recreation/art therapy in order to have pleasure and mastery experiences and regain/reestablish a sense of routine.    Time spent with Pt, 35 minutes.

## 2023-11-02 NOTE — PROGRESS NOTE ADULT - ASSESSMENT
Patient is a 59 y/o M with a PHx HTN, DM, CVA sp tnK with full resolution symptoms, who presented to [facility] on 10/12 due to sudden fall at work and lost consciousness.  MRI brain showed left temporal occipital lobe infarctions and acute punctate left occipital lobe infarction now s/p thrombectomy and Right MCA/PCA oclusion with left sided weakness, sensory impairment, left homonymous hemianopsia, gait and ADL impairments.     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 Continue for 3 months.    - Atorvastatin 80mg qhs.   - begin comprehensive rehab program OT, PT, SLP  3 hours daily 5 x week  - neuropsychology evaluation and support, recreation therapy  - Precautions: cardiac, DM, respiratory, breast CA, fall, aspiration.  - Improving in therapies     #Dizziness  -11/1 reports of increased dizziness c/f dehydration vs decrease in cerebral perfusion pressure vs bradycardia   -Encouraged fluid intake --improved today after additional fluid intake  -Consider Cardiology consult if continues  -Appreciate Hospitalist recommendations     # Bradycardia  - No indication for pacemaker  - Metoprolol 12.5mg BID has been discontinued   - S/p ILR on 10/20  - Consider consulting Cardiology if continued bradycardia with symptoms of "dizziness"     # HTN  - No medication at home and patient is sensitive to HTN medication. Was previously started on lisinopril.   - Can treat with hydralazine 25mg PRN for >170.   - Dr. Curry suggests 10 point decrease in BP parameters based on clinical exam  - 11/1 Titrate -160   - Consider titrating to -150 on weekend or early next week as tolerated.      # HLD  - Atorvastatin 80 mg daily    # DM 2  - A1C 8.6  - Accu checks and ISS    # ETOH abuse  - Not in active withdrawal.  - Continue MVI, thiamine and folic acid    # Pain management  - Tylenol PRN    # GI/Bowel:  - Senna QHS, Miralax Daily  - dulcolax PO   - GI ppx: Protonix    # /Bladder:   -Continent    # FEN   - Diet: DASH DIET     # DVT ppx  - Eliquis 5mg BID    IDT: 10/31  TDD:  11/11 to home.   RN: continent with bowel and continent with bladder   SW: Lives in Apartment with his daughter. On the main floor. He was independent before. Sister is supportive  SP: Regular with thin. Mild cognitive deficits-- . Recall and short term memory deficits, reasoning, attention, decreased mental flexibility.  OT: Setup - eating. CG - grooming, toileting, toilet transfers and LBD. Supervision for UBD. Max assist for foot wear.   PT : CG - transfer. 75 feet with RW and CG. 8 steps with 2 hand rail with CG.   Goals:1.  Supervision for toileting.   2.  Supervision for ambulation.   3. Recall salient information with external aides    Outpatient Follow-up (Specialty/Name of physician):    Rj Delgado  Cardiac Electrophysiology  [address] [id number]  Phone: [phone]  Fax: [phone]  Follow Up Time:     Wily Barkley  Neurology  [address] [place] [address]  Phone: [phone]  Fax: [phone]  Follow Up Time: 1 week    Denzel Zapata  Interventional Cardiology  [address]  Phone: [phone]  Fax: [phone]  Follow Up Time: 1 week    PCP,   Phone: (   )    -  Fax: (   )    -  Follow Up Time: 1 week    Arsenio Goodrich  Interventional Neuroradiology  [address] [id number]  Phone: [phone]  Fax: [phone]   Patient is a 59 y/o M with a PHx HTN, DM, CVA sp tnK with full resolution symptoms, who presented to [facility] on 10/12 due to sudden fall at work and lost consciousness.  MRI brain showed left temporal occipital lobe infarctions and acute punctate left occipital lobe infarction now s/p thrombectomy and Right MCA/PCA oclusion with left sided weakness, sensory impairment, left homonymous hemianopsia, gait and ADL impairments.     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 Continue for 3 months.    - Atorvastatin 80mg qhs.   - begin comprehensive rehab program OT, PT, SLP  3 hours daily 5 x week  - neuropsychology evaluation and support, recreation therapy  - Precautions: cardiac, DM, respiratory, breast CA, fall, aspiration.  - Improving in therapies     #Dizziness   -11/1 reports of increased dizziness c/f dehydration vs decrease in cerebral perfusion pressure vs bradycardia   -Encouraged fluid intake --improved today after additional fluid intake  - per electrophysiology notes, no indication for pacemaker, s/p ILR on 10/20  - needs EP follow up as an outpatient.    # Bradycardia  - No indication for pacemaker  - Metoprolol 12.5mg BID has been discontinued   - per electrophysiology notes, no indication for pacemaker, s/p ILR on 10/20  - needs EP follow up as an outpatient.    # HTN  - No medication at home and patient is sensitive to HTN medication. Was previously started on lisinopril.   - Can treat with hydralazine 25mg PRN for >170.   - did not tolerate lisinopril 5mg during hospitalization (intermittent hypotension)  - Dr. Curry suggests 10 point decrease in BP parameters based on clinical exam  - 11/1 Titrate -160   - Consider titrating to -150 on weekend or early next week as tolerated.      # HLD  - Atorvastatin 80 mg daily    # DM 2  - A1C 8.6  - Accu checks and ISS    # ETOH abuse  - Not in active withdrawal.  - Continue MVI, thiamine and folic acid    # Pain management  - Tylenol PRN    # GI/Bowel:  - Senna QHS, Miralax Daily  - dulcolax PO   - GI ppx: Protonix    # /Bladder:   -Continent    # FEN   - Diet: DASH DIET     # DVT ppx  - Eliquis 5mg BID    IDT: 10/31  TDD:  11/11 to home.   RN: continent with bowel and continent with bladder   SW: Lives in Apartment with his daughter. On the main floor. He was independent before. Sister is supportive  SP: Regular with thin. Mild cognitive deficits-- . Recall and short term memory deficits, reasoning, attention, decreased mental flexibility.  OT: Setup - eating. CG - grooming, toileting, toilet transfers and LBD. Supervision for UBD. Max assist for foot wear.   PT : CG - transfer. 75 feet with RW and CG. 8 steps with 2 hand rail with CG.   Goals:1.  Supervision for toileting.   2.  Supervision for ambulation.   3. Recall salient information with external aides    Outpatient Follow-up (Specialty/Name of physician):    Rj Delgado  Cardiac Electrophysiology  [address] [address]  Phone: [phone]  Fax: [phone]  Follow Up Time:     Wily Barkley  Neurology  [address] [place] [address]  Phone: [phone]  Fax: [phone]  Follow Up Time: 1 week    Denzel Zapata  Interventional Cardiology  [address] [address]  Phone: [phone]  Fax: [phone]  Follow Up Time: 1 week    PCP,   Phone: (   )    -  Fax: (   )    -  Follow Up Time: 1 week    Arsenio Goodrich  Interventional Neuroradiology  [address] [place] [address]  Phone: [phone]  Fax: [phone]   Patient is a 59 y/o M with a PHx HTN, DM, CVA sp tnK with full resolution symptoms, who presented to [facility] on 10/12 due to sudden fall at work and lost consciousness.  MRI brain showed left temporal occipital lobe infarctions and acute punctate left occipital lobe infarction now s/p thrombectomy and Right MCA/PCA oclusion with left sided weakness, sensory impairment, left homonymous hemianopsia, gait and ADL impairments.     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 Continue for 3 months.    - Atorvastatin 80mg qhs.   - begin comprehensive rehab program OT, PT, SLP  3 hours daily 5 x week  - neuropsychology evaluation and support, recreation therapy  - Precautions: cardiac, DM, respiratory, breast CA, fall, aspiration.  - Improving in therapies     #Dizziness   -11/1 reports of increased dizziness c/f dehydration vs decrease in cerebral perfusion pressure vs bradycardia   -Encouraged fluid intake --improved today after additional fluid intake\  --No dizziness today  - per electrophysiology notes, no indication for pacemaker, s/p ILR on 10/20  - needs EP follow up as an outpatient.    # Bradycardia  - No indication for pacemaker  - Metoprolol 12.5mg BID has been discontinued   - per electrophysiology notes, no indication for pacemaker, s/p ILR on 10/20  - needs EP follow up as an outpatient.    # HTN  - No medication at home and patient is sensitive to HTN medication. Was previously started on lisinopril.   - Can treat with hydralazine 25mg PRN for >170.   - did not tolerate lisinopril 5mg during hospitalization (intermittent hypotension)  - Dr. Curry suggests 10 point decrease in BP parameters based on clinical exam  - 11/1 Titrate -160   - Consider titrating to -150 on weekend or early next week as tolerated.   --check manual BPs only--nursing order written     # HLD  - Atorvastatin 80 mg daily    # DM 2  - A1C 8.6  - Accu checks and ISS    # ETOH abuse  - Not in active withdrawal.  - Continue MVI, thiamine and folic acid    # Pain management  - Tylenol PRN    # GI/Bowel:  - Senna QHS, Miralax Daily  - dulcolax PO   - GI ppx: Protonix    # /Bladder:   -Continent    # FEN   - Diet: DASH DIET     # DVT ppx  - Eliquis 5mg BID    IDT: 10/31  TDD:  11/11 to home.   RN: continent with bowel and continent with bladder   SW: Lives in Apartment with his daughter. On the main floor. He was independent before. Sister is supportive  SP: Regular with thin. Mild cognitive deficits-- . Recall and short term memory deficits, reasoning, attention, decreased mental flexibility.  OT: Setup - eating. CG - grooming, toileting, toilet transfers and LBD. Supervision for UBD. Max assist for foot wear.   PT : CG - transfer. 75 feet with RW and CG. 8 steps with 2 hand rail with CG.   Goals:1.  Supervision for toileting.   2.  Supervision for ambulation.   3. Recall salient information with external aides    Outpatient Follow-up (Specialty/Name of physician):    Rj Delgado  Cardiac Electrophysiology  [address] [id number]  Phone: [phone]  Fax: [phone]  Follow Up Time:     Wily Barkley  Neurology  [address] [place] [address]  Phone: [phone]  Fax: [phone]  Follow Up Time: 1 week    Denzel Zapata  Interventional Cardiology  [address] [address]  Phone: [phone]  Fax: [phone]  Follow Up Time: 1 week    PCP,   Phone: (   )    -  Fax: (   )    -  Follow Up Time: 1 week    Arsenio Goodrich  Interventional Neuroradiology  [address] [place] [address]  Phone: [phone]  Fax: [phone]   Patient is a 59 y/o M with a PHx HTN, DM, CVA sp tnK with full resolution symptoms, who presented to [facility] on 10/12 due to sudden fall at work and lost consciousness.  MRI brain showed left temporal occipital lobe infarctions and acute punctate left occipital lobe infarction now s/p thrombectomy and Right MCA/PCA oclusion with left sided weakness, sensory impairment, left homonymous hemianopsia, gait and ADL impairments.     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 Continue for 3 months.    - Atorvastatin 80mg qhs.   - begin comprehensive rehab program OT, PT, SLP  3 hours daily 5 x week  - neuropsychology evaluation and support, recreation therapy  - Precautions: cardiac, DM, respiratory, breast CA, fall, aspiration.  - Improving in therapies     #Dizziness   -11/1 reports of increased dizziness c/f dehydration vs decrease in cerebral perfusion pressure vs bradycardia   -Encouraged fluid intake --improved today after additional fluid intake\  --No dizziness today  - per electrophysiology notes, no indication for pacemaker, s/p ILR on 10/20  - needs EP follow up as an outpatient.    # Bradycardia  - No indication for pacemaker  - Metoprolol 12.5mg BID has been discontinued   - per electrophysiology notes, no indication for pacemaker, s/p ILR on 10/20  - needs EP follow up as an outpatient.    # HTN  - No medication at home and patient is sensitive to HTN medication. Was previously started on lisinopril.   - Can treat with hydralazine 25mg PRN for >170.   - did not tolerate lisinopril 5mg during hospitalization (intermittent hypotension)  - Dr. Curry suggests 10 point decrease in BP parameters based on clinical exam  - 11/1 Titrate -160   - Consider titrating to -150 on weekend or early next week as tolerated.   --check manual BPs only--nursing order written     # HLD  - Atorvastatin 80 mg daily    # DM 2  - A1C 8.6  - Accu checks and ISS    # ETOH abuse  - Not in active withdrawal.  - Continue MVI, thiamine and folic acid    # Pain management  - Tylenol PRN    # GI/Bowel:  - Senna QHS, Miralax Daily  - dulcolax PO   - GI ppx: Protonix    # /Bladder:   -Continent    # FEN   - Diet: DASH DIET     # DVT ppx  - Eliquis 5mg BID    IDT: 10/31  TDD:  11/11 to home.   RN: continent with bowel and continent with bladder   SW: Lives in Apartment with his daughter. On the main floor. He was independent before. Sister is supportive  SP: Regular with thin. Mild cognitive deficits-- . Recall and short term memory deficits, reasoning, attention, decreased mental flexibility.  OT: Setup - eating. CG - grooming, toileting, toilet transfers and LBD. Supervision for UBD. Max assist for foot wear.   PT : CG - transfer. 75 feet with RW and CG. 8 steps with 2 hand rail with CG.   Goals:1.  Supervision for toileting.   2.  Supervision for ambulation.   3. Recall salient information with external aides    Addendum - Spoke to Lety about discharge planning. No-one is able to support at home at this time.     Outpatient Follow-up (Specialty/Name of physician):    Rj Delgado  Cardiac Electrophysiology  [address] [address]  Phone: [phone]  Fax: [phone]  Follow Up Time:     Wily Barkley  Neurology  [address] [place] [address]  Phone: [phone]  Fax: [phone]  Follow Up Time: 1 week    Denzel Zapata  Interventional Cardiology  [address] [address]  Phone: [phone]  Fax: [phone]  Follow Up Time: 1 week    PCP,   Phone: (   )    -  Fax: (   )    -  Follow Up Time: 1 week    Arsenio Goodrich  Interventional Neuroradiology  [address] [place] [address]  Phone: [phone]  Fax: [phone]

## 2023-11-02 NOTE — PROGRESS NOTE ADULT - SUBJECTIVE AND OBJECTIVE BOX
HPI:  Patient is a 57 y/o M with a PHx HTN, DM, stroke one year ago (at the time patient received Tenecteplase and had full resolution of symptoms), who presented to [facility] on 10/12/23 following sudden fall at work and lost consciousness. NIHSS 10 on admission. Admitted under NeuroICU on 10/12. Imaging revealed ischemia in the posterior circulation, CT angiogram head/neck showed right PCA P1-2 cutoff, possibly chronic right MCA M1 cutoff which had distal reconstitution and established collaterals, and left PCA occlusion which patient was subsequently transferred for mechanical thrombectomy.    Cerebral angiogram and mechanical thrombectomy performed using aspiration with TICI 3 recanalization of left PCA. The right occipital lobe filled from right SHANI collaterals, and the right MCA as well was thought to be chronic given noted collateralization. MR with small to moderate left temporal occipital lobe infarctions. Occluded right MCA, occluded right PCA and right vertebral artery proximal segment. The patient was started on a hep gtt given extensive atherosclerotic disease which was switched to apixaban 5mg BID with recommendations for a three month course.    TTE with normal left ventricular internal cavity size. Left ventricular ejection fractio 50 to 55%. Per Neuro, no need for ROCIO at this time. Pt had an episode of bradycardia during his stay and there were concern for 2:1 block. ILR placed 10/20 and no indication for PPM at this time.     Patient was evaluated by PM&R and therapy for functional deficits, gait/ADL impairments and acute rehabilitation was recommended. Patient was medically optimized for discharge to [facility] IRF on 10/27/23.       Subjective:    No acute overnight events. Was notified by OT that patient felt "dizzy" this AM. Examined patient by bedside. He states  "dizziness" started after he got into his wheelchair. He ate breakfast and drank a glucerna and water bottle this AM. He denies any new numbness/tingling/ weakness. He continues to have numbness on the left UE/LE, but that has been present since his stroke. He denies CP/SOB/NV. His BP has been ranging from -160s. HR fluctuating between 40-70s.  Manual BP sitting 125/89, and manual HR 48.   Unclear, if drop in blood pressure caused symptoms or not. He did not seem to be more bradycardic than usual at the time either. Patient provided  more water to drink.  Medicine was notified. On reexamination in physical  therapy when walking, patient stated that the "dizziness" had gone away. He was working on the parallel bars and doing well. Afebrile. Will continue to monitor.     ICU Vital Signs Last 24 Hrs  T(C): 36.4 (02 Nov 2023 07:45), Max: 36.6 (01 Nov 2023 19:58)  T(F): 97.6 (02 Nov 2023 07:45), Max: 97.9 (01 Nov 2023 19:58)  HR: 53 (02 Nov 2023 07:45) (53 - 58)  BP: 153/99 (02 Nov 2023 07:45) (148/76 - 153/99)  BP(mean): --  ABP: --  ABP(mean): --  RR: 16 (02 Nov 2023 07:45) (16 - 17)  SpO2: 94% (02 Nov 2023 07:45) (94% - 96%)    O2 Parameters below as of 02 Nov 2023 07:45  Patient On (Oxygen Delivery Method): room air      Review of system     Neurological deficits     Physical Exam:  Constitutional - NAD, Comfortable  HEENT - NCAT, EOMI  Chest -breathing comfortably on RA  Cardio - warm and well perfused, + loop recorder.   Abdomen - Obese. + BS.   Extremities - No peripheral edema, No calf tenderness. Long curling toenails.    Neurologic Exam:                    Cognitive -             Orientation: AA&Ox2-3 to self, year and location, not to month or day             Attention:   able to state days of week backward, difficulty with serial 7s            Memory: short term memory deficits      Speech - Fluent, Comprehensible, No dysarthria, No aphasia      Cranial Nerves - No facial asymmetry, Tongue midline, EOMI, Shoulder shrug intact. Vision Impaired on the Left upper visual field in L > R eye.      Motor -                      LEFT    UE - ShAB 4/5, EF 4-5/5, EE 4/5, WE 5/5,  WNL                     RIGHT UE - ShAB 5/5, EF 5/5, EE 5/5, WE 5/5,  WNL                    LEFT    LE - HF 5/5, KE 5/5, DF 5/5, PF 5/5                    RIGHT LE - HF 5/5, KE 5/5, DF 5/5, PF 5/5        Sensory - Impaired to LT on the left face (V2/3), arm and leg.      Reflexes - symmetric DTR +1 in LE and UE. Neg Babinski's b/l     Coordination - FTN / HTS intact b/l    LABS                           14.1   5.59  )-----------( 139      ( 02 Nov 2023 07:27 )             41.2             CAPILLARY BLOOD GLUCOSE      POCT Blood Glucose.: 123 mg/dL (02 Nov 2023 07:42)  POCT Blood Glucose.: 97 mg/dL (01 Nov 2023 21:57)  POCT Blood Glucose.: 108 mg/dL (01 Nov 2023 16:40)  POCT Blood Glucose.: 127 mg/dL (01 Nov 2023 11:57)        MEDICATIONS  (STANDING):  apixaban 5 milliGRAM(s) Oral two times a day  atorvastatin 80 milliGRAM(s) Oral at bedtime  bisacodyl 5 milliGRAM(s) Oral at bedtime  citalopram 20 milliGRAM(s) Oral daily  dextrose 50% Injectable 25 Gram(s) IV Push once  famotidine    Tablet 20 milliGRAM(s) Oral daily  folic acid 1 milliGRAM(s) Oral daily  insulin lispro (ADMELOG) corrective regimen sliding scale   SubCutaneous Before meals and at bedtime  metFORMIN 500 milliGRAM(s) Oral two times a day  multivitamin 1 Tablet(s) Oral daily  polyethylene glycol 3350 17 Gram(s) Oral daily  senna 2 Tablet(s) Oral at bedtime  thiamine 100 milliGRAM(s) Oral daily    MEDICATIONS  (PRN):         HPI:  Patient is a 57 y/o M with a PHx HTN, DM, stroke one year ago (at the time patient received Tenecteplase and had full resolution of symptoms), who presented to [facility] on 10/12/23 following sudden fall at work and lost consciousness. NIHSS 10 on admission. Admitted under NeuroICU on 10/12. Imaging revealed ischemia in the posterior circulation, CT angiogram head/neck showed right PCA P1-2 cutoff, possibly chronic right MCA M1 cutoff which had distal reconstitution and established collaterals, and left PCA occlusion which patient was subsequently transferred for mechanical thrombectomy.    Cerebral angiogram and mechanical thrombectomy performed using aspiration with TICI 3 recanalization of left PCA. The right occipital lobe filled from right SHANI collaterals, and the right MCA as well was thought to be chronic given noted collateralization. MR with small to moderate left temporal occipital lobe infarctions. Occluded right MCA, occluded right PCA and right vertebral artery proximal segment. The patient was started on a hep gtt given extensive atherosclerotic disease which was switched to apixaban 5mg BID with recommendations for a three month course.    TTE with normal left ventricular internal cavity size. Left ventricular ejection fractio 50 to 55%. Per Neuro, no need for ROCIO at this time. Pt had an episode of bradycardia during his stay and there were concern for 2:1 block. ILR placed 10/20 and no indication for PPM at this time.     Patient was evaluated by PM&R and therapy for functional deficits, gait/ADL impairments and acute rehabilitation was recommended. Patient was medically optimized for discharge to [facility] IRF on 10/27/23.       Subjective:    Patient seen and examined at bedside.  No events overnight.   He had some dizziness yesterday. He was better the rest of the day. He states his dizziness is better this am as well.   He states he continues to have the numbness and paraesthesia on the LUE and LLE.   Denies any pain at this time.   Denies any-other complaints at this time.   Denies any fever, chills, body aches, cough, congestion or any other symptoms at this time.   Participating in therapy     ICU Vital Signs Last 24 Hrs  T(C): 36.4 (02 Nov 2023 07:45), Max: 36.6 (01 Nov 2023 19:58)  T(F): 97.6 (02 Nov 2023 07:45), Max: 97.9 (01 Nov 2023 19:58)  HR: 53 (02 Nov 2023 07:45) (53 - 58)  BP: 153/99 (02 Nov 2023 07:45) (148/76 - 153/99)  BP(mean): --  ABP: --  ABP(mean): --  RR: 16 (02 Nov 2023 07:45) (16 - 17)  SpO2: 94% (02 Nov 2023 07:45) (94% - 96%)    O2 Parameters below as of 02 Nov 2023 07:45  Patient On (Oxygen Delivery Method): room air      Review of system     Neurological deficits     Physical Exam:  Constitutional - NAD, Comfortable  HEENT - NCAT, EOMI  Chest -breathing comfortably on RA  Cardio - warm and well perfused, + loop recorder.   Abdomen - Obese. + BS.   Extremities - No peripheral edema, No calf tenderness. Long curling toenails.    Neurologic Exam:                    Cognitive -             Orientation: AA&Ox2-3 to self, year and location, not to month or day             Attention:   able to state days of week backward, difficulty with serial 7s            Memory: short term memory deficits      Speech - Fluent, Comprehensible, No dysarthria, No aphasia      Cranial Nerves - No facial asymmetry, Tongue midline, EOMI, Shoulder shrug intact. Vision Impaired on the Left upper visual field in L > R eye.      Motor -                      LEFT    UE - ShAB 4/5, EF 4-5/5, EE 4/5, WE 5/5,  WNL                     RIGHT UE - ShAB 5/5, EF 5/5, EE 5/5, WE 5/5,  WNL                    LEFT    LE - HF 5/5, KE 5/5, DF 5/5, PF 5/5                    RIGHT LE - HF 5/5, KE 5/5, DF 5/5, PF 5/5        Sensory - Impaired to LT on the left face (V2/3), arm and leg.      Reflexes - symmetric DTR +1 in LE and UE. Neg Babinski's b/l     Coordination - FTN / HTS intact b/l    LABS                           14.1   5.59  )-----------( 139      ( 02 Nov 2023 07:27 )             41.2             CAPILLARY BLOOD GLUCOSE      POCT Blood Glucose.: 123 mg/dL (02 Nov 2023 07:42)  POCT Blood Glucose.: 97 mg/dL (01 Nov 2023 21:57)  POCT Blood Glucose.: 108 mg/dL (01 Nov 2023 16:40)  POCT Blood Glucose.: 127 mg/dL (01 Nov 2023 11:57)        MEDICATIONS  (STANDING):  apixaban 5 milliGRAM(s) Oral two times a day  atorvastatin 80 milliGRAM(s) Oral at bedtime  bisacodyl 5 milliGRAM(s) Oral at bedtime  citalopram 20 milliGRAM(s) Oral daily  dextrose 50% Injectable 25 Gram(s) IV Push once  famotidine    Tablet 20 milliGRAM(s) Oral daily  folic acid 1 milliGRAM(s) Oral daily  insulin lispro (ADMELOG) corrective regimen sliding scale   SubCutaneous Before meals and at bedtime  metFORMIN 500 milliGRAM(s) Oral two times a day  multivitamin 1 Tablet(s) Oral daily  polyethylene glycol 3350 17 Gram(s) Oral daily  senna 2 Tablet(s) Oral at bedtime  thiamine 100 milliGRAM(s) Oral daily    MEDICATIONS  (PRN):         HPI:  Patient is a 59 y/o M with a PHx HTN, DM, stroke one year ago (at the time patient received Tenecteplase and had full resolution of symptoms), who presented to [facility] on 10/12/23 following sudden fall at work and lost consciousness. NIHSS 10 on admission. Admitted under NeuroICU on 10/12. Imaging revealed ischemia in the posterior circulation, CT angiogram head/neck showed right PCA P1-2 cutoff, possibly chronic right MCA M1 cutoff which had distal reconstitution and established collaterals, and left PCA occlusion which patient was subsequently transferred for mechanical thrombectomy.    Cerebral angiogram and mechanical thrombectomy performed using aspiration with TICI 3 recanalization of left PCA. The right occipital lobe filled from right SHANI collaterals, and the right MCA as well was thought to be chronic given noted collateralization. MR with small to moderate left temporal occipital lobe infarctions. Occluded right MCA, occluded right PCA and right vertebral artery proximal segment. The patient was started on a hep gtt given extensive atherosclerotic disease which was switched to apixaban 5mg BID with recommendations for a three month course.    TTE with normal left ventricular internal cavity size. Left ventricular ejection fractio 50 to 55%. Per Neuro, no need for ROCIO at this time. Pt had an episode of bradycardia during his stay and there were concern for 2:1 block. ILR placed 10/20 and no indication for PPM at this time.     Patient was evaluated by PM&R and therapy for functional deficits, gait/ADL impairments and acute rehabilitation was recommended. Patient was medically optimized for discharge to [facility] IR[facility] on 10/27/23.       Subjective:    Patient seen and examined at bedside.  No events overnight.   He had some dizziness yesterday. He was better the rest of the day. He states his dizziness is better this am as well.   He states he continues to have the numbness and paraesthesia on the LUE and LLE.   Noted to have high diastolic BP will request manual to compare.   Denies any other pain at this time.   Denies any-other complaints at this time.   Denies any fever, chills, body aches, cough, congestion or any other symptoms at this time.   Participating in therapy     ICU Vital Signs Last 24 Hrs  T(C): 36.4 (02 Nov 2023 07:45), Max: 36.6 (01 Nov 2023 19:58)  T(F): 97.6 (02 Nov 2023 07:45), Max: 97.9 (01 Nov 2023 19:58)  HR: 53 (02 Nov 2023 07:45) (53 - 58)  BP: 153/99 (02 Nov 2023 07:45) (148/76 - 153/99)  BP(mean): --  ABP: --  ABP(mean): --  RR: 16 (02 Nov 2023 07:45) (16 - 17)  SpO2: 94% (02 Nov 2023 07:45) (94% - 96%)    O2 Parameters below as of 02 Nov 2023 07:45  Patient On (Oxygen Delivery Method): room air      Review of system     Neurological deficits     Physical Exam:  Constitutional - NAD, Comfortable  HEENT - NCAT, EOMI  Chest -breathing comfortably on RA  Cardio - warm and well perfused, + loop recorder.   Abdomen - Obese. + BS.   Extremities - No peripheral edema, No calf tenderness. Long curling toenails.    Neurologic Exam:                    Cognitive -             Orientation: AA&Ox2-3 to self, year and location, not to month or day             Attention:   able to state days of week backward, difficulty with serial 7s            Memory: short term memory deficits      Speech - Fluent, Comprehensible, No dysarthria, No aphasia      Cranial Nerves - No facial asymmetry, Tongue midline, EOMI, Shoulder shrug intact. Vision Impaired on the Left upper visual field in L > R eye.      Motor -                      LEFT    UE - ShAB 4/5, EF 4-5/5, EE 4/5, WE 5/5,  WNL                     RIGHT UE - ShAB 5/5, EF 5/5, EE 5/5, WE 5/5,  WNL                    LEFT    LE - HF 5/5, KE 5/5, DF 5/5, PF 5/5                    RIGHT LE - HF 5/5, KE 5/5, DF 5/5, PF 5/5        Sensory - Impaired to LT on the left face (V2/3), arm and leg.      Reflexes - symmetric DTR +1 in LE and UE. Neg Babinski's b/l     Coordination - FTN / HTS intact b/l    LABS                                      14.1   5.59  )-----------( 139      ( 02 Nov 2023 07:27 )             41.2     11-02    138  |  103  |  20  ----------------------------<  129<H>  4.3   |  31  |  1.20    Ca    9.4      02 Nov 2023 07:27    TPro  7.2  /  Alb  3.8  /  TBili  1.1  /  DBili  x   /  AST  27  /  ALT  51<H>  /  AlkPhos  84  11-02            CAPILLARY BLOOD GLUCOSE      POCT Blood Glucose.: 123 mg/dL (02 Nov 2023 07:42)  POCT Blood Glucose.: 97 mg/dL (01 Nov 2023 21:57)  POCT Blood Glucose.: 108 mg/dL (01 Nov 2023 16:40)  POCT Blood Glucose.: 127 mg/dL (01 Nov 2023 11:57)        MEDICATIONS  (STANDING):  apixaban 5 milliGRAM(s) Oral two times a day  atorvastatin 80 milliGRAM(s) Oral at bedtime  bisacodyl 5 milliGRAM(s) Oral at bedtime  citalopram 20 milliGRAM(s) Oral daily  dextrose 50% Injectable 25 Gram(s) IV Push once  famotidine    Tablet 20 milliGRAM(s) Oral daily  folic acid 1 milliGRAM(s) Oral daily  insulin lispro (ADMELOG) corrective regimen sliding scale   SubCutaneous Before meals and at bedtime  metFORMIN 500 milliGRAM(s) Oral two times a day  multivitamin 1 Tablet(s) Oral daily  polyethylene glycol 3350 17 Gram(s) Oral daily  senna 2 Tablet(s) Oral at bedtime  thiamine 100 milliGRAM(s) Oral daily    MEDICATIONS  (PRN):         HPI:  Patient is a 59 y/o M with a PHx HTN, DM, stroke one year ago (at the time patient received Tenecteplase and had full resolution of symptoms), who presented to [facility] on 10/12/23 following sudden fall at work and lost consciousness. NIHSS 10 on admission. Admitted under NeuroICU on 10/12. Imaging revealed ischemia in the posterior circulation, CT angiogram head/neck showed right PCA P1-2 cutoff, possibly chronic right MCA M1 cutoff which had distal reconstitution and established collaterals, and left PCA occlusion which patient was subsequently transferred for mechanical thrombectomy.    Cerebral angiogram and mechanical thrombectomy performed using aspiration with TICI 3 recanalization of left PCA. The right occipital lobe filled from right SHANI collaterals, and the right MCA as well was thought to be chronic given noted collateralization. MR with small to moderate left temporal occipital lobe infarctions. Occluded right MCA, occluded right PCA and right vertebral artery proximal segment. The patient was started on a hep gtt given extensive atherosclerotic disease which was switched to apixaban 5mg BID with recommendations for a three month course.    TTE with normal left ventricular internal cavity size. Left ventricular ejection fractio 50 to 55%. Per Neuro, no need for ROCIO at this time. Pt had an episode of bradycardia during his stay and there were concern for 2:1 block. ILR placed 10/20 and no indication for PPM at this time.     Patient was evaluated by PM&R and therapy for functional deficits, gait/ADL impairments and acute rehabilitation was recommended. Patient was medically optimized for discharge to [facility] IRF on 10/27/23.       Subjective:    Patient seen and examined at bedside.  No events overnight.   He had some dizziness yesterday. He was better the rest of the day. He states his dizziness is better this am as well.   He states he continues to have the numbness and paraesthesia on the LUE and LLE.   Noted to have high diastolic BP will request manual BP to compare. Yesterday pt's electronic BP displayed continued increased diastolic BP, but manual BPs were normal.   Denies any other pain at this time.   Denies any-other complaints at this time.   Denies any fever, chills, body aches, cough, congestion or any other symptoms at this time.   Participating in therapy     ICU Vital Signs Last 24 Hrs  T(C): 36.4 (02 Nov 2023 07:45), Max: 36.6 (01 Nov 2023 19:58)  T(F): 97.6 (02 Nov 2023 07:45), Max: 97.9 (01 Nov 2023 19:58)  HR: 53 (02 Nov 2023 07:45) (53 - 58)  BP: 153/99 (02 Nov 2023 07:45) (148/76 - 153/99)  BP(mean): --  ABP: --  ABP(mean): --  RR: 16 (02 Nov 2023 07:45) (16 - 17)  SpO2: 94% (02 Nov 2023 07:45) (94% - 96%)    O2 Parameters below as of 02 Nov 2023 07:45  Patient On (Oxygen Delivery Method): room air      Review of system     Neurological deficits     Physical Exam:  Constitutional - NAD, Comfortable  HEENT - NCAT, EOMI  Chest -breathing comfortably on RA  Cardio - warm and well perfused, + loop recorder.   Abdomen - Obese. + BS.   Extremities - No peripheral edema, No calf tenderness. Long curling toenails.    Neurologic Exam:                    Cognitive -             Orientation: AA&Ox2-3 to self, year and location, not to month or day             Attention:   able to state days of week backward, difficulty with serial 7s            Memory: short term memory deficits      Speech - Fluent, Comprehensible, No dysarthria, No aphasia      Cranial Nerves - No facial asymmetry, Tongue midline, EOMI, Shoulder shrug intact. Vision Impaired on the Left upper visual field in L > R eye.      Motor -                      LEFT    UE - ShAB 4/5, EF 4-5/5, EE 4/5, WE 5/5,  WNL                     RIGHT UE - ShAB 5/5, EF 5/5, EE 5/5, WE 5/5,  WNL                    LEFT    LE - HF 5/5, KE 5/5, DF 5/5, PF 5/5                    RIGHT LE - HF 5/5, KE 5/5, DF 5/5, PF 5/5        Sensory - Impaired to LT on the left face (V2/3), arm and leg.      Reflexes - symmetric DTR +1 in LE and UE. Neg Babinski's b/l     Coordination - FTN / HTS intact b/l    LABS                                      14.1   5.59  )-----------( 139      ( 02 Nov 2023 07:27 )             41.2     11-02    138  |  103  |  20  ----------------------------<  129<H>  4.3   |  31  |  1.20    Ca    9.4      02 Nov 2023 07:27    TPro  7.2  /  Alb  3.8  /  TBili  1.1  /  DBili  x   /  AST  27  /  ALT  51<H>  /  AlkPhos  84  11-02            CAPILLARY BLOOD GLUCOSE      POCT Blood Glucose.: 123 mg/dL (02 Nov 2023 07:42)  POCT Blood Glucose.: 97 mg/dL (01 Nov 2023 21:57)  POCT Blood Glucose.: 108 mg/dL (01 Nov 2023 16:40)  POCT Blood Glucose.: 127 mg/dL (01 Nov 2023 11:57)        MEDICATIONS  (STANDING):  apixaban 5 milliGRAM(s) Oral two times a day  atorvastatin 80 milliGRAM(s) Oral at bedtime  bisacodyl 5 milliGRAM(s) Oral at bedtime  citalopram 20 milliGRAM(s) Oral daily  dextrose 50% Injectable 25 Gram(s) IV Push once  famotidine    Tablet 20 milliGRAM(s) Oral daily  folic acid 1 milliGRAM(s) Oral daily  insulin lispro (ADMELOG) corrective regimen sliding scale   SubCutaneous Before meals and at bedtime  metFORMIN 500 milliGRAM(s) Oral two times a day  multivitamin 1 Tablet(s) Oral daily  polyethylene glycol 3350 17 Gram(s) Oral daily  senna 2 Tablet(s) Oral at bedtime  thiamine 100 milliGRAM(s) Oral daily    MEDICATIONS  (PRN):

## 2023-11-02 NOTE — CONSULT NOTE ADULT - SUBJECTIVE AND OBJECTIVE BOX
Patient is a 57 y/o M with a PHx HTN, DM, stroke one year ago (at the time patient received Tenecteplase and had full resolution of symptoms), who presented to [facility] on 10/12/23 following sudden fall at work and lost consciousness. NIHSS 10 on admission. Admitted under NeuroICU on 10/12. Imaging revealed ischemia in the posterior circulation, CT angiogram head/neck showed right PCA P1-2 cutoff, possibly chronic right MCA M1 cutoff which had distal reconstitution and established collaterals, and left PCA occlusion which patient was subsequently transferred for mechanical thrombectomy. Cerebral angiogram and mechanical thrombectomy performed using aspiration with TICI 3 recanalization of left PCA. The right occipital lobe filled from right SHANI collaterals, and the right MCA as well was thought to be chronic given noted collateralization. MR with small to moderate left temporal occipital lobe infarctions. Occluded right MCA, occluded right PCA and right vertebral artery proximal segment. The patient was started on a hep gtt given extensive atherosclerotic disease which was switched to apixaban 5mg BID with recommendations for a three month course. TTE with normal left ventricular internal cavity size. Left ventricular ejection fractio 50 to 55%. Per Neuro, no need for ROCIO at this time. Pt had an episode of bradycardia during his stay and there were concern for 2:1 block. ILR placed 10/20 and no indication for PPM at this time. Patient was evaluated by PM&R and therapy for functional deficits, gait/ADL impairments and acute rehabilitation was recommended. Patient was medically optimized for discharge to [facility] on 10/27/23. PMHx:   . Current meds: Please see list in Pt’s chart. Social Hx: .      Findings: Pt was seen for an initial assessment of his/her cognitive and emotional functioning. The Modified MMSE (3MS) was administered; Pt’s results (/100) were in the range. His/Her scores in geriatric mood measures suggested levels of anxiety and depression (GAD7 = /21; PHQ9 = /27; GAS = /30; GDS = /15). Pt was alert,  Ox3, and his/her attitude was cooperative.  Attn/Conc: Simple auditory attention - .  Concentration/Working memory for reversed counting and spelling – (/7). Language: Pt’s speech was of  . Naming - . Sentence repetition - . Auditory Comprehension - . Reading - . Writing - . Memory: Encoding of 3 words was (/3); short-delayed verbal recall – (/3, improving to /3 with cueing); long-delayed verbal recall – (/3, improving to /3 with cueing). LTM was for [place] presidents (/4, improving to /4 with cueing). Visual memory –. Visuospatial: Visuomotor integration – for copy of a 2D figure, was noted. Figure-ground discrimination was (/4) for the Poppelreuter figure. Executive Functions: Motor Planning - . Organizational skills - . Abstract reasoning - . Initiation/Phonemic Fluency - . Verbal problem solving – .   Emotional functioning: Affect - 	. Mood - ; Pt reported experiencing . On mood measures s/he additionally reported .  Thought processes were.  No abnormal thought contents were observed.  Pt denied any AH/VH. Pt also denied SI/HI/I/P. Insight - WFL. Judgment - fair.    Assessment:    FIM scores: Social Interaction ; Problem Solving ; Memory .  Plan: Individual supportive psychotherapy to monitor cognition, affect/mood, and behavior. Cognitive remediation during speech therapy sessions. Participation in recreation/art therapy in order to have pleasure and mastery experiences and regain/reestablish a sense of routine.  Time spent with Pt,  minutes.   Pt is a 59 y/o right-handed male with a PMHx of HTN, DM, stroke one year ago (at the time patient received Tenecteplase and had full resolution of symptoms), who presented to [facility] on 10/12/23 following sudden fall at work and lost consciousness. NIHSS 10 on admission. Admitted under NeuroICU on 10/12. Imaging revealed ischemia in the posterior circulation, CT angiogram head/neck showed right PCA P1-2 cutoff, possibly chronic right MCA M1 cutoff which had distal reconstitution and established collaterals, and left PCA occlusion for which Pt was subsequently transferred for mechanical thrombectomy. Cerebral angiogram and mechanical thrombectomy performed using aspiration with TICI 3 recanalization of left PCA. The right occipital lobe filled from right SHANI collaterals, and the right MCA as well was thought to be chronic given noted collateralization. MR with small to moderate left temporal occipital lobe infarctions. Occluded right MCA, occluded right PCA and right vertebral artery proximal segment. Pt was started on a hep gtt given extensive atherosclerotic disease which was switched to apixaban 5mg BID with recommendations for a three month course. TTE with normal left ventricular internal cavity size. Left ventricular ejection fractio 50 to 55%. Per Neuro, no need for ROCIO at this time. Pt had an episode of bradycardia during his stay and there were concern for 2:1 block. ILR placed 10/20 and no indication for PPM at this time. Pt was evaluated by PM&R and therapy for functional deficits, gait/ADL impairments and acute rehabilitation was recommended. Pt was medically optimized for discharge to [facility] on 10/27/23. PMHx: As noted above. Current meds: Celexa 20 mg QD. Please see list in Pt’s chart. Social Hx: Pt is a  and has a teenage daughter. Pt graduated from high school and is a . Pt lacks hx of mental illness; he drinks beer and/or vodka a few times during the week. Pt is [employer]. He enjoys playing guitar and watching sports.     Findings: Pt was seen for an initial assessment of his cognitive and emotional functioning. The Modified MMSE (3MS) was administered; Pt’s results (86/100) were in the Mildly Impaired range. His scores in standardized mood measures suggested mild levels of anxiety and moderate of depression (GAD7 = 9/21; PHQ9 = 11/27). Pt was alert, closely Ox3 (he was disoriented to the exact date by one day), and his attitude was cooperative. Attn/Conc: Simple auditory attention - intact.  Concentration/Working memory for reversed counting and spelling – intact (7/7). Language: Pt’s speech was of normal volume, pitch and pace. Naming - intact. Sentence repetition - intact. Auditory Comprehension - intact. Reading - intact. Writing - intact. Memory: Encoding of 3 words was intact (3/3); short-delayed verbal recall –intact (3/3); long-delayed verbal recall – significantly impaired (0/3, improving to 2/3 with logical-semantic and saturated cueing). LTM was mildly impaired for US presidents (3/4, improving to 4/4 with cueing). Visual memory – intact. Visuospatial: Visuomotor integration – moderately impaired for copy of a 2D figure (6/10), distortion was noted. Figure-ground discrimination was impaired (5/4) for the Poppelreuter figure. Executive Functions: Motor Planning - intact. Organizational skills - intact. Abstract reasoning - mildly impaired. Initiation/Phonemic Fluency - intact. Verbal problem solving – closely intact. Emotional functioning: Affect - depressed. Mood - "tired"; Pt reported experiencing sad mood, anxiety, helplessness, hopelessness, fatigue. On mood measures he additionally reported anxiety, worry, difficulty relaxing, restlessness, irritability, catastrophization, anhedonia, depressed mood, poor sleep, fatigue/low energy, poor appetite, poor concentration, and psychomotor retardation.  Thought processes were goal-directed. No abnormal thought contents were observed.  Pt denied any AH/VH. Pt also denied SI/HI/I/P. Insight - WFL. Judgment - fair.

## 2023-11-02 NOTE — PROGRESS NOTE ADULT - ATTENDING COMMENTS
Pt. seen with resident.  Agree with documentation above as per resident with amendments made as appropriate. Patient medically stable. Making progress towards rehab goals.     Left PCA, right MCA/PCA occlusion--   Noted to have high diastolic BP will requested manual BP to compare. Yesterday pt's electronic BP displayed continued increased diastolic BP, but manual BPs were normal.  Manual BP today-- 162/78  - Dr. Curry suggests 10 point decrease in BP parameters based on clinical exam  - 11/1 Titrate -160   - Consider titrating to -150 on weekend or early next week as tolerated.   --check manual BPs only--nursing order written   --Pt. did not tolerate BP meds well before in past--Pt. does not recall details of this.      --Called pt's sister, Selma, to provide an update-- discussed pt's function in therapy. Selma requested that another sister, Lety -- [phone]-- be the primary contact for patient as Selma has medical issues affecting her memory.   Will attempt to contact Lety later today after her work hours.

## 2023-11-02 NOTE — PROGRESS NOTE ADULT - TIME BILLING
Review and preparation to see patient, examination and assessment of patient, obtaining nursing subjective report as pt. confused,  Discussion of managment with following consultants: Hospitalist, ....  ,  Discussion of rehabilitation plan of care during team meeting with nursing--follow up on manual BP.  Review of labs.  Discussion with family, ...sister Selma., to update on patient status, -- Notifying rehab team about request for change in primary contact.

## 2023-11-03 LAB
GLUCOSE BLDC GLUCOMTR-MCNC: 108 MG/DL — HIGH (ref 70–99)
GLUCOSE BLDC GLUCOMTR-MCNC: 108 MG/DL — HIGH (ref 70–99)
GLUCOSE BLDC GLUCOMTR-MCNC: 122 MG/DL — HIGH (ref 70–99)
GLUCOSE BLDC GLUCOMTR-MCNC: 122 MG/DL — HIGH (ref 70–99)
GLUCOSE BLDC GLUCOMTR-MCNC: 130 MG/DL — HIGH (ref 70–99)
GLUCOSE BLDC GLUCOMTR-MCNC: 130 MG/DL — HIGH (ref 70–99)
GLUCOSE BLDC GLUCOMTR-MCNC: 154 MG/DL — HIGH (ref 70–99)
GLUCOSE BLDC GLUCOMTR-MCNC: 154 MG/DL — HIGH (ref 70–99)

## 2023-11-03 PROCEDURE — 99232 SBSQ HOSP IP/OBS MODERATE 35: CPT

## 2023-11-03 RX ORDER — MEMANTINE HYDROCHLORIDE 10 MG/1
5 TABLET ORAL
Refills: 0 | Status: DISCONTINUED | OUTPATIENT
Start: 2023-11-03 | End: 2023-11-10

## 2023-11-03 RX ADMIN — Medication 2: at 21:36

## 2023-11-03 RX ADMIN — ATORVASTATIN CALCIUM 80 MILLIGRAM(S): 80 TABLET, FILM COATED ORAL at 21:33

## 2023-11-03 RX ADMIN — METFORMIN HYDROCHLORIDE 500 MILLIGRAM(S): 850 TABLET ORAL at 17:07

## 2023-11-03 RX ADMIN — APIXABAN 5 MILLIGRAM(S): 2.5 TABLET, FILM COATED ORAL at 17:07

## 2023-11-03 RX ADMIN — MEMANTINE HYDROCHLORIDE 5 MILLIGRAM(S): 10 TABLET ORAL at 09:48

## 2023-11-03 RX ADMIN — Medication 1 MILLIGRAM(S): at 13:53

## 2023-11-03 RX ADMIN — Medication 1 TABLET(S): at 13:53

## 2023-11-03 RX ADMIN — METFORMIN HYDROCHLORIDE 500 MILLIGRAM(S): 850 TABLET ORAL at 07:59

## 2023-11-03 RX ADMIN — Medication 100 MILLIGRAM(S): at 13:52

## 2023-11-03 RX ADMIN — APIXABAN 5 MILLIGRAM(S): 2.5 TABLET, FILM COATED ORAL at 06:28

## 2023-11-03 RX ADMIN — FAMOTIDINE 20 MILLIGRAM(S): 10 INJECTION INTRAVENOUS at 13:52

## 2023-11-03 RX ADMIN — CITALOPRAM 20 MILLIGRAM(S): 10 TABLET, FILM COATED ORAL at 13:53

## 2023-11-03 RX ADMIN — MEMANTINE HYDROCHLORIDE 5 MILLIGRAM(S): 10 TABLET ORAL at 17:07

## 2023-11-03 NOTE — PROGRESS NOTE ADULT - SUBJECTIVE AND OBJECTIVE BOX
Patient is a 58y old  Male who presents with a chief complaint of CVA and left hemiparesis (03 Nov 2023 08:13)      SUBJECTIVE / OVERNIGHT EVENTS:  Pt seen and examined at bedside. No acute events overnight.  Pt denies cp, palpitations, sob, abd pain, N/V, fever, chills.    ROS:  All other review of systems negative    Allergies    No Known Allergies    Intolerances        MEDICATIONS  (STANDING):  apixaban 5 milliGRAM(s) Oral two times a day  atorvastatin 80 milliGRAM(s) Oral at bedtime  bisacodyl 5 milliGRAM(s) Oral at bedtime  citalopram 20 milliGRAM(s) Oral daily  dextrose 50% Injectable 25 Gram(s) IV Push once  famotidine    Tablet 20 milliGRAM(s) Oral daily  folic acid 1 milliGRAM(s) Oral daily  insulin lispro (ADMELOG) corrective regimen sliding scale   SubCutaneous Before meals and at bedtime  memantine 5 milliGRAM(s) Oral two times a day  metFORMIN 500 milliGRAM(s) Oral two times a day  multivitamin 1 Tablet(s) Oral daily  polyethylene glycol 3350 17 Gram(s) Oral daily  senna 2 Tablet(s) Oral at bedtime  thiamine 100 milliGRAM(s) Oral daily    MEDICATIONS  (PRN):      Vital Signs Last 24 Hrs  T(C): 36.3 (03 Nov 2023 08:01), Max: 36.3 (02 Nov 2023 19:59)  T(F): 97.4 (03 Nov 2023 08:01), Max: 97.4 (02 Nov 2023 19:59)  HR: 53 (03 Nov 2023 08:01) (53 - 58)  BP: 160/85 (03 Nov 2023 08:01) (142/82 - 162/78)  BP(mean): --  RR: 16 (03 Nov 2023 08:01) (15 - 16)  SpO2: 97% (03 Nov 2023 08:01) (97% - 97%)    Parameters below as of 03 Nov 2023 08:01  Patient On (Oxygen Delivery Method): room air      CAPILLARY BLOOD GLUCOSE      POCT Blood Glucose.: 122 mg/dL (03 Nov 2023 07:57)  POCT Blood Glucose.: 113 mg/dL (02 Nov 2023 21:39)  POCT Blood Glucose.: 111 mg/dL (02 Nov 2023 16:46)  POCT Blood Glucose.: 117 mg/dL (02 Nov 2023 12:12)    I&O's Summary    02 Nov 2023 07:01  -  03 Nov 2023 07:00  --------------------------------------------------------  IN: 480 mL / OUT: 0 mL / NET: 480 mL        PHYSICAL EXAM:  GENERAL: NAD, obese male  HEAD:  Atraumatic, Normocephalic  NECK: Supple, No JVD  CHEST/LUNG: Clear to auscultation bilaterally; No wheeze, nonlabored breathing  HEART: Regular rate and rhythm; No murmurs, rubs, or gallops  ABDOMEN: Soft, Nontender, Nondistended; Bowel sounds present  EXTREMITIES:  No clubbing, cyanosis, or edema  PSYCH: calm, appropriate mood    LABS:                        14.1   5.59  )-----------( 139      ( 02 Nov 2023 07:27 )             41.2     11-02    138  |  103  |  20  ----------------------------<  129<H>  4.3   |  31  |  1.20    Ca    9.4      02 Nov 2023 07:27    TPro  7.2  /  Alb  3.8  /  TBili  1.1  /  DBili  x   /  AST  27  /  ALT  51<H>  /  AlkPhos  84  11-02          Urinalysis Basic - ( 02 Nov 2023 07:27 )    Color: x / Appearance: x / SG: x / pH: x  Gluc: 129 mg/dL / Ketone: x  / Bili: x / Urobili: x   Blood: x / Protein: x / Nitrite: x   Leuk Esterase: x / RBC: x / WBC x   Sq Epi: x / Non Sq Epi: x / Bacteria: x        RADIOLOGY & ADDITIONAL TESTS:  Results Reviewed:   Imaging Personally Reviewed:  Electrocardiogram Personally Reviewed:    COORDINATION OF CARE:  Care Discussed with Consultants/Other Providers [Y/N]:  Prior or Outpatient Records Reviewed [Y/N]:

## 2023-11-03 NOTE — PROGRESS NOTE ADULT - SUBJECTIVE AND OBJECTIVE BOX
HPI:  Patient is a 57 y/o M with a PHx HTN, DM, stroke one year ago (at the time patient received Tenecteplase and had full resolution of symptoms), who presented to [facility] on 10/12/23 following sudden fall at work and lost consciousness. NIHSS 10 on admission. Admitted under NeuroICU on 10/12. Imaging revealed ischemia in the posterior circulation, CT angiogram head/neck showed right PCA P1-2 cutoff, possibly chronic right MCA M1 cutoff which had distal reconstitution and established collaterals, and left PCA occlusion which patient was subsequently transferred for mechanical thrombectomy.    Cerebral angiogram and mechanical thrombectomy performed using aspiration with TICI 3 recanalization of left PCA. The right occipital lobe filled from right SHANI collaterals, and the right MCA as well was thought to be chronic given noted collateralization. MR with small to moderate left temporal occipital lobe infarctions. Occluded right MCA, occluded right PCA and right vertebral artery proximal segment. The patient was started on a hep gtt given extensive atherosclerotic disease which was switched to apixaban 5mg BID with recommendations for a three month course.    TTE with normal left ventricular internal cavity size. Left ventricular ejection fractio 50 to 55%. Per Neuro, no need for ROCIO at this time. Pt had an episode of bradycardia during his stay and there were concern for 2:1 block. ILR placed 10/20 and no indication for PPM at this time.     Patient was evaluated by PM&R and therapy for functional deficits, gait/ADL impairments and acute rehabilitation was recommended. Patient was medically optimized for discharge to [facility]F on 10/27/23.       Subjective:    Patient seen and examined at bedside.  No events overnight.       Noted to have high diastolic BP will request manual BP to compare. Yesterday pt's electronic BP displayed continued increased diastolic BP, but manual BPs were normal.   Denies any other pain at this time.   Denies any-other complaints at this time.   Denies any fever, chills, body aches, cough, congestion or any other symptoms at this time.   Participating in therapy     ICU Vital Signs Last 24 Hrs  T(C): 36.3 (03 Nov 2023 08:01), Max: 36.3 (02 Nov 2023 19:59)  T(F): 97.4 (03 Nov 2023 08:01), Max: 97.4 (02 Nov 2023 19:59)  HR: 53 (03 Nov 2023 08:01) (53 - 58)  BP: 160/85 (03 Nov 2023 08:01) (142/82 - 162/78)  BP(mean): --  ABP: --  ABP(mean): --  RR: 16 (03 Nov 2023 08:01) (15 - 16)  SpO2: 97% (03 Nov 2023 08:01) (97% - 97%)    O2 Parameters below as of 03 Nov 2023 08:01  Patient On (Oxygen Delivery Method): room air        Review of system     Neurological deficits     Physical Exam:  Constitutional - NAD, Comfortable  HEENT - NCAT, EOMI  Chest -breathing comfortably on RA  Cardio - warm and well perfused, + loop recorder.   Abdomen - Obese. + BS.   Extremities - No peripheral edema, No calf tenderness. Long curling toenails.    Neurologic Exam:                    Cognitive -             Orientation: AA&Ox2-3 to self, year and location, not to month or day             Attention:   able to state days of week backward, difficulty with serial 7s            Memory: short term memory deficits      Speech - Fluent, Comprehensible, No dysarthria, No aphasia      Cranial Nerves - No facial asymmetry, Tongue midline, EOMI, Shoulder shrug intact. Vision Impaired on the Left upper visual field in L > R eye.      Motor -                      LEFT    UE - ShAB 4/5, EF 4-5/5, EE 4/5, WE 5/5,  WNL                     RIGHT UE - ShAB 5/5, EF 5/5, EE 5/5, WE 5/5,  WNL                    LEFT    LE - HF 5/5, KE 5/5, DF 5/5, PF 5/5                    RIGHT LE - HF 5/5, KE 5/5, DF 5/5, PF 5/5        Sensory - Impaired to LT on the left face (V2/3), arm and leg.      Reflexes - symmetric DTR +1 in LE and UE. Neg Babinski's b/l     Coordination - FTN / HTS intact b/l    LABS                        CAPILLARY BLOOD GLUCOSE      POCT Blood Glucose.: 122 mg/dL (03 Nov 2023 07:57)  POCT Blood Glucose.: 113 mg/dL (02 Nov 2023 21:39)  POCT Blood Glucose.: 111 mg/dL (02 Nov 2023 16:46)  POCT Blood Glucose.: 117 mg/dL (02 Nov 2023 12:12)        MEDICATIONS  (STANDING):  apixaban 5 milliGRAM(s) Oral two times a day  atorvastatin 80 milliGRAM(s) Oral at bedtime  bisacodyl 5 milliGRAM(s) Oral at bedtime  citalopram 20 milliGRAM(s) Oral daily  dextrose 50% Injectable 25 Gram(s) IV Push once  famotidine    Tablet 20 milliGRAM(s) Oral daily  folic acid 1 milliGRAM(s) Oral daily  insulin lispro (ADMELOG) corrective regimen sliding scale   SubCutaneous Before meals and at bedtime  metFORMIN 500 milliGRAM(s) Oral two times a day  multivitamin 1 Tablet(s) Oral daily  polyethylene glycol 3350 17 Gram(s) Oral daily  senna 2 Tablet(s) Oral at bedtime  thiamine 100 milliGRAM(s) Oral daily    MEDICATIONS  (PRN):         HPI:  Patient is a 57 y/o M with a PHx HTN, DM, stroke one year ago (at the time patient received Tenecteplase and had full resolution of symptoms), who presented to [facility] on 10/12/23 following sudden fall at work and lost consciousness. NIHSS 10 on admission. Admitted under NeuroICU on 10/12. Imaging revealed ischemia in the posterior circulation, CT angiogram head/neck showed right PCA P1-2 cutoff, possibly chronic right MCA M1 cutoff which had distal reconstitution and established collaterals, and left PCA occlusion which patient was subsequently transferred for mechanical thrombectomy.    Cerebral angiogram and mechanical thrombectomy performed using aspiration with TICI 3 recanalization of left PCA. The right occipital lobe filled from right SHANI collaterals, and the right MCA as well was thought to be chronic given noted collateralization. MR with small to moderate left temporal occipital lobe infarctions. Occluded right MCA, occluded right PCA and right vertebral artery proximal segment. The patient was started on a hep gtt given extensive atherosclerotic disease which was switched to apixaban 5mg BID with recommendations for a three month course.    TTE with normal left ventricular internal cavity size. Left ventricular ejection fractio 50 to 55%. Per Neuro, no need for ROCIO at this time. Pt had an episode of bradycardia during his stay and there were concern for 2:1 block. ILR placed 10/20 and no indication for PPM at this time.     Patient was evaluated by PM&R and therapy for functional deficits, gait/ADL impairments and acute rehabilitation was recommended. Patient was medically optimized for discharge to [facility] IRF on 10/27/23.       Subjective:    Patient seen and examined at bedside.  No events overnight.   He states continues to have mild dizziness.   He continues to have the short term memory deficits.   Manual BP diastolic are better.    Continues to have numbness on the LUE and LLE.   Denies any other pain at this time.   Denies any-other complaints at this time.   Denies any fever, chills, body aches, cough, congestion or any other symptoms at this time.   Participating in therapy     ICU Vital Signs Last 24 Hrs  T(C): 36.3 (03 Nov 2023 08:01), Max: 36.3 (02 Nov 2023 19:59)  T(F): 97.4 (03 Nov 2023 08:01), Max: 97.4 (02 Nov 2023 19:59)  HR: 53 (03 Nov 2023 08:01) (53 - 58)  BP: 160/85 (03 Nov 2023 08:01) (142/82 - 162/78)  BP(mean): --  ABP: --  ABP(mean): --  RR: 16 (03 Nov 2023 08:01) (15 - 16)  SpO2: 97% (03 Nov 2023 08:01) (97% - 97%)    O2 Parameters below as of 03 Nov 2023 08:01  Patient On (Oxygen Delivery Method): room air      Review of system     Neurological deficits     Physical Exam:  Constitutional - NAD, Comfortable  HEENT - NCAT, EOMI  Chest -breathing comfortably on RA  Cardio - warm and well perfused, + loop recorder.   Abdomen - Obese. + BS.   Extremities - No peripheral edema, No calf tenderness. Long curling toenails.    Neurologic Exam:                    Cognitive -             Orientation: AA&Ox2-3 to self, year and location, not to month or day             Attention:   able to state days of week backward, difficulty with serial 7s            Memory: short term memory deficits      Speech - Fluent, Comprehensible, No dysarthria, No aphasia      Cranial Nerves - No facial asymmetry, Tongue midline, EOMI, Shoulder shrug intact. Vision Impaired on the Left upper visual field in L > R eye.      Motor -                      LEFT    UE - ShAB 4/5, EF 4-5/5, EE 4/5, WE 5/5,  WNL                     RIGHT UE - ShAB 5/5, EF 5/5, EE 5/5, WE 5/5,  WNL                    LEFT    LE - HF 5/5, KE 5/5, DF 5/5, PF 5/5                    RIGHT LE - HF 5/5, KE 5/5, DF 5/5, PF 5/5        Sensory - Impaired to LT on the left face (V2/3), arm and leg.      Reflexes - symmetric DTR +1 in LE and UE. Neg Babinski's b/l     Coordination - FTN / HTS intact b/l    LABS                        CAPILLARY BLOOD GLUCOSE      POCT Blood Glucose.: 122 mg/dL (03 Nov 2023 07:57)  POCT Blood Glucose.: 113 mg/dL (02 Nov 2023 21:39)  POCT Blood Glucose.: 111 mg/dL (02 Nov 2023 16:46)  POCT Blood Glucose.: 117 mg/dL (02 Nov 2023 12:12)        MEDICATIONS  (STANDING):  apixaban 5 milliGRAM(s) Oral two times a day  atorvastatin 80 milliGRAM(s) Oral at bedtime  bisacodyl 5 milliGRAM(s) Oral at bedtime  citalopram 20 milliGRAM(s) Oral daily  dextrose 50% Injectable 25 Gram(s) IV Push once  famotidine    Tablet 20 milliGRAM(s) Oral daily  folic acid 1 milliGRAM(s) Oral daily  insulin lispro (ADMELOG) corrective regimen sliding scale   SubCutaneous Before meals and at bedtime  metFORMIN 500 milliGRAM(s) Oral two times a day  multivitamin 1 Tablet(s) Oral daily  polyethylene glycol 3350 17 Gram(s) Oral daily  senna 2 Tablet(s) Oral at bedtime  thiamine 100 milliGRAM(s) Oral daily    MEDICATIONS  (PRN):         HPI:  Patient is a 59 y/o M with a PHx HTN, DM, stroke one year ago (at the time patient received Tenecteplase and had full resolution of symptoms), who presented to [facility] on 10/12/23 following sudden fall at work and lost consciousness. NIHSS 10 on admission. Admitted under NeuroICU on 10/12. Imaging revealed ischemia in the posterior circulation, CT angiogram head/neck showed right PCA P1-2 cutoff, possibly chronic right MCA M1 cutoff which had distal reconstitution and established collaterals, and left PCA occlusion which patient was subsequently transferred for mechanical thrombectomy.    Cerebral angiogram and mechanical thrombectomy performed using aspiration with TICI 3 recanalization of left PCA. The right occipital lobe filled from right SHANI collaterals, and the right MCA as well was thought to be chronic given noted collateralization. MR with small to moderate left temporal occipital lobe infarctions. Occluded right MCA, occluded right PCA and right vertebral artery proximal segment. The patient was started on a hep gtt given extensive atherosclerotic disease which was switched to apixaban 5mg BID with recommendations for a three month course.    TTE with normal left ventricular internal cavity size. Left ventricular ejection fractio 50 to 55%. Per Neuro, no need for ROCIO at this time. Pt had an episode of bradycardia during his stay and there were concern for 2:1 block. ILR placed 10/20 and no indication for PPM at this time.     Patient was evaluated by PM&R and therapy for functional deficits, gait/ADL impairments and acute rehabilitation was recommended. Patient was medically optimized for discharge to [facility] IR[facility] on 10/27/23.       Subjective:    Patient seen and examined at bedside.  No events overnight.   He states continues to have mild dizziness.   He continues to have the short term memory deficits. Patient is open to trying memantine for his cognitive deficits.   Manual BP diastolic are better.    Continues to have numbness on the LUE and LLE.   Denies any other pain at this time.   Denies any-other complaints at this time.   Denies any fever, chills, body aches, cough, congestion or any other symptoms at this time.   Participating in therapy     ICU Vital Signs Last 24 Hrs  T(C): 36.3 (03 Nov 2023 08:01), Max: 36.3 (02 Nov 2023 19:59)  T(F): 97.4 (03 Nov 2023 08:01), Max: 97.4 (02 Nov 2023 19:59)  HR: 53 (03 Nov 2023 08:01) (53 - 58)  BP: 160/85 (03 Nov 2023 08:01) (142/82 - 162/78)  BP(mean): --  ABP: --  ABP(mean): --  RR: 16 (03 Nov 2023 08:01) (15 - 16)  SpO2: 97% (03 Nov 2023 08:01) (97% - 97%)    O2 Parameters below as of 03 Nov 2023 08:01  Patient On (Oxygen Delivery Method): room air      Review of system     Neurological deficits     Physical Exam:  Constitutional - NAD, Comfortable  HEENT - NCAT, EOMI  Chest -breathing comfortably on RA  Cardio - warm and well perfused, + loop recorder.   Abdomen - Obese. + BS.   Extremities - No peripheral edema, No calf tenderness. Long curling toenails.    Neurologic Exam:                    Cognitive -             Orientation: AA&Ox2-3 to self, year and location, not to month or day             Attention:   able to state days of week backward, difficulty with serial 7s            Memory: short term memory deficits      Speech - Fluent, Comprehensible, No dysarthria, No aphasia      Cranial Nerves - No facial asymmetry, Tongue midline, EOMI, Shoulder shrug intact. Vision Impaired on the Left upper visual field in L > R eye.      Motor -                      LEFT    UE - ShAB 4/5, EF 4-5/5, EE 4/5, WE 5/5,  WNL                     RIGHT UE - ShAB 5/5, EF 5/5, EE 5/5, WE 5/5,  WNL                    LEFT    LE - HF 5/5, KE 5/5, DF 5/5, PF 5/5                    RIGHT LE - HF 5/5, KE 5/5, DF 5/5, PF 5/5        Sensory - Impaired to LT on the left face (V2/3), arm and leg.      Reflexes - symmetric DTR +1 in LE and UE. Neg Babinski's b/l     Coordination - FTN / HTS intact b/l    LABS                        CAPILLARY BLOOD GLUCOSE      POCT Blood Glucose.: 122 mg/dL (03 Nov 2023 07:57)  POCT Blood Glucose.: 113 mg/dL (02 Nov 2023 21:39)  POCT Blood Glucose.: 111 mg/dL (02 Nov 2023 16:46)  POCT Blood Glucose.: 117 mg/dL (02 Nov 2023 12:12)        MEDICATIONS  (STANDING):  apixaban 5 milliGRAM(s) Oral two times a day  atorvastatin 80 milliGRAM(s) Oral at bedtime  bisacodyl 5 milliGRAM(s) Oral at bedtime  citalopram 20 milliGRAM(s) Oral daily  dextrose 50% Injectable 25 Gram(s) IV Push once  famotidine    Tablet 20 milliGRAM(s) Oral daily  folic acid 1 milliGRAM(s) Oral daily  insulin lispro (ADMELOG) corrective regimen sliding scale   SubCutaneous Before meals and at bedtime  metFORMIN 500 milliGRAM(s) Oral two times a day  multivitamin 1 Tablet(s) Oral daily  polyethylene glycol 3350 17 Gram(s) Oral daily  senna 2 Tablet(s) Oral at bedtime  thiamine 100 milliGRAM(s) Oral daily    MEDICATIONS  (PRN):         HPI:  Patient is a 59 y/o M with a PHx HTN, DM, stroke one year ago (at the time patient received Tenecteplase and had full resolution of symptoms), who presented to [facility] on 10/12/23 following sudden fall at work and lost consciousness. NIHSS 10 on admission. Admitted under NeuroICU on 10/12. Imaging revealed ischemia in the posterior circulation, CT angiogram head/neck showed right PCA P1-2 cutoff, possibly chronic right MCA M1 cutoff which had distal reconstitution and established collaterals, and left PCA occlusion which patient was subsequently transferred for mechanical thrombectomy.    Cerebral angiogram and mechanical thrombectomy performed using aspiration with TICI 3 recanalization of left PCA. The right occipital lobe filled from right SHANI collaterals, and the right MCA as well was thought to be chronic given noted collateralization. MR with small to moderate left temporal occipital lobe infarctions. Occluded right MCA, occluded right PCA and right vertebral artery proximal segment. The patient was started on a hep gtt given extensive atherosclerotic disease which was switched to apixaban 5mg BID with recommendations for a three month course.    TTE with normal left ventricular internal cavity size. Left ventricular ejection fractio 50 to 55%. Per Neuro, no need for ROCIO at this time. Pt had an episode of bradycardia during his stay and there were concern for 2:1 block. ILR placed 10/20 and no indication for PPM at this time.     Patient was evaluated by PM&R and therapy for functional deficits, gait/ADL impairments and acute rehabilitation was recommended. Patient was medically optimized for discharge to [facility] IRF on 10/27/23.       Subjective:    Patient seen and examined at bedside.  No events overnight.  He states continues to have mild dizziness.   He continues to have the short term memory deficits. Patient is open to trying memantine for his cognitive deficits.   Manual BP diastolic are better.    Continues to have numbness on the LUE and LLE.   Denies any other pain at this time.   Denies any-other complaints at this time.   Denies any fever, chills, body aches, cough, congestion or any other symptoms at this time.   Participating in therapy     ICU Vital Signs Last 24 Hrs  T(C): 36.3 (03 Nov 2023 08:01), Max: 36.3 (02 Nov 2023 19:59)  T(F): 97.4 (03 Nov 2023 08:01), Max: 97.4 (02 Nov 2023 19:59)  HR: 53 (03 Nov 2023 08:01) (53 - 58)  BP: 160/85 (03 Nov 2023 08:01) (142/82 - 162/78)  BP(mean): --  ABP: --  ABP(mean): --  RR: 16 (03 Nov 2023 08:01) (15 - 16)  SpO2: 97% (03 Nov 2023 08:01) (97% - 97%)    O2 Parameters below as of 03 Nov 2023 08:01  Patient On (Oxygen Delivery Method): room air      Review of system     Neurological deficits     Physical Exam:  Constitutional - NAD, Comfortable  HEENT - NCAT, EOMI  Chest -breathing comfortably on RA  Cardio - warm and well perfused, + loop recorder.   Abdomen - Obese. + BS.   Extremities - No peripheral edema, No calf tenderness. Long curling toenails.    Neurologic Exam:                    Cognitive -             Orientation: AA&Ox2-3 to self, year and location, not to month or day             Attention:   able to state days of week backward, difficulty with serial 7s            Memory: short term memory deficits      Speech - Fluent, Comprehensible, No dysarthria, No aphasia      Cranial Nerves - No facial asymmetry, Tongue midline, EOMI, Shoulder shrug intact. Vision Impaired on the Left upper visual field in L > R eye.      Motor -                      LEFT    UE - ShAB 4/5, EF 4-5/5, EE 4/5, WE 5/5,  WNL                     RIGHT UE - ShAB 5/5, EF 5/5, EE 5/5, WE 5/5,  WNL                    LEFT    LE - HF 5/5, KE 5/5, DF 5/5, PF 5/5                    RIGHT LE - HF 5/5, KE 5/5, DF 5/5, PF 5/5        Sensory - Impaired to LT on the left face (V2/3), arm and leg.      Reflexes - symmetric DTR +1 in LE and UE. Neg Babinski's b/l     Coordination - FTN / HTS intact b/l    LABS                        CAPILLARY BLOOD GLUCOSE      POCT Blood Glucose.: 122 mg/dL (03 Nov 2023 07:57)  POCT Blood Glucose.: 113 mg/dL (02 Nov 2023 21:39)  POCT Blood Glucose.: 111 mg/dL (02 Nov 2023 16:46)  POCT Blood Glucose.: 117 mg/dL (02 Nov 2023 12:12)        MEDICATIONS  (STANDING):  apixaban 5 milliGRAM(s) Oral two times a day  atorvastatin 80 milliGRAM(s) Oral at bedtime  bisacodyl 5 milliGRAM(s) Oral at bedtime  citalopram 20 milliGRAM(s) Oral daily  dextrose 50% Injectable 25 Gram(s) IV Push once  famotidine    Tablet 20 milliGRAM(s) Oral daily  folic acid 1 milliGRAM(s) Oral daily  insulin lispro (ADMELOG) corrective regimen sliding scale   SubCutaneous Before meals and at bedtime  metFORMIN 500 milliGRAM(s) Oral two times a day  multivitamin 1 Tablet(s) Oral daily  polyethylene glycol 3350 17 Gram(s) Oral daily  senna 2 Tablet(s) Oral at bedtime  thiamine 100 milliGRAM(s) Oral daily    MEDICATIONS  (PRN):

## 2023-11-03 NOTE — PROGRESS NOTE ADULT - ATTENDING COMMENTS
Pt. seen with resident.  Agree with documentation above as per resident with amendments made as appropriate. Patient medically stable. Making progress towards rehab goals.     left PCA infarct,  right MCA and PCA occlusion--   Severe STM deficits-- will trial  Memantine 5mg BID started for cognitive deficits - 11/3. Monitor for SE's  d/c plan for [facility]-- pt. will not have supervision at home

## 2023-11-03 NOTE — PROGRESS NOTE ADULT - ASSESSMENT
Patient is a 59 y/o M with a PHx HTN, DM, CVA sp tnK with full resolution symptoms, who presented to [facility] on 10/12 due to sudden fall at work and lost consciousness.  MRI brain showed left temporal occipital lobe infarctions and acute punctate left occipital lobe infarction now s/p thrombectomy and Right MCA/PCA oclusion with left sided weakness, sensory impairment, left homonymous hemianopsia, gait and ADL impairments.     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 Continue for 3 months.    - Atorvastatin 80mg qhs.   - begin comprehensive rehab program OT, PT, SLP  3 hours daily 5 x week  - neuropsychology evaluation and support, recreation therapy  - Precautions: cardiac, DM, respiratory, breast CA, fall, aspiration.  - Improving in therapies     #Dizziness   -11/1 reports of increased dizziness c/f dehydration vs decrease in cerebral perfusion pressure vs bradycardia   -Encouraged fluid intake --improved today after additional fluid intake\  --No dizziness today  - per electrophysiology notes, no indication for pacemaker, s/p ILR on 10/20  - needs EP follow up as an outpatient.    # Bradycardia  - No indication for pacemaker  - Metoprolol 12.5mg BID has been discontinued   - per electrophysiology notes, no indication for pacemaker, s/p ILR on 10/20  - needs EP follow up as an outpatient.    # HTN  - No medication at home and patient is sensitive to HTN medication. Was previously started on lisinopril.   - Can treat with hydralazine 25mg PRN for >170.   - did not tolerate lisinopril 5mg during hospitalization (intermittent hypotension)  - Dr. Curry suggests 10 point decrease in BP parameters based on clinical exam  - 11/1 Titrate -160   - Consider titrating to -150 on weekend or early next week as tolerated.   --check manual BPs only--nursing order written     # HLD  - Atorvastatin 80 mg daily    # DM 2  - A1C 8.6  - Accu checks and ISS    # ETOH abuse  - Not in active withdrawal.  - Continue MVI, thiamine and folic acid    # Pain management  - Tylenol PRN    # GI/Bowel:  - Senna QHS, Miralax Daily  - dulcolax PO   - GI ppx: Protonix    # /Bladder:   -Continent    # FEN   - Diet: DASH DIET     # DVT ppx  - Eliquis 5mg BID    IDT: 10/31  TDD:  11/11 to home.   RN: continent with bowel and continent with bladder   SW: Lives in Apartment with his daughter. On the main floor. He was independent before. Sister is supportive  SP: Regular with thin. Mild cognitive deficits-- . Recall and short term memory deficits, reasoning, attention, decreased mental flexibility.  OT: Setup - eating. CG - grooming, toileting, toilet transfers and LBD. Supervision for UBD. Max assist for foot wear.   PT : CG - transfer. 75 feet with RW and CG. 8 steps with 2 hand rail with CG.   Goals:1.  Supervision for toileting.   2.  Supervision for ambulation.   3. Recall salient information with external aides    Addendum - Spoke to Lety about discharge planning. No-one is able to support at home at this time.     Outpatient Follow-up (Specialty/Name of physician):    Rj Delgado  Cardiac Electrophysiology  [address] [address]  Phone: [phone]  Fax: [phone]  Follow Up Time:     Wily Barkley  Neurology  [address] [place] [address]  Phone: [phone]  Fax: [phone]  Follow Up Time: 1 week    Denzel Zapata  Interventional Cardiology  [address] [address]  Phone: [phone]  Fax: [phone]  Follow Up Time: 1 week    PCP,   Phone: (   )    -  Fax: (   )    -  Follow Up Time: 1 week    Arsenio Goodrich  Interventional Neuroradiology  [address] [place] [address]  Phone: [phone]  Fax: [phone]   Patient is a 57 y/o M with a PHx HTN, DM, CVA sp tnK with full resolution symptoms, who presented to [facility] on 10/12 due to sudden fall at work and lost consciousness.  MRI brain showed left temporal occipital lobe infarctions and acute punctate left occipital lobe infarction now s/p thrombectomy and Right MCA/PCA oclusion with left sided weakness, sensory impairment, left homonymous hemianopsia, gait and ADL impairments.     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 Continue for 3 months.    - Atorvastatin 80mg qhs.   - Memantine 5mg BID started for cognitive deficits - 11/3.   - begin comprehensive rehab program OT, PT, SLP  3 hours daily 5 x week  - neuropsychology evaluation and support, recreation therapy  - Precautions: cardiac, DM, respiratory, breast CA, fall, aspiration.  - Improving in therapies     #Dizziness   -11/1 reports of increased dizziness c/f dehydration vs decrease in cerebral perfusion pressure vs bradycardia   -Encouraged fluid intake --improved today after additional fluid intake\  --No dizziness today -11/3     # Bradycardia  - No indication for pacemaker  - Metoprolol 12.5mg BID has been discontinued   - per electrophysiology notes, no indication for pacemaker, s/p ILR on 10/20  - needs EP follow up as an outpatient.    # HTN  - No medication at home and patient is sensitive to HTN medication. Was previously started on lisinopril.   - Can treat with hydralazine 25mg PRN for >170.   - did not tolerate lisinopril 5mg during hospitalization (intermittent hypotension)  - Dr. Curry suggests 10 point decrease in BP parameters based on clinical exam  - Consider titrating to -150 on weekend or early next week as tolerated.   --check manual BPs only--nursing order written     # HLD  - Atorvastatin 80 mg daily    # DM 2  - A1C 8.6  - Accu checks and ISS    # ETOH abuse  - Not in active withdrawal.  - Continue MVI, thiamine and folic acid    # Pain management  - Tylenol PRN    # GI/Bowel:  - Senna QHS, Miralax Daily  - dulcolax PO   - GI ppx: Protonix    # /Bladder:   -Continent    # FEN   - Diet: DASH DIET     # DVT ppx  - Eliquis 5mg BID    IDT: 10/31  TDD:  11/11 to home.   RN: continent with bowel and continent with bladder   SW: Lives in Apartment with his daughter. On the main floor. He was independent before. Sister is supportive  SP: Regular with thin. Mild cognitive deficits-- . Recall and short term memory deficits, reasoning, attention, decreased mental flexibility.  OT: Setup - eating. CG - grooming, toileting, toilet transfers and LBD. Supervision for UBD. Max assist for foot wear.   PT : CG - transfer. 75 feet with RW and CG. 8 steps with 2 hand rail with CG.   Goals:1.  Supervision for toileting.   2.  Supervision for ambulation.   3. Recall salient information with external aides    Addendum - Spoke to Lety about discharge planning. No-one is able to support at home at this time.     Outpatient Follow-up (Specialty/Name of physician):    Rj Delgado  Cardiac Electrophysiology  [address] [address]  Phone: [phone]  Fax: [phone]  Follow Up Time:     Wily Barkley  Neurology  [address] [place] [address]  Phone: [phone]  Fax: [phone]  Follow Up Time: 1 week    Denzel Zapata  Interventional Cardiology  [address] [address]  Phone: [phone]  Fax: [phone]  Follow Up Time: 1 week    PCP,   Phone: (   )    -  Fax: (   )    -  Follow Up Time: 1 week    Arsenio Goodrich  Interventional Neuroradiology  [address] [place] [address]  Phone: [phone]  Fax: [phone]   Patient is a 59 y/o M with a PHx HTN, DM, CVA sp tnK with full resolution symptoms, who presented to [facility] on 10/12 due to sudden fall at work and lost consciousness.  MRI brain showed left temporal occipital lobe infarctions and acute punctate left occipital lobe infarction now s/p thrombectomy and Right MCA/PCA oclusion with left sided weakness, sensory impairment, left homonymous hemianopsia, gait and ADL impairments.     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 Continue for 3 months.    - Atorvastatin 80mg qhs.   - Memantine 5mg BID started for cognitive deficits - 11/3.   - begin comprehensive rehab program OT, PT, SLP  3 hours daily 5 x week  - neuropsychology evaluation and support, recreation therapy  - Precautions: cardiac, DM, respiratory, breast CA, fall, aspiration.  - Improving in therapies     #Dizziness   -11/1 reports of increased dizziness c/f dehydration vs decrease in cerebral perfusion pressure vs bradycardia   -Encouraged fluid intake --improved today after additional fluid intake\  --No dizziness today -11/3     # Bradycardia  - No indication for pacemaker  - Metoprolol 12.5mg BID has been discontinued   - per electrophysiology notes, no indication for pacemaker, s/p ILR on 10/20  - needs EP follow up as an outpatient.    # HTN  - No medication at home and patient is sensitive to HTN medication. Was previously started on lisinopril.   - Can treat with hydralazine 25mg PRN for >170.   - did not tolerate lisinopril 5mg during hospitalization (intermittent hypotension)  - Dr. Curry suggests 10 point decrease in BP parameters based on clinical exam  - Consider titrating to -150 on weekend or early next week as tolerated.   --check manual BPs only--nursing order written     # HLD  - Atorvastatin 80 mg daily    # DM 2  - A1C 8.6  - Accu checks and ISS    # ETOH abuse  - Not in active withdrawal.  - Continue MVI, thiamine and folic acid    # Pain management  - Tylenol PRN    # GI/Bowel:  - Senna QHS, Miralax Daily  - dulcolax PO   - GI ppx: Protonix    # /Bladder:   -Continent    # FEN   - Diet: DASH DIET     # DVT ppx  - Eliquis 5mg BID    IDT: 10/31  TDD:  11/11 to home.  d/c plan to [facility] ASAP   RN: continent with bowel and continent with bladder   SW: Lives in Apartment with his daughter. On the main floor. He was independent before. Sister is supportive  SP: Regular with thin. Mild cognitive deficits-- . Recall and short term memory deficits, reasoning, attention, decreased mental flexibility.  OT: Setup - eating. CG - grooming, toileting, toilet transfers and LBD. Supervision for UBD. Max assist for foot wear.   PT : CG - transfer. 75 feet with RW and CG. 8 steps with 2 hand rail with CG.   Goals:1.  Supervision for toileting.   2.  Supervision for ambulation.   3. Recall salient information with external aides    Addendum - Spoke to Lety about discharge planning. No-one is able to support at home at this time.     Outpatient Follow-up (Specialty/Name of physician):    Rj Delgado  Cardiac Electrophysiology  [address] [address]  Phone: [phone]  Fax: [phone]  Follow Up Time:     Wiyl Barkley  Neurology  [address] [place] [address]  Phone: [phone]  Fax: [phone]  Follow Up Time: 1 week    Denzel Zapata  Interventional Cardiology  [address] [address]  Phone: [phone]  Fax: [phone]  Follow Up Time: 1 week    PCP,   Phone: (   )    -  Fax: (   )    -  Follow Up Time: 1 week    Arsenio Goodrich  Interventional Neuroradiology  [address] [place] [address]  Phone: [phone]  Fax: [phone]

## 2023-11-04 LAB
GLUCOSE BLDC GLUCOMTR-MCNC: 107 MG/DL — HIGH (ref 70–99)
GLUCOSE BLDC GLUCOMTR-MCNC: 107 MG/DL — HIGH (ref 70–99)
GLUCOSE BLDC GLUCOMTR-MCNC: 122 MG/DL — HIGH (ref 70–99)
GLUCOSE BLDC GLUCOMTR-MCNC: 122 MG/DL — HIGH (ref 70–99)
GLUCOSE BLDC GLUCOMTR-MCNC: 133 MG/DL — HIGH (ref 70–99)
GLUCOSE BLDC GLUCOMTR-MCNC: 133 MG/DL — HIGH (ref 70–99)
GLUCOSE BLDC GLUCOMTR-MCNC: 159 MG/DL — HIGH (ref 70–99)
GLUCOSE BLDC GLUCOMTR-MCNC: 159 MG/DL — HIGH (ref 70–99)

## 2023-11-04 PROCEDURE — 99232 SBSQ HOSP IP/OBS MODERATE 35: CPT

## 2023-11-04 RX ADMIN — Medication 5 MILLIGRAM(S): at 21:35

## 2023-11-04 RX ADMIN — METFORMIN HYDROCHLORIDE 500 MILLIGRAM(S): 850 TABLET ORAL at 09:01

## 2023-11-04 RX ADMIN — APIXABAN 5 MILLIGRAM(S): 2.5 TABLET, FILM COATED ORAL at 05:50

## 2023-11-04 RX ADMIN — MEMANTINE HYDROCHLORIDE 5 MILLIGRAM(S): 10 TABLET ORAL at 05:50

## 2023-11-04 RX ADMIN — FAMOTIDINE 20 MILLIGRAM(S): 10 INJECTION INTRAVENOUS at 11:21

## 2023-11-04 RX ADMIN — Medication 1 TABLET(S): at 11:21

## 2023-11-04 RX ADMIN — ATORVASTATIN CALCIUM 80 MILLIGRAM(S): 80 TABLET, FILM COATED ORAL at 21:35

## 2023-11-04 RX ADMIN — METFORMIN HYDROCHLORIDE 500 MILLIGRAM(S): 850 TABLET ORAL at 17:08

## 2023-11-04 RX ADMIN — Medication 2: at 11:21

## 2023-11-04 RX ADMIN — Medication 100 MILLIGRAM(S): at 11:21

## 2023-11-04 RX ADMIN — CITALOPRAM 20 MILLIGRAM(S): 10 TABLET, FILM COATED ORAL at 11:21

## 2023-11-04 RX ADMIN — Medication 1 MILLIGRAM(S): at 11:21

## 2023-11-04 RX ADMIN — APIXABAN 5 MILLIGRAM(S): 2.5 TABLET, FILM COATED ORAL at 17:08

## 2023-11-04 RX ADMIN — MEMANTINE HYDROCHLORIDE 5 MILLIGRAM(S): 10 TABLET ORAL at 17:09

## 2023-11-04 NOTE — PROGRESS NOTE ADULT - ASSESSMENT
59 y/o M with a Hx of stroke one year ago (at the time patient received TNK and had full  resolution of symptoms), HTN, DM, non-compliant with medications, who presented following an episode of LOC, as per family at around 6:15 pm patient had a sudden fall at work and lost consciousness. It is unclear if patient had any symptoms before he fell. NIHSS 10 on admission, admitted under NeuroICU on 10/12: Thrombectomy via L radial artery for TICI 3 revascularization of L P1-P2 occlusion. Noted LMCA chronic occlusion., on 0/13: Neurologic status improving. Got MR. Started on ASA and SQL, on 10/14: quadrantanopia now on L superior (originally R); Repeat stroke imaging completed and initial concern for CTP showing poss new perfusion deficit however on further review noted to be present on admission. Started on heparin drip per neurology recommendations, on 10/15: Therapeutic on heparin drip, stability HCT obtained. Cardiology consulted for further stroke work-up and downgraded under medicine.     #Bradycardia, episodes of 2:1 block  TTE EF 50-55%, no other structural abnormalities  - EP f/u re ILR interrogation given reported dizziness today.  - repeat EKG with any subsequent episodes; his HR was reportedly in 60's and BP stable during dizziness episode.  - EKG 10/31, HR 51 with sinus bradycardia, unchanged RBBB with PVCs, TWI in lateral leads.  - per electrophysiology notes (reviewed), no indication for pacemaker, s/p ILR on 10/20  - needs EP follow up as an outpatient.    #Acute CVA  #Occluded R MCA/PCA  s/p thrombectomy   TTE shows with  Normal left ventricular internal cavity size,  3. Left ventricular ejection fraction, by visual estimation, is 50 to 55%.  DVT studies negative  Per Neuro no need for ROCIO  continue Eliquis. Needs for 3 months.    c/w Lipitor  BP goal per NSGY can be titrated down 130-160 today, then 120-150 tomorrow and by 10 points daily  outpatient neuro IR follow up.    #ETOH use disorder  -  in remission, stable.    #HTN  No meds at home  - did not tolerate lisinopril 5mg during hospitalization (intermittent hypotension)  Hydralazine 25 mg PRN if SBP > 170    #HLD  c/w Lipitor 80mg QD    #DM 2  non compliant with meds at home  A1C 8.6  Accu checks and ISS    #Elevated TSH  T4 level normal  outpt f/u    DVT Prophylaxis -- Patient is on Eliquis.

## 2023-11-04 NOTE — PROGRESS NOTE ADULT - SUBJECTIVE AND OBJECTIVE BOX
Hospitalist: Gabriella Barcenas DO    CHIEF COMPLAINT: Patient is a 58y old  male who presents with a chief complaint of CVA and left hemiparesis (04 Nov 2023 08:05)      SUBJECTIVE / OVERNIGHT EVENTS: Patient seen and examined. No acute events overnight. Pain well controlled and patient without any complaints.    MEDICATIONS  (STANDING):  apixaban 5 milliGRAM(s) Oral two times a day  atorvastatin 80 milliGRAM(s) Oral at bedtime  bisacodyl 5 milliGRAM(s) Oral at bedtime  citalopram 20 milliGRAM(s) Oral daily  dextrose 50% Injectable 25 Gram(s) IV Push once  famotidine    Tablet 20 milliGRAM(s) Oral daily  folic acid 1 milliGRAM(s) Oral daily  insulin lispro (ADMELOG) corrective regimen sliding scale   SubCutaneous Before meals and at bedtime  memantine 5 milliGRAM(s) Oral two times a day  metFORMIN 500 milliGRAM(s) Oral two times a day  multivitamin 1 Tablet(s) Oral daily  polyethylene glycol 3350 17 Gram(s) Oral daily  senna 2 Tablet(s) Oral at bedtime  thiamine 100 milliGRAM(s) Oral daily    MEDICATIONS  (PRN):      VITALS:  T(F): 98.1 (11-04-23 @ 09:00), Max: 98.2 (11-03-23 @ 19:50)  HR: 63 (11-04-23 @ 09:00) (45 - 63)  BP: 150/80 (11-04-23 @ 09:00) (100/60 - 150/80)  RR: 15 (11-04-23 @ 09:00) (15 - 15)  SpO2: 96% (11-04-23 @ 09:00)      REVIEW OF SYSTEMS:  For ROV please refer back to H&P     PHYSICAL EXAM:  GENERAL: NAD, obese male  HEAD:  Atraumatic, Normocephalic  NECK: Supple, No JVD  CHEST/LUNG: Clear to auscultation bilaterally; No wheeze, nonlabored breathing  HEART: Regular rate and rhythm; No murmurs, rubs, or gallops  ABDOMEN: Soft, Nontender, Nondistended; Bowel sounds present  EXTREMITIES:  No clubbing, cyanosis, or edema  PSYCH: calm, appropriate mood       CAPILLARY BLOOD GLUCOSE  POCT Blood Glucose.: 159 mg/dL (04 Nov 2023 11:17)  POCT Blood Glucose.: 133 mg/dL (04 Nov 2023 07:56)  POCT Blood Glucose.: 154 mg/dL (03 Nov 2023 21:32)  POCT Blood Glucose.: 130 mg/dL (03 Nov 2023 17:05)        MICROBIOLOGY:          RADIOLOGY & ADDITIONAL TESTS:    Imaging Personally Reviewed:    [X] Consultant(s) Notes Reviewed:  [X] Care Discussed with Consultants/Other Providers:

## 2023-11-04 NOTE — PROGRESS NOTE ADULT - SUBJECTIVE AND OBJECTIVE BOX
Slept well.  Pain is controlled.   Continues to have numbness of the left arm.  Hoping he can go home soon.   No other new ROS.  Has been tolerating rehabilitation program.    VITALS  T(C): 36.8 (11-03-23 @ 19:50), Max: 36.8 (11-03-23 @ 19:50)  HR: 56 (11-03-23 @ 19:50) (56 - 56)  BP: 138/78 (11-03-23 @ 19:50) (138/78 - 138/78)  RR: 15 (11-03-23 @ 19:50) (15 - 15)  SpO2: 97% (11-03-23 @ 19:50) (97% - 97%)  Wt(kg): --     MEDICATIONS   apixaban 5 milliGRAM(s) two times a day  atorvastatin 80 milliGRAM(s) at bedtime  bisacodyl 5 milliGRAM(s) at bedtime  citalopram 20 milliGRAM(s) daily  dextrose 50% Injectable 25 Gram(s) once  famotidine    Tablet 20 milliGRAM(s) daily  folic acid 1 milliGRAM(s) daily  insulin lispro (ADMELOG) corrective regimen sliding scale   Before meals and at bedtime  memantine 5 milliGRAM(s) two times a day  metFORMIN 500 milliGRAM(s) two times a day  multivitamin 1 Tablet(s) daily  polyethylene glycol 3350 17 Gram(s) daily  senna 2 Tablet(s) at bedtime  thiamine 100 milliGRAM(s) daily      RECENT LABS/IMAGING                   POCT Blood Glucose.: 133 mg/dL (11-04-23 @ 07:56)  POCT Blood Glucose.: 154 mg/dL (11-03-23 @ 21:32)  POCT Blood Glucose.: 130 mg/dL (11-03-23 @ 17:05)  POCT Blood Glucose.: 108 mg/dL (11-03-23 @ 13:49)    ------------------------------------------  PHYSICAL EXAM  Constitutional - NAD, Comfortable  Pulm - Breathing comfortably, No wheezing  Abd - Nondistended  Extremities - No edema  Neurologic Exam - Awake, Alert  Psychiatric - Mood WNL, Appropriate    ASSESSMENT/PLAN  58y Male with impairments in mobility and ADLs   - Continue current rehabilitation program 3hrs a day   - Continue current medications, patient is medically stable - consider DC Namenda and start Gabapentin for neuropathic sensations  - DVT prophylaxis  - Skin - OOB and mobilization daily

## 2023-11-05 LAB
GLUCOSE BLDC GLUCOMTR-MCNC: 108 MG/DL — HIGH (ref 70–99)
GLUCOSE BLDC GLUCOMTR-MCNC: 108 MG/DL — HIGH (ref 70–99)
GLUCOSE BLDC GLUCOMTR-MCNC: 135 MG/DL — HIGH (ref 70–99)
GLUCOSE BLDC GLUCOMTR-MCNC: 135 MG/DL — HIGH (ref 70–99)
GLUCOSE BLDC GLUCOMTR-MCNC: 171 MG/DL — HIGH (ref 70–99)
GLUCOSE BLDC GLUCOMTR-MCNC: 171 MG/DL — HIGH (ref 70–99)
GLUCOSE BLDC GLUCOMTR-MCNC: 88 MG/DL — SIGNIFICANT CHANGE UP (ref 70–99)
GLUCOSE BLDC GLUCOMTR-MCNC: 88 MG/DL — SIGNIFICANT CHANGE UP (ref 70–99)

## 2023-11-05 PROCEDURE — 99232 SBSQ HOSP IP/OBS MODERATE 35: CPT

## 2023-11-05 RX ADMIN — Medication 1 MILLIGRAM(S): at 11:58

## 2023-11-05 RX ADMIN — METFORMIN HYDROCHLORIDE 500 MILLIGRAM(S): 850 TABLET ORAL at 08:09

## 2023-11-05 RX ADMIN — Medication 5 MILLIGRAM(S): at 21:51

## 2023-11-05 RX ADMIN — Medication 1 TABLET(S): at 11:58

## 2023-11-05 RX ADMIN — Medication 100 MILLIGRAM(S): at 11:58

## 2023-11-05 RX ADMIN — APIXABAN 5 MILLIGRAM(S): 2.5 TABLET, FILM COATED ORAL at 05:28

## 2023-11-05 RX ADMIN — CITALOPRAM 20 MILLIGRAM(S): 10 TABLET, FILM COATED ORAL at 11:59

## 2023-11-05 RX ADMIN — MEMANTINE HYDROCHLORIDE 5 MILLIGRAM(S): 10 TABLET ORAL at 05:28

## 2023-11-05 RX ADMIN — MEMANTINE HYDROCHLORIDE 5 MILLIGRAM(S): 10 TABLET ORAL at 17:29

## 2023-11-05 RX ADMIN — METFORMIN HYDROCHLORIDE 500 MILLIGRAM(S): 850 TABLET ORAL at 17:29

## 2023-11-05 RX ADMIN — FAMOTIDINE 20 MILLIGRAM(S): 10 INJECTION INTRAVENOUS at 11:58

## 2023-11-05 RX ADMIN — APIXABAN 5 MILLIGRAM(S): 2.5 TABLET, FILM COATED ORAL at 17:29

## 2023-11-05 RX ADMIN — ATORVASTATIN CALCIUM 80 MILLIGRAM(S): 80 TABLET, FILM COATED ORAL at 21:51

## 2023-11-05 RX ADMIN — Medication 2: at 11:57

## 2023-11-05 NOTE — PROGRESS NOTE ADULT - SUBJECTIVE AND OBJECTIVE BOX
Hospitalist: Gabriella Barcenas DO    CHIEF COMPLAINT: Patient is a 58y old  male who presents with a chief complaint of CVA and left hemiparesis (05 Nov 2023 07:24)      SUBJECTIVE / OVERNIGHT EVENTS: Patient seen and examined. No acute events overnight. Pain well controlled and patient without any complaints.    MEDICATIONS  (STANDING):  apixaban 5 milliGRAM(s) Oral two times a day  atorvastatin 80 milliGRAM(s) Oral at bedtime  bisacodyl 5 milliGRAM(s) Oral at bedtime  citalopram 20 milliGRAM(s) Oral daily  dextrose 50% Injectable 25 Gram(s) IV Push once  famotidine    Tablet 20 milliGRAM(s) Oral daily  folic acid 1 milliGRAM(s) Oral daily  insulin lispro (ADMELOG) corrective regimen sliding scale   SubCutaneous Before meals and at bedtime  memantine 5 milliGRAM(s) Oral two times a day  metFORMIN 500 milliGRAM(s) Oral two times a day  multivitamin 1 Tablet(s) Oral daily  polyethylene glycol 3350 17 Gram(s) Oral daily  senna 2 Tablet(s) Oral at bedtime  thiamine 100 milliGRAM(s) Oral daily    MEDICATIONS  (PRN):      VITALS:  T(F): 97.5 (11-05-23 @ 08:18), Max: 98.2 (11-04-23 @ 20:41)  HR: 45 (11-05-23 @ 08:18) (45 - 60)  BP: 158/82 (11-05-23 @ 08:18) (110/80 - 158/82)  RR: 15 (11-05-23 @ 08:18) (15 - 16)  SpO2: 95% (11-05-23 @ 08:18)      REVIEW OF SYSTEMS:  For ROV please refer back to H&P     PHYSICAL EXAM:  GENERAL: NAD, obese male  HEAD:  Atraumatic, Normocephalic  NECK: Supple, No JVD  CHEST/LUNG: Clear to auscultation bilaterally; No wheeze, nonlabored breathing  HEART: Regular rate and rhythm; No murmurs, rubs, or gallops  ABDOMEN: Soft, Nontender, Nondistended; Bowel sounds present  EXTREMITIES:  No clubbing, cyanosis, or edema  PSYCH: calm, appropriate mood       CAPILLARY BLOOD GLUCOSE  POCT Blood Glucose.: 135 mg/dL (05 Nov 2023 08:08)  POCT Blood Glucose.: 122 mg/dL (04 Nov 2023 21:32)  POCT Blood Glucose.: 107 mg/dL (04 Nov 2023 16:03)  POCT Blood Glucose.: 159 mg/dL (04 Nov 2023 11:17)      RADIOLOGY & ADDITIONAL TESTS:    Imaging Personally Reviewed:    [X] Consultant(s) Notes Reviewed:  [X] Care Discussed with Consultants/Other Providers:

## 2023-11-05 NOTE — PROGRESS NOTE ADULT - SUBJECTIVE AND OBJECTIVE BOX
Reports he had some difficulty with sleep overnight.  Pain is controlled.   No other new ROS.  Has been tolerating rehabilitation program.    VITALS  T(C): 36.8 (11-04-23 @ 20:41), Max: 36.8 (11-04-23 @ 20:41)  HR: 58 (11-04-23 @ 20:41) (45 - 63)  BP: 130/66 (11-04-23 @ 20:41) (100/60 - 150/80)  RR: 16 (11-04-23 @ 20:41) (15 - 16)  SpO2: 95% (11-04-23 @ 20:41) (95% - 96%)  Wt(kg): --     MEDICATIONS   apixaban 5 milliGRAM(s) two times a day  atorvastatin 80 milliGRAM(s) at bedtime  bisacodyl 5 milliGRAM(s) at bedtime  citalopram 20 milliGRAM(s) daily  dextrose 50% Injectable 25 Gram(s) once  famotidine    Tablet 20 milliGRAM(s) daily  folic acid 1 milliGRAM(s) daily  insulin lispro (ADMELOG) corrective regimen sliding scale   Before meals and at bedtime  memantine 5 milliGRAM(s) two times a day  metFORMIN 500 milliGRAM(s) two times a day  multivitamin 1 Tablet(s) daily  polyethylene glycol 3350 17 Gram(s) daily  senna 2 Tablet(s) at bedtime  thiamine 100 milliGRAM(s) daily      RECENT LABS/IMAGING                   POCT Blood Glucose.: 122 mg/dL (11-04-23 @ 21:32)  POCT Blood Glucose.: 107 mg/dL (11-04-23 @ 16:03)  POCT Blood Glucose.: 159 mg/dL (11-04-23 @ 11:17)  POCT Blood Glucose.: 133 mg/dL (11-04-23 @ 07:56)    ------------------------------------------  PHYSICAL EXAM  Constitutional - NAD, Comfortable  Pulm - Breathing comfortably, No wheezing  Neurologic Exam - Awake, Alert  Psychiatric - Fatigued     ASSESSMENT/PLAN  58y Male with impairments in mobility and ADLs   - Continue current rehabilitation program 3hrs a day   - Continue current medications, patient is medically stable   - DVT prophylaxis  - Skin - OOB and mobilization daily

## 2023-11-06 LAB
ALBUMIN SERPL ELPH-MCNC: 3.7 G/DL — SIGNIFICANT CHANGE UP (ref 3.3–5)
ALBUMIN SERPL ELPH-MCNC: 3.7 G/DL — SIGNIFICANT CHANGE UP (ref 3.3–5)
ALP SERPL-CCNC: 81 U/L — SIGNIFICANT CHANGE UP (ref 40–120)
ALP SERPL-CCNC: 81 U/L — SIGNIFICANT CHANGE UP (ref 40–120)
ALT FLD-CCNC: 43 U/L — SIGNIFICANT CHANGE UP (ref 10–45)
ALT FLD-CCNC: 43 U/L — SIGNIFICANT CHANGE UP (ref 10–45)
ANION GAP SERPL CALC-SCNC: 6 MMOL/L — SIGNIFICANT CHANGE UP (ref 5–17)
ANION GAP SERPL CALC-SCNC: 6 MMOL/L — SIGNIFICANT CHANGE UP (ref 5–17)
AST SERPL-CCNC: 24 U/L — SIGNIFICANT CHANGE UP (ref 10–40)
AST SERPL-CCNC: 24 U/L — SIGNIFICANT CHANGE UP (ref 10–40)
BILIRUB SERPL-MCNC: 0.7 MG/DL — SIGNIFICANT CHANGE UP (ref 0.2–1.2)
BILIRUB SERPL-MCNC: 0.7 MG/DL — SIGNIFICANT CHANGE UP (ref 0.2–1.2)
BUN SERPL-MCNC: 17 MG/DL — SIGNIFICANT CHANGE UP (ref 7–23)
BUN SERPL-MCNC: 17 MG/DL — SIGNIFICANT CHANGE UP (ref 7–23)
CALCIUM SERPL-MCNC: 9.7 MG/DL — SIGNIFICANT CHANGE UP (ref 8.4–10.5)
CALCIUM SERPL-MCNC: 9.7 MG/DL — SIGNIFICANT CHANGE UP (ref 8.4–10.5)
CHLORIDE SERPL-SCNC: 104 MMOL/L — SIGNIFICANT CHANGE UP (ref 96–108)
CHLORIDE SERPL-SCNC: 104 MMOL/L — SIGNIFICANT CHANGE UP (ref 96–108)
CO2 SERPL-SCNC: 29 MMOL/L — SIGNIFICANT CHANGE UP (ref 22–31)
CO2 SERPL-SCNC: 29 MMOL/L — SIGNIFICANT CHANGE UP (ref 22–31)
CREAT SERPL-MCNC: 0.93 MG/DL — SIGNIFICANT CHANGE UP (ref 0.5–1.3)
CREAT SERPL-MCNC: 0.93 MG/DL — SIGNIFICANT CHANGE UP (ref 0.5–1.3)
EGFR: 96 ML/MIN/1.73M2 — SIGNIFICANT CHANGE UP
EGFR: 96 ML/MIN/1.73M2 — SIGNIFICANT CHANGE UP
GLUCOSE BLDC GLUCOMTR-MCNC: 124 MG/DL — HIGH (ref 70–99)
GLUCOSE BLDC GLUCOMTR-MCNC: 124 MG/DL — HIGH (ref 70–99)
GLUCOSE SERPL-MCNC: 109 MG/DL — HIGH (ref 70–99)
GLUCOSE SERPL-MCNC: 109 MG/DL — HIGH (ref 70–99)
HCT VFR BLD CALC: 40.6 % — SIGNIFICANT CHANGE UP (ref 39–50)
HCT VFR BLD CALC: 40.6 % — SIGNIFICANT CHANGE UP (ref 39–50)
HGB BLD-MCNC: 14.1 G/DL — SIGNIFICANT CHANGE UP (ref 13–17)
HGB BLD-MCNC: 14.1 G/DL — SIGNIFICANT CHANGE UP (ref 13–17)
MCHC RBC-ENTMCNC: 32.3 PG — SIGNIFICANT CHANGE UP (ref 27–34)
MCHC RBC-ENTMCNC: 32.3 PG — SIGNIFICANT CHANGE UP (ref 27–34)
MCHC RBC-ENTMCNC: 34.7 GM/DL — SIGNIFICANT CHANGE UP (ref 32–36)
MCHC RBC-ENTMCNC: 34.7 GM/DL — SIGNIFICANT CHANGE UP (ref 32–36)
MCV RBC AUTO: 93.1 FL — SIGNIFICANT CHANGE UP (ref 80–100)
MCV RBC AUTO: 93.1 FL — SIGNIFICANT CHANGE UP (ref 80–100)
NRBC # BLD: 0 /100 WBCS — SIGNIFICANT CHANGE UP (ref 0–0)
NRBC # BLD: 0 /100 WBCS — SIGNIFICANT CHANGE UP (ref 0–0)
PLATELET # BLD AUTO: 115 K/UL — LOW (ref 150–400)
PLATELET # BLD AUTO: 115 K/UL — LOW (ref 150–400)
POTASSIUM SERPL-MCNC: 4.2 MMOL/L — SIGNIFICANT CHANGE UP (ref 3.5–5.3)
POTASSIUM SERPL-MCNC: 4.2 MMOL/L — SIGNIFICANT CHANGE UP (ref 3.5–5.3)
POTASSIUM SERPL-SCNC: 4.2 MMOL/L — SIGNIFICANT CHANGE UP (ref 3.5–5.3)
POTASSIUM SERPL-SCNC: 4.2 MMOL/L — SIGNIFICANT CHANGE UP (ref 3.5–5.3)
PROT SERPL-MCNC: 6.9 G/DL — SIGNIFICANT CHANGE UP (ref 6–8.3)
PROT SERPL-MCNC: 6.9 G/DL — SIGNIFICANT CHANGE UP (ref 6–8.3)
RBC # BLD: 4.36 M/UL — SIGNIFICANT CHANGE UP (ref 4.2–5.8)
RBC # BLD: 4.36 M/UL — SIGNIFICANT CHANGE UP (ref 4.2–5.8)
RBC # FLD: 12.9 % — SIGNIFICANT CHANGE UP (ref 10.3–14.5)
RBC # FLD: 12.9 % — SIGNIFICANT CHANGE UP (ref 10.3–14.5)
SODIUM SERPL-SCNC: 139 MMOL/L — SIGNIFICANT CHANGE UP (ref 135–145)
SODIUM SERPL-SCNC: 139 MMOL/L — SIGNIFICANT CHANGE UP (ref 135–145)
WBC # BLD: 4.94 K/UL — SIGNIFICANT CHANGE UP (ref 3.8–10.5)
WBC # BLD: 4.94 K/UL — SIGNIFICANT CHANGE UP (ref 3.8–10.5)
WBC # FLD AUTO: 4.94 K/UL — SIGNIFICANT CHANGE UP (ref 3.8–10.5)
WBC # FLD AUTO: 4.94 K/UL — SIGNIFICANT CHANGE UP (ref 3.8–10.5)

## 2023-11-06 PROCEDURE — 99232 SBSQ HOSP IP/OBS MODERATE 35: CPT

## 2023-11-06 RX ADMIN — Medication 1 TABLET(S): at 11:16

## 2023-11-06 RX ADMIN — ATORVASTATIN CALCIUM 80 MILLIGRAM(S): 80 TABLET, FILM COATED ORAL at 21:40

## 2023-11-06 RX ADMIN — MEMANTINE HYDROCHLORIDE 5 MILLIGRAM(S): 10 TABLET ORAL at 05:30

## 2023-11-06 RX ADMIN — METFORMIN HYDROCHLORIDE 500 MILLIGRAM(S): 850 TABLET ORAL at 08:16

## 2023-11-06 RX ADMIN — Medication 1 MILLIGRAM(S): at 11:16

## 2023-11-06 RX ADMIN — POLYETHYLENE GLYCOL 3350 17 GRAM(S): 17 POWDER, FOR SOLUTION ORAL at 11:16

## 2023-11-06 RX ADMIN — Medication 100 MILLIGRAM(S): at 11:16

## 2023-11-06 RX ADMIN — FAMOTIDINE 20 MILLIGRAM(S): 10 INJECTION INTRAVENOUS at 11:16

## 2023-11-06 RX ADMIN — APIXABAN 5 MILLIGRAM(S): 2.5 TABLET, FILM COATED ORAL at 17:08

## 2023-11-06 RX ADMIN — SENNA PLUS 2 TABLET(S): 8.6 TABLET ORAL at 21:40

## 2023-11-06 RX ADMIN — APIXABAN 5 MILLIGRAM(S): 2.5 TABLET, FILM COATED ORAL at 05:30

## 2023-11-06 RX ADMIN — Medication 5 MILLIGRAM(S): at 21:40

## 2023-11-06 RX ADMIN — MEMANTINE HYDROCHLORIDE 5 MILLIGRAM(S): 10 TABLET ORAL at 17:08

## 2023-11-06 RX ADMIN — CITALOPRAM 20 MILLIGRAM(S): 10 TABLET, FILM COATED ORAL at 11:16

## 2023-11-06 RX ADMIN — METFORMIN HYDROCHLORIDE 500 MILLIGRAM(S): 850 TABLET ORAL at 17:08

## 2023-11-06 NOTE — PROGRESS NOTE ADULT - SUBJECTIVE AND OBJECTIVE BOX
HPI:  Patient is a 57 y/o M with a PHx HTN, DM, stroke one year ago (at the time patient received Tenecteplase and had full resolution of symptoms), who presented to [facility] on 10/12/23 following sudden fall at work and lost consciousness. NIHSS 10 on admission. Admitted under NeuroICU on 10/12. Imaging revealed ischemia in the posterior circulation, CT angiogram head/neck showed right PCA P1-2 cutoff, possibly chronic right MCA M1 cutoff which had distal reconstitution and established collaterals, and left PCA occlusion which patient was subsequently transferred for mechanical thrombectomy.    Cerebral angiogram and mechanical thrombectomy performed using aspiration with TICI 3 recanalization of left PCA. The right occipital lobe filled from right SHANI collaterals, and the right MCA as well was thought to be chronic given noted collateralization. MR with small to moderate left temporal occipital lobe infarctions. Occluded right MCA, occluded right PCA and right vertebral artery proximal segment. The patient was started on a hep gtt given extensive atherosclerotic disease which was switched to apixaban 5mg BID with recommendations for a three month course.    TTE with normal left ventricular internal cavity size. Left ventricular ejection fractio 50 to 55%. Per Neuro, no need for ROCIO at this time. Pt had an episode of bradycardia during his stay and there were concern for 2:1 block. ILR placed 10/20 and no indication for PPM at this time.     Patient was evaluated by PM&R and therapy for functional deficits, gait/ADL impairments and acute rehabilitation was recommended. Patient was medically optimized for discharge to [facility] IRF on 10/27/23.       Subjective:    Patient seen and examined at bedside, sitting in WC. NAD.   Denies dizziness. Unable to recall if he slept well or if he completed scheduled 7am OT session.   He continues to have the short term memory deficits.  Continues to have numbness on the LUE and LLE.   Denies any other pain at this time.   Denies any-other complaints at this time.   Denies any fever, chills, body aches, cough, congestion or any other symptoms at this time.   Participating in therapy     Vital Signs Last 24 Hrs  T(C): 36.7 (06 Nov 2023 08:13), Max: 36.7 (06 Nov 2023 08:13)  T(F): 98 (06 Nov 2023 08:13), Max: 98 (06 Nov 2023 08:13)  HR: 52 (06 Nov 2023 08:13) (52 - 63)  BP: 152/88 (06 Nov 2023 08:13) (122/66 - 152/88)  RR: 16 (06 Nov 2023 08:13) (16 - 16)  SpO2: 98% (06 Nov 2023 08:13) (95% - 98%)    Parameters below as of 06 Nov 2023 08:13  Patient On (Oxygen Delivery Method): room air    Review of system     Neurological deficits     Physical Exam:  Constitutional - NAD, Comfortable  HEENT - NCAT, EOMI  Chest -breathing comfortably on RA  Cardio - warm and well perfused, + loop recorder.   Abdomen - Obese    Extremities - No peripheral edema, Long curling toenails.    Neurologic Exam:                    Cognitive -             Orientation: AA&Ox2-3 to self, year and location, not to month or day             Attention:   able to state days of week backward, difficulty with serial 7s            Memory: short term memory deficits      Speech - Fluent, Comprehensible, No dysarthria, No aphasia      Cranial Nerves - No facial asymmetry, Tongue midline, EOMI, Shoulder shrug intact. Vision Impaired on the Left upper visual field in L > R eye.      Motor -                      LEFT    UE - ShAB 4/5, EF 4-5/5, EE 4/5, WE 5/5,  WNL                     RIGHT UE - ShAB 5/5, EF 5/5, EE 5/5, WE 5/5,  WNL                    LEFT    LE - HF 5/5, KE 5/5, DF 5/5, PF 5/5                    RIGHT LE - HF 5/5, KE 5/5, DF 5/5, PF 5/5        Sensory - Impaired to LT on the left face (V2/3), arm and leg.      Reflexes - symmetric DTR +1 in LE and UE. Neg Babinski's b/l     Coordination - FTN / HTS intact b/l                        14.1   4.94  )-----------( 115      ( 06 Nov 2023 06:20 )             40.6     11-06    139  |  104  |  17  ----------------------------<  109<H>  4.2   |  29  |  0.93    Ca    9.7      06 Nov 2023 06:20    TPro  6.9  /  Alb  3.7  /  TBili  0.7  /  DBili  x   /  AST  24  /  ALT  43  /  AlkPhos  81  11-06      Urinalysis Basic - ( 06 Nov 2023 06:20 )    Color: x / Appearance: x / SG: x / pH: x  Gluc: 109 mg/dL / Ketone: x  / Bili: x / Urobili: x   Blood: x / Protein: x / Nitrite: x   Leuk Esterase: x / RBC: x / WBC x   Sq Epi: x / Non Sq Epi: x / Bacteria: x      CAPILLARY BLOOD GLUCOSE      POCT Blood Glucose.: 124 mg/dL (06 Nov 2023 08:15)  POCT Blood Glucose.: 108 mg/dL (05 Nov 2023 21:49)  POCT Blood Glucose.: 88 mg/dL (05 Nov 2023 16:48)  POCT Blood Glucose.: 171 mg/dL (05 Nov 2023 11:55)    MEDICATIONS  (STANDING):  apixaban 5 milliGRAM(s) Oral two times a day  atorvastatin 80 milliGRAM(s) Oral at bedtime  bisacodyl 5 milliGRAM(s) Oral at bedtime  citalopram 20 milliGRAM(s) Oral daily  dextrose 50% Injectable 25 Gram(s) IV Push once  famotidine    Tablet 20 milliGRAM(s) Oral daily  folic acid 1 milliGRAM(s) Oral daily  memantine 5 milliGRAM(s) Oral two times a day  metFORMIN 500 milliGRAM(s) Oral two times a day  multivitamin 1 Tablet(s) Oral daily  polyethylene glycol 3350 17 Gram(s) Oral daily  senna 2 Tablet(s) Oral at bedtime  thiamine 100 milliGRAM(s) Oral daily    MEDICATIONS  (PRN):         HPI:  Patient is a 59 y/o M with a PHx HTN, DM, stroke one year ago (at the time patient received Tenecteplase and had full resolution of symptoms), who presented to [facility] on 10/12/23 following sudden fall at work and lost consciousness. NIHSS 10 on admission. Admitted under NeuroICU on 10/12. Imaging revealed ischemia in the posterior circulation, CT angiogram head/neck showed right PCA P1-2 cutoff, possibly chronic right MCA M1 cutoff which had distal reconstitution and established collaterals, and left PCA occlusion which patient was subsequently transferred for mechanical thrombectomy.    Cerebral angiogram and mechanical thrombectomy performed using aspiration with TICI 3 recanalization of left PCA. The right occipital lobe filled from right SHANI collaterals, and the right MCA as well was thought to be chronic given noted collateralization. MR with small to moderate left temporal occipital lobe infarctions. Occluded right MCA, occluded right PCA and right vertebral artery proximal segment. The patient was started on a hep gtt given extensive atherosclerotic disease which was switched to apixaban 5mg BID with recommendations for a three month course.    TTE with normal left ventricular internal cavity size. Left ventricular ejection fractio 50 to 55%. Per Neuro, no need for ORCIO at this time. Pt had an episode of bradycardia during his stay and there were concern for 2:1 block. ILR placed 10/20 and no indication for PPM at this time.     Patient was evaluated by PM&R and therapy for functional deficits, gait/ADL impairments and acute rehabilitation was recommended. Patient was medically optimized for discharge to [facility] IRF on 10/27/23.       Subjective:    Patient seen and examined at bedside, sitting in WC. NAD.   Denies dizziness. Unable to recall if he slept well or if he completed scheduled 7am OT session.   He continues to have the short term memory deficits.  slept through the night as per nursing.   Continues to have numbness on the LUE and LLE.   Denies any other pain at this time.   Denies any-other complaints at this time.   Denies any fever, chills, body aches, cough, congestion or any other symptoms at this time.   Participating in therapy     Vital Signs Last 24 Hrs  T(C): 36.7 (06 Nov 2023 08:13), Max: 36.7 (06 Nov 2023 08:13)  T(F): 98 (06 Nov 2023 08:13), Max: 98 (06 Nov 2023 08:13)  HR: 52 (06 Nov 2023 08:13) (52 - 63)  BP: 152/88 (06 Nov 2023 08:13) (122/66 - 152/88)  RR: 16 (06 Nov 2023 08:13) (16 - 16)  SpO2: 98% (06 Nov 2023 08:13) (95% - 98%)    Parameters below as of 06 Nov 2023 08:13  Patient On (Oxygen Delivery Method): room air    Review of system --last bm 11/3 as per nursing     Neurological deficits     Physical Exam:  Constitutional - NAD, Comfortable  HEENT - NCAT, EOMI  Chest -breathing comfortably on RA  Cardio - warm and well perfused, + loop recorder.   Abdomen - Obese    Extremities - No peripheral edema, Long curling toenails.    Neurologic Exam:                    Cognitive -             Orientation: AA&Ox2-3 to self, year and location, not to month or day             Attention:   able to state days of week backward, difficulty with serial 7s            Memory: short term memory deficits      Speech - Fluent, Comprehensible, No dysarthria, No aphasia      Cranial Nerves - No facial asymmetry, Tongue midline, EOMI, Shoulder shrug intact. Vision Impaired on the Left upper visual field in L > R eye.      Motor -                      LEFT    UE - ShAB 4/5, EF 4-5/5, EE 4/5, WE 5/5,  WNL                     RIGHT UE - ShAB 5/5, EF 5/5, EE 5/5, WE 5/5,  WNL                    LEFT    LE - HF 5/5, KE 5/5, DF 5/5, PF 5/5                    RIGHT LE - HF 5/5, KE 5/5, DF 5/5, PF 5/5        Sensory - Impaired to LT on the left face (V2/3), arm and leg.      Reflexes - symmetric DTR +1 in LE and UE. Neg Babinski's b/l     Coordination - FTN / HTS intact b/l                        14.1   4.94  )-----------( 115      ( 06 Nov 2023 06:20 )             40.6     11-06    139  |  104  |  17  ----------------------------<  109<H>  4.2   |  29  |  0.93    Ca    9.7      06 Nov 2023 06:20    TPro  6.9  /  Alb  3.7  /  TBili  0.7  /  DBili  x   /  AST  24  /  ALT  43  /  AlkPhos  81  11-06      Urinalysis Basic - ( 06 Nov 2023 06:20 )    Color: x / Appearance: x / SG: x / pH: x  Gluc: 109 mg/dL / Ketone: x  / Bili: x / Urobili: x   Blood: x / Protein: x / Nitrite: x   Leuk Esterase: x / RBC: x / WBC x   Sq Epi: x / Non Sq Epi: x / Bacteria: x      CAPILLARY BLOOD GLUCOSE      POCT Blood Glucose.: 124 mg/dL (06 Nov 2023 08:15)  POCT Blood Glucose.: 108 mg/dL (05 Nov 2023 21:49)  POCT Blood Glucose.: 88 mg/dL (05 Nov 2023 16:48)  POCT Blood Glucose.: 171 mg/dL (05 Nov 2023 11:55)    MEDICATIONS  (STANDING):  apixaban 5 milliGRAM(s) Oral two times a day  atorvastatin 80 milliGRAM(s) Oral at bedtime  bisacodyl 5 milliGRAM(s) Oral at bedtime  citalopram 20 milliGRAM(s) Oral daily  dextrose 50% Injectable 25 Gram(s) IV Push once  famotidine    Tablet 20 milliGRAM(s) Oral daily  folic acid 1 milliGRAM(s) Oral daily  memantine 5 milliGRAM(s) Oral two times a day  metFORMIN 500 milliGRAM(s) Oral two times a day  multivitamin 1 Tablet(s) Oral daily  polyethylene glycol 3350 17 Gram(s) Oral daily  senna 2 Tablet(s) Oral at bedtime  thiamine 100 milliGRAM(s) Oral daily    MEDICATIONS  (PRN):

## 2023-11-06 NOTE — PROGRESS NOTE ADULT - ASSESSMENT
59 y/o M with a Hx of stroke one year ago (at the time patient received TNK and had full  resolution of symptoms), HTN, DM, non-compliant with medications, who presented following an episode of LOC, as per family at around 6:15 pm patient had a sudden fall at work and lost consciousness. It is unclear if patient had any symptoms before he fell. NIHSS 10 on admission, admitted under NeuroICU on 10/12: Thrombectomy via L radial artery for TICI 3 revascularization of L P1-P2 occlusion. Noted LMCA chronic occlusion., on 0/13: Neurologic status improving. Got MR. Started on ASA and SQL, on 10/14: quadrantanopia now on L superior (originally R); Repeat stroke imaging completed and initial concern for CTP showing poss new perfusion deficit however on further review noted to be present on admission. Started on heparin drip per neurology recommendations, on 10/15: Therapeutic on heparin drip, stability HCT obtained. Cardiology consulted for further stroke work-up and downgraded under medicine- pt/ot/dvt ppx      #Acute CVA  #Occluded R MCA/PCA, s/p thrombectomy   -TTE shows with  Normal left ventricular internal cavity size,  3. Left ventricular ejection fraction, by visual estimation, is 50 to 55%.  -DVT studies negative  -continue Eliquis. Needs for 3 months.    -c/w Lipitor  -outpatient neuro IR follow up.    # Bradycardia  -TTE EF 50-55%, no other structural abnormalities  - No indication for pacemaker  - Metoprolol 12.5mg BID has been discontinued   - per electrophysiology notes, no indication for pacemaker, s/p ILR on 10/20  - needs EP follow up as an outpatient.    #intermittent Dizziness   -Encouraged fluid intake --improved today after additional fluid intake    #ETOH use disorder  -  in remission, stable.    #HTN  - No meds at home  - did not tolerate lisinopril 5mg during hospitalization (intermittent hypotension)  - Hydralazine 25 mg PRN if SBP > 170    #HLD  c/w Lipitor    #DM 2  non compliant with meds at home  metFORMIN   A1C 8.6  d/c Accu checks and ISS 11/6/23    #Elevated TSH  T4 level normal  outpt f/u    DVT Prophylaxis - Eliquis.     will follow  d/w rehab team

## 2023-11-06 NOTE — PROGRESS NOTE ADULT - ATTENDING COMMENTS
Pt. seen with resident.  Agree with documentation above as per resident with amendments made as appropriate. Patient medically stable. Making progress towards rehab goals.       left PCA infarct s/p thrombectomy,  right MCA and PCA occlusions  -- BP stable.   cont. to monitor    cont. memantine for memory    d/c plan to [facility] if pt. gets acceptance.

## 2023-11-06 NOTE — PROGRESS NOTE ADULT - ASSESSMENT
Patient is a 59 y/o M with a PHx HTN, DM, CVA sp tnK with full resolution symptoms, who presented to [facility] on 10/12 due to sudden fall at work and lost consciousness.  MRI brain showed left temporal occipital lobe infarctions and acute punctate left occipital lobe infarction now s/p thrombectomy and Right MCA/PCA oclusion with left sided weakness, sensory impairment, left homonymous hemianopsia, gait and ADL impairments.     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 Continue for 3 months.    - Atorvastatin 80mg qhs.   - C/w Memantine 5mg BID for cognitive deficits - 11/3.   - begin comprehensive rehab program OT, PT, SLP  3 hours daily 5 x week  - neuropsychology evaluation and support, recreation therapy  - Precautions: cardiac, DM, respiratory, breast CA, fall, aspiration.  - Improving in therapies     #Dizziness   -11/1 reports of increased dizziness c/f dehydration vs decrease in cerebral perfusion pressure vs bradycardia   -Encouraged fluid intake --improved today after additional fluid intake\  --No dizziness today -11/6     # Bradycardia  - No indication for pacemaker  - Metoprolol 12.5mg BID has been discontinued   - per electrophysiology notes, no indication for pacemaker, s/p ILR on 10/20  - needs EP follow up as an outpatient.    # HTN  - No medication at home and patient is sensitive to HTN medication. Was previously started on lisinopril.   - Can treat with hydralazine 25mg PRN for >170.   - did not tolerate lisinopril 5mg during hospitalization (intermittent hypotension)  - Dr. Curry suggests 10 point decrease in BP parameters based on clinical exam  - Current parameters -150 over weekend, titrate 120-140 as tolerated  --check manual BPs only--nursing order written     # HLD  - Atorvastatin 80 mg daily    # DM 2  - A1C 8.6  - Accu checks and ISS    # ETOH abuse  - Not in active withdrawal.  - Continue MVI, thiamine and folic acid    # Pain management  - Tylenol PRN    # GI/Bowel:  - Senna QHS, Miralax Daily  - dulcolax PO   - GI ppx: Protonix    # /Bladder:   -Continent    # FEN   - Diet: DASH DIET     # DVT ppx  - Eliquis 5mg BID    IDT: 10/31  TDD:  11/11 to home.  d/c plan to [facility] ASAP   RN: continent with bowel and continent with bladder   SW: Lives in Apartment with his daughter. On the main floor. He was independent before. Sister is supportive  SP: Regular with thin. Mild cognitive deficits-- . Recall and short term memory deficits, reasoning, attention, decreased mental flexibility.  OT: Setup - eating. CG - grooming, toileting, toilet transfers and LBD. Supervision for UBD. Max assist for foot wear.   PT : CG - transfer. 75 feet with RW and CG. 8 steps with 2 hand rail with CG.   Goals:1.  Supervision for toileting.   2.  Supervision for ambulation.   3. Recall salient information with external aides    Addendum - Spoke to Lety about discharge planning. No-one is able to support at home at this time.     Outpatient Follow-up (Specialty/Name of physician):    Rj Delgado  Cardiac Electrophysiology  [address] [address]  Phone: [phone]  Fax: [phone]  Follow Up Time:     Wily Barkley  Neurology  [address] [place] [address]  Phone: [phone]  Fax: [phone]  Follow Up Time: 1 week    Denzel Zapata  Interventional Cardiology  [address] [address]  Phone: [phone]  Fax: [phone]  Follow Up Time: 1 week    PCP,   Phone: (   )    -  Fax: (   )    -  Follow Up Time: 1 week    Arsenio Goodrich  Interventional Neuroradiology  [address] [place] [address]  Phone: [phone]  Fax: [phone]   Patient is a 57 y/o M with a PHx HTN, DM, CVA sp tnK with full resolution symptoms, who presented to [facility] on 10/12 due to sudden fall at work and lost consciousness.  MRI brain showed left temporal occipital lobe infarctions and acute punctate left occipital lobe infarction now s/p thrombectomy and Right MCA/PCA oclusion with left sided weakness, sensory impairment, left homonymous hemianopsia, gait and ADL impairments.     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 Continue for 3 months.    - Atorvastatin 80mg qhs.   - C/w Memantine 5mg BID for cognitive deficits - 11/3.   -cont comprehensive rehab program OT, PT, SLP  3 hours daily 5 x week  - neuropsychology evaluation and support, recreation therapy  - Precautions: cardiac, DM, respiratory, breast CA, fall, aspiration.  - Improving in therapies     #Dizziness   -11/1 reports of increased dizziness c/f dehydration vs decrease in cerebral perfusion pressure vs bradycardia   -Encouraged fluid intake --improved today after additional fluid intake  --No dizziness today -11/6     # Bradycardia  - No indication for pacemaker  - Metoprolol 12.5mg BID has been discontinued   - per electrophysiology notes, no indication for pacemaker, s/p ILR on 10/20  - needs EP follow up as an outpatient.    # HTN  - No medication at home and patient is sensitive to HTN medication. Was previously started on lisinopril.   - Can treat with hydralazine 25mg PRN for >170.   - did not tolerate lisinopril 5mg during hospitalization (intermittent hypotension)  - Dr. Curry suggests 10 point decrease in BP parameters based on clinical exam  - Current parameters -150 over weekend, titrate 120-140 as tolerated  --check manual BPs only--nursing order written     # HLD  - Atorvastatin 80 mg daily    # DM 2  - A1C 8.6  - Accu checks and ISS    # ETOH abuse  - Not in active withdrawal.  - Continue MVI, thiamine and folic acid    # Pain management  - Tylenol PRN    # GI/Bowel:  - Senna QHS, Miralax Daily  - dulcolax PO   - GI ppx: Protonix    # /Bladder:   -Continent    # FEN   - Diet: DASH DIET     # DVT ppx  - Eliquis 5mg BID    IDT: 10/31  TDD:  11/11 to home.  d/c plan to [facility] ASAP   RN: continent with bowel and continent with bladder   SW: Lives in Apartment with his daughter. On the main floor. He was independent before. Sister is supportive  SP: Regular with thin. Mild cognitive deficits-- . Recall and short term memory deficits, reasoning, attention, decreased mental flexibility.  OT: Setup - eating. CG - grooming, toileting, toilet transfers and LBD. Supervision for UBD. Max assist for foot wear.   PT : CG - transfer. 75 feet with RW and CG. 8 steps with 2 hand rail with CG.   Goals:1.  Supervision for toileting.   2.  Supervision for ambulation.   3. Recall salient information with external aides    Addendum - Spoke to Lety about discharge planning. No-one is able to support at home at this time.     Outpatient Follow-up (Specialty/Name of physician):    Rj Delgado  Cardiac Electrophysiology  [address] [address]  Phone: [phone]  Fax: [phone]  Follow Up Time:     Wily Barkley  Neurology  [address] [place] [address]  Phone: [phone]  Fax: [phone]  Follow Up Time: 1 week    Denzel Zapata  Interventional Cardiology  [address] [address]  Phone: [phone]  Fax: [phone]  Follow Up Time: 1 week    PCP,   Phone: (   )    -  Fax: (   )    -  Follow Up Time: 1 week    Arseino Goodrich  Interventional Neuroradiology  [address] [place] [address]  Phone: [phone]  Fax: [phone]

## 2023-11-06 NOTE — PROGRESS NOTE ADULT - SUBJECTIVE AND OBJECTIVE BOX
58y old  male who presents with a chief complaint of CVA and left hemiparesis       Patient seen and examined. No acute events overnight.   no new complaints, no n/v, no sob, no pain     Vital Signs Last 24 Hrs  T(C): 36.7 (06 Nov 2023 08:13), Max: 36.7 (06 Nov 2023 08:13)  T(F): 98 (06 Nov 2023 08:13), Max: 98 (06 Nov 2023 08:13)  HR: 52 (06 Nov 2023 08:13) (52 - 63)  BP: 152/88 (06 Nov 2023 08:13) (122/66 - 152/88)  BP(mean): --  RR: 16 (06 Nov 2023 08:13) (16 - 16)  SpO2: 98% (06 Nov 2023 08:13) (95% - 98%)    Parameters below as of 06 Nov 2023 08:13  Patient On (Oxygen Delivery Method): room air    GENERAL- NAD  EAR/NOSE/MOUTH/THROAT -  MMM  EYES- NIKO, conjunctiva and Sclera clear  NECK- supple  RESPIRATORY-  clear to auscultation bilaterally, non laboured breathing  CARDIOVASCULAR - SIS2, RRR  GI - soft NT BS present  EXTREMITIES- no pedal edema  NEUROLOGY- left sided weakness  PSYCHIATRY- AAO X 3                14.1                 139  | 29   | 17           4.94  >-----------< 115     ------------------------< 109                   40.6                 4.2  | 104  | 0.93                                         Ca 9.7   Mg x     Ph x        CAPILLARY BLOOD GLUCOSE      POCT Blood Glucose.: 124 mg/dL (06 Nov 2023 08:15)  POCT Blood Glucose.: 108 mg/dL (05 Nov 2023 21:49)  POCT Blood Glucose.: 88 mg/dL (05 Nov 2023 16:48)  POCT Blood Glucose.: 171 mg/dL (05 Nov 2023 11:55)    MEDICATIONS  (STANDING):  apixaban 5 milliGRAM(s) Oral two times a day  atorvastatin 80 milliGRAM(s) Oral at bedtime  bisacodyl 5 milliGRAM(s) Oral at bedtime  citalopram 20 milliGRAM(s) Oral daily  dextrose 50% Injectable 25 Gram(s) IV Push once  famotidine    Tablet 20 milliGRAM(s) Oral daily  folic acid 1 milliGRAM(s) Oral daily  insulin lispro (ADMELOG) corrective regimen sliding scale   SubCutaneous Before meals and at bedtime  memantine 5 milliGRAM(s) Oral two times a day  metFORMIN 500 milliGRAM(s) Oral two times a day  multivitamin 1 Tablet(s) Oral daily  polyethylene glycol 3350 17 Gram(s) Oral daily  senna 2 Tablet(s) Oral at bedtime  thiamine 100 milliGRAM(s) Oral daily    MEDICATIONS  (PRN):

## 2023-11-07 PROCEDURE — 99232 SBSQ HOSP IP/OBS MODERATE 35: CPT

## 2023-11-07 PROCEDURE — 93010 ELECTROCARDIOGRAM REPORT: CPT

## 2023-11-07 RX ORDER — DONEPEZIL HYDROCHLORIDE 10 MG/1
5 TABLET, FILM COATED ORAL
Refills: 0 | Status: DISCONTINUED | OUTPATIENT
Start: 2023-11-07 | End: 2023-11-13

## 2023-11-07 RX ADMIN — MEMANTINE HYDROCHLORIDE 5 MILLIGRAM(S): 10 TABLET ORAL at 17:12

## 2023-11-07 RX ADMIN — MEMANTINE HYDROCHLORIDE 5 MILLIGRAM(S): 10 TABLET ORAL at 06:02

## 2023-11-07 RX ADMIN — SENNA PLUS 2 TABLET(S): 8.6 TABLET ORAL at 21:27

## 2023-11-07 RX ADMIN — CITALOPRAM 20 MILLIGRAM(S): 10 TABLET, FILM COATED ORAL at 13:07

## 2023-11-07 RX ADMIN — POLYETHYLENE GLYCOL 3350 17 GRAM(S): 17 POWDER, FOR SOLUTION ORAL at 13:06

## 2023-11-07 RX ADMIN — APIXABAN 5 MILLIGRAM(S): 2.5 TABLET, FILM COATED ORAL at 06:02

## 2023-11-07 RX ADMIN — APIXABAN 5 MILLIGRAM(S): 2.5 TABLET, FILM COATED ORAL at 17:12

## 2023-11-07 RX ADMIN — Medication 100 MILLIGRAM(S): at 13:07

## 2023-11-07 RX ADMIN — Medication 1 MILLIGRAM(S): at 13:07

## 2023-11-07 RX ADMIN — Medication 5 MILLIGRAM(S): at 21:27

## 2023-11-07 RX ADMIN — Medication 1 TABLET(S): at 13:07

## 2023-11-07 RX ADMIN — FAMOTIDINE 20 MILLIGRAM(S): 10 INJECTION INTRAVENOUS at 13:07

## 2023-11-07 RX ADMIN — ATORVASTATIN CALCIUM 80 MILLIGRAM(S): 80 TABLET, FILM COATED ORAL at 21:27

## 2023-11-07 RX ADMIN — DONEPEZIL HYDROCHLORIDE 5 MILLIGRAM(S): 10 TABLET, FILM COATED ORAL at 11:06

## 2023-11-07 RX ADMIN — METFORMIN HYDROCHLORIDE 500 MILLIGRAM(S): 850 TABLET ORAL at 17:12

## 2023-11-07 RX ADMIN — METFORMIN HYDROCHLORIDE 500 MILLIGRAM(S): 850 TABLET ORAL at 07:34

## 2023-11-07 NOTE — PROGRESS NOTE ADULT - ATTENDING COMMENTS
Pt. seen with resident.  Agree with documentation above as per resident with amendments made as appropriate. Patient medically stable. Making progress towards rehab goals.       Left PCA infarct, right MCA/PCA occlusion  Memory deficits  -10/13: MRI Brain  showing L PCA P2 occlusion    -cont. Memantine 5mg BID   -Start Donepezil 5mg in AM  11/7  -- Monitor HR as donepezil has slight risk of worsening bradycardia  -- Monitor QTc interval -- 405 on 10/31,   f/u EKG ordered.

## 2023-11-07 NOTE — PROGRESS NOTE ADULT - SUBJECTIVE AND OBJECTIVE BOX
HPI:  Patient is a 57 y/o M with a PHx HTN, DM, stroke one year ago (at the time patient received Tenecteplase and had full resolution of symptoms), who presented to [facility] on 10/12/23 following sudden fall at work and lost consciousness. NIHSS 10 on admission. Admitted under NeuroICU on 10/12. Imaging revealed ischemia in the posterior circulation, CT angiogram head/neck showed right PCA P1-2 cutoff, possibly chronic right MCA M1 cutoff which had distal reconstitution and established collaterals, and left PCA occlusion which patient was subsequently transferred for mechanical thrombectomy.    Cerebral angiogram and mechanical thrombectomy performed using aspiration with TICI 3 recanalization of left PCA. The right occipital lobe filled from right SHANI collaterals, and the right MCA as well was thought to be chronic given noted collateralization. MR with small to moderate left temporal occipital lobe infarctions. Occluded right MCA, occluded right PCA and right vertebral artery proximal segment. The patient was started on a hep gtt given extensive atherosclerotic disease which was switched to apixaban 5mg BID with recommendations for a three month course.    TTE with normal left ventricular internal cavity size. Left ventricular ejection fractio 50 to 55%. Per Neuro, no need for ROCIO at this time. Pt had an episode of bradycardia during his stay and there were concern for 2:1 block. ILR placed 10/20 and no indication for PPM at this time.     Patient was evaluated by PM&R and therapy for functional deficits, gait/ADL impairments and acute rehabilitation was recommended. Patient was medically optimized for discharge to [facility] IRF on 10/27/23.       Subjective:    No acute overnight events. Patient was seen and examined by bedside working with therapists. He states he still has blurry vision on the left. No new numbness/tingling/weakness. +Dizziness. Admits to continued short term memory deficits. Patient continues to need help with ambulating, transfers, UBD/LBD. SBP ranging 100-150, HR 50-60s. Afebrile.     Vital Signs Last 24 Hrs  T(C): 36.7 (06 Nov 2023 08:13), Max: 36.7 (06 Nov 2023 08:13)  T(F): 98 (06 Nov 2023 08:13), Max: 98 (06 Nov 2023 08:13)  HR: 52 (06 Nov 2023 08:13) (52 - 63)  BP: 152/88 (06 Nov 2023 08:13) (122/66 - 152/88)  RR: 16 (06 Nov 2023 08:13) (16 - 16)  SpO2: 98% (06 Nov 2023 08:13) (95% - 98%)    Parameters below as of 06 Nov 2023 08:13  Patient On (Oxygen Delivery Method): room air    Review of system --last bm 11/3 as per nursing     Neurological deficits     Physical Exam:  Constitutional - NAD, Comfortable  HEENT - NCAT, EOMI  Chest -breathing comfortably on RA  Cardio - warm and well perfused, + loop recorder.   Abdomen - Obese    Extremities - No peripheral edema, Long curling toenails.    Neurologic Exam:                    Cognitive -             Orientation: AA&Ox2-3 to self, year and location, not to month or day             Attention:   able to state days of week backward, difficulty with serial 7s            Memory: short term memory deficits      Speech - Fluent, Comprehensible, No dysarthria, No aphasia      Cranial Nerves - No facial asymmetry, Tongue midline, EOMI, Shoulder shrug intact. Vision Impaired on the Left upper visual field in L > R eye.      Motor -                      LEFT    UE - ShAB 4/5, EF 4-5/5, EE 4/5, WE 5/5,  WNL                     RIGHT UE - ShAB 5/5, EF 5/5, EE 5/5, WE 5/5,  WNL                    LEFT    LE - HF 5/5, KE 5/5, DF 5/5, PF 5/5                    RIGHT LE - HF 5/5, KE 5/5, DF 5/5, PF 5/5        Sensory - Impaired to LT on the left face (V2/3), arm and leg.      Reflexes - symmetric DTR +1 in LE and UE. Neg Babinski's b/l     Coordination - FTN / HTS intact b/l                        14.1   4.94  )-----------( 115      ( 06 Nov 2023 06:20 )             40.6     11-06    139  |  104  |  17  ----------------------------<  109<H>  4.2   |  29  |  0.93    Ca    9.7      06 Nov 2023 06:20    TPro  6.9  /  Alb  3.7  /  TBili  0.7  /  DBili  x   /  AST  24  /  ALT  43  /  AlkPhos  81  11-06      Urinalysis Basic - ( 06 Nov 2023 06:20 )    Color: x / Appearance: x / SG: x / pH: x  Gluc: 109 mg/dL / Ketone: x  / Bili: x / Urobili: x   Blood: x / Protein: x / Nitrite: x   Leuk Esterase: x / RBC: x / WBC x   Sq Epi: x / Non Sq Epi: x / Bacteria: x      CAPILLARY BLOOD GLUCOSE  POCT Blood Glucose.: 124 mg/dL (06 Nov 2023 08:15)  POCT Blood Glucose.: 108 mg/dL (05 Nov 2023 21:49)  POCT Blood Glucose.: 88 mg/dL (05 Nov 2023 16:48)  POCT Blood Glucose.: 171 mg/dL (05 Nov 2023 11:55)    MEDICATIONS  (STANDING):  apixaban 5 milliGRAM(s) Oral two times a day  atorvastatin 80 milliGRAM(s) Oral at bedtime  bisacodyl 5 milliGRAM(s) Oral at bedtime  citalopram 20 milliGRAM(s) Oral daily  dextrose 50% Injectable 25 Gram(s) IV Push once  famotidine    Tablet 20 milliGRAM(s) Oral daily  folic acid 1 milliGRAM(s) Oral daily  memantine 5 milliGRAM(s) Oral two times a day  metFORMIN 500 milliGRAM(s) Oral two times a day  multivitamin 1 Tablet(s) Oral daily  polyethylene glycol 3350 17 Gram(s) Oral daily  senna 2 Tablet(s) Oral at bedtime  thiamine 100 milliGRAM(s) Oral daily    MEDICATIONS  (PRN):    Assessment and Plan:   Patient is a 57 y/o M with a PHx HTN, DM, CVA sp tnK with full resolution symptoms, who presented to [facility] on 10/12 due to sudden fall at work and lost consciousness.  MRI brain showed left temporal occipital lobe infarctions and acute punctate left occipital lobe infarction now s/p thrombectomy and Right MCA/PCA oclusion with left sided weakness, sensory impairment, left homonymous hemianopsia, gait and ADL impairments.     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 Continue for 3 months.    - Atorvastatin 80mg qhs.   - C/w Memantine 5mg BID for cognitive deficits - 11/3.   -cont comprehensive rehab program OT, PT, SLP  3 hours daily 5 x week  - neuropsychology evaluation and support, recreation therapy  - Precautions: cardiac, DM, respiratory, breast CA, fall, aspiration.  - No new neurological deficits    #Memory deficits  -10/13: MRI Brain  showing L PCA P2 occlusion    -Memantine 5mg BID   -Plan to add Donepezil 5mg qhs after EKG 11/7      #Dizziness   -11/1 reports of increased dizziness c/f dehydration vs decrease in cerebral perfusion pressure vs bradycardia   -Encouraged fluid intake --improved today after additional fluid intake  -Continues to have dizziness 11/7    # Bradycardia  - No indication for pacemaker  - Metoprolol 12.5mg BID has been discontinued   - per electrophysiology notes, no indication for pacemaker, s/p ILR on 10/20  - needs EP follow up as an outpatient  - EKG to assess QTc and bradycardia 11/7    # HTN  - No medication at home and patient is sensitive to HTN medication. Was previously started on lisinopril.   - Can treat with hydralazine 25mg PRN for >170.   - did not tolerate lisinopril 5mg during hospitalization (intermittent hypotension)  - Dr. Curry suggests 10 point decrease in BP parameters based on clinical exam  - Current parameters -150 over weekend, titrate 120-140 as tolerated  - SBP ranging from 100-160 11/7    # HLD  - Atorvastatin 80 mg daily    # DM 2  - A1C 8.6  - Metformin   - Accu checks and ISS    # ETOH abuse  - Not in active withdrawal.  - Continue MVI, thiamine and folic acid    # Pain management  - No complaints     # GI/Bowel:  - Senna QHS, Miralax Daily  - dulcolax PO     # /Bladder:   -Continent    # FEN   - Diet: DASH DIET     # DVT ppx  - Eliquis 5mg BID    IDT: 10/31  TDD:  11/11 to home.  d/c plan to [facility] ASAP   RN: continent with bowel and continent with bladder   SW: Lives in Apartment with his daughter. On the main floor. He was independent before. Sister is supportive  SP: Regular with thin. Mild cognitive deficits-- . Recall and short term memory deficits, reasoning, attention, decreased mental flexibility.  OT: Setup - eating. CG - grooming, toileting, toilet transfers and LBD. Supervision for UBD. Max assist for foot wear.   PT : CG - transfer. 75 feet with RW and CG. 8 steps with 2 hand rail with CG.   Goals:1.  Supervision for toileting.   2.  Supervision for ambulation.   3. Recall salient information with external aides    Addendum - Spoke to Lety about discharge planning. No-one is able to support at home at this time.     Outpatient Follow-up (Specialty/Name of physician):    Rj Delgado  Cardiac Electrophysiology  [address] [address]  Phone: [phone]  Fax: [phone]  Follow Up Time:     Wily Barkley  Neurology  [address] [place] [address]  Phone: [phone]  Fax: [phone]  Follow Up Time: 1 week    Denzel Zapata  Interventional Cardiology  [address] [place] [address]  Phone: [phone]  Fax: [phone]  Follow Up Time: 1 week    PCP,   Phone: (   )    -  Fax: (   )    -  Follow Up Time: 1 week    Arsenio Goodrich  Interventional Neuroradiology  [address] [place] [address]  Phone: [phone]  Fax: [phone]       Nutritional Assessment:  · Nutritional Assessment	Diet, Consistent Carbohydrate/No Snacks:   DASH/TLC {Sodium & Cholesterol Restricted} (DASH)  Supplement Feeding Modality:  Oral  Glucerna Shake Cans or Servings Per Day:  1       Frequency:  Two Times a day (10-27-23 @ 14:29) [Active]    Attestation Statements:    Attestation Statements:  I have personally seen and examined the patient.  I fully participated in the care of this patient.  I have made amendments to the documentation where necessary, and agree with the history, physical exam, and plan as documented by the Resident.     Pt. seen with resident.  Agree with documentation above as per resident with amendments made as appropriate. Patient medically stable. Making progress towards rehab goals.       left PCA infarct s/p thrombectomy,  right MCA and PCA occlusions  -- BP stable.   cont. to monitor    cont. memantine for memory    d/c plan to [facility] if pt. gets acceptance.              HPI:  Patient is a 57 y/o M with a PHx HTN, DM, stroke one year ago (at the time patient received Tenecteplase and had full resolution of symptoms), who presented to [facility] on 10/12/23 following sudden fall at work and lost consciousness. NIHSS 10 on admission. Admitted under NeuroICU on 10/12. Imaging revealed ischemia in the posterior circulation, CT angiogram head/neck showed right PCA P1-2 cutoff, possibly chronic right MCA M1 cutoff which had distal reconstitution and established collaterals, and left PCA occlusion which patient was subsequently transferred for mechanical thrombectomy.    Cerebral angiogram and mechanical thrombectomy performed using aspiration with TICI 3 recanalization of left PCA. The right occipital lobe filled from right SHANI collaterals, and the right MCA as well was thought to be chronic given noted collateralization. MR with small to moderate left temporal occipital lobe infarctions. Occluded right MCA, occluded right PCA and right vertebral artery proximal segment. The patient was started on a hep gtt given extensive atherosclerotic disease which was switched to apixaban 5mg BID with recommendations for a three month course.    TTE with normal left ventricular internal cavity size. Left ventricular ejection fractio 50 to 55%. Per Neuro, no need for ROCIO at this time. Pt had an episode of bradycardia during his stay and there were concern for 2:1 block. ILR placed 10/20 and no indication for PPM at this time.     Patient was evaluated by PM&R and therapy for functional deficits, gait/ADL impairments and acute rehabilitation was recommended. Patient was medically optimized for discharge to [facility] IRF on 10/27/23.       Subjective:    No acute overnight events. Patient was seen and examined by bedside working with therapists. He states he still has visual field deficit on the left. No new numbness/tingling/weakness. +Slight Dizziness earlier,  able to participate in OT. Admits to continued short term memory deficits. Patient continues to need help with ambulating, transfers--CG, UBD/LBD--CS. SBP ranging 100-150, HR 50-60s. Afebrile.     Vital Signs Last 24 Hrs  T(C): 36.7 (06 Nov 2023 08:13), Max: 36.7 (06 Nov 2023 08:13)  T(F): 98 (06 Nov 2023 08:13), Max: 98 (06 Nov 2023 08:13)  HR: 52 (06 Nov 2023 08:13) (52 - 63)  BP: 152/88 (06 Nov 2023 08:13) (122/66 - 152/88)  RR: 16 (06 Nov 2023 08:13) (16 - 16)  SpO2: 98% (06 Nov 2023 08:13) (95% - 98%)    Parameters below as of 06 Nov 2023 08:13  Patient On (Oxygen Delivery Method): room air    Review of system --last bm 11/3 as per nursing     Neurological deficits     Physical Exam:  Constitutional - NAD, Comfortable  HEENT - NCAT, EOMI  Chest -breathing comfortably on RA  Cardio - warm and well perfused, + loop recorder.   Abdomen - Obese    Extremities - No peripheral edema, Long curling toenails.    Neurologic Exam:                    Cognitive -             Orientation: AA&Ox2-3 to self, year and location, not to month or day             Attention:   able to state days of week backward, difficulty with serial 7s            Memory: short term memory deficits      Speech - Fluent, Comprehensible, No dysarthria, No aphasia      Cranial Nerves - No facial asymmetry, Tongue midline, EOMI, Shoulder shrug intact. Vision Impaired on the Left upper visual field in L > R eye.      Motor -                      LEFT    UE - ShAB 4/5, EF 4-5/5, EE 4/5, WE 5/5,  WNL                     RIGHT UE - ShAB 5/5, EF 5/5, EE 5/5, WE 5/5,  WNL                    LEFT    LE - HF 5/5, KE 5/5, DF 5/5, PF 5/5                    RIGHT LE - HF 5/5, KE 5/5, DF 5/5, PF 5/5        Sensory - Impaired to LT on the left face (V2/3), arm and leg.      Reflexes - symmetric DTR +1 in LE and UE. Neg Babinski's b/l     Coordination - FTN / HTS intact b/l                        14.1   4.94  )-----------( 115      ( 06 Nov 2023 06:20 )             40.6     11-06    139  |  104  |  17  ----------------------------<  109<H>  4.2   |  29  |  0.93    Ca    9.7      06 Nov 2023 06:20    TPro  6.9  /  Alb  3.7  /  TBili  0.7  /  DBili  x   /  AST  24  /  ALT  43  /  AlkPhos  81  11-06      Urinalysis Basic - ( 06 Nov 2023 06:20 )    Color: x / Appearance: x / SG: x / pH: x  Gluc: 109 mg/dL / Ketone: x  / Bili: x / Urobili: x   Blood: x / Protein: x / Nitrite: x   Leuk Esterase: x / RBC: x / WBC x   Sq Epi: x / Non Sq Epi: x / Bacteria: x      CAPILLARY BLOOD GLUCOSE  POCT Blood Glucose.: 124 mg/dL (06 Nov 2023 08:15)  POCT Blood Glucose.: 108 mg/dL (05 Nov 2023 21:49)  POCT Blood Glucose.: 88 mg/dL (05 Nov 2023 16:48)  POCT Blood Glucose.: 171 mg/dL (05 Nov 2023 11:55)    MEDICATIONS  (STANDING):  apixaban 5 milliGRAM(s) Oral two times a day  atorvastatin 80 milliGRAM(s) Oral at bedtime  bisacodyl 5 milliGRAM(s) Oral at bedtime  citalopram 20 milliGRAM(s) Oral daily  dextrose 50% Injectable 25 Gram(s) IV Push once  famotidine    Tablet 20 milliGRAM(s) Oral daily  folic acid 1 milliGRAM(s) Oral daily  memantine 5 milliGRAM(s) Oral two times a day  metFORMIN 500 milliGRAM(s) Oral two times a day  multivitamin 1 Tablet(s) Oral daily  polyethylene glycol 3350 17 Gram(s) Oral daily  senna 2 Tablet(s) Oral at bedtime  thiamine 100 milliGRAM(s) Oral daily    MEDICATIONS  (PRN):    Assessment and Plan:   Patient is a 57 y/o M with a PHx HTN, DM, CVA sp tnK with full resolution symptoms, who presented to [facility] on 10/12 due to sudden fall at work and lost consciousness.  MRI brain showed left temporal occipital lobe infarctions and acute punctate left occipital lobe infarction now s/p thrombectomy and Right MCA/PCA oclusion with left sided weakness, sensory impairment, left homonymous hemianopsia, gait and ADL impairments.     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 Continue for 3 months.    - Atorvastatin 80mg qhs.   - C/w Memantine 5mg BID for cognitive deficits - 11/3.   -cont comprehensive rehab program OT, PT, SLP  3 hours daily 5 x week  - neuropsychology evaluation and support, recreation therapy  - Precautions: cardiac, DM, respiratory, breast CA, fall, aspiration.  - No new neurological deficits    #Memory deficits  -10/13: MRI Brain  showing L PCA P2 occlusion    -Memantine 5mg BID   -Start Donepezil 5mg in AM  11/7  -- Monitor HR as donepezil has slight risk of worsening bradycardia  -- Monitor QTc interval -- 405 on 10/31,   f/u EKG ordered.       # Bradycardia  - No indication for pacemaker  - Metoprolol 12.5mg BID has been discontinued   - per electrophysiology notes, no indication for pacemaker, s/p ILR on 10/20  - needs EP follow up as an outpatient  - EKG to assess QTc and bradycardia 11/7    # HTN  - No medication at home and patient is sensitive to HTN medication. Was previously started on lisinopril.   - Can treat with hydralazine 25mg PRN for >170.   - did not tolerate lisinopril 5mg during hospitalization (intermittent hypotension)  - Dr. Curry suggests 10 point decrease in BP parameters based on clinical exam  - Current parameters -150 over weekend, titrate 120-140 as tolerated  - SBP ranging from 100-160 11/7    # HLD  - Atorvastatin 80 mg daily    # DM 2  - A1C 8.6  - Metformin   - Accu checks and ISS    # ETOH abuse  - Not in active withdrawal.  - Continue MVI, thiamine and folic acid    # Pain management  - No complaints     # GI/Bowel:  - Senna QHS, Miralax Daily  - dulcolax PO     # /Bladder:   -Continent    # FEN   - Diet: DASH DIET     # DVT ppx  - Eliquis 5mg BID    IDT: 10/31  TDD:  11/11 to home.  d/c plan to [facility] ASAP   RN: continent with bowel and continent with bladder   SW: Lives in Apartment with his daughter. On the main floor. He was independent before. Sister is supportive  SP: Regular with thin. Mild cognitive deficits-- . Recall and short term memory deficits, reasoning, attention, decreased mental flexibility.  OT: Setup - eating. CG - grooming, toileting, toilet transfers and LBD. Supervision for UBD. Max assist for foot wear.   PT : CG - transfer. 75 feet with RW and CG. 8 steps with 2 hand rail with CG.   Goals:1.  Supervision for toileting.   2.  Supervision for ambulation.   3. Recall salient information with external aides    Addendum - Spoke to Lety about discharge planning. No-one is able to support at home at this time.     Outpatient Follow-up (Specialty/Name of physician):    Rj Delgado  Cardiac Electrophysiology  [address] [address]  Phone: [phone]  Fax: [phone]  Follow Up Time:     Wily Barkley  Neurology  [address] [place] [address]  Phone: [phone]  Fax: [phone]  Follow Up Time: 1 week    Denzel Zapata  Interventional Cardiology  [address] [address]  Phone: [phone]  Fax: [phone]  Follow Up Time: 1 week    PCP,   Phone: (   )    -  Fax: (   )    -  Follow Up Time: 1 week    Arsenio Goodrich  Interventional Neuroradiology  [address] [place] [address]  Phone: [phone]  Fax: [phone]       Nutritional Assessment:  · Nutritional Assessment	Diet, Consistent Carbohydrate/No Snacks:   DASH/TLC {Sodium & Cholesterol Restricted} (DASH)  Supplement Feeding Modality:  Oral  Glucerna Shake Cans or Servings Per Day:  1       Frequency:  Two Times a day (10-27-23 @ 14:29) [Active]         HPI:  Patient is a 59 y/o M with a PHx HTN, DM, stroke one year ago (at the time patient received Tenecteplase and had full resolution of symptoms), who presented to [facility] on 10/12/23 following sudden fall at work and lost consciousness. NIHSS 10 on admission. Admitted under NeuroICU on 10/12. Imaging revealed ischemia in the posterior circulation, CT angiogram head/neck showed right PCA P1-2 cutoff, possibly chronic right MCA M1 cutoff which had distal reconstitution and established collaterals, and left PCA occlusion which patient was subsequently transferred for mechanical thrombectomy.    Cerebral angiogram and mechanical thrombectomy performed using aspiration with TICI 3 recanalization of left PCA. The right occipital lobe filled from right SHANI collaterals, and the right MCA as well was thought to be chronic given noted collateralization. MR with small to moderate left temporal occipital lobe infarctions. Occluded right MCA, occluded right PCA and right vertebral artery proximal segment. The patient was started on a hep gtt given extensive atherosclerotic disease which was switched to apixaban 5mg BID with recommendations for a three month course.    TTE with normal left ventricular internal cavity size. Left ventricular ejection fractio 50 to 55%. Per Neuro, no need for ROCIO at this time. Pt had an episode of bradycardia during his stay and there were concern for 2:1 block. ILR placed 10/20 and no indication for PPM at this time.     Patient was evaluated by PM&R and therapy for functional deficits, gait/ADL impairments and acute rehabilitation was recommended. Patient was medically optimized for discharge to [facility] IRF on 10/27/23.       Subjective:    No acute overnight events. Patient was seen and examined by bedside working with therapists. He states he still has visual field deficit on the left. No new numbness/tingling/weakness. +Slight Dizziness earlier,  able to participate in OT. Admits to continued short term memory deficits. Patient continues to need help with ambulating, transfers--CG, UBD/LBD--CS. SBP ranging 100-150, HR 50-60s. Afebrile.     Vital Signs Last 24 Hrs  T(C): 36.7 (06 Nov 2023 08:13), Max: 36.7 (06 Nov 2023 08:13)  T(F): 98 (06 Nov 2023 08:13), Max: 98 (06 Nov 2023 08:13)  HR: 52 (06 Nov 2023 08:13) (52 - 63)  BP: 152/88 (06 Nov 2023 08:13) (122/66 - 152/88)  RR: 16 (06 Nov 2023 08:13) (16 - 16)  SpO2: 98% (06 Nov 2023 08:13) (95% - 98%)    Parameters below as of 06 Nov 2023 08:13  Patient On (Oxygen Delivery Method): room air    Review of system --last bm 11/3 as per nursing     Neurological deficits     Physical Exam:  Constitutional - NAD, Comfortable  HEENT - NCAT, EOMI  Chest -breathing comfortably on RA  Cardio - warm and well perfused, + loop recorder.   Abdomen - Obese    Extremities - No peripheral edema, Long curling toenails.    Neurologic Exam:                    Cognitive -             Orientation: AA&Ox2-3 to self, year and location, not to month or day             Attention:   able to state days of week backward, difficulty with serial 7s            Memory: short term memory deficits      Speech - Fluent, Comprehensible, No dysarthria, No aphasia      Cranial Nerves - No facial asymmetry, Tongue midline, EOMI, Shoulder shrug intact. Vision Impaired on the Left upper visual field in L > R eye.      Motor -                      LEFT    UE - ShAB 4/5, EF 4-5/5, EE 4/5, WE 5/5,  WNL                     RIGHT UE - ShAB 5/5, EF 5/5, EE 5/5, WE 5/5,  WNL                    LEFT    LE - HF 5/5, KE 5/5, DF 5/5, PF 5/5                    RIGHT LE - HF 5/5, KE 5/5, DF 5/5, PF 5/5        Sensory - Impaired to LT on the left face (V2/3), arm and leg.      Reflexes - symmetric DTR +1 in LE and UE. Neg Babinski's b/l     Coordination - FTN / HTS intact b/l                        14.1   4.94  )-----------( 115      ( 06 Nov 2023 06:20 )             40.6     11-06    139  |  104  |  17  ----------------------------<  109<H>  4.2   |  29  |  0.93    Ca    9.7      06 Nov 2023 06:20    TPro  6.9  /  Alb  3.7  /  TBili  0.7  /  DBili  x   /  AST  24  /  ALT  43  /  AlkPhos  81  11-06      Urinalysis Basic - ( 06 Nov 2023 06:20 )    Color: x / Appearance: x / SG: x / pH: x  Gluc: 109 mg/dL / Ketone: x  / Bili: x / Urobili: x   Blood: x / Protein: x / Nitrite: x   Leuk Esterase: x / RBC: x / WBC x   Sq Epi: x / Non Sq Epi: x / Bacteria: x      CAPILLARY BLOOD GLUCOSE  POCT Blood Glucose.: 124 mg/dL (06 Nov 2023 08:15)  POCT Blood Glucose.: 108 mg/dL (05 Nov 2023 21:49)  POCT Blood Glucose.: 88 mg/dL (05 Nov 2023 16:48)  POCT Blood Glucose.: 171 mg/dL (05 Nov 2023 11:55)    MEDICATIONS  (STANDING):  apixaban 5 milliGRAM(s) Oral two times a day  atorvastatin 80 milliGRAM(s) Oral at bedtime  bisacodyl 5 milliGRAM(s) Oral at bedtime  citalopram 20 milliGRAM(s) Oral daily  dextrose 50% Injectable 25 Gram(s) IV Push once  famotidine    Tablet 20 milliGRAM(s) Oral daily  folic acid 1 milliGRAM(s) Oral daily  memantine 5 milliGRAM(s) Oral two times a day  metFORMIN 500 milliGRAM(s) Oral two times a day  multivitamin 1 Tablet(s) Oral daily  polyethylene glycol 3350 17 Gram(s) Oral daily  senna 2 Tablet(s) Oral at bedtime  thiamine 100 milliGRAM(s) Oral daily    MEDICATIONS  (PRN):    Assessment and Plan:   Patient is a 59 y/o M with a PHx HTN, DM, CVA sp tnK with full resolution symptoms, who presented to [facility] on 10/12 due to sudden fall at work and lost consciousness.  MRI brain showed left temporal occipital lobe infarctions and acute punctate left occipital lobe infarction now s/p thrombectomy and Right MCA/PCA oclusion with left sided weakness, sensory impairment, left homonymous hemianopsia, gait and ADL impairments.     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 Continue for 3 months.    - Atorvastatin 80mg qhs.   - C/w Memantine 5mg BID for cognitive deficits - 11/3.   -cont comprehensive rehab program OT, PT, SLP  3 hours daily 5 x week  - neuropsychology evaluation and support, recreation therapy  - Precautions: cardiac, DM, respiratory, breast CA, fall, aspiration.  - No new neurological deficits    #Memory deficits  -10/13: MRI Brain  showing L PCA P2 occlusion    -Memantine 5mg BID   -Start Donepezil 5mg in AM  11/7  -- Monitor HR as donepezil has slight risk of worsening bradycardia  -- Monitor QTc interval -- 405 on 10/31,   f/u EKG ordered.       # Bradycardia  - No indication for pacemaker  - Metoprolol 12.5mg BID has been discontinued   - per electrophysiology notes, no indication for pacemaker, s/p ILR on 10/20  - needs EP follow up as an outpatient  - EKG to assess QTc and bradycardia 11/7    # HTN  - No medication at home and patient is sensitive to HTN medication. Was previously started on lisinopril.   - Can treat with hydralazine 25mg PRN for >170.   - did not tolerate lisinopril 5mg during hospitalization (intermittent hypotension)  - Dr. Curry suggests 10 point decrease in BP parameters based on clinical exam  - Current parameters -150 over weekend, titrate 120-140 as tolerated  - SBP ranging from 100-160 11/7    # HLD  - Atorvastatin 80 mg daily    # DM 2  - A1C 8.6  - Metformin   - Accu checks and ISS    # ETOH abuse  - Not in active withdrawal.  - Continue MVI, thiamine and folic acid    # Pain management  - No complaints     # GI/Bowel:  - Senna QHS, Miralax Daily  - dulcolax PO     # /Bladder:   -Continent    # FEN   - Diet: DASH DIET     # DVT ppx  - Eliquis 5mg BID    IDT: 11/7  TDD:  11/11 to home.  d/c plan to [facility] ASAP   RN: continent with bowel and continent with bladder   SW: Lives in Apartment with his daughter. On the main floor. He was independent before. Sister is supportive  SP: Regular with thin. Mild cognitive deficits-- . Recall and short term memory deficits, reasoning, attention, decreased mental flexibility.  Needs assistance with IADLs  OT: Setup - eating & UBD. CG - grooming, toileting, toilet transfers and CS-- LBD.   PT : Supervision - transfer. Ambulation 150 feet without AD and 12 steps with supervision.    Goals:1.  Supervision for toileting.   2.  Supervision for ambulation.   3. Recall salient information with external aides    Addendum - Spoke to Lety about discharge planning. No-one is able to support at home at this time.     Outpatient Follow-up (Specialty/Name of physician):    Rj Delgado  Cardiac Electrophysiology  [address] [address]  Phone: [phone]  Fax: [phone]  Follow Up Time:     Wily Barkley  Neurology  [address] [place] [address]  Phone: [phone]  Fax: [phone]  Follow Up Time: 1 week    Denzel Zapata  Interventional Cardiology  [address] [address]  Phone: [phone]  Fax: [phone]  Follow Up Time: 1 week    PCP,   Phone: (   )    -  Fax: (   )    -  Follow Up Time: 1 week    Arsenio Goodrich  Interventional Neuroradiology  [address] [place] [address]  Phone: [phone]  Fax: [phone]       Nutritional Assessment:  · Nutritional Assessment	Diet, Consistent Carbohydrate/No Snacks:   DASH/TLC {Sodium & Cholesterol Restricted} (DASH)  Supplement Feeding Modality:  Oral  Glucerna Shake Cans or Servings Per Day:  1       Frequency:  Two Times a day (10-27-23 @ 14:29) [Active]

## 2023-11-07 NOTE — PROGRESS NOTE ADULT - SUBJECTIVE AND OBJECTIVE BOX
58y old  male who presents with a chief complaint of CVA and left hemiparesis       Patient seen and examined. No acute events overnight.   no new complaints, no n/v, no sob, no pain     Vital Signs Last 24 Hrs  T(C): 36.7 (07 Nov 2023 09:46), Max: 36.7 (07 Nov 2023 09:46)  T(F): 98 (07 Nov 2023 09:46), Max: 98 (07 Nov 2023 09:46)  HR: 54 (07 Nov 2023 09:46) (54 - 54)  BP: 144/84 (06 Nov 2023 19:15) (144/84 - 144/84)  BP(mean): --  RR: 16 (06 Nov 2023 19:15) (16 - 16)  SpO2: 94% (06 Nov 2023 19:15) (94% - 94%)    Parameters below as of 06 Nov 2023 19:15  Patient On (Oxygen Delivery Method): room air    GENERAL- NAD  EAR/NOSE/MOUTH/THROAT -  MMM  EYES- NIKO, conjunctiva and Sclera clear  NECK- supple  RESPIRATORY-  clear to auscultation bilaterally, non laboured breathing  CARDIOVASCULAR - SIS2, RRR  GI - soft NT BS present  EXTREMITIES- no pedal edema  NEUROLOGY- left sided weakness  PSYCHIATRY- AAO X 3                         14.1                 139  | 29   | 17           4.94  >-----------< 115     ------------------------< 109                   40.6                 4.2  | 104  | 0.93                                         Ca 9.7   Mg x     Ph x          MEDICATIONS  (STANDING):  apixaban 5 milliGRAM(s) Oral two times a day  atorvastatin 80 milliGRAM(s) Oral at bedtime  bisacodyl 5 milliGRAM(s) Oral at bedtime  citalopram 20 milliGRAM(s) Oral daily  dextrose 50% Injectable 25 Gram(s) IV Push once  donepezil 5 milliGRAM(s) Oral with breakfast  famotidine    Tablet 20 milliGRAM(s) Oral daily  folic acid 1 milliGRAM(s) Oral daily  memantine 5 milliGRAM(s) Oral two times a day  metFORMIN 500 milliGRAM(s) Oral two times a day  multivitamin 1 Tablet(s) Oral daily  polyethylene glycol 3350 17 Gram(s) Oral daily  senna 2 Tablet(s) Oral at bedtime  thiamine 100 milliGRAM(s) Oral daily    MEDICATIONS  (PRN):

## 2023-11-08 PROCEDURE — 99232 SBSQ HOSP IP/OBS MODERATE 35: CPT

## 2023-11-08 RX ADMIN — APIXABAN 5 MILLIGRAM(S): 2.5 TABLET, FILM COATED ORAL at 17:22

## 2023-11-08 RX ADMIN — MEMANTINE HYDROCHLORIDE 5 MILLIGRAM(S): 10 TABLET ORAL at 05:42

## 2023-11-08 RX ADMIN — FAMOTIDINE 20 MILLIGRAM(S): 10 INJECTION INTRAVENOUS at 12:09

## 2023-11-08 RX ADMIN — MEMANTINE HYDROCHLORIDE 5 MILLIGRAM(S): 10 TABLET ORAL at 17:22

## 2023-11-08 RX ADMIN — DONEPEZIL HYDROCHLORIDE 5 MILLIGRAM(S): 10 TABLET, FILM COATED ORAL at 07:36

## 2023-11-08 RX ADMIN — ATORVASTATIN CALCIUM 80 MILLIGRAM(S): 80 TABLET, FILM COATED ORAL at 21:07

## 2023-11-08 RX ADMIN — METFORMIN HYDROCHLORIDE 500 MILLIGRAM(S): 850 TABLET ORAL at 17:22

## 2023-11-08 RX ADMIN — Medication 1 TABLET(S): at 12:10

## 2023-11-08 RX ADMIN — Medication 5 MILLIGRAM(S): at 21:08

## 2023-11-08 RX ADMIN — Medication 1 MILLIGRAM(S): at 12:10

## 2023-11-08 RX ADMIN — CITALOPRAM 20 MILLIGRAM(S): 10 TABLET, FILM COATED ORAL at 12:09

## 2023-11-08 RX ADMIN — APIXABAN 5 MILLIGRAM(S): 2.5 TABLET, FILM COATED ORAL at 05:42

## 2023-11-08 RX ADMIN — METFORMIN HYDROCHLORIDE 500 MILLIGRAM(S): 850 TABLET ORAL at 07:36

## 2023-11-08 RX ADMIN — Medication 100 MILLIGRAM(S): at 12:10

## 2023-11-08 RX ADMIN — SENNA PLUS 2 TABLET(S): 8.6 TABLET ORAL at 21:07

## 2023-11-08 NOTE — PROGRESS NOTE ADULT - SUBJECTIVE AND OBJECTIVE BOX
HPI:  Patient is a 57 y/o M with a PHx HTN, DM, stroke one year ago (at the time patient received Tenecteplase and had full resolution of symptoms), who presented to [facility] on 10/12/23 following sudden fall at work and lost consciousness. NIHSS 10 on admission. Admitted under NeuroICU on 10/12. Imaging revealed ischemia in the posterior circulation, CT angiogram head/neck showed right PCA P1-2 cutoff, possibly chronic right MCA M1 cutoff which had distal reconstitution and established collaterals, and left PCA occlusion which patient was subsequently transferred for mechanical thrombectomy.    Cerebral angiogram and mechanical thrombectomy performed using aspiration with TICI 3 recanalization of left PCA. The right occipital lobe filled from right SHANI collaterals, and the right MCA as well was thought to be chronic given noted collateralization. MR with small to moderate left temporal occipital lobe infarctions. Occluded right MCA, occluded right PCA and right vertebral artery proximal segment. The patient was started on a hep gtt given extensive atherosclerotic disease which was switched to apixaban 5mg BID with recommendations for a three month course.    TTE with normal left ventricular internal cavity size. Left ventricular ejection fractio 50 to 55%. Per Neuro, no need for ROCIO at this time. Pt had an episode of bradycardia during his stay and there were concern for 2:1 block. ILR placed 10/20 and no indication for PPM at this time.     Patient was evaluated by PM&R and therapy for functional deficits, gait/ADL impairments and acute rehabilitation was recommended. Patient was medically optimized for discharge to [facility] on 10/27/23.       Subjective:    No acute overnight events. Patient working hard in speech therapy. He still complains of memory difficulties and numbness/tingling in his left arm/leg. He also has decreased visual field on the left. Unchanged since stroke. No dizziness today. No CP/SOB/NV. No new neurological deficits. BP intermittently drops to 122 SBP, but on average is around 150s. Afebrile.     Vital Signs Last 24 Hrs  T(C): 36.7 (06 Nov 2023 08:13), Max: 36.7 (06 Nov 2023 08:13)  T(F): 98 (06 Nov 2023 08:13), Max: 98 (06 Nov 2023 08:13)  HR: 52 (06 Nov 2023 08:13) (52 - 63)  BP: 152/88 (06 Nov 2023 08:13) (122/66 - 152/88)  RR: 16 (06 Nov 2023 08:13) (16 - 16)  SpO2: 98% (06 Nov 2023 08:13) (95% - 98%)    Parameters below as of 06 Nov 2023 08:13  Patient On (Oxygen Delivery Method): room air    Review of system --last bm 11/3 as per nursing     Neurological deficits     Physical Exam:  Constitutional - NAD, Comfortable  HEENT - NCAT, EOMI  Chest -breathing comfortably on RA  Cardio - warm and well perfused, + loop recorder.   Abdomen - Obese    Extremities - No peripheral edema, Long curling toenails.    Neurologic Exam:                    Cognitive -             Orientation: AA&Ox2-3 to self, year and location, not to month or day             Attention:   able to state days of week backward, difficulty with serial 7s            Memory: short term memory deficits      Speech - Fluent, Comprehensible, No dysarthria, No aphasia      Cranial Nerves - No facial asymmetry, Tongue midline, EOMI, Shoulder shrug intact. Vision Impaired on the Left upper visual field in L > R eye.      Motor -                      LEFT    UE - ShAB 4/5, EF 4-5/5, EE 4/5, WE 5/5,  WNL                     RIGHT UE - ShAB 5/5, EF 5/5, EE 5/5, WE 5/5,  WNL                    LEFT    LE - HF 5/5, KE 5/5, DF 5/5, PF 5/5                    RIGHT LE - HF 5/5, KE 5/5, DF 5/5, PF 5/5        Sensory - Impaired to LT on the left face (V2/3), arm and leg.      Reflexes - symmetric DTR +1 in LE and UE. Neg Babinski's b/l     Coordination - FTN / HTS intact b/l                        14.1   4.94  )-----------( 115      ( 06 Nov 2023 06:20 )             40.6     11-06    139  |  104  |  17  ----------------------------<  109<H>  4.2   |  29  |  0.93    Ca    9.7      06 Nov 2023 06:20    TPro  6.9  /  Alb  3.7  /  TBili  0.7  /  DBili  x   /  AST  24  /  ALT  43  /  AlkPhos  81  11-06      Urinalysis Basic - ( 06 Nov 2023 06:20 )    Color: x / Appearance: x / SG: x / pH: x  Gluc: 109 mg/dL / Ketone: x  / Bili: x / Urobili: x   Blood: x / Protein: x / Nitrite: x   Leuk Esterase: x / RBC: x / WBC x   Sq Epi: x / Non Sq Epi: x / Bacteria: x      CAPILLARY BLOOD GLUCOSE  POCT Blood Glucose.: 124 mg/dL (06 Nov 2023 08:15)  POCT Blood Glucose.: 108 mg/dL (05 Nov 2023 21:49)  POCT Blood Glucose.: 88 mg/dL (05 Nov 2023 16:48)  POCT Blood Glucose.: 171 mg/dL (05 Nov 2023 11:55)    MEDICATIONS  (STANDING):  apixaban 5 milliGRAM(s) Oral two times a day  atorvastatin 80 milliGRAM(s) Oral at bedtime  bisacodyl 5 milliGRAM(s) Oral at bedtime  citalopram 20 milliGRAM(s) Oral daily  dextrose 50% Injectable 25 Gram(s) IV Push once  famotidine    Tablet 20 milliGRAM(s) Oral daily  folic acid 1 milliGRAM(s) Oral daily  memantine 5 milliGRAM(s) Oral two times a day  metFORMIN 500 milliGRAM(s) Oral two times a day  multivitamin 1 Tablet(s) Oral daily  polyethylene glycol 3350 17 Gram(s) Oral daily  senna 2 Tablet(s) Oral at bedtime  thiamine 100 milliGRAM(s) Oral daily    MEDICATIONS  (PRN):    Assessment and Plan:   Patient is a 57 y/o M with a PHx HTN, DM, CVA sp tnK with full resolution symptoms, who presented to [facility] on 10/12 due to sudden fall at work and lost consciousness.  MRI brain showed left temporal occipital lobe infarctions and acute punctate left occipital lobe infarction now s/p thrombectomy and Right MCA/PCA oclusion with left sided weakness, sensory impairment, left homonymous hemianopsia, gait and ADL impairments.     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 Continue for 3 months.    - Atorvastatin 80mg qhs.   - C/w Memantine 5mg BID for cognitive deficits - 11/3.   -cont comprehensive rehab program OT, PT, SLP  3 hours daily 5 x week  - neuropsychology evaluation and support, recreation therapy  - Precautions: cardiac, DM, respiratory, breast CA, fall, aspiration.  - No new neurological deficits    #Memory deficits  -10/13: MRI Brain  showing L PCA P2 occlusion    -Memantine 5mg BID   -Start Donepezil 5mg in AM  11/7  -Monitor HR as donepezil has slight risk of worsening bradycardia  -Monitor QTc interval -- 405 on 10/3  -QTc 460 on 11/7   - Order EKG on 11/10     # Bradycardia  - No indication for pacemaker  - Metoprolol 12.5mg BID has been discontinued   - per electrophysiology notes, no indication for pacemaker, s/p ILR on 10/20  - needs EP follow up as an outpatient  - HR 64bpm on 11/7     # HTN  - No medication at home and patient is sensitive to HTN medication. Was previously started on lisinopril.   - Can treat with hydralazine 25mg PRN for >170.   - did not tolerate lisinopril 5mg during hospitalization (intermittent hypotension)  - Dr. Curry suggests 10 point decrease in BP parameters based on clinical exam  - Current parameters -150 over weekend, titrate 120-140 as tolerated  - SBP goal 140-150s     # HLD  - Atorvastatin 80 mg daily    # DM 2  - A1C 8.6  - Metformin   - Accu checks and ISS    # ETOH abuse  - Not in active withdrawal.  - Continue MVI, thiamine and folic acid    # Pain management  - No complaints     # GI/Bowel:  - Senna QHS, Miralax Daily  - dulcolax PO     # /Bladder:   -Continent    # FEN   - Diet: DASH DIET     # DVT ppx  - Eliquis 5mg BID    IDT: 11/7   TDD:  11/11 to home.  d/c plan to [facility] ASAP   RN: continent with bowel and continent with bladder   SW: Lives in Apartment with his daughter. On the main floor. He was independent before. Sister is supportive  SP: Regular with thin. Mild cognitive deficits-- . Recall and short term memory deficits, reasoning, attention, decreased mental flexibility.  Needs assistance with IADLs  OT: Setup - eating & UBD. CG - grooming, toileting, toilet transfers and CS-- LBD.   PT : Supervision - transfer. Ambulation 150 feet without AD and 12 steps with supervision.    Goals:1.  Supervision for toileting.   2.  Supervision for ambulation.   3. Recall salient information with external aides    11/8 - SW working on [facility] placement, discussed with sister     Addendum - Spoke to Lety about discharge planning. No-one is able to support at home at this time.     Outpatient Follow-up (Specialty/Name of physician):    Rj Delgado  Cardiac Electrophysiology  [address] [address]  Phone: [phone]  Fax: [phone]  Follow Up Time:     Wily Barkley  Neurology  [address] [place] [address]  Phone: [phone]  Fax: [phone]  Follow Up Time: 1 week    Denzel Zapata  Interventional Cardiology  [address] [address]  Phone: [phone]  Fax: [phone]  Follow Up Time: 1 week    PCP,   Phone: (   )    -  Fax: (   )    -  Follow Up Time: 1 week    Arsenio Goodrich  Interventional Neuroradiology  [address] [place] [address]  Phone: [phone]  Fax: [phone]       Nutritional Assessment:  · Nutritional Assessment	Diet, Consistent Carbohydrate/No Snacks:   DASH/TLC {Sodium & Cholesterol Restricted} (DASH)  Supplement Feeding Modality:  Oral  Glucerna Shake Cans or Servings Per Day:  1       Frequency:  Two Times a day (10-27-23 @ 14:29) [Active]         HPI:  Patient is a 57 y/o M with a PHx HTN, DM, stroke one year ago (at the time patient received Tenecteplase and had full resolution of symptoms), who presented to [facility] on 10/12/23 following sudden fall at work and lost consciousness. NIHSS 10 on admission. Admitted under NeuroICU on 10/12. Imaging revealed ischemia in the posterior circulation, CT angiogram head/neck showed right PCA P1-2 cutoff, possibly chronic right MCA M1 cutoff which had distal reconstitution and established collaterals, and left PCA occlusion which patient was subsequently transferred for mechanical thrombectomy.    Cerebral angiogram and mechanical thrombectomy performed using aspiration with TICI 3 recanalization of left PCA. The right occipital lobe filled from right SHANI collaterals, and the right MCA as well was thought to be chronic given noted collateralization. MR with small to moderate left temporal occipital lobe infarctions. Occluded right MCA, occluded right PCA and right vertebral artery proximal segment. The patient was started on a hep gtt given extensive atherosclerotic disease which was switched to apixaban 5mg BID with recommendations for a three month course.    TTE with normal left ventricular internal cavity size. Left ventricular ejection fractio 50 to 55%. Per Neuro, no need for ROCIO at this time. Pt had an episode of bradycardia during his stay and there were concern for 2:1 block. ILR placed 10/20 and no indication for PPM at this time.     Patient was evaluated by PM&R and therapy for functional deficits, gait/ADL impairments and acute rehabilitation was recommended. Patient was medically optimized for discharge to [facility] on 10/27/23.       Subjective:    No acute overnight events. Patient working hard in speech therapy. He still complains of memory difficulties and numbness/tingling in his left arm/leg--not painful. He also has decreased visual field on the left. Unchanged since stroke. No dizziness today. No CP/SOB/NV. No new neurological deficits. BP intermittently drops to 122 SBP, but on average is around 150s. Afebrile.     Vital Signs Last 24 Hrs  T(C): 36.7 (06 Nov 2023 08:13), Max: 36.7 (06 Nov 2023 08:13)  T(F): 98 (06 Nov 2023 08:13), Max: 98 (06 Nov 2023 08:13)  HR: 52 (06 Nov 2023 08:13) (52 - 63)  BP: 152/88 (06 Nov 2023 08:13) (122/66 - 152/88)  RR: 16 (06 Nov 2023 08:13) (16 - 16)  SpO2: 98% (06 Nov 2023 08:13) (95% - 98%)    Parameters below as of 06 Nov 2023 08:13  Patient On (Oxygen Delivery Method): room air    Review of system --last bm 11/3 as per nursing     Neurological deficits     Physical Exam:  Constitutional - NAD, Comfortable  HEENT - NCAT, EOMI  Chest -breathing comfortably on RA  Cardio - warm and well perfused, + loop recorder.   Abdomen - Obese    Extremities - No peripheral edema, Long curling toenails.    Neurologic Exam:                    Cognitive -             Orientation: AA&Ox2-3 to self, year and location, not to month or day             Attention:   able to state days of week backward, difficulty with serial 7s            Memory: short term memory deficits      Speech - Fluent, Comprehensible, No dysarthria, No aphasia      Cranial Nerves - No facial asymmetry, Tongue midline, EOMI, Shoulder shrug intact. Vision Impaired on the Left upper visual field in L > R eye.      Motor -                      LEFT    UE - ShAB 4/5, EF 4-5/5, EE 4/5, WE 5/5,  WNL                     RIGHT UE - ShAB 5/5, EF 5/5, EE 5/5, WE 5/5,  WNL                    LEFT    LE - HF 5/5, KE 5/5, DF 5/5, PF 5/5                    RIGHT LE - HF 5/5, KE 5/5, DF 5/5, PF 5/5        Sensory - Impaired to LT on the left face (V2/3), arm and leg.      Reflexes - symmetric DTR +1 in LE and UE. Neg Babinski's b/l     Coordination - FTN / HTS intact b/l                        14.1   4.94  )-----------( 115      ( 06 Nov 2023 06:20 )             40.6     11-06    139  |  104  |  17  ----------------------------<  109<H>  4.2   |  29  |  0.93    Ca    9.7      06 Nov 2023 06:20    TPro  6.9  /  Alb  3.7  /  TBili  0.7  /  DBili  x   /  AST  24  /  ALT  43  /  AlkPhos  81  11-06      Urinalysis Basic - ( 06 Nov 2023 06:20 )    Color: x / Appearance: x / SG: x / pH: x  Gluc: 109 mg/dL / Ketone: x  / Bili: x / Urobili: x   Blood: x / Protein: x / Nitrite: x   Leuk Esterase: x / RBC: x / WBC x   Sq Epi: x / Non Sq Epi: x / Bacteria: x      CAPILLARY BLOOD GLUCOSE  POCT Blood Glucose.: 124 mg/dL (06 Nov 2023 08:15)  POCT Blood Glucose.: 108 mg/dL (05 Nov 2023 21:49)  POCT Blood Glucose.: 88 mg/dL (05 Nov 2023 16:48)  POCT Blood Glucose.: 171 mg/dL (05 Nov 2023 11:55)    MEDICATIONS  (STANDING):  apixaban 5 milliGRAM(s) Oral two times a day  atorvastatin 80 milliGRAM(s) Oral at bedtime  bisacodyl 5 milliGRAM(s) Oral at bedtime  citalopram 20 milliGRAM(s) Oral daily  dextrose 50% Injectable 25 Gram(s) IV Push once  famotidine    Tablet 20 milliGRAM(s) Oral daily  folic acid 1 milliGRAM(s) Oral daily  memantine 5 milliGRAM(s) Oral two times a day  metFORMIN 500 milliGRAM(s) Oral two times a day  multivitamin 1 Tablet(s) Oral daily  polyethylene glycol 3350 17 Gram(s) Oral daily  senna 2 Tablet(s) Oral at bedtime  thiamine 100 milliGRAM(s) Oral daily    MEDICATIONS  (PRN):    Assessment and Plan:   Patient is a 57 y/o M with a PHx HTN, DM, CVA sp tnK with full resolution symptoms, who presented to [facility] on 10/12 due to sudden fall at work and lost consciousness.  MRI brain showed left temporal occipital lobe infarctions and acute punctate left occipital lobe infarction now s/p thrombectomy and Right MCA/PCA oclusion with left sided weakness, sensory impairment, left homonymous hemianopsia, gait and ADL impairments.     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 Continue for 3 months.    - Atorvastatin 80mg qhs.   - C/w Memantine 5mg BID for cognitive deficits - 11/3.   -cont comprehensive rehab program OT, PT, SLP  3 hours daily 5 x week  - neuropsychology evaluation and support, recreation therapy  - Precautions: cardiac, DM, respiratory, breast CA, fall, aspiration.  - No new neurological deficits    #Memory deficits  -10/13: MRI Brain  showing L PCA P2 occlusion    -Memantine 5mg BID   -Started Donepezil 5mg in AM  11/7--monitor for SE  -Monitor HR as donepezil has slight risk of worsening bradycardia  -Monitor QTc interval -- 405 on 10/3  -QTc 460 on 11/7   - Order f/u EKG on 11/10     # Bradycardia  - No indication for pacemaker  - Metoprolol 12.5mg BID has been discontinued   - per electrophysiology notes, no indication for pacemaker, s/p ILR on 10/20  - needs EP follow up as an outpatient  - HR 64bpm on 11/7     # HTN  - No medication at home and patient is sensitive to HTN medication. Was previously started on lisinopril.   - Can treat with hydralazine 25mg PRN for >170.   - did not tolerate lisinopril 5mg during hospitalization (intermittent hypotension)  - Dr. Curry suggests 10 point decrease in BP parameters based on clinical exam  - Current parameters -150 over weekend, titrate 120-140 as tolerated  - SBP goal 140-150s     # HLD  - Atorvastatin 80 mg daily    # DM 2  - A1C 8.6  - Metformin   - Accu checks and ISS    # ETOH abuse  - Not in active withdrawal.  - Continue MVI, thiamine and folic acid    # Pain management  - No complaints     # GI/Bowel:  - Senna QHS, Miralax Daily  - dulcolax PO     # /Bladder:   -Continent    # FEN   - Diet: DASH DIET     # DVT ppx  - Eliquis 5mg BID    IDT: 11/7   TDD:  11/11 to home.  d/c plan to [facility] ASAP   RN: continent with bowel and continent with bladder   SW: Lives in Apartment with his daughter. On the main floor. He was independent before. Sister is supportive  SP: Regular with thin. Mild cognitive deficits-- . Recall and short term memory deficits, reasoning, attention, decreased mental flexibility.  Needs assistance with IADLs  OT: Setup - eating & UBD. CG - grooming, toileting, toilet transfers and CS-- LBD.   PT : Supervision - transfer. Ambulation 150 feet without AD and 12 steps with supervision.    Goals:1.  Supervision for toileting.   2.  Supervision for ambulation.   3. Recall salient information with external aides    11/8 - SW working on [facility] placement, discussed with sister     Addendum - Spoke to Lety about discharge planning. No-one is able to support at home at this time.     Outpatient Follow-up (Specialty/Name of physician):    Rj Delgado  Cardiac Electrophysiology  [address] [place], [place] [address]  Phone: [phone]  Fax: [phone]  Follow Up Time:     Wily Barkley  Neurology  [address] [place] [address]  Phone: [phone]  Fax: [phone]  Follow Up Time: 1 week    Denzel Zapata  Interventional Cardiology  [address] [address]  Phone: [phone]  Fax: [phone]  Follow Up Time: 1 week    PCP,   Phone: (   )    -  Fax: (   )    -  Follow Up Time: 1 week    Arsenio Goodrich  Interventional Neuroradiology  [address] [place] [address]  Phone: [phone]  Fax: [phone]       Nutritional Assessment:  · Nutritional Assessment	Diet, Consistent Carbohydrate/No Snacks:   DASH/TLC {Sodium & Cholesterol Restricted} (DASH)  Supplement Feeding Modality:  Oral  Glucerna Shake Cans or Servings Per Day:  1       Frequency:  Two Times a day (10-27-23 @ 14:29) [Active]

## 2023-11-08 NOTE — PROGRESS NOTE ADULT - ATTENDING COMMENTS
Pt. seen with fellow.  Agree with documentation above as per fellow with amendments made as appropriate. Patient medically stable. Making progress towards rehab goals.     left PCA Infarct s/p thrombectomy, right MCA and PCA occlusion--   -Started Donepezil 5mg in AM  11/7--monitor for SE--seem to be tolerating well.  -Monitor HR as donepezil has slight risk of worsening bradycardia  -Monitor QTc interval -- 405 on 10/3  -QTc 460 on 11/7   - Order f/u EKG on 11/10

## 2023-11-08 NOTE — SPEECH LANGUAGE PATHOLOGY EVALUATION - SLP PROBLEM SOLVING
[facility] Dermatology Note  Encounter Date: Nov 8, 2023  Office Visit       Dermatology Problem List:  1. 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 Repeat bx 5/20/2020 most consistent with androgenetic alopecia (which makes sense in setting of #2 as well)   - Biopsy in 2016 consistent with alopecia areata, but not clinically consistent; had outside ILK without improvement per report  - Repeat bx 5/20/2020 with nonscarring alopecia with marked miniaturization, favoring androgenetic alopecia. Also mild perifollicular fibrosis.  - Current tx: LLLT 2-3x weekly (started years ago), dutasteride 0.5mg daily, ketoconazole shampoo alternating with head and shoulders, Minoxidil 5% once daily, clobetasol solution 2x a week, calcipotriene solution twice weekly, multivitamin    -Previous tx: finasteride 5 mg daily  Hair labs: 4/8/2022  TSH: 0.04 (low)  Cbc: normal 2 years ago  Ferritin:105 (2 years ago)  Zinc: 72 (9/15/2021)  Vitamin D: 65  2. Pituitary prolactinoma s/p removal in 1975 followed by radiation with subsequent development of panhypopituitarism   3.  TSH <0.01 in 6/30/2021  - She is on estradiol per endocrinology  ____________________________________________     Assessment & Plan:     # Female pattern hair loss, favor androgenetic alopecia has old bx with alopecia areata. Hair Metrix performed today. Discussed additional treatment options such as the risks and benefits of oral minoxidil.  - Continue minoxidil 5% foam daily  - Continue HairMax laser comb tLLLLT  - Continue ketoconazole shampoo 2-3 weekly  - Continue clobetasol solution twice weekly.   - Start calcipotriene 0.005% solution twice weekly.  - Continue dutasteride 0.5mg daily.  Denies dizziness. Denies any of these side effects   - HairMetrix was performed today.  - Prior recs: micropigmentation at [facility], patient is not interested   -  Future consideration: low dose oral minoxidil- Hx of long QT (counseled patient that she would need clearance from cardiology first), spironolactone, increasing topical minoxidil to twice daily, PRP-discussed benefits, risks, and cost  -CMP  - Referred to cardiology today for oral minoxidil clearance due to pt Hx or long QT       Procedures Performed:   HairMetrix: 6/9/2023 to 11/8/2023  - Frontal anterior scalp: 144 to 146  - Mid scalp: 116 to 109  - Vertex scalp: 126 to 147  - Occipital scalp: 146 to 168  - Right temple: 89 to 67  - Left temple: 104 to 85      Follow-up: for hair loss 3 months, see cards in the mean time    Staff and Scribe:     Scribe Disclosure:   I, Gavin Ng, am serving as a scribe to document services personally performed by this physician, Dr. Carley Serrano, based on data collection and the provider's statements to me.   Scribe Disclosure:   I, Josephine Interiano ([email or web address]), am serving as a scribe to document services personally performed by this physician, Dr. Carley Serrano, based on data collection and the provider's statements to me.     Scribe Disclosure:   I, Mare Tucker, am serving as a scribe to document services personally performed by Carley Serrano MD based on data collection and the provider's statements to me.         Provider Disclosure:   The documentation recorded by the scribe accurately reflects the services I personally performed and the decisions made by me.    Carley Serrano MD    Department of Dermatology  [facility]: Phone: [phone], Fax:[phone]  [facility]: Phone: [phone], Fax: [phone]   ____________________________________________    CC: Hair Loss    HPI:  Ms. Maria Esther Lowe is a(n) 68 year old female who presents today as a return patient for hair loss.    Last seen 6/9/2023 for female pattern hair loss. At that time, pt was  instructed to continue topicals, stop finasteride and start dutasteride daily. Pt referred to cardiology for EKG.    Today, she feels that hair growth is improved. Denies scalp sxs. She is on the same regimen but has not started calciprotriene solution. Denies any adverse effects from the dutasteride. Patient believes she has some darkening of skin due to sun exposure in [place].     Patient is otherwise feeling well, without additional skin concerns.    Labs Reviewed:    Last Comprehensive Metabolic Panel:  Lab Results   Component Value Date     04/08/2022    POTASSIUM 3.3 (L) 04/08/2022    CHLORIDE 107 04/08/2022    CO2 27 04/08/2022    ANIONGAP 8 04/08/2022     (H) 04/08/2022    BUN 22 04/08/2022    CR 1.12 (H) 04/08/2022    GFRESTIMATED 54 (L) 04/08/2022    ARI 9.6 04/08/2022     TSH   Date Value Ref Range Status   04/08/2022 0.04 (L) 0.40 - 4.00 mU/L Final   06/30/2021 <0.01 (L) 0.40 - 4.00 mU/L Final         Physical Exam:  Vitals: There were no vitals taken for this visit.  SKIN: Focused examination of scalp was performed.  - Mild-mod loose scale in the frontal and midscalp regions  - Mild perifollicular scale in the vertex  - Negative hair pull test  - Regrowth layers: Subtle but appreciable layers - 1 cm (1st layer), 2-3 cm (2nd layer), 4 cm (3rd layer)   - No other lesions of concern on areas examined.     Medications:  Current Outpatient Medications   Medication    CALCIUM 500 +D 500-400 MG-UNIT TABS    cholecalciferol (VITAMIN D3) 125 mcg (5000 units) capsule    clobetasol (TEMOVATE) 0.05 % external solution    cyanocobalamin (VITAMIN  B-12) 1000 MCG tablet    dutasteride (AVODART) 0.5 MG capsule    estradiol (ESTRACE) 1 MG tablet    hydrocortisone (CORTEF) 10 MG tablet    ketoconazole (NIZORAL) 2 % external shampoo    levothyroxine (SYNTHROID/LEVOTHROID) 150 MCG tablet    levothyroxine (SYNTHROID/LEVOTHROID) 175 MCG tablet    losartan-hydrochlorothiazide (HYZAAR) 100-25 MG tablet    simvastatin  (ZOCOR) 40 MG tablet     No current facility-administered medications for this visit.      Past Medical History:   Patient Active Problem List   Diagnosis    Prolactinoma (H)    Panhypopituitarism (H24)    Hormone replacement therapy (postmenopausal)    Carpal tunnel syndrome    History of robot-assisted laparoscopic hysterectomy     Past Medical History:   Diagnosis Date    Hyperlipidemia LDL goal < 130     Hypertension     Hypothyroidism     Osteopenia     DEXA: 12/11; T-score of -1.9       Panhypopituitarism (H)         CC No referring provider defined for this encounter. on close of this encounter.    Pt able to provide verbally appropriate solutions related to problems for ADL's

## 2023-11-08 NOTE — PROGRESS NOTE ADULT - SUBJECTIVE AND OBJECTIVE BOX
58y old  male who presents with a chief complaint of CVA and left hemiparesis     Patient seen and examined. No acute events overnight.   no new complaints, no n/v, no sob, no pain     Vital Signs Last 24 Hrs  T(C): 36.7 (08 Nov 2023 07:33), Max: 36.7 (07 Nov 2023 09:46)  T(F): 98 (08 Nov 2023 07:33), Max: 98.1 (07 Nov 2023 20:08)  HR: 57 (08 Nov 2023 07:33) (54 - 57)  BP: 138/88 (08 Nov 2023 07:33) (130/78 - 138/88)  BP(mean): --  RR: 15 (08 Nov 2023 07:33) (15 - 15)  SpO2: 98% (08 Nov 2023 07:33) (98% - 98%)    Parameters below as of 07 Nov 2023 20:08  Patient On (Oxygen Delivery Method): room air      GENERAL- NAD  EAR/NOSE/MOUTH/THROAT -  MMM  EYES- NIKO, conjunctiva and Sclera clear  NECK- supple  RESPIRATORY-  clear to auscultation bilaterally, non laboured breathing  CARDIOVASCULAR - SIS2, RRR  GI - soft NT BS present  EXTREMITIES- no pedal edema  NEUROLOGY- left sided weakness  PSYCHIATRY- AAO X 3          MEDICATIONS  (STANDING):  apixaban 5 milliGRAM(s) Oral two times a day  atorvastatin 80 milliGRAM(s) Oral at bedtime  bisacodyl 5 milliGRAM(s) Oral at bedtime  citalopram 20 milliGRAM(s) Oral daily  dextrose 50% Injectable 25 Gram(s) IV Push once  donepezil 5 milliGRAM(s) Oral with breakfast  famotidine    Tablet 20 milliGRAM(s) Oral daily  folic acid 1 milliGRAM(s) Oral daily  memantine 5 milliGRAM(s) Oral two times a day  metFORMIN 500 milliGRAM(s) Oral two times a day  multivitamin 1 Tablet(s) Oral daily  polyethylene glycol 3350 17 Gram(s) Oral daily  senna 2 Tablet(s) Oral at bedtime  thiamine 100 milliGRAM(s) Oral daily

## 2023-11-09 ENCOUNTER — TRANSCRIPTION ENCOUNTER (OUTPATIENT)
Age: 58
End: 2023-11-09

## 2023-11-09 LAB
ALBUMIN SERPL ELPH-MCNC: 3.6 G/DL — SIGNIFICANT CHANGE UP (ref 3.3–5)
ALBUMIN SERPL ELPH-MCNC: 3.6 G/DL — SIGNIFICANT CHANGE UP (ref 3.3–5)
ALP SERPL-CCNC: 75 U/L — SIGNIFICANT CHANGE UP (ref 40–120)
ALP SERPL-CCNC: 75 U/L — SIGNIFICANT CHANGE UP (ref 40–120)
ALT FLD-CCNC: 40 U/L — SIGNIFICANT CHANGE UP (ref 10–45)
ALT FLD-CCNC: 40 U/L — SIGNIFICANT CHANGE UP (ref 10–45)
ANION GAP SERPL CALC-SCNC: 7 MMOL/L — SIGNIFICANT CHANGE UP (ref 5–17)
ANION GAP SERPL CALC-SCNC: 7 MMOL/L — SIGNIFICANT CHANGE UP (ref 5–17)
AST SERPL-CCNC: 35 U/L — SIGNIFICANT CHANGE UP (ref 10–40)
AST SERPL-CCNC: 35 U/L — SIGNIFICANT CHANGE UP (ref 10–40)
BILIRUB SERPL-MCNC: 0.7 MG/DL — SIGNIFICANT CHANGE UP (ref 0.2–1.2)
BILIRUB SERPL-MCNC: 0.7 MG/DL — SIGNIFICANT CHANGE UP (ref 0.2–1.2)
BUN SERPL-MCNC: 16 MG/DL — SIGNIFICANT CHANGE UP (ref 7–23)
BUN SERPL-MCNC: 16 MG/DL — SIGNIFICANT CHANGE UP (ref 7–23)
CALCIUM SERPL-MCNC: 9.3 MG/DL — SIGNIFICANT CHANGE UP (ref 8.4–10.5)
CALCIUM SERPL-MCNC: 9.3 MG/DL — SIGNIFICANT CHANGE UP (ref 8.4–10.5)
CHLORIDE SERPL-SCNC: 103 MMOL/L — SIGNIFICANT CHANGE UP (ref 96–108)
CHLORIDE SERPL-SCNC: 103 MMOL/L — SIGNIFICANT CHANGE UP (ref 96–108)
CO2 SERPL-SCNC: 31 MMOL/L — SIGNIFICANT CHANGE UP (ref 22–31)
CO2 SERPL-SCNC: 31 MMOL/L — SIGNIFICANT CHANGE UP (ref 22–31)
CREAT SERPL-MCNC: 1.05 MG/DL — SIGNIFICANT CHANGE UP (ref 0.5–1.3)
CREAT SERPL-MCNC: 1.05 MG/DL — SIGNIFICANT CHANGE UP (ref 0.5–1.3)
EGFR: 83 ML/MIN/1.73M2 — SIGNIFICANT CHANGE UP
EGFR: 83 ML/MIN/1.73M2 — SIGNIFICANT CHANGE UP
GLUCOSE SERPL-MCNC: 111 MG/DL — HIGH (ref 70–99)
GLUCOSE SERPL-MCNC: 111 MG/DL — HIGH (ref 70–99)
HCT VFR BLD CALC: 40.1 % — SIGNIFICANT CHANGE UP (ref 39–50)
HCT VFR BLD CALC: 40.1 % — SIGNIFICANT CHANGE UP (ref 39–50)
HGB BLD-MCNC: 13.6 G/DL — SIGNIFICANT CHANGE UP (ref 13–17)
HGB BLD-MCNC: 13.6 G/DL — SIGNIFICANT CHANGE UP (ref 13–17)
MCHC RBC-ENTMCNC: 31.9 PG — SIGNIFICANT CHANGE UP (ref 27–34)
MCHC RBC-ENTMCNC: 31.9 PG — SIGNIFICANT CHANGE UP (ref 27–34)
MCHC RBC-ENTMCNC: 33.9 GM/DL — SIGNIFICANT CHANGE UP (ref 32–36)
MCHC RBC-ENTMCNC: 33.9 GM/DL — SIGNIFICANT CHANGE UP (ref 32–36)
MCV RBC AUTO: 93.9 FL — SIGNIFICANT CHANGE UP (ref 80–100)
MCV RBC AUTO: 93.9 FL — SIGNIFICANT CHANGE UP (ref 80–100)
NRBC # BLD: 0 /100 WBCS — SIGNIFICANT CHANGE UP (ref 0–0)
NRBC # BLD: 0 /100 WBCS — SIGNIFICANT CHANGE UP (ref 0–0)
PLATELET # BLD AUTO: 111 K/UL — LOW (ref 150–400)
PLATELET # BLD AUTO: 111 K/UL — LOW (ref 150–400)
POTASSIUM SERPL-MCNC: 5.1 MMOL/L — SIGNIFICANT CHANGE UP (ref 3.5–5.3)
POTASSIUM SERPL-MCNC: 5.1 MMOL/L — SIGNIFICANT CHANGE UP (ref 3.5–5.3)
POTASSIUM SERPL-SCNC: 5.1 MMOL/L — SIGNIFICANT CHANGE UP (ref 3.5–5.3)
POTASSIUM SERPL-SCNC: 5.1 MMOL/L — SIGNIFICANT CHANGE UP (ref 3.5–5.3)
PROT SERPL-MCNC: 7 G/DL — SIGNIFICANT CHANGE UP (ref 6–8.3)
PROT SERPL-MCNC: 7 G/DL — SIGNIFICANT CHANGE UP (ref 6–8.3)
RBC # BLD: 4.27 M/UL — SIGNIFICANT CHANGE UP (ref 4.2–5.8)
RBC # BLD: 4.27 M/UL — SIGNIFICANT CHANGE UP (ref 4.2–5.8)
RBC # FLD: 13.1 % — SIGNIFICANT CHANGE UP (ref 10.3–14.5)
RBC # FLD: 13.1 % — SIGNIFICANT CHANGE UP (ref 10.3–14.5)
SODIUM SERPL-SCNC: 141 MMOL/L — SIGNIFICANT CHANGE UP (ref 135–145)
SODIUM SERPL-SCNC: 141 MMOL/L — SIGNIFICANT CHANGE UP (ref 135–145)
WBC # BLD: 5.19 K/UL — SIGNIFICANT CHANGE UP (ref 3.8–10.5)
WBC # BLD: 5.19 K/UL — SIGNIFICANT CHANGE UP (ref 3.8–10.5)
WBC # FLD AUTO: 5.19 K/UL — SIGNIFICANT CHANGE UP (ref 3.8–10.5)
WBC # FLD AUTO: 5.19 K/UL — SIGNIFICANT CHANGE UP (ref 3.8–10.5)

## 2023-11-09 PROCEDURE — 99232 SBSQ HOSP IP/OBS MODERATE 35: CPT

## 2023-11-09 RX ORDER — MEMANTINE HYDROCHLORIDE 10 MG/1
1 TABLET ORAL
Qty: 0 | Refills: 0 | DISCHARGE
Start: 2023-11-09

## 2023-11-09 RX ORDER — SENNA PLUS 8.6 MG/1
2 TABLET ORAL
Qty: 0 | Refills: 0 | DISCHARGE
Start: 2023-11-09

## 2023-11-09 RX ORDER — POLYETHYLENE GLYCOL 3350 17 G/17G
17 POWDER, FOR SOLUTION ORAL
Qty: 0 | Refills: 0 | DISCHARGE
Start: 2023-11-09

## 2023-11-09 RX ORDER — DONEPEZIL HYDROCHLORIDE 10 MG/1
2 TABLET, FILM COATED ORAL
Qty: 0 | Refills: 0 | DISCHARGE
Start: 2023-11-09

## 2023-11-09 RX ORDER — METFORMIN HYDROCHLORIDE 850 MG/1
1 TABLET ORAL
Refills: 0 | DISCHARGE
Start: 2023-11-09

## 2023-11-09 RX ADMIN — Medication 1 MILLIGRAM(S): at 11:35

## 2023-11-09 RX ADMIN — DONEPEZIL HYDROCHLORIDE 5 MILLIGRAM(S): 10 TABLET, FILM COATED ORAL at 08:02

## 2023-11-09 RX ADMIN — METFORMIN HYDROCHLORIDE 500 MILLIGRAM(S): 850 TABLET ORAL at 17:38

## 2023-11-09 RX ADMIN — FAMOTIDINE 20 MILLIGRAM(S): 10 INJECTION INTRAVENOUS at 11:35

## 2023-11-09 RX ADMIN — METFORMIN HYDROCHLORIDE 500 MILLIGRAM(S): 850 TABLET ORAL at 08:02

## 2023-11-09 RX ADMIN — CITALOPRAM 20 MILLIGRAM(S): 10 TABLET, FILM COATED ORAL at 11:35

## 2023-11-09 RX ADMIN — MEMANTINE HYDROCHLORIDE 5 MILLIGRAM(S): 10 TABLET ORAL at 06:09

## 2023-11-09 RX ADMIN — ATORVASTATIN CALCIUM 80 MILLIGRAM(S): 80 TABLET, FILM COATED ORAL at 21:10

## 2023-11-09 RX ADMIN — APIXABAN 5 MILLIGRAM(S): 2.5 TABLET, FILM COATED ORAL at 06:05

## 2023-11-09 RX ADMIN — MEMANTINE HYDROCHLORIDE 5 MILLIGRAM(S): 10 TABLET ORAL at 17:38

## 2023-11-09 RX ADMIN — APIXABAN 5 MILLIGRAM(S): 2.5 TABLET, FILM COATED ORAL at 17:37

## 2023-11-09 RX ADMIN — Medication 1 TABLET(S): at 11:35

## 2023-11-09 RX ADMIN — Medication 100 MILLIGRAM(S): at 11:36

## 2023-11-09 NOTE — PROGRESS NOTE ADULT - ATTENDING COMMENTS
Pt. seen with resident.  Agree with documentation above as per resident with amendments made as appropriate. Patient medically stable. Making progress towards rehab goals.     Right MCA infarct--  cont. Donepezil 5mg in AM--started  11/7--monitor for SE--tolerating well so far--No GI SE, dizziness or sleep issues.    cont. memantine  --Seen during speech therapy--working on memory strategies-- Time management 80%, Using calendar-- 80%; reviewed warning signs of stroke and medication management    Awaiting ROGERIO placement-- insurance issue-- SW is addressing.

## 2023-11-09 NOTE — DISCHARGE NOTE PROVIDER - NSDCCPCAREPLAN_GEN_ALL_CORE_FT
PRINCIPAL DISCHARGE DIAGNOSIS  Diagnosis: Acute right PCA stroke  Assessment and Plan of Treatment: You experienced a stroke resulting in cognitive deficits including memory which makes activities of daily living difficult. Please continue to take the medications as prescribed at discharge unless adjusted by your neurologist or primary care provider (PCP). Follow-up with neurology and your PCP.      SECONDARY DISCHARGE DIAGNOSES  Diagnosis: Type 2 diabetes mellitus  Assessment and Plan of Treatment: Your blood sugars have been managed on Metformin. Continue to take Metformin twice daily unless otherwise prescribed by your primary care provider. Follow-up with your PCP for monitoring.    Diagnosis: Bradycardia  Assessment and Plan of Treatment: Your heart rates have been on the lower side.  You were placed on a loop recorder to monitor for any irregular rhythms of the heart (which can contribute to stroke). Once discharged from facilities please follow-up with Electrophysiology for further assessment.    Diagnosis: Hypertension  Assessment and Plan of Treatment: Your blood pressure has been mildly elevated. We have been monitoring without medications. Our records show that attemtps at Lisinopril were not well tolerated. Please follow-up with your PCP to monitor and for medication adjustments.     PRINCIPAL DISCHARGE DIAGNOSIS  Diagnosis: Acute right PCA stroke  Assessment and Plan of Treatment: You experienced a stroke resulting in cognitive deficits including memory which makes activities of daily living difficult. Please continue to take the medications as prescribed at discharge unless adjusted by your neurologist or primary care provider (PCP). Follow-up with neurology and your PCP.      SECONDARY DISCHARGE DIAGNOSES  Diagnosis: Type 2 diabetes mellitus  Assessment and Plan of Treatment: Your blood sugars were initially managed on Metformin but required the addition of another medication, Tradjenta . Continue to take Metformin twice daily and Tradjenta daily unless otherwise prescribed by your primary care provider.  Follow-up with your PCP for monitoring.    Diagnosis: Bradycardia  Assessment and Plan of Treatment: Your heart rates have been on the lower side.  You were placed on a loop recorder to monitor for any irregular rhythms of the heart (which can contribute to stroke). Once discharged from facilities please follow-up with Electrophysiology for further assessment.    Diagnosis: Hypertension  Assessment and Plan of Treatment: Your blood pressure has been mildly elevated. We have been monitoring without medications. Our records show that attemtps at Lisinopril were not well tolerated. Please follow-up with your PCP to monitor and for medication adjustments.     PRINCIPAL DISCHARGE DIAGNOSIS  Diagnosis: Acute right PCA stroke  Assessment and Plan of Treatment: You experienced a stroke resulting in cognitive deficits including memory which impacts your ability to perform daily living activities. You have made significant gains in rehab but at this time still require supervision and assistance. Please continue to take the medications as prescribed at discharge unless adjusted by your neurologist or primary care provider (PCP). Follow-up with neurology and your PCP.      SECONDARY DISCHARGE DIAGNOSES  Diagnosis: Type 2 diabetes mellitus  Assessment and Plan of Treatment: Your blood sugars were initially managed on Metformin but required the addition of another medication, Tradjenta . Continue to take Metformin twice daily and Tradjenta daily unless otherwise prescribed by your primary care provider.  Follow-up with your PCP for monitoring.    Diagnosis: Bradycardia  Assessment and Plan of Treatment: Your heart rates have been on the lower side.  You were placed on a loop recorder to monitor for any irregular rhythms of the heart (which can contribute to stroke). Once discharged from facilities please follow-up with Electrophysiology for further assessment.    Diagnosis: Hypertension  Assessment and Plan of Treatment: Your blood pressure has been mildly elevated. We have been monitoring without medications. Our records show that attemtps at Lisinopril were not well tolerated. Please follow-up with your PCP to monitor and for medication adjustments.     PRINCIPAL DISCHARGE DIAGNOSIS  Diagnosis: Acute right PCA stroke  Assessment and Plan of Treatment: You experienced a stroke resulting in cognitive deficits including memory which impacts your ability to perform daily living activities. You have made significant gains in rehab but at this time still require supervision and assistance. Please continue to take the medications as prescribed at discharge unless adjusted by your neurologist or primary care provider (PCP). Follow-up with neurology and your PCP.      SECONDARY DISCHARGE DIAGNOSES  Diagnosis: Type 2 diabetes mellitus  Assessment and Plan of Treatment: Your blood sugars were initially managed on Metformin but required the addition of another medication, Tradjenta . Continue to take Metformin twice daily and Tradjenta daily unless otherwise prescribed by your primary care provider.  Follow-up with your PCP for monitoring.    Diagnosis: Bradycardia  Assessment and Plan of Treatment: Your heart rates have been on the lower side.  You were placed on a loop recorder to monitor for any irregular rhythms of the heart (which can contribute to stroke). Once discharged from facilities please follow-up with Electrophysiology for further assessment.    Diagnosis: Hypertension  Assessment and Plan of Treatment: Your blood pressure has been mildly elevated. We have been monitoring without medications. Our records show that attempts at Lisinopril were not well tolerated. Please follow-up with your PCP to monitor and for medication adjustments.     PRINCIPAL DISCHARGE DIAGNOSIS  Diagnosis: Acute right PCA stroke  Assessment and Plan of Treatment: You experienced a stroke resulting in cognitive deficits including memory which impacts your ability to perform daily living activities. You have made significant gains in rehab but at this time still require supervision and assistance. Eliquis should be continued through Jan 13th. Please continue to take the medications as prescribed at discharge unless adjusted by your neurologist or primary care provider (PCP). Follow-up with neurology and your PCP.      SECONDARY DISCHARGE DIAGNOSES  Diagnosis: Type 2 diabetes mellitus  Assessment and Plan of Treatment: Your blood sugars were initially managed on Metformin but required the addition of another medication, Tradjenta . Continue to take Metformin twice daily and Tradjenta daily unless otherwise prescribed by your primary care provider.  Follow-up with your PCP for monitoring.    Diagnosis: Bradycardia  Assessment and Plan of Treatment: Your heart rates have been on the lower side.  You were placed on a loop recorder to monitor for any irregular rhythms of the heart (which can contribute to stroke). Once discharged from facilities please follow-up with Electrophysiology for further assessment.    Diagnosis: Hypertension  Assessment and Plan of Treatment: Your blood pressure has been mildly elevated. We have been monitoring without medications. Our records show that attempts at Lisinopril were not well tolerated. Please follow-up with your PCP to monitor and for medication adjustments.     PRINCIPAL DISCHARGE DIAGNOSIS  Diagnosis: Acute right PCA stroke  Assessment and Plan of Treatment: You experienced a stroke resulting in cognitive deficits including memory which impacts your ability to perform daily living activities. You have made significant gains in rehab but at this time still require supervision and assistance. Eliquis should be continued through Jan 13th. Please continue to take the medications as prescribed at discharge unless adjusted by your neurologist or primary care provider (PCP). Follow-up with neurology in 2-3 weeks and your PCP and Dr. Sosa once discharged from [facility].      SECONDARY DISCHARGE DIAGNOSES  Diagnosis: Type 2 diabetes mellitus  Assessment and Plan of Treatment: Your blood sugars were initially managed on Metformin but required the addition of another medication, Tradjenta . Continue to take Metformin twice daily and Tradjenta daily unless otherwise prescribed by your primary care provider.  Follow-up with your PCP for monitoring.    Diagnosis: Bradycardia  Assessment and Plan of Treatment: Your heart rates have been on the lower side.  You were placed on a loop recorder to monitor for any irregular rhythms of the heart (which can contribute to stroke). Once discharged from facilities please follow-up with Electrophysiology for further assessment.    Diagnosis: Hypertension  Assessment and Plan of Treatment: Your blood pressure has been mildly elevated. We have been monitoring without medications. Our records show that attempts at Lisinopril were not well tolerated. Please follow-up with your PCP to monitor and for medication adjustments.

## 2023-11-09 NOTE — PROGRESS NOTE ADULT - ASSESSMENT
59 y/o M with a Hx of stroke one year ago (at the time patient received TNK and had full  resolution of symptoms), HTN, DM, who presented following an episode of LOC,  admitted sp LOC 10/12 sp Thrombectomy via L radial artery for TICI 3 revascularization of L P1-P2 occlusion. Noted LMCA chronic occlusion., on 10/13: Neurologic status improving. 10/14: quadrantanopia now on L superior (originally R); Repeat stroke imaging completed and initial concern for CTP showing poss new perfusion deficit however on further review noted to be present on admission. Started on heparin drip per neurology recommendations, on 10/15: Therapeutic on heparin drip, stability HCT obtained. Cardiology consulted for further stroke work-up and downgraded under medicine- pt/ot/dvt ppx      #Acute CVA and thrombectomy  #Occluded R MCA/PCA, s/p thrombectomy   -TTE shows with  Normal left ventricular internal cavity size,  3. Left ventricular ejection fraction, by visual estimation, is 50 to 55%.  -DVT studies negative  -continue Eliquis. Needs for 3 months.    -c/w Lipitor  -outpatient neuro IR follow up.    # Bradycardia  -TTE EF 50-55%, no other structural abnormalities  - No indication for pacemaker  - Metoprolol 12.5mg BID has been discontinued   - per electrophysiology notes, no indication for pacemaker, s/p ILR on 10/20  - needs EP follow up as an outpatient.    #intermittent Dizziness   -Encouraged fluid intake --improved today after additional fluid intake    #ETOH use disorder  -  in remission, stable.    #HTN  - No meds at home  - did not tolerate lisinopril 5mg during hospitalization (intermittent hypotension)  - Hydralazine 25 mg PRN if SBP > 170    #HLD  c/w Lipitor    #DM 2  not taking meds regularly at home  so continue metformin  A1C 8.6      #Elevated TSH  T4 level normal  outpt f/u    DVT Prophylaxis - Eliquis.     will follow  d/w rehab team

## 2023-11-09 NOTE — PROGRESS NOTE ADULT - ASSESSMENT
Assessment and Plan:   Patient is a 57 y/o M with a PHx HTN, DM, CVA sp tnK with full resolution symptoms, who presented to [facility] on 10/12 due to sudden fall at work and lost consciousness.  MRI brain showed left temporal occipital lobe infarctions and acute punctate left occipital lobe infarction now s/p thrombectomy and Right MCA/PCA oclusion with left sided weakness, sensory impairment, left homonymous hemianopsia, gait and ADL impairments.     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 Continue for 3 months.    - Atorvastatin 80mg qhs.   - C/w Memantine 5mg BID for cognitive deficits - 11/3.   -cont comprehensive rehab program OT, PT, SLP  3 hours daily 5 x week  - neuropsychology evaluation and support, recreation therapy  - Precautions: cardiac, DM, respiratory, breast CA, fall, aspiration.  - No new neurological deficits    #Memory deficits  -10/13: MRI Brain  showing L PCA P2 occlusion    -Memantine 5mg BID   -Started Donepezil 5mg in AM  11/7--monitor for SE  -Monitor HR as donepezil has slight risk of worsening bradycardia  -Monitor QTc interval -- 405 on 10/3  -QTc 460 on 11/7   - Order f/u EKG on 11/10     # Bradycardia  - No indication for pacemaker  - Metoprolol 12.5mg BID has been discontinued   - per electrophysiology notes, no indication for pacemaker, s/p ILR on 10/20  - needs EP follow up as an outpatient  - HR 64bpm on 11/7     # HTN  - No medication at home and patient is sensitive to HTN medication. Was previously started on lisinopril.   - Can treat with hydralazine 25mg PRN for >170.   - did not tolerate lisinopril 5mg during hospitalization (intermittent hypotension)  - Dr. Curry suggests 10 point decrease in BP parameters based on clinical exam  - Current parameters -150 over weekend, titrate 120-140 as tolerated  - SBP goal 140-150s     # HLD  - Atorvastatin 80 mg daily    # DM 2  - A1C 8.6  - Metformin   - Accu checks and ISS    # ETOH abuse  - Not in active withdrawal.  - Continue MVI, thiamine and folic acid    # Pain management  - No complaints     # GI/Bowel:  - Senna QHS, Miralax Daily  - dulcolax PO     # /Bladder:   -Continent    # FEN   - Diet: DASH DIET     # DVT ppx  - Eliquis 5mg BID    IDT: 11/7   TDD:  11/11 to home.  d/c plan to [facility] ASAP   RN: continent with bowel and continent with bladder   SW: Lives in Apartment with his daughter. On the main floor. He was independent before. Sister is supportive  SP: Regular with thin. Mild cognitive deficits-- . Recall and short term memory deficits, reasoning, attention, decreased mental flexibility.  Needs assistance with IADLs  OT: Setup - eating & UBD. CG - grooming, toileting, toilet transfers and CS-- LBD.   PT : Supervision - transfer. Ambulation 150 feet without AD and 12 steps with supervision.    Goals:1.  Supervision for toileting.   2.  Supervision for ambulation.   3. Recall salient information with external aides    11/8 - SW working on [facility] placement, discussed with sister     Addendum - Spoke to Lety about discharge planning. No-one is able to support at home at this time.     Outpatient Follow-up (Specialty/Name of physician):    Rj Delgado  Cardiac Electrophysiology  [address] [address]  Phone: [phone]  Fax: [phone]  Follow Up Time:     Wily Barkley  Neurology  [address] [place] [address]  Phone: [phone]  Fax: [phone]  Follow Up Time: 1 week    Denzel Zapata  Interventional Cardiology  [address] [address]  Phone: [phone]  Fax: [phone]  Follow Up Time: 1 week    PCP,   Phone: (   )    -  Fax: (   )    -  Follow Up Time: 1 week    Arsenio Goodrich  Interventional Neuroradiology  [address] [place] [address]  Phone: [phone]  Fax: [phone]       Nutritional Assessment:  · Nutritional Assessment	Diet, Consistent Carbohydrate/No Snacks:   DASH/TLC {Sodium & Cholesterol Restricted} (DASH)  Supplement Feeding Modality:  Oral  Glucerna Shake Cans or Servings Per Day:  1       Frequency:  Two Times a day (10-27-23 @ 14:29) [Active]         Assessment and Plan:   Patient is a 59 y/o M with a PHx HTN, DM, CVA sp tnK with full resolution symptoms, who presented to [facility] on 10/12 due to sudden fall at work and lost consciousness.  MRI brain showed left temporal occipital lobe infarctions and acute punctate left occipital lobe infarction now s/p thrombectomy and Right MCA/PCA oclusion with left sided weakness, sensory impairment, left homonymous hemianopsia, gait and ADL impairments.     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 Continue for 3 months.    - Atorvastatin 80mg qhs.   - C/w Memantine 5mg BID for cognitive deficits - 11/3.   - cont comprehensive rehab program OT, PT, SLP  3 hours daily 5 x week  - neuropsychology evaluation and support, recreation therapy  - Precautions: cardiac, DM, respiratory, breast CA, fall, aspiration.  - No new neurological deficits    #Memory deficits  -10/13: MRI Brain  showing L PCA P2 occlusion    -Memantine 5mg BID   -Started Donepezil 5mg in AM  11/7--monitor for SE  -Monitor HR as donepezil has slight risk of worsening bradycardia  -Monitor QTc interval -- 405 on 10/3  -QTc 460 on 11/7   -Order f/u EKG on 11/10     # Bradycardia  - No indication for pacemaker  - Metoprolol 12.5mg BID has been discontinued   - per electrophysiology notes, no indication for pacemaker, s/p ILR on 10/20  - needs EP follow up as an outpatient  - HR 64bpm on 11/7     # HTN  - No medication at home and patient is sensitive to HTN medication. Was previously started on lisinopril.   - Can treat with hydralazine 25mg PRN for >170.   - did not tolerate lisinopril 5mg during hospitalization (intermittent hypotension)  - Dr. Curry suggests 10 point decrease in BP parameters based on clinical exam  - Current parameters -150 over weekend, titrate 120-140 as tolerated  - SBP goal 140-150s     # HLD  - Atorvastatin 80 mg daily    # DM 2  - A1C 8.6  - Metformin   - Accu checks and ISS    # ETOH abuse  - Not in active withdrawal.  - Continue MVI, thiamine and folic acid    # Pain management  - No complaints     # GI/Bowel:  - Senna QHS, Miralax Daily  - dulcolax PO     # /Bladder:   -Continent    # FEN   - Diet: DASH DIET     # DVT ppx  - Eliquis 5mg BID    IDT: 11/7   TDD:  11/11 to home.  d/c plan to [facility] ASAP   RN: continent with bowel and continent with bladder   SW: Lives in Apartment with his daughter. On the main floor. He was independent before. Sister is supportive  SP: Regular with thin. Mild cognitive deficits-- . Recall and short term memory deficits, reasoning, attention, decreased mental flexibility.  Needs assistance with IADLs  OT: Setup - eating & UBD. CG - grooming, toileting, toilet transfers and CS-- LBD.   PT : Supervision - transfer. Ambulation 150 feet without AD and 12 steps with supervision.    Goals:1.  Supervision for toileting.   2.  Supervision for ambulation.   3. Recall salient information with external aides    11/8 - SW working on [facility] placement, discussed with sister     Addendum - Spoke to Lety about discharge planning. No-one is able to support at home at this time.     Outpatient Follow-up (Specialty/Name of physician):    Rj Delgado  Cardiac Electrophysiology  [address] [address]  Phone: [phone]  Fax: [phone]  Follow Up Time:     Wily Barkley  Neurology  [address] [place] [address]  Phone: [phone]  Fax: [phone]  Follow Up Time: 1 week    Denzel Zapata  Interventional Cardiology  [address] [address]  Phone: [phone]  Fax: [phone]  Follow Up Time: 1 week    PCP,   Phone: (   )    -  Fax: (   )    -  Follow Up Time: 1 week    Arsenio Goodrich  Interventional Neuroradiology  [address] [place] [address]  Phone: [phone]  Fax: [phone]       Nutritional Assessment:  · Nutritional Assessment	Diet, Consistent Carbohydrate/No Snacks:   DASH/TLC {Sodium & Cholesterol Restricted} (DASH)  Supplement Feeding Modality:  Oral  Glucerna Shake Cans or Servings Per Day:  1       Frequency:  Two Times a day (10-27-23 @ 14:29) [Active]         Assessment and Plan:   Patient is a 59 y/o M with a PHx HTN, DM, CVA sp tnK with full resolution symptoms, who presented to [facility] on 10/12 due to sudden fall at work and lost consciousness.  MRI brain showed left temporal occipital lobe infarctions and acute punctate left occipital lobe infarction now s/p thrombectomy and Right MCA/PCA oclusion with left sided weakness, sensory impairment, left homonymous hemianopsia, gait and ADL impairments.     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 Continue for 3 months.    - Atorvastatin 80mg qhs.   - C/w Memantine 5mg BID for cognitive deficits - 11/3.   - cont comprehensive rehab program OT, PT, SLP  3 hours daily 5 x week  - neuropsychology evaluation and support, recreation therapy  - Precautions: cardiac, DM, respiratory, breast CA, fall, aspiration.  - No new neurological deficits    #Memory deficits  -10/13: MRI Brain  showing L PCA P2 occlusion    -Memantine 5mg BID   -cont. Donepezil 5mg in AM--started  11/7--monitor for SE--tolerating well so far  -Monitor HR as donepezil has slight risk of worsening bradycardia  -Monitor QTc interval -- 405 on 10/3  -QTc 460 on 11/7   -Order f/u EKG on 11/10     # Bradycardia  - No indication for pacemaker  - Metoprolol 12.5mg BID has been discontinued   - per electrophysiology notes, no indication for pacemaker, s/p ILR on 10/20  - needs EP follow up as an outpatient  - HR 64bpm on 11/7     # HTN  - No medication at home and patient is sensitive to HTN medication. Was previously started on lisinopril.   - Can treat with hydralazine 25mg PRN for >170.   - did not tolerate lisinopril 5mg during hospitalization (intermittent hypotension)  - Dr. Curry suggests 10 point decrease in BP parameters based on clinical exam  - Current parameters -150 over weekend, titrate 120-140 as tolerated  - SBP goal 140-150s     # HLD  - Atorvastatin 80 mg daily    # DM 2  - A1C 8.6  - Metformin   - Accu checks and ISS    # ETOH abuse  - Not in active withdrawal.  - Continue MVI, thiamine and folic acid    # Pain management  - No complaints     # GI/Bowel:  - Senna QHS, Miralax Daily  - dulcolax PO     # /Bladder:   -Continent    # FEN   - Diet: DASH DIET     # DVT ppx  - Eliquis 5mg BID    IDT: 11/7   TDD:  11/11 to home.  d/c plan to [facility] ASAP   RN: continent with bowel and continent with bladder   SW: Lives in Apartment with his daughter. On the main floor. He was independent before. Sister is supportive  SP: Regular with thin. Mild cognitive deficits-- . Recall and short term memory deficits, reasoning, attention, decreased mental flexibility.  Needs assistance with IADLs  OT: Setup - eating & UBD. CG - grooming, toileting, toilet transfers and CS-- LBD.   PT : Supervision - transfer. Ambulation 150 feet without AD and 12 steps with supervision.    Goals:1.  Supervision for toileting.   2.  Supervision for ambulation.   3. Recall salient information with external aides    11/8 - SW working on [facility] placement, discussed with sister     Addendum - Spoke to Lety about discharge planning. No-one is able to support at home at this time.     Outpatient Follow-up (Specialty/Name of physician):    Rj Delgado  Cardiac Electrophysiology  [address] [place], [place] [id number]  Phone: [phone]  Fax: [phone]  Follow Up Time:     Wily Barkley  Neurology  [address] [place] [address]  Phone: [phone]  Fax: [phone]  Follow Up Time: 1 week    Denzel Zapata  Interventional Cardiology  [address] [address]  Phone: [phone]  Fax: [phone]  Follow Up Time: 1 week    PCP,   Phone: (   )    -  Fax: (   )    -  Follow Up Time: 1 week    Arsenio Goodrich  Interventional Neuroradiology  [address] [place] [address]  Phone: [phone]  Fax: [phone]       Nutritional Assessment:  · Nutritional Assessment	Diet, Consistent Carbohydrate/No Snacks:   DASH/TLC {Sodium & Cholesterol Restricted} (DASH)  Supplement Feeding Modality:  Oral  Glucerna Shake Cans or Servings Per Day:  1       Frequency:  Two Times a day (10-27-23 @ 14:29) [Active]

## 2023-11-09 NOTE — DISCHARGE NOTE PROVIDER - NSDCFUSCHEDAPPT_GEN_ALL_CORE_FT
[facility] Physician [facility] [address]  Scheduled Appointment: 11/20/2023     [facility] Physician [facility] [address]  Scheduled Appointment: 11/20/2023     [facility] Physician [facility] [address]  Scheduled Appointment: 11/20/2023     [facility] Physician [place] [address]  Scheduled Appointment: 11/24/2023     [facility] Physician [facility] [address]  Scheduled Appointment: 11/24/2023     [facility] Physician [facility] [address]  Scheduled Appointment: 11/24/2023     [facility] Physician [facility] [address]  Scheduled Appointment: 12/28/2023     [facility] Physician [facility] [address]  Scheduled Appointment: 12/28/2023     [facility] Physician [facility] [address]  Scheduled Appointment: 12/28/2023

## 2023-11-09 NOTE — DISCHARGE NOTE PROVIDER - CARE PROVIDERS DIRECT ADDRESSES
,DirectAddress_Unknown,[email or web address],DirectAddress_Unknown,DirectAddress_Unknown ,DirectAddress_Unknown,[email or web address],DirectAddress_Unknown,DirectAddress_Unknown ,DirectAddress_Unknown,[email or web address],DirectAddress_Unknown,DirectAddress_Unknown ,DirectAddress_Unknown,[email or web address],DirectAddress_Unknown,DirectAddress_Unknown,[email or web address] ,DirectAddress_Unknown,[email or web address],DirectAddress_Unknown,DirectAddress_Unknown,[email or web address] ,DirectAddress_Unknown,[email or web address],DirectAddress_Unknown,DirectAddress_Unknown,[email or web address] ,DirectAddress_Unknown,[email or web address],DirectAddress_Unknown,DirectAddress_Unknown,[email or web address] ,DirectAddress_Unknown,[email or web address],DirectAddress_Unknown,DirectAddress_Unknown,[email or web address] ,DirectAddress_Unknown,[email or web address],DirectAddress_Unknown,DirectAddress_Unknown,[email or web address]

## 2023-11-09 NOTE — PROGRESS NOTE ADULT - SUBJECTIVE AND OBJECTIVE BOX
HPI:  Patient is a 57 y/o M with a PHx HTN, DM, stroke one year ago (at the time patient received Tenecteplase and had full resolution of symptoms), who presented to [facility] on 10/12/23 following sudden fall at work and lost consciousness. NIHSS 10 on admission. Admitted under NeuroICU on 10/12. Imaging revealed ischemia in the posterior circulation, CT angiogram head/neck showed right PCA P1-2 cutoff, possibly chronic right MCA M1 cutoff which had distal reconstitution and established collaterals, and left PCA occlusion which patient was subsequently transferred for mechanical thrombectomy.    Cerebral angiogram and mechanical thrombectomy performed using aspiration with TICI 3 recanalization of left PCA. The right occipital lobe filled from right SHANI collaterals, and the right MCA as well was thought to be chronic given noted collateralization. MR with small to moderate left temporal occipital lobe infarctions. Occluded right MCA, occluded right PCA and right vertebral artery proximal segment. The patient was started on a hep gtt given extensive atherosclerotic disease which was switched to apixaban 5mg BID with recommendations for a three month course.    TTE with normal left ventricular internal cavity size. Left ventricular ejection fractio 50 to 55%. Per Neuro, no need for ROCIO at this time. Pt had an episode of bradycardia during his stay and there were concern for 2:1 block. ILR placed 10/20 and no indication for PPM at this time.     Patient was evaluated by PM&R and therapy for functional deficits, gait/ADL impairments and acute rehabilitation was recommended. Patient was medically optimized for discharge to [facility] on 10/27/23.       Subjective:    No acute overnight events. Awake, alert, 80% eaten.  He was able to recall showering earlier this morning but still struggles with cognitive deficits including short term memory. Denies pain, numbness of extremities remains unchanged.     ROS  -no SOB  -no Abd pain, constipation, diarrhea. Charted BM 11/8  -no chest pain    Vital Signs Last 24 Hrs  T(C): 36.7 (09 Nov 2023 07:22), Max: 36.7 (09 Nov 2023 07:22)  T(F): 98 (09 Nov 2023 07:22), Max: 98 (09 Nov 2023 07:22)  HR: 51 (09 Nov 2023 07:22) (51 - 55)  BP: 150/80 (09 Nov 2023 07:22) (133/80 - 150/80)  RR: 17 (09 Nov 2023 07:22) (16 - 17)  SpO2: 96% (09 Nov 2023 07:22) (96% - 97%)    Parameters below as of 09 Nov 2023 07:22  Patient On (Oxygen Delivery Method): room air      Neurological deficits     Physical Exam:  Constitutional - NAD, Comfortable  HEENT - NCAT, EOMI  Chest -breathing comfortably on RA  Cardio - warm and well perfused, + loop recorder.   Abdomen - Obese    Extremities - No peripheral edema, Long curling toenails.    Neurologic Exam:                    Cognitive -             Orientation: AA&Ox2-3 to self, year and location, not to month or day             Attention:   able to state days of week backward, difficulty with serial 7s            Memory: short term memory deficits      Speech - Fluent, Comprehensible, No dysarthria, No aphasia      Cranial Nerves - No facial asymmetry, Tongue midline, EOMI, Shoulder shrug intact. Vision Impaired on the Left upper visual field in L > R eye.      Motor -                      LEFT    UE - ShAB 4/5, EF 4-5/5, EE 4/5, WE 5/5,  WNL                     RIGHT UE - ShAB 5/5, EF 5/5, EE 5/5, WE 5/5,  WNL                    LEFT    LE - HF 5/5, KE 5/5, DF 5/5, PF 5/5                    RIGHT LE - HF 5/5, KE 5/5, DF 5/5, PF 5/5        Sensory - Impaired to LT on the left face (V2/3), arm and leg.      Reflexes - symmetric DTR +1 in LE and UE. Neg Babinski's b/l     Coordination - FTN / HTS intact b/l               RECENT LABS    Vital Signs Last 24 Hrs  T(C): 36.7 (09 Nov 2023 07:22), Max: 36.7 (09 Nov 2023 07:22)  T(F): 98 (09 Nov 2023 07:22), Max: 98 (09 Nov 2023 07:22)  HR: 51 (09 Nov 2023 07:22) (51 - 55)  BP: 150/80 (09 Nov 2023 07:22) (133/80 - 150/80)  BP(mean): --  RR: 17 (09 Nov 2023 07:22) (16 - 17)  SpO2: 96% (09 Nov 2023 07:22) (96% - 97%)    Parameters below as of 09 Nov 2023 07:22  Patient On (Oxygen Delivery Method): room air                          13.6   5.19  )-----------( 111      ( 09 Nov 2023 07:09 )             40.1     11-09    141  |  103  |  16  ----------------------------<  111<H>  5.1   |  31  |  1.05    Ca    9.3      09 Nov 2023 07:09    TPro  7.0  /  Alb  3.6  /  TBili  0.7  /  DBili  x   /  AST  35  /  ALT  40  /  AlkPhos  75  11-09      Urinalysis Basic - ( 09 Nov 2023 07:09 )    Color: x / Appearance: x / SG: x / pH: x  Gluc: 111 mg/dL / Ketone: x  / Bili: x / Urobili: x   Blood: x / Protein: x / Nitrite: x   Leuk Esterase: x / RBC: x / WBC x   Sq Epi: x / Non Sq Epi: x / Bacteria: x      CAPILLARY BLOOD GLUCOSE    POCT Blood Glucose.: 171 mg/dL (05 Nov 2023 11:55)    MEDICATIONS  (STANDING):  apixaban 5 milliGRAM(s) Oral two times a day  atorvastatin 80 milliGRAM(s) Oral at bedtime  bisacodyl 5 milliGRAM(s) Oral at bedtime  citalopram 20 milliGRAM(s) Oral daily  dextrose 50% Injectable 25 Gram(s) IV Push once  donepezil 5 milliGRAM(s) Oral with breakfast  famotidine    Tablet 20 milliGRAM(s) Oral daily  folic acid 1 milliGRAM(s) Oral daily  memantine 5 milliGRAM(s) Oral two times a day  metFORMIN 500 milliGRAM(s) Oral two times a day  multivitamin 1 Tablet(s) Oral daily  polyethylene glycol 3350 17 Gram(s) Oral daily  senna 2 Tablet(s) Oral at bedtime  thiamine 100 milliGRAM(s) Oral daily    MEDICATIONS  (PRN):     HPI:  Patient is a 57 y/o M with a PHx HTN, DM, stroke one year ago (at the time patient received Tenecteplase and had full resolution of symptoms), who presented to [facility] on 10/12/23 following sudden fall at work and lost consciousness. NIHSS 10 on admission. Admitted under NeuroICU on 10/12. Imaging revealed ischemia in the posterior circulation, CT angiogram head/neck showed right PCA P1-2 cutoff, possibly chronic right MCA M1 cutoff which had distal reconstitution and established collaterals, and left PCA occlusion which patient was subsequently transferred for mechanical thrombectomy.    Cerebral angiogram and mechanical thrombectomy performed using aspiration with TICI 3 recanalization of left PCA. The right occipital lobe filled from right SHANI collaterals, and the right MCA as well was thought to be chronic given noted collateralization. MR with small to moderate left temporal occipital lobe infarctions. Occluded right MCA, occluded right PCA and right vertebral artery proximal segment. The patient was started on a hep gtt given extensive atherosclerotic disease which was switched to apixaban 5mg BID with recommendations for a three month course.    TTE with normal left ventricular internal cavity size. Left ventricular ejection fractio 50 to 55%. Per Neuro, no need for ROCIO at this time. Pt had an episode of bradycardia during his stay and there were concern for 2:1 block. ILR placed 10/20 and no indication for PPM at this time.     Patient was evaluated by PM&R and therapy for functional deficits, gait/ADL impairments and acute rehabilitation was recommended. Patient was medically optimized for discharge to [facility] IRF on 10/27/23.       Subjective:    No acute overnight events. Awake, alert, 80% eaten.  He was able to recall showering earlier this morning but still struggles with cognitive deficits including short term memory. Denies pain, numbness of extremities remains unchanged.     ROS  -no SOB  -no Abd pain, constipation, diarrhea. Charted BM 11/8  -no chest pain    Vital Signs Last 24 Hrs  T(C): 36.7 (09 Nov 2023 07:22), Max: 36.7 (09 Nov 2023 07:22)  T(F): 98 (09 Nov 2023 07:22), Max: 98 (09 Nov 2023 07:22)  HR: 51 (09 Nov 2023 07:22) (51 - 55)  BP: 150/80 (09 Nov 2023 07:22) (133/80 - 150/80)  RR: 17 (09 Nov 2023 07:22) (16 - 17)  SpO2: 96% (09 Nov 2023 07:22) (96% - 97%)    Parameters below as of 09 Nov 2023 07:22  Patient On (Oxygen Delivery Method): room air      Neurological deficits     Physical Exam:  Constitutional - NAD, Comfortable  HEENT - NCAT, EOMI  Chest -breathing comfortably on RA  Cardio - warm and well perfused, + loop recorder.   Abdomen - Obese, soft, no-tender  Extremities - No peripheral edema, Long curling toenails.    Neurologic Exam:                    Cognitive -             Orientation: AA&Ox2-3 to self, year and location, not to month or day             Attention:   able to state days of week backward, difficulty with serial 7s (able to reach 86 without delay)            Memory: short term memory deficits      Speech - Fluent, Comprehensible, No dysarthria, No aphasia      Cranial Nerves - No facial asymmetry, Tongue midline, EOMI, Shoulder shrug intact. Vision Impaired on the Left upper visual field in L > R eye.      Motor -                      LEFT    UE - ShAB 4/5, EF 4-5/5, EE 4/5, WE 5/5,  WNL                     RIGHT UE - ShAB 5/5, EF 5/5, EE 5/5, WE 5/5,  WNL                    LEFT    LE - HF 5/5, KE 5/5, DF 5/5, PF 5/5                    RIGHT LE - HF 5/5, KE 5/5, DF 5/5, PF 5/5        Sensory - Impaired to LT on the left face (V2/3), arm and leg.      Reflexes - symmetric DTR +1 in LE and UE. Neg Babinski's b/l     Coordination - FTN / HTS intact b/l               RECENT LABS    Vital Signs Last 24 Hrs  T(C): 36.7 (09 Nov 2023 07:22), Max: 36.7 (09 Nov 2023 07:22)  T(F): 98 (09 Nov 2023 07:22), Max: 98 (09 Nov 2023 07:22)  HR: 51 (09 Nov 2023 07:22) (51 - 55)  BP: 150/80 (09 Nov 2023 07:22) (133/80 - 150/80)  BP(mean): --  RR: 17 (09 Nov 2023 07:22) (16 - 17)  SpO2: 96% (09 Nov 2023 07:22) (96% - 97%)    Parameters below as of 09 Nov 2023 07:22  Patient On (Oxygen Delivery Method): room air                          13.6   5.19  )-----------( 111      ( 09 Nov 2023 07:09 )             40.1     11-09    141  |  103  |  16  ----------------------------<  111<H>  5.1   |  31  |  1.05    Ca    9.3      09 Nov 2023 07:09    TPro  7.0  /  Alb  3.6  /  TBili  0.7  /  DBili  x   /  AST  35  /  ALT  40  /  AlkPhos  75  11-09      Urinalysis Basic - ( 09 Nov 2023 07:09 )    Color: x / Appearance: x / SG: x / pH: x  Gluc: 111 mg/dL / Ketone: x  / Bili: x / Urobili: x   Blood: x / Protein: x / Nitrite: x   Leuk Esterase: x / RBC: x / WBC x   Sq Epi: x / Non Sq Epi: x / Bacteria: x      CAPILLARY BLOOD GLUCOSE      POCT Blood Glucose.: 171 mg/dL (05 Nov 2023 11:55)    MEDICATIONS  (STANDING):  apixaban 5 milliGRAM(s) Oral two times a day  atorvastatin 80 milliGRAM(s) Oral at bedtime  bisacodyl 5 milliGRAM(s) Oral at bedtime  citalopram 20 milliGRAM(s) Oral daily  dextrose 50% Injectable 25 Gram(s) IV Push once  donepezil 5 milliGRAM(s) Oral with breakfast  famotidine    Tablet 20 milliGRAM(s) Oral daily  folic acid 1 milliGRAM(s) Oral daily  memantine 5 milliGRAM(s) Oral two times a day  metFORMIN 500 milliGRAM(s) Oral two times a day  multivitamin 1 Tablet(s) Oral daily  polyethylene glycol 3350 17 Gram(s) Oral daily  senna 2 Tablet(s) Oral at bedtime  thiamine 100 milliGRAM(s) Oral daily    MEDICATIONS  (PRN):

## 2023-11-09 NOTE — PROGRESS NOTE ADULT - SUBJECTIVE AND OBJECTIVE BOX
No events overnight. no new complaints offered to me    Vital Signs Last 24 Hrs  T(F): 98 (09 Nov 2023 07:22), Max: 98 (09 Nov 2023 07:22)  HR: 51 (09 Nov 2023 07:22) (51 - 55)  BP: 150/80 (09 Nov 2023 07:22) (133/80 - 150/80)  RR: 17 (09 Nov 2023 07:22) (16 - 17)  SpO2: 96% (09 Nov 2023 07:22) (96% - 97%)  I&O's Summary        PHYSICAL EXAM:  General: NAD, Comfortable  Pulm: CTA BL No Rhonchi Rales or wheezes  Neck: Supple, No JVD  CVS: RRR No rubs murmurs or gallops  Abdomen: Soft, Nontender, Nondistended; No masses or organomegally  Extremities: No calf tenderness, No pitting edema                  Urinalysis Basic - ( 09 Nov 2023 07:09 )    Color: x / Appearance: x / SG: x / pH: x  Gluc: 111 mg/dL / Ketone: x  / Bili: x / Urobili: x   Blood: x / Protein: x / Nitrite: x   Leuk Esterase: x / RBC: x / WBC x   Sq Epi: x / Non Sq Epi: x / Bacteria: x        COVID-19 PCR: Ml (10-27-23 @ 22:21)

## 2023-11-09 NOTE — DISCHARGE NOTE PROVIDER - NSDCMRMEDTOKEN_GEN_ALL_CORE_FT
apixaban 5 mg oral tablet: 1 tab(s) orally 2 times a day  atorvastatin 80 mg oral tablet: 1 tab(s) orally once a day (at bedtime)  bisacodyl 5 mg oral delayed release tablet: 1 tab(s) orally once a day (at bedtime)  CeleXA 20 mg oral tablet: 1 tab(s) orally once a day  donepezil 5 mg oral tablet: 1 tab(s) orally once a day (before a meal)  famotidine 20 mg oral tablet: 1 tab(s) orally once a day  folic acid 1 mg oral tablet: 1 tab(s) orally once a day  memantine 5 mg oral tablet: 1 tab(s) orally 2 times a day  metFORMIN 500 mg oral tablet: 1 tab(s) orally 2 times a day  Multiple Vitamins oral tablet: 1 tab(s) orally once a day  polyethylene glycol 3350 oral powder for reconstitution: 17 gram(s) orally once a day  senna leaf extract oral tablet: 2 tab(s) orally once a day (at bedtime)  thiamine 100 mg oral tablet: 1 tab(s) orally once a day   apixaban 5 mg oral tablet: 1 tab(s) orally 2 times a day  atorvastatin 80 mg oral tablet: 1 tab(s) orally once a day (at bedtime)  bisacodyl 5 mg oral delayed release tablet: 1 tab(s) orally once a day (at bedtime)  CeleXA 20 mg oral tablet: 1 tab(s) orally once a day  donepezil 5 mg oral tablet: 1 tab(s) orally once a day (before a meal)  famotidine 20 mg oral tablet: 1 tab(s) orally once a day  folic acid 1 mg oral tablet: 1 tab(s) orally once a day  linagliptin 5 mg oral tablet: 1 tab(s) orally once a day  memantine 5 mg oral tablet: 1 tab(s) orally 2 times a day  metFORMIN 500 mg oral tablet: 1 tab(s) orally 2 times a day  Multiple Vitamins oral tablet: 1 tab(s) orally once a day  polyethylene glycol 3350 oral powder for reconstitution: 17 gram(s) orally once a day  senna leaf extract oral tablet: 2 tab(s) orally once a day (at bedtime)  thiamine 100 mg oral tablet: 1 tab(s) orally once a day   apixaban 5 mg oral tablet: 1 tab(s) orally 2 times a day  atorvastatin 80 mg oral tablet: 1 tab(s) orally once a day (at bedtime)  bisacodyl 5 mg oral delayed release tablet: 1 tab(s) orally once a day (at bedtime)  CeleXA 20 mg oral tablet: 1 tab(s) orally once a day  donepezil 5 mg oral tablet: 1 tab(s) orally once a day (before a meal)  famotidine 20 mg oral tablet: 1 tab(s) orally once a day  folic acid 1 mg oral tablet: 1 tab(s) orally once a day  linagliptin 5 mg oral tablet: 1 tab(s) orally once a day  memantine 10 mg oral tablet: 1 tab(s) orally 2 times a day  metFORMIN 500 mg oral tablet: 1 tab(s) orally 2 times a day  Multiple Vitamins oral tablet: 1 tab(s) orally once a day  polyethylene glycol 3350 oral powder for reconstitution: 17 gram(s) orally once a day  senna leaf extract oral tablet: 2 tab(s) orally once a day (at bedtime)  thiamine 100 mg oral tablet: 1 tab(s) orally once a day   apixaban 5 mg oral tablet: 1 tab(s) orally 2 times a day  atorvastatin 80 mg oral tablet: 1 tab(s) orally once a day (at bedtime)  CeleXA 20 mg oral tablet: 1 tab(s) orally once a day  donepezil 5 mg oral tablet: 1 tab(s) orally once a day (before a meal)  famotidine 20 mg oral tablet: 1 tab(s) orally once a day  folic acid 1 mg oral tablet: 1 tab(s) orally once a day  linagliptin 5 mg oral tablet: 1 tab(s) orally once a day  memantine 10 mg oral tablet: 1 tab(s) orally 2 times a day  metFORMIN 500 mg oral tablet: 1 tab(s) orally 2 times a day  Multiple Vitamins oral tablet: 1 tab(s) orally once a day  polyethylene glycol 3350 oral powder for reconstitution: 17 gram(s) orally once a day as needed for  constipation  thiamine 100 mg oral tablet: 1 tab(s) orally once a day   apixaban 5 mg oral tablet: 1 tab(s) orally 2 times a day  atorvastatin 80 mg oral tablet: 1 tab(s) orally once a day (at bedtime)  CeleXA 20 mg oral tablet: 1 tab(s) orally once a day  donepezil 5 mg oral tablet: 2 tab(s) orally once a day (before a meal)  famotidine 20 mg oral tablet: 1 tab(s) orally once a day  folic acid 1 mg oral tablet: 1 tab(s) orally once a day  linagliptin 5 mg oral tablet: 1 tab(s) orally once a day  memantine 10 mg oral tablet: 1 tab(s) orally 2 times a day  metFORMIN 500 mg oral tablet: 1 tab(s) orally 2 times a day  Multiple Vitamins oral tablet: 1 tab(s) orally once a day  polyethylene glycol 3350 oral powder for reconstitution: 17 gram(s) orally once a day as needed for  constipation  thiamine 100 mg oral tablet: 1 tab(s) orally once a day

## 2023-11-09 NOTE — DISCHARGE NOTE PROVIDER - HOSPITAL COURSE
HPI:  Patient is a 59 y/o M with a PHx HTN, DM, stroke one year ago (at the time patient received Tenecteplase and had full resolution of symptoms), who presented to [facility] on 10/12/23 following sudden fall at work and lost consciousness. NIHSS 10 on admission. Admitted under NeuroICU on 10/12. Imaging revealed ischemia in the posterior circulation, CT angiogram head/neck showed right PCA P1-2 cutoff, possibly chronic right MCA M1 cutoff which had distal reconstitution and established collaterals, and left PCA occlusion which patient was subsequently transferred for mechanical thrombectomy.    Cerebral angiogram and mechanical thrombectomy performed using aspiration with TICI 3 recanalization of left PCA. The right occipital lobe filled from right SHANI collaterals, and the right MCA as well was thought to be chronic given noted collateralization. MR with small to moderate left temporal occipital lobe infarctions. Occluded right MCA, occluded right PCA and right vertebral artery proximal segment. The patient was started on a hep gtt given extensive atherosclerotic disease which was switched to apixaban 5mg BID with recommendations for a three month course.    TTE with normal left ventricular internal cavity size. Left ventricular ejection fractio 50 to 55%. Per Neuro, no need for ROCIO at this time. Pt had an episode of bradycardia during his stay and there were concern for 2:1 block. ILR placed 10/20 and no indication for PPM at this time.     Patient was evaluated by PM&R and therapy for functional deficits, gait/ADL impairments and acute rehabilitation was recommended. Patient was medically optimized for discharge to [facility] IRF on 10/27/23.  (27 Oct 2023 12:30)      Rehab course significant for severe short term memory deficits.     Functional Status:  RN: continent with bowel and continent with bladder   SW: Lives in Apartment with his daughter. On the main floor. He was independent before. Sister is supportive  SP: Regular with thin. Mild cognitive deficits-- . Recall and short term memory deficits, reasoning, attention, decreased mental flexibility.  Needs assistance with IADLs  OT: Setup - eating & UBD. CG - grooming, toileting, toilet transfers and CS-- LBD.   PT : Supervision - transfer. Ambulation 150 feet without AD and 12 steps with supervision.      All other medical co-morbidities were stable. Patient tolerated course of inpatient PT/OT/SLP rehab with significant improvements and met rehab goals prior to discharge. Discharge instructions were discussed with patient and family. All questions answered and all concerns addressed. Patient was medically cleared for discharge to ___.     Outpatient Follow-ups:  Rj Delagdo  Cardiac Electrophysiology  [address] [address]  Phone: [phone]  Fax: [phone]  Follow Up Time:     Wily Barkley  Neurology  [address] [place] [address]  Phone: [phone]  Fax: [phone]  Follow Up Time: 1 week    Denzel Zapata  Interventional Cardiology  [address] [place], [place] [address]  Phone: [phone]  Fax: [phone]  Follow Up Time: 1 week    PCP,   Phone: (   )    -  Fax: (   )    -  Follow Up Time: 1 week    Arsenio Goodrich  Interventional Neuroradiology  [address] [place] [address]  Phone: [phone]  Fax: [phone]       HPI:  Patient is a 59 y/o M with a PHx HTN, DM, stroke one year ago (at the time patient received Tenecteplase and had full resolution of symptoms), who presented to [facility] on 10/12/23 following sudden fall at work and lost consciousness. NIHSS 10 on admission. Admitted under NeuroICU on 10/12. Imaging revealed ischemia in the posterior circulation, CT angiogram head/neck showed right PCA P1-2 cutoff, possibly chronic right MCA M1 cutoff which had distal reconstitution and established collaterals, and left PCA occlusion which patient was subsequently transferred for mechanical thrombectomy.    Cerebral angiogram and mechanical thrombectomy performed using aspiration with TICI 3 recanalization of left PCA. The right occipital lobe filled from right SHANI collaterals, and the right MCA as well was thought to be chronic given noted collateralization. MR with small to moderate left temporal occipital lobe infarctions. Occluded right MCA, occluded right PCA and right vertebral artery proximal segment. The patient was started on a hep gtt given extensive atherosclerotic disease which was switched to apixaban 5mg BID with recommendations for a three month course.    TTE with normal left ventricular internal cavity size. Left ventricular ejection fractio 50 to 55%. Per Neuro, no need for ROCIO at this time. Pt had an episode of bradycardia during his stay and there were concern for 2:1 block. ILR placed 10/20 and no indication for PPM at this time.     Patient was evaluated by PM&R and therapy for functional deficits, gait/ADL impairments and acute rehabilitation was recommended. Patient was medically optimized for discharge to [facility] IRF on 10/27/23.  (27 Oct 2023 12:30)      Rehab course significant for severe short term memory deficits.     Functional Status:  RN: continent with bowel and continent with bladder   SW: Lives in Apartment with his daughter. On the main floor. He was independent before. Sister is supportive  SP: Regular with thin. Mild cognitive deficits-- . Recall and short term memory deficits, reasoning, attention, decreased mental flexibility.  Needs assistance with IADLs  OT: Setup - eating & UBD. CG - grooming, toileting, toilet transfers and CS-- LBD.   PT : Supervision - transfer. Ambulation 150 feet without AD and 12 steps with supervision.      All other medical co-morbidities were stable. Patient tolerated course of inpatient PT/OT/SLP rehab with significant improvements and met rehab goals prior to discharge. Discharge instructions were discussed with patient and family. All questions answered and all concerns addressed. Patient was medically cleared for discharge to ___.     Outpatient Follow-ups:  Rj Delgado  Cardiac Electrophysiology  [address] [address]  Phone: [phone]  Fax: [phone]  Follow Up Time:     Wily Barkley  Neurology  [address] [place] [address]  Phone: [phone]  Fax: [phone]  Follow Up Time: 1 week    Denzel Zapata  Interventional Cardiology  [address] [place], [place] [id number]  Phone: [phone]  Fax: [phone]  Follow Up Time: 1 week    PCP,   Phone: (   )    -  Fax: (   )    -  Follow Up Time: 1 week    Arsenio Goodrich  Interventional Neuroradiology  [address] [place] [address]  Phone: [phone]  Fax: [phone]       HPI:  Patient is a 59 y/o M with a PHx HTN, DM, stroke one year ago (at the time patient received Tenecteplase and had full resolution of symptoms), who presented to [facility] on 10/12/23 following sudden fall at work and lost consciousness. NIHSS 10 on admission. Admitted under NeuroICU on 10/12. Imaging revealed ischemia in the posterior circulation, CT angiogram head/neck showed right PCA P1-2 cutoff, possibly chronic right MCA M1 cutoff which had distal reconstitution and established collaterals, and left PCA occlusion which patient was subsequently transferred for mechanical thrombectomy.    Cerebral angiogram and mechanical thrombectomy performed using aspiration with TICI 3 recanalization of left PCA. The right occipital lobe filled from right SHANI collaterals, and the right MCA as well was thought to be chronic given noted collateralization. MR with small to moderate left temporal occipital lobe infarctions. Occluded right MCA, occluded right PCA and right vertebral artery proximal segment. The patient was started on a hep gtt given extensive atherosclerotic disease which was switched to apixaban 5mg BID with recommendations for a three month course.    TTE with normal left ventricular internal cavity size. Left ventricular ejection fractio 50 to 55%. Per Neuro, no need for ROCIO at this time. Pt had an episode of bradycardia during his stay and there were concern for 2:1 block. ILR placed 10/20 and no indication for PPM at this time.     Patient was evaluated by PM&R and therapy for functional deficits, gait/ADL impairments and acute rehabilitation was recommended. Patient was medically optimized for discharge to [facility] IRF on 10/27/23.  (27 Oct 2023 12:30)      Rehab course significant for severe short term memory deficits.     Functional Status:  RN: continent with bowel and continent with bladder   SW: Lives in Apartment with his daughter. On the main floor. He was independent before. Sister is supportive  SP: Regular with thin. Mild cognitive deficits-- . Recall and short term memory deficits, reasoning, attention, decreased mental flexibility.  Needs assistance with IADLs  OT: Setup - eating & UBD. CG - grooming, toileting, toilet transfers and CS-- LBD.   PT : Supervision - transfer. Ambulation 150 feet without AD and 12 steps with supervision.      All other medical co-morbidities were stable. Patient tolerated course of inpatient PT/OT/SLP rehab with significant improvements and met rehab goals prior to discharge. Discharge instructions were discussed with patient and family. All questions answered and all concerns addressed. Patient was medically cleared for discharge to ___.     Outpatient Follow-ups:  Rj Delgado  Cardiac Electrophysiology  [address] [address]  Phone: [phone]  Fax: [phone]  Follow Up Time:     Wily Barkley  Neurology  [address] [place] [address]  Phone: [phone]  Fax: [phone]  Follow Up Time: 1 week    Denzel Zapata  Interventional Cardiology  [address] [place], [place] [address]  Phone: [phone]  Fax: [phone]  Follow Up Time: 1 week    PCP,   Phone: (   )    -  Fax: (   )    -  Follow Up Time: 1 week    Arsenio Goodrich  Interventional Neuroradiology  [address] [place] [address]  Phone: [phone]  Fax: [phone]       HPI:  Patient is a 57 y/o M with a PHx HTN, DM, stroke one year ago (at the time patient received Tenecteplase and had full resolution of symptoms), who presented to [facility] on 10/12/23 following sudden fall at work and lost consciousness. NIHSS 10 on admission. Admitted under NeuroICU on 10/12. Imaging revealed ischemia in the posterior circulation, CT angiogram head/neck showed right PCA P1-2 cutoff, possibly chronic right MCA M1 cutoff which had distal reconstitution and established collaterals, and left PCA occlusion which patient was subsequently transferred for mechanical thrombectomy.    Cerebral angiogram and mechanical thrombectomy performed using aspiration with TICI 3 recanalization of left PCA. The right occipital lobe filled from right SHANI collaterals, and the right MCA as well was thought to be chronic given noted collateralization. MR with small to moderate left temporal occipital lobe infarctions. Occluded right MCA, occluded right PCA and right vertebral artery proximal segment. The patient was started on a hep gtt given extensive atherosclerotic disease which was switched to apixaban 5mg BID with recommendations for a three month course.    TTE with normal left ventricular internal cavity size. Left ventricular ejection fractio 50 to 55%. Per Neuro, no need for ROCIO at this time. Pt had an episode of bradycardia during his stay and there were concern for 2:1 block. ILR placed 10/20 and no indication for PPM at this time.     Patient was evaluated by PM&R and therapy for functional deficits, gait/ADL impairments and acute rehabilitation was recommended. Patient was medically optimized for discharge to [facility] IRF on 10/27/23.  (27 Oct 2023 12:30)    Rehab course significant for severe short term memory deficits.     Functional Status:  RN: continent with bowel and continent with bladder   SW: Lives in Apartment with his daughter. On the main floor. He was independent before. Sister is supportive  SP: Regular with thin. Mild cognitive deficits-- . Recall and short term memory deficits, reasoning, attention, decreased mental flexibility.  Needs assistance with IADLs  OT: Setup - eating & UBD. CG - grooming, toileting, toilet transfers and CS-- LBD.   PT : Supervision - transfer. Ambulation 150 feet without AD and 12 steps with supervision.      All other medical co-morbidities were stable. Patient tolerated course of inpatient PT/OT/SLP rehab with significant improvements and met rehab goals prior to discharge. Discharge instructions were discussed with patient and family. All questions answered and all concerns addressed. Patient was medically cleared for discharge to ______.     Outpatient Follow-ups:  Rj Delgado  Cardiac Electrophysiology  [address] [place], [place] [id number]  Phone: [phone]  Fax: [phone]  Follow Up Time:     Wily Barkley  Neurology  [address] [place] [address]  Phone: [phone]  Fax: [phone]  Follow Up Time: 1 week    Denzel Zapata  Interventional Cardiology  [address] [place] [address]  Phone: [phone]  Fax: [phone]  Follow Up Time: 1 week    PCP  Follow Up Time: 1 week    Arsenio Goodrich  Interventional Neuroradiology  [address] [place] [address]  Phone: [phone]  Fax: [phone]       HPI:  Patient is a 59 y/o M with a PHx HTN, DM, stroke one year ago (at the time patient received Tenecteplase and had full resolution of symptoms), who presented to [facility] on 10/12/23 following sudden fall at work and lost consciousness. NIHSS 10 on admission. Admitted under NeuroICU on 10/12. Imaging revealed ischemia in the posterior circulation, CT angiogram head/neck showed right PCA P1-2 cutoff, possibly chronic right MCA M1 cutoff which had distal reconstitution and established collaterals, and left PCA occlusion which patient was subsequently transferred for mechanical thrombectomy.    Cerebral angiogram and mechanical thrombectomy performed using aspiration with TICI 3 recanalization of left PCA. The right occipital lobe filled from right SHANI collaterals, and the right MCA as well was thought to be chronic given noted collateralization. MR with small to moderate left temporal occipital lobe infarctions. Occluded right MCA, occluded right PCA and right vertebral artery proximal segment. The patient was started on a hep gtt given extensive atherosclerotic disease which was switched to apixaban 5mg BID with recommendations for a three month course.    TTE with normal left ventricular internal cavity size. Left ventricular ejection fractio 50 to 55%. Per Neuro, no need for ROCIO at this time. Pt had an episode of bradycardia during his stay and there were concern for 2:1 block. ILR placed 10/20 and no indication for PPM at this time.     Patient was evaluated by PM&R and therapy for functional deficits, gait/ADL impairments and acute rehabilitation was recommended. Patient was medically optimized for discharge to [facility] IRF on 10/27/23.  (27 Oct 2023 12:30)    Rehab course significant for severe short term memory deficits.     Functional Status:  RN: continent with bowel and continent with bladder   SW: Lives in Apartment with his daughter. On the main floor. He was independent before. Sister is supportive  SP: Regular with thin. Mild cognitive deficits-- . Recall and short term memory deficits, reasoning, attention, decreased mental flexibility.  Needs assistance with IADLs  OT: Setup - eating & UBD. CG - grooming, toileting, toilet transfers and CS-- LBD.   PT : Supervision - transfer. Ambulation 150 feet without AD and 12 steps with supervision.      All other medical co-morbidities were stable. Patient tolerated course of inpatient PT/OT/SLP rehab with significant improvements and met rehab goals prior to discharge. Discharge instructions were discussed with patient and family. All questions answered and all concerns addressed. Patient was medically cleared for discharge to ______.     Outpatient Follow-ups:  Rj Delgado  Cardiac Electrophysiology  [address] [place], [place] [id number]  Phone: [phone]  Fax: [phone]  Follow Up Time:     Wily Barkley  Neurology  [address] [place] [address]  Phone: [phone]  Fax: [phone]  Follow Up Time: 1 week    Denzel Zapata  Interventional Cardiology  [address] [place] [address]  Phone: [phone]  Fax: [phone]  Follow Up Time: 1 week    PCP  Follow Up Time: 1 week    Arsenio Goodrich  Interventional Neuroradiology  [address] [place] [address]  Phone: [phone]  Fax: [phone]       HPI:  Patient is a 59 y/o M with a PHx HTN, DM, stroke one year ago (at the time patient received Tenecteplase and had full resolution of symptoms), who presented to [facility] on 10/12/23 following sudden fall at work and lost consciousness. NIHSS 10 on admission. Admitted under NeuroICU on 10/12. Imaging revealed ischemia in the posterior circulation, CT angiogram head/neck showed right PCA P1-2 cutoff, possibly chronic right MCA M1 cutoff which had distal reconstitution and established collaterals, and left PCA occlusion which patient was subsequently transferred for mechanical thrombectomy.    Cerebral angiogram and mechanical thrombectomy performed using aspiration with TICI 3 recanalization of left PCA. The right occipital lobe filled from right SHANI collaterals, and the right MCA as well was thought to be chronic given noted collateralization. MR with small to moderate left temporal occipital lobe infarctions. Occluded right MCA, occluded right PCA and right vertebral artery proximal segment. The patient was started on a hep gtt given extensive atherosclerotic disease which was switched to apixaban 5mg BID with recommendations for a three month course.    TTE with normal left ventricular internal cavity size. Left ventricular ejection fractio 50 to 55%. Per Neuro, no need for ROCIO at this time. Pt had an episode of bradycardia during his stay and there were concern for 2:1 block. ILR placed 10/20 and no indication for PPM at this time.     Patient was evaluated by PM&R and therapy for functional deficits, gait/ADL impairments and acute rehabilitation was recommended. Patient was medically optimized for discharge to [facility] IRF on 10/27/23.  (27 Oct 2023 12:30)    Rehab course significant for severe short term memory deficits.     Functional Status:  RN: continent with bowel and continent with bladder   SW: Lives in Apartment with his daughter. On the main floor. He was independent before. Sister is supportive  SP: Regular with thin. Mild cognitive deficits-- . Recall and short term memory deficits, reasoning, attention, decreased mental flexibility.  Needs assistance with IADLs  OT: Setup - eating & UBD. CG - grooming, toileting, toilet transfers and CS-- LBD.   PT : Supervision - transfer. Ambulation 150 feet without AD and 12 steps with supervision.      All other medical co-morbidities were stable. Patient tolerated course of inpatient PT/OT/SLP rehab with significant improvements and met rehab goals prior to discharge. Discharge instructions were discussed with patient and family. All questions answered and all concerns addressed. Patient was medically cleared for discharge to ______.     Outpatient Follow-ups:  Rj Delgado  Cardiac Electrophysiology  [address] [place], [place] [id number]  Phone: [phone]  Fax: [phone]  Follow Up Time:     Wily Barkley  Neurology  [address] [place] [address]  Phone: [phone]  Fax: [phone]  Follow Up Time: 1 week    Denzel Zapata  Interventional Cardiology  [address] [place] [address]  Phone: [phone]  Fax: [phone]  Follow Up Time: 1 week    PCP  Follow Up Time: 1 week    Arsenio Goodrich  Interventional Neuroradiology  [address] [place] [address]  Phone: [phone]  Fax: [phone]       HPI:  Patient is a 57 y/o M with a PHx HTN, DM, stroke one year ago (at the time patient received Tenecteplase and had full resolution of symptoms), who presented to [facility] on 10/12/23 following sudden fall at work and lost consciousness. NIHSS 10 on admission. Admitted under NeuroICU on 10/12. Imaging revealed ischemia in the posterior circulation, CT angiogram head/neck showed right PCA P1-2 cutoff, possibly chronic right MCA M1 cutoff which had distal reconstitution and established collaterals, and left PCA occlusion which patient was subsequently transferred for mechanical thrombectomy.    Cerebral angiogram and mechanical thrombectomy performed using aspiration with TICI 3 recanalization of left PCA. The right occipital lobe filled from right SHANI collaterals, and the right MCA as well was thought to be chronic given noted collateralization. MR with small to moderate left temporal occipital lobe infarctions. Occluded right MCA, occluded right PCA and right vertebral artery proximal segment. The patient was started on a hep gtt given extensive atherosclerotic disease which was switched to apixaban 5mg BID with recommendations for a three month course.    TTE with normal left ventricular internal cavity size. Left ventricular ejection fractio 50 to 55%. Per Neuro, no need for ROCIO at this time. Pt had an episode of bradycardia during his stay and there were concern for 2:1 block. ILR placed 10/20 and no indication for PPM at this time.     Patient was evaluated by PM&R and therapy for functional deficits, gait/ADL impairments and acute rehabilitation was recommended. Patient was medically optimized for discharge to [facility] IRF on 10/27/23.  (27 Oct 2023 12:30)    Rehab course significant for severe short term memory deficits.     Functional Status:  RN: continent with bowel and continent with bladder   SW: Lives in Apartment with his daughter. On the main floor. He was independent before. family cannot provide supervision.  Insurance coverage for Medicaid holding up ROGERIO discharge.  SP: Regular with thin. Improving-- Mild cognitive deficits-- .  Recall and short term memory deficits, reasoning, attention, executive functioning.  Not impulsive or unsafe.  Needs assistance with IADLs, finances, meds.  OT: Setup/sup - eating, grooming, UBD. CS - toileting, toilet transfers and LBD. Min-A Bathing. Mod A--f/w, CG ambulating to bathroom  PT : D/C WFL. Ambulation 150 feet without AD and 12 steps with supervision.      All other medical co-morbidities were stable. Patient tolerated course of inpatient PT/OT/SLP rehab with significant improvements and met rehab goals prior to discharge. Discharge instructions were discussed with patient and family. All questions answered and all concerns addressed. Patient was medically cleared for discharge to [facility].    Outpatient Follow-ups:  Rj Delgado  Cardiac Electrophysiology  [address] [address]  Phone: [phone]  Fax: [phone]  Follow Up Time:     Wily Barkley  Neurology  [address] [place] [address]  Phone: [phone]  Fax: [phone]  Follow Up Time: 1 week    Denzel Zapata  Interventional Cardiology  [address] [address]  Phone: [phone]  Fax: [phone]  Follow Up Time: 1 week    PCP  Follow Up Time: 1 week    Arsenio Goodrich  Interventional Neuroradiology  [address] [place] [address]  Phone: [phone]  Fax: [phone]       HPI:  Patient is a 57 y/o M with a PHx HTN, DM, stroke one year ago (at the time patient received Tenecteplase and had full resolution of symptoms), who presented to [facility] on 10/12/23 following sudden fall at work and lost consciousness. NIHSS 10 on admission. Admitted under NeuroICU on 10/12. Imaging revealed ischemia in the posterior circulation, CT angiogram head/neck showed right PCA P1-2 cutoff, possibly chronic right MCA M1 cutoff which had distal reconstitution and established collaterals, and left PCA occlusion which patient was subsequently transferred for mechanical thrombectomy.    Cerebral angiogram and mechanical thrombectomy performed using aspiration with TICI 3 recanalization of left PCA. The right occipital lobe filled from right SHANI collaterals, and the right MCA as well was thought to be chronic given noted collateralization. MR with small to moderate left temporal occipital lobe infarctions. Occluded right MCA, occluded right PCA and right vertebral artery proximal segment. The patient was started on a hep gtt given extensive atherosclerotic disease which was switched to apixaban 5mg BID with recommendations for a three month course.    TTE with normal left ventricular internal cavity size. Left ventricular ejection fractio 50 to 55%. Per Neuro, no need for ROCIO at this time. Pt had an episode of bradycardia during his stay and there were concern for 2:1 block. ILR placed 10/20 and no indication for PPM at this time.     Patient was evaluated by PM&R and therapy for functional deficits, gait/ADL impairments and acute rehabilitation was recommended. Patient was medically optimized for discharge to [facility]F on 10/27/23.  (27 Oct 2023 12:30)    Rehab course significant for severe short term memory deficits.     Functional Status:  RN: continent with bowel and continent with bladder   SW: Lives in Apartment with his daughter. On the main floor. He was independent before. family cannot provide supervision.  Insurance coverage for Medicaid holding up ROGERIO discharge.  SP: Regular with thin. Improving-- Mild cognitive deficits-- .  Recall and short term memory deficits, reasoning, attention, executive functioning.  Not impulsive or unsafe.  Needs assistance with IADLs, finances, meds.  OT: Setup/sup - eating, grooming, UBD. CS - toileting, toilet transfers and LBD. Min-A Bathing. Mod A--f/w, CG ambulating to bathroom  PT : D/C WFL. Ambulation 150 feet without AD and 12 steps with supervision.      All other medical co-morbidities were stable. Patient tolerated course of inpatient PT/OT/SLP rehab with significant improvements and met rehab goals prior to discharge. Discharge instructions were discussed with patient and family. All questions answered and all concerns addressed. Patient was medically cleared for discharge to [facility] [place].    Outpatient Follow-ups:  Rj Delgado  Cardiac Electrophysiology  [address] [address]  Phone: [phone]  Fax: [phone]  Follow Up Time:     Wily Barkley  Neurology  [address] [place] [address]  Phone: [phone]  Fax: [phone]  Follow Up Time: 1 week    Denzel Zapata  Interventional Cardiology  [address] [address]  Phone: [phone]  Fax: [phone]  Follow Up Time: 1 week    PCP  Follow Up Time: 1 week    Arsenio Goodrich  Interventional Neuroradiology  [address] [place] [address]  Phone: [phone]  Fax: [phone]       HPI:  Patient is a 59 y/o M with a PHx HTN, DM, stroke one year ago (at the time patient received Tenecteplase and had full resolution of symptoms), who presented to [facility] on 10/12/23 following sudden fall at work and lost consciousness. NIHSS 10 on admission. Admitted under NeuroICU on 10/12. Imaging revealed ischemia in the posterior circulation, CT angiogram head/neck showed right PCA P1-2 cutoff, possibly chronic right MCA M1 cutoff which had distal reconstitution and established collaterals, and left PCA occlusion which patient was subsequently transferred for mechanical thrombectomy.    Cerebral angiogram and mechanical thrombectomy performed using aspiration with TICI 3 recanalization of left PCA. The right occipital lobe filled from right SHANI collaterals, and the right MCA as well was thought to be chronic given noted collateralization. MR with small to moderate left temporal occipital lobe infarctions. Occluded right MCA, occluded right PCA and right vertebral artery proximal segment. The patient was started on a hep gtt given extensive atherosclerotic disease which was switched to apixaban 5mg BID with recommendations for a three month course.    TTE with normal left ventricular internal cavity size. Left ventricular ejection fractio 50 to 55%. Per Neuro, no need for ROCIO at this time. Pt had an episode of bradycardia during his stay and there were concern for 2:1 block. ILR placed 10/20 and no indication for PPM at this time.     Patient was evaluated by PM&R and therapy for functional deficits, gait/ADL impairments and acute rehabilitation was recommended. Patient was medically optimized for discharge to [facility] IRF on 10/27/23.  (27 Oct 2023 12:30)    Rehab course significant for severe short term memory deficits.     Functional Status:  RN: continent with bowel and continent with bladder   SW: Lives in Apartment with his daughter. On the main floor. He was independent before. family cannot provide supervision.  Insurance coverage for Medicaid holding up ROGERIO discharge.  SP: Regular with thin. Improving-- Mild cognitive deficits-- .  Recall and short term memory deficits, reasoning, attention, executive functioning.  Not impulsive or unsafe.  Needs assistance with IADLs, finances, meds.  OT: Setup/sup - eating, grooming, UBD. CS - toileting, toilet transfers and LBD. Min-A Bathing. Mod A--f/w, CG ambulating to bathroom  PT : D/C WFL. Ambulation 150 feet without AD and 12 steps with supervision.      All other medical co-morbidities were stable. Patient tolerated course of inpatient PT/OT/SLP rehab with significant improvements and met rehab goals prior to discharge. Discharge instructions were discussed with patient and family. All questions answered and all concerns addressed. Patient was medically cleared for discharge to [facility].    Outpatient Follow-ups:  Rj Delgado  Cardiac Electrophysiology  [address] [address]  Phone: [phone]  Fax: [phone]  Follow Up Time:     Wily Barkley  Neurology  [address] [place] [address]  Phone: [phone]  Fax: [phone]  Follow Up Time: 1 week    Denzel Zapata  Interventional Cardiology  [address] [address]  Phone: [phone]  Fax: [phone]  Follow Up Time: 1 week    PCP  Follow Up Time: 1 week    Arsenio Goodrich  Interventional Neuroradiology  [address] [place] [address]  Phone: [phone]  Fax: [phone]       HPI:  Patient is a 57 y/o M with a PHx HTN, DM, stroke one year ago (at the time patient received Tenecteplase and had full resolution of symptoms), who presented to [facility] on 10/12/23 following sudden fall at work and lost consciousness. NIHSS 10 on admission. Admitted under NeuroICU on 10/12. Imaging revealed ischemia in the posterior circulation, CT angiogram head/neck showed right PCA P1-2 cutoff, possibly chronic right MCA M1 cutoff which had distal reconstitution and established collaterals, and left PCA occlusion which patient was subsequently transferred for mechanical thrombectomy.    Cerebral angiogram and mechanical thrombectomy performed using aspiration with TICI 3 recanalization of left PCA. The right occipital lobe filled from right SHANI collaterals, and the right MCA as well was thought to be chronic given noted collateralization. MR with small to moderate left temporal occipital lobe infarctions. Occluded right MCA, occluded right PCA and right vertebral artery proximal segment. The patient was started on a hep gtt given extensive atherosclerotic disease which was switched to apixaban 5mg BID with recommendations for a three month course.    TTE with normal left ventricular internal cavity size. Left ventricular ejection fractio 50 to 55%. Per Neuro, no need for ROCIO at this time. Pt had an episode of bradycardia during his stay and there were concern for 2:1 block. ILR placed 10/20 and no indication for PPM at this time.     Patient was evaluated by PM&R and therapy for functional deficits, gait/ADL impairments and acute rehabilitation was recommended. Patient was medically optimized for discharge to [facility] IRF on 10/27/23.  (27 Oct 2023 12:30)    Rehab course significant for severe short term memory deficits and hemisensory deficits. he was placed on memantine and donezepil with improvement, EKG Qtc monitored, stable. he was maintained on eliquis (est 3 month Rx) and statin post CVA. lisinopril was not toelrated and DC'd; he is currently stable without BP medications, and recommended for spot doses hydralazine if SBP  >170, however he has not required it.      Functional Status:  Tolerating Regular with thin. Improving He has deficits in recall and short term memory deficits, reasoning, attention, executive functioning.  Not impulsive or unsafe.  Needs assistance with IADLs, finances, meds.He requires Setup/sup - eating, grooming, UBD. CS - toileting, toilet transfers and LBD. Min-A Bathing. Mod A--f/w, CG ambulating to bathroom Ambulation 150 feet without AD and 12 steps with supervision.      All other medical co-morbidities were stable. Patient tolerated course of inpatient PT/OT/SLP rehab with significant improvements and met rehab goals prior to discharge. Discharge instructions were discussed with patient and family. All questions answered and all concerns addressed. Patient was medically cleared for discharge to [facility] [place].    Outpatient Follow-ups:  Rj Delgado  Cardiac Electrophysiology  [address] [place] [id number]  Phone: [phone]  Fax: [phone]  Follow Up Time:     Wily Barkley  Neurology  [address] [place] [address]  Phone: [phone]  Fax: [phone]  Follow Up Time: 1 week    Denzel Zapata  Interventional Cardiology  [address] [address]  Phone: [phone]  Fax: [phone]  Follow Up Time: 1 week    PCP  Follow Up Time: 1 week    Arsenio Goodrich  Interventional Neuroradiology  [address] [place] [address]  Phone: [phone]  Fax: [phone]       HPI:  Patient is a 57 y/o M with a PHx HTN, DM, stroke one year ago (at the time patient received Tenecteplase and had full resolution of symptoms), who presented to [facility] on 10/12/23 following sudden fall at work and lost consciousness. NIHSS 10 on admission. Admitted under NeuroICU on 10/12. Imaging revealed ischemia in the posterior circulation, CT angiogram head/neck showed right PCA P1-2 cutoff, possibly chronic right MCA M1 cutoff which had distal reconstitution and established collaterals, and left PCA occlusion which patient was subsequently transferred for mechanical thrombectomy.    Cerebral angiogram and mechanical thrombectomy performed using aspiration with TICI 3 recanalization of left PCA. The right occipital lobe filled from right SHANI collaterals, and the right MCA as well was thought to be chronic given noted collateralization. MR with small to moderate left temporal occipital lobe infarctions. Occluded right MCA, occluded right PCA and right vertebral artery proximal segment. The patient was started on a hep gtt given extensive atherosclerotic disease which was switched to apixaban 5mg BID with recommendations for a three month course.    TTE with normal left ventricular internal cavity size. Left ventricular ejection fractio 50 to 55%. Per Neuro, no need for ROCIO at this time. Pt had an episode of bradycardia during his stay and there were concern for 2:1 block. ILR placed 10/20 and no indication for PPM at this time.     Patient was evaluated by PM&R and therapy for functional deficits, gait/ADL impairments and acute rehabilitation was recommended. Patient was medically optimized for discharge to [facility] IRF on 10/27/23.  (27 Oct 2023 12:30)    Rehab course significant for severe short term memory deficits and hemisensory deficits. he was placed on memantine and donezepil with improvement, EKG Qtc monitored, stable. he was maintained on eliquis (est 3 month Rx) and statin post CVA. lisinopril was not toelrated and DC'd; he is currently stable without BP medications, and recommended for spot doses hydralazine if SBP  >170, however he has not required it.      Functional Status:  Tolerating Regular with thin. Improving He has deficits in recall and short term memory deficits, reasoning, attention, executive functioning.  Not impulsive or unsafe.  Needs assistance with IADLs, finances, meds.He requires Setup/sup - eating, grooming, UBD. CS - toileting, toilet transfers and LBD. Min-A Bathing. Mod A--f/w, CG ambulating to bathroom Ambulation 150 feet without AD and 12 steps with supervision.      All other medical co-morbidities were stable. Patient tolerated course of inpatient PT/OT/SLP rehab with significant improvements and met rehab goals prior to discharge. Discharge instructions were discussed with patient and family. All questions answered and all concerns addressed. Patient was medically cleared for discharge to [facility].    Outpatient Follow-ups:  Rj Delgado  Cardiac Electrophysiology  [address] [id number]  Phone: [phone]  Fax: [phone]  Follow Up Time:     Wily Barkley  Neurology  [address] [place] [address]  Phone: [phone]  Fax: [phone]  Follow Up Time: 1 week    Denzel Zapata  Interventional Cardiology  [address] [address]  Phone: [phone]  Fax: [phone]  Follow Up Time: 1 week    PCP  Follow Up Time: 1 week    Arsenio Goodrich  Interventional Neuroradiology  [address] [place] [address]  Phone: [phone]  Fax: [phone]       HPI:  Patient is a 59 y/o M with a PHx HTN, DM, stroke one year ago (at the time patient received Tenecteplase and had full resolution of symptoms), who presented to [facility] on 10/12/23 following sudden fall at work and lost consciousness. NIHSS 10 on admission. Admitted under NeuroICU on 10/12. Imaging revealed ischemia in the posterior circulation, CT angiogram head/neck showed right PCA P1-2 cutoff, possibly chronic right MCA M1 cutoff which had distal reconstitution and established collaterals, and left PCA occlusion which patient was subsequently transferred for mechanical thrombectomy.    Cerebral angiogram and mechanical thrombectomy performed using aspiration with TICI 3 recanalization of left PCA. The right occipital lobe filled from right SHANI collaterals, and the right MCA as well was thought to be chronic given noted collateralization. MR with small to moderate left temporal occipital lobe infarctions. Occluded right MCA, occluded right PCA and right vertebral artery proximal segment. The patient was started on a hep gtt given extensive atherosclerotic disease which was switched to apixaban 5mg BID with recommendations for a three month course.    TTE with normal left ventricular internal cavity size. Left ventricular ejection fractio 50 to 55%. Per Neuro, no need for ROCIO at this time. Pt had an episode of bradycardia during his stay and there were concern for 2:1 block. ILR placed 10/20 and no indication for PPM at this time.     Patient was evaluated by PM&R and therapy for functional deficits, gait/ADL impairments and acute rehabilitation was recommended. Patient was medically optimized for discharge to [facility] IRF on 10/27/23.  (27 Oct 2023 12:30)    Rehab course significant for severe short term memory deficits and hemisensory deficits. he was placed on memantine and donezepil with improvement, EKG Qtc monitored, stable. he was maintained on eliquis (est 3 month Rx) and statin post CVA. lisinopril was not toelrated and DC'd; he is currently stable without BP medications, and recommended for spot doses hydralazine if SBP  >170, however he has not required it.      Functional Status:  Tolerating Regular with thin. Improving He has deficits in recall and short term memory deficits, reasoning, attention, executive functioning.  Not impulsive or unsafe.  Needs assistance with IADLs, finances, meds.He requires Setup/sup - eating, grooming, UBD. CS - toileting, toilet transfers and LBD. Min-A Bathing. Mod A--f/w, CG ambulating to bathroom Ambulation 150 feet without AD and 12 steps with supervision.      All other medical co-morbidities were stable. Patient tolerated course of inpatient PT/OT/SLP rehab with significant improvements and met rehab goals prior to discharge. Discharge instructions were discussed with patient and family. All questions answered and all concerns addressed. Patient was medically cleared for discharge to [facility].    Outpatient Follow-ups:  Rj Delgado  Cardiac Electrophysiology  [address] [address] [id number]  Phone: [phone]  Fax: [phone]  Follow Up Time:     Wily Barkley  Neurology  [address] [place] [address]  Phone: [phone]  Fax: [phone]  Follow Up Time: 1 week    Denzel Zapata  Interventional Cardiology  [address] [address]  Phone: [phone]  Fax: [phone]  Follow Up Time: 1 week    PCP  Follow Up Time: 1 week    Arsenio Goodrich  Interventional Neuroradiology  [address] [place] [address]  Phone: [phone]  Fax: [phone]       HPI:  Patient is a 57 y/o M with a PHx HTN, DM, stroke one year ago (at the time patient received Tenecteplase and had full resolution of symptoms), who presented to [facility] on 10/12/23 following sudden fall at work and lost consciousness. NIHSS 10 on admission. Admitted under NeuroICU on 10/12. Imaging revealed ischemia in the posterior circulation, CT angiogram head/neck showed right PCA P1-2 cutoff, possibly chronic right MCA M1 cutoff which had distal reconstitution and established collaterals, and left PCA occlusion which patient was subsequently transferred for mechanical thrombectomy.    Cerebral angiogram and mechanical thrombectomy performed using aspiration with TICI 3 recanalization of left PCA. The right occipital lobe filled from right SHANI collaterals, and the right MCA as well was thought to be chronic given noted collateralization. MR with small to moderate left temporal occipital lobe infarctions. Occluded right MCA, occluded right PCA and right vertebral artery proximal segment. The patient was started on a hep gtt given extensive atherosclerotic disease which was switched to apixaban 5mg BID with recommendations for a three month course.    TTE with normal left ventricular internal cavity size. Left ventricular ejection fractio 50 to 55%. Per Neuro, no need for ROCIO at this time. Pt had an episode of bradycardia during his stay and there were concern for 2:1 block. ILR placed 10/20 and no indication for PPM at this time.     Patient was evaluated by PM&R and therapy for functional deficits, gait/ADL impairments and acute rehabilitation was recommended. Patient was medically optimized for discharge to [facility] IRF on 10/27/23.  (27 Oct 2023 12:30)    Rehab course significant for severe short term memory deficits and hemisensory deficits. he was placed on memantine and donezepil with improvement, EKG Qtc monitored, stable. he was maintained on eliquis (est 3 month Rx) and statin post CVA. lisinopril was not toelrated and DC'd; he is currently stable without BP medications, and recommended for spot doses hydralazine if SBP  >170, however he has not required it, so discontinued.      Discharge Functional Status:   supervision transfers, independent bed mobiltiy, supervision ambulation 150 feet without assist device/CG, set up UB/CG LB dressing, set up eating and grooming. Reduced memory, reduced initiation. Negotiates 12 steps with 1 HR and CS    All other medical co-morbidities were stable. Patient tolerated course of inpatient PT/OT/SLP rehab with significant improvements and met rehab goals prior to discharge. Discharge instructions were discussed with patient and family. All questions answered and all concerns addressed. Patient was medically cleared for discharge to [facility].    Outpatient Follow-ups:  Rj Delgado  Cardiac Electrophysiology  [address] [id number]  Phone: [phone]  Fax: [phone]  Follow Up Time:     Wily Barkley  Neurology  [address] [place] [address]  Phone: [phone]  Fax: [phone]  Follow Up Time: 1 week    Denzel Zapata  Interventional Cardiology  [address] [id number]  Phone: [phone]  Fax: [phone]  Follow Up Time: 1 week    PCP  Follow Up Time: 1 week    Arsenio Goodrich  Interventional Neuroradiology  [address] [place] [address]  Phone: [phone]  Fax: [phone]       HPI:  Patient is a 59 y/o M with a PHx HTN, DM, stroke one year ago (at the time patient received Tenecteplase and had full resolution of symptoms), who presented to [facility] on 10/12/23 following sudden fall at work and lost consciousness. NIHSS 10 on admission. Admitted under NeuroICU on 10/12. Imaging revealed ischemia in the posterior circulation, CT angiogram head/neck showed right PCA P1-2 cutoff, possibly chronic right MCA M1 cutoff which had distal reconstitution and established collaterals, and left PCA occlusion which patient was subsequently transferred for mechanical thrombectomy.    Cerebral angiogram and mechanical thrombectomy performed using aspiration with TICI 3 recanalization of left PCA. The right occipital lobe filled from right SHANI collaterals, and the right MCA as well was thought to be chronic given noted collateralization. MR with small to moderate left temporal occipital lobe infarctions. Occluded right MCA, occluded right PCA and right vertebral artery proximal segment. The patient was started on a hep gtt given extensive atherosclerotic disease which was switched to apixaban 5mg BID with recommendations for a three month course.    TTE with normal left ventricular internal cavity size. Left ventricular ejection fractio 50 to 55%. Per Neuro, no need for ROCIO at this time. Pt had an episode of bradycardia during his stay and there were concern for 2:1 block. ILR placed 10/20 and no indication for PPM at this time.     Patient was evaluated by PM&R and therapy for functional deficits, gait/ADL impairments and acute rehabilitation was recommended. Patient was medically optimized for discharge to [facility] IRF on 10/27/23.  (27 Oct 2023 12:30)    Rehab course significant for severe short term memory deficits and hemisensory deficits. he was placed on memantine and donezepil with improvement, EKG Qtc monitored, stable. he was maintained on eliquis (est 3 month Rx) and statin post CVA. lisinopril was not toelrated and DC'd; he is currently stable without BP medications, and recommended for spot doses hydralazine if SBP  >170, however he has not required it, so discontinued.      Discharge Functional Status:   supervision transfers, independent bed mobiltiy, supervision ambulation 150 feet without assist device/CG, set up UB/CG LB dressing, set up eating and grooming. Reduced memory, reduced initiation. Negotiates 12 steps with 1 HR and CS    All other medical co-morbidities were stable. Patient tolerated course of inpatient PT/OT/SLP rehab with significant improvements and met rehab goals prior to discharge. Discharge instructions were discussed with patient and family. All questions answered and all concerns addressed. Patient was medically cleared for discharge to [facility].    Outpatient Follow-ups:  Rj Delgado  Cardiac Electrophysiology  [address] [id number]  Phone: [phone]  Fax: [phone]  Follow Up Time:     Wily Barkley  Neurology  [address] [place] [address]  Phone: [phone]  Fax: [phone]  Follow Up Time: 1 week    Denzel Zapata  Interventional Cardiology  [address] [id number]  Phone: [phone]  Fax: [phone]  Follow Up Time: 1 week    PCP  Follow Up Time: 1 week    Arsenio Goodrich  Interventional Neuroradiology  [address] [place] [address]  Phone: [phone]  Fax: [phone]       HPI:  Patient is a 59 y/o M with a PHx HTN, DM, stroke one year ago (at the time patient received Tenecteplase and had full resolution of symptoms), who presented to [facility] on 10/12/23 following sudden fall at work and lost consciousness. NIHSS 10 on admission. Admitted under NeuroICU on 10/12. Imaging revealed ischemia in the posterior circulation, CT angiogram head/neck showed right PCA P1-2 cutoff, possibly chronic right MCA M1 cutoff which had distal reconstitution and established collaterals, and left PCA occlusion which patient was subsequently transferred for mechanical thrombectomy.    Cerebral angiogram and mechanical thrombectomy performed using aspiration with TICI 3 recanalization of left PCA. The right occipital lobe filled from right SHANI collaterals, and the right MCA as well was thought to be chronic given noted collateralization. MR with small to moderate left temporal occipital lobe infarctions. Occluded right MCA, occluded right PCA and right vertebral artery proximal segment. The patient was started on a hep gtt given extensive atherosclerotic disease which was switched to apixaban 5mg BID with recommendations for a three month course.    TTE with normal left ventricular internal cavity size. Left ventricular ejection fractio 50 to 55%. Per Neuro, no need for ROCIO at this time. Pt had an episode of bradycardia during his stay and there were concern for 2:1 block. ILR placed 10/20 and no indication for PPM at this time.     Patient was evaluated by PM&R and therapy for functional deficits, gait/ADL impairments and acute rehabilitation was recommended. Patient was medically optimized for discharge to [facility] on 10/27/23.  (27 Oct 2023 12:30)    Rehab course significant for severe short term memory deficits and hemisensory deficits. he was placed on memantine and donezepil with improvement, EKG Qtc monitored, stable. he was maintained on eliquis (est 3 month Rx) and statin post CVA. lisinopril was not toelrated and DC'd; he is currently stable without BP medications, and recommended for spot doses hydralazine if SBP  >170, however he has not required it, so discontinued.      Discharge Functional Status:   supervision transfers, independent bed mobiltiy, supervision ambulation 150 feet without assist device/CG, set up UB/CG LB dressing, set up eating and grooming. Reduced memory, reduced initiation. Negotiates 12 steps with 1 HR and CS    All other medical co-morbidities were stable. Patient tolerated course of inpatient PT/OT/SLP rehab with significant improvements and met rehab goals prior to discharge. Discharge instructions were discussed with patient and family. All questions answered and all concerns addressed. Patient was medically cleared for discharge to [facility].    Outpatient Follow-ups:  Rj Delgado  Cardiac Electrophysiology  [address] [id number]  Phone: [phone]  Fax: [phone]  Follow Up Time:     Wily Barkley  Neurology  [address] [place] [address]  Phone: [phone]  Fax: [phone]  Follow Up Time: 1 week    Denzel Zapata  Interventional Cardiology  [address] [id number]  Phone: [phone]  Fax: [phone]  Follow Up Time: 1 week    PCP  Follow Up Time: 1 week    Arsenio Goodrich  Interventional Neuroradiology  [address] [place] [address]  Phone: [phone]  Fax: [phone]

## 2023-11-09 NOTE — DISCHARGE NOTE PROVIDER - NSDCCAREPROVSEEN_GEN_ALL_CORE_FT
Shaheen, Ramya Magaña, Dewayne Persaud, Angie Georges, Abhishek Shaheen, Ramya Magaña, Dewayne Persaud, Angie Georges, Neal Vazquez-Casals, Jordan Quezada, Rosie MEDINA

## 2023-11-09 NOTE — DISCHARGE NOTE PROVIDER - NPI NUMBER (FOR SYSADMIN USE ONLY) :
[[id number]],[[id number]],[[id number]],[[id number]] [[id number]],[[id number]],[[id number]],[[id number]] [[id number]],[[id number]],[[id number]],[[id number]] [[id number]],[[id number]],[[id number]],[[id number]],[[id number]] [[id number]],[[id number]],[[id number]],[[id number]],[[id number]] [[id number]],[[id number]],[[id number]],[[id number]],[[id number]] [[id number]],[[id number]],[[id number]],[[id number]],[[id number]] [[id number]],[[id number]],[[id number]],[[id number]],[[id number]] [[id number]],[[id number]],[[id number]],[[id number]],[[id number]]

## 2023-11-09 NOTE — DISCHARGE NOTE PROVIDER - CARE PROVIDER_API CALL
Rj Delgado  Cardiac Electrophysiology  [address] [facility], [address] [place] [address]  Phone: [phone]  Fax: [phone]  Follow Up Time: 2 weeks    Wily Barkley  Neurology  [address] [place] [address]  Phone: [phone]  Fax: [phone]  Follow Up Time: 1 week    Denzel Zapata  Interventional Cardiology  [address] [facility], [address] [address]  Phone: [phone]  Fax: [phone]  Follow Up Time: 1 week    Arsenio Goodrich  Interventional Neuroradiology  [address] [place] [address]  Phone: [phone]  Fax: [phone]  Follow Up Time: 1 week   Rj Delgado  Cardiac Electrophysiology  [address] [facility], [address] [place] [address]  Phone: [phone]  Fax: [phone]  Follow Up Time: 2 weeks    Wily Barkley  Neurology  [address] [place] [address]  Phone: [phone]  Fax: [phone]  Follow Up Time: 1 week    Denzel Zapata  Interventional Cardiology  [address] [facility], [address] [address]  Phone: [phone]  Fax: [phone]  Follow Up Time: 1 week    Arsenio Goodrich  Interventional Neuroradiology  [address] [place] [address]  Phone: [phone]  Fax: [phone]  Follow Up Time: 1 week   Rj Delgado  Cardiac Electrophysiology  39 [facility], [address] [place] [address]  Phone: [phone]  Fax: [phone]  Follow Up Time: 2 weeks    Wily Barkley  Neurology  [address] [place] [address]  Phone: [phone]  Fax: [phone]  Follow Up Time: 1 week    Denzel Zapata  Interventional Cardiology  39 [facility], [address] [address]  Phone: [phone]  Fax: [phone]  Follow Up Time: 1 week    Arsenio Goodrich  Interventional Neuroradiology  [address] [place] [address]  Phone: [phone]  Fax: [phone]  Follow Up Time: 1 week   jR Delgado  Cardiac Electrophysiology  39 [facility], [address] [place] [address]  Phone: [phone]  Fax: [phone]  Follow Up Time: 2 weeks    Wily Barkley  Neurology  [address] [place] [address]  Phone: [phone]  Fax: [phone]  Follow Up Time: 1 week    Denzel Zapata  Interventional Cardiology  39 [facility], [address] [address]  Phone: [phone]  Fax: [phone]  Follow Up Time: 1 week    Arsenio Goodrich  Interventional Neuroradiology  [address] [place] [address]  Phone: [phone]  Fax: [phone]  Follow Up Time: 1 week    Angie Persaud  Physical/Rehab Medicine  [address] [place] [address]  Phone: [phone]  Fax: [phone]  Follow Up Time: 1 month   Rj Delgado  Cardiac Electrophysiology  39 [facility], [address] [place] [address]  Phone: [phone]  Fax: [phone]  Follow Up Time: 2 weeks    Wily Barkley  Neurology  [address] [place] [address]  Phone: [phone]  Fax: [phone]  Follow Up Time: 1 week    Denzel Zapata  Interventional Cardiology  [address] [facility], [address] [address]  Phone: [phone]  Fax: [phone]  Follow Up Time: 1 week    Arsenio Goodrich  Interventional Neuroradiology  [address] [place] [address]  Phone: [phone]  Fax: [phone]  Follow Up Time: 1 week    Angie Persaud  Physical/Rehab Medicine  [address] [place] [address]  Phone: [phone]  Fax: [phone]  Follow Up Time: 1 month   Rj Delgado  Cardiac Electrophysiology  39 [facility], [address] [place] [address]  Phone: [phone]  Fax: [phone]  Follow Up Time: 2 weeks    Wily Barkley  Neurology  [address] [place] [address]  Phone: [phone]  Fax: [phone]  Follow Up Time: 1 week    Denzel Zapata  Interventional Cardiology  39 [facility], [address] [address]  Phone: [phone]  Fax: [phone]  Follow Up Time: 1 week    Arsenio Goodrich  Interventional Neuroradiology  [address] [place] [address]  Phone: [phone]  Fax: [phone]  Follow Up Time: 1 week    Angie Persaud  Physical/Rehab Medicine  [address] [place] [address]  Phone: [phone]  Fax: [phone]  Follow Up Time: 1 month   Rj Delgado  Cardiac Electrophysiology  [address] [address]  Phone: [phone]  Fax: [phone]  Follow Up Time: 2 weeks    Wily Barkley  Neurology  [address] [place] [address]  Phone: [phone]  Fax: [phone]  Follow Up Time: 1 week    Denzel Zapata  Interventional Cardiology  [address] [address]  Phone: [phone]  Fax: [phone]  Follow Up Time: 1 week    Arsenio Goordich  Interventional Neuroradiology  [address] [place] [address]  Phone: [phone]  Fax: [phone]  Follow Up Time: 1 week    Michelle Sosa  Physical/Rehab Medicine  [address] [place] [address]  Phone: [phone]  Fax: [phone]  Follow Up Time:    Rj Delgado  Cardiac Electrophysiology  [address] [address]  Phone: [phone]  Fax: [phone]  Follow Up Time: 2 weeks    Wily Barkley  Neurology  [address] [place] [address]  Phone: [phone]  Fax: [phone]  Follow Up Time: 1 week    Denzel Zapata  Interventional Cardiology  [address] [address]  Phone: [phone]  Fax: [phone]  Follow Up Time: 1 week    Arsenio Goodrich  Interventional Neuroradiology  [address] [place] [address]  Phone: [phone]  Fax: [phone]  Follow Up Time: 1 week    Michelle Sosa  Physical/Rehab Medicine  [address] [place] [address]  Phone: [phone]  Fax: [phone]  Follow Up Time:    Rj Delgado  Cardiac Electrophysiology  [address] [address]  Phone: [phone]  Fax: [phone]  Follow Up Time: 2 weeks    Wily Barkley  Neurology  [address] [place] [address]  Phone: [phone]  Fax: [phone]  Follow Up Time: 1 week    Denzel Zapata  Interventional Cardiology  [address] [address]  Phone: [phone]  Fax: [phone]  Follow Up Time: 1 week    Arsenio Goodrich  Interventional Neuroradiology  [address] [place] [address]  Phone: [phone]  Fax: [phone]  Follow Up Time: 1 week    Michelle Sosa  Physical/Rehab Medicine  [address] [place] [address]  Phone: [phone]  Fax: [phone]  Follow Up Time:

## 2023-11-09 NOTE — DISCHARGE NOTE PROVIDER - PROVIDER TOKENS
PROVIDER:[TOKEN:[[id number]],FOLLOWUP:[2 weeks]],PROVIDER:[TOKEN:[[id number]],FOLLOWUP:[1 week]],PROVIDER:[TOKEN:[[id number]],FOLLOWUP:[1 week]],PROVIDER:[TOKEN:[[id number]],FOLLOWUP:[1 week]] PROVIDER:[TOKEN:[[id number]],FOLLOWUP:[2 weeks]],PROVIDER:[TOKEN:[[id number]],FOLLOWUP:[1 week]],PROVIDER:[TOKEN:[[id number]],FOLLOWUP:[1 week]],PROVIDER:[TOKEN:[[id number]],FOLLOWUP:[1 week]] PROVIDER:[TOKEN:[[id number]],FOLLOWUP:[2 weeks]],PROVIDER:[TOKEN:[[id number]],FOLLOWUP:[1 week]],PROVIDER:[TOKEN:[[id number]],FOLLOWUP:[1 week]],PROVIDER:[TOKEN:[[id number]],FOLLOWUP:[1 week]] PROVIDER:[TOKEN:[[id number]],FOLLOWUP:[2 weeks]],PROVIDER:[TOKEN:[[id number]],FOLLOWUP:[1 week]],PROVIDER:[TOKEN:[[id number]],FOLLOWUP:[1 week]],PROVIDER:[TOKEN:[[id number]],FOLLOWUP:[1 week]],PROVIDER:[TOKEN:[[id number]],FOLLOWUP:[1 month]] PROVIDER:[TOKEN:[[id number]],FOLLOWUP:[2 weeks]],PROVIDER:[TOKEN:[[id number]],FOLLOWUP:[1 week]],PROVIDER:[TOKEN:[[id number]],FOLLOWUP:[1 week]],PROVIDER:[TOKEN:[[id number]],FOLLOWUP:[1 week]],PROVIDER:[TOKEN:[[id number]],FOLLOWUP:[1 month]] PROVIDER:[TOKEN:[[id number]],FOLLOWUP:[2 weeks]],PROVIDER:[TOKEN:[[id number]],FOLLOWUP:[1 week]],PROVIDER:[TOKEN:[[id number]],FOLLOWUP:[1 week]],PROVIDER:[TOKEN:[[id number]],FOLLOWUP:[1 week]],PROVIDER:[TOKEN:[[id number]],FOLLOWUP:[1 month]] PROVIDER:[TOKEN:[[id number]],FOLLOWUP:[2 weeks]],PROVIDER:[TOKEN:[[id number]],FOLLOWUP:[1 week]],PROVIDER:[TOKEN:[[id number]],FOLLOWUP:[1 week]],PROVIDER:[TOKEN:[[id number]],FOLLOWUP:[1 week]],PROVIDER:[TOKEN:[[id number]:MIIS:[id number]]] PROVIDER:[TOKEN:[[id number]:[id number]],FOLLOWUP:[2 weeks]],PROVIDER:[TOKEN:[[id number]],FOLLOWUP:[1 week]],PROVIDER:[TOKEN:[[id number]],FOLLOWUP:[1 week]],PROVIDER:[TOKEN:[[id number]],FOLLOWUP:[1 week]],PROVIDER:[TOKEN:[[id number]:MIIS:[id number]]] PROVIDER:[TOKEN:[[id number]],FOLLOWUP:[2 weeks]],PROVIDER:[TOKEN:[[id number]],FOLLOWUP:[1 week]],PROVIDER:[TOKEN:[[id number]],FOLLOWUP:[1 week]],PROVIDER:[TOKEN:[[id number]],FOLLOWUP:[1 week]],PROVIDER:[TOKEN:[[id number]:[id number]]]

## 2023-11-10 PROCEDURE — 99232 SBSQ HOSP IP/OBS MODERATE 35: CPT

## 2023-11-10 PROCEDURE — 93010 ELECTROCARDIOGRAM REPORT: CPT

## 2023-11-10 RX ORDER — MEMANTINE HYDROCHLORIDE 10 MG/1
10 TABLET ORAL
Refills: 0 | Status: DISCONTINUED | OUTPATIENT
Start: 2023-11-10 | End: 2023-12-05

## 2023-11-10 RX ORDER — LINAGLIPTIN 5 MG/1
5 TABLET, FILM COATED ORAL DAILY
Refills: 0 | Status: DISCONTINUED | OUTPATIENT
Start: 2023-11-10 | End: 2023-11-10

## 2023-11-10 RX ADMIN — Medication 100 MILLIGRAM(S): at 11:12

## 2023-11-10 RX ADMIN — METFORMIN HYDROCHLORIDE 500 MILLIGRAM(S): 850 TABLET ORAL at 08:29

## 2023-11-10 RX ADMIN — APIXABAN 5 MILLIGRAM(S): 2.5 TABLET, FILM COATED ORAL at 18:51

## 2023-11-10 RX ADMIN — CITALOPRAM 20 MILLIGRAM(S): 10 TABLET, FILM COATED ORAL at 11:12

## 2023-11-10 RX ADMIN — MEMANTINE HYDROCHLORIDE 10 MILLIGRAM(S): 10 TABLET ORAL at 17:05

## 2023-11-10 RX ADMIN — METFORMIN HYDROCHLORIDE 500 MILLIGRAM(S): 850 TABLET ORAL at 17:04

## 2023-11-10 RX ADMIN — FAMOTIDINE 20 MILLIGRAM(S): 10 INJECTION INTRAVENOUS at 11:12

## 2023-11-10 RX ADMIN — ATORVASTATIN CALCIUM 80 MILLIGRAM(S): 80 TABLET, FILM COATED ORAL at 21:07

## 2023-11-10 RX ADMIN — DONEPEZIL HYDROCHLORIDE 5 MILLIGRAM(S): 10 TABLET, FILM COATED ORAL at 08:30

## 2023-11-10 RX ADMIN — APIXABAN 5 MILLIGRAM(S): 2.5 TABLET, FILM COATED ORAL at 05:37

## 2023-11-10 RX ADMIN — MEMANTINE HYDROCHLORIDE 5 MILLIGRAM(S): 10 TABLET ORAL at 05:37

## 2023-11-10 RX ADMIN — Medication 1 TABLET(S): at 11:12

## 2023-11-10 NOTE — PROGRESS NOTE ADULT - ASSESSMENT
Assessment and Plan:   Patient is a 59 y/o M with a PHx HTN, DM, CVA sp tnK with full resolution symptoms, who presented to [facility] on 10/12 due to sudden fall at work and lost consciousness.  MRI brain showed left temporal occipital lobe infarctions and acute punctate left occipital lobe infarction now s/p thrombectomy and Right MCA/PCA oclusion with left sided weakness, sensory impairment, left homonymous hemianopsia, gait and ADL impairments.     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 Continue for 3 months.    - Atorvastatin 80mg qhs.   - C/w Memantine 5mg BID for cognitive deficits - 11/3.   - cont comprehensive rehab program OT, PT, SLP  3 hours daily 5 x week  - neuropsychology evaluation and support, recreation therapy  - Precautions: cardiac, DM, respiratory, breast CA, fall, aspiration.  - No new neurological deficits    #Memory deficits  -10/13: MRI Brain  showing L PCA P2 occlusion    -Memantine 5mg BID   - cont. Donepezil 5mg in AM--started  11/7--monitor for SE--tolerating well so far  - EKG QTc monitoring 11/10  -Monitor HR as donepezil has slight risk of worsening bradycardia  -Monitor QTc interval -- 405 on 10/3  -QTc 460 on 11/7       # Bradycardia  - No indication for pacemaker  - Metoprolol 12.5mg BID has been discontinued   - per electrophysiology notes, no indication for pacemaker, s/p ILR on 10/20  - needs EP follow up as an outpatient  - HR 64bpm on 11/7     # HTN  - No medication at home and patient is sensitive to HTN medication. Was previously started on lisinopril.   - Can treat with hydralazine 25mg PRN for >170.   - did not tolerate lisinopril 5mg during hospitalization (intermittent hypotension)  - Dr. Curry suggests 10 point decrease in BP parameters based on clinical exam  - Current parameters -150 over weekend, titrate 120-140 as tolerated  - SBP goal 140-150s.  (120/60 - 177/83) 11/10    # HLD  - Atorvastatin 80 mg daily    # DM 2  - A1C 8.6  - Metformin   - Accu checks and ISS    # ETOH abuse  - Not in active withdrawal.  - Continue MVI, thiamine and folic acid    # Pain management  - No complaints     # GI/Bowel:  - Senna QHS, Miralax Daily  - dulcolax PO     # /Bladder:   -Continent    # FEN   - Diet: DASH DIET     # DVT ppx  - Eliquis 5mg BID    #Dispo  - awaiting placement    IDT: 11/7   TDD:  11/11 to home.  d/c plan to [facility] ASAP   RN: continent with bowel and continent with bladder   SW: Lives in Apartment with his daughter. On the main floor. He was independent before. Sister is supportive  SP: Regular with thin. Mild cognitive deficits-- . Recall and short term memory deficits, reasoning, attention, decreased mental flexibility.  Needs assistance with IADLs  OT: Setup - eating & UBD. CG - grooming, toileting, toilet transfers and CS-- LBD.   PT : Supervision - transfer. Ambulation 150 feet without AD and 12 steps with supervision.    Goals:1.  Supervision for toileting.   2.  Supervision for ambulation.   3. Recall salient information with external aides    11/8 - SW working on [facility] placement, discussed with sister     Addendum - Spoke to Lety about discharge planning. No-one is able to support at home at this time.     Outpatient Follow-up (Specialty/Name of physician):    Rj Delgado  Cardiac Electrophysiology  [address] [address]  Phone: [phone]  Fax: [phone]  Follow Up Time:     Wily Barkley  Neurology  [address] [place] [address]  Phone: [phone]  Fax: [phone]  Follow Up Time: 1 week    Denzel Zapata  Interventional Cardiology  [address] [address]  Phone: [phone]  Fax: [phone]  Follow Up Time: 1 week    PCP,   Phone: (   )    -  Fax: (   )    -  Follow Up Time: 1 week    Arsenio Goodrich  Interventional Neuroradiology  [address] [place] [address]  Phone: [phone]  Fax: [phone]             Assessment and Plan:   Patient is a 59 y/o M with a PHx HTN, DM, CVA sp tnK with full resolution symptoms, who presented to [facility] on 10/12 due to sudden fall at work and lost consciousness.  MRI brain showed left temporal occipital lobe infarctions and acute punctate left occipital lobe infarction now s/p thrombectomy and Right MCA/PCA oclusion with left sided weakness, sensory impairment, left homonymous hemianopsia, gait and ADL impairments.     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 Continue for 3 months.    - Atorvastatin 80mg qhs.   - C/w Memantine 5mg BID for cognitive deficits - 11/3.   - cont comprehensive rehab program OT, PT, SLP  3 hours daily 5 x week  - neuropsychology evaluation and support, recreation therapy  - Precautions: cardiac, DM, respiratory, breast CA, fall, aspiration.  - No new neurological deficits    #Memory deficits  -10/13: MRI Brain  showing L PCA P2 occlusion    - Trial increase Memantine to 10mg BID   - cont. Donepezil 5mg in AM--started  11/7--monitor for SE--tolerating well so far  - EKG QTc monitoring 11/10  -Monitor HR as donepezil has slight risk of worsening bradycardia  -Monitor QTc interval -- 405 on 10/3  -QTc 460 on 11/7       # Bradycardia  - No indication for pacemaker  - Metoprolol 12.5mg BID has been discontinued   - per electrophysiology notes, no indication for pacemaker, s/p ILR on 10/20  - needs EP follow up as an outpatient  - HR 64bpm on 11/7     # HTN  - No medication at home and patient is sensitive to HTN medication. Was previously started on lisinopril.   - Can treat with hydralazine 25mg PRN for >170.   - did not tolerate lisinopril 5mg during hospitalization (intermittent hypotension)  - Dr. Curry suggests 10 point decrease in BP parameters based on clinical exam  - Current parameters -150 over weekend, titrate 120-140 as tolerated  - SBP goal 140-150s.  (120/60 - 177/83) 11/10    # HLD  - Atorvastatin 80 mg daily    # DM 2  - A1C 8.6  - Metformin   - Accu checks and ISS    # ETOH abuse  - Not in active withdrawal.  - Continue MVI, thiamine and folic acid    # Pain management  - No complaints     # GI/Bowel:  - Senna QHS, Miralax Daily  - dulcolax PO     # /Bladder:   -Continent    # FEN   - Diet: DASH DIET     # DVT ppx  - Eliquis 5mg BID    #Dispo  - awaiting placement    IDT: 11/7   TDD:  11/11 to home.  d/c plan to [facility] ASAP   RN: continent with bowel and continent with bladder   SW: Lives in Apartment with his daughter. On the main floor. He was independent before. Sister is supportive  SP: Regular with thin. Mild cognitive deficits-- . Recall and short term memory deficits, reasoning, attention, decreased mental flexibility.  Needs assistance with IADLs  OT: Setup - eating & UBD. CG - grooming, toileting, toilet transfers and CS-- LBD.   PT : Supervision - transfer. Ambulation 150 feet without AD and 12 steps with supervision.    Goals:1.  Supervision for toileting.   2.  Supervision for ambulation.   3. Recall salient information with external aides    11/8 - SW working on [facility] placement, discussed with sister     Addendum - Spoke to Lety about discharge planning. No-one is able to support at home at this time.     Outpatient Follow-up (Specialty/Name of physician):    Rj Delgado  Cardiac Electrophysiology  [address] [address]  Phone: [phone]  Fax: [phone]  Follow Up Time:     Wily Barkley  Neurology  [address] [place] [address]  Phone: [phone]  Fax: [phone]  Follow Up Time: 1 week    Denzel Zapata  Interventional Cardiology  39 [facility], [address] [address]  Phone: [phone]  Fax: [phone]  Follow Up Time: 1 week    PCP,   Phone: (   )    -  Fax: (   )    -  Follow Up Time: 1 week    Arsenio Goodrich  Interventional Neuroradiology  [address] [place] [address]  Phone: [phone]  Fax: [phone]

## 2023-11-10 NOTE — PROGRESS NOTE ADULT - ATTENDING COMMENTS
Pt. seen with resident.  Agree with documentation above as per resident with amendments made as appropriate. Patient medically stable. Making progress towards rehab goals.     Left PCA s/p thrombectomy, right MCA/PCA occlusion  --Tolerating donepezil 5mg daily w/o SE's  --f/u routine EKG  -- Trial increase Memantine to 10mg BID

## 2023-11-10 NOTE — PROGRESS NOTE ADULT - ASSESSMENT
58M SP CVA no residual sp thrombolysis, Moyamoya HTN, DM (not compliant with care/off meds),  Admit sp LOC 10/12 sp Thrombectomy     #Acute CVA SP thrombectomy  -per IR, continue Eliquis for 3 mo  -moyamoya seen on imaging  -Statin  -outpatient neuro IR follow up.    # Bradycardia  -TTE EF 50-55%, no other structural abnormalities  -assymptomatic  - Metoprolol 12.5mg BID has been discontinued   - per electrophysiology notes, no indication for pacemaker, s/p ILR on 10/20  - needs EP follow up as an outpatient.    #intermittent Dizziness   -Encouraged fluid intake --improved today after additional fluid intake    #ETOH use disorder  -  in remission, stable.    #HTN  - No meds at home  - permissive hypertension as per neuro recs.   - did not tolerate lisinopril 5mg during hospitalization (intermittent hypotension)  - may resume Amlodipine 5mg once permissive hypertensive period has past    #DM 2  -wasnt taking meds regularly at home  -A1c 8.5% so should require two agents minimum  Metformin could be titrated as tolerated  Add tradgenta      #Elevated TSH  T4 level normal  probably subclinical    DVT Prophylaxis - Eliquis.     will follow  d/w rehab team

## 2023-11-10 NOTE — PROGRESS NOTE ADULT - SUBJECTIVE AND OBJECTIVE BOX
No events overnight  No new complaints to offer me    Vital Signs Last 24 Hrs  T(F): 97.6 (10 Nov 2023 08:33), Max: 97.6 (10 Nov 2023 08:33)  HR: 53 (10 Nov 2023 08:33) (53 - 59)  BP: 152/90 (10 Nov 2023 08:40) (120/60 - 177/83)  RR: 16 (10 Nov 2023 08:33) (16 - 16)  SpO2: 94% (10 Nov 2023 08:33) (94% - 95%)    I&O's Summary      Examination:  General: NAD, Comfortable  Pulm: CTA BL No Rhonchi Rales or wheezes  Neck: Supple, No JVD  CVS: RRR No rubs murmurs or gallops  Abdomen: Soft, Nontender, Nondistended; No masses or organomegally  Extremities: No calf tenderness, No pitting edema    Labs:                        13.6   5.19  )-----------( 111      ( 09 Nov 2023 07:09 )             40.1       11-09    141  |  103  |  16  ----------------------------<  111  5.1   |  31  |  1.05    Ca    9.3      09 Nov 2023 07:09    TPro  7.0  /  Alb  3.6  /  TBili  0.7  /  DBili  x   /  AST  35  /  ALT  40  /  AlkPhos  75  11-09

## 2023-11-10 NOTE — PROGRESS NOTE ADULT - SUBJECTIVE AND OBJECTIVE BOX
HPI:  Patient is a 59 y/o M with a PHx HTN, DM, stroke one year ago (at the time patient received Tenecteplase and had full resolution of symptoms), who presented to [facility] on 10/12/23 following sudden fall at work and lost consciousness. NIHSS 10 on admission. Admitted under NeuroICU on 10/12. Imaging revealed ischemia in the posterior circulation, CT angiogram head/neck showed right PCA P1-2 cutoff, possibly chronic right MCA M1 cutoff which had distal reconstitution and established collaterals, and left PCA occlusion which patient was subsequently transferred for mechanical thrombectomy.    Cerebral angiogram and mechanical thrombectomy performed using aspiration with TICI 3 recanalization of left PCA. The right occipital lobe filled from right SHANI collaterals, and the right MCA as well was thought to be chronic given noted collateralization. MR with small to moderate left temporal occipital lobe infarctions. Occluded right MCA, occluded right PCA and right vertebral artery proximal segment. The patient was started on a hep gtt given extensive atherosclerotic disease which was switched to apixaban 5mg BID with recommendations for a three month course.    TTE with normal left ventricular internal cavity size. Left ventricular ejection fractio 50 to 55%. Per Neuro, no need for ROCIO at this time. Pt had an episode of bradycardia during his stay and there were concern for 2:1 block. ILR placed 10/20 and no indication for PPM at this time.     Patient was evaluated by PM&R and therapy for functional deficits, gait/ADL impairments and acute rehabilitation was recommended. Patient was medically optimized for discharge to [facility] on 10/27/23.       Subjective:    No acute overnight events. Awake, alert. Able to recall some events from yesterday. Asks appropriate questions.     ROS  -no SOB  -no Abd pain, constipation, diarrhea. Charted BM 11/8  -no chest pain  +fatigue  +neurological deficits    Vital Signs Last 24 Hrs  T(C): 36.4 (10 Nov 2023 08:33), Max: 36.4 (10 Nov 2023 08:33)  T(F): 97.6 (10 Nov 2023 08:33), Max: 97.6 (10 Nov 2023 08:33)  HR: 53 (10 Nov 2023 08:33) (53 - 59)  BP: 152/90 (10 Nov 2023 08:40) (120/60 - 177/83)  BP(mean): --  RR: 16 (10 Nov 2023 08:33) (16 - 16)  SpO2: 94% (10 Nov 2023 08:33) (94% - 95%)    Parameters below as of 10 Nov 2023 08:33  Patient On (Oxygen Delivery Method): room air      Physical Exam:  Constitutional - NAD, Comfortable  HEENT - NCAT, EOMI  Chest -breathing comfortably on RA  Cardio - warm and well perfused, + loop recorder.   Abdomen - Obese, soft, no-tender  Extremities - No peripheral edema, Long curling toenails.    Neurologic Exam:                    Cognitive -             Orientation: AA&Ox2-3 to self, year and location, not to month or day             Attention:   able to state days of week backward, difficulty with serial 7s (able to reach 86 without delay)            Memory: short term memory deficits      Speech - Fluent, Comprehensible, No dysarthria, No aphasia      Cranial Nerves - No facial asymmetry, Tongue midline, EOMI, Shoulder shrug intact. Vision Impaired on the Left upper visual field in L > R eye.      Motor -                      LEFT    UE - ShAB 4/5, EF 4-5/5, EE 4/5, WE 5/5,  WNL                     RIGHT UE - ShAB 5/5, EF 5/5, EE 5/5, WE 5/5,  WNL                    LEFT    LE - HF 5/5, KE 5/5, DF 5/5, PF 5/5                    RIGHT LE - HF 5/5, KE 5/5, DF 5/5, PF 5/5        Sensory - Impaired to LT on the left face (V2/3), arm and leg.  Feels LT but sensation of numbness and lesser than right side.      Reflexes - symmetric DTR +1 in LE and UE. Neg Babinski's b/l     Coordination - FTN / HTS intact b/l                                     13.6   5.19  )-----------( 111      ( 09 Nov 2023 07:09 )             40.1     11-09    141  |  103  |  16  ----------------------------<  111<H>  5.1   |  31  |  1.05    Ca    9.3      09 Nov 2023 07:09    TPro  7.0  /  Alb  3.6  /  TBili  0.7  /  DBili  x   /  AST  35  /  ALT  40  /  AlkPhos  75  11-09      Urinalysis Basic - ( 09 Nov 2023 07:09 )    Color: x / Appearance: x / SG: x / pH: x  Gluc: 111 mg/dL / Ketone: x  / Bili: x / Urobili: x   Blood: x / Protein: x / Nitrite: x   Leuk Esterase: x / RBC: x / WBC x   Sq Epi: x / Non Sq Epi: x / Bacteria: x      CAPILLARY BLOOD GLUCOSE      POCT Blood Glucose.: 171 mg/dL (05 Nov 2023 11:55)    MEDICATIONS  (STANDING):  apixaban 5 milliGRAM(s) Oral two times a day  atorvastatin 80 milliGRAM(s) Oral at bedtime  bisacodyl 5 milliGRAM(s) Oral at bedtime  citalopram 20 milliGRAM(s) Oral daily  dextrose 50% Injectable 25 Gram(s) IV Push once  donepezil 5 milliGRAM(s) Oral with breakfast  famotidine    Tablet 20 milliGRAM(s) Oral daily  folic acid 1 milliGRAM(s) Oral daily  memantine 5 milliGRAM(s) Oral two times a day  metFORMIN 500 milliGRAM(s) Oral two times a day  multivitamin 1 Tablet(s) Oral daily  polyethylene glycol 3350 17 Gram(s) Oral daily  senna 2 Tablet(s) Oral at bedtime  thiamine 100 milliGRAM(s) Oral daily    MEDICATIONS  (PRN):

## 2023-11-11 PROCEDURE — 99232 SBSQ HOSP IP/OBS MODERATE 35: CPT | Mod: GC

## 2023-11-11 PROCEDURE — 99232 SBSQ HOSP IP/OBS MODERATE 35: CPT

## 2023-11-11 RX ADMIN — MEMANTINE HYDROCHLORIDE 10 MILLIGRAM(S): 10 TABLET ORAL at 05:09

## 2023-11-11 RX ADMIN — DONEPEZIL HYDROCHLORIDE 5 MILLIGRAM(S): 10 TABLET, FILM COATED ORAL at 09:09

## 2023-11-11 RX ADMIN — ATORVASTATIN CALCIUM 80 MILLIGRAM(S): 80 TABLET, FILM COATED ORAL at 21:05

## 2023-11-11 RX ADMIN — Medication 1 TABLET(S): at 12:07

## 2023-11-11 RX ADMIN — Medication 100 MILLIGRAM(S): at 12:10

## 2023-11-11 RX ADMIN — Medication 1 MILLIGRAM(S): at 12:07

## 2023-11-11 RX ADMIN — APIXABAN 5 MILLIGRAM(S): 2.5 TABLET, FILM COATED ORAL at 17:57

## 2023-11-11 RX ADMIN — CITALOPRAM 20 MILLIGRAM(S): 10 TABLET, FILM COATED ORAL at 12:08

## 2023-11-11 RX ADMIN — FAMOTIDINE 20 MILLIGRAM(S): 10 INJECTION INTRAVENOUS at 12:07

## 2023-11-11 RX ADMIN — APIXABAN 5 MILLIGRAM(S): 2.5 TABLET, FILM COATED ORAL at 05:10

## 2023-11-11 RX ADMIN — MEMANTINE HYDROCHLORIDE 10 MILLIGRAM(S): 10 TABLET ORAL at 17:58

## 2023-11-11 RX ADMIN — METFORMIN HYDROCHLORIDE 500 MILLIGRAM(S): 850 TABLET ORAL at 17:58

## 2023-11-11 RX ADMIN — METFORMIN HYDROCHLORIDE 500 MILLIGRAM(S): 850 TABLET ORAL at 09:09

## 2023-11-11 NOTE — PROGRESS NOTE ADULT - ASSESSMENT
Imp: Patient with diagnosis of CVA admitted for comprehensive acute rehabilitation.    Plan:  - Continue PT/OT/SLP  - DVT prophylaxis- On Eliquis  - Skin- Turn q2h, check skin daily  - Continue current medications; patient medically stable.   - Patient is stable to continue current rehabilitation program.   - Medical issues:   * HTN - improved today and at goal.   * Bradycardia - asymptomatic. Continue to monitor.

## 2023-11-12 PROCEDURE — 99232 SBSQ HOSP IP/OBS MODERATE 35: CPT | Mod: GC

## 2023-11-12 RX ORDER — LINAGLIPTIN 5 MG/1
5 TABLET, FILM COATED ORAL DAILY
Refills: 0 | Status: DISCONTINUED | OUTPATIENT
Start: 2023-11-12 | End: 2023-12-05

## 2023-11-12 RX ADMIN — APIXABAN 5 MILLIGRAM(S): 2.5 TABLET, FILM COATED ORAL at 17:59

## 2023-11-12 RX ADMIN — LINAGLIPTIN 5 MILLIGRAM(S): 5 TABLET, FILM COATED ORAL at 15:30

## 2023-11-12 RX ADMIN — MEMANTINE HYDROCHLORIDE 10 MILLIGRAM(S): 10 TABLET ORAL at 17:59

## 2023-11-12 RX ADMIN — CITALOPRAM 20 MILLIGRAM(S): 10 TABLET, FILM COATED ORAL at 11:52

## 2023-11-12 RX ADMIN — FAMOTIDINE 20 MILLIGRAM(S): 10 INJECTION INTRAVENOUS at 11:51

## 2023-11-12 RX ADMIN — Medication 1 TABLET(S): at 11:52

## 2023-11-12 RX ADMIN — Medication 1 MILLIGRAM(S): at 11:52

## 2023-11-12 RX ADMIN — APIXABAN 5 MILLIGRAM(S): 2.5 TABLET, FILM COATED ORAL at 05:35

## 2023-11-12 RX ADMIN — DONEPEZIL HYDROCHLORIDE 5 MILLIGRAM(S): 10 TABLET, FILM COATED ORAL at 08:34

## 2023-11-12 RX ADMIN — ATORVASTATIN CALCIUM 80 MILLIGRAM(S): 80 TABLET, FILM COATED ORAL at 21:11

## 2023-11-12 RX ADMIN — Medication 100 MILLIGRAM(S): at 11:51

## 2023-11-12 RX ADMIN — METFORMIN HYDROCHLORIDE 500 MILLIGRAM(S): 850 TABLET ORAL at 17:58

## 2023-11-12 RX ADMIN — MEMANTINE HYDROCHLORIDE 10 MILLIGRAM(S): 10 TABLET ORAL at 05:35

## 2023-11-12 RX ADMIN — METFORMIN HYDROCHLORIDE 500 MILLIGRAM(S): 850 TABLET ORAL at 08:34

## 2023-11-12 NOTE — PROGRESS NOTE ADULT - SUBJECTIVE AND OBJECTIVE BOX
No events overnight  No new complaints to offer me    MEDICATIONS  (STANDING):  apixaban 5 milliGRAM(s) Oral two times a day  atorvastatin 80 milliGRAM(s) Oral at bedtime  bisacodyl 5 milliGRAM(s) Oral at bedtime  citalopram 20 milliGRAM(s) Oral daily  dextrose 50% Injectable 25 Gram(s) IV Push once  donepezil 5 milliGRAM(s) Oral with breakfast  famotidine    Tablet 20 milliGRAM(s) Oral daily  folic acid 1 milliGRAM(s) Oral daily  memantine 10 milliGRAM(s) Oral two times a day  metFORMIN 500 milliGRAM(s) Oral two times a day  multivitamin 1 Tablet(s) Oral daily  polyethylene glycol 3350 17 Gram(s) Oral daily  senna 2 Tablet(s) Oral at bedtime  thiamine 100 milliGRAM(s) Oral daily    MEDICATIONS  (PRN):      Vital Signs Last 24 Hrs  T(F): 98 (11 Nov 2023 20:30), Max: 98 (11 Nov 2023 20:30)  HR: 56 (11 Nov 2023 20:30) (56 - 56)  BP: 124/70 (11 Nov 2023 20:30) (124/70 - 124/70)  RR: 16 (11 Nov 2023 20:30) (16 - 16)  SpO2: 97% (11 Nov 2023 20:30) (97% - 97%)    I&O's Summary      Examination:  General: NAD, Comfortable  Pulm: CTA BL No Rhonchi Rales or wheezes  Neck: Supple, No JVD  CVS: RRR No rubs murmurs or gallops  Abdomen: Soft, Nontender, Nondistended; No masses or organomegally  Extremities: No calf tenderness, No pitting edema    Labs:

## 2023-11-12 NOTE — PROGRESS NOTE ADULT - ASSESSMENT
58M SP CVA no residual sp thrombolysis, [place] HTN, DM (not compliant with care/off meds),  Admit sp LOC 10/12 sp Thrombectomy     #Acute CVA SP thrombectomy  -per IR, continue Eliquis for 3 mo  -moyamoya seen on imaging  -Statin  -outpatient neuro IR follow up.    # Bradycardia  -TTE EF 50-55%, no other structural abnormalities  -assymptomatic  - Metoprolol 12.5mg BID has been discontinued   - per electrophysiology notes, no indication for pacemaker, s/p ILR on 10/20  - needs EP follow up as an outpatient.    #intermittent Dizziness   -Encouraged fluid intake --improved today after additional fluid intake    #ETOH use disorder  -  in remission, stable.    #HTN  - No meds at home  - permissive hypertension as per neuro recs.   - did not tolerate lisinopril 5mg during hospitalization (intermittent hypotension)  - may resume Amlodipine 5mg once permissive hypertensive period has past    #DM 2  -wasnt taking meds regularly at home  -A1c 8.5% so should require two agents minimum  Metformin could be titrated as tolerated  Added tradgenta. tolerated      #Elevated TSH  T4 level normal  probably subclinical    DVT Prophylaxis - Eliquis.     will follow  d/w rehab team

## 2023-11-12 NOTE — PROGRESS NOTE ADULT - SUBJECTIVE AND OBJECTIVE BOX
Patient is a 58y old  Male who presents with a chief complaint of CVA and left hemiparesis (2023 12:34)    HPI:  Patient is a 57 y/o M with a PHx HTN, DM, stroke one year ago (at the time patient received Tenecteplase and had full resolution of symptoms), who presented to [facility] on 10/12/23 following sudden fall at work and lost consciousness. NIHSS 10 on admission. Admitted under NeuroICU on 10/12. Imaging revealed ischemia in the posterior circulation, CT angiogram head/neck showed right PCA P1-2 cutoff, possibly chronic right MCA M1 cutoff which had distal reconstitution and established collaterals, and left PCA occlusion which patient was subsequently transferred for mechanical thrombectomy.    Cerebral angiogram and mechanical thrombectomy performed using aspiration with TICI 3 recanalization of left PCA. The right occipital lobe filled from right SHANI collaterals, and the right MCA as well was thought to be chronic given noted collateralization. MR with small to moderate left temporal occipital lobe infarctions. Occluded right MCA, occluded right PCA and right vertebral artery proximal segment. The patient was started on a hep gtt given extensive atherosclerotic disease which was switched to apixaban 5mg BID with recommendations for a three month course.    TTE with normal left ventricular internal cavity size. Left ventricular ejection fractio 50 to 55%. Per Neuro, no need for ROCIO at this time. Pt had an episode of bradycardia during his stay and there were concern for 2:1 block. ILR placed 10/20 and no indication for PPM at this time.     Patient was evaluated by PM&R and therapy for functional deficits, gait/ADL impairments and acute rehabilitation was recommended. Patient was medically optimized for discharge to [facility] on 10/27/23.  (27 Oct 2023 12:30)    ----------------------------------------------------------------------    SUBJECTIVE:  Seen and evaluated at bedside this AM. No acute issues overnight.   BP within normal limits  HR stable bradycardia - asymptomatic     Other ROS:  Denies: headache, lightheadedness, CP, SOB, abdominal pain, dysuria, nausea, constipation  ----------------------------------------------------------------------  PHYSICAL EXAM:    Vital Signs Last 24 Hrs  T(C): 36.7 (2023 20:30), Max: 36.7 (2023 20:30)  T(F): 98 (:), Max: 98 (:30)  HR: 56 (:) (56 - 56)  BP: 124/70 (:) (124/70 - 124/70)  BP(mean): --  RR: 16 (:) (16 - 16)  SpO2: 97% () (97% - 97%)    Parameters below as of   Patient On (Oxygen Delivery Method): room air      Daily     Daily Weight in k.8 (2023 22:30)      PHYSICAL EXAM:    General - NAD, alert, follows commands  Heart- pulse regular  Lungs- breathing comfortably without respiratory distress  Abd- no visible abdominal distension   Ext- No calf pain, extremities well perfused  Neuro- Exam unchanged         ----------------------------------------------------------------------  RECENT LABS:                  CAPILLARY BLOOD GLUCOSE        ----------------------------------------------------------------------  RECENT IMAGING:    ***  ----------------------------------------------------------------------  MEDICATIONS:  MEDICATIONS  (STANDING):  apixaban 5 milliGRAM(s) Oral two times a day  atorvastatin 80 milliGRAM(s) Oral at bedtime  bisacodyl 5 milliGRAM(s) Oral at bedtime  citalopram 20 milliGRAM(s) Oral daily  dextrose 50% Injectable 25 Gram(s) IV Push once  donepezil 5 milliGRAM(s) Oral with breakfast  famotidine    Tablet 20 milliGRAM(s) Oral daily  folic acid 1 milliGRAM(s) Oral daily  memantine 10 milliGRAM(s) Oral two times a day  metFORMIN 500 milliGRAM(s) Oral two times a day  multivitamin 1 Tablet(s) Oral daily  polyethylene glycol 3350 17 Gram(s) Oral daily  senna 2 Tablet(s) Oral at bedtime  thiamine 100 milliGRAM(s) Oral daily    MEDICATIONS  (PRN):    ----------------------------------------------------------------------

## 2023-11-13 LAB
ALBUMIN SERPL ELPH-MCNC: 3.5 G/DL — SIGNIFICANT CHANGE UP (ref 3.3–5)
ALBUMIN SERPL ELPH-MCNC: 3.5 G/DL — SIGNIFICANT CHANGE UP (ref 3.3–5)
ALP SERPL-CCNC: 72 U/L — SIGNIFICANT CHANGE UP (ref 40–120)
ALP SERPL-CCNC: 72 U/L — SIGNIFICANT CHANGE UP (ref 40–120)
ALT FLD-CCNC: 34 U/L — SIGNIFICANT CHANGE UP (ref 10–45)
ALT FLD-CCNC: 34 U/L — SIGNIFICANT CHANGE UP (ref 10–45)
ANION GAP SERPL CALC-SCNC: 6 MMOL/L — SIGNIFICANT CHANGE UP (ref 5–17)
ANION GAP SERPL CALC-SCNC: 6 MMOL/L — SIGNIFICANT CHANGE UP (ref 5–17)
AST SERPL-CCNC: 18 U/L — SIGNIFICANT CHANGE UP (ref 10–40)
AST SERPL-CCNC: 18 U/L — SIGNIFICANT CHANGE UP (ref 10–40)
BILIRUB SERPL-MCNC: 0.6 MG/DL — SIGNIFICANT CHANGE UP (ref 0.2–1.2)
BILIRUB SERPL-MCNC: 0.6 MG/DL — SIGNIFICANT CHANGE UP (ref 0.2–1.2)
BUN SERPL-MCNC: 20 MG/DL — SIGNIFICANT CHANGE UP (ref 7–23)
BUN SERPL-MCNC: 20 MG/DL — SIGNIFICANT CHANGE UP (ref 7–23)
CALCIUM SERPL-MCNC: 9.2 MG/DL — SIGNIFICANT CHANGE UP (ref 8.4–10.5)
CALCIUM SERPL-MCNC: 9.2 MG/DL — SIGNIFICANT CHANGE UP (ref 8.4–10.5)
CHLORIDE SERPL-SCNC: 103 MMOL/L — SIGNIFICANT CHANGE UP (ref 96–108)
CHLORIDE SERPL-SCNC: 103 MMOL/L — SIGNIFICANT CHANGE UP (ref 96–108)
CO2 SERPL-SCNC: 32 MMOL/L — HIGH (ref 22–31)
CO2 SERPL-SCNC: 32 MMOL/L — HIGH (ref 22–31)
CREAT SERPL-MCNC: 1 MG/DL — SIGNIFICANT CHANGE UP (ref 0.5–1.3)
CREAT SERPL-MCNC: 1 MG/DL — SIGNIFICANT CHANGE UP (ref 0.5–1.3)
EGFR: 87 ML/MIN/1.73M2 — SIGNIFICANT CHANGE UP
EGFR: 87 ML/MIN/1.73M2 — SIGNIFICANT CHANGE UP
GLUCOSE SERPL-MCNC: 101 MG/DL — HIGH (ref 70–99)
GLUCOSE SERPL-MCNC: 101 MG/DL — HIGH (ref 70–99)
HCT VFR BLD CALC: 40.3 % — SIGNIFICANT CHANGE UP (ref 39–50)
HCT VFR BLD CALC: 40.3 % — SIGNIFICANT CHANGE UP (ref 39–50)
HGB BLD-MCNC: 13.6 G/DL — SIGNIFICANT CHANGE UP (ref 13–17)
HGB BLD-MCNC: 13.6 G/DL — SIGNIFICANT CHANGE UP (ref 13–17)
MCHC RBC-ENTMCNC: 31.9 PG — SIGNIFICANT CHANGE UP (ref 27–34)
MCHC RBC-ENTMCNC: 31.9 PG — SIGNIFICANT CHANGE UP (ref 27–34)
MCHC RBC-ENTMCNC: 33.7 GM/DL — SIGNIFICANT CHANGE UP (ref 32–36)
MCHC RBC-ENTMCNC: 33.7 GM/DL — SIGNIFICANT CHANGE UP (ref 32–36)
MCV RBC AUTO: 94.4 FL — SIGNIFICANT CHANGE UP (ref 80–100)
MCV RBC AUTO: 94.4 FL — SIGNIFICANT CHANGE UP (ref 80–100)
NRBC # BLD: 0 /100 WBCS — SIGNIFICANT CHANGE UP (ref 0–0)
NRBC # BLD: 0 /100 WBCS — SIGNIFICANT CHANGE UP (ref 0–0)
PLATELET # BLD AUTO: 103 K/UL — LOW (ref 150–400)
PLATELET # BLD AUTO: 103 K/UL — LOW (ref 150–400)
POTASSIUM SERPL-MCNC: 4.3 MMOL/L — SIGNIFICANT CHANGE UP (ref 3.5–5.3)
POTASSIUM SERPL-MCNC: 4.3 MMOL/L — SIGNIFICANT CHANGE UP (ref 3.5–5.3)
POTASSIUM SERPL-SCNC: 4.3 MMOL/L — SIGNIFICANT CHANGE UP (ref 3.5–5.3)
POTASSIUM SERPL-SCNC: 4.3 MMOL/L — SIGNIFICANT CHANGE UP (ref 3.5–5.3)
PROT SERPL-MCNC: 6.7 G/DL — SIGNIFICANT CHANGE UP (ref 6–8.3)
PROT SERPL-MCNC: 6.7 G/DL — SIGNIFICANT CHANGE UP (ref 6–8.3)
RBC # BLD: 4.27 M/UL — SIGNIFICANT CHANGE UP (ref 4.2–5.8)
RBC # BLD: 4.27 M/UL — SIGNIFICANT CHANGE UP (ref 4.2–5.8)
RBC # FLD: 13.2 % — SIGNIFICANT CHANGE UP (ref 10.3–14.5)
RBC # FLD: 13.2 % — SIGNIFICANT CHANGE UP (ref 10.3–14.5)
SODIUM SERPL-SCNC: 141 MMOL/L — SIGNIFICANT CHANGE UP (ref 135–145)
SODIUM SERPL-SCNC: 141 MMOL/L — SIGNIFICANT CHANGE UP (ref 135–145)
WBC # BLD: 5.87 K/UL — SIGNIFICANT CHANGE UP (ref 3.8–10.5)
WBC # BLD: 5.87 K/UL — SIGNIFICANT CHANGE UP (ref 3.8–10.5)
WBC # FLD AUTO: 5.87 K/UL — SIGNIFICANT CHANGE UP (ref 3.8–10.5)
WBC # FLD AUTO: 5.87 K/UL — SIGNIFICANT CHANGE UP (ref 3.8–10.5)

## 2023-11-13 PROCEDURE — 99232 SBSQ HOSP IP/OBS MODERATE 35: CPT

## 2023-11-13 RX ORDER — LINAGLIPTIN 5 MG/1
1 TABLET, FILM COATED ORAL
Qty: 0 | Refills: 0 | DISCHARGE
Start: 2023-11-13

## 2023-11-13 RX ORDER — DONEPEZIL HYDROCHLORIDE 10 MG/1
10 TABLET, FILM COATED ORAL
Refills: 0 | Status: DISCONTINUED | OUTPATIENT
Start: 2023-11-14 | End: 2023-12-05

## 2023-11-13 RX ADMIN — FAMOTIDINE 20 MILLIGRAM(S): 10 INJECTION INTRAVENOUS at 11:38

## 2023-11-13 RX ADMIN — Medication 1 TABLET(S): at 11:38

## 2023-11-13 RX ADMIN — Medication 100 MILLIGRAM(S): at 11:38

## 2023-11-13 RX ADMIN — METFORMIN HYDROCHLORIDE 500 MILLIGRAM(S): 850 TABLET ORAL at 17:11

## 2023-11-13 RX ADMIN — ATORVASTATIN CALCIUM 80 MILLIGRAM(S): 80 TABLET, FILM COATED ORAL at 21:15

## 2023-11-13 RX ADMIN — MEMANTINE HYDROCHLORIDE 10 MILLIGRAM(S): 10 TABLET ORAL at 05:57

## 2023-11-13 RX ADMIN — METFORMIN HYDROCHLORIDE 500 MILLIGRAM(S): 850 TABLET ORAL at 08:08

## 2023-11-13 RX ADMIN — CITALOPRAM 20 MILLIGRAM(S): 10 TABLET, FILM COATED ORAL at 11:38

## 2023-11-13 RX ADMIN — APIXABAN 5 MILLIGRAM(S): 2.5 TABLET, FILM COATED ORAL at 17:10

## 2023-11-13 RX ADMIN — Medication 1 MILLIGRAM(S): at 11:38

## 2023-11-13 RX ADMIN — MEMANTINE HYDROCHLORIDE 10 MILLIGRAM(S): 10 TABLET ORAL at 17:10

## 2023-11-13 RX ADMIN — LINAGLIPTIN 5 MILLIGRAM(S): 5 TABLET, FILM COATED ORAL at 11:38

## 2023-11-13 RX ADMIN — DONEPEZIL HYDROCHLORIDE 5 MILLIGRAM(S): 10 TABLET, FILM COATED ORAL at 08:08

## 2023-11-13 RX ADMIN — APIXABAN 5 MILLIGRAM(S): 2.5 TABLET, FILM COATED ORAL at 05:57

## 2023-11-13 NOTE — PROGRESS NOTE ADULT - ASSESSMENT
59 y/o M with a Hx of stroke one year ago (at the time patient received TNK and had full  resolution of symptoms), HTN, DM, non-compliant with medications, who presented following an episode of LOC, as per family at around 6:15 pm patient had a sudden fall at work and lost consciousness. It is unclear if patient had any symptoms before he fell. NIHSS 10 on admission, admitted under NeuroICU on 10/12: Thrombectomy via L radial artery for TICI 3 revascularization of L P1-P2 occlusion. Noted LMCA chronic occlusion., on 0/13: Neurologic status improving. Got MR. Started on ASA and SQL, on 10/14: quadrantanopia now on L superior (originally R); Repeat stroke imaging completed and initial concern for CTP showing poss new perfusion deficit however on further review noted to be present on admission. Started on heparin drip per neurology recommendations, on 10/15: Therapeutic on heparin drip, stability HCT obtained. Cardiology consulted for further stroke work-up and downgraded under medicine- pt/ot/dvt ppx      #Acute CVA  #Occluded R MCA/PCA, s/p thrombectomy   -TTE shows with  Normal left ventricular internal cavity size,  3. Left ventricular ejection fraction, by visual estimation, is 50 to 55%.  -DVT studies negative  -continue Eliquis. Needs for 3 months.    -c/w Lipitor  -outpatient neuro IR follow up.    # Bradycardia  -TTE EF 50-55%, no other structural abnormalities  - No indication for pacemaker  - Metoprolol discontinued   - per electrophysiology notes, no indication for pacemaker, s/p ILR on 10/20  - needs EP follow up as an outpatient.    #intermittent Dizziness   -Encouraged fluid intake --improved today after additional fluid intake    #ETOH use disorder  -  in remission, stable.    #HTN  - No meds at home  - did not tolerate lisinopril 5mg during hospitalization (intermittent hypotension)  - Hydralazine 25 mg PRN if SBP > 170    #HLD  c/w Lipitor    #DM 2  non compliant with meds at home  metFORMIN   A1C 8.6  d/c Accu checks and ISS 11/6/23    #Elevated TSH  T4 level normal  outpt f/u    DVT Prophylaxis - Eliquis.     will follow  d/w rehab team

## 2023-11-13 NOTE — PROGRESS NOTE ADULT - SUBJECTIVE AND OBJECTIVE BOX
HPI:  Patient is a 57 y/o M with a PHx HTN, DM, stroke one year ago (at the time patient received Tenecteplase and had full resolution of symptoms), who presented to [facility] on 10/12/23 following sudden fall at work and lost consciousness. NIHSS 10 on admission. Admitted under NeuroICU on 10/12. Imaging revealed ischemia in the posterior circulation, CT angiogram head/neck showed right PCA P1-2 cutoff, possibly chronic right MCA M1 cutoff which had distal reconstitution and established collaterals, and left PCA occlusion which patient was subsequently transferred for mechanical thrombectomy.    Cerebral angiogram and mechanical thrombectomy performed using aspiration with TICI 3 recanalization of left PCA. The right occipital lobe filled from right SHANI collaterals, and the right MCA as well was thought to be chronic given noted collateralization. MR with small to moderate left temporal occipital lobe infarctions. Occluded right MCA, occluded right PCA and right vertebral artery proximal segment. The patient was started on a hep gtt given extensive atherosclerotic disease which was switched to apixaban 5mg BID with recommendations for a three month course.    TTE with normal left ventricular internal cavity size. Left ventricular ejection fractio 50 to 55%. Per Neuro, no need for ROCIO at this time. Pt had an episode of bradycardia during his stay and there were concern for 2:1 block. ILR placed 10/20 and no indication for PPM at this time.     Patient was evaluated by PM&R and therapy for functional deficits, gait/ADL impairments and acute rehabilitation was recommended. Patient was medically optimized for discharge to [facility] IRF on 10/27/23.       Subjective:    No acute overnight events. Awake, alert. Able to recall some events from yesterday. Asks appropriate questions. Eating and sleeping well.   Complains of dizziness (mild on standing), L sided numbness, and short term memory issues. He recalls 2/3 words after 5 min in memory/cognitive testing, requiring categorization/association to recall.     ROS  -no SOB  -no Abd pain, constipation, diarrhea. Charted BM 11/8  -no chest pain  +fatigue  +neurological deficits    Vital Signs Last 24 Hrs  ICU Vital Signs Last 24 Hrs  T(C): 36.4 (13 Nov 2023 10:52), Max: 36.6 (12 Nov 2023 12:00)  T(F): 97.6 (13 Nov 2023 10:52), Max: 97.8 (12 Nov 2023 12:00)  HR: 59 (13 Nov 2023 10:52) (50 - 60)  BP: 150/90 (13 Nov 2023 10:52) (120/74 - 150/90)  BP(mean): --  ABP: --  ABP(mean): --  RR: 16 (13 Nov 2023 10:52) (16 - 16)  SpO2: 97% (13 Nov 2023 10:52) (94% - 98%)    O2 Parameters below as of 13 Nov 2023 10:52  Patient On (Oxygen Delivery Method): room air    Physical Exam:  Constitutional - NAD, Comfortable  HEENT - NCAT, EOMI  Chest -breathing comfortably on RA  Cardio - warm and well perfused, + loop recorder.   Abdomen - Obese, soft, no-tender  Extremities - No peripheral edema, Long curling toenails.    Neurologic Exam:                    Cognitive -             Orientation: AA&Ox2-3 to self, year and location, not to month or day             Attention:   able to state days of week backward, mild difficulty with serial 7's - made one error counting down to 50's            Memory: short term memory deficits - recalls 2 of 3 objects after 5 minutes with categorical cues     Speech - Fluent, Comprehensible, No dysarthria, No aphasia      Cranial Nerves - No facial asymmetry, Tongue midline, EOMI, Shoulder shrug intact. Vision Impaired on the Left upper visual field in L > R eye.      Motor -                      LEFT    UE - ShAB 4/5, EF 4-5/5, EE 4/5, WE 5/5,  WNL                     RIGHT UE - ShAB 5/5, EF 5/5, EE 5/5, WE 5/5,  WNL                    LEFT    LE - HF 5/5, KE 5/5, DF 5/5, PF 5/5                    RIGHT LE - HF 5/5, KE 5/5, DF 5/5, PF 5/5        Sensory - Impaired to LT on the left face (V2/3), arm and leg.  Feels LT but sensation of numbness and lesser than right side.      Reflexes - symmetric DTR +1 in LE and UE. Neg Babinski's b/l     Coordination - FTN / HTS intact b/l             MEDICATIONS:  CBC:            13.6   5.87  )-----------( 103      ( 11-13-23 @ 06:00 )             40.3         Chem:         ( 11-13-23 @ 06:00 )    141  |  103  |  20  ----------------------------<  101<H>  4.3   |  32<H>  |  1.00      Liver Functions: ( 11-13-23 @ 06:00 )  Alb: 3.5 g/dL / Pro: 6.7 g/dL / ALK PHOS: 72 U/L / ALT: 34 U/L / AST: 18 U/L / GGT: x            MEDICATIONS  (STANDING):  apixaban 5 milliGRAM(s) Oral two times a day  atorvastatin 80 milliGRAM(s) Oral at bedtime  bisacodyl 5 milliGRAM(s) Oral at bedtime  citalopram 20 milliGRAM(s) Oral daily  dextrose 50% Injectable 25 Gram(s) IV Push once  donepezil 5 milliGRAM(s) Oral with breakfast  famotidine    Tablet 20 milliGRAM(s) Oral daily  folic acid 1 milliGRAM(s) Oral daily  linagliptin 5 milliGRAM(s) Oral daily  memantine 10 milliGRAM(s) Oral two times a day  metFORMIN 500 milliGRAM(s) Oral two times a day  multivitamin 1 Tablet(s) Oral daily  polyethylene glycol 3350 17 Gram(s) Oral daily  senna 2 Tablet(s) Oral at bedtime  thiamine 100 milliGRAM(s) Oral daily    MEDICATIONS  (PRN):   HPI:  Patient is a 59 y/o M with a PHx HTN, DM, stroke one year ago (at the time patient received Tenecteplase and had full resolution of symptoms), who presented to [facility] on 10/12/23 following sudden fall at work and lost consciousness. NIHSS 10 on admission. Admitted under NeuroICU on 10/12. Imaging revealed ischemia in the posterior circulation, CT angiogram head/neck showed right PCA P1-2 cutoff, possibly chronic right MCA M1 cutoff which had distal reconstitution and established collaterals, and left PCA occlusion which patient was subsequently transferred for mechanical thrombectomy.    Cerebral angiogram and mechanical thrombectomy performed using aspiration with TICI 3 recanalization of left PCA. The right occipital lobe filled from right SHANI collaterals, and the right MCA as well was thought to be chronic given noted collateralization. MR with small to moderate left temporal occipital lobe infarctions. Occluded right MCA, occluded right PCA and right vertebral artery proximal segment. The patient was started on a hep gtt given extensive atherosclerotic disease which was switched to apixaban 5mg BID with recommendations for a three month course.    TTE with normal left ventricular internal cavity size. Left ventricular ejection fractio 50 to 55%. Per Neuro, no need for ROCIO at this time. Pt had an episode of bradycardia during his stay and there were concern for 2:1 block. ILR placed 10/20 and no indication for PPM at this time.     Patient was evaluated by PM&R and therapy for functional deficits, gait/ADL impairments and acute rehabilitation was recommended. Patient was medically optimized for discharge to [facility] IRF on 10/27/23.       Subjective:    No acute overnight events. Awake, alert. Able to recall some events from yesterday. Asks appropriate questions. Eating and sleeping well.   Complains of dizziness (mild on standing), L sided numbness, and short term memory issues. He recalls 2/3 words after 5 min in memory/cognitive testing, requiring categorization/association to recall.     ROS  -no SOB  -no Abd pain, constipation, diarrhea. Charted BM 11/8  -no chest pain  +fatigue  +neurological deficits    Vital Signs Last 24 Hrs  ICU Vital Signs Last 24 Hrs  T(C): 36.4 (13 Nov 2023 10:52), Max: 36.6 (12 Nov 2023 12:00)  T(F): 97.6 (13 Nov 2023 10:52), Max: 97.8 (12 Nov 2023 12:00)  HR: 59 (13 Nov 2023 10:52) (50 - 60)  BP: 150/90 (13 Nov 2023 10:52) (120/74 - 150/90)  BP(mean): --  ABP: --  ABP(mean): --  RR: 16 (13 Nov 2023 10:52) (16 - 16)  SpO2: 97% (13 Nov 2023 10:52) (94% - 98%)    O2 Parameters below as of 13 Nov 2023 10:52  Patient On (Oxygen Delivery Method): room air    Physical Exam:  Constitutional - NAD, Comfortable  HEENT - NCAT, EOMI  Chest -breathing comfortably on RA  Cardio - warm and well perfused, + loop recorder.   Abdomen - Obese, soft, no-tender  Extremities - No peripheral edema, Long curling toenails.    Neurologic Exam:                    Cognitive -             Orientation: AA&Ox2-3 to self, month, year and location, not to date            Attention:   able to state days of week backward, mild difficulty with serial 7's - made one error            Memory: short term memory deficits - recalls 2 of 3 objects after 5 minutes with categorical cues     Speech - Fluent, Comprehensible, No dysarthria, No aphasia      Cranial Nerves - No facial asymmetry, Tongue midline, EOMI, Shoulder shrug intact. Vision Impaired on the Left upper visual field in L > R eye.      Motor -                      LEFT    UE - ShAB 4/5, EF 4-5/5, EE 4/5, WE 5/5,  WNL                     RIGHT UE - ShAB 5/5, EF 5/5, EE 5/5, WE 5/5,  WNL                    LEFT    LE - HF 5/5, KE 5/5, DF 5/5, PF 5/5                    RIGHT LE - HF 5/5, KE 5/5, DF 5/5, PF 5/5        Sensory - Impaired to LT on the left face (V2/3), arm and leg.  Feels LT but sensation of numbness and lesser than right side.      Reflexes - symmetric DTR +1 in LE and UE. Neg Babinski's b/l     Coordination - FTN / HTS intact b/l             MEDICATIONS:  CBC:            13.6   5.87  )-----------( 103      ( 11-13-23 @ 06:00 )             40.3         Chem:         ( 11-13-23 @ 06:00 )    141  |  103  |  20  ----------------------------<  101<H>  4.3   |  32<H>  |  1.00      Liver Functions: ( 11-13-23 @ 06:00 )  Alb: 3.5 g/dL / Pro: 6.7 g/dL / ALK PHOS: 72 U/L / ALT: 34 U/L / AST: 18 U/L / GGT: x            MEDICATIONS  (STANDING):  apixaban 5 milliGRAM(s) Oral two times a day  atorvastatin 80 milliGRAM(s) Oral at bedtime  bisacodyl 5 milliGRAM(s) Oral at bedtime  citalopram 20 milliGRAM(s) Oral daily  dextrose 50% Injectable 25 Gram(s) IV Push once  donepezil 5 milliGRAM(s) Oral with breakfast  famotidine    Tablet 20 milliGRAM(s) Oral daily  folic acid 1 milliGRAM(s) Oral daily  linagliptin 5 milliGRAM(s) Oral daily  memantine 10 milliGRAM(s) Oral two times a day  metFORMIN 500 milliGRAM(s) Oral two times a day  multivitamin 1 Tablet(s) Oral daily  polyethylene glycol 3350 17 Gram(s) Oral daily  senna 2 Tablet(s) Oral at bedtime  thiamine 100 milliGRAM(s) Oral daily    MEDICATIONS  (PRN):

## 2023-11-13 NOTE — PROGRESS NOTE ADULT - ASSESSMENT
Assessment and Plan:   Patient is a 57 y/o M with a PHx HTN, DM, CVA sp tnK with full resolution symptoms, who presented to [facility] on 10/12 due to sudden fall at work and lost consciousness.  MRI brain showed left temporal occipital lobe infarctions and acute punctate left occipital lobe infarction now s/p thrombectomy and Right MCA/PCA oclusion with left sided weakness, sensory impairment, left homonymous hemianopsia, gait and ADL impairments.     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 Continue for 3 months.    - Atorvastatin 80mg qhs.   - C/w Memantine 5mg BID for cognitive deficits - 11/3.   - cont comprehensive rehab program OT, PT, SLP  3 hours daily 5 x week  - neuropsychology evaluation and support, recreation therapy  - Precautions: cardiac, DM, respiratory, breast CA, fall, aspiration.  - No new neurological deficits    #Memory deficits  -10/13: MRI Brain  showing L PCA P2 occlusion    - Trial increase Memantine to 10mg BID   - cont. Donepezil 5mg in AM--started  11/7--monitor for SE--tolerating well so far  - EKG QTc monitoring 11/10  -Monitor HR as donepezil has slight risk of worsening bradycardia  -Monitor QTc interval -- 405 on 10/3  -QTc 460 on 11/7       # Bradycardia  - No indication for pacemaker  - Metoprolol 12.5mg BID has been discontinued   - per electrophysiology notes, no indication for pacemaker, s/p ILR on 10/20  - needs EP follow up as an outpatient  - HR 64bpm on 11/7     # HTN  - No medication at home and patient is sensitive to HTN medication. Was previously started on lisinopril.   - Can treat with hydralazine 25mg PRN for >170.   - did not tolerate lisinopril 5mg during hospitalization (intermittent hypotension)  - Dr. Curry suggests 10 point decrease in BP parameters based on clinical exam  - Current parameters -150 over weekend, titrate 120-140 as tolerated  - SBP goal 140-150s.  (120/60 - 177/83) 11/10    # HLD  - Atorvastatin 80 mg daily    # DM 2  - A1C 8.6  - Metformin   - Accu checks and ISS    # ETOH abuse  - Not in active withdrawal.  - Continue MVI, thiamine and folic acid    # Pain management  - No complaints     # GI/Bowel:  - Senna QHS, Miralax Daily  - dulcolax PO     # /Bladder:   -Continent    # FEN   - Diet: DASH DIET     # DVT ppx  - Eliquis 5mg BID    #Dispo  - awaiting placement    IDT: 11/7   TDD:  11/11 to home.  d/c plan to [facility] ASAP   RN: continent with bowel and continent with bladder   SW: Lives in Apartment with his daughter. On the main floor. He was independent before. Sister is supportive  SP: Regular with thin. Mild cognitive deficits-- . Recall and short term memory deficits, reasoning, attention, decreased mental flexibility.  Needs assistance with IADLs  OT: Setup - eating & UBD. CG - grooming, toileting, toilet transfers and CS-- LBD.   PT : Supervision - transfer. Ambulation 150 feet without AD and 12 steps with supervision.    Goals:1.  Supervision for toileting.   2.  Supervision for ambulation.   3. Recall salient information with external aides    11/8 - SW working on [facility] placement, discussed with sister     Addendum - Spoke to Lety about discharge planning. No-one is able to support at home at this time.     Outpatient Follow-up (Specialty/Name of physician):    Rj Delgado  Cardiac Electrophysiology  [address] [address]  Phone: [phone]  Fax: [phone]  Follow Up Time:     Wily Barkley  Neurology  [address] [place] [address]  Phone: [phone]  Fax: [phone]  Follow Up Time: 1 week    Denzel Zapata  Interventional Cardiology  39 [facility], [address] [address]  Phone: [phone]  Fax: [phone]  Follow Up Time: 1 week    PCP,   Phone: (   )    -  Fax: (   )    -  Follow Up Time: 1 week    Arsenio Goodrich  Interventional Neuroradiology  [address] [place] [address]  Phone: [phone]  Fax: [phone]             Assessment and Plan:   Patient is a 57 y/o M with a PHx HTN, DM, CVA sp tnK with full resolution symptoms, who presented to [facility] on 10/12 due to sudden fall at work and lost consciousness.  MRI brain showed left temporal occipital lobe infarctions and acute punctate left occipital lobe infarction now s/p thrombectomy and Right MCA/PCA oclusion with left sided weakness, sensory impairment, left homonymous hemianopsia, gait and ADL impairments.     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 Continue for 3 months.    - Atorvastatin 80mg qhs.   - C/w Memantine 5mg BID for cognitive deficits - 11/3.   - cont comprehensive rehab program OT, PT, SLP  3 hours daily 5 x week  - neuropsychology evaluation and support, recreation therapy  - Precautions: cardiac, DM, respiratory, breast CA, fall, aspiration.  - No new neurological deficits    #Memory deficits  -10/13: MRI Brain  showing L PCA P2 occlusion    -Cont. Memantine  10mg BID   -Trial increase Donepezil to 10mg in AM--start  11/14--monitor for SE  - EKG QTc  11/10--445  -Monitor HR as donepezil has slight risk of worsening bradycardia--HR has been stable  -QTc 460 on 11/7       # Bradycardia  - No indication for pacemaker  - Metoprolol 12.5mg BID has been discontinued   - per electrophysiology notes, no indication for pacemaker, s/p ILR on 10/20  - needs EP follow up as an outpatient  - HR stable in 50s-60s     # HTN  - No medication at home and patient is sensitive to HTN medication. Was previously started on lisinopril.   - Can treat with hydralazine 25mg PRN for >170.   - did not tolerate lisinopril 5mg during hospitalization (intermittent hypotension)  - Dr. Curry suggests 10 point decrease in BP parameters based on clinical exam  - Current parameters -150 over weekend, titrate 120-140 as tolerated  - Monitor BP-- SBP has been 120-150    # HLD  - Atorvastatin 80 mg daily    # DM 2  - A1C 8.6  - Metformin   - Accu checks and ISS    # ETOH abuse  - Not in active withdrawal.  - Continue MVI, thiamine and folic acid    # Pain management  - No complaints     # GI/Bowel:  - Senna QHS, Miralax Daily  - dulcolax PO     # /Bladder:   -Continent    # FEN   - Diet: DASH DIET     # DVT ppx  - Eliquis 5mg BID    #Dispo  - awaiting placement    IDT: 11/7   TDD:  11/11 to home.  d/c plan to [facility] ASAP   RN: continent with bowel and continent with bladder   SW: Lives in Apartment with his daughter. On the main floor. He was independent before. Sister is supportive  SP: Regular with thin. Mild cognitive deficits-- . Recall and short term memory deficits, reasoning, attention, decreased mental flexibility.  Needs assistance with IADLs  OT: Setup - eating & UBD. CG - grooming, toileting, toilet transfers and CS-- LBD.   PT : Supervision - transfer. Ambulation 150 feet without AD and 12 steps with supervision.    Goals:1.  Supervision for toileting.   2.  Supervision for ambulation.   3. Recall salient information with external aides    11/8 - SW working on [facility] placement, discussed with sister     Addendum - Spoke to Lety about discharge planning. No-one is able to support at home at this time.     Outpatient Follow-up (Specialty/Name of physician):    Rj Delgado  Cardiac Electrophysiology  [address] [place] [address]  Phone: [phone]  Fax: [phone]  Follow Up Time:     Wily Barkley  Neurology  [address] [place] [address]  Phone: [phone]  Fax: [phone]  Follow Up Time: 1 week    Denzel Zapata  Interventional Cardiology  39 [facility], [address] [address]  Phone: [phone]  Fax: [phone]  Follow Up Time: 1 week    PCP,   Phone: (   )    -  Fax: (   )    -  Follow Up Time: 1 week    Arsenio Goodrich  Interventional Neuroradiology  [address] [place] [address]  Phone: [phone]  Fax: [phone]

## 2023-11-13 NOTE — PROGRESS NOTE ADULT - ATTENDING COMMENTS
Pt. seen with resident.  Agree with documentation above as per resident with amendments made as appropriate. Patient medically stable. Making progress towards rehab goals.     CVA  Memory deficits  -10/13: MRI Brain  showing L PCA P2 occlusion    -Cont. Memantine  10mg BID   -Trial increase Donepezil to 10mg in AM--start  11/14--monitor for SE  - EKG QTc  11/10--445  -Monitor HR as donepezil has slight risk of worsening bradycardia--HR has been stable    BP controlled---150

## 2023-11-13 NOTE — PROGRESS NOTE ADULT - SUBJECTIVE AND OBJECTIVE BOX
58y old  male who presents with a chief complaint of CVA and left hemiparesis     Patient seen and examined. No acute events overnight.   no new complaints, no n/v, no sob, no pain     Vital Signs Last 24 Hrs  T(C): 36.4 (13 Nov 2023 10:52), Max: 36.4 (12 Nov 2023 21:17)  T(F): 97.6 (13 Nov 2023 10:52), Max: 97.6 (13 Nov 2023 10:52)  HR: 59 (13 Nov 2023 10:52) (59 - 60)  BP: 150/90 (13 Nov 2023 10:52) (120/74 - 150/90)  BP(mean): --  RR: 16 (13 Nov 2023 10:52) (16 - 16)  SpO2: 97% (13 Nov 2023 10:52) (94% - 97%)    Parameters below as of 13 Nov 2023 10:52  Patient On (Oxygen Delivery Method): room air      GENERAL- NAD  EAR/NOSE/MOUTH/THROAT -  MMM  EYES- NIKO, conjunctiva and Sclera clear  NECK- supple  RESPIRATORY-  clear to auscultation bilaterally, non laboured breathing  CARDIOVASCULAR - SIS2, RRR  GI - soft NT BS present  EXTREMITIES- no pedal edema  NEUROLOGY- left sided weakness  PSYCHIATRY- AAO X 3                    13.6                 141  | 32   | 20           5.87  >-----------< 103     ------------------------< 101                   40.3                 4.3  | 103  | 1.00                                         Ca 9.2   Mg x     Ph x          MEDICATIONS  (STANDING):  apixaban 5 milliGRAM(s) Oral two times a day  atorvastatin 80 milliGRAM(s) Oral at bedtime  bisacodyl 5 milliGRAM(s) Oral at bedtime  citalopram 20 milliGRAM(s) Oral daily  dextrose 50% Injectable 25 Gram(s) IV Push once  famotidine    Tablet 20 milliGRAM(s) Oral daily  folic acid 1 milliGRAM(s) Oral daily  linagliptin 5 milliGRAM(s) Oral daily  memantine 10 milliGRAM(s) Oral two times a day  metFORMIN 500 milliGRAM(s) Oral two times a day  multivitamin 1 Tablet(s) Oral daily  polyethylene glycol 3350 17 Gram(s) Oral daily  senna 2 Tablet(s) Oral at bedtime  thiamine 100 milliGRAM(s) Oral daily

## 2023-11-14 PROCEDURE — 99232 SBSQ HOSP IP/OBS MODERATE 35: CPT

## 2023-11-14 PROCEDURE — 90832 PSYTX W PT 30 MINUTES: CPT

## 2023-11-14 RX ADMIN — MEMANTINE HYDROCHLORIDE 10 MILLIGRAM(S): 10 TABLET ORAL at 06:17

## 2023-11-14 RX ADMIN — DONEPEZIL HYDROCHLORIDE 10 MILLIGRAM(S): 10 TABLET, FILM COATED ORAL at 07:56

## 2023-11-14 RX ADMIN — LINAGLIPTIN 5 MILLIGRAM(S): 5 TABLET, FILM COATED ORAL at 11:12

## 2023-11-14 RX ADMIN — Medication 100 MILLIGRAM(S): at 11:12

## 2023-11-14 RX ADMIN — MEMANTINE HYDROCHLORIDE 10 MILLIGRAM(S): 10 TABLET ORAL at 17:04

## 2023-11-14 RX ADMIN — APIXABAN 5 MILLIGRAM(S): 2.5 TABLET, FILM COATED ORAL at 06:17

## 2023-11-14 RX ADMIN — METFORMIN HYDROCHLORIDE 500 MILLIGRAM(S): 850 TABLET ORAL at 07:55

## 2023-11-14 RX ADMIN — ATORVASTATIN CALCIUM 80 MILLIGRAM(S): 80 TABLET, FILM COATED ORAL at 21:52

## 2023-11-14 RX ADMIN — CITALOPRAM 20 MILLIGRAM(S): 10 TABLET, FILM COATED ORAL at 11:12

## 2023-11-14 RX ADMIN — Medication 1 TABLET(S): at 11:12

## 2023-11-14 RX ADMIN — Medication 1 MILLIGRAM(S): at 11:12

## 2023-11-14 RX ADMIN — APIXABAN 5 MILLIGRAM(S): 2.5 TABLET, FILM COATED ORAL at 17:04

## 2023-11-14 RX ADMIN — FAMOTIDINE 20 MILLIGRAM(S): 10 INJECTION INTRAVENOUS at 11:12

## 2023-11-14 RX ADMIN — METFORMIN HYDROCHLORIDE 500 MILLIGRAM(S): 850 TABLET ORAL at 17:04

## 2023-11-14 NOTE — PROGRESS NOTE ADULT - SUBJECTIVE AND OBJECTIVE BOX
HPI:  Patient is a 59 y/o M with a PHx HTN, DM, stroke one year ago (at the time patient received Tenecteplase and had full resolution of symptoms), who presented to [facility] on 10/12/23 following sudden fall at work and lost consciousness. NIHSS 10 on admission. Admitted under NeuroICU on 10/12. Imaging revealed ischemia in the posterior circulation, CT angiogram head/neck showed right PCA P1-2 cutoff, possibly chronic right MCA M1 cutoff which had distal reconstitution and established collaterals, and left PCA occlusion which patient was subsequently transferred for mechanical thrombectomy.    Cerebral angiogram and mechanical thrombectomy performed using aspiration with TICI 3 recanalization of left PCA. The right occipital lobe filled from right SHANI collaterals, and the right MCA as well was thought to be chronic given noted collateralization. MR with small to moderate left temporal occipital lobe infarctions. Occluded right MCA, occluded right PCA and right vertebral artery proximal segment. The patient was started on a hep gtt given extensive atherosclerotic disease which was switched to apixaban 5mg BID with recommendations for a three month course.    TTE with normal left ventricular internal cavity size. Left ventricular ejection fractio 50 to 55%. Per Neuro, no need for ROCIO at this time. Pt had an episode of bradycardia during his stay and there were concern for 2:1 block. ILR placed 10/20 and no indication for PPM at this time.     Patient was evaluated by PM&R and therapy for functional deficits, gait/ADL impairments and acute rehabilitation was recommended. Patient was medically optimized for discharge to [facility] IRF on 10/27/23.       Subjective:    No acute overnight events. Met with patient during speech therapy, where he had recalled 4/6 stroke signs today (up from 2/6 last week). He is frustrated with his short term memory loss and L sided numbness. He is eating and sleeping well.     ROS  -no SOB  -no Abd pain, constipation, diarrhea.   -no chest pain  +fatigue  +neurological deficits    Vital Signs Last 24 Hrs  ICU Vital Signs Last 24 Hrs  T(C): 36.7 (14 Nov 2023 07:54), Max: 37.1 (13 Nov 2023 19:51)  T(F): 98.1 (14 Nov 2023 07:54), Max: 98.7 (13 Nov 2023 19:51)  HR: 62 (14 Nov 2023 07:54) (57 - 62)  BP: 160/88 (14 Nov 2023 07:54) (130/80 - 160/88)  BP(mean): --  ABP: --  ABP(mean): --  RR: 16 (14 Nov 2023 07:54) (15 - 16)  SpO2: 98% (14 Nov 2023 07:54) (97% - 98%)    O2 Parameters below as of 14 Nov 2023 07:54  Patient On (Oxygen Delivery Method): room air        Physical Exam:  Constitutional - NAD, Comfortable  HEENT - NCAT, EOMI  Chest -breathing comfortably on RA  Cardio - warm and well perfused, + loop recorder.   Abdomen - Obese, soft, no-tender  Extremities - No peripheral edema, Long curling toenails.    Neurologic Exam:                    Cognitive -             Orientation: AA&Ox2-3 to self, month, year and location, not to date            Attention:   able to state days of week backward, mild difficulty with serial 7's - made one error            Memory: short term memory deficits - recalls 2 of 3 objects after 5 minutes with categorical cues     Speech - Fluent, Comprehensible, No dysarthria, No aphasia      Cranial Nerves - No facial asymmetry, Tongue midline, EOMI, Shoulder shrug intact. Vision Impaired on the Left upper visual field in L > R eye.      Motor -                      LEFT    UE - ShAB 4/5, EF 4-5/5, EE 4/5, WE 5/5,  WNL                     RIGHT UE - ShAB 5/5, EF 5/5, EE 5/5, WE 5/5,  WNL                    LEFT    LE - HF 5/5, KE 5/5, DF 5/5, PF 5/5                    RIGHT LE - HF 5/5, KE 5/5, DF 5/5, PF 5/5        Sensory - Impaired to LT on the left face (V2/3), arm and leg.  Feels LT but sensation of numbness and lesser than right side.      Reflexes - symmetric DTR +1 in LE and UE. Neg Babinski's b/l     Coordination - FTN / HTS intact b/l             LABS:  CBC:            13.6   5.87  )-----------( 103      ( 11-13-23 @ 06:00 )             40.3         Chem:         ( 11-13-23 @ 06:00 )    141  |  103  |  20  ----------------------------<  101<H>  4.3   |  32<H>  |  1.00        Liver Functions: ( 11-13-23 @ 06:00 )  Alb: 3.5 g/dL / Pro: 6.7 g/dL / ALK PHOS: 72 U/L / ALT: 34 U/L / AST: 18 U/L / GGT: x            MEDICATIONS  (STANDING):  apixaban 5 milliGRAM(s) Oral two times a day  atorvastatin 80 milliGRAM(s) Oral at bedtime  bisacodyl 5 milliGRAM(s) Oral at bedtime  citalopram 20 milliGRAM(s) Oral daily  dextrose 50% Injectable 25 Gram(s) IV Push once  donepezil 10 milliGRAM(s) Oral with breakfast  famotidine    Tablet 20 milliGRAM(s) Oral daily  folic acid 1 milliGRAM(s) Oral daily  linagliptin 5 milliGRAM(s) Oral daily  memantine 10 milliGRAM(s) Oral two times a day  metFORMIN 500 milliGRAM(s) Oral two times a day  multivitamin 1 Tablet(s) Oral daily  polyethylene glycol 3350 17 Gram(s) Oral daily  senna 2 Tablet(s) Oral at bedtime  thiamine 100 milliGRAM(s) Oral daily    MEDICATIONS  (PRN):   HPI:  Patient is a 57 y/o M with a PHx HTN, DM, stroke one year ago (at the time patient received Tenecteplase and had full resolution of symptoms), who presented to [facility] on 10/12/23 following sudden fall at work and lost consciousness. NIHSS 10 on admission. Admitted under NeuroICU on 10/12. Imaging revealed ischemia in the posterior circulation, CT angiogram head/neck showed right PCA P1-2 cutoff, possibly chronic right MCA M1 cutoff which had distal reconstitution and established collaterals, and left PCA occlusion which patient was subsequently transferred for mechanical thrombectomy.    Cerebral angiogram and mechanical thrombectomy performed using aspiration with TICI 3 recanalization of left PCA. The right occipital lobe filled from right SHANI collaterals, and the right MCA as well was thought to be chronic given noted collateralization. MR with small to moderate left temporal occipital lobe infarctions. Occluded right MCA, occluded right PCA and right vertebral artery proximal segment. The patient was started on a hep gtt given extensive atherosclerotic disease which was switched to apixaban 5mg BID with recommendations for a three month course.    TTE with normal left ventricular internal cavity size. Left ventricular ejection fractio 50 to 55%. Per Neuro, no need for ROCIO at this time. Pt had an episode of bradycardia during his stay and there were concern for 2:1 block. ILR placed 10/20 and no indication for PPM at this time.     Patient was evaluated by PM&R and therapy for functional deficits, gait/ADL impairments and acute rehabilitation was recommended. Patient was medically optimized for discharge to [facility] IRF on 10/27/23.       Subjective:    No acute overnight events. Met with patient during speech therapy, where he had recalled 4/6 stroke signs today (up from 2/6 last week). He is frustrated with his short term memory loss. Reports unchanged L sided numbness. He is eating and sleeping well.     ROS  -no SOB  -no Abd pain, constipation, diarrhea.   -no chest pain  +fatigue  +neurological deficits    Vital Signs Last 24 Hrs  ICU Vital Signs Last 24 Hrs  T(C): 36.7 (14 Nov 2023 07:54), Max: 37.1 (13 Nov 2023 19:51)  T(F): 98.1 (14 Nov 2023 07:54), Max: 98.7 (13 Nov 2023 19:51)  HR: 62 (14 Nov 2023 07:54) (57 - 62)  BP: 160/88 (14 Nov 2023 07:54) (130/80 - 160/88)  RR: 16 (14 Nov 2023 07:54) (15 - 16)  SpO2: 98% (14 Nov 2023 07:54) (97% - 98%)    O2 Parameters below as of 14 Nov 2023 07:54  Patient On (Oxygen Delivery Method): room air    Physical Exam:  Constitutional - NAD, Comfortable  HEENT - NCAT, EOMI  Chest -breathing comfortably on RA  Cardio - warm and well perfused, + loop recorder.   Abdomen - Obese, soft, no-tender  Extremities - No peripheral edema, Long curling toenails.    Neurologic Exam:                    Cognitive -             Orientation: AA&Ox2-3 to self, month, year and location, not to date            Attention:   able to state days of week backward, mild difficulty with serial 7's - made one error            Memory: short term memory deficits - recalls 2 of 3 objects after 5 minutes with categorical cues     Speech - Fluent, Comprehensible, No dysarthria, No aphasia      Cranial Nerves - No facial asymmetry, Tongue midline, EOMI, Shoulder shrug intact. Vision Impaired on the Left upper visual field in L > R eye.      Motor -                      LEFT    UE - ShAB 4/5, EF 4-5/5, EE 4/5, WE 5/5,  WNL                     RIGHT UE - ShAB 5/5, EF 5/5, EE 5/5, WE 5/5,  WNL                    LEFT    LE - HF 5/5, KE 5/5, DF 5/5, PF 5/5                    RIGHT LE - HF 5/5, KE 5/5, DF 5/5, PF 5/5        Sensory - Impaired to LT on the left face (V2/3), arm and leg.  Feels LT but sensation of numbness and lesser than right side.      Reflexes - symmetric DTR +1 in LE and UE. Neg Babinski's b/l     Coordination - FTN / HTS intact b/l             LABS:  CBC:            13.6   5.87  )-----------( 103      ( 11-13-23 @ 06:00 )             40.3         Chem:         ( 11-13-23 @ 06:00 )    141  |  103  |  20  ----------------------------<  101<H>  4.3   |  32<H>  |  1.00        Liver Functions: ( 11-13-23 @ 06:00 )  Alb: 3.5 g/dL / Pro: 6.7 g/dL / ALK PHOS: 72 U/L / ALT: 34 U/L / AST: 18 U/L / GGT: x            MEDICATIONS  (STANDING):  apixaban 5 milliGRAM(s) Oral two times a day  atorvastatin 80 milliGRAM(s) Oral at bedtime  bisacodyl 5 milliGRAM(s) Oral at bedtime  citalopram 20 milliGRAM(s) Oral daily  dextrose 50% Injectable 25 Gram(s) IV Push once  donepezil 10 milliGRAM(s) Oral with breakfast  famotidine    Tablet 20 milliGRAM(s) Oral daily  folic acid 1 milliGRAM(s) Oral daily  linagliptin 5 milliGRAM(s) Oral daily  memantine 10 milliGRAM(s) Oral two times a day  metFORMIN 500 milliGRAM(s) Oral two times a day  multivitamin 1 Tablet(s) Oral daily  polyethylene glycol 3350 17 Gram(s) Oral daily  senna 2 Tablet(s) Oral at bedtime  thiamine 100 milliGRAM(s) Oral daily    MEDICATIONS  (PRN):   HPI:  Patient is a 57 y/o M with a PHx HTN, DM, stroke one year ago (at the time patient received Tenecteplase and had full resolution of symptoms), who presented to [facility] on 10/12/23 following sudden fall at work and lost consciousness. NIHSS 10 on admission. Admitted under NeuroICU on 10/12. Imaging revealed ischemia in the posterior circulation, CT angiogram head/neck showed right PCA P1-2 cutoff, possibly chronic right MCA M1 cutoff which had distal reconstitution and established collaterals, and left PCA occlusion which patient was subsequently transferred for mechanical thrombectomy.    Cerebral angiogram and mechanical thrombectomy performed using aspiration with TICI 3 recanalization of left PCA. The right occipital lobe filled from right SHANI collaterals, and the right MCA as well was thought to be chronic given noted collateralization. MR with small to moderate left temporal occipital lobe infarctions. Occluded right MCA, occluded right PCA and right vertebral artery proximal segment. The patient was started on a hep gtt given extensive atherosclerotic disease which was switched to apixaban 5mg BID with recommendations for a three month course.    TTE with normal left ventricular internal cavity size. Left ventricular ejection fractio 50 to 55%. Per Neuro, no need for ROCIO at this time. Pt had an episode of bradycardia during his stay and there were concern for 2:1 block. ILR placed 10/20 and no indication for PPM at this time.     Patient was evaluated by PM&R and therapy for functional deficits, gait/ADL impairments and acute rehabilitation was recommended. Patient was medically optimized for discharge to [facility] IRF on 10/27/23.       Subjective:    No acute overnight events. Met with patient during speech therapy, where he had recalled 4/6 stroke signs today (improved from 2/6 last week). He is frustrated with his short term memory loss, but is feeling more comfortable with using the strategies he is learning in therapy. Reports unchanged L sided numbness. He is eating and sleeping well.     ROS  -no SOB  -no Abd pain, constipation, diarrhea.   -no chest pain  +fatigue  +neurological deficits    Vital Signs Last 24 Hrs  ICU Vital Signs Last 24 Hrs  T(C): 36.7 (14 Nov 2023 07:54), Max: 37.1 (13 Nov 2023 19:51)  T(F): 98.1 (14 Nov 2023 07:54), Max: 98.7 (13 Nov 2023 19:51)  HR: 62 (14 Nov 2023 07:54) (57 - 62)  BP: 160/88 (14 Nov 2023 07:54) (130/80 - 160/88)  RR: 16 (14 Nov 2023 07:54) (15 - 16)  SpO2: 98% (14 Nov 2023 07:54) (97% - 98%)    O2 Parameters below as of 14 Nov 2023 07:54  Patient On (Oxygen Delivery Method): room air    Physical Exam:  Constitutional - NAD, Comfortable  HEENT - NCAT, EOMI  Chest -breathing comfortably on RA  Cardio - warm and well perfused, + loop recorder.   Abdomen - Obese, soft, no-tender  Extremities - No peripheral edema, Long curling toenails.    Neurologic Exam:                    Cognitive -             Orientation: AA&Ox2-3 to self, month, year and location, not to date            Attention:   able to state days of week backward, mild difficulty with serial 7's - made one error            Memory: short term memory deficits - recalls 2 of 3 objects after 5 minutes with categorical cues     Speech - Fluent, Comprehensible, No dysarthria, No aphasia      Cranial Nerves - No facial asymmetry, Tongue midline, EOMI, Shoulder shrug intact. Vision Impaired on the Left upper visual field in L > R eye.      Motor -                      LEFT    UE - ShAB 4/5, EF 4-5/5, EE 4/5, WE 5/5,  WNL                     RIGHT UE - ShAB 5/5, EF 5/5, EE 5/5, WE 5/5,  WNL                    LEFT    LE - HF 5/5, KE 5/5, DF 5/5, PF 5/5                    RIGHT LE - HF 5/5, KE 5/5, DF 5/5, PF 5/5        Sensory - Impaired to LT on the left face (V2/3), arm and leg.  Feels LT but sensation of numbness and lesser than right side.      Reflexes - symmetric DTR +1 in LE and UE. Neg Babinski's b/l     Coordination - FTN / HTS intact b/l             LABS:  CBC:            13.6   5.87  )-----------( 103      ( 11-13-23 @ 06:00 )             40.3         Chem:         ( 11-13-23 @ 06:00 )    141  |  103  |  20  ----------------------------<  101<H>  4.3   |  32<H>  |  1.00        Liver Functions: ( 11-13-23 @ 06:00 )  Alb: 3.5 g/dL / Pro: 6.7 g/dL / ALK PHOS: 72 U/L / ALT: 34 U/L / AST: 18 U/L / GGT: x            MEDICATIONS  (STANDING):  apixaban 5 milliGRAM(s) Oral two times a day  atorvastatin 80 milliGRAM(s) Oral at bedtime  bisacodyl 5 milliGRAM(s) Oral at bedtime  citalopram 20 milliGRAM(s) Oral daily  dextrose 50% Injectable 25 Gram(s) IV Push once  donepezil 10 milliGRAM(s) Oral with breakfast  famotidine    Tablet 20 milliGRAM(s) Oral daily  folic acid 1 milliGRAM(s) Oral daily  linagliptin 5 milliGRAM(s) Oral daily  memantine 10 milliGRAM(s) Oral two times a day  metFORMIN 500 milliGRAM(s) Oral two times a day  multivitamin 1 Tablet(s) Oral daily  polyethylene glycol 3350 17 Gram(s) Oral daily  senna 2 Tablet(s) Oral at bedtime  thiamine 100 milliGRAM(s) Oral daily    MEDICATIONS  (PRN):

## 2023-11-14 NOTE — PROGRESS NOTE ADULT - ASSESSMENT
Pt alert, attentive, intact expressive language, thought processes - goal-directed, no morbid thought contents noted, denied AH/VH, denied SI/HI/I/P, calm behavior, improved coping with his current deficits and disabilities. Plan: Continue individual supportive tx.

## 2023-11-14 NOTE — PROGRESS NOTE ADULT - ATTENDING COMMENTS
Pt. seen with resident.  Agree with documentation above as per resident with amendments made as appropriate. Patient medically stable. Making progress towards rehab goals.     right PCA occlusion, left PCA infarct  #Memory deficits  -10/13: MRI Brain  showing L PCA P2 occlusion    -Cont. Memantine 10mg BID   -Trial increase Donepezil to 10mg in AM--started  11/14--monitor for SE  - EKG QTc  11/10--445 Pt. seen with resident & MS#.  Agree with documentation above as per resident & MS3 with amendments made as appropriate. Patient medically stable. Making progress towards rehab goals.     right PCA occlusion, left PCA infarct  #Memory deficits  -10/13: MRI Brain  showing L PCA P2 occlusion    -Cont. Memantine 10mg BID   -Trial increase Donepezil to 10mg in AM--started  11/14--monitor for SE  - EKG QTc  11/10--445

## 2023-11-14 NOTE — PROGRESS NOTE ADULT - SUBJECTIVE AND OBJECTIVE BOX
Pt was seen for 25 min for supportive tx. Pt c/o   . Pt reported doing well since last seen. He continues to fully participate in all his rehab txs and to notice improvement. Pt reported improvement in ambulation during PT, as well as in performing his ADLs during PT, and continued work in cognitive remediation during SLP. Pt reported that numbness on his left side is still present but has decreased somewhat since admission, and that his left peripheral vision is also improving. Pt expressed concern about his daughter, who lives with him but is currently staying with one of his brothers and his family. Pt reported missing her, and trying to stay in touch with her on the phone. Pt also expressed concern about the future of his job, as well as awareness that he is not nearly ready to return to work. Pt reported that his family has been very supportive and his siblings have been coming to see him and calling him often, which makes him feel well regarded. Pt expressed awareness of disposition, i.e., d/c to [facility], possibly to a facility where one of his siblings work; he reported agreeing with that arrangement. Pt denied feelings of sadness or anxiety, improved sleep and appetite, and overall feeling he is adjusting better to his current situation. Support and encouragement were provided.

## 2023-11-14 NOTE — PROGRESS NOTE ADULT - SUBJECTIVE AND OBJECTIVE BOX
Patient is a 58y old  Male who presents with a chief complaint of CVA and left hemiparesis (14 Nov 2023 10:51)      Patient seen and examined at bedside. NAD    ALLERGIES:  No Known Allergies    MEDICATIONS  (STANDING):  apixaban 5 milliGRAM(s) Oral two times a day  atorvastatin 80 milliGRAM(s) Oral at bedtime  bisacodyl 5 milliGRAM(s) Oral at bedtime  citalopram 20 milliGRAM(s) Oral daily  dextrose 50% Injectable 25 Gram(s) IV Push once  donepezil 10 milliGRAM(s) Oral with breakfast  famotidine    Tablet 20 milliGRAM(s) Oral daily  folic acid 1 milliGRAM(s) Oral daily  linagliptin 5 milliGRAM(s) Oral daily  memantine 10 milliGRAM(s) Oral two times a day  metFORMIN 500 milliGRAM(s) Oral two times a day  multivitamin 1 Tablet(s) Oral daily  polyethylene glycol 3350 17 Gram(s) Oral daily  senna 2 Tablet(s) Oral at bedtime  thiamine 100 milliGRAM(s) Oral daily    MEDICATIONS  (PRN):    Vital Signs Last 24 Hrs  T(F): 98.1 (14 Nov 2023 07:54), Max: 98.7 (13 Nov 2023 19:51)  HR: 62 (14 Nov 2023 07:54) (57 - 62)  BP: 160/88 (14 Nov 2023 07:54) (130/80 - 160/88)  RR: 16 (14 Nov 2023 07:54) (15 - 16)  SpO2: 98% (14 Nov 2023 07:54) (97% - 98%)  I&O's Summary        PHYSICAL EXAM:  General: NAD  ENT: MMM, no scleral icterus  Neck: Supple, No JVD  Lungs: Clear to auscultation bilaterally, no wheezes, rales, rhonchi  Cardio: RRR, S1/S2  Abdomen: Soft, Nontender, Nondistended; Bowel sounds present  Extremities: No calf tenderness, No pitting edema    LABS:                        13.6   5.87  )-----------( 103      ( 13 Nov 2023 06:00 )             40.3       11-13    141  |  103  |  20  ----------------------------<  101  4.3   |  32  |  1.00    Ca    9.2      13 Nov 2023 06:00    TPro  6.7  /  Alb  3.5  /  TBili  0.6  /  DBili  x   /  AST  18  /  ALT  34  /  AlkPhos  72  11-13                10-13 Chol 199 mg/dL LDL -- HDL 29 mg/dL Trig 277 mg/dL                  Urinalysis Basic - ( 13 Nov 2023 06:00 )    Color: x / Appearance: x / SG: x / pH: x  Gluc: 101 mg/dL / Ketone: x  / Bili: x / Urobili: x   Blood: x / Protein: x / Nitrite: x   Leuk Esterase: x / RBC: x / WBC x   Sq Epi: x / Non Sq Epi: x / Bacteria: x        COVID-19 PCR: NotDetejeison (10-27-23 @ 22:21)      RADIOLOGY & ADDITIONAL TESTS:    Care Discussed with Consultants/Other Providers: yes, rehab

## 2023-11-14 NOTE — PROGRESS NOTE ADULT - ASSESSMENT
57 y/o M with a Hx of stroke one year ago (at the time patient received TNK and had full resolution of symptoms), HTN, T2DM, non-compliant with medications, who presented following an episode of LOC, as per family at around 6:15 pm patient had a sudden fall at work and lost consciousness. It is unclear if patient had any symptoms before he fell. NIHSS 10 on admission, admitted under NeuroICU on 10/12: Thrombectomy via L radial artery for TICI 3 revascularization of L P1-P2 occlusion. Noted LMCA chronic occlusion., on 0/13: Neurologic status improving. Got MR. Started on ASA and SQL, on 10/14: quadrantanopia now on L superior (originally R); Repeat stroke imaging completed and initial concern for CTP showing poss new perfusion deficit however on further review noted to be present on admission. Started on heparin drip per neurology recommendations, on 10/15: Therapeutic on heparin drip, stability HCT obtained. Cardiology consulted for further stroke work-up and downgraded under medicine- pt/ot/dvt ppx    #Acute CVA  #Occluded R MCA/PCA, s/p thrombectomy   -TTE shows with  Normal left ventricular internal cavity size,  3. Left ventricular ejection fraction, by visual estimation, is 50 to 55%.  -DVT studies negative  -continue Eliquis. Needs for 3 months.    -c/w Lipitor  -outpatient neuro IR follow up  -Continue comprehensive rehab program - PT/OT/SLP per rehab team  -Pain management, bowel regimen per rehab     #Bradycardia  -TTE EF 50-55%, no other structural abnormalities  -No indication for pacemaker  -Metoprolol discontinued   -per electrophysiology notes, no indication for pacemaker, s/p ILR on 10/20. Follow up outpatient    #Intermittent Dizziness   -Encouraged fluid intake    #ETOH use disorder -  in remission, stable.    #HTN  -No meds at home  -Did not tolerate lisinopril 5mg during hospitalization (intermittent hypotension)  -Hydralazine 25 mg PRN if SBP > 170    #HLD  -Cont Lipitor    #T2DM, HbA1c 8.6 - non compliant with meds at home  -Cont metformin metFORMIN     #Elevated TSH  -T4 level normal  -outpt f/u    DVT ppx - eliquis

## 2023-11-15 PROCEDURE — 99232 SBSQ HOSP IP/OBS MODERATE 35: CPT

## 2023-11-15 RX ADMIN — Medication 1 MILLIGRAM(S): at 11:15

## 2023-11-15 RX ADMIN — MEMANTINE HYDROCHLORIDE 10 MILLIGRAM(S): 10 TABLET ORAL at 05:39

## 2023-11-15 RX ADMIN — METFORMIN HYDROCHLORIDE 500 MILLIGRAM(S): 850 TABLET ORAL at 17:11

## 2023-11-15 RX ADMIN — LINAGLIPTIN 5 MILLIGRAM(S): 5 TABLET, FILM COATED ORAL at 11:15

## 2023-11-15 RX ADMIN — APIXABAN 5 MILLIGRAM(S): 2.5 TABLET, FILM COATED ORAL at 17:11

## 2023-11-15 RX ADMIN — Medication 100 MILLIGRAM(S): at 11:15

## 2023-11-15 RX ADMIN — Medication 1 TABLET(S): at 11:15

## 2023-11-15 RX ADMIN — MEMANTINE HYDROCHLORIDE 10 MILLIGRAM(S): 10 TABLET ORAL at 17:11

## 2023-11-15 RX ADMIN — CITALOPRAM 20 MILLIGRAM(S): 10 TABLET, FILM COATED ORAL at 11:15

## 2023-11-15 RX ADMIN — DONEPEZIL HYDROCHLORIDE 10 MILLIGRAM(S): 10 TABLET, FILM COATED ORAL at 07:33

## 2023-11-15 RX ADMIN — APIXABAN 5 MILLIGRAM(S): 2.5 TABLET, FILM COATED ORAL at 05:39

## 2023-11-15 RX ADMIN — ATORVASTATIN CALCIUM 80 MILLIGRAM(S): 80 TABLET, FILM COATED ORAL at 21:19

## 2023-11-15 RX ADMIN — FAMOTIDINE 20 MILLIGRAM(S): 10 INJECTION INTRAVENOUS at 11:15

## 2023-11-15 RX ADMIN — METFORMIN HYDROCHLORIDE 500 MILLIGRAM(S): 850 TABLET ORAL at 07:33

## 2023-11-15 NOTE — PROGRESS NOTE ADULT - SUBJECTIVE AND OBJECTIVE BOX
HPI:  Patient is a 59 y/o M with a PHx HTN, DM, stroke one year ago (at the time patient received Tenecteplase and had full resolution of symptoms), who presented to [facility] on 10/12/23 following sudden fall at work and lost consciousness. NIHSS 10 on admission. Admitted under NeuroICU on 10/12. Imaging revealed ischemia in the posterior circulation, CT angiogram head/neck showed right PCA P1-2 cutoff, possibly chronic right MCA M1 cutoff which had distal reconstitution and established collaterals, and left PCA occlusion which patient was subsequently transferred for mechanical thrombectomy.    Cerebral angiogram and mechanical thrombectomy performed using aspiration with TICI 3 recanalization of left PCA. The right occipital lobe filled from right SHANI collaterals, and the right MCA as well was thought to be chronic given noted collateralization. MR with small to moderate left temporal occipital lobe infarctions. Occluded right MCA, occluded right PCA and right vertebral artery proximal segment. The patient was started on a hep gtt given extensive atherosclerotic disease which was switched to apixaban 5mg BID with recommendations for a three month course.    TTE with normal left ventricular internal cavity size. Left ventricular ejection fractio 50 to 55%. Per Neuro, no need for ROCIO at this time. Pt had an episode of bradycardia during his stay and there were concern for 2:1 block. ILR placed 10/20 and no indication for PPM at this time.     Patient was evaluated by PM&R and therapy for functional deficits, gait/ADL impairments and acute rehabilitation was recommended. Patient was medically optimized for discharge to [facility] IRF on 10/27/23.       Subjective:    No acute overnight events. Met with neuropsychologist yesterday. Pt seems down and depressed, says he just misses his daughter when asked.   He is frustrated with his short term memory loss, but is feeling more comfortable with using the strategies he is learning in therapy. Reports unchanged L sided numbness. He is eating and sleeping well.     ROS  -no SOB  -no Abd pain, constipation, diarrhea.   -no chest pain  +fatigue  +neurological deficits    Vital Signs Last 24 Hrs  ICU Vital Signs Last 24 Hrs  T(C): 36.6 (15 Nov 2023 07:32), Max: 36.6 (14 Nov 2023 19:33)  T(F): 97.9 (15 Nov 2023 07:32), Max: 97.9 (15 Nov 2023 07:32)  HR: 56 (15 Nov 2023 07:32) (56 - 56)  BP: 146/86 (15 Nov 2023 07:32) (145/82 - 146/86)  BP(mean): --  ABP: --  ABP(mean): --  RR: 16 (15 Nov 2023 07:32) (15 - 16)  SpO2: 99% (15 Nov 2023 07:32) (94% - 99%)    O2 Parameters below as of 15 Nov 2023 07:32  Patient On (Oxygen Delivery Method): room air      Physical Exam:  Constitutional - NAD, Comfortable  HEENT - NCAT, EOMI  Chest -breathing comfortably on RA  Cardio - warm and well perfused, + loop recorder.   Abdomen - Obese, soft, no-tender  Extremities - No peripheral edema, Long curling toenails.    Neurologic Exam:                    Cognitive -             Orientation: AA&Ox2-3 to self, month, year and location, not to date            Attention:   able to state days of week backward, mild difficulty with serial 7's - made one error            Memory: short term memory deficits - recalls 2 of 3 objects after 5 minutes with categorical cues     Speech - Fluent, Comprehensible, No dysarthria, No aphasia      Cranial Nerves - No facial asymmetry, Tongue midline, EOMI, Shoulder shrug intact. Vision Impaired on the Left upper visual field in L > R eye.      Motor -                      LEFT    UE - ShAB 4/5, EF 4-5/5, EE 4/5, WE 5/5,  WNL                     RIGHT UE - ShAB 5/5, EF 5/5, EE 5/5, WE 5/5,  WNL                    LEFT    LE - HF 5/5, KE 5/5, DF 5/5, PF 5/5                    RIGHT LE - HF 5/5, KE 5/5, DF 5/5, PF 5/5        Sensory - Impaired to LT on the left face (V2/3), arm and leg.  Feels LT but sensation of numbness and lesser than right side.      Reflexes - symmetric DTR +1 in LE and UE. Neg Babinski's b/l     Coordination - FTN / HTS intact b/l             LABS: most recent from 11/13/23  CBC:            13.6   5.87  )-----------( 103      ( 11-13-23 @ 06:00 )             40.3         Chem:         ( 11-13-23 @ 06:00 )    141  |  103  |  20  ----------------------------<  101<H>  4.3   |  32<H>  |  1.00        Liver Functions: ( 11-13-23 @ 06:00 )  Alb: 3.5 g/dL / Pro: 6.7 g/dL / ALK PHOS: 72 U/L / ALT: 34 U/L / AST: 18 U/L / GGT: x     MEDICATIONS  (STANDING):  apixaban 5 milliGRAM(s) Oral two times a day  atorvastatin 80 milliGRAM(s) Oral at bedtime  bisacodyl 5 milliGRAM(s) Oral at bedtime  citalopram 20 milliGRAM(s) Oral daily  dextrose 50% Injectable 25 Gram(s) IV Push once  donepezil 10 milliGRAM(s) Oral with breakfast  famotidine    Tablet 20 milliGRAM(s) Oral daily  folic acid 1 milliGRAM(s) Oral daily  linagliptin 5 milliGRAM(s) Oral daily  memantine 10 milliGRAM(s) Oral two times a day  metFORMIN 500 milliGRAM(s) Oral two times a day  multivitamin 1 Tablet(s) Oral daily  polyethylene glycol 3350 17 Gram(s) Oral daily  senna 2 Tablet(s) Oral at bedtime  thiamine 100 milliGRAM(s) Oral daily    MEDICATIONS  (PRN):   HPI:  Patient is a 57 y/o M with a PHx HTN, DM, stroke one year ago (at the time patient received Tenecteplase and had full resolution of symptoms), who presented to [facility] on 10/12/23 following sudden fall at work and lost consciousness. NIHSS 10 on admission. Admitted under NeuroICU on 10/12. Imaging revealed ischemia in the posterior circulation, CT angiogram head/neck showed right PCA P1-2 cutoff, possibly chronic right MCA M1 cutoff which had distal reconstitution and established collaterals, and left PCA occlusion which patient was subsequently transferred for mechanical thrombectomy.    Cerebral angiogram and mechanical thrombectomy performed using aspiration with TICI 3 recanalization of left PCA. The right occipital lobe filled from right SHANI collaterals, and the right MCA as well was thought to be chronic given noted collateralization. MR with small to moderate left temporal occipital lobe infarctions. Occluded right MCA, occluded right PCA and right vertebral artery proximal segment. The patient was started on a hep gtt given extensive atherosclerotic disease which was switched to apixaban 5mg BID with recommendations for a three month course.    TTE with normal left ventricular internal cavity size. Left ventricular ejection fractio 50 to 55%. Per Neuro, no need for ROCIO at this time. Pt had an episode of bradycardia during his stay and there were concern for 2:1 block. ILR placed 10/20 and no indication for PPM at this time.     Patient was evaluated by PM&R and therapy for functional deficits, gait/ADL impairments and acute rehabilitation was recommended. Patient was medically optimized for discharge to [facility] IRF on 10/27/23.       Subjective:    No acute overnight events. Met with neuropsychologist yesterday. Pt seems down and depressed, says he just misses his daughter when asked. States she has come to visit a couple times.    He is frustrated with his short term memory loss, but is feeling more comfortable with using the strategies he is learning in therapy. Reports unchanged L sided numbness. He is eating and sleeping well.     ROS  -no SOB  -no Abd pain, constipation, diarrhea.   -no chest pain  +fatigue  +neurological deficits    Vital Signs Last 24 Hrs  ICU Vital Signs Last 24 Hrs  T(C): 36.6 (15 Nov 2023 07:32), Max: 36.6 (14 Nov 2023 19:33)  T(F): 97.9 (15 Nov 2023 07:32), Max: 97.9 (15 Nov 2023 07:32)  HR: 56 (15 Nov 2023 07:32) (56 - 56)  BP: 146/86 (15 Nov 2023 07:32) (145/82 - 146/86)  BP(mean): --  ABP: --  ABP(mean): --  RR: 16 (15 Nov 2023 07:32) (15 - 16)  SpO2: 99% (15 Nov 2023 07:32) (94% - 99%)    O2 Parameters below as of 15 Nov 2023 07:32  Patient On (Oxygen Delivery Method): room air      Physical Exam:  Constitutional - NAD, Comfortable  HEENT - NCAT, EOMI  Chest -breathing comfortably on RA  Cardio - warm and well perfused, + loop recorder.   Abdomen - Obese, soft, no-tender  Extremities - No peripheral edema, Long curling toenails.    Neurologic Exam:                    Cognitive -             Orientation: AA&Ox2-3 to self, month, year and location, not to date            Attention:   able to state days of week backward, mild difficulty with serial 7's - made one error            Memory: short term memory deficits - recalls 2 of 3 objects after 5 minutes with categorical cues     Speech - Fluent, Comprehensible, No dysarthria, No aphasia      Cranial Nerves - No facial asymmetry, Tongue midline, EOMI, Shoulder shrug intact. Vision Impaired on the Left upper visual field in L > R eye.      Motor -                      LEFT    UE - ShAB 4/5, EF 4-5/5, EE 4/5, WE 5/5,  WNL                     RIGHT UE - ShAB 5/5, EF 5/5, EE 5/5, WE 5/5,  WNL                    LEFT    LE - HF 5/5, KE 5/5, DF 5/5, PF 5/5                    RIGHT LE - HF 5/5, KE 5/5, DF 5/5, PF 5/5        Sensory - Impaired to LT on the left face (V2/3), arm and leg.  Feels LT but sensation of numbness and lesser than right side.      Reflexes - symmetric DTR +1 in LE and UE. Neg Babinski's b/l     Coordination - FTN / HTS intact b/l             LABS: most recent from 11/13/23  CBC:            13.6   5.87  )-----------( 103      ( 11-13-23 @ 06:00 )             40.3         Chem:         ( 11-13-23 @ 06:00 )    141  |  103  |  20  ----------------------------<  101<H>  4.3   |  32<H>  |  1.00        Liver Functions: ( 11-13-23 @ 06:00 )  Alb: 3.5 g/dL / Pro: 6.7 g/dL / ALK PHOS: 72 U/L / ALT: 34 U/L / AST: 18 U/L / GGT: x     MEDICATIONS  (STANDING):  apixaban 5 milliGRAM(s) Oral two times a day  atorvastatin 80 milliGRAM(s) Oral at bedtime  bisacodyl 5 milliGRAM(s) Oral at bedtime  citalopram 20 milliGRAM(s) Oral daily  dextrose 50% Injectable 25 Gram(s) IV Push once  donepezil 10 milliGRAM(s) Oral with breakfast  famotidine    Tablet 20 milliGRAM(s) Oral daily  folic acid 1 milliGRAM(s) Oral daily  linagliptin 5 milliGRAM(s) Oral daily  memantine 10 milliGRAM(s) Oral two times a day  metFORMIN 500 milliGRAM(s) Oral two times a day  multivitamin 1 Tablet(s) Oral daily  polyethylene glycol 3350 17 Gram(s) Oral daily  senna 2 Tablet(s) Oral at bedtime  thiamine 100 milliGRAM(s) Oral daily    MEDICATIONS  (PRN):

## 2023-11-15 NOTE — PROGRESS NOTE ADULT - ASSESSMENT
Assessment and Plan:   Patient is a 59 y/o M with a PHx HTN, DM, CVA sp tnK with full resolution symptoms, who presented to [facility] on 10/12 due to sudden fall at work and lost consciousness.  MRI brain showed left temporal occipital lobe infarctions and acute punctate left occipital lobe infarction now s/p thrombectomy and Right MCA/PCA oclusion with left sided weakness, sensory impairment, left homonymous hemianopsia, gait and ADL impairments.     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 Continue for 3 months.    - Atorvastatin 80mg qhs.   - C/w Memantine 5mg BID for cognitive deficits - 11/3.   - cont comprehensive rehab program OT, SLP  3 hours daily 5 x week. d/c SLP  - neuropsychology evaluation and support, recreation therapy  - Precautions: cardiac, DM, respiratory, breast CA, fall, aspiration.  - No new neurological deficits    #Memory deficits  -10/13: MRI Brain  showing L PCA P2 occlusion    -Cont. Memantine 10mg BID   -Trial increase Donepezil to 10mg in AM--started  11/14--monitor for SE  - EKG QTc  11/10--445  -Monitor HR as donepezil has slight risk of worsening bradycardia--HR has been stable  -QTc 460 on 11/7       # Bradycardia  - No indication for pacemaker  - Metoprolol 12.5mg BID has been discontinued   - per electrophysiology notes, no indication for pacemaker, s/p ILR on 10/20  - needs EP follow up as an outpatient  - HR stable in 50s-60s     # HTN  - No medication at home and patient is sensitive to HTN medication. Was previously started on lisinopril.   - Can treat with hydralazine 25mg PRN for >170.   - did not tolerate lisinopril 5mg during hospitalization (intermittent hypotension)  - Dr. Curry suggests 10 point decrease in BP parameters based on clinical exam  - Current parameters -150 over weekend, titrate 120-140 as tolerated  - Monitor BP-- SBP has been 120-150    # HLD  - Atorvastatin 80 mg daily    # DM 2  - A1C 8.6  - Metformin   - Accu checks and ISS    # ETOH abuse  - Not in active withdrawal.  - Continue MVI, thiamine and folic acid    # Pain management  - No complaints     # GI/Bowel:  - Senna QHS, Miralax Daily  - dulcolax PO     # /Bladder:   -Continent    # FEN   - Diet: DASH DIET     # DVT ppx  - Eliquis 5mg BID    #Dispo  - awaiting placement    IDT: 11/14  TDD:  d/c plan to [facility] ASAP   RN: continent with bowel and continent with bladder   SW: Lives in Apartment with his daughter. On the main floor. He was independent before. family cannot provide supervision.  Insurance coverage for Medicaid holding up [facility] discharge  SP: Regular with thin. Mild cognitive deficits-- . Recall and short term memory deficits, reasoning, attention, decreased mental flexibility. Needs assistance with IADLs, finances, meds  OT: Setup/sup - eating, grooming, UBD. CG - toileting, toilet transfers and LBD.  Min-A Bathing.   PT : D/C WFL. Ambulation 150 feet without AD and 12 steps with supervision.    Goals: 1. Recall salient information with external aides  2. supervision ambulation to bathroom and toileting    -- Spoke with patient's sister, Selma. She had questions about his current cognitive state and recovery. Discussed that he is not able to be unsupervised at this time, requiring help with money, medication, etc. as mentioned above. All questions answered. Selma is aware of the divorce/death documentation holding up placement and states the family is actively working on this issue. 11/14/23.     Outpatient Follow-up (Specialty/Name of physician):    Rj Delgado  Cardiac Electrophysiology  [address] [place], [place] [address]  Phone: [phone]  Fax: [phone]  Follow Up Time:     Wily Barkley  Neurology  [address] [place] [address]  Phone: [phone]  Fax: [phone]  Follow Up Time: 1 week    Denzel Zapata  Interventional Cardiology  [address] [facility], [address] [address]  Phone: [phone]  Fax: [phone]  Follow Up Time: 1 week    PCP,   Phone: (   )    -  Fax: (   )    -  Follow Up Time: 1 week    Arsenio Goodrich  Interventional Neuroradiology  [address] [place] [address]  Phone: [phone]  Fax: [phone]             Assessment and Plan:   Patient is a 59 y/o M with a PHx HTN, DM, CVA sp tnK with full resolution symptoms, who presented to [facility] on 10/12 due to sudden fall at work and lost consciousness.  MRI brain showed left temporal occipital lobe infarctions and acute punctate left occipital lobe infarction now s/p thrombectomy and Right MCA/PCA oclusion with left sided weakness, sensory impairment, left homonymous hemianopsia, gait and ADL impairments.     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 Continue for 3 months.    - Atorvastatin 80mg qhs.   - C/w Memantine 5mg BID for cognitive deficits - 11/3.   - cont comprehensive rehab program OT, SLP  3 hours daily 5 x week. d/c SLP  - neuropsychology evaluation and support, recreation therapy  - Precautions: cardiac, DM, respiratory, breast CA, fall, aspiration.  - No new neurological deficits    #Memory deficits  -10/13: MRI Brain  showing L PCA P2 occlusion    -Cont. Memantine 10mg BID   -Cont. Donepezil to 10mg in AM--started  11/14--monitor for SE  - EKG QTc  11/10--445  -Monitor HR as donepezil has slight risk of worsening bradycardia--HR has been stable  -QTc 460 on 11/7       # Bradycardia  - No indication for pacemaker  - Metoprolol 12.5mg BID has been discontinued   - per electrophysiology notes, no indication for pacemaker, s/p ILR on 10/20  - needs EP follow up as an outpatient  - HR stable in 50s-60s     # HTN  - No medication at home and patient is sensitive to HTN medication. Was previously started on lisinopril.   - Can treat with hydralazine 25mg PRN for >170.   - did not tolerate lisinopril 5mg during hospitalization (intermittent hypotension)  - Dr. Curry suggests 10 point decrease in BP parameters based on clinical exam  - Current parameters -150 over weekend, titrate 120-140 as tolerated  - Monitor BP-- SBP has been 120-150    Mood--  --Neuropsychology following for counseling    # HLD  - Atorvastatin 80 mg daily    # DM 2  - A1C 8.6  - Metformin   - Accu checks and ISS    # ETOH abuse  - Not in active withdrawal.  - Continue MVI, thiamine and folic acid    # Pain management  - No complaints     # GI/Bowel:  - Senna QHS, Miralax Daily  - dulcolax PO     # /Bladder:   -Continent    # FEN   - Diet: DASH DIET     # DVT ppx  - Eliquis 5mg BID    #Dispo  - awaiting placement    IDT: 11/14  TDD:  d/c plan to [facility] ASAP   RN: continent with bowel and continent with bladder   SW: Lives in Apartment with his daughter. On the main floor. He was independent before. family cannot provide supervision.  Insurance coverage for Medicaid holding up [facility] discharge  SP: Regular with thin. Mild cognitive deficits-- . Recall and short term memory deficits, reasoning, attention, decreased mental flexibility. Needs assistance with IADLs, finances, meds  OT: Setup/sup - eating, grooming, UBD. CG - toileting, toilet transfers and LBD.  Min-A Bathing.   PT : D/C WFL. Ambulation 150 feet without AD and 12 steps with supervision.    Goals: 1. Recall salient information with external aides  2. supervision ambulation to bathroom and toileting    -- Dr. Jamison Spoke with patient's sister, Selma 11/14. She had questions about his current cognitive state and recovery. Discussed that he is not able to be unsupervised at this time, requiring help with money, medication, etc. as mentioned above. All questions answered. Selma is aware of the divorce/death documentation holding up placement and states the family is actively working on this issue. 11/14/23.     Outpatient Follow-up (Specialty/Name of physician):    Rj Delgado  Cardiac Electrophysiology  [address] [place] [address], [address]  Phone: [phone]  Fax: [phone]  Follow Up Time:     Wily Barkley  Neurology  [address] [place] [address]  Phone: [phone]  Fax: [phone]  Follow Up Time: 1 week    Denzel Zapata  Interventional Cardiology  [address] [facility], [address] [address]  Phone: [phone]  Fax: [phone]  Follow Up Time: 1 week    PCP,   Phone: (   )    -  Fax: (   )    -  Follow Up Time: 1 week    Arsenio Goodrich  Interventional Neuroradiology  [address] [place] [address]  Phone: [phone]  Fax: [phone]

## 2023-11-15 NOTE — PROGRESS NOTE ADULT - ASSESSMENT
59 y/o M with a Hx of stroke one year ago (at the time patient received TNK and had full  resolution of symptoms), HTN, DM, non-compliant with medications, who presented following an episode of LOC, as per family at around 6:15 pm patient had a sudden fall at work and lost consciousness. It is unclear if patient had any symptoms before he fell. NIHSS 10 on admission, admitted under NeuroICU on 10/12: Thrombectomy via L radial artery for TICI 3 revascularization of L P1-P2 occlusion. Noted LMCA chronic occlusion., on 0/13: Neurologic status improving. Got MR. Started on ASA and SQL, on 10/14: quadrantanopia now on L superior (originally R); Repeat stroke imaging completed and initial concern for CTP showing poss new perfusion deficit however on further review noted to be present on admission. Started on heparin drip per neurology recommendations, on 10/15: Therapeutic on heparin drip, stability HCT obtained. Cardiology consulted for further stroke work-up and downgraded under medicine- pt/ot/dvt ppx      #Acute CVA  #Occluded R MCA/PCA, s/p thrombectomy   -TTE shows with  Normal left ventricular internal cavity size,  3. Left ventricular ejection fraction, by visual estimation, is 50 to 55%.  -DVT studies negative  -continue Eliquis. Needs for 3 months.    -c/w Lipitor  -outpatient neuro IR follow up.    # Bradycardia  -TTE EF 50-55%, no other structural abnormalities  - No indication for pacemaker  - Metoprolol discontinued   - per electrophysiology notes, no indication for pacemaker, s/p ILR on 10/20  - needs EP follow up as an outpatient.    #intermittent Dizziness   -Encouraged fluid intake - improved now    #ETOH use disorder  -  in remission, stable.    #HTN  - No meds at home  - did not tolerate lisinopril 5mg during hospitalization (intermittent hypotension)  - Hydralazine 25 mg PRN if SBP > 170    #HLD  c/w Lipitor    #DM 2  non compliant with meds at home  metformin   A1C 8.6  d/c Accu checks and ISS 11/6/23    #Elevated TSH  T4 level normal  outpt f/u    DVT Prophylaxis - Eliquis.     will follow  d/w rehab team

## 2023-11-15 NOTE — PROGRESS NOTE ADULT - ATTENDING COMMENTS
Pt. seen with student.  Agree with documentation above as per MS3 with amendments made as appropriate. Patient medically stable. Making progress towards rehab goals.     left PCA infarct, right MCA and PCA occlusion.   Tolerating donepezil and memantine for memory--  HR stable.  Cont. meds.  Neuropsychology following --11/14 note reviewed  d/c planning to [facility]

## 2023-11-15 NOTE — PROGRESS NOTE ADULT - SUBJECTIVE AND OBJECTIVE BOX
58y old  male who presents with a chief complaint of CVA and left hemiparesis     Patient seen and examined. No acute events overnight.   no new complaints, no n/v, no sob, no pain     Vital Signs Last 24 Hrs  T(C): 36.6 (15 Nov 2023 07:32), Max: 36.6 (14 Nov 2023 19:33)  T(F): 97.9 (15 Nov 2023 07:32), Max: 97.9 (15 Nov 2023 07:32)  HR: 56 (15 Nov 2023 07:32) (56 - 56)  BP: 146/86 (15 Nov 2023 07:32) (145/82 - 146/86)  BP(mean): --  RR: 16 (15 Nov 2023 07:32) (15 - 16)  SpO2: 99% (15 Nov 2023 07:32) (94% - 99%)    Parameters below as of 15 Nov 2023 07:32  Patient On (Oxygen Delivery Method): room air      GENERAL- NAD  EAR/NOSE/MOUTH/THROAT -  MMM  EYES- NIKO, conjunctiva and Sclera clear  NECK- supple  RESPIRATORY-  clear to auscultation bilaterally, non laboured breathing  CARDIOVASCULAR - SIS2, RRR  GI - soft NT BS present  EXTREMITIES- no pedal edema  NEUROLOGY- left sided weakness  PSYCHIATRY- AAO X 3              MEDICATIONS  (STANDING):  apixaban 5 milliGRAM(s) Oral two times a day  atorvastatin 80 milliGRAM(s) Oral at bedtime  bisacodyl 5 milliGRAM(s) Oral at bedtime  citalopram 20 milliGRAM(s) Oral daily  dextrose 50% Injectable 25 Gram(s) IV Push once  donepezil 10 milliGRAM(s) Oral with breakfast  famotidine    Tablet 20 milliGRAM(s) Oral daily  folic acid 1 milliGRAM(s) Oral daily  linagliptin 5 milliGRAM(s) Oral daily  memantine 10 milliGRAM(s) Oral two times a day  metFORMIN 500 milliGRAM(s) Oral two times a day  multivitamin 1 Tablet(s) Oral daily  polyethylene glycol 3350 17 Gram(s) Oral daily  senna 2 Tablet(s) Oral at bedtime  thiamine 100 milliGRAM(s) Oral daily    MEDICATIONS  (PRN):

## 2023-11-16 LAB
ALBUMIN SERPL ELPH-MCNC: 3.8 G/DL — SIGNIFICANT CHANGE UP (ref 3.3–5)
ALBUMIN SERPL ELPH-MCNC: 3.8 G/DL — SIGNIFICANT CHANGE UP (ref 3.3–5)
ALP SERPL-CCNC: 76 U/L — SIGNIFICANT CHANGE UP (ref 40–120)
ALP SERPL-CCNC: 76 U/L — SIGNIFICANT CHANGE UP (ref 40–120)
ALT FLD-CCNC: 33 U/L — SIGNIFICANT CHANGE UP (ref 10–45)
ALT FLD-CCNC: 33 U/L — SIGNIFICANT CHANGE UP (ref 10–45)
ANION GAP SERPL CALC-SCNC: 5 MMOL/L — SIGNIFICANT CHANGE UP (ref 5–17)
ANION GAP SERPL CALC-SCNC: 5 MMOL/L — SIGNIFICANT CHANGE UP (ref 5–17)
AST SERPL-CCNC: 19 U/L — SIGNIFICANT CHANGE UP (ref 10–40)
AST SERPL-CCNC: 19 U/L — SIGNIFICANT CHANGE UP (ref 10–40)
BILIRUB SERPL-MCNC: 0.6 MG/DL — SIGNIFICANT CHANGE UP (ref 0.2–1.2)
BILIRUB SERPL-MCNC: 0.6 MG/DL — SIGNIFICANT CHANGE UP (ref 0.2–1.2)
BUN SERPL-MCNC: 16 MG/DL — SIGNIFICANT CHANGE UP (ref 7–23)
BUN SERPL-MCNC: 16 MG/DL — SIGNIFICANT CHANGE UP (ref 7–23)
CALCIUM SERPL-MCNC: 9.4 MG/DL — SIGNIFICANT CHANGE UP (ref 8.4–10.5)
CALCIUM SERPL-MCNC: 9.4 MG/DL — SIGNIFICANT CHANGE UP (ref 8.4–10.5)
CHLORIDE SERPL-SCNC: 102 MMOL/L — SIGNIFICANT CHANGE UP (ref 96–108)
CHLORIDE SERPL-SCNC: 102 MMOL/L — SIGNIFICANT CHANGE UP (ref 96–108)
CO2 SERPL-SCNC: 34 MMOL/L — HIGH (ref 22–31)
CO2 SERPL-SCNC: 34 MMOL/L — HIGH (ref 22–31)
CREAT SERPL-MCNC: 1.07 MG/DL — SIGNIFICANT CHANGE UP (ref 0.5–1.3)
CREAT SERPL-MCNC: 1.07 MG/DL — SIGNIFICANT CHANGE UP (ref 0.5–1.3)
EGFR: 81 ML/MIN/1.73M2 — SIGNIFICANT CHANGE UP
EGFR: 81 ML/MIN/1.73M2 — SIGNIFICANT CHANGE UP
GLUCOSE SERPL-MCNC: 110 MG/DL — HIGH (ref 70–99)
GLUCOSE SERPL-MCNC: 110 MG/DL — HIGH (ref 70–99)
HCT VFR BLD CALC: 40.6 % — SIGNIFICANT CHANGE UP (ref 39–50)
HCT VFR BLD CALC: 40.6 % — SIGNIFICANT CHANGE UP (ref 39–50)
HGB BLD-MCNC: 13.9 G/DL — SIGNIFICANT CHANGE UP (ref 13–17)
HGB BLD-MCNC: 13.9 G/DL — SIGNIFICANT CHANGE UP (ref 13–17)
MCHC RBC-ENTMCNC: 32.3 PG — SIGNIFICANT CHANGE UP (ref 27–34)
MCHC RBC-ENTMCNC: 32.3 PG — SIGNIFICANT CHANGE UP (ref 27–34)
MCHC RBC-ENTMCNC: 34.2 GM/DL — SIGNIFICANT CHANGE UP (ref 32–36)
MCHC RBC-ENTMCNC: 34.2 GM/DL — SIGNIFICANT CHANGE UP (ref 32–36)
MCV RBC AUTO: 94.2 FL — SIGNIFICANT CHANGE UP (ref 80–100)
MCV RBC AUTO: 94.2 FL — SIGNIFICANT CHANGE UP (ref 80–100)
NRBC # BLD: 0 /100 WBCS — SIGNIFICANT CHANGE UP (ref 0–0)
NRBC # BLD: 0 /100 WBCS — SIGNIFICANT CHANGE UP (ref 0–0)
PLATELET # BLD AUTO: 111 K/UL — LOW (ref 150–400)
PLATELET # BLD AUTO: 111 K/UL — LOW (ref 150–400)
POTASSIUM SERPL-MCNC: 4.3 MMOL/L — SIGNIFICANT CHANGE UP (ref 3.5–5.3)
POTASSIUM SERPL-MCNC: 4.3 MMOL/L — SIGNIFICANT CHANGE UP (ref 3.5–5.3)
POTASSIUM SERPL-SCNC: 4.3 MMOL/L — SIGNIFICANT CHANGE UP (ref 3.5–5.3)
POTASSIUM SERPL-SCNC: 4.3 MMOL/L — SIGNIFICANT CHANGE UP (ref 3.5–5.3)
PROT SERPL-MCNC: 7.2 G/DL — SIGNIFICANT CHANGE UP (ref 6–8.3)
PROT SERPL-MCNC: 7.2 G/DL — SIGNIFICANT CHANGE UP (ref 6–8.3)
RBC # BLD: 4.31 M/UL — SIGNIFICANT CHANGE UP (ref 4.2–5.8)
RBC # BLD: 4.31 M/UL — SIGNIFICANT CHANGE UP (ref 4.2–5.8)
RBC # FLD: 13.3 % — SIGNIFICANT CHANGE UP (ref 10.3–14.5)
RBC # FLD: 13.3 % — SIGNIFICANT CHANGE UP (ref 10.3–14.5)
SODIUM SERPL-SCNC: 141 MMOL/L — SIGNIFICANT CHANGE UP (ref 135–145)
SODIUM SERPL-SCNC: 141 MMOL/L — SIGNIFICANT CHANGE UP (ref 135–145)
WBC # BLD: 6.15 K/UL — SIGNIFICANT CHANGE UP (ref 3.8–10.5)
WBC # BLD: 6.15 K/UL — SIGNIFICANT CHANGE UP (ref 3.8–10.5)
WBC # FLD AUTO: 6.15 K/UL — SIGNIFICANT CHANGE UP (ref 3.8–10.5)
WBC # FLD AUTO: 6.15 K/UL — SIGNIFICANT CHANGE UP (ref 3.8–10.5)

## 2023-11-16 PROCEDURE — 99232 SBSQ HOSP IP/OBS MODERATE 35: CPT

## 2023-11-16 RX ADMIN — MEMANTINE HYDROCHLORIDE 10 MILLIGRAM(S): 10 TABLET ORAL at 17:00

## 2023-11-16 RX ADMIN — METFORMIN HYDROCHLORIDE 500 MILLIGRAM(S): 850 TABLET ORAL at 17:00

## 2023-11-16 RX ADMIN — DONEPEZIL HYDROCHLORIDE 10 MILLIGRAM(S): 10 TABLET, FILM COATED ORAL at 07:35

## 2023-11-16 RX ADMIN — APIXABAN 5 MILLIGRAM(S): 2.5 TABLET, FILM COATED ORAL at 17:00

## 2023-11-16 RX ADMIN — MEMANTINE HYDROCHLORIDE 10 MILLIGRAM(S): 10 TABLET ORAL at 05:11

## 2023-11-16 RX ADMIN — APIXABAN 5 MILLIGRAM(S): 2.5 TABLET, FILM COATED ORAL at 05:11

## 2023-11-16 RX ADMIN — ATORVASTATIN CALCIUM 80 MILLIGRAM(S): 80 TABLET, FILM COATED ORAL at 21:04

## 2023-11-16 RX ADMIN — METFORMIN HYDROCHLORIDE 500 MILLIGRAM(S): 850 TABLET ORAL at 05:11

## 2023-11-16 NOTE — PROGRESS NOTE ADULT - SUBJECTIVE AND OBJECTIVE BOX
58y old  male who presents with a chief complaint of CVA and left hemiparesis     Patient seen and examined. No acute events overnight.   no new complaints, no n/v, no sob, no pain       Vital Signs Last 24 Hrs  T(C): 36.7 (16 Nov 2023 07:32), Max: 36.9 (15 Nov 2023 20:08)  T(F): 98 (16 Nov 2023 07:32), Max: 98.4 (15 Nov 2023 20:08)  HR: 55 (16 Nov 2023 07:32) (54 - 55)  BP: 132/94 (16 Nov 2023 07:32) (132/94 - 160/84)  BP(mean): --  RR: 16 (16 Nov 2023 07:32) (16 - 16)  SpO2: 98% (16 Nov 2023 07:32) (97% - 98%)    Parameters below as of 16 Nov 2023 07:32  Patient On (Oxygen Delivery Method): room air      GENERAL- NAD  EAR/NOSE/MOUTH/THROAT -  MMM  EYES- NIKO, conjunctiva and Sclera clear  NECK- supple  RESPIRATORY-  clear to auscultation bilaterally, non laboured breathing  CARDIOVASCULAR - SIS2, RRR  GI - soft NT BS present  EXTREMITIES- no pedal edema  NEUROLOGY- left sided weakness  PSYCHIATRY- AAO X 3                      13.9                 141  | 34   | 16           6.15  >-----------< 111     ------------------------< 110                   40.6                 4.3  | 102  | 1.07                   MEDICATIONS  (STANDING):  apixaban 5 milliGRAM(s) Oral two times a day  atorvastatin 80 milliGRAM(s) Oral at bedtime  bisacodyl 5 milliGRAM(s) Oral at bedtime  citalopram 20 milliGRAM(s) Oral daily  dextrose 50% Injectable 25 Gram(s) IV Push once  donepezil 10 milliGRAM(s) Oral with breakfast  famotidine    Tablet 20 milliGRAM(s) Oral daily  folic acid 1 milliGRAM(s) Oral daily  linagliptin 5 milliGRAM(s) Oral daily  memantine 10 milliGRAM(s) Oral two times a day  metFORMIN 500 milliGRAM(s) Oral two times a day  multivitamin 1 Tablet(s) Oral daily  polyethylene glycol 3350 17 Gram(s) Oral daily  senna 2 Tablet(s) Oral at bedtime  thiamine 100 milliGRAM(s) Oral daily    MEDICATIONS  (PRN):

## 2023-11-16 NOTE — PROGRESS NOTE ADULT - ATTENDING COMMENTS
Pt. seen with resident.  Agree with documentation above as per resident with amendments made as appropriate. Patient medically stable. Making progress towards rehab goals.     Left PCA infarct,  right MCA and PCA occlusion  -Cont. Memantine 10mg BID   -Cont. Donepezil to 10mg in AM--started  11/14--monitor for SE  - EKG QTc  11/10--445    Team meeting today-- awaiting [facility] discharge.  Potentially could be Mod I for part of the day with more time in therapy but would need supervision/assist for bathing, dressing and IADLs.  Limited family support

## 2023-11-16 NOTE — PROGRESS NOTE ADULT - ASSESSMENT
Assessment and Plan:   Patient is a 59 y/o M with a PHx HTN, DM, CVA sp tnK with full resolution symptoms, who presented to [facility] on 10/12 due to sudden fall at work and lost consciousness.  MRI brain showed left temporal occipital lobe infarctions and acute punctate left occipital lobe infarction now s/p thrombectomy and Right MCA/PCA oclusion with left sided weakness, sensory impairment, left homonymous hemianopsia, gait and ADL impairments.     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 Continue for 3 months.    - Atorvastatin 80mg qhs.   - C/w Memantine 5mg BID for cognitive deficits - 11/3.   - cont comprehensive rehab program OT, SLP  3 hours daily 5 x week. d/c SLP  - neuropsychology evaluation and support, recreation therapy  - Precautions: cardiac, DM, respiratory, breast CA, fall, aspiration.  - No new neurological deficits    #Memory deficits  -10/13: MRI Brain  showing L PCA P2 occlusion    -Cont. Memantine 10mg BID   -Cont. Donepezil to 10mg in AM--started  11/14--monitor for SE  - EKG QTc  11/10--445  -Monitor HR as donepezil has slight risk of worsening bradycardia--HR has been stable  -QTc 460 on 11/7       # Bradycardia  - No indication for pacemaker  - Metoprolol 12.5mg BID has been discontinued   - per electrophysiology notes, no indication for pacemaker, s/p ILR on 10/20  - needs EP follow up as an outpatient  - HR stable in 50s-60s     # HTN  - No medication at home and patient is sensitive to HTN medication. Was previously started on lisinopril.   - Can treat with hydralazine 25mg PRN for >170.   - did not tolerate lisinopril 5mg during hospitalization (intermittent hypotension)  - Dr. Curry suggests 10 point decrease in BP parameters based on clinical exam  - Current parameters -150 over weekend, titrate 120-140 as tolerated  - Monitor BP-- SBP has been 120-150    Mood--  --Neuropsychology following for counseling    # HLD  - Atorvastatin 80 mg daily    # DM 2  - A1C 8.6  - Metformin   - Accu checks and ISS    # ETOH abuse  - Not in active withdrawal.  - Continue MVI, thiamine and folic acid    # Pain management  - No complaints     # GI/Bowel:  - Senna QHS, Miralax Daily  - dulcolax PO     # /Bladder:   -Continent    # FEN   - Diet: DASH DIET     # DVT ppx  - Eliquis 5mg BID    #Dispo  - awaiting placement    IDT: 11/14  TDD:  d/c plan to [facility] ASAP   RN: continent with bowel and continent with bladder   SW: Lives in Apartment with his daughter. On the main floor. He was independent before. family cannot provide supervision.  Insurance coverage for Medicaid holding up [facility] discharge  SP: Regular with thin. Mild cognitive deficits-- . Recall and short term memory deficits, reasoning, attention, decreased mental flexibility. Needs assistance with IADLs, finances, meds  OT: Setup/sup - eating, grooming, UBD. CG - toileting, toilet transfers and LBD.  Min-A Bathing.   PT : D/C WFL. Ambulation 150 feet without AD and 12 steps with supervision.    Goals: 1. Recall salient information with external aides  2. supervision ambulation to bathroom and toileting    -- Dr. Jamison Spoke with patient's sister, Selma 11/14. She had questions about his current cognitive state and recovery. Discussed that he is not able to be unsupervised at this time, requiring help with money, medication, etc. as mentioned above. All questions answered. Selma is aware of the divorce/death documentation holding up placement and states the family is actively working on this issue. 11/14/23.     Outpatient Follow-up (Specialty/Name of physician):    Rj Delgado  Cardiac Electrophysiology  [address] [place] [address], [address]  Phone: [phone]  Fax: [phone]  Follow Up Time:     Wily Barkley  Neurology  [address] [place] [address]  Phone: [phone]  Fax: [phone]  Follow Up Time: 1 week    Denzel Zapata  Interventional Cardiology  [address] [facility], [address] [address]  Phone: [phone]  Fax: [phone]  Follow Up Time: 1 week    PCP,   Phone: (   )    -  Fax: (   )    -  Follow Up Time: 1 week    Arsenio Goodrich  Interventional Neuroradiology  [address] [place] [address]  Phone: [phone]  Fax: [phone]             Assessment and Plan:   Patient is a 59 y/o M with a PHx HTN, DM, CVA sp tnK with full resolution symptoms, who presented to [facility] on 10/12 due to sudden fall at work and lost consciousness.  MRI brain showed left temporal occipital lobe infarctions and acute punctate left occipital lobe infarction now s/p thrombectomy and Right MCA/PCA oclusion with left sided weakness, sensory impairment, left homonymous hemianopsia, gait and ADL impairments.     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 Continue for 3 months.    - Atorvastatin 80mg qhs.   - C/w Memantine 5mg BID for cognitive deficits - 11/3.   - cont comprehensive rehab program OT, SLP  3 hours daily 5 x week. d/c SLP  - neuropsychology evaluation and support, recreation therapy  - Precautions: cardiac, DM, respiratory, breast CA, fall, aspiration.  - No new neurological deficits    #Memory deficits  -10/13: MRI Brain  showing L PCA P2 occlusion    -Cont. Memantine 10mg BID   -Cont. Donepezil to 10mg in AM--started  11/14--monitor for SE  - EKG QTc  11/10--445  -Monitor HR as donepezil has slight risk of worsening bradycardia--HR has been stable  -QTc 460 on 11/7       # Bradycardia  - No indication for pacemaker  - Metoprolol 12.5mg BID has been discontinued   - per electrophysiology notes, no indication for pacemaker, s/p ILR on 10/20  - needs EP follow up as an outpatient  - HR stable in 50s-60s     # HTN  - No medication at home and patient is sensitive to HTN medication. Was previously started on lisinopril.   - Can treat with hydralazine 25mg PRN for >170.   - did not tolerate lisinopril 5mg during hospitalization (intermittent hypotension)  - Dr. Curry suggests 10 point decrease in BP parameters based on clinical exam  - Current parameters -150 over weekend, titrate 120-140 as tolerated  - Monitor BP-- SBP has been 120-150    Mood--  --Neuropsychology following for counseling    # HLD  - Atorvastatin 80 mg daily    # DM 2  - A1C 8.6  - Metformin   - Accu checks and ISS    # ETOH abuse  - Not in active withdrawal.  - Continue MVI, thiamine and folic acid    # Pain management  - No complaints     # GI/Bowel:  - Senna QHS, Miralax Daily  - dulcolax PO     # /Bladder:   -Continent    # FEN   - Diet: DASH DIET     # DVT ppx  - Eliquis 5mg BID    #Dispo  - awaiting placement, dependent on paperwork which family must provide    IDT: 11/14  TDD:  d/c plan to [facility] ASAP   RN: continent with bowel and continent with bladder   SW: Lives in Apartment with his daughter. On the main floor. He was independent before. family cannot provide supervision.  Insurance coverage for Medicaid holding up [facility] discharge  SP: Regular with thin. Mild cognitive deficits-- . Recall and short term memory deficits, reasoning, attention, decreased mental flexibility. Needs assistance with IADLs, finances, meds  OT: Setup/sup - eating, grooming, UBD. CG - toileting, toilet transfers and LBD.  Min-A Bathing.   PT : D/C WFL. Ambulation 150 feet without AD and 12 steps with supervision.    Goals: 1. Recall salient information with external aides  2. supervision ambulation to bathroom and toileting    -- Dr. Jamison Spoke with patient's sister, Selma 11/14. She had questions about his current cognitive state and recovery. Discussed that he is not able to be unsupervised at this time, requiring help with money, medication, etc. as mentioned above. All questions answered. Selma is aware of the divorce/death documentation holding up placement and states the family is actively working on this issue. 11/14/23.     Outpatient Follow-up (Specialty/Name of physician):    Rj Delgado  Cardiac Electrophysiology  [address] [place] [address]  Phone: [phone]  Fax: [phone]  Follow Up Time:     Wily Barkley  Neurology  [address] [place] [address]  Phone: [phone]  Fax: [phone]  Follow Up Time: 1 week    Denzel Zapata  Interventional Cardiology  [address] [facility], [address] [address]  Phone: [phone]  Fax: [phone]  Follow Up Time: 1 week    PCP,   Phone: (   )    -  Fax: (   )    -  Follow Up Time: 1 week    Arsenio Goodrich  Interventional Neuroradiology  [address] [place] [address]  Phone: [phone]  Fax: [phone]             Assessment and Plan:   Patient is a 57 y/o M with a PHx HTN, DM, CVA sp tnK with full resolution symptoms, who presented to [facility] on 10/12 due to sudden fall at work and lost consciousness.  MRI brain showed left temporal occipital lobe infarctions and acute punctate left occipital lobe infarction now s/p thrombectomy and Right MCA/PCA oclusion with left sided weakness, sensory impairment, left homonymous hemianopsia, gait and ADL impairments.     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 Continue for 3 months.    - Atorvastatin 80mg qhs.   - C/w Memantine 5mg BID for cognitive deficits - 11/3.   - cont comprehensive rehab program OT, SLP  3 hours daily 5 x week. d/c SLP  - neuropsychology evaluation and support, recreation therapy  - Precautions: cardiac, DM, respiratory, breast CA, fall, aspiration.  - No new neurological deficits    #Memory deficits  -10/13: MRI Brain  showing L PCA P2 occlusion    -Cont. Memantine 10mg BID   -Cont. Donepezil to 10mg in AM--started  11/14--monitor for SE  - EKG QTc  11/10--445  -Monitor HR as donepezil has slight risk of worsening bradycardia--HR has been stable  -QTc 460 on 11/7       # Bradycardia  - No indication for pacemaker  - Metoprolol 12.5mg BID has been discontinued   - per electrophysiology notes, no indication for pacemaker, s/p ILR on 10/20  - needs EP follow up as an outpatient  - HR stable in 50s-60s     # HTN  - No medication at home and patient is sensitive to HTN medication. Was previously started on lisinopril.   - Can treat with hydralazine 25mg PRN for >170.   - did not tolerate lisinopril 5mg during hospitalization (intermittent hypotension)  - Dr. Curry suggests 10 point decrease in BP parameters based on clinical exam  - Current parameters -150 over weekend, titrate 120-140 as tolerated  - Monitor BP-- SBP has been 120-150    Mood--  --Neuropsychology following for counseling    # HLD  - Atorvastatin 80 mg daily    # DM 2  - A1C 8.6  - Metformin   - Accu checks and ISS    # ETOH abuse  - Not in active withdrawal.  - Continue MVI, thiamine and folic acid    # Pain management  - No complaints     # GI/Bowel:  - Senna QHS, Miralax Daily  - dulcolax PO     # /Bladder:   -Continent    # FEN   - Diet: DASH DIET     # DVT ppx  - Eliquis 5mg BID    #Dispo  - awaiting placement, dependent on paperwork which family must provide    IDT: 11/16  TDD:  d/c plan to [facility] ASAP   RN: continent with bowel and continent with bladder   SW: Lives in Apartment with his daughter. On the main floor. He was independent before. family cannot provide supervision.  Insurance coverage for Medicaid holding up [facility] discharge  SP: Regular with thin. Improving-- Mild cognitive deficits-- . Recall and short term memory deficits, reasoning, attention, executive functioning.  Not impulsive or unsafe.  Needs assistance with IADLs, finances, meds  OT: Setup/sup - eating, grooming, UBD. CS - toileting, toilet transfers and LBD.  Min-A Bathing. Mod A--f/w, CG ambulating to bathroom  PT : D/C WFL. Ambulation 150 feet without AD and 12 steps with supervision.    Goals: 1. Recall salient information with external aides  2. supervision ambulation to bathroom and toileting    -- Dr. Jamison Spoke with patient's sister, Selma 11/14. She had questions about his current cognitive state and recovery. Discussed that he is not able to be unsupervised at this time, requiring help with money, medication, etc. as mentioned above. All questions answered. Selma is aware of the divorce/death documentation holding up placement and states the family is actively working on this issue. 11/14/23.     Outpatient Follow-up (Specialty/Name of physician):    Rj Delgado  Cardiac Electrophysiology  [address] [place] [address], [address] [address]  Phone: [phone]  Fax: [phone]  Follow Up Time:     Wily Barkley  Neurology  [address] [place] [address]  Phone: [phone]  Fax: [phone]  Follow Up Time: 1 week    Denzel Zapata  Interventional Cardiology  [address] [place] [address], [address] [address]  Phone: [phone]  Fax: [phone]  Follow Up Time: 1 week    PCP,   Phone: (   )    -  Fax: (   )    -  Follow Up Time: 1 week    Arsenio Goodrich  Interventional Neuroradiology  [address] [place] [address]  Phone: [phone]  Fax: [phone]             Assessment and Plan:   Patient is a 59 y/o M with a PHx HTN, DM, CVA sp tnK with full resolution symptoms, who presented to [facility] on 10/12 due to sudden fall at work and lost consciousness.  MRI brain showed left temporal occipital lobe infarctions and acute punctate left occipital lobe infarction now s/p thrombectomy and Right MCA/PCA oclusion with left sided weakness, sensory impairment, left homonymous hemianopsia, gait and ADL impairments.     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 Continue for 3 months.    - Atorvastatin 80mg qhs.   - C/w Memantine 5mg BID for cognitive deficits - 11/3.   - cont comprehensive rehab program OT, SLP  3 hours daily 5 x week. d/c SLP  - neuropsychology evaluation and support, recreation therapy  - Precautions: cardiac, DM, respiratory, breast CA, fall, aspiration.  - No new neurological deficits    #Memory deficits  -10/13: MRI Brain  showing L PCA P2 occlusion    -Cont. Memantine 10mg BID   -Cont. Donepezil to 10mg in AM--started  11/14--monitor for SE  - EKG QTc  11/10--445  -Monitor HR as donepezil has slight risk of worsening bradycardia--HR has been stable  -QTc 460 on 11/7       # Bradycardia  - No indication for pacemaker  - Metoprolol 12.5mg BID has been discontinued   - per electrophysiology notes, no indication for pacemaker, s/p ILR on 10/20  - needs EP follow up as an outpatient  - HR stable in 50s-60s     # HTN  - No medication at home and patient is sensitive to HTN medication. Was previously started on lisinopril.   - Can treat with hydralazine 25mg PRN for >170.   - did not tolerate lisinopril 5mg during hospitalization (intermittent hypotension)  - Dr. Curry suggests 10 point decrease in BP parameters based on clinical exam  - Current parameters -150 over weekend, titrate 120-140 as tolerated  - Monitor BP-- SBP has been 120-150    Mood--  --Neuropsychology following for counseling    # HLD  - Atorvastatin 80 mg daily    # DM 2  - A1C 8.6  - Metformin   - Accu checks and ISS    # ETOH abuse  - Not in active withdrawal.  - Continue MVI, thiamine and folic acid    # Pain management  - No complaints     # GI/Bowel:  - Senna QHS, Miralax Daily  - dulcolax PO     # /Bladder:   -Continent    # FEN   - Diet: DASH DIET     # DVT ppx  - Eliquis 5mg BID    #Dispo  - awaiting placement, dependent on paperwork which family must provide    IDT: 11/16  TDD:  d/c plan to [facility] ASAP   RN: continent with bowel and continent with bladder   SW: Lives in Apartment with his daughter. On the main floor. He was independent before. family cannot provide supervision.  Insurance coverage for Medicaid holding up [facility] discharge  SP: Regular with thin. Improving-- Mild cognitive deficits-- . Recall and short term memory deficits, reasoning, attention, executive functioning.  Not impulsive or unsafe.  Needs assistance with IADLs, finances, meds  OT: Setup/sup - eating, grooming, UBD. CS - toileting, toilet transfers and LBD.  Min-A Bathing. Mod A--f/w, CG ambulating to bathroom  PT : D/C WFL. Ambulation 150 feet without AD and 12 steps with supervision.    Goals: 1. Recall salient information with external aides  2. supervision ambulation to bathroom and toileting    -- Dr. Jamison Spoke with patient's sister, Selma 11/16. No current family situation where someone is available 24/7. Selma is aware of the divorce/death documentation holding up placement and states the family is actively working on this issue. Lety # [phone].  [phone] home.      Outpatient Follow-up (Specialty/Name of physician):    Rj Delgado  Cardiac Electrophysiology  [address] [place] [address], [address] [address]  Phone: [phone]  Fax: [phone]  Follow Up Time:     Wily Barkley  Neurology  [address] [place] [address]  Phone: [phone]  Fax: [phone]  Follow Up Time: 1 week    Denzel Zapata  Interventional Cardiology  [address] [facility], [address] [address]  Phone: [phone]  Fax: [phone]  Follow Up Time: 1 week    PCP,   Phone: (   )    -  Fax: (   )    -  Follow Up Time: 1 week    Arsenio Goodrich  Interventional Neuroradiology  [address] [place] [address]  Phone: [phone]  Fax: [phone]

## 2023-11-16 NOTE — PROGRESS NOTE ADULT - ASSESSMENT
59 y/o M with a Hx of stroke one year ago (at the time patient received TNK and had full  resolution of symptoms), HTN, DM, non-compliant with medications, who presented following an episode of LOC, as per family at around 6:15 pm patient had a sudden fall at work and lost consciousness. It is unclear if patient had any symptoms before he fell. NIHSS 10 on admission, admitted under NeuroICU on 10/12: Thrombectomy via L radial artery for TICI 3 revascularization of L P1-P2 occlusion. Noted LMCA chronic occlusion., on 0/13: Neurologic status improving. Got MR. Started on ASA and SQL, on 10/14: quadrantanopia now on L superior (originally R); Repeat stroke imaging completed and initial concern for CTP showing poss new perfusion deficit however on further review noted to be present on admission. Started on heparin drip per neurology recommendations, on 10/15: Therapeutic on heparin drip, stability HCT obtained. Cardiology consulted for further stroke work-up and downgraded under medicine- pt/ot/dvt ppx    #Acute CVA  #Occluded R MCA/PCA, s/p thrombectomy   -TTE shows with  Normal left ventricular internal cavity size,  3. Left ventricular ejection fraction, by visual estimation, is 50 to 55%.  -DVT studies negative  -continue Eliquis. Needs for 3 months.    -c/w Lipitor  -outpatient neuro IR follow up.    # Bradycardia  -TTE EF 50-55%, no other structural abnormalities  - No indication for pacemaker  - Metoprolol discontinued   - per electrophysiology notes, no indication for pacemaker, s/p ILR on 10/20  - needs EP follow up as an outpatient.    #intermittent Dizziness   -Encouraged fluid intake - improved now    #ETOH use disorder  -  in remission, stable.    #HTN  - No meds at home  - did not tolerate lisinopril 5mg during hospitalization (intermittent hypotension)  - Hydralazine 25 mg PRN if SBP > 170    #HLD  c/w Lipitor    #DM 2  non compliant with meds at home  metformin, linagliptin   A1C 8.6  d/c Accu checks and ISS 11/6/23    #Elevated TSH  T4 level normal  outpt f/u    DVT Prophylaxis - Eliquis.     will follow  d/w rehab team

## 2023-11-16 NOTE — PROGRESS NOTE ADULT - SUBJECTIVE AND OBJECTIVE BOX
HPI:  Patient is a 59 y/o M with a PHx HTN, DM, stroke one year ago (at the time patient received Tenecteplase and had full resolution of symptoms), who presented to [facility] on 10/12/23 following sudden fall at work and lost consciousness. NIHSS 10 on admission. Admitted under NeuroICU on 10/12. Imaging revealed ischemia in the posterior circulation, CT angiogram head/neck showed right PCA P1-2 cutoff, possibly chronic right MCA M1 cutoff which had distal reconstitution and established collaterals, and left PCA occlusion which patient was subsequently transferred for mechanical thrombectomy.    Cerebral angiogram and mechanical thrombectomy performed using aspiration with TICI 3 recanalization of left PCA. The right occipital lobe filled from right SHANI collaterals, and the right MCA as well was thought to be chronic given noted collateralization. MR with small to moderate left temporal occipital lobe infarctions. Occluded right MCA, occluded right PCA and right vertebral artery proximal segment. The patient was started on a hep gtt given extensive atherosclerotic disease which was switched to apixaban 5mg BID with recommendations for a three month course.    TTE with normal left ventricular internal cavity size. Left ventricular ejection fractio 50 to 55%. Per Neuro, no need for ROCIO at this time. Pt had an episode of bradycardia during his stay and there were concern for 2:1 block. ILR placed 10/20 and no indication for PPM at this time.     Patient was evaluated by PM&R and therapy for functional deficits, gait/ADL impairments and acute rehabilitation was recommended. Patient was medically optimized for discharge to [facility] IRF on 10/27/23.       Subjective:    No acute overnight events. Continues to struggle with short term memory and L sided paresthesias + numbness. Stated mood is "good." He misses his daughter but is able to speak with her every day. He is eating and sleeping well, and has no complaints.     ROS  -no SOB  -no Abd pain, constipation, diarrhea.   -no chest pain  +fatigue  +neurological deficits    Vital Signs Last 24 Hrs  ICU Vital Signs Last 24 Hrs  T(C): 36.7 (16 Nov 2023 07:32), Max: 36.9 (15 Nov 2023 20:08)  T(F): 98 (16 Nov 2023 07:32), Max: 98.4 (15 Nov 2023 20:08)  HR: 55 (16 Nov 2023 07:32) (54 - 55)  BP: 132/94 (16 Nov 2023 07:32) (132/94 - 160/84)  BP(mean): --  ABP: --  ABP(mean): --  RR: 16 (16 Nov 2023 07:32) (16 - 16)  SpO2: 98% (16 Nov 2023 07:32) (97% - 98%)    O2 Parameters below as of 16 Nov 2023 07:32  Patient On (Oxygen Delivery Method): room air      Physical Exam:  Constitutional - NAD, Comfortable  HEENT - NCAT, EOMI  Chest -breathing comfortably on RA  Cardio - warm and well perfused, + loop recorder.   Abdomen - Obese, soft, no-tender  Extremities - No peripheral edema, Long curling toenails.    Neurologic Exam:                    Cognitive -             Orientation: AA&Ox2-3 to self, month, year and location, not to date            Attention:   able to state days of week backward, mild difficulty with serial 7's - made one error            Memory: short term memory deficits - recalls 2 of 3 objects after 5 minutes with categorical cues     Speech - Fluent, Comprehensible, No dysarthria, No aphasia      Cranial Nerves - No facial asymmetry, Tongue midline, EOMI, Shoulder shrug intact. Vision Impaired on the Left upper visual field in L > R eye.      Motor -                      LEFT    UE - ShAB 4/5, EF 4-5/5, EE 4/5, WE 5/5,  WNL                     RIGHT UE - ShAB 5/5, EF 5/5, EE 5/5, WE 5/5,  WNL                    LEFT    LE - HF 5/5, KE 5/5, DF 5/5, PF 5/5                    RIGHT LE - HF 5/5, KE 5/5, DF 5/5, PF 5/5        Sensory - Impaired to LT on the left face (V2/3), arm and leg.  Feels LT but sensation of numbness and lesser than right side.      Reflexes - symmetric DTR +1 in LE and UE. Neg Babinski's b/l     Coordination - FTN / HTS intact b/l             LABS:   CBC:            13.9   6.15  )-----------( 111      ( 11-16-23 @ 05:15 )             40.6         Chem:         ( 11-16-23 @ 05:15 )    141  |  102  |  16  ----------------------------<  110<H>  4.3   |  34<H>  |  1.07        Liver Functions: ( 11-16-23 @ 05:15 )  Alb: 3.8 g/dL / Pro: 7.2 g/dL / ALK PHOS: 76 U/L / ALT: 33 U/L / AST: 19 U/L / GGT: x            MEDICATIONS  (STANDING):  apixaban 5 milliGRAM(s) Oral two times a day  atorvastatin 80 milliGRAM(s) Oral at bedtime  bisacodyl 5 milliGRAM(s) Oral at bedtime  citalopram 20 milliGRAM(s) Oral daily  dextrose 50% Injectable 25 Gram(s) IV Push once  donepezil 10 milliGRAM(s) Oral with breakfast  famotidine    Tablet 20 milliGRAM(s) Oral daily  folic acid 1 milliGRAM(s) Oral daily  linagliptin 5 milliGRAM(s) Oral daily  memantine 10 milliGRAM(s) Oral two times a day  metFORMIN 500 milliGRAM(s) Oral two times a day  multivitamin 1 Tablet(s) Oral daily  polyethylene glycol 3350 17 Gram(s) Oral daily  senna 2 Tablet(s) Oral at bedtime  thiamine 100 milliGRAM(s) Oral daily    MEDICATIONS  (PRN):   HPI:  Patient is a 57 y/o M with a PHx HTN, DM, stroke one year ago (at the time patient received Tenecteplase and had full resolution of symptoms), who presented to [facility] on 10/12/23 following sudden fall at work and lost consciousness. NIHSS 10 on admission. Admitted under NeuroICU on 10/12. Imaging revealed ischemia in the posterior circulation, CT angiogram head/neck showed right PCA P1-2 cutoff, possibly chronic right MCA M1 cutoff which had distal reconstitution and established collaterals, and left PCA occlusion which patient was subsequently transferred for mechanical thrombectomy.    Cerebral angiogram and mechanical thrombectomy performed using aspiration with TICI 3 recanalization of left PCA. The right occipital lobe filled from right SHANI collaterals, and the right MCA as well was thought to be chronic given noted collateralization. MR with small to moderate left temporal occipital lobe infarctions. Occluded right MCA, occluded right PCA and right vertebral artery proximal segment. The patient was started on a hep gtt given extensive atherosclerotic disease which was switched to apixaban 5mg BID with recommendations for a three month course.    TTE with normal left ventricular internal cavity size. Left ventricular ejection fractio 50 to 55%. Per Neuro, no need for ROCIO at this time. Pt had an episode of bradycardia during his stay and there were concern for 2:1 block. ILR placed 10/20 and no indication for PPM at this time.     Patient was evaluated by PM&R and therapy for functional deficits, gait/ADL impairments and acute rehabilitation was recommended. Patient was medically optimized for discharge to [facility] IRF on 10/27/23.       Subjective:  No acute overnight events. Continues to struggle with short term memory and L sided paresthesias + numbness. Stated mood is "good." He misses his daughter but is able to speak with her every day. He is eating and sleeping well, and has no complaints.     ROS  -no SOB  -no Abd pain, constipation, diarrhea.   -no chest pain  +fatigue  +neurological deficits    Vital Signs Last 24 Hrs  T(C): 36.7 (16 Nov 2023 07:32), Max: 36.9 (15 Nov 2023 20:08)  T(F): 98 (16 Nov 2023 07:32), Max: 98.4 (15 Nov 2023 20:08)  HR: 55 (16 Nov 2023 07:32) (54 - 55)  BP: 132/94 (16 Nov 2023 07:32) (132/94 - 160/84)  RR: 16 (16 Nov 2023 07:32) (16 - 16)  SpO2: 98% (16 Nov 2023 07:32) (97% - 98%)    Parameters below as of 16 Nov 2023 07:32  Patient On (Oxygen Delivery Method): room air      Physical Exam:  Constitutional - NAD, Comfortable  HEENT - NCAT, EOMI  Chest -breathing comfortably on RA  Cardio - warm and well perfused, + loop recorder.   Abdomen - Obese, soft, no-tender  Extremities - No peripheral edema, Long curling toenails.    Neurologic Exam:                    Cognitive -             Orientation: AA&Ox2-3 to self, month, year and location, not to date            Attention:   able to state days of week backward, mild difficulty with serial 7's - made one error            Memory: short term memory deficits - recalls 2 of 3 objects after 5 minutes with categorical cues     Speech - Fluent, Comprehensible, No dysarthria, No aphasia      Cranial Nerves - No facial asymmetry, Tongue midline, EOMI, Shoulder shrug intact. Vision Impaired on the Left upper visual field in L > R eye.      Motor -                      LEFT    UE - ShAB 4/5, EF 4-5/5, EE 4/5, WE 5/5,  WNL                     RIGHT UE - ShAB 5/5, EF 5/5, EE 5/5, WE 5/5,  WNL                    LEFT    LE - HF 5/5, KE 5/5, DF 5/5, PF 5/5                    RIGHT LE - HF 5/5, KE 5/5, DF 5/5, PF 5/5        Sensory - Impaired to LT on the left face (V2/3), arm and leg.  Feels LT but sensation of numbness and lesser than right side.      Reflexes - symmetric DTR +1 in LE and UE. Neg Babinski's b/l     Coordination - FTN / HTS intact b/l             LABS:   CBC:            13.9   6.15  )-----------( 111      ( 11-16-23 @ 05:15 )             40.6         Chem:         ( 11-16-23 @ 05:15 )    141  |  102  |  16  ----------------------------<  110<H>  4.3   |  34<H>  |  1.07        Liver Functions: ( 11-16-23 @ 05:15 )  Alb: 3.8 g/dL / Pro: 7.2 g/dL / ALK PHOS: 76 U/L / ALT: 33 U/L / AST: 19 U/L / GGT: x            MEDICATIONS  (STANDING):  apixaban 5 milliGRAM(s) Oral two times a day  atorvastatin 80 milliGRAM(s) Oral at bedtime  bisacodyl 5 milliGRAM(s) Oral at bedtime  citalopram 20 milliGRAM(s) Oral daily  dextrose 50% Injectable 25 Gram(s) IV Push once  donepezil 10 milliGRAM(s) Oral with breakfast  famotidine    Tablet 20 milliGRAM(s) Oral daily  folic acid 1 milliGRAM(s) Oral daily  linagliptin 5 milliGRAM(s) Oral daily  memantine 10 milliGRAM(s) Oral two times a day  metFORMIN 500 milliGRAM(s) Oral two times a day  multivitamin 1 Tablet(s) Oral daily  polyethylene glycol 3350 17 Gram(s) Oral daily  senna 2 Tablet(s) Oral at bedtime  thiamine 100 milliGRAM(s) Oral daily    MEDICATIONS  (PRN):

## 2023-11-17 PROCEDURE — 99232 SBSQ HOSP IP/OBS MODERATE 35: CPT

## 2023-11-17 RX ADMIN — MEMANTINE HYDROCHLORIDE 10 MILLIGRAM(S): 10 TABLET ORAL at 17:07

## 2023-11-17 RX ADMIN — METFORMIN HYDROCHLORIDE 500 MILLIGRAM(S): 850 TABLET ORAL at 17:07

## 2023-11-17 RX ADMIN — APIXABAN 5 MILLIGRAM(S): 2.5 TABLET, FILM COATED ORAL at 17:07

## 2023-11-17 RX ADMIN — DONEPEZIL HYDROCHLORIDE 10 MILLIGRAM(S): 10 TABLET, FILM COATED ORAL at 08:25

## 2023-11-17 RX ADMIN — Medication 1 TABLET(S): at 11:57

## 2023-11-17 RX ADMIN — Medication 1 MILLIGRAM(S): at 11:57

## 2023-11-17 RX ADMIN — ATORVASTATIN CALCIUM 80 MILLIGRAM(S): 80 TABLET, FILM COATED ORAL at 21:34

## 2023-11-17 RX ADMIN — Medication 100 MILLIGRAM(S): at 11:57

## 2023-11-17 RX ADMIN — APIXABAN 5 MILLIGRAM(S): 2.5 TABLET, FILM COATED ORAL at 05:42

## 2023-11-17 RX ADMIN — MEMANTINE HYDROCHLORIDE 10 MILLIGRAM(S): 10 TABLET ORAL at 05:42

## 2023-11-17 RX ADMIN — LINAGLIPTIN 5 MILLIGRAM(S): 5 TABLET, FILM COATED ORAL at 11:57

## 2023-11-17 RX ADMIN — METFORMIN HYDROCHLORIDE 500 MILLIGRAM(S): 850 TABLET ORAL at 08:25

## 2023-11-17 RX ADMIN — FAMOTIDINE 20 MILLIGRAM(S): 10 INJECTION INTRAVENOUS at 11:57

## 2023-11-17 RX ADMIN — CITALOPRAM 20 MILLIGRAM(S): 10 TABLET, FILM COATED ORAL at 11:57

## 2023-11-17 NOTE — PROGRESS NOTE ADULT - SUBJECTIVE AND OBJECTIVE BOX
Patient is a 58y old  Male who presents with a chief complaint of CVA and left hemiparesis (16 Nov 2023 14:07)      Patient seen and examined at bedside. No events overnight, laying comfortably in bed. Patient without headache, admits to same numbness on L side as previous, no changes. Denies headache, changes in vision, nausea or vomiting.    ALLERGIES:  No Known Allergies    MEDICATIONS  (STANDING):  apixaban 5 milliGRAM(s) Oral two times a day  atorvastatin 80 milliGRAM(s) Oral at bedtime  bisacodyl 5 milliGRAM(s) Oral at bedtime  citalopram 20 milliGRAM(s) Oral daily  dextrose 50% Injectable 25 Gram(s) IV Push once  donepezil 10 milliGRAM(s) Oral with breakfast  famotidine    Tablet 20 milliGRAM(s) Oral daily  folic acid 1 milliGRAM(s) Oral daily  linagliptin 5 milliGRAM(s) Oral daily  memantine 10 milliGRAM(s) Oral two times a day  metFORMIN 500 milliGRAM(s) Oral two times a day  multivitamin 1 Tablet(s) Oral daily  polyethylene glycol 3350 17 Gram(s) Oral daily  senna 2 Tablet(s) Oral at bedtime  thiamine 100 milliGRAM(s) Oral daily    MEDICATIONS  (PRN):    Vital Signs Last 24 Hrs  T(F): 97.9 (17 Nov 2023 08:35), Max: 98.8 (16 Nov 2023 19:52)  HR: 53 (17 Nov 2023 08:35) (53 - 64)  BP: 150/90 (17 Nov 2023 08:35) (126/70 - 150/90)  RR: 15 (17 Nov 2023 08:35) (14 - 15)  SpO2: 95% (17 Nov 2023 08:35) (95% - 96%)  I&O's Summary      PHYSICAL EXAM:  GENERAL: NAD, well-groomed, laying in bed  HEAD:  Atraumatic, Normocephalic  EYES: PEEL, conjunctiva and sclera clear  ENMT: Moist mucous membranes, Supple, No JVD  CHEST/LUNG: Clear to auscultation bilaterally, good air entry, non-labored breathing  HEART: RRR; S1/S2, No murmur  ABDOMEN: Soft, Nontender, Nondistended; Bowel sounds present  EXTREMITIES: No calf tenderness, No cyanosis, No edema  SKIN: Warm, perfused  PSYCH: Normal mood, Normal affect  NERVOUS SYSTEM:  awake alert oriented, Good concentration    LABS:                        13.9   6.15  )-----------( 111      ( 16 Nov 2023 05:15 )             40.6     11-16    141  |  102  |  16  ----------------------------<  110  4.3   |  34  |  1.07    Ca    9.4      16 Nov 2023 05:15    TPro  7.2  /  Alb  3.8  /  TBili  0.6  /  DBili  x   /  AST  19  /  ALT  33  /  AlkPhos  76  11-16              10-13 Chol 199 mg/dL LDL -- HDL 29 mg/dL Trig 277 mg/dL                  Urinalysis Basic - ( 16 Nov 2023 05:15 )    Color: x / Appearance: x / SG: x / pH: x  Gluc: 110 mg/dL / Ketone: x  / Bili: x / Urobili: x   Blood: x / Protein: x / Nitrite: x   Leuk Esterase: x / RBC: x / WBC x   Sq Epi: x / Non Sq Epi: x / Bacteria: x        COVID-19 PCR: NotDetec (10-27-23 @ 22:21)      RADIOLOGY & ADDITIONAL TESTS:    Care Discussed with Consultants/Other Providers:

## 2023-11-17 NOTE — PROGRESS NOTE ADULT - ASSESSMENT
59 y/o M with a Hx of stroke one year ago (at the time patient received TNK and had full  resolution of symptoms), HTN, DM, non-compliant with medications, who presented following an episode of LOC, as per family at around 6:15 pm patient had a sudden fall at work and lost consciousness. It is unclear if patient had any symptoms before he fell. NIHSS 10 on admission, admitted under NeuroICU on 10/12: Thrombectomy via L radial artery for TICI 3 revascularization of L P1-P2 occlusion. Noted LMCA chronic occlusion., on 0/13: Neurologic status improving. Got MR. Started on ASA and SQL, on 10/14: quadrantanopia now on L superior (originally R); Repeat stroke imaging completed and initial concern for CTP showing poss new perfusion deficit however on further review noted to be present on admission. Started on heparin drip per neurology recommendations, on 10/15: Therapeutic on heparin drip, stability HCT obtained. Cardiology consulted for further stroke work-up and downgraded under medicine- pt/ot/dvt ppx    #Acute CVA  #Occluded R MCA/PCA, s/p thrombectomy   -TTE shows with  Normal left ventricular internal cavity size,  3. Left ventricular ejection fraction, by visual estimation, is 50 to 55%.  -DVT studies negative  -continue Eliquis. Needs for 3 months.    -c/w Lipitor 80mg  -outpatient neuro IR follow up.    # Bradycardia  -TTE EF 50-55%, no other structural abnormalities  - Metoprolol discontinued   - per electrophysiology notes, no indication for pacemaker, s/p ILR on 10/20  - needs EP follow up as an outpatient.    #intermittent Dizziness   -Encouraged fluid intake - improved now    #ETOH use disorder  -  in remission, stable.    #HTN  - No meds at home  - did not tolerate lisinopril 5mg during hospitalization (intermittent hypotension)  - Hydralazine 25 mg PRN if SBP > 170    #DM 2  non compliant with meds at home  metformin, linagliptin   A1C 8.6  d/c Accu checks and ISS 11/6/23    #Elevated TSH  T4 level normal  outpt f/u    #DVT Prophylaxis   - Eliquis

## 2023-11-17 NOTE — PROGRESS NOTE ADULT - ATTENDING COMMENTS
Pt. seen with student.  Agree with documentation above as per MS3 with amendments made as appropriate. Patient medically stable. Making progress towards rehab goals.     Left PCA infarct, right MCA and PCA occlusion--  Stable-- BP controlled.  awaiting discharge

## 2023-11-17 NOTE — PROGRESS NOTE ADULT - ASSESSMENT
Assessment and Plan:   Patient is a 57 y/o M with a PHx HTN, DM, CVA sp tnK with full resolution symptoms, who presented to [facility] on 10/12 due to sudden fall at work and lost consciousness.  MRI brain showed left temporal occipital lobe infarctions and acute punctate left occipital lobe infarction now s/p thrombectomy and Right MCA/PCA oclusion with left sided weakness, sensory impairment, left homonymous hemianopsia, gait and ADL impairments.     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 Continue for 3 months.    - Atorvastatin 80mg qhs.   - C/w Memantine 5mg BID for cognitive deficits - 11/3.   - cont comprehensive rehab program OT, SLP  3 hours daily 5 x week. d/c SLP  - neuropsychology evaluation and support, recreation therapy  - Precautions: cardiac, DM, respiratory, breast CA, fall, aspiration.  - No new neurological deficits    #Memory deficits  -10/13: MRI Brain  showing L PCA P2 occlusion    -Cont. Memantine 10mg BID   -Cont. Donepezil to 10mg in AM--started  11/14--monitor for SE  - EKG QTc  11/10--445  -Monitor HR as donepezil has slight risk of worsening bradycardia--HR has been stable  -QTc 460 on 11/7       # Bradycardia  - No indication for pacemaker  - Metoprolol 12.5mg BID has been discontinued   - per electrophysiology notes, no indication for pacemaker, s/p ILR on 10/20  - needs EP follow up as an outpatient  - HR stable in 50s-60s     # HTN  - No medication at home and patient is sensitive to HTN medication. Was previously started on lisinopril.   - Can treat with hydralazine 25mg PRN for >170.   - did not tolerate lisinopril 5mg during hospitalization (intermittent hypotension)  - Dr. Curry suggests 10 point decrease in BP parameters based on clinical exam  - Current parameters -150 over weekend, titrate 120-140 as tolerated  - Monitor BP-- SBP has been 120-150    Mood--  --Neuropsychology following for counseling    # HLD  - Atorvastatin 80 mg daily    # DM 2  - A1C 8.6  - Metformin   - Accu checks and ISS    # ETOH abuse  - Not in active withdrawal.  - Continue MVI, thiamine and folic acid    # Pain management  - No complaints     # GI/Bowel:  - Senna QHS, Miralax Daily  - dulcolax PO     # /Bladder:   -Continent    # FEN   - Diet: DASH DIET     # DVT ppx  - Eliquis 5mg BID    #Dispo  - awaiting placement, dependent on paperwork which family must provide    IDT: 11/16  TDD:  d/c plan to [facility] ASAP   RN: continent with bowel and continent with bladder   SW: Lives in Apartment with his daughter. On the main floor. He was independent before. family cannot provide supervision.  Insurance coverage for Medicaid holding up [facility] discharge  SP: Regular with thin. Improving-- Mild cognitive deficits-- . Recall and short term memory deficits, reasoning, attention, executive functioning.  Not impulsive or unsafe.  Needs assistance with IADLs, finances, meds  OT: Setup/sup - eating, grooming, UBD. CS - toileting, toilet transfers and LBD.  Min-A Bathing. Mod A--f/w, CG ambulating to bathroom  PT : D/C WFL. Ambulation 150 feet without AD and 12 steps with supervision.    Goals: 1. Recall salient information with external aides  2. supervision ambulation to bathroom and toileting    -- Dr. Jamison Spoke with patient's sister, Selma 11/16. No current family situation where someone is available 24/7. Selma is aware of the divorce/death documentation holding up placement and states the family is actively working on this issue. Lety # [phone].  [phone] home.      Outpatient Follow-up (Specialty/Name of physician):    Rj Delgado  Cardiac Electrophysiology  [address] [place] [address], [address] [address]  Phone: [phone]  Fax: [phone]  Follow Up Time:     Wily Barkley  Neurology  [address] [place] [address]  Phone: [phone]  Fax: [phone]  Follow Up Time: 1 week    Denzel Zapata  Interventional Cardiology  [facility], [address] [address]  Phone: [phone]  Fax: [phone]  Follow Up Time: 1 week    PCP,   Phone: (   )    -  Fax: (   )    -  Follow Up Time: 1 week    Arsenio Goodrich  Interventional Neuroradiology  [address] [place] [id number]  Phone: [phone]  Fax: [phone]

## 2023-11-17 NOTE — PROGRESS NOTE ADULT - SUBJECTIVE AND OBJECTIVE BOX
HPI:  Patient is a 59 y/o M with a PHx HTN, DM, stroke one year ago (at the time patient received Tenecteplase and had full resolution of symptoms), who presented to [facility] on 10/12/23 following sudden fall at work and lost consciousness. NIHSS 10 on admission. Admitted under NeuroICU on 10/12. Imaging revealed ischemia in the posterior circulation, CT angiogram head/neck showed right PCA P1-2 cutoff, possibly chronic right MCA M1 cutoff which had distal reconstitution and established collaterals, and left PCA occlusion which patient was subsequently transferred for mechanical thrombectomy.    Cerebral angiogram and mechanical thrombectomy performed using aspiration with TICI 3 recanalization of left PCA. The right occipital lobe filled from right SHANI collaterals, and the right MCA as well was thought to be chronic given noted collateralization. MR with small to moderate left temporal occipital lobe infarctions. Occluded right MCA, occluded right PCA and right vertebral artery proximal segment. The patient was started on a hep gtt given extensive atherosclerotic disease which was switched to apixaban 5mg BID with recommendations for a three month course.    TTE with normal left ventricular internal cavity size. Left ventricular ejection fractio 50 to 55%. Per Neuro, no need for ROCIO at this time. Pt had an episode of bradycardia during his stay and there were concern for 2:1 block. ILR placed 10/20 and no indication for PPM at this time.     Patient was evaluated by PM&R and therapy for functional deficits, gait/ADL impairments and acute rehabilitation was recommended. Patient was medically optimized for discharge to [facility] IRF on 10/27/23.       Subjective:  No acute overnight events. Continues to struggle with short term memory and L sided paresthesias + numbness, but knows he is making progress. He misses his daughter but is able to speak with her every day. Seeing her this weekend. He is eating and sleeping well, and has no complaints.     ROS  -no SOB  -no Abd pain, constipation, diarrhea.   -no chest pain  +fatigue  +neurological deficits    Vital Signs Last 24 Hrs  ICU Vital Signs Last 24 Hrs  T(C): 36.6 (17 Nov 2023 08:35), Max: 37.1 (16 Nov 2023 19:52)  T(F): 97.9 (17 Nov 2023 08:35), Max: 98.8 (16 Nov 2023 19:52)  HR: 53 (17 Nov 2023 08:35) (53 - 64)  BP: 150/90 (17 Nov 2023 08:35) (126/70 - 150/90)  BP(mean): --  ABP: --  ABP(mean): --  RR: 15 (17 Nov 2023 08:35) (14 - 15)  SpO2: 95% (17 Nov 2023 08:35) (95% - 96%)    O2 Parameters below as of 17 Nov 2023 08:35  Patient On (Oxygen Delivery Method): room air      Physical Exam:  Constitutional - NAD, Comfortable  HEENT - NCAT, EOMI  Chest -breathing comfortably on RA  Cardio - warm and well perfused, + loop recorder.   Abdomen - Obese, soft, no-tender  Extremities - No peripheral edema, Long curling toenails.    Neurologic Exam:                    Cognitive -             Orientation: AA&Ox2-3 to self, month, year and location, not to date            Attention:   able to state days of week backward, mild difficulty with serial 7's - made one error            Memory: short term memory deficits - recalls 2 of 3 objects after 5 minutes with categorical cues     Speech - Fluent, Comprehensible, No dysarthria, No aphasia      Cranial Nerves - No facial asymmetry, Tongue midline, EOMI, Shoulder shrug intact. Vision Impaired on the Left upper visual field in L > R eye.      Motor -                      LEFT    UE - ShAB 4/5, EF 4-5/5, EE 4/5, WE 5/5,  WNL                     RIGHT UE - ShAB 5/5, EF 5/5, EE 5/5, WE 5/5,  WNL                    LEFT    LE - HF 5/5, KE 5/5, DF 5/5, PF 5/5                    RIGHT LE - HF 5/5, KE 5/5, DF 5/5, PF 5/5        Sensory - Impaired to LT on the left face (V2/3), arm and leg.  Feels LT but sensation of numbness and lesser than right side.      Reflexes - symmetric DTR +1 in LE and UE. Neg Babinski's b/l     Coordination - FTN / HTS intact b/l             LABS:   CBC:            13.9   6.15  )-----------( 111      ( 11-16-23 @ 05:15 )             40.6         Chem:         ( 11-16-23 @ 05:15 )    141  |  102  |  16  ----------------------------<  110<H>  4.3   |  34<H>  |  1.07        Liver Functions: ( 11-16-23 @ 05:15 )  Alb: 3.8 g/dL / Pro: 7.2 g/dL / ALK PHOS: 76 U/L / ALT: 33 U/L / AST: 19 U/L / GGT: x          MEDICATIONS  (STANDING):  apixaban 5 milliGRAM(s) Oral two times a day  atorvastatin 80 milliGRAM(s) Oral at bedtime  bisacodyl 5 milliGRAM(s) Oral at bedtime  citalopram 20 milliGRAM(s) Oral daily  dextrose 50% Injectable 25 Gram(s) IV Push once  donepezil 10 milliGRAM(s) Oral with breakfast  famotidine    Tablet 20 milliGRAM(s) Oral daily  folic acid 1 milliGRAM(s) Oral daily  linagliptin 5 milliGRAM(s) Oral daily  memantine 10 milliGRAM(s) Oral two times a day  metFORMIN 500 milliGRAM(s) Oral two times a day  multivitamin 1 Tablet(s) Oral daily  polyethylene glycol 3350 17 Gram(s) Oral daily  senna 2 Tablet(s) Oral at bedtime  thiamine 100 milliGRAM(s) Oral daily    MEDICATIONS  (PRN):

## 2023-11-18 PROCEDURE — 99232 SBSQ HOSP IP/OBS MODERATE 35: CPT

## 2023-11-18 RX ADMIN — METFORMIN HYDROCHLORIDE 500 MILLIGRAM(S): 850 TABLET ORAL at 08:24

## 2023-11-18 RX ADMIN — FAMOTIDINE 20 MILLIGRAM(S): 10 INJECTION INTRAVENOUS at 11:55

## 2023-11-18 RX ADMIN — APIXABAN 5 MILLIGRAM(S): 2.5 TABLET, FILM COATED ORAL at 17:11

## 2023-11-18 RX ADMIN — Medication 1 MILLIGRAM(S): at 15:58

## 2023-11-18 RX ADMIN — CITALOPRAM 20 MILLIGRAM(S): 10 TABLET, FILM COATED ORAL at 11:55

## 2023-11-18 RX ADMIN — APIXABAN 5 MILLIGRAM(S): 2.5 TABLET, FILM COATED ORAL at 06:28

## 2023-11-18 RX ADMIN — DONEPEZIL HYDROCHLORIDE 10 MILLIGRAM(S): 10 TABLET, FILM COATED ORAL at 08:24

## 2023-11-18 RX ADMIN — MEMANTINE HYDROCHLORIDE 10 MILLIGRAM(S): 10 TABLET ORAL at 17:11

## 2023-11-18 RX ADMIN — METFORMIN HYDROCHLORIDE 500 MILLIGRAM(S): 850 TABLET ORAL at 17:11

## 2023-11-18 RX ADMIN — SENNA PLUS 2 TABLET(S): 8.6 TABLET ORAL at 21:02

## 2023-11-18 RX ADMIN — Medication 1 TABLET(S): at 11:55

## 2023-11-18 RX ADMIN — Medication 100 MILLIGRAM(S): at 15:58

## 2023-11-18 RX ADMIN — LINAGLIPTIN 5 MILLIGRAM(S): 5 TABLET, FILM COATED ORAL at 11:55

## 2023-11-18 RX ADMIN — ATORVASTATIN CALCIUM 80 MILLIGRAM(S): 80 TABLET, FILM COATED ORAL at 21:02

## 2023-11-18 RX ADMIN — MEMANTINE HYDROCHLORIDE 10 MILLIGRAM(S): 10 TABLET ORAL at 06:29

## 2023-11-18 NOTE — PROGRESS NOTE ADULT - SUBJECTIVE AND OBJECTIVE BOX
Cc: Difficulty walking    HPI: Patient with no new medical issues today.  Pain controlled, no chest pain, no N/V, no Fevers/Chills. No other new ROS  Has been tolerating rehabilitation program.    apixaban 5 milliGRAM(s) Oral two times a day  atorvastatin 80 milliGRAM(s) Oral at bedtime  bisacodyl 5 milliGRAM(s) Oral at bedtime  citalopram 20 milliGRAM(s) Oral daily  dextrose 50% Injectable 25 Gram(s) IV Push once  donepezil 10 milliGRAM(s) Oral with breakfast  famotidine    Tablet 20 milliGRAM(s) Oral daily  folic acid 1 milliGRAM(s) Oral daily  linagliptin 5 milliGRAM(s) Oral daily  memantine 10 milliGRAM(s) Oral two times a day  metFORMIN 500 milliGRAM(s) Oral two times a day  multivitamin 1 Tablet(s) Oral daily  polyethylene glycol 3350 17 Gram(s) Oral daily  senna 2 Tablet(s) Oral at bedtime  thiamine 100 milliGRAM(s) Oral daily    T(C): 36.3 (11-18-23 @ 08:26), Max: 36.7 (11-17-23 @ 21:36)  HR: 56 (11-18-23 @ 08:26) (53 - 56)  BP: 151/96 (11-18-23 @ 08:26) (140/80 - 151/96)  RR: 16 (11-18-23 @ 08:26) (15 - 16)  SpO2: 96% (11-18-23 @ 08:26) (95% - 97%)    In NAD  HEENT- EOMI  Heart- Chris, Well Perfused  Lungs- No resp distress, no use of accessory resp muscles  Abd- + BS, NT  Ext- No calf pain  Neuro- Exam unchanged  Psych- Affect wnl      Imp: Patient with diagnosis of CVA admitted for comprehensive acute rehabilitation.    Plan:  - Continue therapies  - DVT prophylaxis- Apixaban  - Skin- Turn q2h, check skin daily  - Continue current medications; patient medically stable.   - Patient is stable to continue current rehabilitation program- requires active and ongoing therapeutic interventions of multiple disciplines and can tolerate 3h/d of therapies. Can actively participate and benefit from an intensive rehabilitation program. Requires supervision of a rehabilitation physician and a coordinated interdisciplinary approach to providing rehabilitation.

## 2023-11-19 PROCEDURE — 99232 SBSQ HOSP IP/OBS MODERATE 35: CPT

## 2023-11-19 RX ORDER — GABAPENTIN 400 MG/1
200 CAPSULE ORAL ONCE
Refills: 0 | Status: COMPLETED | OUTPATIENT
Start: 2023-11-19 | End: 2023-11-19

## 2023-11-19 RX ADMIN — Medication 1 MILLIGRAM(S): at 11:50

## 2023-11-19 RX ADMIN — MEMANTINE HYDROCHLORIDE 10 MILLIGRAM(S): 10 TABLET ORAL at 05:42

## 2023-11-19 RX ADMIN — ATORVASTATIN CALCIUM 80 MILLIGRAM(S): 80 TABLET, FILM COATED ORAL at 21:07

## 2023-11-19 RX ADMIN — METFORMIN HYDROCHLORIDE 500 MILLIGRAM(S): 850 TABLET ORAL at 17:14

## 2023-11-19 RX ADMIN — Medication 100 MILLIGRAM(S): at 11:50

## 2023-11-19 RX ADMIN — CITALOPRAM 20 MILLIGRAM(S): 10 TABLET, FILM COATED ORAL at 11:50

## 2023-11-19 RX ADMIN — MEMANTINE HYDROCHLORIDE 10 MILLIGRAM(S): 10 TABLET ORAL at 17:13

## 2023-11-19 RX ADMIN — FAMOTIDINE 20 MILLIGRAM(S): 10 INJECTION INTRAVENOUS at 11:50

## 2023-11-19 RX ADMIN — GABAPENTIN 200 MILLIGRAM(S): 400 CAPSULE ORAL at 21:06

## 2023-11-19 RX ADMIN — APIXABAN 5 MILLIGRAM(S): 2.5 TABLET, FILM COATED ORAL at 05:42

## 2023-11-19 RX ADMIN — LINAGLIPTIN 5 MILLIGRAM(S): 5 TABLET, FILM COATED ORAL at 11:50

## 2023-11-19 RX ADMIN — DONEPEZIL HYDROCHLORIDE 10 MILLIGRAM(S): 10 TABLET, FILM COATED ORAL at 08:05

## 2023-11-19 RX ADMIN — APIXABAN 5 MILLIGRAM(S): 2.5 TABLET, FILM COATED ORAL at 17:14

## 2023-11-19 RX ADMIN — METFORMIN HYDROCHLORIDE 500 MILLIGRAM(S): 850 TABLET ORAL at 05:42

## 2023-11-19 RX ADMIN — Medication 1 TABLET(S): at 11:50

## 2023-11-19 NOTE — PROGRESS NOTE ADULT - SUBJECTIVE AND OBJECTIVE BOX
Cc: Difficulty walking    HPI: Patient with no new medical issues today.  Pain controlled, no chest pain, no N/V, no Fevers/Chills. No other new ROS  Has been tolerating rehabilitation program.    MEDICATIONS  (STANDING):  apixaban 5 milliGRAM(s) Oral two times a day  atorvastatin 80 milliGRAM(s) Oral at bedtime  bisacodyl 5 milliGRAM(s) Oral at bedtime  citalopram 20 milliGRAM(s) Oral daily  dextrose 50% Injectable 25 Gram(s) IV Push once  donepezil 10 milliGRAM(s) Oral with breakfast  famotidine    Tablet 20 milliGRAM(s) Oral daily  folic acid 1 milliGRAM(s) Oral daily  linagliptin 5 milliGRAM(s) Oral daily  memantine 10 milliGRAM(s) Oral two times a day  metFORMIN 500 milliGRAM(s) Oral two times a day  multivitamin 1 Tablet(s) Oral daily  polyethylene glycol 3350 17 Gram(s) Oral daily  senna 2 Tablet(s) Oral at bedtime  thiamine 100 milliGRAM(s) Oral daily    MEDICATIONS  (PRN):    Vital Signs Last 24 Hrs  T(C): 36.6 (19 Nov 2023 08:01), Max: 36.7 (18 Nov 2023 19:54)  T(F): 97.8 (19 Nov 2023 08:01), Max: 98 (18 Nov 2023 19:54)  HR: 57 (19 Nov 2023 08:01) (57 - 57)  BP: 153/80 (19 Nov 2023 08:01) (144/86 - 153/80)  BP(mean): --  RR: 16 (19 Nov 2023 08:01) (16 - 16)  SpO2: 94% (19 Nov 2023 08:01) (94% - 94%)    In NAD  HEENT- EOMI  Heart- Chris, Well Perfused  Lungs- No resp distress, no use of accessory resp muscles  Abd- + BS, NT  Ext- No calf pain  Neuro- Exam unchanged  Psych- Affect wnl    Imp: Patient with diagnosis of CVA admitted for comprehensive acute rehabilitation.    Plan:  - Continue therapies  - DVT prophylaxis- Apixaban  - Skin- Turn q2h, check skin daily  - Continue current medications; patient medically stable.   - Patient is stable to continue current rehabilitation program- requires active and ongoing therapeutic interventions of multiple disciplines and can tolerate 3h/d of therapies. Can actively participate and benefit from an intensive rehabilitation program. Requires supervision of a rehabilitation physician and a coordinated interdisciplinary approach to providing rehabilitation.

## 2023-11-20 LAB
ALBUMIN SERPL ELPH-MCNC: 3.4 G/DL — SIGNIFICANT CHANGE UP (ref 3.3–5)
ALBUMIN SERPL ELPH-MCNC: 3.4 G/DL — SIGNIFICANT CHANGE UP (ref 3.3–5)
ALP SERPL-CCNC: 72 U/L — SIGNIFICANT CHANGE UP (ref 40–120)
ALP SERPL-CCNC: 72 U/L — SIGNIFICANT CHANGE UP (ref 40–120)
ALT FLD-CCNC: 31 U/L — SIGNIFICANT CHANGE UP (ref 10–45)
ALT FLD-CCNC: 31 U/L — SIGNIFICANT CHANGE UP (ref 10–45)
ANION GAP SERPL CALC-SCNC: 6 MMOL/L — SIGNIFICANT CHANGE UP (ref 5–17)
ANION GAP SERPL CALC-SCNC: 6 MMOL/L — SIGNIFICANT CHANGE UP (ref 5–17)
AST SERPL-CCNC: 15 U/L — SIGNIFICANT CHANGE UP (ref 10–40)
AST SERPL-CCNC: 15 U/L — SIGNIFICANT CHANGE UP (ref 10–40)
BILIRUB SERPL-MCNC: 0.5 MG/DL — SIGNIFICANT CHANGE UP (ref 0.2–1.2)
BILIRUB SERPL-MCNC: 0.5 MG/DL — SIGNIFICANT CHANGE UP (ref 0.2–1.2)
BUN SERPL-MCNC: 17 MG/DL — SIGNIFICANT CHANGE UP (ref 7–23)
BUN SERPL-MCNC: 17 MG/DL — SIGNIFICANT CHANGE UP (ref 7–23)
CALCIUM SERPL-MCNC: 9.4 MG/DL — SIGNIFICANT CHANGE UP (ref 8.4–10.5)
CALCIUM SERPL-MCNC: 9.4 MG/DL — SIGNIFICANT CHANGE UP (ref 8.4–10.5)
CHLORIDE SERPL-SCNC: 104 MMOL/L — SIGNIFICANT CHANGE UP (ref 96–108)
CHLORIDE SERPL-SCNC: 104 MMOL/L — SIGNIFICANT CHANGE UP (ref 96–108)
CO2 SERPL-SCNC: 31 MMOL/L — SIGNIFICANT CHANGE UP (ref 22–31)
CO2 SERPL-SCNC: 31 MMOL/L — SIGNIFICANT CHANGE UP (ref 22–31)
CREAT SERPL-MCNC: 1.1 MG/DL — SIGNIFICANT CHANGE UP (ref 0.5–1.3)
CREAT SERPL-MCNC: 1.1 MG/DL — SIGNIFICANT CHANGE UP (ref 0.5–1.3)
EGFR: 78 ML/MIN/1.73M2 — SIGNIFICANT CHANGE UP
EGFR: 78 ML/MIN/1.73M2 — SIGNIFICANT CHANGE UP
GLUCOSE SERPL-MCNC: 107 MG/DL — HIGH (ref 70–99)
GLUCOSE SERPL-MCNC: 107 MG/DL — HIGH (ref 70–99)
HCT VFR BLD CALC: 38.8 % — LOW (ref 39–50)
HCT VFR BLD CALC: 38.8 % — LOW (ref 39–50)
HGB BLD-MCNC: 13.1 G/DL — SIGNIFICANT CHANGE UP (ref 13–17)
HGB BLD-MCNC: 13.1 G/DL — SIGNIFICANT CHANGE UP (ref 13–17)
MCHC RBC-ENTMCNC: 31.8 PG — SIGNIFICANT CHANGE UP (ref 27–34)
MCHC RBC-ENTMCNC: 31.8 PG — SIGNIFICANT CHANGE UP (ref 27–34)
MCHC RBC-ENTMCNC: 33.8 GM/DL — SIGNIFICANT CHANGE UP (ref 32–36)
MCHC RBC-ENTMCNC: 33.8 GM/DL — SIGNIFICANT CHANGE UP (ref 32–36)
MCV RBC AUTO: 94.2 FL — SIGNIFICANT CHANGE UP (ref 80–100)
MCV RBC AUTO: 94.2 FL — SIGNIFICANT CHANGE UP (ref 80–100)
NRBC # BLD: 0 /100 WBCS — SIGNIFICANT CHANGE UP (ref 0–0)
NRBC # BLD: 0 /100 WBCS — SIGNIFICANT CHANGE UP (ref 0–0)
PLATELET # BLD AUTO: 108 K/UL — LOW (ref 150–400)
PLATELET # BLD AUTO: 108 K/UL — LOW (ref 150–400)
POTASSIUM SERPL-MCNC: 5.1 MMOL/L — SIGNIFICANT CHANGE UP (ref 3.5–5.3)
POTASSIUM SERPL-MCNC: 5.1 MMOL/L — SIGNIFICANT CHANGE UP (ref 3.5–5.3)
POTASSIUM SERPL-SCNC: 5.1 MMOL/L — SIGNIFICANT CHANGE UP (ref 3.5–5.3)
POTASSIUM SERPL-SCNC: 5.1 MMOL/L — SIGNIFICANT CHANGE UP (ref 3.5–5.3)
PROT SERPL-MCNC: 6.5 G/DL — SIGNIFICANT CHANGE UP (ref 6–8.3)
PROT SERPL-MCNC: 6.5 G/DL — SIGNIFICANT CHANGE UP (ref 6–8.3)
RBC # BLD: 4.12 M/UL — LOW (ref 4.2–5.8)
RBC # BLD: 4.12 M/UL — LOW (ref 4.2–5.8)
RBC # FLD: 13.2 % — SIGNIFICANT CHANGE UP (ref 10.3–14.5)
RBC # FLD: 13.2 % — SIGNIFICANT CHANGE UP (ref 10.3–14.5)
SODIUM SERPL-SCNC: 141 MMOL/L — SIGNIFICANT CHANGE UP (ref 135–145)
SODIUM SERPL-SCNC: 141 MMOL/L — SIGNIFICANT CHANGE UP (ref 135–145)
WBC # BLD: 6.65 K/UL — SIGNIFICANT CHANGE UP (ref 3.8–10.5)
WBC # BLD: 6.65 K/UL — SIGNIFICANT CHANGE UP (ref 3.8–10.5)
WBC # FLD AUTO: 6.65 K/UL — SIGNIFICANT CHANGE UP (ref 3.8–10.5)
WBC # FLD AUTO: 6.65 K/UL — SIGNIFICANT CHANGE UP (ref 3.8–10.5)

## 2023-11-20 PROCEDURE — 99232 SBSQ HOSP IP/OBS MODERATE 35: CPT

## 2023-11-20 RX ADMIN — Medication 100 MILLIGRAM(S): at 11:21

## 2023-11-20 RX ADMIN — METFORMIN HYDROCHLORIDE 500 MILLIGRAM(S): 850 TABLET ORAL at 17:37

## 2023-11-20 RX ADMIN — Medication 1 MILLIGRAM(S): at 11:21

## 2023-11-20 RX ADMIN — MEMANTINE HYDROCHLORIDE 10 MILLIGRAM(S): 10 TABLET ORAL at 05:58

## 2023-11-20 RX ADMIN — DONEPEZIL HYDROCHLORIDE 10 MILLIGRAM(S): 10 TABLET, FILM COATED ORAL at 07:45

## 2023-11-20 RX ADMIN — Medication 1 TABLET(S): at 11:21

## 2023-11-20 RX ADMIN — FAMOTIDINE 20 MILLIGRAM(S): 10 INJECTION INTRAVENOUS at 11:21

## 2023-11-20 RX ADMIN — APIXABAN 5 MILLIGRAM(S): 2.5 TABLET, FILM COATED ORAL at 05:58

## 2023-11-20 RX ADMIN — METFORMIN HYDROCHLORIDE 500 MILLIGRAM(S): 850 TABLET ORAL at 05:58

## 2023-11-20 RX ADMIN — MEMANTINE HYDROCHLORIDE 10 MILLIGRAM(S): 10 TABLET ORAL at 17:37

## 2023-11-20 RX ADMIN — LINAGLIPTIN 5 MILLIGRAM(S): 5 TABLET, FILM COATED ORAL at 11:20

## 2023-11-20 RX ADMIN — ATORVASTATIN CALCIUM 80 MILLIGRAM(S): 80 TABLET, FILM COATED ORAL at 20:07

## 2023-11-20 RX ADMIN — CITALOPRAM 20 MILLIGRAM(S): 10 TABLET, FILM COATED ORAL at 11:21

## 2023-11-20 RX ADMIN — APIXABAN 5 MILLIGRAM(S): 2.5 TABLET, FILM COATED ORAL at 17:37

## 2023-11-20 NOTE — PROGRESS NOTE ADULT - SUBJECTIVE AND OBJECTIVE BOX
Patient is a 58y old  Male who presents with a chief complaint of CVA and left hemiparesis (19 Nov 2023 09:20)      HPI:  Patient is a 59 y/o M with a PHx HTN, DM, stroke one year ago (at the time patient received Tenecteplase and had full resolution of symptoms), who presented to [facility] on 10/12/23 following sudden fall at work and lost consciousness. NIHSS 10 on admission. Admitted under NeuroICU on 10/12. Imaging revealed ischemia in the posterior circulation, CT angiogram head/neck showed right PCA P1-2 cutoff, possibly chronic right MCA M1 cutoff which had distal reconstitution and established collaterals, and left PCA occlusion which patient was subsequently transferred for mechanical thrombectomy.    Cerebral angiogram and mechanical thrombectomy performed using aspiration with TICI 3 recanalization of left PCA. The right occipital lobe filled from right SHANI collaterals, and the right MCA as well was thought to be chronic given noted collateralization. MR with small to moderate left temporal occipital lobe infarctions. Occluded right MCA, occluded right PCA and right vertebral artery proximal segment. The patient was started on a hep gtt given extensive atherosclerotic disease which was switched to apixaban 5mg BID with recommendations for a three month course.    TTE with normal left ventricular internal cavity size. Left ventricular ejection fractio 50 to 55%. Per Neuro, no need for ROCIO at this time. Pt had an episode of bradycardia during his stay and there were concern for 2:1 block. ILR placed 10/20 and no indication for PPM at this time.     Patient was evaluated by PM&R and therapy for functional deficits, gait/ADL impairments and acute rehabilitation was recommended. Patient was medically optimized for discharge to [facility] IRF on 10/27/23.  (27 Oct 2023 12:30)      PAST MEDICAL & SURGICAL HISTORY:  HTN (hypertension)      CVA (cerebrovascular accident)      History of hip surgery          MEDICATIONS  (STANDING):  apixaban 5 milliGRAM(s) Oral two times a day  atorvastatin 80 milliGRAM(s) Oral at bedtime  bisacodyl 5 milliGRAM(s) Oral at bedtime  citalopram 20 milliGRAM(s) Oral daily  dextrose 50% Injectable 25 Gram(s) IV Push once  donepezil 10 milliGRAM(s) Oral with breakfast  famotidine    Tablet 20 milliGRAM(s) Oral daily  folic acid 1 milliGRAM(s) Oral daily  linagliptin 5 milliGRAM(s) Oral daily  memantine 10 milliGRAM(s) Oral two times a day  metFORMIN 500 milliGRAM(s) Oral two times a day  multivitamin 1 Tablet(s) Oral daily  polyethylene glycol 3350 17 Gram(s) Oral daily  senna 2 Tablet(s) Oral at bedtime  thiamine 100 milliGRAM(s) Oral daily    MEDICATIONS  (PRN):      Allergies    No Known Allergies    Intolerances          VITALS  58y  Vital Signs Last 24 Hrs  T(C): 36.6 (20 Nov 2023 07:42), Max: 36.6 (20 Nov 2023 07:42)  T(F): 97.9 (20 Nov 2023 07:42), Max: 97.9 (20 Nov 2023 07:42)  HR: 51 (20 Nov 2023 07:42) (51 - 56)  BP: 124/80 (20 Nov 2023 07:43) (118/83 - 154/91)  BP(mean): --  RR: 16 (20 Nov 2023 07:42) (16 - 16)  SpO2: 95% (20 Nov 2023 07:42) (95% - 96%)    Parameters below as of 20 Nov 2023 07:42  Patient On (Oxygen Delivery Method): room air      Daily     Daily         RECENT LABS:                          13.1   6.65  )-----------( 108      ( 20 Nov 2023 05:44 )             38.8     11-20    141  |  104  |  17  ----------------------------<  107<H>  5.1   |  31  |  1.10    Ca    9.4      20 Nov 2023 05:44    TPro  6.5  /  Alb  3.4  /  TBili  0.5  /  DBili  x   /  AST  15  /  ALT  31  /  AlkPhos  72  11-20    LIVER FUNCTIONS - ( 20 Nov 2023 05:44 )  Alb: 3.4 g/dL / Pro: 6.5 g/dL / ALK PHOS: 72 U/L / ALT: 31 U/L / AST: 15 U/L / GGT: x             Urinalysis Basic - ( 20 Nov 2023 05:44 )    Color: x / Appearance: x / SG: x / pH: x  Gluc: 107 mg/dL / Ketone: x  / Bili: x / Urobili: x   Blood: x / Protein: x / Nitrite: x   Leuk Esterase: x / RBC: x / WBC x   Sq Epi: x / Non Sq Epi: x / Bacteria: x          CAPILLARY BLOOD GLUCOSE

## 2023-11-20 NOTE — PROGRESS NOTE ADULT - ASSESSMENT
57 y/o M with a PHx HTN, DM, CVA sp tnK with full resolution symptoms, who presented to [facility] on 10/12 s/p fall +LOC, found to have left temporal occipital lobe infarctions and acute punctate left occipital lobe infarction. Patient s/p thrombectomy with left sided weakness, sensory impairment, left homonymous hemianopsia, gait and ADL impairments.     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 Continue for 3 months.    - Atorvastatin 80mg qhs.   - Memantine 5mg BID for cognitive deficits - 11/3.   - cont comprehensive rehab program PT, OT 3 hours daily 5 x week.  - neuropsychology evaluation and support, recreation therapy  - Precautions: cardiac, DM, respiratory, breast CA, fall, aspiration.  - No new neurological deficits    # Memory deficits  - Memantine 10mg BID   - Donepezil to 10mg in AM--started  11/14--monitor for SE  - EKG QTc  11/10 445--> 460 on 11/7     # Bradycardia  - Metoprolol dc  - per electrophysiology notes, no indication for pacemaker, s/p ILR on 10/20  - needs EP follow up as an outpatient  - HR 51-56 11/20    # HTN  - did not tolerate lisinopril 5mg during hospitalization (intermittent hypotension)  - Current parameters -150, titrate 120-140 as tolerated  - hydralazine 25mg PRN for >170.   - BP  (118/83 - 154/91) 11/20    # HLD  - Atorvastatin 80 mg daily    # DM 2  - A1C 8.6  - Metformin   - Accu checks and ISS    # h/o ETOH abuse  - Not in active withdrawal.  - Continue MVI, thiamine and folic acid    # GI/Bowel:  - Senna QHS, Miralax Daily  - dulcolax PO     # FEN   - Diet: DASH DIET     # DVT ppx  - Eliquis 5mg BID    # Dispo  - awaiting [facility] placement, dependent on paperwork which family must provide  - Dr. Jamison Spoke with patient's sister, Selma 11/16. No current family situation where someone is available 24/7. Selma is aware of the divorce/death documentation holding up placement and states the family is actively working on this issue. Lety # [phone].  [phone] [place].      Outpatient Follow-up (Specialty/Name of physician):    Rj Delgado  Cardiac Electrophysiology  [address] [facility], [address]  Phone: [phone]  Fax: [phone]  Follow Up Time:     Wily Barkley  Neurology  [address] [place] [address]  Phone: [phone]  Fax: [phone]  Follow Up Time: 1 week    Denzel Zapata  Interventional Cardiology  [address] [address]  Phone: [phone]  Fax: [phone]  Follow Up Time: 1 week    PCP,   Phone: (   )    -  Fax: (   )    -  Follow Up Time: 1 week    Arsenio Goodrich  Interventional Neuroradiology  [address] [place] [address]  Phone: [phone]  Fax: [phone]             57 y/o M with a PHx HTN, DM, CVA sp tnK with full resolution symptoms, who presented to [facility] on 10/12 s/p fall +LOC, found to have left temporal occipital lobe infarctions and acute punctate left occipital lobe infarction. Patient s/p thrombectomy with left sided weakness, sensory impairment, left homonymous hemianopsia, gait and ADL impairments.     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 Continue for 3 months.    - Atorvastatin 80mg qhs.   - Memantine 5mg BID for cognitive deficits - 11/3. Tolerating, no GI complaints or insomnia  - cont comprehensive rehab program PT, OT 3 hours daily 5 x week.  - neuropsychology evaluation and support, recreation therapy  - Precautions: cardiac, DM, respiratory, breast CA, fall, aspiration.  - No new neurological deficits    # Memory deficits  - Memantine 10mg BID   - Donepezil to 10mg in AM--started  11/14--monitor for SE  - EKG QTc  11/10 445--> 460 on 11/7     # Bradycardia  - Metoprolol dc  - per electrophysiology notes, no indication for pacemaker, s/p ILR on 10/20  - needs EP follow up as an outpatient  - HR 51-56 11/20    # HTN  - did not tolerate lisinopril 5mg during hospitalization (intermittent hypotension)  - Current parameters -150, titrate 120-140 as tolerated  - hydralazine 25mg PRN for >170.   - BP  (118/83 - 154/91) 11/20    # HLD  - Atorvastatin 80 mg daily    # DM 2  - A1C 8.6  - Metformin   - Accu checks and ISS    # h/o ETOH abuse  - Not in active withdrawal.  - Continue MVI, thiamine and folic acid    # GI/Bowel:  - Senna QHS, Miralax Daily  - dulcolax PO     # FEN   - Diet: DASH DIET     # DVT ppx  - Eliquis 5mg BID    # Dispo  - awaiting [facility] placement, dependent on paperwork which family must provide. Patient aware  - Dr. Jamison Spoke with patient's sister, Selma 11/16. No current family situation where someone is available 24/7. Selma is aware of the divorce/death documentation holding up placement and states the family is actively working on this issue. Lety # [phone].  [phone] [place].      Outpatient Follow-up (Specialty/Name of physician):    Rj Delgado  Cardiac Electrophysiology  [address]  Phone: [phone]  Fax: [phone]  Follow Up Time:     Wily Barkley  Neurology  [address] [place] [address]  Phone: [phone]  Fax: [phone]  Follow Up Time: 1 week    Denzel Zapata  Interventional Cardiology  [address] [place] [id number]  Phone: [phone]  Fax: [phone]  Follow Up Time: 1 week    PCP,   Phone: (   )    -  Fax: (   )    -  Follow Up Time: 1 week    Arsenio Goodrich  Interventional Neuroradiology  [address] [place] [address]  Phone: [phone]  Fax: [phone]

## 2023-11-20 NOTE — PROGRESS NOTE ADULT - CONSTITUTIONAL COMMENTS
alert, follows 1-2 step verbal commands, good- simple attention, good- sustained. He recalls me from admission exam

## 2023-11-20 NOTE — PROGRESS NOTE ADULT - ASSESSMENT
57 y/o M with a Hx of stroke one year ago (at the time patient received TNK and had full  resolution of symptoms), HTN, DM, non-compliant with medications, who presented following an episode of LOC, as per family at around 6:15 pm patient had a sudden fall at work and lost consciousness. It is unclear if patient had any symptoms before he fell. NIHSS 10 on admission, admitted under NeuroICU on 10/12: Thrombectomy via L radial artery for TICI 3 revascularization of L P1-P2 occlusion. Noted LMCA chronic occlusion., on 0/13: Neurologic status improving. Got MR. Started on ASA and SQL, on 10/14: quadrantanopia now on L superior (originally R); Repeat stroke imaging completed and initial concern for CTP showing poss new perfusion deficit however on further review noted to be present on admission. Started on heparin drip per neurology recommendations, on 10/15: Therapeutic on heparin drip, stability HCT obtained. Cardiology consulted for further stroke work-up and downgraded under medicine- pt/ot/dvt ppx    #Acute CVA  #Occluded R MCA/PCA, s/p thrombectomy   -TTE shows with  Normal left ventricular internal cavity size,  3. Left ventricular ejection fraction, by visual estimation, is 50 to 55%.  -DVT studies negative  -continue Eliquis. Needs for 3 months.    -c/w Lipitor 80mg  -outpatient neuro IR follow up.  - L paresthesias (not new), pt tolerated Neurontin 200mg, Can ADD gabapentin qhs if pt amendable for symptoms    # Bradycardia  -TTE EF 50-55%, no other structural abnormalities  - Metoprolol discontinued   - per electrophysiology notes, no indication for pacemaker, s/p ILR on 10/20  - needs EP follow up as an outpatient.    #intermittent Dizziness   -Encouraged fluid intake - improved now    #ETOH use disorder  -  in remission, stable.    #HTN  - No meds at home  - did not tolerate lisinopril 5mg during hospitalization (intermittent hypotension)  - Hydralazine 25 mg PRN if SBP > 170    #DM 2  non compliant with meds at home  metformin, linagliptin   A1C 8.6  d/c Accu checks and ISS 11/6/23    #Elevated TSH  T4 level normal  outpt f/u    #DVT Prophylaxis   - Eliquis    Discussed treatment plan with IDT.

## 2023-11-20 NOTE — PROGRESS NOTE ADULT - SUBJECTIVE AND OBJECTIVE BOX
CC: Patient is a 58y old  Male who presents with a chief complaint of CVA and left hemiparesis (20 Nov 2023 08:58)      Interval History: Patient seen and examined at bedside. Pt c/o paresthesias to L arm overnight, not new for the pt; was given Neurontin 200mg x1. Pt denies pain or any other complaints.    ALLERGIES:  No Known Allergies    MEDICATIONS  (STANDING):  apixaban 5 milliGRAM(s) Oral two times a day  atorvastatin 80 milliGRAM(s) Oral at bedtime  bisacodyl 5 milliGRAM(s) Oral at bedtime  citalopram 20 milliGRAM(s) Oral daily  dextrose 50% Injectable 25 Gram(s) IV Push once  donepezil 10 milliGRAM(s) Oral with breakfast  famotidine    Tablet 20 milliGRAM(s) Oral daily  folic acid 1 milliGRAM(s) Oral daily  linagliptin 5 milliGRAM(s) Oral daily  memantine 10 milliGRAM(s) Oral two times a day  metFORMIN 500 milliGRAM(s) Oral two times a day  multivitamin 1 Tablet(s) Oral daily  polyethylene glycol 3350 17 Gram(s) Oral daily  senna 2 Tablet(s) Oral at bedtime  thiamine 100 milliGRAM(s) Oral daily    MEDICATIONS  (PRN):    Vital Signs Last 24 Hrs  T(F): 97.9 (20 Nov 2023 07:42), Max: 97.9 (20 Nov 2023 07:42)  HR: 51 (20 Nov 2023 07:42) (51 - 56)  BP: 124/80 (20 Nov 2023 07:43) (118/83 - 154/91)  RR: 16 (20 Nov 2023 07:42) (16 - 16)  SpO2: 95% (20 Nov 2023 07:42) (95% - 96%)  I&O's Summary        PHYSICAL EXAM:  GENERAL: pt sitting in chair in NAD  CHEST/LUNG: Clear to percussion bilaterally; No rales, rhonchi, wheezing, or rubs; normal respiratory effort   HEART: Regular rate and rhythm; No murmurs noted  ABDOMEN: Soft, Nontender, Nondistended; Bowel sounds present   MUSCULOSKELETAL/EXTREMITIES:  5/5 strength to BUE, no edema noted to BLE  NERVOUS SYSTEM:  Sensation intact; follows commands  PSYCH: Appropriate affect, Alert & Oriented x 3     LABS:                        13.1   6.65  )-----------( 108      ( 20 Nov 2023 05:44 )             38.8       11-20    141  |  104  |  17  ----------------------------<  107  5.1   |  31  |  1.10    Ca    9.4      20 Nov 2023 05:44    TPro  6.5  /  Alb  3.4  /  TBili  0.5  /  DBili  x   /  AST  15  /  ALT  31  /  AlkPhos  72  11-20                10-13 Chol 199 mg/dL LDL -- HDL 29 mg/dL Trig 277 mg/dL                  Urinalysis Basic - ( 20 Nov 2023 05:44 )    Color: x / Appearance: x / SG: x / pH: x  Gluc: 107 mg/dL / Ketone: x  / Bili: x / Urobili: x   Blood: x / Protein: x / Nitrite: x   Leuk Esterase: x / RBC: x / WBC x   Sq Epi: x / Non Sq Epi: x / Bacteria: x        COVID-19 PCR: Ml (10-27-23 @ 22:21)      Care Discussed with Consultants/Other Providers: Yes

## 2023-11-20 NOTE — PROGRESS NOTE ADULT - COMMENTS
Patient seen in room, mood improved. Denies h/a, dizziness. he's eating well and feels sleep is improving. he still feels numbness in the left side but it is not as distressing as before. AWare and agreeable with planned [facility] transfer

## 2023-11-21 PROCEDURE — 99232 SBSQ HOSP IP/OBS MODERATE 35: CPT

## 2023-11-21 RX ORDER — MEMANTINE HYDROCHLORIDE 10 MG/1
1 TABLET ORAL
Qty: 0 | Refills: 0 | DISCHARGE
Start: 2023-11-21

## 2023-11-21 RX ADMIN — METFORMIN HYDROCHLORIDE 500 MILLIGRAM(S): 850 TABLET ORAL at 07:59

## 2023-11-21 RX ADMIN — MEMANTINE HYDROCHLORIDE 10 MILLIGRAM(S): 10 TABLET ORAL at 17:39

## 2023-11-21 RX ADMIN — Medication 1 MILLIGRAM(S): at 11:42

## 2023-11-21 RX ADMIN — Medication 1 TABLET(S): at 11:41

## 2023-11-21 RX ADMIN — ATORVASTATIN CALCIUM 80 MILLIGRAM(S): 80 TABLET, FILM COATED ORAL at 21:15

## 2023-11-21 RX ADMIN — APIXABAN 5 MILLIGRAM(S): 2.5 TABLET, FILM COATED ORAL at 05:49

## 2023-11-21 RX ADMIN — DONEPEZIL HYDROCHLORIDE 10 MILLIGRAM(S): 10 TABLET, FILM COATED ORAL at 07:59

## 2023-11-21 RX ADMIN — LINAGLIPTIN 5 MILLIGRAM(S): 5 TABLET, FILM COATED ORAL at 11:42

## 2023-11-21 RX ADMIN — METFORMIN HYDROCHLORIDE 500 MILLIGRAM(S): 850 TABLET ORAL at 17:39

## 2023-11-21 RX ADMIN — CITALOPRAM 20 MILLIGRAM(S): 10 TABLET, FILM COATED ORAL at 11:41

## 2023-11-21 RX ADMIN — FAMOTIDINE 20 MILLIGRAM(S): 10 INJECTION INTRAVENOUS at 11:42

## 2023-11-21 RX ADMIN — Medication 100 MILLIGRAM(S): at 11:42

## 2023-11-21 RX ADMIN — APIXABAN 5 MILLIGRAM(S): 2.5 TABLET, FILM COATED ORAL at 17:39

## 2023-11-21 RX ADMIN — MEMANTINE HYDROCHLORIDE 10 MILLIGRAM(S): 10 TABLET ORAL at 05:49

## 2023-11-21 NOTE — PROGRESS NOTE ADULT - ASSESSMENT
59 y/o M with a Hx of stroke one year ago (at the time patient received TNK and had full  resolution of symptoms), HTN, DM, non-compliant with medications, who presented following an episode of LOC, as per family at around 6:15 pm patient had a sudden fall at work and lost consciousness. It is unclear if patient had any symptoms before he fell. NIHSS 10 on admission, admitted under NeuroICU on 10/12: Thrombectomy via L radial artery for TICI 3 revascularization of L P1-P2 occlusion. Noted LMCA chronic occlusion., on 0/13: Neurologic status improving. Got MR. Started on ASA and SQL, on 10/14: quadrantanopia now on L superior (originally R); Repeat stroke imaging completed and initial concern for CTP showing poss new perfusion deficit however on further review noted to be present on admission. Started on heparin drip per neurology recommendations, on 10/15: Therapeutic on heparin drip, stability HCT obtained. Cardiology consulted for further stroke work-up and downgraded under medicine- pt/ot/dvt ppx    #Acute CVA  #Occluded R MCA/PCA, s/p thrombectomy   -TTE shows with  Normal left ventricular internal cavity size,  3. Left ventricular ejection fraction, by visual estimation, is 50 to 55%.  -DVT studies negative  -continue Eliquis. Needs for 3 months.    -c/w Lipitor 80mg  -outpatient neuro IR follow up.  - L paresthesias (not new), pt tolerated Neurontin 200mg, Can ADD gabapentin qhs if pt amendable for symptoms    # Bradycardia  -TTE EF 50-55%, no other structural abnormalities  - Metoprolol discontinued   - per electrophysiology notes, no indication for pacemaker, s/p ILR on 10/20  - needs EP follow up as an outpatient.    #intermittent Dizziness   -Encouraged fluid intake - improved now    #ETOH use disorder  -  in remission, stable.    #HTN  - No meds at home  - did not tolerate lisinopril 5mg during hospitalization (intermittent hypotension)  - Hydralazine 25 mg PRN if SBP > 170    #DM 2  non compliant with meds at home  metformin, linagliptin   A1C 8.6  d/c Accu checks and ISS 11/6/23    #Elevated TSH  T4 level normal  outpt f/u    #DVT Prophylaxis   - Eliquis    Discussed treatment plan with IDT.

## 2023-11-21 NOTE — PROGRESS NOTE ADULT - SUBJECTIVE AND OBJECTIVE BOX
Patient is a 58y old  Male who presents with a chief complaint of CVA and left hemiparesis (20 Nov 2023 11:41)      HPI:  Patient is a 59 y/o M with a PHx HTN, DM, stroke one year ago (at the time patient received Tenecteplase and had full resolution of symptoms), who presented to [facility] on 10/12/23 following sudden fall at work and lost consciousness. NIHSS 10 on admission. Admitted under NeuroICU on 10/12. Imaging revealed ischemia in the posterior circulation, CT angiogram head/neck showed right PCA P1-2 cutoff, possibly chronic right MCA M1 cutoff which had distal reconstitution and established collaterals, and left PCA occlusion which patient was subsequently transferred for mechanical thrombectomy.    Cerebral angiogram and mechanical thrombectomy performed using aspiration with TICI 3 recanalization of left PCA. The right occipital lobe filled from right SHANI collaterals, and the right MCA as well was thought to be chronic given noted collateralization. MR with small to moderate left temporal occipital lobe infarctions. Occluded right MCA, occluded right PCA and right vertebral artery proximal segment. The patient was started on a hep gtt given extensive atherosclerotic disease which was switched to apixaban 5mg BID with recommendations for a three month course.    TTE with normal left ventricular internal cavity size. Left ventricular ejection fractio 50 to 55%. Per Neuro, no need for ROCIO at this time. Pt had an episode of bradycardia during his stay and there were concern for 2:1 block. ILR placed 10/20 and no indication for PPM at this time.     Patient was evaluated by PM&R and therapy for functional deficits, gait/ADL impairments and acute rehabilitation was recommended. Patient was medically optimized for discharge to [facility] IRF on 10/27/23.  (27 Oct 2023 12:30)      PAST MEDICAL & SURGICAL HISTORY:  HTN (hypertension)      CVA (cerebrovascular accident)      History of hip surgery          MEDICATIONS  (STANDING):  apixaban 5 milliGRAM(s) Oral two times a day  atorvastatin 80 milliGRAM(s) Oral at bedtime  bisacodyl 5 milliGRAM(s) Oral at bedtime  citalopram 20 milliGRAM(s) Oral daily  dextrose 50% Injectable 25 Gram(s) IV Push once  donepezil 10 milliGRAM(s) Oral with breakfast  famotidine    Tablet 20 milliGRAM(s) Oral daily  folic acid 1 milliGRAM(s) Oral daily  linagliptin 5 milliGRAM(s) Oral daily  memantine 10 milliGRAM(s) Oral two times a day  metFORMIN 500 milliGRAM(s) Oral two times a day  multivitamin 1 Tablet(s) Oral daily  polyethylene glycol 3350 17 Gram(s) Oral daily  senna 2 Tablet(s) Oral at bedtime  thiamine 100 milliGRAM(s) Oral daily    MEDICATIONS  (PRN):      Allergies    No Known Allergies    Intolerances          VITALS  58y  Vital Signs Last 24 Hrs  T(C): 36.1 (21 Nov 2023 08:01), Max: 36.1 (21 Nov 2023 08:01)  T(F): 97 (21 Nov 2023 08:01), Max: 97 (21 Nov 2023 08:01)  HR: 51 (21 Nov 2023 08:01) (51 - 51)  BP: 148/80 (21 Nov 2023 08:01) (148/80 - 148/80)  BP(mean): --  RR: 16 (21 Nov 2023 08:01) (16 - 16)  SpO2: 97% (21 Nov 2023 08:01) (97% - 97%)    Parameters below as of 21 Nov 2023 08:01  Patient On (Oxygen Delivery Method): room air      Daily     Daily         RECENT LABS:                          13.1   6.65  )-----------( 108      ( 20 Nov 2023 05:44 )             38.8     11-20    141  |  104  |  17  ----------------------------<  107<H>  5.1   |  31  |  1.10    Ca    9.4      20 Nov 2023 05:44    TPro  6.5  /  Alb  3.4  /  TBili  0.5  /  DBili  x   /  AST  15  /  ALT  31  /  AlkPhos  72  11-20    LIVER FUNCTIONS - ( 20 Nov 2023 05:44 )  Alb: 3.4 g/dL / Pro: 6.5 g/dL / ALK PHOS: 72 U/L / ALT: 31 U/L / AST: 15 U/L / GGT: x             Urinalysis Basic - ( 20 Nov 2023 05:44 )    Color: x / Appearance: x / SG: x / pH: x  Gluc: 107 mg/dL / Ketone: x  / Bili: x / Urobili: x   Blood: x / Protein: x / Nitrite: x   Leuk Esterase: x / RBC: x / WBC x   Sq Epi: x / Non Sq Epi: x / Bacteria: x          CAPILLARY BLOOD GLUCOSE

## 2023-11-21 NOTE — PROGRESS NOTE ADULT - COMMENTS
Patient doing well. slept well overnight, no complaints of H/A, dizziness. good appetite. Can sense need to defecate/urinate although he does have hemisensory deficits in area, +continent. mood overall stable, happy with new roommate. No new complaints

## 2023-11-21 NOTE — PROGRESS NOTE ADULT - SUBJECTIVE AND OBJECTIVE BOX
CC: Patient is a 58y old  Male who presents with a chief complaint of CVA and left hemiparesis (21 Nov 2023 09:04)      Interval History: Patient seen and examined at bedside. No acute overnight events. No complaints this morning.    ALLERGIES:  No Known Allergies    MEDICATIONS  (STANDING):  apixaban 5 milliGRAM(s) Oral two times a day  atorvastatin 80 milliGRAM(s) Oral at bedtime  bisacodyl 5 milliGRAM(s) Oral at bedtime  citalopram 20 milliGRAM(s) Oral daily  dextrose 50% Injectable 25 Gram(s) IV Push once  donepezil 10 milliGRAM(s) Oral with breakfast  famotidine    Tablet 20 milliGRAM(s) Oral daily  folic acid 1 milliGRAM(s) Oral daily  linagliptin 5 milliGRAM(s) Oral daily  memantine 10 milliGRAM(s) Oral two times a day  metFORMIN 500 milliGRAM(s) Oral two times a day  multivitamin 1 Tablet(s) Oral daily  polyethylene glycol 3350 17 Gram(s) Oral daily  senna 2 Tablet(s) Oral at bedtime  thiamine 100 milliGRAM(s) Oral daily    MEDICATIONS  (PRN):    Vital Signs Last 24 Hrs  T(F): 97 (21 Nov 2023 08:01), Max: 97 (21 Nov 2023 08:01)  HR: 51 (21 Nov 2023 08:01) (51 - 51)  BP: 148/80 (21 Nov 2023 08:01) (148/80 - 148/80)  RR: 16 (21 Nov 2023 08:01) (16 - 16)  SpO2: 97% (21 Nov 2023 08:01) (97% - 97%)  I&O's Summary        PHYSICAL EXAM:  GENERAL: pt sitting up in NAD  CHEST/LUNG: Clear to percussion bilaterally; No rales, rhonchi, wheezing, or rubs; normal respiratory effort   HEART: Regular rate and rhythm; No murmurs noted  ABDOMEN: Soft, Nontender, Nondistended; Bowel sounds present   MUSCULOSKELETAL/EXTREMITIES:  5/5 strength to BUE, no edema noted to BLE  NERVOUS SYSTEM:  Sensation intact; follows commands  PSYCH: Appropriate affect, Alert & Oriented x 3     LABS:                        13.1   6.65  )-----------( 108      ( 20 Nov 2023 05:44 )             38.8       11-20    141  |  104  |  17  ----------------------------<  107  5.1   |  31  |  1.10    Ca    9.4      20 Nov 2023 05:44    TPro  6.5  /  Alb  3.4  /  TBili  0.5  /  DBili  x   /  AST  15  /  ALT  31  /  AlkPhos  72  11-20                10-13 Chol 199 mg/dL LDL -- HDL 29 mg/dL Trig 277 mg/dL                  Urinalysis Basic - ( 20 Nov 2023 05:44 )    Color: x / Appearance: x / SG: x / pH: x  Gluc: 107 mg/dL / Ketone: x  / Bili: x / Urobili: x   Blood: x / Protein: x / Nitrite: x   Leuk Esterase: x / RBC: x / WBC x   Sq Epi: x / Non Sq Epi: x / Bacteria: x        COVID-19 PCR: NotDetec (10-27-23 @ 22:21)      Care Discussed with Consultants/Other Providers: Yes   CC: Patient is a 58y old  Male who presents with a chief complaint of CVA and left hemiparesis (21 Nov 2023 09:04)      Interval History: Patient seen and examined at bedside. No acute overnight events. No complaints this morning. Pt was amb with PT this AM.    ALLERGIES:  No Known Allergies    MEDICATIONS  (STANDING):  apixaban 5 milliGRAM(s) Oral two times a day  atorvastatin 80 milliGRAM(s) Oral at bedtime  bisacodyl 5 milliGRAM(s) Oral at bedtime  citalopram 20 milliGRAM(s) Oral daily  dextrose 50% Injectable 25 Gram(s) IV Push once  donepezil 10 milliGRAM(s) Oral with breakfast  famotidine    Tablet 20 milliGRAM(s) Oral daily  folic acid 1 milliGRAM(s) Oral daily  linagliptin 5 milliGRAM(s) Oral daily  memantine 10 milliGRAM(s) Oral two times a day  metFORMIN 500 milliGRAM(s) Oral two times a day  multivitamin 1 Tablet(s) Oral daily  polyethylene glycol 3350 17 Gram(s) Oral daily  senna 2 Tablet(s) Oral at bedtime  thiamine 100 milliGRAM(s) Oral daily    MEDICATIONS  (PRN):    Vital Signs Last 24 Hrs  T(F): 97 (21 Nov 2023 08:01), Max: 97 (21 Nov 2023 08:01)  HR: 51 (21 Nov 2023 08:01) (51 - 51)  BP: 148/80 (21 Nov 2023 08:01) (148/80 - 148/80)  RR: 16 (21 Nov 2023 08:01) (16 - 16)  SpO2: 97% (21 Nov 2023 08:01) (97% - 97%)  I&O's Summary        PHYSICAL EXAM:  GENERAL: pt sitting up in NAD  CHEST/LUNG: Clear to percussion bilaterally; No rales, rhonchi, wheezing, or rubs; normal respiratory effort   HEART: Regular rate and rhythm; No murmurs noted  ABDOMEN: Soft, Nontender, Nondistended; Bowel sounds present   MUSCULOSKELETAL/EXTREMITIES:  5/5 strength to BUE, no edema noted to BLE  NERVOUS SYSTEM:  Sensation intact; follows commands  PSYCH: Appropriate affect, Alert & Oriented x 3     LABS:                        13.1   6.65  )-----------( 108      ( 20 Nov 2023 05:44 )             38.8       11-20    141  |  104  |  17  ----------------------------<  107  5.1   |  31  |  1.10    Ca    9.4      20 Nov 2023 05:44    TPro  6.5  /  Alb  3.4  /  TBili  0.5  /  DBili  x   /  AST  15  /  ALT  31  /  AlkPhos  72  11-20                10-13 Chol 199 mg/dL LDL -- HDL 29 mg/dL Trig 277 mg/dL                  Urinalysis Basic - ( 20 Nov 2023 05:44 )    Color: x / Appearance: x / SG: x / pH: x  Gluc: 107 mg/dL / Ketone: x  / Bili: x / Urobili: x   Blood: x / Protein: x / Nitrite: x   Leuk Esterase: x / RBC: x / WBC x   Sq Epi: x / Non Sq Epi: x / Bacteria: x        COVID-19 PCR: NotDetec (10-27-23 @ 22:21)      Care Discussed with Consultants/Other Providers: Yes

## 2023-11-21 NOTE — PROGRESS NOTE ADULT - ASSESSMENT
57 y/o M with a PHx HTN, DM, CVA sp tnK with full resolution symptoms, who presented to [facility] on 10/12 s/p fall +LOC, found to have left temporal occipital lobe infarctions and acute punctate left occipital lobe infarction. Patient s/p thrombectomy with left sided weakness, sensory impairment, left homonymous hemianopsia, gait and ADL impairments.     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 Continue for 3 months.    - Atorvastatin 80mg qhs.   - cont comprehensive rehab program PT, OT 3 hours daily 5 x week.  - neuropsychology evaluation and support, recreation therapy  - Precautions: cardiac, DM, respiratory, breast CA, fall, aspiration.    # Memory deficits  - Memantine 10mg BID   - Donepezil 10mg  - EKG QTc  11/10 445--> 460 on 11/7     # Bradycardia  - Metoprolol dc  - per electrophysiology notes, no indication for pacemaker, s/p ILR on 10/20  - needs EP follow up as an outpatient    # HTN  - did not tolerate lisinopril 5mg during hospitalization (intermittent hypotension)  - hydralazine 25mg PRN for >170.   - BP (148/80 - 148/80) 11/21    # HLD  - Atorvastatin 80 mg daily    # DM 2  - A1C 8.6  - Metformin   - Accu checks and ISS    # h/o ETOH abuse  - Not in active withdrawal.  - Continue MVI, thiamine and folic acid    # GI/Bowel:  - Senna QHS, Miralax Daily  - dulcolax PO     # FEN   - Diet: DASH DIET     # DVT ppx  - Eliquis 5mg BID    # Dispo  - awaiting [facility] placement, dependent on paperwork which family must provide. Patient aware  - Dr. Jamison Spoke with patient's sister, Selma 11/16. No current family situation where someone is available 24/7. Selma is aware of the divorce/death documentation holding up placement and states the family is actively working on this issue. Lety # [phone].  [phone] home.      Outpatient Follow-up (Specialty/Name of physician):    Rj Delgado  Cardiac Electrophysiology  [address] [facility] [address], [address]  [place] [address]  Phone: [phone]  Fax: [phone]  Follow Up Time:     Wily Barkley  Neurology  [address] [place] [address]  Phone: [phone]  Fax: [phone]  Follow Up Time: 1 week    Denzel Zapata  Interventional Cardiology  [address] [facility] [address], [address]  [place] [address]  Phone: [phone]  Fax: [phone]  Follow Up Time: 1 week    PCP,   Phone: (   )    -  Fax: (   )    -  Follow Up Time: 1 week    Arsenio Goodrich  Interventional Neuroradiology  [address] [place] [address]  Phone: [phone]  Fax: [phone]

## 2023-11-22 PROCEDURE — 99232 SBSQ HOSP IP/OBS MODERATE 35: CPT

## 2023-11-22 RX ADMIN — MEMANTINE HYDROCHLORIDE 10 MILLIGRAM(S): 10 TABLET ORAL at 17:07

## 2023-11-22 RX ADMIN — APIXABAN 5 MILLIGRAM(S): 2.5 TABLET, FILM COATED ORAL at 17:08

## 2023-11-22 RX ADMIN — MEMANTINE HYDROCHLORIDE 10 MILLIGRAM(S): 10 TABLET ORAL at 06:06

## 2023-11-22 RX ADMIN — METFORMIN HYDROCHLORIDE 500 MILLIGRAM(S): 850 TABLET ORAL at 17:07

## 2023-11-22 RX ADMIN — Medication 1 MILLIGRAM(S): at 11:18

## 2023-11-22 RX ADMIN — Medication 1 TABLET(S): at 11:17

## 2023-11-22 RX ADMIN — FAMOTIDINE 20 MILLIGRAM(S): 10 INJECTION INTRAVENOUS at 11:17

## 2023-11-22 RX ADMIN — APIXABAN 5 MILLIGRAM(S): 2.5 TABLET, FILM COATED ORAL at 06:06

## 2023-11-22 RX ADMIN — ATORVASTATIN CALCIUM 80 MILLIGRAM(S): 80 TABLET, FILM COATED ORAL at 21:32

## 2023-11-22 RX ADMIN — LINAGLIPTIN 5 MILLIGRAM(S): 5 TABLET, FILM COATED ORAL at 11:18

## 2023-11-22 RX ADMIN — Medication 100 MILLIGRAM(S): at 11:18

## 2023-11-22 RX ADMIN — METFORMIN HYDROCHLORIDE 500 MILLIGRAM(S): 850 TABLET ORAL at 06:06

## 2023-11-22 RX ADMIN — DONEPEZIL HYDROCHLORIDE 10 MILLIGRAM(S): 10 TABLET, FILM COATED ORAL at 07:56

## 2023-11-22 RX ADMIN — CITALOPRAM 20 MILLIGRAM(S): 10 TABLET, FILM COATED ORAL at 11:18

## 2023-11-22 NOTE — PROGRESS NOTE ADULT - COMMENTS
Patient in chair, comfortable, sleeping and eating well. He jacob H/A, sensory deficits improving, and also feels left side visual deficits are improving although he compensates with head turns. no acute issues overnight, no constipation, no dysuria or hesistancy

## 2023-11-22 NOTE — PROGRESS NOTE ADULT - SUBJECTIVE AND OBJECTIVE BOX
Patient is a 58y old  Male who presents with a chief complaint of CVA and left hemiparesis (21 Nov 2023 11:36)      HPI:  Patient is a 59 y/o M with a PHx HTN, DM, stroke one year ago (at the time patient received Tenecteplase and had full resolution of symptoms), who presented to [facility] on 10/12/23 following sudden fall at work and lost consciousness. NIHSS 10 on admission. Admitted under NeuroICU on 10/12. Imaging revealed ischemia in the posterior circulation, CT angiogram head/neck showed right PCA P1-2 cutoff, possibly chronic right MCA M1 cutoff which had distal reconstitution and established collaterals, and left PCA occlusion which patient was subsequently transferred for mechanical thrombectomy.    Cerebral angiogram and mechanical thrombectomy performed using aspiration with TICI 3 recanalization of left PCA. The right occipital lobe filled from right SHANI collaterals, and the right MCA as well was thought to be chronic given noted collateralization. MR with small to moderate left temporal occipital lobe infarctions. Occluded right MCA, occluded right PCA and right vertebral artery proximal segment. The patient was started on a hep gtt given extensive atherosclerotic disease which was switched to apixaban 5mg BID with recommendations for a three month course.    TTE with normal left ventricular internal cavity size. Left ventricular ejection fractio 50 to 55%. Per Neuro, no need for ROCIO at this time. Pt had an episode of bradycardia during his stay and there were concern for 2:1 block. ILR placed 10/20 and no indication for PPM at this time.     Patient was evaluated by PM&R and therapy for functional deficits, gait/ADL impairments and acute rehabilitation was recommended. Patient was medically optimized for discharge to [facility] IRF on 10/27/23.  (27 Oct 2023 12:30)      PAST MEDICAL & SURGICAL HISTORY:  HTN (hypertension)      CVA (cerebrovascular accident)      History of hip surgery          MEDICATIONS  (STANDING):  apixaban 5 milliGRAM(s) Oral two times a day  atorvastatin 80 milliGRAM(s) Oral at bedtime  bisacodyl 5 milliGRAM(s) Oral at bedtime  citalopram 20 milliGRAM(s) Oral daily  dextrose 50% Injectable 25 Gram(s) IV Push once  donepezil 10 milliGRAM(s) Oral with breakfast  famotidine    Tablet 20 milliGRAM(s) Oral daily  folic acid 1 milliGRAM(s) Oral daily  linagliptin 5 milliGRAM(s) Oral daily  memantine 10 milliGRAM(s) Oral two times a day  metFORMIN 500 milliGRAM(s) Oral two times a day  multivitamin 1 Tablet(s) Oral daily  polyethylene glycol 3350 17 Gram(s) Oral daily  senna 2 Tablet(s) Oral at bedtime  thiamine 100 milliGRAM(s) Oral daily    MEDICATIONS  (PRN):      Allergies    No Known Allergies    Intolerances          VITALS  58y  Vital Signs Last 24 Hrs  T(C): 36.6 (22 Nov 2023 07:58), Max: 37 (21 Nov 2023 19:37)  T(F): 97.9 (22 Nov 2023 07:58), Max: 98.6 (21 Nov 2023 19:37)  HR: 60 (22 Nov 2023 07:58) (54 - 60)  BP: 133/90 (22 Nov 2023 07:58) (133/90 - 172/82)  BP(mean): --  RR: 16 (22 Nov 2023 07:58) (16 - 16)  SpO2: 96% (22 Nov 2023 07:58) (96% - 96%)    Parameters below as of 22 Nov 2023 07:58  Patient On (Oxygen Delivery Method): room air      Daily     Daily         RECENT LABS:                      CAPILLARY BLOOD GLUCOSE

## 2023-11-22 NOTE — PROGRESS NOTE ADULT - CONSTITUTIONAL COMMENTS
alert, pleasant, NAD O x 3 grossly . However staff notes difficulty in recall as well as reduced memory and executive function

## 2023-11-22 NOTE — PROGRESS NOTE ADULT - ASSESSMENT
57 y/o M with a PHx HTN, DM, CVA sp tnK with full resolution symptoms, who presented to [facility] on 10/12 s/p fall +LOC, found to have left temporal occipital lobe infarctions and acute punctate left occipital lobe infarction.    # Occluded R MCA/PCA  - s/p thrombectomy  - Eliquis 5mg BID given extensive atherosclerotic disease. Continue for 3 months.    - Atorvastatin 80mg qhs.   - cont comprehensive rehab program PT, OT 3 hours daily 5 x week.  - neuropsychology support, recreation therapy  - Precautions: cardiac, DM, respiratory, breast CA, fall, aspiration.    # Memory deficits  - Memantine 10mg BID   - Donepezil 10mg  - EKG QTc  460 on 11/7     # Bradycardia  - Metoprolol dc  - per electrophysiology notes, no indication for pacemaker, s/p ILR on 10/20  - needs EP follow up as an outpatient  - HR 54-60 11/22 asymptomatic    # HTN  - did not tolerate lisinopril 5mg during hospitalization (intermittent hypotension)  - hydralazine 25mg PRN for >170.   - BP (133/90 - 172/82) 11/22    # HLD  - Atorvastatin 80 mg daily    # DM 2  - A1C 8.6  - Metformin     # h/o ETOH abuse  -  MVI, thiamine and folic acid    # GI/Bowel:  - Senna QHS, Miralax Daily  - dulcolax PO     # FEN   - DASH DIET     # DVT ppx  - Eliquis 5mg BID    # Case discussed in IDT rounds 11/22 (follow up):  -       - awaiting [facility] placement, dependent on paperwork which family must provide. Patient aware      Lety # [phone].  [phone] home.      Outpatient Follow-up (Specialty/Name of physician):    Rj Delgado  Cardiac Electrophysiology  [address] [place] [address], [address] [address]  Phone: [phone]  Fax: [phone]  Follow Up Time:     Wily Barkley  Neurology  [address] [place] [address]  Phone: [phone]  Fax: [phone]  Follow Up Time: 1 week    Denzel Zapata  Interventional Cardiology  [address] [facility], [address] [address]  Phone: [phone]  Fax: [phone]  Follow Up Time: 1 week    PCP,   Phone: (   )    -  Fax: (   )    -  Follow Up Time: 1 week    Arsenio Goodrich  Interventional Neuroradiology  [address] [place] [address]  Phone: [phone]  Fax: [phone]             57 y/o M with a PHx HTN, DM, CVA sp tnK with full resolution symptoms, who presented to [facility] on 10/12 s/p fall +LOC, found to have left temporal occipital lobe infarctions and acute punctate left occipital lobe infarction.    # Occluded R MCA/PCA  - s/p thrombectomy  - Eliquis 5mg BID given extensive atherosclerotic disease. Continue for 3 months.    - Atorvastatin 80mg qhs.   - cont comprehensive rehab program PT, OT 3 hours daily 5 x week.  - neuropsychology support, recreation therapy  - Precautions: cardiac, DM, respiratory, breast CA, fall, aspiration.    # Memory deficits  - Memantine 10mg BID   - Donepezil 10mg  - EKG QTc  460 on 11/7     # Bradycardia  - Metoprolol dc  - per electrophysiology notes, no indication for pacemaker, s/p ILR on 10/20  - needs EP follow up as an outpatient  - HR 54-60 11/22 asymptomatic    # HTN  - did not tolerate lisinopril 5mg during hospitalization (intermittent hypotension)  - hydralazine 25mg PRN for >170.   - BP (133/90 - 172/82) 11/22    # HLD  - Atorvastatin 80 mg daily    # DM 2  - A1C 8.6  - Metformin     # h/o ETOH abuse  -  MVI, thiamine and folic acid    # GI/Bowel:  - Senna QHS, Miralax Daily  - dulcolax PO     # FEN   - DASH DIET     # DVT ppx  - Eliquis 5mg BID    # Case discussed in IDT rounds 11/22 (follow up):  - supervision transfers, independent bed mobiltiy, supervision ambulation 150 feet without assist device/CG, set up UB/CG LB dressing, set up eating and grooming. Reduced memory, reduced initiation. Negotiates 12 steps with 1 HR and CS  - goals: supervision iADLs, supervision bADLs requires cues. Discussed with patient and in teams; patient not safe with intermittent supervision waking hours, requires continuous  - awaiting [facility] placement, dependent on paperwork which family must provide. Patient aware    # LABs  CBC BMP 11/24      Lety # [phone].  [phone] home.      Outpatient Follow-up (Specialty/Name of physician):    Rj Delgado  Cardiac Electrophysiology  [address] [facility], [address]  [place] [address]  Phone: [phone]  Fax: [phone]  Follow Up Time:     Wily Barkley  Neurology  [address] [place] [address]  Phone: [phone]  Fax: [phone]  Follow Up Time: 1 week    Denzel Zapata  Interventional Cardiology  [address] [facility]  [place] [address]  Phone: [phone]  Fax: [phone]  Follow Up Time: 1 week    PCP,   Phone: (   )    -  Fax: (   )    -  Follow Up Time: 1 week    Arsenio Goodrich  Interventional Neuroradiology  [address] [place] [address]  Phone: [phone]  Fax: [phone]

## 2023-11-23 PROCEDURE — 99232 SBSQ HOSP IP/OBS MODERATE 35: CPT

## 2023-11-23 PROCEDURE — 99232 SBSQ HOSP IP/OBS MODERATE 35: CPT | Mod: GC

## 2023-11-23 RX ADMIN — METFORMIN HYDROCHLORIDE 500 MILLIGRAM(S): 850 TABLET ORAL at 12:52

## 2023-11-23 RX ADMIN — Medication 1 TABLET(S): at 12:51

## 2023-11-23 RX ADMIN — APIXABAN 5 MILLIGRAM(S): 2.5 TABLET, FILM COATED ORAL at 06:45

## 2023-11-23 RX ADMIN — APIXABAN 5 MILLIGRAM(S): 2.5 TABLET, FILM COATED ORAL at 18:13

## 2023-11-23 RX ADMIN — ATORVASTATIN CALCIUM 80 MILLIGRAM(S): 80 TABLET, FILM COATED ORAL at 21:14

## 2023-11-23 RX ADMIN — FAMOTIDINE 20 MILLIGRAM(S): 10 INJECTION INTRAVENOUS at 12:52

## 2023-11-23 RX ADMIN — CITALOPRAM 20 MILLIGRAM(S): 10 TABLET, FILM COATED ORAL at 12:52

## 2023-11-23 RX ADMIN — SENNA PLUS 2 TABLET(S): 8.6 TABLET ORAL at 21:14

## 2023-11-23 RX ADMIN — MEMANTINE HYDROCHLORIDE 10 MILLIGRAM(S): 10 TABLET ORAL at 06:45

## 2023-11-23 RX ADMIN — Medication 100 MILLIGRAM(S): at 12:52

## 2023-11-23 RX ADMIN — DONEPEZIL HYDROCHLORIDE 10 MILLIGRAM(S): 10 TABLET, FILM COATED ORAL at 12:52

## 2023-11-23 RX ADMIN — Medication 1 MILLIGRAM(S): at 12:52

## 2023-11-23 RX ADMIN — LINAGLIPTIN 5 MILLIGRAM(S): 5 TABLET, FILM COATED ORAL at 12:53

## 2023-11-23 RX ADMIN — METFORMIN HYDROCHLORIDE 500 MILLIGRAM(S): 850 TABLET ORAL at 18:13

## 2023-11-23 RX ADMIN — MEMANTINE HYDROCHLORIDE 10 MILLIGRAM(S): 10 TABLET ORAL at 18:13

## 2023-11-23 NOTE — PROGRESS NOTE ADULT - SUBJECTIVE AND OBJECTIVE BOX
Cc: Gait dysfunction    HPI: Patient with no new medical issues today.  Pain controlled, no chest pain, no N/V, no Fevers/Chills. No other new ROS  Has been tolerating rehabilitation program.    apixaban 5 milliGRAM(s) Oral two times a day  atorvastatin 80 milliGRAM(s) Oral at bedtime  bisacodyl 5 milliGRAM(s) Oral at bedtime  citalopram 20 milliGRAM(s) Oral daily  dextrose 50% Injectable 25 Gram(s) IV Push once  donepezil 10 milliGRAM(s) Oral with breakfast  famotidine    Tablet 20 milliGRAM(s) Oral daily  folic acid 1 milliGRAM(s) Oral daily  linagliptin 5 milliGRAM(s) Oral daily  memantine 10 milliGRAM(s) Oral two times a day  metFORMIN 500 milliGRAM(s) Oral two times a day  multivitamin 1 Tablet(s) Oral daily  polyethylene glycol 3350 17 Gram(s) Oral daily  senna 2 Tablet(s) Oral at bedtime  thiamine 100 milliGRAM(s) Oral daily      T(C): 36.5 (11-22-23 @ 20:26), Max: 36.5 (11-22-23 @ 20:26)  HR: 63 (11-22-23 @ 20:26) (63 - 63)  BP: 130/68 (11-22-23 @ 20:26) (130/68 - 130/68)  RR: 16 (11-22-23 @ 20:26) (16 - 16)  SpO2: 97% (11-22-23 @ 20:26) (97% - 97%)    In NAD  HEENT- EOMI  Heart- RRR, S1S2  Lungs- CTA bl.  Abd- + BS, NT  Ext- No calf pain  Neuro- Exam unchanged          Imp: Patient with diagnosis of  CVA admmitted for comprehensive acute rehabilitation.    Plan:  - Continue PT/OT/SLP  - DVT prophylaxis  - Skin- Turn q2h, check skin daily  - Continue current medications; patient medically stable.   -Active issues- none  - Patient is stable to continue current rehabilitation program.

## 2023-11-23 NOTE — PROGRESS NOTE ADULT - SUBJECTIVE AND OBJECTIVE BOX
Patient is a 58y old  Male who presents with a chief complaint of CVA and left hemiparesis (22 Nov 2023 09:02)    Patient seen and examined at bedside. No events overnight, laying comfortably in bed. Patient without headache, admits to same numbness on L side as previous, no changes. Denies headache, changes in vision, nausea or vomiting.    ALLERGIES:  No Known Allergies    MEDICATIONS  (STANDING):  apixaban 5 milliGRAM(s) Oral two times a day  atorvastatin 80 milliGRAM(s) Oral at bedtime  bisacodyl 5 milliGRAM(s) Oral at bedtime  citalopram 20 milliGRAM(s) Oral daily  dextrose 50% Injectable 25 Gram(s) IV Push once  donepezil 10 milliGRAM(s) Oral with breakfast  famotidine    Tablet 20 milliGRAM(s) Oral daily  folic acid 1 milliGRAM(s) Oral daily  linagliptin 5 milliGRAM(s) Oral daily  memantine 10 milliGRAM(s) Oral two times a day  metFORMIN 500 milliGRAM(s) Oral two times a day  multivitamin 1 Tablet(s) Oral daily  polyethylene glycol 3350 17 Gram(s) Oral daily  senna 2 Tablet(s) Oral at bedtime  thiamine 100 milliGRAM(s) Oral daily    MEDICATIONS  (PRN):    Vital Signs Last 24 Hrs  T(F): 97.7 (22 Nov 2023 20:26), Max: 97.7 (22 Nov 2023 20:26)  HR: 63 (22 Nov 2023 20:26) (63 - 63)  BP: 130/68 (22 Nov 2023 20:26) (130/68 - 130/68)  RR: 16 (22 Nov 2023 20:26) (16 - 16)  SpO2: 97% (22 Nov 2023 20:26) (97% - 97%)  I&O's Summary      PHYSICAL EXAM:  GENERAL: NAD, well-groomed, laying in bed  HEAD:  Atraumatic, Normocephalic  EYES: PEEL, conjunctiva and sclera clear  ENMT: Moist mucous membranes, Supple, No JVD  CHEST/LUNG: Clear to auscultation bilaterally, good air entry, non-labored breathing  HEART: RRR; S1/S2, No murmur  ABDOMEN: Soft, Nontender, Nondistended; Bowel sounds present  EXTREMITIES: No calf tenderness, No cyanosis, No edema  SKIN: Warm, perfused  PSYCH: Normal mood, Normal affect  NERVOUS SYSTEM:  awake alert oriented, Good concentration    LABS:                    10-13 Chol 199 mg/dL LDL -- HDL 29 mg/dL Trig 277 mg/dL                      COVID-19 PCR: NotDetec (10-27-23 @ 22:21)      RADIOLOGY & ADDITIONAL TESTS:    Care Discussed with Consultants/Other Providers:

## 2023-11-23 NOTE — PROGRESS NOTE ADULT - ASSESSMENT
57 y/o M with a Hx of stroke one year ago (at the time patient received TNK and had full  resolution of symptoms), HTN, DM, non-compliant with medications, who presented following an episode of LOC, as per family at around 6:15 pm patient had a sudden fall at work and lost consciousness. It is unclear if patient had any symptoms before he fell. NIHSS 10 on admission, admitted under NeuroICU on 10/12: Thrombectomy via L radial artery for TICI 3 revascularization of L P1-P2 occlusion. Noted LMCA chronic occlusion., on 0/13: Neurologic status improving. Got MR. Started on ASA and SQL, on 10/14: quadrantanopia now on L superior (originally R); Repeat stroke imaging completed and initial concern for CTP showing poss new perfusion deficit however on further review noted to be present on admission. Started on heparin drip per neurology recommendations, on 10/15: Therapeutic on heparin drip, stability HCT obtained. Cardiology consulted for further stroke work-up and downgraded under medicine- pt/ot/dvt ppx    #Acute CVA  #Occluded R MCA/PCA, s/p thrombectomy   -TTE shows with  Normal left ventricular internal cavity size,  3. Left ventricular ejection fraction, by visual estimation, is 50 to 55%.  -DVT studies negative  -continue Eliquis. Needs for 3 months.    -c/w Lipitor 80mg  -outpatient neuro IR follow up.  - L paresthesias (not new), pt tolerated Neurontin 200mg, Can ADD gabapentin qhs if pt amendable for symptoms    # Bradycardia  -TTE EF 50-55%, no other structural abnormalities  - Metoprolol discontinued   - per electrophysiology notes, no indication for pacemaker, s/p ILR on 10/20  - needs EP follow up as an outpatient.    #intermittent Dizziness   -Encouraged fluid intake - improved now    #ETOH use disorder  -  in remission, stable.    #HTN  - No meds at home  - did not tolerate lisinopril 5mg during hospitalization (intermittent hypotension)  - Hydralazine 25 mg PRN if SBP > 170    #DM 2  - non compliant with meds at home  - continue metformin, linagliptin   - A1C 8.6    #Elevated TSH  - T4 level normal  - outpt f/u    #DVT Prophylaxis   - Eliquis

## 2023-11-24 ENCOUNTER — APPOINTMENT (OUTPATIENT)
Dept: ELECTROPHYSIOLOGY | Facility: CLINIC | Age: 58
End: 2023-11-24
Payer: SELF-PAY

## 2023-11-24 ENCOUNTER — NON-APPOINTMENT (OUTPATIENT)
Age: 58
End: 2023-11-24

## 2023-11-24 LAB
ALBUMIN SERPL ELPH-MCNC: 3.5 G/DL — SIGNIFICANT CHANGE UP (ref 3.3–5)
ALBUMIN SERPL ELPH-MCNC: 3.5 G/DL — SIGNIFICANT CHANGE UP (ref 3.3–5)
ALP SERPL-CCNC: 77 U/L — SIGNIFICANT CHANGE UP (ref 40–120)
ALP SERPL-CCNC: 77 U/L — SIGNIFICANT CHANGE UP (ref 40–120)
ALT FLD-CCNC: 34 U/L — SIGNIFICANT CHANGE UP (ref 10–45)
ALT FLD-CCNC: 34 U/L — SIGNIFICANT CHANGE UP (ref 10–45)
ANION GAP SERPL CALC-SCNC: 6 MMOL/L — SIGNIFICANT CHANGE UP (ref 5–17)
ANION GAP SERPL CALC-SCNC: 6 MMOL/L — SIGNIFICANT CHANGE UP (ref 5–17)
AST SERPL-CCNC: 21 U/L — SIGNIFICANT CHANGE UP (ref 10–40)
AST SERPL-CCNC: 21 U/L — SIGNIFICANT CHANGE UP (ref 10–40)
BASOPHILS # BLD AUTO: 0.02 K/UL — SIGNIFICANT CHANGE UP (ref 0–0.2)
BASOPHILS # BLD AUTO: 0.02 K/UL — SIGNIFICANT CHANGE UP (ref 0–0.2)
BASOPHILS NFR BLD AUTO: 0.4 % — SIGNIFICANT CHANGE UP (ref 0–2)
BASOPHILS NFR BLD AUTO: 0.4 % — SIGNIFICANT CHANGE UP (ref 0–2)
BILIRUB SERPL-MCNC: 0.6 MG/DL — SIGNIFICANT CHANGE UP (ref 0.2–1.2)
BILIRUB SERPL-MCNC: 0.6 MG/DL — SIGNIFICANT CHANGE UP (ref 0.2–1.2)
BUN SERPL-MCNC: 16 MG/DL — SIGNIFICANT CHANGE UP (ref 7–23)
BUN SERPL-MCNC: 16 MG/DL — SIGNIFICANT CHANGE UP (ref 7–23)
CALCIUM SERPL-MCNC: 9.2 MG/DL — SIGNIFICANT CHANGE UP (ref 8.4–10.5)
CALCIUM SERPL-MCNC: 9.2 MG/DL — SIGNIFICANT CHANGE UP (ref 8.4–10.5)
CHLORIDE SERPL-SCNC: 105 MMOL/L — SIGNIFICANT CHANGE UP (ref 96–108)
CHLORIDE SERPL-SCNC: 105 MMOL/L — SIGNIFICANT CHANGE UP (ref 96–108)
CO2 SERPL-SCNC: 31 MMOL/L — SIGNIFICANT CHANGE UP (ref 22–31)
CO2 SERPL-SCNC: 31 MMOL/L — SIGNIFICANT CHANGE UP (ref 22–31)
CREAT SERPL-MCNC: 1.01 MG/DL — SIGNIFICANT CHANGE UP (ref 0.5–1.3)
CREAT SERPL-MCNC: 1.01 MG/DL — SIGNIFICANT CHANGE UP (ref 0.5–1.3)
EGFR: 86 ML/MIN/1.73M2 — SIGNIFICANT CHANGE UP
EGFR: 86 ML/MIN/1.73M2 — SIGNIFICANT CHANGE UP
EOSINOPHIL # BLD AUTO: 0.13 K/UL — SIGNIFICANT CHANGE UP (ref 0–0.5)
EOSINOPHIL # BLD AUTO: 0.13 K/UL — SIGNIFICANT CHANGE UP (ref 0–0.5)
EOSINOPHIL NFR BLD AUTO: 2.4 % — SIGNIFICANT CHANGE UP (ref 0–6)
EOSINOPHIL NFR BLD AUTO: 2.4 % — SIGNIFICANT CHANGE UP (ref 0–6)
GLUCOSE SERPL-MCNC: 100 MG/DL — HIGH (ref 70–99)
GLUCOSE SERPL-MCNC: 100 MG/DL — HIGH (ref 70–99)
HCT VFR BLD CALC: 39.7 % — SIGNIFICANT CHANGE UP (ref 39–50)
HCT VFR BLD CALC: 39.7 % — SIGNIFICANT CHANGE UP (ref 39–50)
HGB BLD-MCNC: 13.1 G/DL — SIGNIFICANT CHANGE UP (ref 13–17)
HGB BLD-MCNC: 13.1 G/DL — SIGNIFICANT CHANGE UP (ref 13–17)
IMM GRANULOCYTES NFR BLD AUTO: 0.4 % — SIGNIFICANT CHANGE UP (ref 0–0.9)
IMM GRANULOCYTES NFR BLD AUTO: 0.4 % — SIGNIFICANT CHANGE UP (ref 0–0.9)
LYMPHOCYTES # BLD AUTO: 1.05 K/UL — SIGNIFICANT CHANGE UP (ref 1–3.3)
LYMPHOCYTES # BLD AUTO: 1.05 K/UL — SIGNIFICANT CHANGE UP (ref 1–3.3)
LYMPHOCYTES # BLD AUTO: 19.6 % — SIGNIFICANT CHANGE UP (ref 13–44)
LYMPHOCYTES # BLD AUTO: 19.6 % — SIGNIFICANT CHANGE UP (ref 13–44)
MCHC RBC-ENTMCNC: 31.3 PG — SIGNIFICANT CHANGE UP (ref 27–34)
MCHC RBC-ENTMCNC: 31.3 PG — SIGNIFICANT CHANGE UP (ref 27–34)
MCHC RBC-ENTMCNC: 33 GM/DL — SIGNIFICANT CHANGE UP (ref 32–36)
MCHC RBC-ENTMCNC: 33 GM/DL — SIGNIFICANT CHANGE UP (ref 32–36)
MCV RBC AUTO: 95 FL — SIGNIFICANT CHANGE UP (ref 80–100)
MCV RBC AUTO: 95 FL — SIGNIFICANT CHANGE UP (ref 80–100)
MONOCYTES # BLD AUTO: 0.42 K/UL — SIGNIFICANT CHANGE UP (ref 0–0.9)
MONOCYTES # BLD AUTO: 0.42 K/UL — SIGNIFICANT CHANGE UP (ref 0–0.9)
MONOCYTES NFR BLD AUTO: 7.8 % — SIGNIFICANT CHANGE UP (ref 2–14)
MONOCYTES NFR BLD AUTO: 7.8 % — SIGNIFICANT CHANGE UP (ref 2–14)
NEUTROPHILS # BLD AUTO: 3.73 K/UL — SIGNIFICANT CHANGE UP (ref 1.8–7.4)
NEUTROPHILS # BLD AUTO: 3.73 K/UL — SIGNIFICANT CHANGE UP (ref 1.8–7.4)
NEUTROPHILS NFR BLD AUTO: 69.4 % — SIGNIFICANT CHANGE UP (ref 43–77)
NEUTROPHILS NFR BLD AUTO: 69.4 % — SIGNIFICANT CHANGE UP (ref 43–77)
NRBC # BLD: 0 /100 WBCS — SIGNIFICANT CHANGE UP (ref 0–0)
NRBC # BLD: 0 /100 WBCS — SIGNIFICANT CHANGE UP (ref 0–0)
PLATELET # BLD AUTO: 107 K/UL — LOW (ref 150–400)
PLATELET # BLD AUTO: 107 K/UL — LOW (ref 150–400)
POTASSIUM SERPL-MCNC: 4.5 MMOL/L — SIGNIFICANT CHANGE UP (ref 3.5–5.3)
POTASSIUM SERPL-MCNC: 4.5 MMOL/L — SIGNIFICANT CHANGE UP (ref 3.5–5.3)
POTASSIUM SERPL-SCNC: 4.5 MMOL/L — SIGNIFICANT CHANGE UP (ref 3.5–5.3)
POTASSIUM SERPL-SCNC: 4.5 MMOL/L — SIGNIFICANT CHANGE UP (ref 3.5–5.3)
PROT SERPL-MCNC: 6.7 G/DL — SIGNIFICANT CHANGE UP (ref 6–8.3)
PROT SERPL-MCNC: 6.7 G/DL — SIGNIFICANT CHANGE UP (ref 6–8.3)
RBC # BLD: 4.18 M/UL — LOW (ref 4.2–5.8)
RBC # BLD: 4.18 M/UL — LOW (ref 4.2–5.8)
RBC # FLD: 13.4 % — SIGNIFICANT CHANGE UP (ref 10.3–14.5)
RBC # FLD: 13.4 % — SIGNIFICANT CHANGE UP (ref 10.3–14.5)
SODIUM SERPL-SCNC: 142 MMOL/L — SIGNIFICANT CHANGE UP (ref 135–145)
SODIUM SERPL-SCNC: 142 MMOL/L — SIGNIFICANT CHANGE UP (ref 135–145)
WBC # BLD: 5.37 K/UL — SIGNIFICANT CHANGE UP (ref 3.8–10.5)
WBC # BLD: 5.37 K/UL — SIGNIFICANT CHANGE UP (ref 3.8–10.5)
WBC # FLD AUTO: 5.37 K/UL — SIGNIFICANT CHANGE UP (ref 3.8–10.5)
WBC # FLD AUTO: 5.37 K/UL — SIGNIFICANT CHANGE UP (ref 3.8–10.5)

## 2023-11-24 PROCEDURE — 93298 REM INTERROG DEV EVAL SCRMS: CPT | Mod: NC

## 2023-11-24 PROCEDURE — G2066: CPT | Mod: NC

## 2023-11-24 PROCEDURE — 99232 SBSQ HOSP IP/OBS MODERATE 35: CPT

## 2023-11-24 RX ADMIN — DONEPEZIL HYDROCHLORIDE 10 MILLIGRAM(S): 10 TABLET, FILM COATED ORAL at 08:28

## 2023-11-24 RX ADMIN — METFORMIN HYDROCHLORIDE 500 MILLIGRAM(S): 850 TABLET ORAL at 09:18

## 2023-11-24 RX ADMIN — APIXABAN 5 MILLIGRAM(S): 2.5 TABLET, FILM COATED ORAL at 17:07

## 2023-11-24 RX ADMIN — Medication 1 TABLET(S): at 12:48

## 2023-11-24 RX ADMIN — FAMOTIDINE 20 MILLIGRAM(S): 10 INJECTION INTRAVENOUS at 12:49

## 2023-11-24 RX ADMIN — ATORVASTATIN CALCIUM 80 MILLIGRAM(S): 80 TABLET, FILM COATED ORAL at 22:06

## 2023-11-24 RX ADMIN — MEMANTINE HYDROCHLORIDE 10 MILLIGRAM(S): 10 TABLET ORAL at 05:16

## 2023-11-24 RX ADMIN — Medication 100 MILLIGRAM(S): at 12:49

## 2023-11-24 RX ADMIN — Medication 1 MILLIGRAM(S): at 12:48

## 2023-11-24 RX ADMIN — LINAGLIPTIN 5 MILLIGRAM(S): 5 TABLET, FILM COATED ORAL at 12:49

## 2023-11-24 RX ADMIN — CITALOPRAM 20 MILLIGRAM(S): 10 TABLET, FILM COATED ORAL at 12:49

## 2023-11-24 RX ADMIN — METFORMIN HYDROCHLORIDE 500 MILLIGRAM(S): 850 TABLET ORAL at 17:07

## 2023-11-24 RX ADMIN — APIXABAN 5 MILLIGRAM(S): 2.5 TABLET, FILM COATED ORAL at 05:16

## 2023-11-24 RX ADMIN — MEMANTINE HYDROCHLORIDE 10 MILLIGRAM(S): 10 TABLET ORAL at 17:07

## 2023-11-24 NOTE — PROGRESS NOTE ADULT - ASSESSMENT
59 y/o M with a PHx HTN, DM, CVA sp tnK with full resolution symptoms, who presented to [facility] on 10/12 s/p fall +LOC, found to have left temporal occipital lobe infarctions and acute punctate left occipital lobe infarction.    # Occluded R MCA/PCA  - s/p thrombectomy  - Eliquis 5mg BID given extensive atherosclerotic disease. Continue for 3 months.    - Atorvastatin 80mg qhs.   - cont comprehensive rehab program PT, OT 3 hours daily 5 x week.  - neuropsychology support, recreation therapy  - Precautions: cardiac, DM, respiratory, breast CA, fall, aspiration.    # Memory deficits  - Memantine 10mg BID   - Donepezil 10mg  - EKG QTc  460 on 11/7     # Bradycardia, stable   - Metoprolol dc  - per electrophysiology notes, no indication for pacemaker, s/p ILR on 10/20  - needs EP follow up as an outpatient  - HR 54-60 11/22 asymptomatic    # HTN  - did not tolerate lisinopril 5mg during hospitalization (intermittent hypotension)  - hydralazine 25mg PRN for >170.   - BP (133/90 - 172/82) 11/22    # HLD  - Atorvastatin 80 mg daily    # DM 2  - A1C 8.6  - Metformin     # h/o ETOH abuse  -  MVI, thiamine and folic acid    # GI/Bowel:  - Senna QHS, Miralax Daily  - dulcolax PO     # FEN   - DASH DIET     # DVT ppx  - Eliquis 5mg BID    # Case discussed in IDT rounds 11/22 (follow up):  - supervision transfers, independent bed mobiltiy, supervision ambulation 150 feet without assist device/CG, set up UB/CG LB dressing, set up eating and grooming. Reduced memory, reduced initiation. Negotiates 12 steps with 1 HR and CS  - goals: supervision iADLs, supervision bADLs requires cues. Discussed with patient and in teams; patient not safe with intermittent supervision waking hours, requires continuous  - awaiting [employer] placement, dependent on paperwork which family must provide. Patient aware. Brother works at a [employer] in [place].     # LABs  CBC BMP 11/27      Lety # [phone].  [phone] home.      Outpatient Follow-up (Specialty/Name of physician):    Rj Delgado  Cardiac Electrophysiology  [address], [address]  [place] [address]  Phone: [phone]  Fax: [phone]  Follow Up Time:     Wily Barkley  Neurology  [address] [place] [address]  Phone: [phone]  Fax: [phone]  Follow Up Time: 1 week    Denzel Zapata  Interventional Cardiology  [address], [address]  [place] [address]  Phone: [phone]  Fax: [phone]  Follow Up Time: 1 week    PCP,   Phone: (   )    -  Fax: (   )    -  Follow Up Time: 1 week    Arsenio Goodrich  Interventional Neuroradiology  [address] [place] [address]  Phone: [phone]  Fax: [phone]                   59 y/o M with a PHx HTN, DM, CVA sp tnK with full resolution symptoms, who presented to [facility] on 10/12 s/p fall +LOC, found to have left temporal occipital lobe infarctions and acute punctate left occipital lobe infarction.    # Occluded R MCA/PCA  - s/p thrombectomy  - Eliquis 5mg BID given extensive atherosclerotic disease. Continue for 3 months.    - Atorvastatin 80mg qhs.   - cont comprehensive rehab program PT, OT 3 hours daily 5 x week.  - neuropsychology support, recreation therapy  - Precautions: cardiac, DM, respiratory, breast CA, fall, aspiration.    # Memory deficits  - Memantine 10mg BID   - Donepezil 10mg  - EKG QTc  460 on 11/7     # Bradycardia, stable   - Metoprolol dc  - per electrophysiology notes, no indication for pacemaker, s/p ILR on 10/20  - needs EP follow up as an outpatient    # HTN  - hydralazine 25mg PRN for >170.   - BP (131/82 - 132/86) 11/24 in target range    # HLD  - Atorvastatin 80 mg daily    # DM 2  - A1C 8.6  - Metformin     # h/o ETOH abuse  -  MVI, thiamine and folic acid    # GI/Bowel:  - Senna QHS, Miralax Daily  - dulcolax PO     # FEN   - DASH DIET     # DVT ppx  - Eliquis 5mg BID    # Case discussed in IDT rounds 11/22 (follow up):  - supervision transfers, independent bed mobiltiy, supervision ambulation 150 feet without assist device/CG, set up UB/CG LB dressing, set up eating and grooming. Reduced memory, reduced initiation. Negotiates 12 steps with 1 HR and CS  - goals: supervision iADLs, supervision bADLs requires cues. Discussed with patient and in teams; patient not safe with intermittent supervision waking hours, requires continuous  - awaiting [facility] placement, dependent on paperwork which family must provide. Patient aware. Brother works at a [employer] in [place].     # LABs  CBC BMP 11/27      Lety # [phone].  [phone] home.      Outpatient Follow-up (Specialty/Name of physician):    Rj Delgado  Cardiac Electrophysiology  [address], [address]  [place] [address]  Phone: [phone]  Fax: [phone]  Follow Up Time:     Wily Barkley  Neurology  [address] [place] [address]  Phone: [phone]  Fax: [phone]  Follow Up Time: 1 week    Denzel Zapata  Interventional Cardiology  [address], [address]  [place] [address]  Phone: [phone]  Fax: [phone]  Follow Up Time: 1 week    PCP,   Phone: (   )    -  Fax: (   )    -  Follow Up Time: 1 week    Arsenio Goodrich  Interventional Neuroradiology  [address] [place] [address]  Phone: [phone]  Fax: [phone]

## 2023-11-24 NOTE — PROGRESS NOTE ADULT - ATTENDING COMMENTS
Progress note amended to include my discussions with patient, resident, hospitalist, RN, SW, PT and my findings    Patient in bed, reminisces about Thanksgiving holidays and his family. While he is wistful, he also realizes he needs more therapy and is awaiting [facility] placement to continue to work on safety, cognition, advanced transfers and self care activities. He denies H/A, difficulty sleeping, and reports good appetite. He still has left sided numbness, but is less bothersome. Had Bm yesterday, no dysuria. Denies CP or palpitations, although he is occasionally reminded he has a loop recorder in place, which makes him feel slightly anxious, although he states those instances have reduced. HR remains james in 50s but asymptomatic.    Labs reviewed, stable. Continue current program, working on confirmation of Medicaid qualification for [facility] placement, patient aware. Ordered for psychology and rec therapy services PRN support     RECENT LABS    Vital Signs Last 24 Hrs  T(C): 36.7 (24 Nov 2023 08:38), Max: 36.7 (23 Nov 2023 18:14)  T(F): 98.1 (24 Nov 2023 08:38), Max: 98.1 (24 Nov 2023 08:38)  HR: 51 (24 Nov 2023 08:38) (51 - 62)  BP: 131/86 (24 Nov 2023 08:38) (131/82 - 132/86)  BP(mean): --  RR: 16 (24 Nov 2023 08:38) (16 - 16)  SpO2: 98% (24 Nov 2023 08:38) (97% - 98%)    Parameters below as of 24 Nov 2023 08:38  Patient On (Oxygen Delivery Method): room air                              13.1   5.37  )-----------( 107      ( 24 Nov 2023 06:35 )             39.7     11-24    142  |  105  |  16  ----------------------------<  100<H>  4.5   |  31  |  1.01    Ca    9.2      24 Nov 2023 06:35    TPro  6.7  /  Alb  3.5  /  TBili  0.6  /  DBili  x   /  AST  21  /  ALT  34  /  AlkPhos  77  11-24      Urinalysis Basic - ( 24 Nov 2023 06:35 )    Color: x / Appearance: x / SG: x / pH: x  Gluc: 100 mg/dL / Ketone: x  / Bili: x / Urobili: x   Blood: x / Protein: x / Nitrite: x   Leuk Esterase: x / RBC: x / WBC x   Sq Epi: x / Non Sq Epi: x / Bacteria: x      CAPILLARY BLOOD GLUCOSE

## 2023-11-24 NOTE — PROGRESS NOTE ADULT - SUBJECTIVE AND OBJECTIVE BOX
Patient is a 58y old  Male who presents with a chief complaint of CVA and left hemiparesis (24 Nov 2023 10:17)    Patient seen and examined at bedside. No events overnight, laying comfortably in bed. Patient denies headache, changes in vision, nausea or vomiting, no new neurological changes    ALLERGIES:  No Known Allergies    MEDICATIONS  (STANDING):  apixaban 5 milliGRAM(s) Oral two times a day  atorvastatin 80 milliGRAM(s) Oral at bedtime  bisacodyl 5 milliGRAM(s) Oral at bedtime  citalopram 20 milliGRAM(s) Oral daily  dextrose 50% Injectable 25 Gram(s) IV Push once  donepezil 10 milliGRAM(s) Oral with breakfast  famotidine    Tablet 20 milliGRAM(s) Oral daily  folic acid 1 milliGRAM(s) Oral daily  linagliptin 5 milliGRAM(s) Oral daily  memantine 10 milliGRAM(s) Oral two times a day  metFORMIN 500 milliGRAM(s) Oral two times a day  multivitamin 1 Tablet(s) Oral daily  polyethylene glycol 3350 17 Gram(s) Oral daily  senna 2 Tablet(s) Oral at bedtime  thiamine 100 milliGRAM(s) Oral daily    MEDICATIONS  (PRN):    Vital Signs Last 24 Hrs  T(F): 98.1 (24 Nov 2023 08:38), Max: 98.1 (24 Nov 2023 08:38)  HR: 51 (24 Nov 2023 08:38) (51 - 62)  BP: 131/86 (24 Nov 2023 08:38) (131/82 - 132/86)  RR: 16 (24 Nov 2023 08:38) (16 - 16)  SpO2: 98% (24 Nov 2023 08:38) (97% - 98%)  I&O's Summary      PHYSICAL EXAM:  GENERAL: NAD, well-groomed, laying in bed  HEAD:  Atraumatic, Normocephalic  EYES: PEEL, conjunctiva and sclera clear  ENMT: Moist mucous membranes, Supple, No JVD  CHEST/LUNG: Clear to auscultation bilaterally, good air entry, non-labored breathing  HEART: RRR; S1/S2, No murmur  ABDOMEN: Soft, Nontender, Nondistended; Bowel sounds present  EXTREMITIES: No calf tenderness, No cyanosis, No edema  SKIN: Warm, perfused  PSYCH: Normal mood, Normal affect  NERVOUS SYSTEM:  awake alert oriented, Good concentration    LABS:                        13.1   5.37  )-----------( 107      ( 24 Nov 2023 06:35 )             39.7     11-24    142  |  105  |  16  ----------------------------<  100  4.5   |  31  |  1.01    Ca    9.2      24 Nov 2023 06:35    TPro  6.7  /  Alb  3.5  /  TBili  0.6  /  DBili  x   /  AST  21  /  ALT  34  /  AlkPhos  77  11-24              10-13 Chol 199 mg/dL LDL -- HDL 29 mg/dL Trig 277 mg/dL                  Urinalysis Basic - ( 24 Nov 2023 06:35 )    Color: x / Appearance: x / SG: x / pH: x  Gluc: 100 mg/dL / Ketone: x  / Bili: x / Urobili: x   Blood: x / Protein: x / Nitrite: x   Leuk Esterase: x / RBC: x / WBC x   Sq Epi: x / Non Sq Epi: x / Bacteria: x        COVID-19 PCR: Varghesetejeison (10-27-23 @ 22:21)      RADIOLOGY & ADDITIONAL TESTS:    Care Discussed with Consultants/Other Providers:

## 2023-11-24 NOTE — PROGRESS NOTE ADULT - SUBJECTIVE AND OBJECTIVE BOX
Patient is a 58y old  Male who presents with a chief complaint of CVA and left hemiparesis (21 Nov 2023 11:36)    HPI:  Patient is a 57 y/o M with a PHx HTN, DM, stroke one year ago (at the time patient received Tenecteplase and had full resolution of symptoms), who presented to [facility] on 10/12/23 following sudden fall at work and lost consciousness. NIHSS 10 on admission. Admitted under NeuroICU on 10/12. Imaging revealed ischemia in the posterior circulation, CT angiogram head/neck showed right PCA P1-2 cutoff, possibly chronic right MCA M1 cutoff which had distal reconstitution and established collaterals, and left PCA occlusion which patient was subsequently transferred for mechanical thrombectomy.    Cerebral angiogram and mechanical thrombectomy performed using aspiration with TICI 3 recanalization of left PCA. The right occipital lobe filled from right SHANI collaterals, and the right MCA as well was thought to be chronic given noted collateralization. MR with small to moderate left temporal occipital lobe infarctions. Occluded right MCA, occluded right PCA and right vertebral artery proximal segment. The patient was started on a hep gtt given extensive atherosclerotic disease which was switched to apixaban 5mg BID with recommendations for a three month course.    TTE with normal left ventricular internal cavity size. Left ventricular ejection fraction 50 to 55%. Per Neuro, no need for ROCIO at this time. Pt had an episode of bradycardia during his stay and there were concern for 2:1 block. ILR placed 10/20 and no indication for PPM at this time.     Patient was evaluated by PM&R and therapy for functional deficits, gait/ADL impairments and acute rehabilitation was recommended. Patient was medically optimized for discharge to [facility] on 10/27/23.  (27 Oct 2023 12:30)    ROS  denies constipation  denies pain  +numbness left side  +neurological deficits    ICU Vital Signs Last 24 Hrs  T(C): 36.7 (24 Nov 2023 08:38), Max: 36.7 (23 Nov 2023 18:14)  T(F): 98.1 (24 Nov 2023 08:38), Max: 98.1 (24 Nov 2023 08:38)  HR: 51 (24 Nov 2023 08:38) (51 - 62)  BP: 131/86 (24 Nov 2023 08:38) (131/82 - 132/86)  RR: 16 (24 Nov 2023 08:38) (16 - 16)  SpO2: 98% (24 Nov 2023 08:38) (97% - 98%)    O2 Parameters below as of 24 Nov 2023 08:38  Patient On (Oxygen Delivery Method): room air    Vital Signs  T(C): 36.7 (11-24-23 @ 08:38), Max: 36.7 (11-23-23 @ 18:14)  HR: 51 (11-24-23 @ 08:38) (51 - 62)  BP: 131/86 (11-24-23 @ 08:38) (131/82 - 132/86)  RR: 16 (11-24-23 @ 08:38) (16 - 16)  SpO2: 98% (11-24-23 @ 08:38) (97% - 98%)    PE  Gen - NAD, Comfortable  HEENT - NCAT, EOMI, MMM  Neck - Supple, No limited ROM  Pulm - good chest expansion, no conversational dyspnea  Cardiovascular - warm and well perfused  Abdomen - Soft, NT, mild distention  Extremities - No C/C/E, No calf tenderness  Neuro-     Cognitive - AAOx3     Communication - Fluent, No dysarthria     Memory: Recall 3 objects immediate and 3 min later  -- improved     Cranial Nerves - CN 2-12 intact     Motor strength WFL 5/5s, tested supine. Still requires supervision and set-up for functional tasks.      Tone - normal  Psychiatric - Mood stable, Affect WNL    RECENT LABS                        13.1   5.37  )-----------( 107      ( 24 Nov 2023 06:35 )             39.7     11-24    142  |  105  |  16  ----------------------------<  100<H>  4.5   |  31  |  1.01    Ca    9.2      24 Nov 2023 06:35    TPro  6.7  /  Alb  3.5  /  TBili  0.6  /  DBili  x   /  AST  21  /  ALT  34  /  AlkPhos  77  11-24      Urinalysis Basic - ( 24 Nov 2023 06:35 )    Color: x / Appearance: x / SG: x / pH: x  Gluc: 100 mg/dL / Ketone: x  / Bili: x / Urobili: x   Blood: x / Protein: x / Nitrite: x   Leuk Esterase: x / RBC: x / WBC x   Sq Epi: x / Non Sq Epi: x / Bacteria: x      CAPILLARY BLOOD GLUCOSE    MEDICATIONS  (STANDING):  apixaban 5 milliGRAM(s) Oral two times a day  atorvastatin 80 milliGRAM(s) Oral at bedtime  bisacodyl 5 milliGRAM(s) Oral at bedtime  citalopram 20 milliGRAM(s) Oral daily  dextrose 50% Injectable 25 Gram(s) IV Push once  donepezil 10 milliGRAM(s) Oral with breakfast  famotidine    Tablet 20 milliGRAM(s) Oral daily  folic acid 1 milliGRAM(s) Oral daily  linagliptin 5 milliGRAM(s) Oral daily  memantine 10 milliGRAM(s) Oral two times a day  metFORMIN 500 milliGRAM(s) Oral two times a day  multivitamin 1 Tablet(s) Oral daily  polyethylene glycol 3350 17 Gram(s) Oral daily  senna 2 Tablet(s) Oral at bedtime  thiamine 100 milliGRAM(s) Oral daily

## 2023-11-25 PROCEDURE — 99232 SBSQ HOSP IP/OBS MODERATE 35: CPT

## 2023-11-25 PROCEDURE — 99232 SBSQ HOSP IP/OBS MODERATE 35: CPT | Mod: GC

## 2023-11-25 RX ADMIN — ATORVASTATIN CALCIUM 80 MILLIGRAM(S): 80 TABLET, FILM COATED ORAL at 21:16

## 2023-11-25 RX ADMIN — METFORMIN HYDROCHLORIDE 500 MILLIGRAM(S): 850 TABLET ORAL at 17:45

## 2023-11-25 RX ADMIN — APIXABAN 5 MILLIGRAM(S): 2.5 TABLET, FILM COATED ORAL at 05:01

## 2023-11-25 RX ADMIN — DONEPEZIL HYDROCHLORIDE 10 MILLIGRAM(S): 10 TABLET, FILM COATED ORAL at 08:40

## 2023-11-25 RX ADMIN — Medication 100 MILLIGRAM(S): at 11:45

## 2023-11-25 RX ADMIN — Medication 1 TABLET(S): at 11:44

## 2023-11-25 RX ADMIN — MEMANTINE HYDROCHLORIDE 10 MILLIGRAM(S): 10 TABLET ORAL at 05:00

## 2023-11-25 RX ADMIN — METFORMIN HYDROCHLORIDE 500 MILLIGRAM(S): 850 TABLET ORAL at 08:40

## 2023-11-25 RX ADMIN — FAMOTIDINE 20 MILLIGRAM(S): 10 INJECTION INTRAVENOUS at 11:44

## 2023-11-25 RX ADMIN — Medication 1 MILLIGRAM(S): at 11:44

## 2023-11-25 RX ADMIN — CITALOPRAM 20 MILLIGRAM(S): 10 TABLET, FILM COATED ORAL at 11:44

## 2023-11-25 RX ADMIN — APIXABAN 5 MILLIGRAM(S): 2.5 TABLET, FILM COATED ORAL at 17:45

## 2023-11-25 RX ADMIN — LINAGLIPTIN 5 MILLIGRAM(S): 5 TABLET, FILM COATED ORAL at 11:45

## 2023-11-25 RX ADMIN — MEMANTINE HYDROCHLORIDE 10 MILLIGRAM(S): 10 TABLET ORAL at 17:45

## 2023-11-25 NOTE — PROGRESS NOTE ADULT - ASSESSMENT
59 y/o M with a Hx of stroke one year ago (at the time patient received TNK and had full  resolution of symptoms), HTN, DM, non-compliant with medications, who presented following an episode of LOC, as per family at around 6:15 pm patient had a sudden fall at work and lost consciousness. It is unclear if patient had any symptoms before he fell. NIHSS 10 on admission, admitted under NeuroICU on 10/12: Thrombectomy via L radial artery for TICI 3 revascularization of L P1-P2 occlusion. Noted LMCA chronic occlusion., on 0/13: Neurologic status improving. Got MR. Started on ASA and SQL, on 10/14: quadrantanopia now on L superior (originally R); Repeat stroke imaging completed and initial concern for CTP showing poss new perfusion deficit however on further review noted to be present on admission. Started on heparin drip per neurology recommendations, on 10/15: Therapeutic on heparin drip, stability HCT obtained. Cardiology consulted for further stroke work-up and downgraded under medicine- pt/ot/dvt ppx    #Acute CVA  #Occluded R MCA/PCA, s/p thrombectomy   -TTE shows with  Normal left ventricular internal cavity size,  3. Left ventricular ejection fraction, by visual estimation, is 50 to 55%.  -DVT studies negative  -continue Eliquis. Needs for 3 months.    -c/w Lipitor 80mg  -outpatient neuro IR follow up.  - L paresthesias (not new), pt tolerated Neurontin 200mg, Can ADD gabapentin qhs if pt amendable for symptoms    # Bradycardia  -TTE EF 50-55%, no other structural abnormalities  - Metoprolol discontinued   - per electrophysiology notes, no indication for pacemaker, s/p ILR on 10/20  - needs EP follow up as an outpatient.    #intermittent Dizziness   -Encouraged fluid intake - improved now    #ETOH use disorder  -  in remission, stable.    #HTN  - No meds at home  - did not tolerate lisinopril 5mg during hospitalization (intermittent hypotension)  - Hydralazine 25 mg PRN if SBP > 170    #DM 2  - non compliant with meds at home  - continue metformin, linagliptin   - A1C 8.6    #Elevated TSH  - T4 level normal  - outpt f/u    #DVT Prophylaxis   - Eliquis

## 2023-11-25 NOTE — PROGRESS NOTE ADULT - SUBJECTIVE AND OBJECTIVE BOX
Cc: Gait dysfunction    HPI: Patient with no new medical issues today.  Pain controlled, no chest pain, no N/V, no Fevers/Chills. No other new ROS  Has been tolerating rehabilitation program.    apixaban 5 milliGRAM(s) Oral two times a day  atorvastatin 80 milliGRAM(s) Oral at bedtime  bisacodyl 5 milliGRAM(s) Oral at bedtime  citalopram 20 milliGRAM(s) Oral daily  dextrose 50% Injectable 25 Gram(s) IV Push once  donepezil 10 milliGRAM(s) Oral with breakfast  famotidine    Tablet 20 milliGRAM(s) Oral daily  folic acid 1 milliGRAM(s) Oral daily  linagliptin 5 milliGRAM(s) Oral daily  memantine 10 milliGRAM(s) Oral two times a day  metFORMIN 500 milliGRAM(s) Oral two times a day  multivitamin 1 Tablet(s) Oral daily  polyethylene glycol 3350 17 Gram(s) Oral daily  senna 2 Tablet(s) Oral at bedtime  thiamine 100 milliGRAM(s) Oral daily    Vital Signs Last 24 Hrs  T(C): 36.6 (11-25-23 @ 08:31), Max: 36.6 (11-25-23 @ 08:31)  T(F): 97.8 (11-25-23 @ 08:31), Max: 97.8 (11-25-23 @ 08:31)  HR: 49 (11-25-23 @ 08:31) (49 - 57)  BP: 145/65 (11-25-23 @ 08:31) (122/64 - 145/65)  BP(mean): --  RR: 15 (11-25-23 @ 08:31) (15 - 16)  SpO2: 96% (11-25-23 @ 08:31) (96% - 97%)    In NAD  HEENT- EOMI  Heart- RRR, S1S2  Lungs- CTA bl.  Abd- + BS, NT  Ext- No calf pain  Neuro- Exam unchanged          Imp: Patient with diagnosis of  CVA admmitted for comprehensive acute rehabilitation.    Plan:  - Continue PT/OT/SLP  - DVT prophylaxis  - Skin- Turn q2h, check skin daily  - Continue current medications; patient medically stable.   -Active issues- none  - Patient is stable to continue current rehabilitation program.

## 2023-11-25 NOTE — PROGRESS NOTE ADULT - SUBJECTIVE AND OBJECTIVE BOX
Patient is a 58y old  Male who presents with a chief complaint of CVA and left hemiparesis (24 Nov 2023 10:54)      Patient seen and examined at bedside. No events overnight, laying comfortably in bed. Patient denies headache, changes in vision, nausea or vomiting, no new neurological changes    ALLERGIES:  No Known Allergies    MEDICATIONS  (STANDING):  apixaban 5 milliGRAM(s) Oral two times a day  atorvastatin 80 milliGRAM(s) Oral at bedtime  bisacodyl 5 milliGRAM(s) Oral at bedtime  citalopram 20 milliGRAM(s) Oral daily  dextrose 50% Injectable 25 Gram(s) IV Push once  donepezil 10 milliGRAM(s) Oral with breakfast  famotidine    Tablet 20 milliGRAM(s) Oral daily  folic acid 1 milliGRAM(s) Oral daily  linagliptin 5 milliGRAM(s) Oral daily  memantine 10 milliGRAM(s) Oral two times a day  metFORMIN 500 milliGRAM(s) Oral two times a day  multivitamin 1 Tablet(s) Oral daily  polyethylene glycol 3350 17 Gram(s) Oral daily  senna 2 Tablet(s) Oral at bedtime  thiamine 100 milliGRAM(s) Oral daily    MEDICATIONS  (PRN):    Vital Signs Last 24 Hrs  T(F): 97.8 (25 Nov 2023 08:31), Max: 97.8 (25 Nov 2023 08:31)  HR: 49 (25 Nov 2023 08:31) (49 - 57)  BP: 145/65 (25 Nov 2023 08:31) (122/64 - 145/65)  RR: 15 (25 Nov 2023 08:31) (15 - 16)  SpO2: 96% (25 Nov 2023 08:31) (96% - 97%)  I&O's Summary      PHYSICAL EXAM:  GENERAL: NAD, well-groomed, laying in bed  HEAD:  Atraumatic, Normocephalic  EYES: PEEL, conjunctiva and sclera clear  ENMT: Moist mucous membranes, Supple, No JVD  CHEST/LUNG: Clear to auscultation bilaterally, good air entry, non-labored breathing  HEART: RRR; S1/S2, No murmur  ABDOMEN: Soft, Nontender, Nondistended; Bowel sounds present  EXTREMITIES: No calf tenderness, No cyanosis, No edema  SKIN: Warm, perfused  PSYCH: Normal mood, Normal affect  NERVOUS SYSTEM:  awake alert oriented, Good concentration    LABS:                        13.1   5.37  )-----------( 107      ( 24 Nov 2023 06:35 )             39.7     11-24    142  |  105  |  16  ----------------------------<  100  4.5   |  31  |  1.01    Ca    9.2      24 Nov 2023 06:35    TPro  6.7  /  Alb  3.5  /  TBili  0.6  /  DBili  x   /  AST  21  /  ALT  34  /  AlkPhos  77  11-24              10-13 Chol 199 mg/dL LDL -- HDL 29 mg/dL Trig 277 mg/dL                  Urinalysis Basic - ( 24 Nov 2023 06:35 )    Color: x / Appearance: x / SG: x / pH: x  Gluc: 100 mg/dL / Ketone: x  / Bili: x / Urobili: x   Blood: x / Protein: x / Nitrite: x   Leuk Esterase: x / RBC: x / WBC x   Sq Epi: x / Non Sq Epi: x / Bacteria: x        COVID-19 PCR: Varghesetejeison (10-27-23 @ 22:21)      RADIOLOGY & ADDITIONAL TESTS:    Care Discussed with Consultants/Other Providers:

## 2023-11-26 PROCEDURE — 99232 SBSQ HOSP IP/OBS MODERATE 35: CPT

## 2023-11-26 PROCEDURE — 99232 SBSQ HOSP IP/OBS MODERATE 35: CPT | Mod: GC

## 2023-11-26 RX ADMIN — MEMANTINE HYDROCHLORIDE 10 MILLIGRAM(S): 10 TABLET ORAL at 05:08

## 2023-11-26 RX ADMIN — Medication 1 MILLIGRAM(S): at 12:38

## 2023-11-26 RX ADMIN — METFORMIN HYDROCHLORIDE 500 MILLIGRAM(S): 850 TABLET ORAL at 17:11

## 2023-11-26 RX ADMIN — FAMOTIDINE 20 MILLIGRAM(S): 10 INJECTION INTRAVENOUS at 12:39

## 2023-11-26 RX ADMIN — ATORVASTATIN CALCIUM 80 MILLIGRAM(S): 80 TABLET, FILM COATED ORAL at 21:33

## 2023-11-26 RX ADMIN — LINAGLIPTIN 5 MILLIGRAM(S): 5 TABLET, FILM COATED ORAL at 12:38

## 2023-11-26 RX ADMIN — APIXABAN 5 MILLIGRAM(S): 2.5 TABLET, FILM COATED ORAL at 17:11

## 2023-11-26 RX ADMIN — Medication 100 MILLIGRAM(S): at 12:38

## 2023-11-26 RX ADMIN — CITALOPRAM 20 MILLIGRAM(S): 10 TABLET, FILM COATED ORAL at 12:39

## 2023-11-26 RX ADMIN — APIXABAN 5 MILLIGRAM(S): 2.5 TABLET, FILM COATED ORAL at 05:08

## 2023-11-26 RX ADMIN — MEMANTINE HYDROCHLORIDE 10 MILLIGRAM(S): 10 TABLET ORAL at 17:11

## 2023-11-26 RX ADMIN — METFORMIN HYDROCHLORIDE 500 MILLIGRAM(S): 850 TABLET ORAL at 08:39

## 2023-11-26 RX ADMIN — DONEPEZIL HYDROCHLORIDE 10 MILLIGRAM(S): 10 TABLET, FILM COATED ORAL at 08:39

## 2023-11-26 RX ADMIN — Medication 1 TABLET(S): at 12:39

## 2023-11-26 NOTE — PROGRESS NOTE ADULT - SUBJECTIVE AND OBJECTIVE BOX
Patient is a 58y old  Male who presents with a chief complaint of CVA and left hemiparesis (25 Nov 2023 14:56)    Patient seen and examined at bedside. No events overnight, laying comfortably in bed. Patient denies headache, changes in vision, nausea or vomiting, no new neurological changes    ALLERGIES:  No Known Allergies    MEDICATIONS  (STANDING):  apixaban 5 milliGRAM(s) Oral two times a day  atorvastatin 80 milliGRAM(s) Oral at bedtime  bisacodyl 5 milliGRAM(s) Oral at bedtime  citalopram 20 milliGRAM(s) Oral daily  dextrose 50% Injectable 25 Gram(s) IV Push once  donepezil 10 milliGRAM(s) Oral with breakfast  famotidine    Tablet 20 milliGRAM(s) Oral daily  folic acid 1 milliGRAM(s) Oral daily  linagliptin 5 milliGRAM(s) Oral daily  memantine 10 milliGRAM(s) Oral two times a day  metFORMIN 500 milliGRAM(s) Oral two times a day  multivitamin 1 Tablet(s) Oral daily  polyethylene glycol 3350 17 Gram(s) Oral daily  senna 2 Tablet(s) Oral at bedtime  thiamine 100 milliGRAM(s) Oral daily    MEDICATIONS  (PRN):    Vital Signs Last 24 Hrs  T(F): 97.6 (25 Nov 2023 19:51), Max: 97.6 (25 Nov 2023 19:51)  HR: 53 (25 Nov 2023 19:51) (53 - 54)  BP: 122/82 (25 Nov 2023 20:00) (122/82 - 138/82)  RR: 14 (25 Nov 2023 19:51) (14 - 15)  SpO2: 97% (25 Nov 2023 19:51) (94% - 97%)  I&O's Summary      PHYSICAL EXAM:  GENERAL: NAD, well-groomed, laying in bed  HEAD:  Atraumatic, Normocephalic  EYES: PEEL, conjunctiva and sclera clear  ENMT: Moist mucous membranes, Supple, No JVD  CHEST/LUNG: Clear to auscultation bilaterally, good air entry, non-labored breathing  HEART: RRR; S1/S2, No murmur  ABDOMEN: Soft, Nontender, Nondistended; Bowel sounds present  EXTREMITIES: No calf tenderness, No cyanosis, No edema  SKIN: Warm, perfused  PSYCH: Normal mood, Normal affect  NERVOUS SYSTEM:  awake alert oriented, Good concentration    LABS:                        13.1   5.37  )-----------( 107      ( 24 Nov 2023 06:35 )             39.7     11-24    142  |  105  |  16  ----------------------------<  100  4.5   |  31  |  1.01    Ca    9.2      24 Nov 2023 06:35    TPro  6.7  /  Alb  3.5  /  TBili  0.6  /  DBili  x   /  AST  21  /  ALT  34  /  AlkPhos  77  11-24              10-13 Chol 199 mg/dL LDL -- HDL 29 mg/dL Trig 277 mg/dL                  Urinalysis Basic - ( 24 Nov 2023 06:35 )    Color: x / Appearance: x / SG: x / pH: x  Gluc: 100 mg/dL / Ketone: x  / Bili: x / Urobili: x   Blood: x / Protein: x / Nitrite: x   Leuk Esterase: x / RBC: x / WBC x   Sq Epi: x / Non Sq Epi: x / Bacteria: x        COVID-19 PCR: Varghesetejeison (10-27-23 @ 22:21)      RADIOLOGY & ADDITIONAL TESTS:    Care Discussed with Consultants/Other Providers:

## 2023-11-26 NOTE — PROGRESS NOTE ADULT - SUBJECTIVE AND OBJECTIVE BOX
Cc: Gait dysfunction    HPI: Patient with no new medical issues today.  Pain controlled, no chest pain, no N/V, no Fevers/Chills. No other new ROS  Has been tolerating rehabilitation program.    apixaban 5 milliGRAM(s) Oral two times a day  atorvastatin 80 milliGRAM(s) Oral at bedtime  bisacodyl 5 milliGRAM(s) Oral at bedtime  citalopram 20 milliGRAM(s) Oral daily  dextrose 50% Injectable 25 Gram(s) IV Push once  donepezil 10 milliGRAM(s) Oral with breakfast  famotidine    Tablet 20 milliGRAM(s) Oral daily  folic acid 1 milliGRAM(s) Oral daily  linagliptin 5 milliGRAM(s) Oral daily  memantine 10 milliGRAM(s) Oral two times a day  metFORMIN 500 milliGRAM(s) Oral two times a day  multivitamin 1 Tablet(s) Oral daily  polyethylene glycol 3350 17 Gram(s) Oral daily  senna 2 Tablet(s) Oral at bedtime  thiamine 100 milliGRAM(s) Oral daily    Vital Signs Last 24 Hrs  T(C): 36.4 (26 Nov 2023 08:34), Max: 36.4 (25 Nov 2023 19:51)  T(F): 97.6 (26 Nov 2023 08:34), Max: 97.6 (25 Nov 2023 19:51)  HR: 54 (26 Nov 2023 08:34) (53 - 54)  BP: 122/76 (26 Nov 2023 08:34) (122/76 - 138/82)  BP(mean): --  RR: 18 (26 Nov 2023 08:34) (14 - 18)  SpO2: 98% (26 Nov 2023 08:34) (94% - 98%)    Parameters below as of 26 Nov 2023 08:34  Patient On (Oxygen Delivery Method): room air        In NAD  HEENT- EOMI  Heart- RRR, S1S2  Lungs- CTA bl.  Abd- + BS, NT  Ext- No calf pain  Neuro- Exam unchanged          Imp: Patient with diagnosis of  CVA admmitted for comprehensive acute rehabilitation.    Plan:  - Continue PT/OT/SLP  - DVT prophylaxis  - Skin- Turn q2h, check skin daily  - Continue current medications; patient medically stable.   -Active issues- none  - Patient is stable to continue current rehabilitation program.

## 2023-11-26 NOTE — PROGRESS NOTE ADULT - ASSESSMENT
59 y/o M with a Hx of stroke one year ago (at the time patient received TNK and had full  resolution of symptoms), HTN, DM, non-compliant with medications, who presented following an episode of LOC, as per family at around 6:15 pm patient had a sudden fall at work and lost consciousness. It is unclear if patient had any symptoms before he fell. NIHSS 10 on admission, admitted under NeuroICU on 10/12: Thrombectomy via L radial artery for TICI 3 revascularization of L P1-P2 occlusion. Noted LMCA chronic occlusion., on 0/13: Neurologic status improving. Got MR. Started on ASA and SQL, on 10/14: quadrantanopia now on L superior (originally R); Repeat stroke imaging completed and initial concern for CTP showing poss new perfusion deficit however on further review noted to be present on admission. Started on heparin drip per neurology recommendations, on 10/15: Therapeutic on heparin drip, stability HCT obtained. Cardiology consulted for further stroke work-up and downgraded under medicine- pt/ot/dvt ppx    #Acute CVA  #Occluded R MCA/PCA, s/p thrombectomy   -TTE shows with  Normal left ventricular internal cavity size,  3. Left ventricular ejection fraction, by visual estimation, is 50 to 55%.  -DVT studies negative  -continue Eliquis. Needs for 3 months.    -c/w Lipitor 80mg  -outpatient neuro IR follow up.  -L paresthesias (not new), pt tolerated Neurontin 200mg, Can ADD gabapentin qhs if pt amendable for symptoms    #Bradycardia  - TTE EF 50-55%, no other structural abnormalities  - Metoprolol discontinued   - per electrophysiology notes, no indication for pacemaker, s/p ILR on 10/20  - needs EP follow up as an outpatient.    #intermittent Dizziness   -Encouraged fluid intake - improved now    #ETOH use disorder  -  in remission, stable    #HTN  - No meds at home  - did not tolerate lisinopril 5mg during hospitalization (intermittent hypotension)  - Hydralazine 25 mg PRN if SBP > 170    #DM 2  - non compliant with meds at home  - continue metformin, linagliptin   - A1C 8.6    #Elevated TSH  - T4 level normal  - outpt f/u    #DVT ppx  - Eliquis

## 2023-11-27 LAB
ALBUMIN SERPL ELPH-MCNC: 3.4 G/DL — SIGNIFICANT CHANGE UP (ref 3.3–5)
ALBUMIN SERPL ELPH-MCNC: 3.4 G/DL — SIGNIFICANT CHANGE UP (ref 3.3–5)
ALP SERPL-CCNC: 76 U/L — SIGNIFICANT CHANGE UP (ref 40–120)
ALP SERPL-CCNC: 76 U/L — SIGNIFICANT CHANGE UP (ref 40–120)
ALT FLD-CCNC: 30 U/L — SIGNIFICANT CHANGE UP (ref 10–45)
ALT FLD-CCNC: 30 U/L — SIGNIFICANT CHANGE UP (ref 10–45)
ANION GAP SERPL CALC-SCNC: 10 MMOL/L — SIGNIFICANT CHANGE UP (ref 5–17)
ANION GAP SERPL CALC-SCNC: 10 MMOL/L — SIGNIFICANT CHANGE UP (ref 5–17)
AST SERPL-CCNC: 21 U/L — SIGNIFICANT CHANGE UP (ref 10–40)
AST SERPL-CCNC: 21 U/L — SIGNIFICANT CHANGE UP (ref 10–40)
BASOPHILS # BLD AUTO: 0.03 K/UL — SIGNIFICANT CHANGE UP (ref 0–0.2)
BASOPHILS # BLD AUTO: 0.03 K/UL — SIGNIFICANT CHANGE UP (ref 0–0.2)
BASOPHILS NFR BLD AUTO: 0.5 % — SIGNIFICANT CHANGE UP (ref 0–2)
BASOPHILS NFR BLD AUTO: 0.5 % — SIGNIFICANT CHANGE UP (ref 0–2)
BILIRUB SERPL-MCNC: 0.5 MG/DL — SIGNIFICANT CHANGE UP (ref 0.2–1.2)
BILIRUB SERPL-MCNC: 0.5 MG/DL — SIGNIFICANT CHANGE UP (ref 0.2–1.2)
BUN SERPL-MCNC: 16 MG/DL — SIGNIFICANT CHANGE UP (ref 7–23)
BUN SERPL-MCNC: 16 MG/DL — SIGNIFICANT CHANGE UP (ref 7–23)
CALCIUM SERPL-MCNC: 9 MG/DL — SIGNIFICANT CHANGE UP (ref 8.4–10.5)
CALCIUM SERPL-MCNC: 9 MG/DL — SIGNIFICANT CHANGE UP (ref 8.4–10.5)
CHLORIDE SERPL-SCNC: 103 MMOL/L — SIGNIFICANT CHANGE UP (ref 96–108)
CHLORIDE SERPL-SCNC: 103 MMOL/L — SIGNIFICANT CHANGE UP (ref 96–108)
CO2 SERPL-SCNC: 26 MMOL/L — SIGNIFICANT CHANGE UP (ref 22–31)
CO2 SERPL-SCNC: 26 MMOL/L — SIGNIFICANT CHANGE UP (ref 22–31)
CREAT SERPL-MCNC: 0.92 MG/DL — SIGNIFICANT CHANGE UP (ref 0.5–1.3)
CREAT SERPL-MCNC: 0.92 MG/DL — SIGNIFICANT CHANGE UP (ref 0.5–1.3)
EGFR: 96 ML/MIN/1.73M2 — SIGNIFICANT CHANGE UP
EGFR: 96 ML/MIN/1.73M2 — SIGNIFICANT CHANGE UP
EOSINOPHIL # BLD AUTO: 0.14 K/UL — SIGNIFICANT CHANGE UP (ref 0–0.5)
EOSINOPHIL # BLD AUTO: 0.14 K/UL — SIGNIFICANT CHANGE UP (ref 0–0.5)
EOSINOPHIL NFR BLD AUTO: 2.4 % — SIGNIFICANT CHANGE UP (ref 0–6)
EOSINOPHIL NFR BLD AUTO: 2.4 % — SIGNIFICANT CHANGE UP (ref 0–6)
GLUCOSE SERPL-MCNC: 94 MG/DL — SIGNIFICANT CHANGE UP (ref 70–99)
GLUCOSE SERPL-MCNC: 94 MG/DL — SIGNIFICANT CHANGE UP (ref 70–99)
HCT VFR BLD CALC: 38.8 % — LOW (ref 39–50)
HCT VFR BLD CALC: 38.8 % — LOW (ref 39–50)
HGB BLD-MCNC: 13 G/DL — SIGNIFICANT CHANGE UP (ref 13–17)
HGB BLD-MCNC: 13 G/DL — SIGNIFICANT CHANGE UP (ref 13–17)
IMM GRANULOCYTES NFR BLD AUTO: 0.3 % — SIGNIFICANT CHANGE UP (ref 0–0.9)
IMM GRANULOCYTES NFR BLD AUTO: 0.3 % — SIGNIFICANT CHANGE UP (ref 0–0.9)
LYMPHOCYTES # BLD AUTO: 1.29 K/UL — SIGNIFICANT CHANGE UP (ref 1–3.3)
LYMPHOCYTES # BLD AUTO: 1.29 K/UL — SIGNIFICANT CHANGE UP (ref 1–3.3)
LYMPHOCYTES # BLD AUTO: 22.1 % — SIGNIFICANT CHANGE UP (ref 13–44)
LYMPHOCYTES # BLD AUTO: 22.1 % — SIGNIFICANT CHANGE UP (ref 13–44)
MCHC RBC-ENTMCNC: 31.9 PG — SIGNIFICANT CHANGE UP (ref 27–34)
MCHC RBC-ENTMCNC: 31.9 PG — SIGNIFICANT CHANGE UP (ref 27–34)
MCHC RBC-ENTMCNC: 33.5 GM/DL — SIGNIFICANT CHANGE UP (ref 32–36)
MCHC RBC-ENTMCNC: 33.5 GM/DL — SIGNIFICANT CHANGE UP (ref 32–36)
MCV RBC AUTO: 95.1 FL — SIGNIFICANT CHANGE UP (ref 80–100)
MCV RBC AUTO: 95.1 FL — SIGNIFICANT CHANGE UP (ref 80–100)
MONOCYTES # BLD AUTO: 0.46 K/UL — SIGNIFICANT CHANGE UP (ref 0–0.9)
MONOCYTES # BLD AUTO: 0.46 K/UL — SIGNIFICANT CHANGE UP (ref 0–0.9)
MONOCYTES NFR BLD AUTO: 7.9 % — SIGNIFICANT CHANGE UP (ref 2–14)
MONOCYTES NFR BLD AUTO: 7.9 % — SIGNIFICANT CHANGE UP (ref 2–14)
NEUTROPHILS # BLD AUTO: 3.91 K/UL — SIGNIFICANT CHANGE UP (ref 1.8–7.4)
NEUTROPHILS # BLD AUTO: 3.91 K/UL — SIGNIFICANT CHANGE UP (ref 1.8–7.4)
NEUTROPHILS NFR BLD AUTO: 66.8 % — SIGNIFICANT CHANGE UP (ref 43–77)
NEUTROPHILS NFR BLD AUTO: 66.8 % — SIGNIFICANT CHANGE UP (ref 43–77)
NRBC # BLD: 0 /100 WBCS — SIGNIFICANT CHANGE UP (ref 0–0)
NRBC # BLD: 0 /100 WBCS — SIGNIFICANT CHANGE UP (ref 0–0)
PLATELET # BLD AUTO: 102 K/UL — LOW (ref 150–400)
PLATELET # BLD AUTO: 102 K/UL — LOW (ref 150–400)
POTASSIUM SERPL-MCNC: 4.1 MMOL/L — SIGNIFICANT CHANGE UP (ref 3.5–5.3)
POTASSIUM SERPL-MCNC: 4.1 MMOL/L — SIGNIFICANT CHANGE UP (ref 3.5–5.3)
POTASSIUM SERPL-SCNC: 4.1 MMOL/L — SIGNIFICANT CHANGE UP (ref 3.5–5.3)
POTASSIUM SERPL-SCNC: 4.1 MMOL/L — SIGNIFICANT CHANGE UP (ref 3.5–5.3)
PROT SERPL-MCNC: 6.6 G/DL — SIGNIFICANT CHANGE UP (ref 6–8.3)
PROT SERPL-MCNC: 6.6 G/DL — SIGNIFICANT CHANGE UP (ref 6–8.3)
RBC # BLD: 4.08 M/UL — LOW (ref 4.2–5.8)
RBC # BLD: 4.08 M/UL — LOW (ref 4.2–5.8)
RBC # FLD: 13.3 % — SIGNIFICANT CHANGE UP (ref 10.3–14.5)
RBC # FLD: 13.3 % — SIGNIFICANT CHANGE UP (ref 10.3–14.5)
SODIUM SERPL-SCNC: 139 MMOL/L — SIGNIFICANT CHANGE UP (ref 135–145)
SODIUM SERPL-SCNC: 139 MMOL/L — SIGNIFICANT CHANGE UP (ref 135–145)
WBC # BLD: 5.85 K/UL — SIGNIFICANT CHANGE UP (ref 3.8–10.5)
WBC # BLD: 5.85 K/UL — SIGNIFICANT CHANGE UP (ref 3.8–10.5)
WBC # FLD AUTO: 5.85 K/UL — SIGNIFICANT CHANGE UP (ref 3.8–10.5)
WBC # FLD AUTO: 5.85 K/UL — SIGNIFICANT CHANGE UP (ref 3.8–10.5)

## 2023-11-27 PROCEDURE — 99231 SBSQ HOSP IP/OBS SF/LOW 25: CPT

## 2023-11-27 PROCEDURE — 99232 SBSQ HOSP IP/OBS MODERATE 35: CPT

## 2023-11-27 RX ADMIN — MEMANTINE HYDROCHLORIDE 10 MILLIGRAM(S): 10 TABLET ORAL at 05:44

## 2023-11-27 RX ADMIN — METFORMIN HYDROCHLORIDE 500 MILLIGRAM(S): 850 TABLET ORAL at 17:25

## 2023-11-27 RX ADMIN — FAMOTIDINE 20 MILLIGRAM(S): 10 INJECTION INTRAVENOUS at 12:22

## 2023-11-27 RX ADMIN — DONEPEZIL HYDROCHLORIDE 10 MILLIGRAM(S): 10 TABLET, FILM COATED ORAL at 08:32

## 2023-11-27 RX ADMIN — Medication 1 MILLIGRAM(S): at 12:22

## 2023-11-27 RX ADMIN — Medication 1 TABLET(S): at 12:22

## 2023-11-27 RX ADMIN — MEMANTINE HYDROCHLORIDE 10 MILLIGRAM(S): 10 TABLET ORAL at 17:24

## 2023-11-27 RX ADMIN — ATORVASTATIN CALCIUM 80 MILLIGRAM(S): 80 TABLET, FILM COATED ORAL at 21:28

## 2023-11-27 RX ADMIN — Medication 100 MILLIGRAM(S): at 12:22

## 2023-11-27 RX ADMIN — LINAGLIPTIN 5 MILLIGRAM(S): 5 TABLET, FILM COATED ORAL at 12:22

## 2023-11-27 RX ADMIN — METFORMIN HYDROCHLORIDE 500 MILLIGRAM(S): 850 TABLET ORAL at 05:44

## 2023-11-27 RX ADMIN — APIXABAN 5 MILLIGRAM(S): 2.5 TABLET, FILM COATED ORAL at 05:44

## 2023-11-27 RX ADMIN — APIXABAN 5 MILLIGRAM(S): 2.5 TABLET, FILM COATED ORAL at 17:25

## 2023-11-27 RX ADMIN — CITALOPRAM 20 MILLIGRAM(S): 10 TABLET, FILM COATED ORAL at 12:21

## 2023-11-27 NOTE — PROGRESS NOTE ADULT - ASSESSMENT
58M SP CVA 2022 No Residual HTN DM  Came to [facility] Oct12 with LOC  Ischemic CVA SP Mechanical Thrombectomy  Started on Eliquis, SP ILR placement    #Ischemic CVA  - ASA Plavix High intensity Statin    #Bradycardia  - TTE EF 50-55%, no other structural abnormalities  - Metoprolol discontinued   - per electrophysiology notes, no indication for pacemaker, s/p ILR on 10/20  - needs EP follow up as an outpatient.    #intermittent Dizziness   -Encouraged fluid intake - improved now    #ETOH use disorder  -  in remission, stable    #HTN  - No meds at home  - did not tolerate lisinopril 5mg during hospitalization (intermittent hypotension)  - controlled rn. goal bp <130mmHG    #DM 2  - Wasnt taking meds at home  - A1C on admission 8.6%  - restarted metformin, linagliptin on this admission  - controlled  - A1C 8.6    #Elevated TSH  - T4 level normal  - outpt f/u    #DVT ppx  - Eliquis

## 2023-11-27 NOTE — PROGRESS NOTE ADULT - SUBJECTIVE AND OBJECTIVE BOX
Patient is a 58y old  Male who presents with a chief complaint of CVA and left hemiparesis (26 Nov 2023 14:00)      HPI:  Patient is a 57 y/o M with a PHx HTN, DM, stroke one year ago (at the time patient received Tenecteplase and had full resolution of symptoms), who presented to [facility] on 10/12/23 following sudden fall at work and lost consciousness. NIHSS 10 on admission. Admitted under NeuroICU on 10/12. Imaging revealed ischemia in the posterior circulation, CT angiogram head/neck showed right PCA P1-2 cutoff, possibly chronic right MCA M1 cutoff which had distal reconstitution and established collaterals, and left PCA occlusion which patient was subsequently transferred for mechanical thrombectomy.    Cerebral angiogram and mechanical thrombectomy performed using aspiration with TICI 3 recanalization of left PCA. The right occipital lobe filled from right SHANI collaterals, and the right MCA as well was thought to be chronic given noted collateralization. MR with small to moderate left temporal occipital lobe infarctions. Occluded right MCA, occluded right PCA and right vertebral artery proximal segment. The patient was started on a hep gtt given extensive atherosclerotic disease which was switched to apixaban 5mg BID with recommendations for a three month course.    TTE with normal left ventricular internal cavity size. Left ventricular ejection fractio 50 to 55%. Per Neuro, no need for ROCIO at this time. Pt had an episode of bradycardia during his stay and there were concern for 2:1 block. ILR placed 10/20 and no indication for PPM at this time.     Patient was evaluated by PM&R and therapy for functional deficits, gait/ADL impairments and acute rehabilitation was recommended. Patient was medically optimized for discharge to [facility] IRF on 10/27/23.  (27 Oct 2023 12:30)      PAST MEDICAL & SURGICAL HISTORY:  HTN (hypertension)      CVA (cerebrovascular accident)      History of hip surgery          MEDICATIONS  (STANDING):  apixaban 5 milliGRAM(s) Oral two times a day  atorvastatin 80 milliGRAM(s) Oral at bedtime  bisacodyl 5 milliGRAM(s) Oral at bedtime  citalopram 20 milliGRAM(s) Oral daily  dextrose 50% Injectable 25 Gram(s) IV Push once  donepezil 10 milliGRAM(s) Oral with breakfast  famotidine    Tablet 20 milliGRAM(s) Oral daily  folic acid 1 milliGRAM(s) Oral daily  linagliptin 5 milliGRAM(s) Oral daily  memantine 10 milliGRAM(s) Oral two times a day  metFORMIN 500 milliGRAM(s) Oral two times a day  multivitamin 1 Tablet(s) Oral daily  polyethylene glycol 3350 17 Gram(s) Oral daily  senna 2 Tablet(s) Oral at bedtime  thiamine 100 milliGRAM(s) Oral daily    MEDICATIONS  (PRN):      Allergies    No Known Allergies    Intolerances          VITALS  58y  Vital Signs Last 24 Hrs  T(C): 36.7 (27 Nov 2023 07:10), Max: 36.7 (27 Nov 2023 07:10)  T(F): 98.1 (27 Nov 2023 07:10), Max: 98.1 (27 Nov 2023 07:10)  HR: 63 (27 Nov 2023 07:10) (54 - 63)  BP: 133/73 (27 Nov 2023 07:10) (130/80 - 136/83)  BP(mean): --  RR: 14 (27 Nov 2023 07:10) (14 - 16)  SpO2: 95% (27 Nov 2023 07:10) (95% - 98%)    Parameters below as of 27 Nov 2023 07:10  Patient On (Oxygen Delivery Method): room air      Daily     Daily         RECENT LABS:                          13.0   5.85  )-----------( 102      ( 27 Nov 2023 05:44 )             38.8     11-27    139  |  103  |  16  ----------------------------<  94  4.1   |  26  |  0.92    Ca    9.0      27 Nov 2023 05:44    TPro  6.6  /  Alb  3.4  /  TBili  0.5  /  DBili  x   /  AST  21  /  ALT  30  /  AlkPhos  76  11-27    LIVER FUNCTIONS - ( 27 Nov 2023 05:44 )  Alb: 3.4 g/dL / Pro: 6.6 g/dL / ALK PHOS: 76 U/L / ALT: 30 U/L / AST: 21 U/L / GGT: x             Urinalysis Basic - ( 27 Nov 2023 05:44 )    Color: x / Appearance: x / SG: x / pH: x  Gluc: 94 mg/dL / Ketone: x  / Bili: x / Urobili: x   Blood: x / Protein: x / Nitrite: x   Leuk Esterase: x / RBC: x / WBC x   Sq Epi: x / Non Sq Epi: x / Bacteria: x          CAPILLARY BLOOD GLUCOSE                   Patient is a 58y old  Male who presents with a chief complaint of CVA and left hemiparesis (26 Nov 2023 14:00)      HPI:  Patient is a 57 y/o M with a PHx HTN, DM, stroke one year ago (at the time patient received Tenecteplase and had full resolution of symptoms), who presented to [facility] on 10/12/23 following sudden fall at work and lost consciousness. NIHSS 10 on admission. Admitted under NeuroICU on 10/12. Imaging revealed ischemia in the posterior circulation, CT angiogram head/neck showed right PCA P1-2 cutoff, possibly chronic right MCA M1 cutoff which had distal reconstitution and established collaterals, and left PCA occlusion which patient was subsequently transferred for mechanical thrombectomy.    Cerebral angiogram and mechanical thrombectomy performed using aspiration with TICI 3 recanalization of left PCA. The right occipital lobe filled from right SHANI collaterals, and the right MCA as well was thought to be chronic given noted collateralization. MR with small to moderate left temporal occipital lobe infarctions. Occluded right MCA, occluded right PCA and right vertebral artery proximal segment. The patient was started on a hep gtt given extensive atherosclerotic disease which was switched to apixaban 5mg BID with recommendations for a three month course.    TTE with normal left ventricular internal cavity size. Left ventricular ejection fractio 50 to 55%. Per Neuro, no need for ROCIO at this time. Pt had an episode of bradycardia during his stay and there were concern for 2:1 block. ILR placed 10/20 and no indication for PPM at this time.     Patient was evaluated by PM&R and therapy for functional deficits, gait/ADL impairments and acute rehabilitation was recommended. Patient was medically optimized for discharge to [facility] IRF on 10/27/23.  (27 Oct 2023 12:30)    Interim: Patient seen and examined sitting in WC bedside,  Alert, energetic, pleasant mood, listening to the Grateful [employer] at [facility] 1984. No new symptoms, denies any change to numbness of left side. He is a little disappointed that the left handed numbness interferes with guitar playing but he has worked on it some in therapies. All questions answered.     ROS  -denies constipation  -denies insomnia  +left side numbness    PAST MEDICAL & SURGICAL HISTORY:  HTN (hypertension)      CVA (cerebrovascular accident)      History of hip surgery    PE    Gen - NAD, Comfortable  HEENT - NCAT, EOMI, MMM  Neck - Supple, No limited ROM  Pulm - good chest expansion, no conversational dyspnea  Cardiovascular - warm and well perfused  Abdomen - Soft, NT, mild distention  Extremities - No C/C/E, No calf tenderness.   Neuro-     Cognitive - AAOx3     Communication - Fluent, No dysarthria     Memory: Recall 3 objects immediate and 3 min later.      Cranial Nerves - CN 2-12 intact     Motor strength WFL 5/5s, tested supine. Still requires supervision and set-up for functional tasks.      Tone - normal     Sensation - impaired left side jaciel LUE.   Psychiatric - Mood stable, Affect WNL      MEDICATIONS  (STANDING):  apixaban 5 milliGRAM(s) Oral two times a day  atorvastatin 80 milliGRAM(s) Oral at bedtime  bisacodyl 5 milliGRAM(s) Oral at bedtime  citalopram 20 milliGRAM(s) Oral daily  dextrose 50% Injectable 25 Gram(s) IV Push once  donepezil 10 milliGRAM(s) Oral with breakfast  famotidine    Tablet 20 milliGRAM(s) Oral daily  folic acid 1 milliGRAM(s) Oral daily  linagliptin 5 milliGRAM(s) Oral daily  memantine 10 milliGRAM(s) Oral two times a day  metFORMIN 500 milliGRAM(s) Oral two times a day  multivitamin 1 Tablet(s) Oral daily  polyethylene glycol 3350 17 Gram(s) Oral daily  senna 2 Tablet(s) Oral at bedtime  thiamine 100 milliGRAM(s) Oral daily    MEDICATIONS  (PRN):      Allergies    No Known Allergies    Intolerances          VITALS  58y  Vital Signs Last 24 Hrs  T(C): 36.7 (27 Nov 2023 07:10), Max: 36.7 (27 Nov 2023 07:10)  T(F): 98.1 (27 Nov 2023 07:10), Max: 98.1 (27 Nov 2023 07:10)  HR: 63 (27 Nov 2023 07:10) (54 - 63)  BP: 133/73 (27 Nov 2023 07:10) (130/80 - 136/83)  BP(mean): --  RR: 14 (27 Nov 2023 07:10) (14 - 16)  SpO2: 95% (27 Nov 2023 07:10) (95% - 98%)    Parameters below as of 27 Nov 2023 07:10  Patient On (Oxygen Delivery Method): room air      Daily     Daily         RECENT LABS:                          13.0   5.85  )-----------( 102      ( 27 Nov 2023 05:44 )             38.8     11-27    139  |  103  |  16  ----------------------------<  94  4.1   |  26  |  0.92    Ca    9.0      27 Nov 2023 05:44    TPro  6.6  /  Alb  3.4  /  TBili  0.5  /  DBili  x   /  AST  21  /  ALT  30  /  AlkPhos  76  11-27    LIVER FUNCTIONS - ( 27 Nov 2023 05:44 )  Alb: 3.4 g/dL / Pro: 6.6 g/dL / ALK PHOS: 76 U/L / ALT: 30 U/L / AST: 21 U/L / GGT: x             Urinalysis Basic - ( 27 Nov 2023 05:44 )    Color: x / Appearance: x / SG: x / pH: x  Gluc: 94 mg/dL / Ketone: x  / Bili: x / Urobili: x   Blood: x / Protein: x / Nitrite: x   Leuk Esterase: x / RBC: x / WBC x   Sq Epi: x / Non Sq Epi: x / Bacteria: x          CAPILLARY BLOOD GLUCOSE

## 2023-11-27 NOTE — PROGRESS NOTE ADULT - ASSESSMENT
59 y/o M with a PHx HTN, DM, CVA sp tnK with full resolution symptoms, who presented to [facility] on 10/12 s/p fall +LOC, found to have left temporal occipital lobe infarctions and acute punctate left occipital lobe infarction.    # Occluded R MCA/PCA  - s/p thrombectomy  - Eliquis 5mg BID given extensive atherosclerotic disease. Continue for 3 months.    - Atorvastatin 80mg qhs.   - cont comprehensive rehab program PT, OT 3 hours daily 5 x week.  - neuropsychology support, recreation therapy  - Precautions: cardiac, DM, respiratory, breast CA, fall, aspiration.    # Memory deficits  - Memantine 10mg BID   - Donepezil 10mg  - EKG QTc  460 on 11/7     # Bradycardia, stable   - Metoprolol dc  - per electrophysiology notes, no indication for pacemaker, s/p ILR on 10/20  - needs EP follow up as an outpatient    # HTN  - hydralazine 25mg PRN for >170.   - BP  (130/80 - 136/83) 11/27    # HLD  - Atorvastatin 80 mg daily    # DM 2  - A1C 8.6  - Metformin     # h/o ETOH abuse  -  MVI, thiamine and folic acid    # GI/Bowel:  - Senna QHS, Miralax Daily  - dulcolax PO     # FEN   - DASH DIET     # DVT ppx  - Eliquis 5mg BID    # Case discussed in IDT rounds 11/22 (follow up):  - supervision transfers, independent bed mobiltiy, supervision ambulation 150 feet without assist device/CG, set up UB/CG LB dressing, set up eating and grooming. Reduced memory, reduced initiation. Negotiates 12 steps with 1 HR and CS  - goals: supervision iADLs, supervision bADLs requires cues. Discussed with patient and in teams; patient not safe with intermittent supervision waking hours, requires continuous  - awaiting [employer] placement, dependent on paperwork which family must provide. Patient aware. Brother works at a [employer] in [place].     # LABs  CBC BMP 11/27: reviewed, stable  CBC BMP 11/30      Lety # [phone].  [phone] home.      Outpatient Follow-up (Specialty/Name of physician):    Rj Delgado  Cardiac Electrophysiology  [address], [address]  [place] [address]  Phone: [phone]  Fax: [phone]  Follow Up Time:     Wily Barkley  Neurology  [address] [place] [address]  Phone: [phone]  Fax: [phone]  Follow Up Time: 1 week    Denzel Zapata  Interventional Cardiology  [address], [address]  [place] [address]  Phone: [phone]  Fax: [phone]  Follow Up Time: 1 week    PCP,   Phone: (   )    -  Fax: (   )    -  Follow Up Time: 1 week    Arsenio Goodrich  Interventional Neuroradiology  [address] [place] [address]  Phone: [phone]  Fax: [phone]

## 2023-11-27 NOTE — PROGRESS NOTE ADULT - SUBJECTIVE AND OBJECTIVE BOX
No events overnight  No new complaints to offer me    MEDICATIONS  (STANDING):  apixaban 5 milliGRAM(s) Oral two times a day  atorvastatin 80 milliGRAM(s) Oral at bedtime  bisacodyl 5 milliGRAM(s) Oral at bedtime  citalopram 20 milliGRAM(s) Oral daily  dextrose 50% Injectable 25 Gram(s) IV Push once  donepezil 10 milliGRAM(s) Oral with breakfast  famotidine    Tablet 20 milliGRAM(s) Oral daily  folic acid 1 milliGRAM(s) Oral daily  linagliptin 5 milliGRAM(s) Oral daily  memantine 10 milliGRAM(s) Oral two times a day  metFORMIN 500 milliGRAM(s) Oral two times a day  multivitamin 1 Tablet(s) Oral daily  polyethylene glycol 3350 17 Gram(s) Oral daily  senna 2 Tablet(s) Oral at bedtime  thiamine 100 milliGRAM(s) Oral daily    MEDICATIONS  (PRN):      Vital Signs Last 24 Hrs  T(F): 98.1 (27 Nov 2023 07:10), Max: 98.1 (27 Nov 2023 07:10)  HR: 63 (27 Nov 2023 07:10) (54 - 63)  BP: 133/73 (27 Nov 2023 07:10) (130/80 - 136/83)  RR: 14 (27 Nov 2023 07:10) (14 - 16)  SpO2: 95% (27 Nov 2023 07:10) (95% - 98%)    I&O's Summary      Examination:  General: NAD, Comfortable  Pulm: CTA BL No Rhonchi Rales or wheezes  Neck: Supple, No JVD  CVS: RRR No rubs murmurs or gallops  Abdomen: Soft, Nontender, Nondistended; No masses or organomegally  Extremities: No calf tenderness, No pitting edema    Labs:                        13.0   5.85  )-----------( 102      ( 27 Nov 2023 05:44 )             38.8       11-27    139  |  103  |  16  ----------------------------<  94  4.1   |  26  |  0.92    Ca    9.0      27 Nov 2023 05:44    TPro  6.6  /  Alb  3.4  /  TBili  0.5  /  DBili  x   /  AST  21  /  ALT  30  /  AlkPhos  76  11-27

## 2023-11-27 NOTE — PROGRESS NOTE ADULT - ATTENDING COMMENTS
Progress note amended to include my discussions with patient, resident, SW, PT and PT.     Patient reports enjoying music, especially the Grateful [employer], and [employer] with Jaciel Govea. He is in good spirits, although he continues to experience numbness on the left side which can be bothersome although it is improved somewhat. He is also broadly aware of his deficits, with some increased insight, and has a lot of concerns going home without adequate supervision/assist, which he also doesn't feel comfortable putting on his daughter. He prefers to go to [facility] but still await clarification insurance coverage following his wife's passing.    He still requires instances of physical assist as well; in OT this morning, required CG to walk to the shower room due to a lateral loss of balance. and  CG when going to the closet and retrieving clothing. He requires CGA for shower transfer and close sup for cues for safety during showering (balance, sequencing, reduced safety awareness). Continue program    RECENT LABS    Vital Signs Last 24 Hrs  T(C): 36.7 (27 Nov 2023 07:10), Max: 36.7 (27 Nov 2023 07:10)  T(F): 98.1 (27 Nov 2023 07:10), Max: 98.1 (27 Nov 2023 07:10)  HR: 63 (27 Nov 2023 07:10) (54 - 63)  BP: 133/73 (27 Nov 2023 07:10) (130/80 - 136/83)  BP(mean): --  RR: 14 (27 Nov 2023 07:10) (14 - 16)  SpO2: 95% (27 Nov 2023 07:10) (95% - 98%)    Parameters below as of 27 Nov 2023 07:10  Patient On (Oxygen Delivery Method): room air                              13.0   5.85  )-----------( 102      ( 27 Nov 2023 05:44 )             38.8     11-27    139  |  103  |  16  ----------------------------<  94  4.1   |  26  |  0.92    Ca    9.0      27 Nov 2023 05:44    TPro  6.6  /  Alb  3.4  /  TBili  0.5  /  DBili  x   /  AST  21  /  ALT  30  /  AlkPhos  76  11-27      Urinalysis Basic - ( 27 Nov 2023 05:44 )    Color: x / Appearance: x / SG: x / pH: x  Gluc: 94 mg/dL / Ketone: x  / Bili: x / Urobili: x   Blood: x / Protein: x / Nitrite: x   Leuk Esterase: x / RBC: x / WBC x   Sq Epi: x / Non Sq Epi: x / Bacteria: x      CAPILLARY BLOOD GLUCOSE

## 2023-11-28 PROCEDURE — 99232 SBSQ HOSP IP/OBS MODERATE 35: CPT

## 2023-11-28 RX ORDER — POLYETHYLENE GLYCOL 3350 17 G/17G
17 POWDER, FOR SOLUTION ORAL DAILY
Refills: 0 | Status: DISCONTINUED | OUTPATIENT
Start: 2023-11-28 | End: 2023-12-05

## 2023-11-28 RX ADMIN — LINAGLIPTIN 5 MILLIGRAM(S): 5 TABLET, FILM COATED ORAL at 12:35

## 2023-11-28 RX ADMIN — APIXABAN 5 MILLIGRAM(S): 2.5 TABLET, FILM COATED ORAL at 06:14

## 2023-11-28 RX ADMIN — Medication 100 MILLIGRAM(S): at 12:34

## 2023-11-28 RX ADMIN — METFORMIN HYDROCHLORIDE 500 MILLIGRAM(S): 850 TABLET ORAL at 06:13

## 2023-11-28 RX ADMIN — ATORVASTATIN CALCIUM 80 MILLIGRAM(S): 80 TABLET, FILM COATED ORAL at 21:00

## 2023-11-28 RX ADMIN — CITALOPRAM 20 MILLIGRAM(S): 10 TABLET, FILM COATED ORAL at 12:35

## 2023-11-28 RX ADMIN — DONEPEZIL HYDROCHLORIDE 10 MILLIGRAM(S): 10 TABLET, FILM COATED ORAL at 08:13

## 2023-11-28 RX ADMIN — APIXABAN 5 MILLIGRAM(S): 2.5 TABLET, FILM COATED ORAL at 18:44

## 2023-11-28 RX ADMIN — MEMANTINE HYDROCHLORIDE 10 MILLIGRAM(S): 10 TABLET ORAL at 18:43

## 2023-11-28 RX ADMIN — MEMANTINE HYDROCHLORIDE 10 MILLIGRAM(S): 10 TABLET ORAL at 06:14

## 2023-11-28 RX ADMIN — FAMOTIDINE 20 MILLIGRAM(S): 10 INJECTION INTRAVENOUS at 12:34

## 2023-11-28 RX ADMIN — Medication 1 TABLET(S): at 12:34

## 2023-11-28 RX ADMIN — Medication 1 MILLIGRAM(S): at 12:34

## 2023-11-28 RX ADMIN — METFORMIN HYDROCHLORIDE 500 MILLIGRAM(S): 850 TABLET ORAL at 18:43

## 2023-11-28 NOTE — PROGRESS NOTE ADULT - SUBJECTIVE AND OBJECTIVE BOX
Patient is a 58y old  Male who presents with a chief complaint of CVA and left hemiparesis (27 Nov 2023 14:46)      HPI:  Patient is a 59 y/o M with a PHx HTN, DM, stroke one year ago (at the time patient received Tenecteplase and had full resolution of symptoms), who presented to [facility] on 10/12/23 following sudden fall at work and lost consciousness. NIHSS 10 on admission. Admitted under NeuroICU on 10/12. Imaging revealed ischemia in the posterior circulation, CT angiogram head/neck showed right PCA P1-2 cutoff, possibly chronic right MCA M1 cutoff which had distal reconstitution and established collaterals, and left PCA occlusion which patient was subsequently transferred for mechanical thrombectomy.    Cerebral angiogram and mechanical thrombectomy performed using aspiration with TICI 3 recanalization of left PCA. The right occipital lobe filled from right SHANI collaterals, and the right MCA as well was thought to be chronic given noted collateralization. MR with small to moderate left temporal occipital lobe infarctions. Occluded right MCA, occluded right PCA and right vertebral artery proximal segment. The patient was started on a hep gtt given extensive atherosclerotic disease which was switched to apixaban 5mg BID with recommendations for a three month course.    TTE with normal left ventricular internal cavity size. Left ventricular ejection fractio 50 to 55%. Per Neuro, no need for ROCIO at this time. Pt had an episode of bradycardia during his stay and there were concern for 2:1 block. ILR placed 10/20 and no indication for PPM at this time.     Patient was evaluated by PM&R and therapy for functional deficits, gait/ADL impairments and acute rehabilitation was recommended. Patient was medically optimized for discharge to [facility] IRF on 10/27/23.  (27 Oct 2023 12:30)      PAST MEDICAL & SURGICAL HISTORY:  HTN (hypertension)      CVA (cerebrovascular accident)      History of hip surgery          MEDICATIONS  (STANDING):  apixaban 5 milliGRAM(s) Oral two times a day  atorvastatin 80 milliGRAM(s) Oral at bedtime  bisacodyl 5 milliGRAM(s) Oral at bedtime  citalopram 20 milliGRAM(s) Oral daily  dextrose 50% Injectable 25 Gram(s) IV Push once  donepezil 10 milliGRAM(s) Oral with breakfast  famotidine    Tablet 20 milliGRAM(s) Oral daily  folic acid 1 milliGRAM(s) Oral daily  linagliptin 5 milliGRAM(s) Oral daily  memantine 10 milliGRAM(s) Oral two times a day  metFORMIN 500 milliGRAM(s) Oral two times a day  multivitamin 1 Tablet(s) Oral daily  polyethylene glycol 3350 17 Gram(s) Oral daily  senna 2 Tablet(s) Oral at bedtime  thiamine 100 milliGRAM(s) Oral daily    MEDICATIONS  (PRN):      Allergies    No Known Allergies    Intolerances          VITALS  58y  Vital Signs Last 24 Hrs  T(C): 36.4 (28 Nov 2023 08:15), Max: 36.4 (27 Nov 2023 20:29)  T(F): 97.6 (28 Nov 2023 08:15), Max: 97.6 (27 Nov 2023 20:29)  HR: 55 (28 Nov 2023 08:15) (55 - 58)  BP: 136/84 (28 Nov 2023 08:15) (132/81 - 136/84)  BP(mean): --  RR: 16 (28 Nov 2023 08:15) (16 - 16)  SpO2: 97% (28 Nov 2023 08:15) (97% - 97%)    Parameters below as of 27 Nov 2023 20:29  Patient On (Oxygen Delivery Method): room air      Daily     Daily         RECENT LABS:                          13.0   5.85  )-----------( 102      ( 27 Nov 2023 05:44 )             38.8     11-27    139  |  103  |  16  ----------------------------<  94  4.1   |  26  |  0.92    Ca    9.0      27 Nov 2023 05:44    TPro  6.6  /  Alb  3.4  /  TBili  0.5  /  DBili  x   /  AST  21  /  ALT  30  /  AlkPhos  76  11-27    LIVER FUNCTIONS - ( 27 Nov 2023 05:44 )  Alb: 3.4 g/dL / Pro: 6.6 g/dL / ALK PHOS: 76 U/L / ALT: 30 U/L / AST: 21 U/L / GGT: x             Urinalysis Basic - ( 27 Nov 2023 05:44 )    Color: x / Appearance: x / SG: x / pH: x  Gluc: 94 mg/dL / Ketone: x  / Bili: x / Urobili: x   Blood: x / Protein: x / Nitrite: x   Leuk Esterase: x / RBC: x / WBC x   Sq Epi: x / Non Sq Epi: x / Bacteria: x          CAPILLARY BLOOD GLUCOSE                 Patient is a 58y old  Male who presents with a chief complaint of CVA and left hemiparesis (27 Nov 2023 14:46)    HPI:  Patient is a 59 y/o M with a PHx HTN, DM, stroke one year ago (at the time patient received Tenecteplase and had full resolution of symptoms), who presented to [facility] on 10/12/23 following sudden fall at work and lost consciousness. NIHSS 10 on admission. Admitted under NeuroICU on 10/12. Imaging revealed ischemia in the posterior circulation, CT angiogram head/neck showed right PCA P1-2 cutoff, possibly chronic right MCA M1 cutoff which had distal reconstitution and established collaterals, and left PCA occlusion which patient was subsequently transferred for mechanical thrombectomy.    Cerebral angiogram and mechanical thrombectomy performed using aspiration with TICI 3 recanalization of left PCA. The right occipital lobe filled from right SHANI collaterals, and the right MCA as well was thought to be chronic given noted collateralization. MR with small to moderate left temporal occipital lobe infarctions. Occluded right MCA, occluded right PCA and right vertebral artery proximal segment. The patient was started on a hep gtt given extensive atherosclerotic disease which was switched to apixaban 5mg BID with recommendations for a three month course.    TTE with normal left ventricular internal cavity size. Left ventricular ejection fractio 50 to 55%. Per Neuro, no need for ROCIO at this time. Pt had an episode of bradycardia during his stay and there were concern for 2:1 block. ILR placed 10/20 and no indication for PPM at this time.     Patient was evaluated by PM&R and therapy for functional deficits, gait/ADL impairments and acute rehabilitation was recommended. Patient was medically optimized for discharge to [facility] IRF on 10/27/23.  (27 Oct 2023 12:30)    Interim/Subj: Patient seen and examined in WC at bedside between therapies. Reports sleeping well, eating well. Denies constipation, requests laxatives be PRN only. States he understands he requires supervision upon discharge. Feels his memory has been improving, such as remembering what times his daughter would be in school so as not to call. No current concerns, all questions answered.     ROS:  +neurological deficits, numbness    PAST MEDICAL & SURGICAL HISTORY:  HTN (hypertension)    CVA (cerebrovascular accident)    History of hip surgery        MEDICATIONS  (STANDING):  apixaban 5 milliGRAM(s) Oral two times a day  atorvastatin 80 milliGRAM(s) Oral at bedtime  bisacodyl 5 milliGRAM(s) Oral at bedtime  citalopram 20 milliGRAM(s) Oral daily  dextrose 50% Injectable 25 Gram(s) IV Push once  donepezil 10 milliGRAM(s) Oral with breakfast  famotidine    Tablet 20 milliGRAM(s) Oral daily  folic acid 1 milliGRAM(s) Oral daily  linagliptin 5 milliGRAM(s) Oral daily  memantine 10 milliGRAM(s) Oral two times a day  metFORMIN 500 milliGRAM(s) Oral two times a day  multivitamin 1 Tablet(s) Oral daily  polyethylene glycol 3350 17 Gram(s) Oral daily  senna 2 Tablet(s) Oral at bedtime  thiamine 100 milliGRAM(s) Oral daily    MEDICATIONS  (PRN):      Allergies    No Known Allergies    Intolerances          VITALS  58y  Vital Signs Last 24 Hrs  T(C): 36.4 (28 Nov 2023 08:15), Max: 36.4 (27 Nov 2023 20:29)  T(F): 97.6 (28 Nov 2023 08:15), Max: 97.6 (27 Nov 2023 20:29)  HR: 55 (28 Nov 2023 08:15) (55 - 58)  BP: 136/84 (28 Nov 2023 08:15) (132/81 - 136/84)  RR: 16 (28 Nov 2023 08:15) (16 - 16)  SpO2: 97% (28 Nov 2023 08:15) (97% - 97%)    Parameters below as of 27 Nov 2023 20:29  Patient On (Oxygen Delivery Method): room air      Daily         RECENT LABS:                          13.0   5.85  )-----------( 102      ( 27 Nov 2023 05:44 )             38.8     11-27    139  |  103  |  16  ----------------------------<  94  4.1   |  26  |  0.92    Ca    9.0      27 Nov 2023 05:44    TPro  6.6  /  Alb  3.4  /  TBili  0.5  /  DBili  x   /  AST  21  /  ALT  30  /  AlkPhos  76  11-27    LIVER FUNCTIONS - ( 27 Nov 2023 05:44 )  Alb: 3.4 g/dL / Pro: 6.6 g/dL / ALK PHOS: 76 U/L / ALT: 30 U/L / AST: 21 U/L / GGT: x             Urinalysis Basic - ( 27 Nov 2023 05:44 )    Color: x / Appearance: x / SG: x / pH: x  Gluc: 94 mg/dL / Ketone: x  / Bili: x / Urobili: x   Blood: x / Protein: x / Nitrite: x   Leuk Esterase: x / RBC: x / WBC x   Sq Epi: x / Non Sq Epi: x / Bacteria: x      CAPILLARY BLOOD GLUCOSE

## 2023-11-28 NOTE — PROGRESS NOTE ADULT - ATTENDING COMMENTS
Progress note amended to include my discussions with patient, resident, hospitalist, RN, SW, PT and my findings    Patient seen at end of OT sessions. Slept well last night, coping with left sided numbness. On examination, able to localize quickly to sensation on face, UE and LE bilaterally, 100% accuracy. BUE and LE normal tone, no pain with ROm. motor 5/5 bilateral shoulder, elbow flexion, gross grasp. 5/5 bilateral HF, quad, and ankel PF and DF. Calves soft no swelling no TTP    Patient has been having daily BM and refusing . Will switch miralax PRN and dc remainder (dulcolax and senna)    Case discussed in IDT rounds. Famly has not been forthcoming with death certificate for confirmation Medicaid status (spouse's passing); may need to place on ALC    RECENT LABS    Vital Signs Last 24 Hrs  T(C): 36.4 (28 Nov 2023 08:15), Max: 36.4 (27 Nov 2023 20:29)  T(F): 97.6 (28 Nov 2023 08:15), Max: 97.6 (27 Nov 2023 20:29)  HR: 55 (28 Nov 2023 08:15) (55 - 58)  BP: 136/84 (28 Nov 2023 08:15) (132/81 - 136/84)  BP(mean): --  RR: 16 (28 Nov 2023 08:15) (16 - 16)  SpO2: 97% (28 Nov 2023 08:15) (97% - 97%)    Parameters below as of 27 Nov 2023 20:29  Patient On (Oxygen Delivery Method): room air                              13.0   5.85  )-----------( 102      ( 27 Nov 2023 05:44 )             38.8     11-27    139  |  103  |  16  ----------------------------<  94  4.1   |  26  |  0.92    Ca    9.0      27 Nov 2023 05:44    TPro  6.6  /  Alb  3.4  /  TBili  0.5  /  DBili  x   /  AST  21  /  ALT  30  /  AlkPhos  76  11-27      Urinalysis Basic - ( 27 Nov 2023 05:44 )    Color: x / Appearance: x / SG: x / pH: x  Gluc: 94 mg/dL / Ketone: x  / Bili: x / Urobili: x   Blood: x / Protein: x / Nitrite: x   Leuk Esterase: x / RBC: x / WBC x   Sq Epi: x / Non Sq Epi: x / Bacteria: x      CAPILLARY BLOOD GLUCOSE

## 2023-11-28 NOTE — PROGRESS NOTE ADULT - ASSESSMENT
58M SP CVA 2022 No Residual HTN DM  Came to [facility] Oct12 with LOC  Ischemic CVA SP Mechanical Thrombectomy  Started on Eliquis, SP ILR placement    #Ischemic CVA  - ASA Plavix High intensity Statin    #Bradycardia  - TTE EF 50-55%, no other structural abnormalities  - Metoprolol discontinued   - per electrophysiology notes, no indication for pacemaker, s/p ILR on 10/20  - needs EP follow up as an outpatient.    #intermittent Dizziness   -Encouraged fluid intake - improved now    #ETOH use disorder  -  in remission, stable    #HTN  - No meds at home  - did not tolerate lisinopril 5mg during hospitalization (intermittent hypotension)  - controlled rn. goal bp <130mmHG    #DM 2  - Wasnt taking meds at home  - A1C on admission 8.6%  - restarted metformin, linagliptin on this admission  - controlled  - A1C 8.6  Nov28 Fasting sugars reviewd. good control    #Elevated TSH  - T4 level normal  - outpt f/u    #DVT ppx  - Eliquis   58M SP CVA 2022 No Residual HTN DM  Came to [facility] Oct12 with LOC  Ischemic CVA SP Mechanical Thrombectomy  Started on Eliquis, SP ILR placement    #Ischemic CVA  - ASA Plavix High intensity Statin    #Bradycardia  - TTE EF 50-55%, no other structural abnormalities  - Metoprolol discontinued   - per electrophysiology notes, no indication for pacemaker, s/p ILR on 10/20  - needs EP follow up as an outpatient.    #intermittent Dizziness   -Encouraged fluid intake - improved now    #ETOH use disorder  -  in remission, stable    #HTN  - No meds at home  - did not tolerate lisinopril 5mg during hospitalization (intermittent hypotension)  - controlled rn. goal bp <130mmHG can follow up as outpatient  - nov28 if bp control is desired, may start norvasc 2.5mg qd    #DM 2  - Wasnt taking meds at home  - A1C on admission 8.6%  - restarted metformin, linagliptin on this admission  - controlled  - A1C 8.6  Nov28 Fasting sugars reviewd. good control    #Elevated TSH  - T4 level normal  - outpt f/u    #DVT ppx  - Eliquis

## 2023-11-28 NOTE — PROGRESS NOTE ADULT - SUBJECTIVE AND OBJECTIVE BOX
No events overnight  No new complaints to offer me    MEDICATIONS  (PRN):      Vital Signs Last 24 Hrs  T(F): 98.1 (27 Nov 2023 07:10), Max: 98.1 (27 Nov 2023 07:10)  HR: 63 (27 Nov 2023 07:10) (54 - 63)  BP: 133/73 (27 Nov 2023 07:10) (130/80 - 136/83)  RR: 14 (27 Nov 2023 07:10) (14 - 16)  SpO2: 95% (27 Nov 2023 07:10) (95% - 98%)    I&O's Summary      Examination:  General: NAD, Comfortable  Pulm: CTA BL No Rhonchi Rales or wheezes  Neck: Supple, No JVD  CVS: RRR No rubs murmurs or gallops  Abdomen: Soft, Nontender, Nondistended; No masses or organomegally  Extremities: No calf tenderness, No pitting edema    Labs:                        13.0   5.85  )-----------( 102      ( 27 Nov 2023 05:44 )             38.8       11-27    139  |  103  |  16  ----------------------------<  94  4.1   |  26  |  0.92    Ca    9.0      27 Nov 2023 05:44    TPro  6.6  /  Alb  3.4  /  TBili  0.5  /  DBili  x   /  AST  21  /  ALT  30  /  AlkPhos  76  11-27

## 2023-11-28 NOTE — PROGRESS NOTE ADULT - ASSESSMENT
57 y/o M with a PHx HTN, DM, CVA sp tnK with full resolution symptoms, who presented to [facility] on 10/12 s/p fall +LOC, found to have left temporal occipital lobe infarctions and acute punctate left occipital lobe infarction.    # Occluded R MCA/PCA  - s/p thrombectomy  - Eliquis 5mg BID given extensive atherosclerotic disease. Continue for 3 months.    - Atorvastatin 80mg qhs.   - cont comprehensive rehab program PT, OT 3 hours daily 5 x week.  - neuropsychology support, recreation therapy  - Precautions: cardiac, DM, respiratory, breast CA, fall, aspiration.    # Memory deficits  - Memantine 10mg BID   - Donepezil 10mg  - EKG QTc  460 on 11/7     # Bradycardia, stable   - Metoprolol dc  - per electrophysiology notes, no indication for pacemaker, s/p ILR on 10/20  - needs EP follow up as an outpatient    # HTN  - currently off anti HTN meds  - BP (132/81 - 136/84) 11/28    # HLD  - Atorvastatin 80 mg daily    # DM 2  - A1C 8.6  - Metformin     # h/o ETOH abuse  -  MVI, thiamine and folic acid    # GI/Bowel:  - Senna QHS, Miralax Daily  - dulcolax PO     # FEN   - DASH DIET     # DVT ppx  - Eliquis 5mg BID    # Case discussed in IDT rounds 11/22 (follow up):  - supervision transfers, independent bed mobiltiy, supervision ambulation 150 feet without assist device/CG, set up UB/CG LB dressing, set up eating and grooming. Reduced memory, reduced initiation. Negotiates 12 steps with 1 HR and CS  - goals: supervision iADLs, supervision bADLs requires cues. Discussed with patient and in teams; patient not safe with intermittent supervision waking hours, requires continuous  - awaiting [employer] placement, dependent on paperwork which family must provide. Patient aware. Brother works at a [employer] in [place].     # LABs  CBC BMP 11/30      Lety # [phone].  [phone] home.      Outpatient Follow-up (Specialty/Name of physician):    Rj Delgado  Cardiac Electrophysiology  [address] [facility], [address]  [place] [address]  Phone: [phone]  Fax: [phone]  Follow Up Time:     Wily Barkley  Neurology  [address] [place] [address]  Phone: [phone]  Fax: [phone]  Follow Up Time: 1 week    Denzel Zapata  Interventional Cardiology  [address] [facility], [address]  [place] [address]  Phone: [phone]  Fax: [phone]  Follow Up Time: 1 week    PCP,   Phone: (   )    -  Fax: (   )    -  Follow Up Time: 1 week    Arsenio Goodrich  Interventional Neuroradiology  [address] [place] [address]  Phone: [phone]  Fax: [phone]                   59 y/o M with a PHx HTN, DM, CVA sp tnK with full resolution symptoms, who presented to [facility] on 10/12 s/p fall +LOC, found to have left temporal occipital lobe infarctions and acute punctate left occipital lobe infarction.    # Occluded R MCA/PCA  - s/p thrombectomy  - Eliquis 5mg BID given extensive atherosclerotic disease. Continue for 3 months.    - Atorvastatin 80mg qhs.   - cont comprehensive rehab program PT, OT 3 hours daily 5 x week.  - neuropsychology support, recreation therapy  - Precautions: cardiac, DM, respiratory, breast CA, fall, aspiration.    # Memory deficits  - Memantine 10mg BID   - Donepezil 10mg  - EKG QTc  460 on 11/7     # Bradycardia, stable   - Metoprolol dc  - per electrophysiology notes, no indication for pacemaker, s/p ILR on 10/20  - needs EP follow up as an outpatient    # HTN  - currently off anti HTN meds  - BP (132/81 - 136/84) 11/28    # HLD  - Atorvastatin 80 mg daily    # DM 2  - A1C 8.6  - Metformin     # h/o ETOH abuse  -  MVI, thiamine and folic acid    # GI/Bowel:  - Senna QHS, Miralax Daily  - dulcolax PO     # FEN   - DASH DIET     # DVT ppx  - Eliquis 5mg BID    # Case discussed in IDT rounds 11/28 (follow up):  - supervision transfers, independent bed mobiltiy, supervision ambulation 150 feet without assist device/CG, set up UB/CG LB dressing, set up eating and grooming. Reduced memory, reduced initiation. Negotiates 12 steps with 1 HR and CS  - goals: supervision iADLs, supervision bADLs requires cues. Discussed with patient and in teams; patient not safe with intermittent supervision waking hours, requires continuous oversight  - Medicare, able to downgrade to alternate level of care.  D/C ASAP upon placement.         # LABs  CBC BMP 11/30      Lety # [phone].  [phone] home.      Outpatient Follow-up (Specialty/Name of physician):    Rj Delgado  Cardiac Electrophysiology  [address] [facility], [address]  [place] [address]  Phone: [phone]  Fax: [phone]  Follow Up Time:     Wily Barkley  Neurology  [address] [place] [address]  Phone: [phone]  Fax: [phone]  Follow Up Time: 1 week    Denzel Zapata  Interventional Cardiology  [address] [facility], [address]  [place] [address]  Phone: [phone]  Fax: [phone]  Follow Up Time: 1 week    PCP,   Phone: (   )    -  Fax: (   )    -  Follow Up Time: 1 week    Arsenio Goodrich  Interventional Neuroradiology  [address] [place] [address]  Phone: [phone]  Fax: [phone]                   57 y/o M with a PHx HTN, DM, CVA sp tnK with full resolution symptoms, who presented to [facility] on 10/12 s/p fall +LOC, found to have left temporal occipital lobe infarctions and acute punctate left occipital lobe infarction.    # Occluded R MCA/PCA  - s/p thrombectomy  - Eliquis 5mg BID given extensive atherosclerotic disease. Continue for 3 months.    - Atorvastatin 80mg qhs.   - cont comprehensive rehab program PT, OT 3 hours daily 5 x week.  - neuropsychology support, recreation therapy  - Precautions: cardiac, DM, respiratory, breast CA, fall, aspiration.    # Memory deficits  - Memantine 10mg BID   - Donepezil 10mg  - EKG QTc  460 on 11/7     # Bradycardia, stable   - Metoprolol dc  - per electrophysiology notes, no indication for pacemaker, s/p ILR on 10/20  - needs EP follow up as an outpatient    # HTN  - currently off anti HTN meds  - BP (132/81 - 136/84) 11/28    # HLD  - Atorvastatin 80 mg daily    # DM 2  - A1C 8.6  - Metformin     # h/o ETOH abuse  -  MVI, thiamine and folic acid    # GI/Bowel:  - Senna QHS, Miralax Daily--> dc standing 11/28, patient refusing. miralax switched PRN  - dulcolax PO     # FEN   - DASH DIET     # DVT ppx  - Eliquis 5mg BID    # Case discussed in IDT rounds 11/28 (follow up):  - supervision transfers, independent bed mobiltiy, supervision ambulation 150 feet without assist device/CG, set up UB/CG LB dressing, set up eating and grooming. Reduced memory, reduced initiation. Negotiates 12 steps with 1 HR and CS  - goals: supervision iADLs, supervision bADLs requires cues. Discussed with patient and in teams; patient not safe with intermittent supervision waking hours, requires continuous oversight  - Medicare, able to downgrade to alternate level of care.  D/C ASAP upon placement.         # LABs  CBC BMP 11/30      Lety # [phone].  [phone] home.      Outpatient Follow-up (Specialty/Name of physician):    Rj Delgado  Cardiac Electrophysiology  [address] [facility], [address]  [place] [address]  Phone: [phone]  Fax: [phone]  Follow Up Time:     Wily Barkley  Neurology  [address] [place] [address]  Phone: [phone]  Fax: [phone]  Follow Up Time: 1 week    Denzel Zapata  Interventional Cardiology  [address] [facility], [address]  [place] [address]  Phone: [phone]  Fax: [phone]  Follow Up Time: 1 week    PCP,   Phone: (   )    -  Fax: (   )    -  Follow Up Time: 1 week    Arsenio Goodrich  Interventional Neuroradiology  [address] [place] [address]  Phone: [phone]  Fax: [phone]                   57 y/o M with a PHx HTN, DM, CVA sp tnK with full resolution symptoms, who presented to [facility] on 10/12 s/p fall +LOC, found to have left temporal occipital lobe infarctions and acute punctate left occipital lobe infarction.    # Occluded R MCA/PCA  - s/p thrombectomy  - Eliquis 5mg BID given extensive atherosclerotic disease. Continue for 3 months.    - Atorvastatin 80mg qhs.   - cont comprehensive rehab program PT, OT 3 hours daily 5 x week.  - neuropsychology support, recreation therapy  - Precautions: cardiac, DM, respiratory, breast CA, fall, aspiration.    # Memory deficits  - Memantine 10mg BID   - Donepezil 10mg  - EKG QTc  460 on 11/7     # Bradycardia, stable   - Metoprolol dc  - per electrophysiology notes, no indication for pacemaker, s/p ILR on 10/20  - needs EP follow up as an outpatient    # HTN  - currently off anti HTN meds  - BP (132/81 - 136/84) 11/28    # HLD  - Atorvastatin 80 mg daily    # DM 2  - A1C 8.6  - Metformin     # h/o ETOH abuse  -  MVI, thiamine and folic acid    # GI/Bowel:  - Senna QHS, Miralax Daily--> dc standing 11/28, patient refusing. miralax switched PRN  - dulcolax PO     # FEN   - DASH DIET     # DVT ppx  - Eliquis 5mg BID    # Case discussed in IDT rounds 11/28 (follow up):  - supervision transfers, independent bed mobiltiy, supervision ambulation 150 feet without assist device/CG, set up UB/CG LB dressing, set up eating and grooming. Reduced memory, reduced initiation. Negotiates 12 steps with 1 HR and CS  - goals: supervision iADLs, supervision bADLs requires cues. Discussed with patient and in teams; patient not safe with intermittent supervision waking hours, requires continuous oversight  - Medicare, able to downgrade to alternate level of care.  D/C ASAP upon placement.     Called Lety who was unavailable to talk (teaching a class), reached out to [place] to explain [facility] and plan for assisted living. Explained there may be a cost to the family not covered by Medicaid. Requested Lety call SW when able to answer any further questions on coverage.     # LABs  CBC BMP 11/30      Lety # [phone].  [phone] home.      Outpatient Follow-up (Specialty/Name of physician):    Rj Delgado  Cardiac Electrophysiology  [address] [facility], [address] [address]  Phone: [phone]  Fax: [phone]  Follow Up Time:     Wily Barkley  Neurology  [address] [place] [address]  Phone: [phone]  Fax: [phone]  Follow Up Time: 1 week    Denzel Zapata  Interventional Cardiology  [address] [place] [id number]  Phone: [phone]  Fax: [phone]  Follow Up Time: 1 week    PCP,   Phone: (   )    -  Fax: (   )    -  Follow Up Time: 1 week    Arsenio Goodrich  Interventional Neuroradiology  [address] [place] [address]  Phone: [phone]  Fax: [phone]

## 2023-11-29 PROCEDURE — 99232 SBSQ HOSP IP/OBS MODERATE 35: CPT

## 2023-11-29 PROCEDURE — 93010 ELECTROCARDIOGRAM REPORT: CPT

## 2023-11-29 RX ADMIN — Medication 1 TABLET(S): at 12:01

## 2023-11-29 RX ADMIN — Medication 100 MILLIGRAM(S): at 12:00

## 2023-11-29 RX ADMIN — APIXABAN 5 MILLIGRAM(S): 2.5 TABLET, FILM COATED ORAL at 06:29

## 2023-11-29 RX ADMIN — ATORVASTATIN CALCIUM 80 MILLIGRAM(S): 80 TABLET, FILM COATED ORAL at 21:44

## 2023-11-29 RX ADMIN — Medication 1 MILLIGRAM(S): at 12:01

## 2023-11-29 RX ADMIN — APIXABAN 5 MILLIGRAM(S): 2.5 TABLET, FILM COATED ORAL at 17:21

## 2023-11-29 RX ADMIN — LINAGLIPTIN 5 MILLIGRAM(S): 5 TABLET, FILM COATED ORAL at 12:01

## 2023-11-29 RX ADMIN — MEMANTINE HYDROCHLORIDE 10 MILLIGRAM(S): 10 TABLET ORAL at 06:29

## 2023-11-29 RX ADMIN — DONEPEZIL HYDROCHLORIDE 10 MILLIGRAM(S): 10 TABLET, FILM COATED ORAL at 08:33

## 2023-11-29 RX ADMIN — MEMANTINE HYDROCHLORIDE 10 MILLIGRAM(S): 10 TABLET ORAL at 17:22

## 2023-11-29 RX ADMIN — METFORMIN HYDROCHLORIDE 500 MILLIGRAM(S): 850 TABLET ORAL at 08:33

## 2023-11-29 RX ADMIN — METFORMIN HYDROCHLORIDE 500 MILLIGRAM(S): 850 TABLET ORAL at 17:21

## 2023-11-29 RX ADMIN — FAMOTIDINE 20 MILLIGRAM(S): 10 INJECTION INTRAVENOUS at 12:01

## 2023-11-29 RX ADMIN — CITALOPRAM 20 MILLIGRAM(S): 10 TABLET, FILM COATED ORAL at 12:01

## 2023-11-29 NOTE — PROGRESS NOTE ADULT - ASSESSMENT
57 y/o M with a PHx HTN, DM, CVA sp tnK with full resolution symptoms, who presented to [facility] on 10/12 s/p fall +LOC, found to have left temporal occipital lobe infarctions and acute punctate left occipital lobe infarction.    # Occluded R MCA/PCA  - s/p thrombectomy  - Eliquis 5mg BID given extensive atherosclerotic disease. Continue for 3 months.    - Atorvastatin 80mg qhs.   - cont comprehensive rehab program PT, OT 3 hours daily 5 x week.  - neuropsychology support, recreation therapy  - Precautions: cardiac, DM, respiratory,  fall, aspiration.    # Memory deficits  - Memantine 10mg BID   - Donepezil 10mg  - EKG QTc  445 on 11/10  --Check repeat EKG    # Bradycardia, stable   - off Metoprolol   - per electrophysiology notes, no indication for pacemaker, s/p ILR on 10/20  - needs EP follow up as an outpatient    # HTN  - currently off anti HTN meds  - BP controlled-- -140s    # HLD  - Atorvastatin 80 mg daily    # DM 2  - A1C 8.6  - Metformin     # h/o ETOH abuse  -  MVI, thiamine and folic acid    # GI/Bowel:  - Miralax  PRN      # FEN   - DASH DIET     # DVT ppx  - Eliquis 5mg BID    # Case discussed in IDT rounds 11/28 (follow up):  - supervision transfers, independent bed mobiltiy, supervision ambulation 150 feet without assist device/CG, set up UB/CG LB dressing, set up eating and grooming. Reduced memory, reduced initiation. Negotiates 12 steps with 1 HR and CS  - goals: supervision iADLs, supervision bADLs requires cues. Discussed with patient and in teams; patient not safe with intermittent supervision waking hours, requires continuous oversight  - Medicare, able to downgrade to alternate level of care.  D/C ASAP upon placement.     on 11/28-- Dr. Jamison Called Lety who was unavailable to talk (teaching a class), reached out to [place] to explain ALC and plan for assisted living. Explained there may be a cost to the family not covered by Medicaid. Requested Lety call SW when able to answer any further questions on coverage.     # LABs  CBC BMP 11/30      Lety # [phone].  [phone] home.      Outpatient Follow-up (Specialty/Name of physician):    Rj Delgado  Cardiac Electrophysiology  [address] [facility], [address] [address]  Phone: [phone]  Fax: [phone]  Follow Up Time:     Wily Barkley  Neurology  [address] [place] [address]  Phone: [phone]  Fax: [phone]  Follow Up Time: 1 week    Denzel Zapata  Interventional Cardiology  [address] [facility], [address] [address]  Phone: [phone]  Fax: [phone]  Follow Up Time: 1 week    PCP,   Phone: (   )    -  Fax: (   )    -  Follow Up Time: 1 week    Arsenio Goodrich  Interventional Neuroradiology  [address] [place] [id number]  Phone: [phone]  Fax: [phone]

## 2023-11-29 NOTE — CHART NOTE - NSCHARTNOTEFT_GEN_A_CORE
Attempted to call emergency contact, Selma Poon, to update on medical/functional status as well as goals/needs on discharge, but the call went to voicemail. Will attempt to call back again.
NUTRITION Follow Up Note    SOURCE: Patient [X]  Medical Record [X]  Nursing Staff [X]  Family Member []    DIET: Diet, Consistent Carbohydrate/No Snacks:   DASH/TLC {Sodium & Cholesterol Restricted} (DASH)  Supplement Feeding Modality:  Oral  Glucerna Shake Cans or Servings Per Day:  1       Frequency:  Two Times a day (10-27-23 @ 14:29) [Active]    Patient noted with good appetite/PO intakes, consuming % of meals at this time as per nursing flowsheets. T2DM noted. A1C: 8.6% (10/13/23). Addressed with [facility] diet. Reports good acceptance to Glucerna 8oz PO BID (Provides 440kcal-20grams of Protein). ETOH abuse noted. Continues with MVI, thiamine, and folic acid supplementation. Reinforced diet education. Food preferences obtained and noted on patients file with nutrition office.      EDEMA: No edema noted    LAST BM: 2023    SKIN: no pressure injuries noted    WEIGHT TRENDS:  Weight in k.4 (2023 23:43)      PERTINENT MEDICATIONS: MEDICATIONS  (STANDING):  apixaban 5 milliGRAM(s) Oral two times a day  atorvastatin 80 milliGRAM(s) Oral at bedtime  citalopram 20 milliGRAM(s) Oral daily  dextrose 50% Injectable 25 Gram(s) IV Push once  donepezil 10 milliGRAM(s) Oral with breakfast  famotidine    Tablet 20 milliGRAM(s) Oral daily  folic acid 1 milliGRAM(s) Oral daily  linagliptin 5 milliGRAM(s) Oral daily  memantine 10 milliGRAM(s) Oral two times a day  metFORMIN 500 milliGRAM(s) Oral two times a day  multivitamin 1 Tablet(s) Oral daily  thiamine 100 milliGRAM(s) Oral daily    MEDICATIONS  (PRN):  polyethylene glycol 3350 17 Gram(s) Oral daily PRN Constipation      PERTINENT LABS:   Na139 mmol/L Glu 94 mg/dL K+ 4.1 mmol/L Cr  0.92 mg/dL BUN 16 mg/dL  Alb 3.4 g/dL        ESTIMATED NEEDS:   [X] No Change Since Previous Assessment    PREVIOUS NUTRITION DIAGNOSIS:  Altered Nutrition Related Lab Values  related to T2DM  as evidenced by A1C 8.6%    NUTRITION DIAGNOSIS IS [X] Ongoing -     NEW NUTRITION DIAGNOSIS: [X] Not Applicable    INTERVENTIONS:   1. Recommend continue current nutrition plan of care as tolerated   2. Provide ongoing diet education as needed   3. Monitor daily PO intakes, GI tolerance, labs, weights, skin integrity, & BM regularity     MONITORING & EVALUATION:   1. Weights   2. PO intakes   3. Skin integrity   4. Tolerance to diet prescription   5. Labs & POCT  6. Follow up (per protocol)    Registered Dietitian/Nutritionist Remains Available.  Juanjo Pimentel RD, CDN    Contact: [phone] or via MS TEAMS
Nutrition Follow Up Note  Hospital Course   (Per Electronic Medical Record)    Source:  Patient [X]  Nursing Staff [X]   Medical Record [X]      Diet: Diet, Consistent Carbohydrate/No Snacks:   DASH/TLC {Sodium & Cholesterol Restricted} (DASH)  Supplement Feeding Modality:  Oral  Glucerna Shake Cans or Servings Per Day:  1       Frequency:  Two Times a day (10-27-23 @ 14:29) [Active]    At this time patient tolerating diet w/ adequate appetite/intake consuming % of meals. Reports good acceptance of Glucerna 8oz PO Daily (Provides 220kcal-10grams of Protein). No issues w/ dentition or chewing and swallowing current diet texture. Denies N/V/C/D, last BM 11/1 Per nursing flowsheets. Reports good acceptance and understanding of Heart Healthy Consistent Carbohydrate meal pattern. Noted: POCT 97-127mg/dL in last 24 hours.      Enteral/Parenteral Nutrition: Not Applicable    Current Weight: 246.4lb on 11/2    Pertinent Medications: MEDICATIONS  (STANDING):  apixaban 5 milliGRAM(s) Oral two times a day  atorvastatin 80 milliGRAM(s) Oral at bedtime  bisacodyl 5 milliGRAM(s) Oral at bedtime  citalopram 20 milliGRAM(s) Oral daily  dextrose 50% Injectable 25 Gram(s) IV Push once  famotidine    Tablet 20 milliGRAM(s) Oral daily  folic acid 1 milliGRAM(s) Oral daily  insulin lispro (ADMELOG) corrective regimen sliding scale   SubCutaneous Before meals and at bedtime  metFORMIN 500 milliGRAM(s) Oral two times a day  multivitamin 1 Tablet(s) Oral daily  polyethylene glycol 3350 17 Gram(s) Oral daily  senna 2 Tablet(s) Oral at bedtime  thiamine 100 milliGRAM(s) Oral daily    MEDICATIONS  (PRN):      Pertinent Labs:  11-02 Na138 mmol/L Glu 129 mg/dL<H> K+ 4.3 mmol/L Cr  1.20 mg/dL BUN 20 mg/dL 11-02 Alb 3.8 g/dL 10-13 Chol 199 mg/dL LDL --    HDL 29 mg/dL<L> Trig 277 mg/dL<H>    Skin: No pressure injury per nursing flowsheets, Surgical Incision, Site chest wall     Edema: No edema noted per nursing flowsheet    Last Bowel Movement: on 111/1 Per nursing flowsheets     Estimated Needs:   [X] No Change Since Previous Assessment    Previous Nutrition Diagnosis:   Altered Nutrition Related Lab Values  related to T2DM  as evidenced by A1C 8.6%    Nutrition Diagnosis is [X] Ongoing - addressed w/ Provided Heart Healthy Consistent Carbohydrate meal pattern    New Nutrition Diagnosis: [X] Not Applicable    Interventions:   1. Recommend continuing with current plan of care  2. Ongoing diet education     Monitoring & Evaluation:   [X] Weights   [X] PO Intake   [X] Skin Integrity   [X] Follow Up (Per Protocol)  [X] Tolerance to Diet Prescription   [X] Other: Labs     Registered Dietitian/Nutritionist Remains Available.  Mary Pimentel RD, MS, CDN    Phone# [phone]
Spoke with patient's sister, Selma, regarding functional/medical progress. Explained that we had a meeting with PT/OT/SLP/Nursing and received updates on therapy sessions. If discharged home the patient will need help during his recovery process. He still requires assistance with ADLs and ambulation. Explained that we are applying to [facility] to see if he will be accepted, but there is no guarantee. Sister stated that family cannot supervise/help patient if he were to go home. His sisters work full time and cannot provide services. Will discuss with social work and PM&R team.
[facility] Plan of Care    REHABILITATION DIAGNOSIS:  Cerebral infarction          COMORBIDITIES/COMPLICATING CONDITIONS IMPACTING REHABILITATION:  HEALTH ISSUES - PROBLEM Dx:        PAST MEDICAL & SURGICAL HISTORY:  HTN (hypertension)      CVA (cerebrovascular accident)      History of hip surgery          Based upon consideration of the patient's impairments, functional status, complicating conditions and any other contributing factors and after information garnered from the assessments of all therapy disciplines involved in treating the patient and other pertinent clinicians:    INTERDISCIPLINARY REHABILITATION INTERVENTIONS:    [ X  ] Transfer Training  [ X  ] Bed Mobility  [ X  ] Therapeutic Exercise  [ X ] Balance/Coordination Exercises  [ X ] Locomotion retraining  [ X  ] Stairs  [  X ] Functional Transfer Training  [ X  ] Bowel/Bladder program  [ X  ] Pain Management  [ X  ] Skin/Wound Care  [ X  ] Visual/Perceptual Training  [ X  ] Therapeutic Recreation Activities  [  X ] Neuromuscular Re-education  [ X  ] Activities of Daily Living  [ X  ] Speech Exercise  [X   ] Swallowing Exercises  [   ] Vital Stim  [   ] Dietary Supplements  [   ] Calorie Count  [ X  ] Cognitive Exercises  [  X ] Cognitive/Linguistic Treatment  [  x ] Behavior Program  [  x ] Neuropsych Therapy  [ X  ] Patient/Family Counseling  [ X ] Family Training  [ X  ] Community Re-entry  [   ] Orthotic Evaluation  [   ] Prosthetic Eval/Training    MEDICAL PROGNOSIS: good      REHAB POTENTIAL:  Good    EXPECTED DAILY THERAPY:  3 hours/day           ESTIMATED LOS:  [  ] 5-7 Days  [  ] 7-10 Days  [ x ] 10- 14 Days  [ x ] 14- 18 Days  [  ] 18- 21 Days    ESTIMATED DISPOSITION:  [  ] Home   [ x ] Home with Outpatient Therapies  [ x ] Home with Home Therapies  [  ] Assisted Living  [  ] Nursing Home  [  ] Long Term Acute Care    INTERDISCIPLINARY FUNCTIONAL OUTCOMES/GOALS:         Gait/Mobility: Supervision       Transfers: Modified Independent       ADLs:  Supervision/ Minimal Assist/ Contact Guard       Functional Transfers: Modified Independent/Supervision       Medication Management: Modified Independent       Communication:  Independent       Cognitive: Modified Independent/supervision       Dysphagia: Independent       Bladder Independent       Bowel: Independent     Functional Independent Measures:   7 = Independent  6 = Modified Independent  5 = Supervision  4 = Minimal Assist/ Contact Guard  3 = Moderate Assistance  2 = Maximum Assistance  1 = Total Assistance  0 = Unable to assess
NUTRITION FOLLOW UP    SOURCE: Patient [X)   Family [ ]    Medical Record (X)    Diet, Consistent Carbohydrate/No Snacks:   DASH/TLC {Sodium & Cholesterol Restricted} (DASH)  Supplement Feeding Modality:  Oral  Glucerna Shake Cans or Servings Per Day:  1       Frequency:  Two Times a day (10-27-23 @ 14:29) [Active]        At this time patient tolerating diet w/ adequate appetite/intake consuming % of meals. Observed with 100% intake of lunch meal today. Reports good acceptance of Glucerna 8oz PO BID (Provides 220kcal-10grams of Protein)- states that he would like to continue. No issues w/ dentition or chewing and swallowing current diet texture. Denies N/V/D, constipation, last BM 11/14 Per nursing flowsheets. Reports good acceptance and understanding of Heart Healthy Consistent Carbohydrate meal pattern. Noted: POCT d/c'd on 11/6.  A1c 8.6%.       CURRENT WEIGHT:  249.3lbs (11/15)   246.4lb on 11/2    PERTINENT MEDS:   Pertinent Medications: MEDICATIONS  (STANDING):  apixaban 5 milliGRAM(s) Oral two times a day  atorvastatin 80 milliGRAM(s) Oral at bedtime  bisacodyl 5 milliGRAM(s) Oral at bedtime  citalopram 20 milliGRAM(s) Oral daily  dextrose 50% Injectable 25 Gram(s) IV Push once  donepezil 10 milliGRAM(s) Oral with breakfast  famotidine    Tablet 20 milliGRAM(s) Oral daily  folic acid 1 milliGRAM(s) Oral daily  linagliptin 5 milliGRAM(s) Oral daily  memantine 10 milliGRAM(s) Oral two times a day  metFORMIN 500 milliGRAM(s) Oral two times a day  multivitamin 1 Tablet(s) Oral daily  polyethylene glycol 3350 17 Gram(s) Oral daily  senna 2 Tablet(s) Oral at bedtime  thiamine 100 milliGRAM(s) Oral daily    MEDICATIONS  (PRN):      PERTINENT LABS:  11-13 Na141 mmol/L Glu 101 mg/dL<H> K+ 4.3 mmol/L Cr  1.00 mg/dL BUN 20 mg/dL 11-13 Alb 3.5 g/dL    CAPILLARY BLOOD GLUCOSE    Skin: No pressure injury per nursing flowsheets, Surgical Incision, Site chest wall     Edema: No edema noted per nursing flowsheet    Last Bowel Movement: on 11/14 Per nursing flowsheets     Estimated Needs:   [X] No Change Since Previous Assessment    Previous Nutrition Diagnosis:   Altered Nutrition Related Lab Values  related to T2DM  as evidenced by A1C 8.6%    Nutrition Diagnosis is [X] Ongoing - addressed w/ Provided Heart Healthy Consistent Carbohydrate meal pattern    New Nutrition Diagnosis: [X] Not Applicable    NUTRITION RECOMMENDATIONS:   1. Continue plan of care  2. Can d/c glucerna if pt receptive  3. Ongoing diet education    MONITORING AND EVALUATION:   1. Tolerance to diet prescription   2. PO intake  3. Weights  4. Labs  5. Follow Up per protocol     RD to remain available   Fatuma Boothe RDN   [phone] or TEAMS
NUTRITION Follow Up Note    SOURCE: Patient [X]  Medical Record [X]  Nursing Staff [X]  Family Member []    DIET: Diet, Consistent Carbohydrate/No Snacks:   DASH/TLC {Sodium & Cholesterol Restricted} (DASH)  Supplement Feeding Modality:  Oral  Glucerna Shake Cans or Servings Per Day:  1       Frequency:  Two Times a day (10-27-23 @ 14:29) [Active]    Patient noted with good appetite/PO intakes, consuming % of meals as per nursing flowsheets. Continues with Glucerna 8oz PO BID (Provides 440kcal-20grams of Protein). T2DM noted. A1C: 8.6% (10/13/23). Addressed with [facility] diet. Food preferences obtained and noted on patients file with nutrition office.. Recommend continue current nutrition plan of care as tolerated.    EDEMA: no edema noted    LAST BM: 2023    SKIN: no pressure injuries noted    WEIGHT TRENDS:  Weight in k.8 (2023 22:14)      PERTINENT MEDICATIONS: MEDICATIONS  (STANDING):  apixaban 5 milliGRAM(s) Oral two times a day  atorvastatin 80 milliGRAM(s) Oral at bedtime  bisacodyl 5 milliGRAM(s) Oral at bedtime  citalopram 20 milliGRAM(s) Oral daily  dextrose 50% Injectable 25 Gram(s) IV Push once  donepezil 5 milliGRAM(s) Oral with breakfast  famotidine    Tablet 20 milliGRAM(s) Oral daily  folic acid 1 milliGRAM(s) Oral daily  memantine 5 milliGRAM(s) Oral two times a day  metFORMIN 500 milliGRAM(s) Oral two times a day  multivitamin 1 Tablet(s) Oral daily  polyethylene glycol 3350 17 Gram(s) Oral daily  senna 2 Tablet(s) Oral at bedtime  thiamine 100 milliGRAM(s) Oral daily    MEDICATIONS  (PRN):      PERTINENT LABS:   Na141 mmol/L Glu 111 mg/dL<H> K+ 5.1 mmol/L Cr  1.05 mg/dL BUN 16 mg/dL  Alb 3.6 g/dL 10-13 Chol 199 mg/dL LDL --    HDL 29 mg/dL<L> Trig 277 mg/dL<H>        ESTIMATED NEEDS:   [X] No Change Since Previous Assessment    PREVIOUS NUTRITION DIAGNOSIS:  Altered Nutrition Related Lab Values  related to T2DM  as evidenced by A1C 8.6%    NUTRITION DIAGNOSIS IS [X] Ongoing -     NEW NUTRITION DIAGNOSIS: [X] Not Applicable    INTERVENTIONS:   1. Recommend continue current nutrition plan of care as tolerated   2. Provide ongoing diet education as needed   3. Monitor daily PO intakes, GI tolerance, labs, weights, skin integrity, & BM regularity     MONITORING & EVALUATION:   1. Weights   2. PO intakes   3. Skin integrity   4. Tolerance to diet prescription   5. Labs & POCT  6. Follow up (per protocol)    Registered Dietitian/Nutritionist Remains Available.  Juanjo Pimentel RD, CDN    Contact: [phone] or via MS TEAMS
NUTRITION Follow Up Note    SOURCE: Patient [X]  Medical Record [X]  Nursing Staff [X]  Family Member []    DIET: Diet, Consistent Carbohydrate/No Snacks:   DASH/TLC {Sodium & Cholesterol Restricted} (DASH)  Supplement Feeding Modality:  Oral  Glucerna Shake Cans or Servings Per Day:  1       Frequency:  Two Times a day (10-27-23 @ 14:29) [Active]    Patient noted with good appetite/PO intakes, consuming % of meals at this time. T2DM noted. A1C: 8.6% (10/13/23). [facility] Reports good acceptance to Glucerna 8oz PO BID (Provides 440kcal-20grams of Protein). ETOH abuse noted. Continues with MVI, thiamine, and folic acid supplementation. Reinforced diet education. Food preferences obtained and noted on patients file with nutrition office.    EDEMA: No edema noted    LAST BM: 11/21/23    SKIN: No pressure injuries noted    WEIGHT TRENDS:  11/14/23 - 113.1 kG  11/13/23 - 118 kG  11/12/23 - 111.8 KG  11/11/23 - 111.8 kG      PERTINENT MEDICATIONS: MEDICATIONS  (STANDING):  apixaban 5 milliGRAM(s) Oral two times a day  atorvastatin 80 milliGRAM(s) Oral at bedtime  bisacodyl 5 milliGRAM(s) Oral at bedtime  citalopram 20 milliGRAM(s) Oral daily  dextrose 50% Injectable 25 Gram(s) IV Push once  donepezil 10 milliGRAM(s) Oral with breakfast  famotidine    Tablet 20 milliGRAM(s) Oral daily  folic acid 1 milliGRAM(s) Oral daily  linagliptin 5 milliGRAM(s) Oral daily  memantine 10 milliGRAM(s) Oral two times a day  metFORMIN 500 milliGRAM(s) Oral two times a day  multivitamin 1 Tablet(s) Oral daily  polyethylene glycol 3350 17 Gram(s) Oral daily  senna 2 Tablet(s) Oral at bedtime  thiamine 100 milliGRAM(s) Oral daily    MEDICATIONS  (PRN):      PERTINENT LABS:  11-20 Na141 mmol/L Glu 107 mg/dL<H> K+ 5.1 mmol/L Cr  1.10 mg/dL BUN 17 mg/dL 11-20 Alb 3.4 g/dL        ESTIMATED NEEDS:   [X] No Change Since Previous Assessment    PREVIOUS NUTRITION DIAGNOSIS:  Altered Nutrition Related Lab Values  related to T2DM  as evidenced by A1C 8.6%    NUTRITION DIAGNOSIS IS [X] Ongoing - Addressed with Glucerna 8oz PO BID (Provides 440kcal-20grams of Protein)     NEW NUTRITION DIAGNOSIS: [X] Not Applicable    INTERVENTIONS:   1. Recommend continue current nutrition plan of care as tolerated   2. Provide ongoing diet education as needed   3. Monitor daily PO intakes, GI tolerance, labs, weights, skin integrity, & BM regularity     MONITORING & EVALUATION:   1. Weights   2. PO intakes   3. Skin integrity   4. Tolerance to diet prescription   5. Labs & POCT  6. Follow up (per protocol)    Registered Dietitian/Nutritionist Remains Available.  Juanjo Pimentel RD, CDN    Contact: [phone] or via MS TEAMS

## 2023-11-29 NOTE — CHART NOTE - NSCHARTNOTESELECT_GEN_ALL_CORE
Nutrition Services
Event Note
Event Note
IPOC/Event Note
Nutrition Services
Nutrition Services

## 2023-11-29 NOTE — PROGRESS NOTE ADULT - SUBJECTIVE AND OBJECTIVE BOX
HPI:  Patient is a 57 y/o M with a PHx HTN, DM, stroke one year ago (at the time patient received Tenecteplase and had full resolution of symptoms), who presented to [facility] on 10/12/23 following sudden fall at work and lost consciousness. NIHSS 10 on admission. Admitted under NeuroICU on 10/12. Imaging revealed ischemia in the posterior circulation, CT angiogram head/neck showed right PCA P1-2 cutoff, possibly chronic right MCA M1 cutoff which had distal reconstitution and established collaterals, and left PCA occlusion which patient was subsequently transferred for mechanical thrombectomy.    Cerebral angiogram and mechanical thrombectomy performed using aspiration with TICI 3 recanalization of left PCA. The right occipital lobe filled from right SHANI collaterals, and the right MCA as well was thought to be chronic given noted collateralization. MR with small to moderate left temporal occipital lobe infarctions. Occluded right MCA, occluded right PCA and right vertebral artery proximal segment. The patient was started on a hep gtt given extensive atherosclerotic disease which was switched to apixaban 5mg BID with recommendations for a three month course.    TTE with normal left ventricular internal cavity size. Left ventricular ejection fractio 50 to 55%. Per Neuro, no need for ROCIO at this time. Pt had an episode of bradycardia during his stay and there were concern for 2:1 block. ILR placed 10/20 and no indication for PPM at this time.     Patient was evaluated by PM&R and therapy for functional deficits, gait/ADL impairments and acute rehabilitation was recommended. Patient was medically optimized for discharge to [facility] IR[facility] on 10/27/23.  (27 Oct 2023 12:30)          Subjective:  No new complaints.  Slept during the night.  Feels his memory is improving, states left sided numbness is the same.       VITALS  Vital Signs Last 24 Hrs  T(C): 36.6 (29 Nov 2023 08:05), Max: 36.6 (29 Nov 2023 08:05)  T(F): 97.8 (29 Nov 2023 08:05), Max: 97.8 (29 Nov 2023 08:05)  HR: 58 (29 Nov 2023 08:05) (53 - 58)  BP: 138/96 (29 Nov 2023 08:05) (130/86 - 149/85)  BP(mean): --  RR: 16 (29 Nov 2023 08:05) (14 - 16)  SpO2: 96% (29 Nov 2023 08:05) (94% - 96%)    Parameters below as of 29 Nov 2023 08:05  Patient On (Oxygen Delivery Method): room air        REVIEW OF SYMPTOMS  Neurological deficits      PHYSICAL EXAM  Gen - NAD, Comfortable  HEENT - NCAT, EOMI, MMM  Neck - Supple, No limited ROM  Pulm - good chest expansion, no conversational dyspnea  Cardiovascular - warm and well perfused  Abdomen - Soft, NT,   Extremities - No edema, No calf tenderness  Neuro-     Cognitive - AAOx3     Communication - Fluent, No dysarthria     Memory: Recall 3 objects immediate and 3 min later  -- improved     Cranial Nerves - CN 2-12 intact except left facial numbness     Motor strength WFL 5/5s, tested supine. Still requires supervision and set-up for functional tasks.      Tone - normal  Psychiatric - Mood stable, Affect WNL    RECENT LABS                  RADIOLOGY/OTHER RESULTS      MEDICATIONS  (STANDING):  apixaban 5 milliGRAM(s) Oral two times a day  atorvastatin 80 milliGRAM(s) Oral at bedtime  citalopram 20 milliGRAM(s) Oral daily  dextrose 50% Injectable 25 Gram(s) IV Push once  donepezil 10 milliGRAM(s) Oral with breakfast  famotidine    Tablet 20 milliGRAM(s) Oral daily  folic acid 1 milliGRAM(s) Oral daily  linagliptin 5 milliGRAM(s) Oral daily  memantine 10 milliGRAM(s) Oral two times a day  metFORMIN 500 milliGRAM(s) Oral two times a day  multivitamin 1 Tablet(s) Oral daily  thiamine 100 milliGRAM(s) Oral daily    MEDICATIONS  (PRN):  polyethylene glycol 3350 17 Gram(s) Oral daily PRN Constipation

## 2023-11-29 NOTE — PATIENT PROFILE ADULT - FUNCTIONAL ASSESSMENT - DAILY ACTIVITY 4.
The patient's goals for the shift include   Problem: Postpartum  Goal: Experiences normal postpartum course  Outcome: Progressing  Goal: Appropriate maternal -  bonding  Outcome: Progressing  Goal: Establish and maintain infant feeding pattern for adequate nutrition  Outcome: Progressing     Problem: Pain - Adult  Goal: Verbalizes/displays adequate comfort level or baseline comfort level  Outcome: Progressing     Problem: Safety - Adult  Goal: Free from fall injury  Outcome: Progressing       VSS, bleeding and swelling minimal, pain controlled with medications, breastfeeding/pumping.    3 = A little assistance

## 2023-11-30 LAB
ALBUMIN SERPL ELPH-MCNC: 3.5 G/DL — SIGNIFICANT CHANGE UP (ref 3.3–5)
ALBUMIN SERPL ELPH-MCNC: 3.5 G/DL — SIGNIFICANT CHANGE UP (ref 3.3–5)
ALP SERPL-CCNC: 76 U/L — SIGNIFICANT CHANGE UP (ref 40–120)
ALP SERPL-CCNC: 76 U/L — SIGNIFICANT CHANGE UP (ref 40–120)
ALT FLD-CCNC: 29 U/L — SIGNIFICANT CHANGE UP (ref 10–45)
ALT FLD-CCNC: 29 U/L — SIGNIFICANT CHANGE UP (ref 10–45)
ANION GAP SERPL CALC-SCNC: 9 MMOL/L — SIGNIFICANT CHANGE UP (ref 5–17)
ANION GAP SERPL CALC-SCNC: 9 MMOL/L — SIGNIFICANT CHANGE UP (ref 5–17)
AST SERPL-CCNC: 19 U/L — SIGNIFICANT CHANGE UP (ref 10–40)
AST SERPL-CCNC: 19 U/L — SIGNIFICANT CHANGE UP (ref 10–40)
BASOPHILS # BLD AUTO: 0.02 K/UL — SIGNIFICANT CHANGE UP (ref 0–0.2)
BASOPHILS # BLD AUTO: 0.02 K/UL — SIGNIFICANT CHANGE UP (ref 0–0.2)
BASOPHILS NFR BLD AUTO: 0.3 % — SIGNIFICANT CHANGE UP (ref 0–2)
BASOPHILS NFR BLD AUTO: 0.3 % — SIGNIFICANT CHANGE UP (ref 0–2)
BILIRUB SERPL-MCNC: 0.5 MG/DL — SIGNIFICANT CHANGE UP (ref 0.2–1.2)
BILIRUB SERPL-MCNC: 0.5 MG/DL — SIGNIFICANT CHANGE UP (ref 0.2–1.2)
BUN SERPL-MCNC: 14 MG/DL — SIGNIFICANT CHANGE UP (ref 7–23)
BUN SERPL-MCNC: 14 MG/DL — SIGNIFICANT CHANGE UP (ref 7–23)
CALCIUM SERPL-MCNC: 9.3 MG/DL — SIGNIFICANT CHANGE UP (ref 8.4–10.5)
CALCIUM SERPL-MCNC: 9.3 MG/DL — SIGNIFICANT CHANGE UP (ref 8.4–10.5)
CHLORIDE SERPL-SCNC: 104 MMOL/L — SIGNIFICANT CHANGE UP (ref 96–108)
CHLORIDE SERPL-SCNC: 104 MMOL/L — SIGNIFICANT CHANGE UP (ref 96–108)
CO2 SERPL-SCNC: 27 MMOL/L — SIGNIFICANT CHANGE UP (ref 22–31)
CO2 SERPL-SCNC: 27 MMOL/L — SIGNIFICANT CHANGE UP (ref 22–31)
CREAT SERPL-MCNC: 0.97 MG/DL — SIGNIFICANT CHANGE UP (ref 0.5–1.3)
CREAT SERPL-MCNC: 0.97 MG/DL — SIGNIFICANT CHANGE UP (ref 0.5–1.3)
EGFR: 91 ML/MIN/1.73M2 — SIGNIFICANT CHANGE UP
EGFR: 91 ML/MIN/1.73M2 — SIGNIFICANT CHANGE UP
EOSINOPHIL # BLD AUTO: 0.16 K/UL — SIGNIFICANT CHANGE UP (ref 0–0.5)
EOSINOPHIL # BLD AUTO: 0.16 K/UL — SIGNIFICANT CHANGE UP (ref 0–0.5)
EOSINOPHIL NFR BLD AUTO: 2.8 % — SIGNIFICANT CHANGE UP (ref 0–6)
EOSINOPHIL NFR BLD AUTO: 2.8 % — SIGNIFICANT CHANGE UP (ref 0–6)
GLUCOSE SERPL-MCNC: 106 MG/DL — HIGH (ref 70–99)
GLUCOSE SERPL-MCNC: 106 MG/DL — HIGH (ref 70–99)
HCT VFR BLD CALC: 39.9 % — SIGNIFICANT CHANGE UP (ref 39–50)
HCT VFR BLD CALC: 39.9 % — SIGNIFICANT CHANGE UP (ref 39–50)
HGB BLD-MCNC: 13.6 G/DL — SIGNIFICANT CHANGE UP (ref 13–17)
HGB BLD-MCNC: 13.6 G/DL — SIGNIFICANT CHANGE UP (ref 13–17)
IMM GRANULOCYTES NFR BLD AUTO: 0.3 % — SIGNIFICANT CHANGE UP (ref 0–0.9)
IMM GRANULOCYTES NFR BLD AUTO: 0.3 % — SIGNIFICANT CHANGE UP (ref 0–0.9)
LYMPHOCYTES # BLD AUTO: 1.02 K/UL — SIGNIFICANT CHANGE UP (ref 1–3.3)
LYMPHOCYTES # BLD AUTO: 1.02 K/UL — SIGNIFICANT CHANGE UP (ref 1–3.3)
LYMPHOCYTES # BLD AUTO: 17.7 % — SIGNIFICANT CHANGE UP (ref 13–44)
LYMPHOCYTES # BLD AUTO: 17.7 % — SIGNIFICANT CHANGE UP (ref 13–44)
MCHC RBC-ENTMCNC: 31.7 PG — SIGNIFICANT CHANGE UP (ref 27–34)
MCHC RBC-ENTMCNC: 31.7 PG — SIGNIFICANT CHANGE UP (ref 27–34)
MCHC RBC-ENTMCNC: 34.1 GM/DL — SIGNIFICANT CHANGE UP (ref 32–36)
MCHC RBC-ENTMCNC: 34.1 GM/DL — SIGNIFICANT CHANGE UP (ref 32–36)
MCV RBC AUTO: 93 FL — SIGNIFICANT CHANGE UP (ref 80–100)
MCV RBC AUTO: 93 FL — SIGNIFICANT CHANGE UP (ref 80–100)
MONOCYTES # BLD AUTO: 0.47 K/UL — SIGNIFICANT CHANGE UP (ref 0–0.9)
MONOCYTES # BLD AUTO: 0.47 K/UL — SIGNIFICANT CHANGE UP (ref 0–0.9)
MONOCYTES NFR BLD AUTO: 8.1 % — SIGNIFICANT CHANGE UP (ref 2–14)
MONOCYTES NFR BLD AUTO: 8.1 % — SIGNIFICANT CHANGE UP (ref 2–14)
NEUTROPHILS # BLD AUTO: 4.08 K/UL — SIGNIFICANT CHANGE UP (ref 1.8–7.4)
NEUTROPHILS # BLD AUTO: 4.08 K/UL — SIGNIFICANT CHANGE UP (ref 1.8–7.4)
NEUTROPHILS NFR BLD AUTO: 70.8 % — SIGNIFICANT CHANGE UP (ref 43–77)
NEUTROPHILS NFR BLD AUTO: 70.8 % — SIGNIFICANT CHANGE UP (ref 43–77)
NRBC # BLD: 0 /100 WBCS — SIGNIFICANT CHANGE UP (ref 0–0)
NRBC # BLD: 0 /100 WBCS — SIGNIFICANT CHANGE UP (ref 0–0)
PLATELET # BLD AUTO: 105 K/UL — LOW (ref 150–400)
PLATELET # BLD AUTO: 105 K/UL — LOW (ref 150–400)
POTASSIUM SERPL-MCNC: 4.2 MMOL/L — SIGNIFICANT CHANGE UP (ref 3.5–5.3)
POTASSIUM SERPL-MCNC: 4.2 MMOL/L — SIGNIFICANT CHANGE UP (ref 3.5–5.3)
POTASSIUM SERPL-SCNC: 4.2 MMOL/L — SIGNIFICANT CHANGE UP (ref 3.5–5.3)
POTASSIUM SERPL-SCNC: 4.2 MMOL/L — SIGNIFICANT CHANGE UP (ref 3.5–5.3)
PROT SERPL-MCNC: 6.8 G/DL — SIGNIFICANT CHANGE UP (ref 6–8.3)
PROT SERPL-MCNC: 6.8 G/DL — SIGNIFICANT CHANGE UP (ref 6–8.3)
RBC # BLD: 4.29 M/UL — SIGNIFICANT CHANGE UP (ref 4.2–5.8)
RBC # BLD: 4.29 M/UL — SIGNIFICANT CHANGE UP (ref 4.2–5.8)
RBC # FLD: 13.5 % — SIGNIFICANT CHANGE UP (ref 10.3–14.5)
RBC # FLD: 13.5 % — SIGNIFICANT CHANGE UP (ref 10.3–14.5)
SODIUM SERPL-SCNC: 140 MMOL/L — SIGNIFICANT CHANGE UP (ref 135–145)
SODIUM SERPL-SCNC: 140 MMOL/L — SIGNIFICANT CHANGE UP (ref 135–145)
WBC # BLD: 5.77 K/UL — SIGNIFICANT CHANGE UP (ref 3.8–10.5)
WBC # BLD: 5.77 K/UL — SIGNIFICANT CHANGE UP (ref 3.8–10.5)
WBC # FLD AUTO: 5.77 K/UL — SIGNIFICANT CHANGE UP (ref 3.8–10.5)
WBC # FLD AUTO: 5.77 K/UL — SIGNIFICANT CHANGE UP (ref 3.8–10.5)

## 2023-11-30 PROCEDURE — 99232 SBSQ HOSP IP/OBS MODERATE 35: CPT

## 2023-11-30 RX ORDER — AMLODIPINE BESYLATE 2.5 MG/1
2.5 TABLET ORAL DAILY
Refills: 0 | Status: DISCONTINUED | OUTPATIENT
Start: 2023-11-30 | End: 2023-12-01

## 2023-11-30 RX ADMIN — FAMOTIDINE 20 MILLIGRAM(S): 10 INJECTION INTRAVENOUS at 11:49

## 2023-11-30 RX ADMIN — CITALOPRAM 20 MILLIGRAM(S): 10 TABLET, FILM COATED ORAL at 11:49

## 2023-11-30 RX ADMIN — MEMANTINE HYDROCHLORIDE 10 MILLIGRAM(S): 10 TABLET ORAL at 17:18

## 2023-11-30 RX ADMIN — LINAGLIPTIN 5 MILLIGRAM(S): 5 TABLET, FILM COATED ORAL at 11:49

## 2023-11-30 RX ADMIN — METFORMIN HYDROCHLORIDE 500 MILLIGRAM(S): 850 TABLET ORAL at 07:56

## 2023-11-30 RX ADMIN — DONEPEZIL HYDROCHLORIDE 10 MILLIGRAM(S): 10 TABLET, FILM COATED ORAL at 07:56

## 2023-11-30 RX ADMIN — METFORMIN HYDROCHLORIDE 500 MILLIGRAM(S): 850 TABLET ORAL at 17:18

## 2023-11-30 RX ADMIN — ATORVASTATIN CALCIUM 80 MILLIGRAM(S): 80 TABLET, FILM COATED ORAL at 21:21

## 2023-11-30 RX ADMIN — Medication 1 MILLIGRAM(S): at 11:49

## 2023-11-30 RX ADMIN — Medication 1 TABLET(S): at 11:48

## 2023-11-30 RX ADMIN — MEMANTINE HYDROCHLORIDE 10 MILLIGRAM(S): 10 TABLET ORAL at 06:54

## 2023-11-30 RX ADMIN — APIXABAN 5 MILLIGRAM(S): 2.5 TABLET, FILM COATED ORAL at 17:18

## 2023-11-30 RX ADMIN — Medication 100 MILLIGRAM(S): at 11:48

## 2023-11-30 RX ADMIN — APIXABAN 5 MILLIGRAM(S): 2.5 TABLET, FILM COATED ORAL at 06:54

## 2023-11-30 NOTE — PROGRESS NOTE ADULT - SUBJECTIVE AND OBJECTIVE BOX
HPI:  Patient is a 59 y/o M with a PHx HTN, DM, stroke one year ago (at the time patient received Tenecteplase and had full resolution of symptoms), who presented to [facility] on 10/12/23 following sudden fall at work and lost consciousness. NIHSS 10 on admission. Admitted under NeuroICU on 10/12. Imaging revealed ischemia in the posterior circulation, CT angiogram head/neck showed right PCA P1-2 cutoff, possibly chronic right MCA M1 cutoff which had distal reconstitution and established collaterals, and left PCA occlusion which patient was subsequently transferred for mechanical thrombectomy.    Cerebral angiogram and mechanical thrombectomy performed using aspiration with TICI 3 recanalization of left PCA. The right occipital lobe filled from right SHANI collaterals, and the right MCA as well was thought to be chronic given noted collateralization. MR with small to moderate left temporal occipital lobe infarctions. Occluded right MCA, occluded right PCA and right vertebral artery proximal segment. The patient was started on a hep gtt given extensive atherosclerotic disease which was switched to apixaban 5mg BID with recommendations for a three month course.    TTE with normal left ventricular internal cavity size. Left ventricular ejection fractio 50 to 55%. Per Neuro, no need for ROCIO at this time. Pt had an episode of bradycardia during his stay and there were concern for 2:1 block. ILR placed 10/20 and no indication for PPM at this time.     Patient was evaluated by PM&R and therapy for functional deficits, gait/ADL impairments and acute rehabilitation was recommended. Patient was medically optimized for discharge to [facility] on 10/27/23.  (27 Oct 2023 12:30)          Subjective:  Seen this AM.  No new complaints.  Therapy going well.       VITALS  Vital Signs Last 24 Hrs  T(C): 37.1 (30 Nov 2023 07:57), Max: 37.1 (30 Nov 2023 07:57)  T(F): 98.8 (30 Nov 2023 07:57), Max: 98.8 (30 Nov 2023 07:57)  HR: 56 (30 Nov 2023 07:57) (56 - 58)  BP: 138/90 (30 Nov 2023 07:57) (136/68 - 138/90)  BP(mean): --  RR: 16 (30 Nov 2023 07:57) (16 - 16)  SpO2: 95% (30 Nov 2023 07:57) (95% - 96%)    Parameters below as of 30 Nov 2023 07:57  Patient On (Oxygen Delivery Method): room air        REVIEW OF SYMPTOMS  Neurological deficits      PHYSICAL EXAM  Gen - NAD, Comfortable  HEENT - NCAT, EOMI, MMM  Neck - Supple, No limited ROM  Pulm - good chest expansion, no conversational dyspnea  Cardiovascular - warm and well perfused  Abdomen - Soft, NT,   Extremities - No edema, No calf tenderness  Neuro-     Cognitive - AAOx3     Communication - Fluent, No dysarthria     Memory: Recall 2/3 spontaneously, 3/3 with cat. cues.       Cranial Nerves - CN 2-12 intact except left facial numbness     Motor strength WFL 5/5s, tested supine. Still requires supervision and set-up for functional tasks.      Tone - normal  Psychiatric - Mood stable, Affect WNL      RECENT LABS                        13.6   5.77  )-----------( 105      ( 30 Nov 2023 06:48 )             39.9     11-30    140  |  104  |  14  ----------------------------<  106<H>  4.2   |  27  |  0.97    Ca    9.3      30 Nov 2023 06:48    TPro  6.8  /  Alb  3.5  /  TBili  0.5  /  DBili  x   /  AST  19  /  ALT  29  /  AlkPhos  76  11-30      Urinalysis Basic - ( 30 Nov 2023 06:48 )    Color: x / Appearance: x / SG: x / pH: x  Gluc: 106 mg/dL / Ketone: x  / Bili: x / Urobili: x   Blood: x / Protein: x / Nitrite: x   Leuk Esterase: x / RBC: x / WBC x   Sq Epi: x / Non Sq Epi: x / Bacteria: x          RADIOLOGY/OTHER RESULTS      MEDICATIONS  (STANDING):  apixaban 5 milliGRAM(s) Oral two times a day  atorvastatin 80 milliGRAM(s) Oral at bedtime  citalopram 20 milliGRAM(s) Oral daily  dextrose 50% Injectable 25 Gram(s) IV Push once  donepezil 10 milliGRAM(s) Oral with breakfast  famotidine    Tablet 20 milliGRAM(s) Oral daily  folic acid 1 milliGRAM(s) Oral daily  linagliptin 5 milliGRAM(s) Oral daily  memantine 10 milliGRAM(s) Oral two times a day  metFORMIN 500 milliGRAM(s) Oral two times a day  multivitamin 1 Tablet(s) Oral daily  thiamine 100 milliGRAM(s) Oral daily    MEDICATIONS  (PRN):  polyethylene glycol 3350 17 Gram(s) Oral daily PRN Constipation

## 2023-11-30 NOTE — PROGRESS NOTE ADULT - ASSESSMENT
57 y/o M with a PHx HTN, DM, CVA sp tnK with full resolution symptoms, who presented to [facility] on 10/12 s/p fall +LOC, found to have left temporal occipital lobe infarctions and acute punctate left occipital lobe infarction.    # Occluded R MCA/PCA  - s/p thrombectomy  - Eliquis 5mg BID given extensive atherosclerotic disease. Continue for 3 months.    - Atorvastatin 80mg qhs.   - cont comprehensive rehab program PT, OT 3 hours daily 5 x week.  - neuropsychology support, recreation therapy  - Precautions: cardiac, DM, respiratory,  fall, aspiration.    # Memory deficits  - Memantine 10mg BID   - Donepezil 10mg  - EKG QTc  438 11/30      # Bradycardia, stable   - off Metoprolol   - per electrophysiology notes, no indication for pacemaker, s/p ILR on 10/20  - needs EP follow up as an outpatient    # HTN  - currently off anti HTN meds  - BP controlled-- -140s    # HLD  - Atorvastatin 80 mg daily    # DM 2  - A1C 8.6  - Metformin     # h/o ETOH abuse  -  MVI, thiamine and folic acid    # GI/Bowel:  - Miralax  PRN      # FEN   - DASH DIET     # DVT ppx  - Eliquis 5mg BID    # Case discussed in IDT rounds 11/28 (follow up):  - supervision transfers, independent bed mobiltiy, supervision ambulation 150 feet without assist device/CG, set up UB/CG LB dressing, set up eating and grooming. Reduced memory, reduced initiation. Negotiates 12 steps with 1 HR and CS  - goals: supervision iADLs, supervision bADLs requires cues. Discussed with patient and in teams; patient not safe with intermittent supervision waking hours, requires continuous oversight  - Medicare, able to downgrade to alternate level of care.  D/C ASAP upon placement.     on 11/28-- Dr. Jamison Called Lety who was unavailable to talk (teaching a class), reached out to [place] to explain ALC and plan for assisted living. Explained there may be a cost to the family not covered by Medicaid. Requested Lety call SW when able to answer any further questions on coverage.     # LABs  CBC BMP 11/30      Lety # [phone].  [phone] home.      Outpatient Follow-up (Specialty/Name of physician):    Rj Delgado  Cardiac Electrophysiology  [address] [address]  Phone: [phone]  Fax: [phone]  Follow Up Time:     Wily Barkley  Neurology  [address] [place] [address]  Phone: [phone]  Fax: [phone]  Follow Up Time: 1 week    Denzel Zapata  Interventional Cardiology  [address] [address]  Phone: [phone]  Fax: [phone]  Follow Up Time: 1 week    PCP,   Phone: (   )    -  Fax: (   )    -  Follow Up Time: 1 week    Arsenio Goodrich  Interventional Neuroradiology  [address] [id number]  Phone: [phone]  Fax: [phone]

## 2023-11-30 NOTE — PROGRESS NOTE ADULT - ASSESSMENT
58M SP CVA 2022 No Residual HTN DM  Came to [facility] Oct12 with LOC  Ischemic CVA SP Mechanical Thrombectomy  Started on Eliquis, SP ILR placement    #Ischemic CVA  - ASA Plavix High intensity Statin    #Bradycardia  - TTE EF 50-55%, no other structural abnormalities  - Metoprolol discontinued   - per electrophysiology notes, no indication for pacemaker, s/p ILR on 10/20  - needs EP follow up as an outpatient.    #intermittent Dizziness   -Encouraged fluid intake - improved now    #ETOH use disorder  -  in remission, stable    #HTN  - No meds at home  - did not tolerate lisinopril 5mg during hospitalization (intermittent hypotension)  - controlled rn. goal bp <130mmHG can follow up as outpatient  - nov28 if bp control is desired, may start norvasc 2.5mg qd  - Nov30. will start norvasc. discussed with patient    #DM 2  - Wasnt taking meds at home  - A1C on admission 8.6%  - restarted metformin, linagliptin on this admission  - controlled  - A1C 8.6  Nov28 Fasting sugars reviewd. good control    #Elevated TSH  - T4 level normal  - outpt f/u    #DVT ppx  - Eliquis

## 2023-12-01 PROCEDURE — 99232 SBSQ HOSP IP/OBS MODERATE 35: CPT

## 2023-12-01 RX ADMIN — ATORVASTATIN CALCIUM 80 MILLIGRAM(S): 80 TABLET, FILM COATED ORAL at 20:16

## 2023-12-01 RX ADMIN — CITALOPRAM 20 MILLIGRAM(S): 10 TABLET, FILM COATED ORAL at 12:05

## 2023-12-01 RX ADMIN — Medication 1 TABLET(S): at 12:06

## 2023-12-01 RX ADMIN — LINAGLIPTIN 5 MILLIGRAM(S): 5 TABLET, FILM COATED ORAL at 12:05

## 2023-12-01 RX ADMIN — MEMANTINE HYDROCHLORIDE 10 MILLIGRAM(S): 10 TABLET ORAL at 17:45

## 2023-12-01 RX ADMIN — Medication 1 MILLIGRAM(S): at 12:05

## 2023-12-01 RX ADMIN — MEMANTINE HYDROCHLORIDE 10 MILLIGRAM(S): 10 TABLET ORAL at 06:29

## 2023-12-01 RX ADMIN — DONEPEZIL HYDROCHLORIDE 10 MILLIGRAM(S): 10 TABLET, FILM COATED ORAL at 07:49

## 2023-12-01 RX ADMIN — APIXABAN 5 MILLIGRAM(S): 2.5 TABLET, FILM COATED ORAL at 17:45

## 2023-12-01 RX ADMIN — METFORMIN HYDROCHLORIDE 500 MILLIGRAM(S): 850 TABLET ORAL at 07:49

## 2023-12-01 RX ADMIN — AMLODIPINE BESYLATE 2.5 MILLIGRAM(S): 2.5 TABLET ORAL at 06:29

## 2023-12-01 RX ADMIN — METFORMIN HYDROCHLORIDE 500 MILLIGRAM(S): 850 TABLET ORAL at 17:45

## 2023-12-01 RX ADMIN — APIXABAN 5 MILLIGRAM(S): 2.5 TABLET, FILM COATED ORAL at 06:29

## 2023-12-01 RX ADMIN — Medication 100 MILLIGRAM(S): at 12:06

## 2023-12-01 RX ADMIN — FAMOTIDINE 20 MILLIGRAM(S): 10 INJECTION INTRAVENOUS at 12:06

## 2023-12-01 NOTE — PROGRESS NOTE ADULT - ASSESSMENT
57 y/o M with a PHx HTN, DM, CVA sp tnK with full resolution symptoms, who presented to [facility] on 10/12 s/p fall +LOC, found to have left temporal occipital lobe infarctions and acute punctate left occipital lobe infarction.    # Occluded R MCA/PCA  - s/p thrombectomy  - Eliquis 5mg BID given extensive atherosclerotic disease. Continue for 3 months.    - Atorvastatin 80mg qhs.   - cont comprehensive rehab program PT, OT 3 hours daily 5 x week.  - neuropsychology support, recreation therapy  - Precautions: cardiac, DM, respiratory,  fall, aspiration.    # Memory deficits  - Memantine 10mg BID   - Donepezil 10mg  - EKG QTc  438 11/30      # Bradycardia, stable   - off Metoprolol   - per electrophysiology notes, no indication for pacemaker, s/p ILR on 10/20  - needs EP follow up as an outpatient    # HTN  - currently off anti HTN meds--stopped amlodipine 2.5mg --d/w hospitalist  - BP controlled-- -140s  ---Requested nursing repeat this AM's BP as was elevated    # HLD  - Atorvastatin 80 mg daily    # DM 2  - A1C 8.6  - Metformin     # h/o ETOH abuse  -  MVI, thiamine and folic acid    # GI/Bowel:  - Miralax  PRN      # FEN   - DASH DIET     # DVT ppx  - Eliquis 5mg BID    # Case discussed in IDT rounds 11/28 (follow up):  - supervision transfers, independent bed mobiltiy, supervision ambulation 150 feet without assist device/CG, set up UB/CG LB dressing, set up eating and grooming. Reduced memory, reduced initiation. Negotiates 12 steps with 1 HR and CS  - goals: supervision iADLs, supervision bADLs requires cues. Discussed with patient and in teams; patient not safe with intermittent supervision waking hours, requires continuous oversight  - Medicare, able to downgrade to alternate level of care.  D/C ASAP upon placement.         # LABs  CBC BMP 11/30      Lety # [phone].  [phone] home.      Outpatient Follow-up (Specialty/Name of physician):    Rj Delgado  Cardiac Electrophysiology  [address] [facility], [address]  [place] [address]  Phone: [phone]  Fax: [phone]  Follow Up Time:     Wily Barkley  Neurology  [address] [place] [address]  Phone: [phone]  Fax: [phone]  Follow Up Time: 1 week    Denzel Zapata  Interventional Cardiology  [address] [facility], [address]  [place] [address]  Phone: [phone]  Fax: [phone]  Follow Up Time: 1 week    PCP,   Phone: (   )    -  Fax: (   )    -  Follow Up Time: 1 week    Arsenio Goodrich  Interventional Neuroradiology  [address] [place] [address]  Phone: [phone]  Fax: [phone]

## 2023-12-01 NOTE — PROGRESS NOTE ADULT - SUBJECTIVE AND OBJECTIVE BOX
HPI:  Patient is a 57 y/o M with a PHx HTN, DM, stroke one year ago (at the time patient received Tenecteplase and had full resolution of symptoms), who presented to [facility] on 10/12/23 following sudden fall at work and lost consciousness. NIHSS 10 on admission. Admitted under NeuroICU on 10/12. Imaging revealed ischemia in the posterior circulation, CT angiogram head/neck showed right PCA P1-2 cutoff, possibly chronic right MCA M1 cutoff which had distal reconstitution and established collaterals, and left PCA occlusion which patient was subsequently transferred for mechanical thrombectomy.    Cerebral angiogram and mechanical thrombectomy performed using aspiration with TICI 3 recanalization of left PCA. The right occipital lobe filled from right SHANI collaterals, and the right MCA as well was thought to be chronic given noted collateralization. MR with small to moderate left temporal occipital lobe infarctions. Occluded right MCA, occluded right PCA and right vertebral artery proximal segment. The patient was started on a hep gtt given extensive atherosclerotic disease which was switched to apixaban 5mg BID with recommendations for a three month course.    TTE with normal left ventricular internal cavity size. Left ventricular ejection fractio 50 to 55%. Per Neuro, no need for ROCIO at this time. Pt had an episode of bradycardia during his stay and there were concern for 2:1 block. ILR placed 10/20 and no indication for PPM at this time.     Patient was evaluated by PM&R and therapy for functional deficits, gait/ADL impairments and acute rehabilitation was recommended. Patient was medically optimized for discharge to [facility] on 10/27/23.  (27 Oct 2023 12:30)          Subjective:  Seen this AM in speech therapy.  No new complaints.  reports left sided numbness is the same.   Pt. has acceptance to [facility] but due to Medicaid change effective today, facility requesting insurance authorization-- so discharge delayed.       VITALS  Vital Signs Last 24 Hrs  T(C): 36.5 (01 Dec 2023 07:46), Max: 36.5 (01 Dec 2023 07:46)  T(F): 97.7 (01 Dec 2023 07:46), Max: 97.7 (01 Dec 2023 07:46)  HR: 58 (01 Dec 2023 07:46) (50 - 58)  BP: 170/80 (01 Dec 2023 07:46) (124/74 - 170/80)--Requested nursing repeat this AM's BP as was elevated  BP(mean): --  RR: 15 (01 Dec 2023 07:46) (14 - 15)  SpO2: 97% (01 Dec 2023 07:46) (96% - 97%)    Parameters below as of 01 Dec 2023 07:46  Patient On (Oxygen Delivery Method): room air        REVIEW OF SYMPTOMS  Neurological deficits      PHYSICAL EXAM  Gen - NAD, Comfortable  HEENT - NCAT, EOMI, MMM  Neck - Supple, No limited ROM  Pulm - good chest expansion, no conversational dyspnea  Cardiovascular - warm and well perfused  Abdomen - Soft, NT,   Extremities - No edema, No calf tenderness  Neuro-     Cognitive - AAOx3     Communication - Fluent, No dysarthria     Memory: Recall 2/3 spontaneously, 3/3 with cat. cues.       Cranial Nerves - CN 2-12 intact except left facial numbness     Motor strength WFL 5/5s, tested supine. Still requires supervision and set-up for functional tasks.      Tone - normal  Psychiatric - Mood stable, Affect WNL    RECENT LABS                        13.6   5.77  )-----------( 105      ( 30 Nov 2023 06:48 )             39.9     11-30    140  |  104  |  14  ----------------------------<  106<H>  4.2   |  27  |  0.97    Ca    9.3      30 Nov 2023 06:48    TPro  6.8  /  Alb  3.5  /  TBili  0.5  /  DBili  x   /  AST  19  /  ALT  29  /  AlkPhos  76  11-30      Urinalysis Basic - ( 30 Nov 2023 06:48 )    Color: x / Appearance: x / SG: x / pH: x  Gluc: 106 mg/dL / Ketone: x  / Bili: x / Urobili: x   Blood: x / Protein: x / Nitrite: x   Leuk Esterase: x / RBC: x / WBC x   Sq Epi: x / Non Sq Epi: x / Bacteria: x          RADIOLOGY/OTHER RESULTS      MEDICATIONS  (STANDING):  apixaban 5 milliGRAM(s) Oral two times a day  atorvastatin 80 milliGRAM(s) Oral at bedtime  citalopram 20 milliGRAM(s) Oral daily  dextrose 50% Injectable 25 Gram(s) IV Push once  donepezil 10 milliGRAM(s) Oral with breakfast  famotidine    Tablet 20 milliGRAM(s) Oral daily  folic acid 1 milliGRAM(s) Oral daily  linagliptin 5 milliGRAM(s) Oral daily  memantine 10 milliGRAM(s) Oral two times a day  metFORMIN 500 milliGRAM(s) Oral two times a day  multivitamin 1 Tablet(s) Oral daily  thiamine 100 milliGRAM(s) Oral daily    MEDICATIONS  (PRN):  polyethylene glycol 3350 17 Gram(s) Oral daily PRN Constipation

## 2023-12-01 NOTE — PROGRESS NOTE ADULT - SUBJECTIVE AND OBJECTIVE BOX
Patient is a 58y old  Male who presents with a chief complaint of CVA and left hemiparesis (01 Dec 2023 11:20)      SUBJECTIVE / OVERNIGHT EVENTS:  Pt seen and examined at bedside. No acute events overnight.  Pt denies cp, palpitations, sob, abd pain, N/V, fever, chills.    ROS:  All other review of systems negative    Allergies    No Known Allergies    Intolerances        MEDICATIONS  (STANDING):  apixaban 5 milliGRAM(s) Oral two times a day  atorvastatin 80 milliGRAM(s) Oral at bedtime  citalopram 20 milliGRAM(s) Oral daily  dextrose 50% Injectable 25 Gram(s) IV Push once  donepezil 10 milliGRAM(s) Oral with breakfast  famotidine    Tablet 20 milliGRAM(s) Oral daily  folic acid 1 milliGRAM(s) Oral daily  linagliptin 5 milliGRAM(s) Oral daily  memantine 10 milliGRAM(s) Oral two times a day  metFORMIN 500 milliGRAM(s) Oral two times a day  multivitamin 1 Tablet(s) Oral daily  thiamine 100 milliGRAM(s) Oral daily    MEDICATIONS  (PRN):  polyethylene glycol 3350 17 Gram(s) Oral daily PRN Constipation      Vital Signs Last 24 Hrs  T(C): 36.5 (01 Dec 2023 07:46), Max: 36.5 (01 Dec 2023 07:46)  T(F): 97.7 (01 Dec 2023 07:46), Max: 97.7 (01 Dec 2023 07:46)  HR: 58 (01 Dec 2023 07:46) (50 - 58)  BP: 170/80 (01 Dec 2023 07:46) (124/74 - 170/80)  BP(mean): --  RR: 15 (01 Dec 2023 07:46) (14 - 15)  SpO2: 97% (01 Dec 2023 07:46) (96% - 97%)    Parameters below as of 01 Dec 2023 07:46  Patient On (Oxygen Delivery Method): room air      CAPILLARY BLOOD GLUCOSE        I&O's Summary      PHYSICAL EXAM:  GENERAL: NAD, obese male   HEAD:  Atraumatic, Normocephalic  NECK: Supple, No JVD  CHEST/LUNG: Clear to auscultation bilaterally; No wheeze, nonlabored breathing  HEART: Regular rate and rhythm; No murmurs, rubs, or gallops  ABDOMEN: Soft, Nontender, Nondistended; Bowel sounds present  EXTREMITIES:  No clubbing, cyanosis, or edema  NEUROLOGY: AAOx3, non-focal  PSYCH: calm, appropriate mood    LABS:                        13.6   5.77  )-----------( 105      ( 30 Nov 2023 06:48 )             39.9     11-30    140  |  104  |  14  ----------------------------<  106<H>  4.2   |  27  |  0.97    Ca    9.3      30 Nov 2023 06:48    TPro  6.8  /  Alb  3.5  /  TBili  0.5  /  DBili  x   /  AST  19  /  ALT  29  /  AlkPhos  76  11-30          Urinalysis Basic - ( 30 Nov 2023 06:48 )    Color: x / Appearance: x / SG: x / pH: x  Gluc: 106 mg/dL / Ketone: x  / Bili: x / Urobili: x   Blood: x / Protein: x / Nitrite: x   Leuk Esterase: x / RBC: x / WBC x   Sq Epi: x / Non Sq Epi: x / Bacteria: x        RADIOLOGY & ADDITIONAL TESTS:  Results Reviewed:   Imaging Personally Reviewed:  Electrocardiogram Personally Reviewed:    COORDINATION OF CARE:  Care Discussed with Consultants/Other Providers [Y/N]:  Prior or Outpatient Records Reviewed [Y/N]:

## 2023-12-01 NOTE — PROGRESS NOTE ADULT - ASSESSMENT
58M SP CVA 2022 No Residual HTN DM  Came to [facility] Oct12 with LOC  Ischemic CVA SP Mechanical Thrombectomy  Eliquis, SP ILR placement    #Ischemic CVA  - ASA Plavix High intensity Statin    #Bradycardia  - TTE EF 50-55%, no other structural abnormalities  - Metoprolol discontinued   - per electrophysiology notes, no indication for pacemaker, s/p ILR on 10/20  - needs EP follow up as an outpatient.    #intermittent Dizziness   -Encouraged fluid intake - improved now    #ETOH use disorder  -  in remission, stable    #HTN  - No meds at home  - did not tolerate lisinopril 5mg during hospitalization (intermittent hypotension)  - controlled rn. goal bp <130mmHG can follow up as outpatient    #DM 2  - Wasnt taking meds at home  - Ha1c 8.6%  - Metformin, linagliptin  - controlled    #Elevated TSH  - T4 level normal  - outpt f/u    #DVT ppx  - Eliquis

## 2023-12-02 PROCEDURE — 99231 SBSQ HOSP IP/OBS SF/LOW 25: CPT

## 2023-12-02 PROCEDURE — 99232 SBSQ HOSP IP/OBS MODERATE 35: CPT

## 2023-12-02 RX ADMIN — MEMANTINE HYDROCHLORIDE 10 MILLIGRAM(S): 10 TABLET ORAL at 05:35

## 2023-12-02 RX ADMIN — MEMANTINE HYDROCHLORIDE 10 MILLIGRAM(S): 10 TABLET ORAL at 17:23

## 2023-12-02 RX ADMIN — CITALOPRAM 20 MILLIGRAM(S): 10 TABLET, FILM COATED ORAL at 11:42

## 2023-12-02 RX ADMIN — LINAGLIPTIN 5 MILLIGRAM(S): 5 TABLET, FILM COATED ORAL at 11:41

## 2023-12-02 RX ADMIN — APIXABAN 5 MILLIGRAM(S): 2.5 TABLET, FILM COATED ORAL at 05:35

## 2023-12-02 RX ADMIN — FAMOTIDINE 20 MILLIGRAM(S): 10 INJECTION INTRAVENOUS at 11:42

## 2023-12-02 RX ADMIN — DONEPEZIL HYDROCHLORIDE 10 MILLIGRAM(S): 10 TABLET, FILM COATED ORAL at 07:54

## 2023-12-02 RX ADMIN — Medication 1 TABLET(S): at 11:41

## 2023-12-02 RX ADMIN — METFORMIN HYDROCHLORIDE 500 MILLIGRAM(S): 850 TABLET ORAL at 17:23

## 2023-12-02 RX ADMIN — METFORMIN HYDROCHLORIDE 500 MILLIGRAM(S): 850 TABLET ORAL at 07:53

## 2023-12-02 RX ADMIN — ATORVASTATIN CALCIUM 80 MILLIGRAM(S): 80 TABLET, FILM COATED ORAL at 21:14

## 2023-12-02 RX ADMIN — Medication 100 MILLIGRAM(S): at 11:41

## 2023-12-02 RX ADMIN — APIXABAN 5 MILLIGRAM(S): 2.5 TABLET, FILM COATED ORAL at 17:23

## 2023-12-02 RX ADMIN — Medication 1 MILLIGRAM(S): at 11:42

## 2023-12-02 NOTE — PROGRESS NOTE ADULT - SENSORY
+ left lopez sensory defictis
reduced sensation left face, UE and LE
Able to localized to Lt, right and left discrimination intact, intact to temperature. +reduced left lopez facial/Ue and LE sensation to LT.

## 2023-12-02 NOTE — PROGRESS NOTE ADULT - SUBJECTIVE AND OBJECTIVE BOX
Hospitalist: Gabriella Barcenas DO    CHIEF COMPLAINT: Patient is a 58y old  male who presents with a chief complaint of CVA and left hemiparesis (02 Dec 2023 13:44)      SUBJECTIVE / OVERNIGHT EVENTS: Patient seen and examined. No acute events overnight. Pain well controlled and patient without any complaints.    MEDICATIONS  (STANDING):  apixaban 5 milliGRAM(s) Oral two times a day  atorvastatin 80 milliGRAM(s) Oral at bedtime  citalopram 20 milliGRAM(s) Oral daily  dextrose 50% Injectable 25 Gram(s) IV Push once  donepezil 10 milliGRAM(s) Oral with breakfast  famotidine    Tablet 20 milliGRAM(s) Oral daily  folic acid 1 milliGRAM(s) Oral daily  linagliptin 5 milliGRAM(s) Oral daily  memantine 10 milliGRAM(s) Oral two times a day  metFORMIN 500 milliGRAM(s) Oral two times a day  multivitamin 1 Tablet(s) Oral daily  thiamine 100 milliGRAM(s) Oral daily    MEDICATIONS  (PRN):  polyethylene glycol 3350 17 Gram(s) Oral daily PRN Constipation      VITALS:  T(F): 97.9 (12-02-23 @ 07:56), Max: 97.9 (12-02-23 @ 07:56)  HR: 49 (12-02-23 @ 07:56) (49 - 65)  BP: 153/82 (12-02-23 @ 07:56) (122/75 - 153/82)  RR: 15 (12-02-23 @ 07:56) (14 - 15)  SpO2: 96% (12-02-23 @ 07:56)      REVIEW OF SYSTEMS:  For ROV please refer back to H&P     PHYSICAL EXAM:  GENERAL: NAD, obese male   HEAD:  Atraumatic, Normocephalic  NECK: Supple, No JVD  CHEST/LUNG: Clear to auscultation bilaterally; No wheeze, nonlabored breathing  HEART: Regular rate and rhythm; No murmurs, rubs, or gallops  ABDOMEN: Soft, Nontender, Nondistended; Bowel sounds present  EXTREMITIES:  No clubbing, cyanosis, or edema  NEUROLOGY: AAOx3, non-focal  PSYCH: calm, appropriate mood      RADIOLOGY & ADDITIONAL TESTS:    Imaging Personally Reviewed:    [X] Consultant(s) Notes Reviewed:  [X] Care Discussed with Consultants/Other Providers:

## 2023-12-02 NOTE — PROGRESS NOTE ADULT - ASSESSMENT
58M SP CVA 2022 No Residual HTN DM  Came to [facility] Oct12 with LOC  Ischemic CVA SP Mechanical Thrombectomy  Eliquis, SP ILR placement    #Ischemic CVA  - ASA/Plavix/Statin    #Bradycardia  - TTE EF 50-55%, no other structural abnormalities  - Metoprolol discontinued   - per electrophysiology notes, no indication for pacemaker, s/p ILR on 10/20  - needs EP follow up as an outpatient.    #intermittent Dizziness   -Encouraged fluid intake - improved now    #ETOH use disorder  -  in remission, stable    #HTN  - No meds at home  - did not tolerate lisinopril 5mg during hospitalization (intermittent hypotension)  - controlled rn. goal bp <130mmHG can follow up as outpatient    #DM 2  - Wasnt taking meds at home  - Ha1c 8.6%  - Metformin, linagliptin  - controlled    #Elevated TSH  - T4 level normal  - outpt f/u    #DVT ppx  - Eliquis

## 2023-12-02 NOTE — PROGRESS NOTE ADULT - SUBJECTIVE AND OBJECTIVE BOX
Patient is a 58y old  Male who presents with a chief complaint of CVA and left hemiparesis (02 Dec 2023 09:15)      HPI:  Patient is a 59 y/o M with a PHx HTN, DM, stroke one year ago (at the time patient received Tenecteplase and had full resolution of symptoms), who presented to [facility] on 10/12/23 following sudden fall at work and lost consciousness. NIHSS 10 on admission. Admitted under NeuroICU on 10/12. Imaging revealed ischemia in the posterior circulation, CT angiogram head/neck showed right PCA P1-2 cutoff, possibly chronic right MCA M1 cutoff which had distal reconstitution and established collaterals, and left PCA occlusion which patient was subsequently transferred for mechanical thrombectomy.    Cerebral angiogram and mechanical thrombectomy performed using aspiration with TICI 3 recanalization of left PCA. The right occipital lobe filled from right SHANI collaterals, and the right MCA as well was thought to be chronic given noted collateralization. MR with small to moderate left temporal occipital lobe infarctions. Occluded right MCA, occluded right PCA and right vertebral artery proximal segment. The patient was started on a hep gtt given extensive atherosclerotic disease which was switched to apixaban 5mg BID with recommendations for a three month course.    TTE with normal left ventricular internal cavity size. Left ventricular ejection fractio 50 to 55%. Per Neuro, no need for ROCIO at this time. Pt had an episode of bradycardia during his stay and there were concern for 2:1 block. ILR placed 10/20 and no indication for PPM at this time.     Patient was evaluated by PM&R and therapy for functional deficits, gait/ADL impairments and acute rehabilitation was recommended. Patient was medically optimized for discharge to [facility] IRF on 10/27/23.  (27 Oct 2023 12:30)      PAST MEDICAL & SURGICAL HISTORY:  HTN (hypertension)      CVA (cerebrovascular accident)      History of hip surgery          MEDICATIONS  (STANDING):  apixaban 5 milliGRAM(s) Oral two times a day  atorvastatin 80 milliGRAM(s) Oral at bedtime  citalopram 20 milliGRAM(s) Oral daily  dextrose 50% Injectable 25 Gram(s) IV Push once  donepezil 10 milliGRAM(s) Oral with breakfast  famotidine    Tablet 20 milliGRAM(s) Oral daily  folic acid 1 milliGRAM(s) Oral daily  linagliptin 5 milliGRAM(s) Oral daily  memantine 10 milliGRAM(s) Oral two times a day  metFORMIN 500 milliGRAM(s) Oral two times a day  multivitamin 1 Tablet(s) Oral daily  thiamine 100 milliGRAM(s) Oral daily    MEDICATIONS  (PRN):  polyethylene glycol 3350 17 Gram(s) Oral daily PRN Constipation      Allergies    No Known Allergies    Intolerances          VITALS  58y  Vital Signs Last 24 Hrs  T(C): 36.6 (02 Dec 2023 07:56), Max: 36.6 (02 Dec 2023 07:56)  T(F): 97.9 (02 Dec 2023 07:56), Max: 97.9 (02 Dec 2023 07:56)  HR: 49 (02 Dec 2023 07:56) (49 - 65)  BP: 153/82 (02 Dec 2023 07:56) (122/75 - 153/82)  BP(mean): --  RR: 15 (02 Dec 2023 07:56) (14 - 15)  SpO2: 96% (02 Dec 2023 07:56) (93% - 96%)    Parameters below as of 02 Dec 2023 07:56  Patient On (Oxygen Delivery Method): room air      Daily     Daily         RECENT LABS:                      CAPILLARY BLOOD GLUCOSE

## 2023-12-02 NOTE — PROGRESS NOTE ADULT - SUBJECTIVE AND OBJECTIVE BOX
Patient is a 58y old  Male who presents with a chief complaint of CVA and left hemiparesis (01 Dec 2023 11:33)      SUBJECTIVE / OVERNIGHT EVENTS:  Pt seen and examined at bedside. No acute events overnight.  Pt denies cp, palpitations, sob, abd pain, N/V, fever, chills.    ROS:  All other review of systems negative    Allergies    No Known Allergies    Intolerances        MEDICATIONS  (STANDING):  apixaban 5 milliGRAM(s) Oral two times a day  atorvastatin 80 milliGRAM(s) Oral at bedtime  citalopram 20 milliGRAM(s) Oral daily  dextrose 50% Injectable 25 Gram(s) IV Push once  donepezil 10 milliGRAM(s) Oral with breakfast  famotidine    Tablet 20 milliGRAM(s) Oral daily  folic acid 1 milliGRAM(s) Oral daily  linagliptin 5 milliGRAM(s) Oral daily  memantine 10 milliGRAM(s) Oral two times a day  metFORMIN 500 milliGRAM(s) Oral two times a day  multivitamin 1 Tablet(s) Oral daily  thiamine 100 milliGRAM(s) Oral daily    MEDICATIONS  (PRN):  polyethylene glycol 3350 17 Gram(s) Oral daily PRN Constipation      Vital Signs Last 24 Hrs  T(C): 36.6 (02 Dec 2023 07:56), Max: 36.6 (02 Dec 2023 07:56)  T(F): 97.9 (02 Dec 2023 07:56), Max: 97.9 (02 Dec 2023 07:56)  HR: 49 (02 Dec 2023 07:56) (49 - 65)  BP: 153/82 (02 Dec 2023 07:56) (120/80 - 153/82)  BP(mean): --  RR: 15 (02 Dec 2023 07:56) (14 - 15)  SpO2: 96% (02 Dec 2023 07:56) (93% - 96%)    Parameters below as of 02 Dec 2023 07:56  Patient On (Oxygen Delivery Method): room air      CAPILLARY BLOOD GLUCOSE        I&O's Summary      PHYSICAL EXAM:  GENERAL: NAD, obese male   HEAD:  Atraumatic, Normocephalic  NECK: Supple, No JVD  CHEST/LUNG: Clear to auscultation bilaterally; No wheeze, nonlabored breathing  HEART: Regular rate and rhythm; No murmurs, rubs, or gallops  ABDOMEN: Soft, Nontender, Nondistended; Bowel sounds present  EXTREMITIES:  No clubbing, cyanosis, or edema  NEUROLOGY: AAOx3, non-focal  PSYCH: calm, appropriate mood    LABS:                    RADIOLOGY & ADDITIONAL TESTS:  Results Reviewed:   Imaging Personally Reviewed:  Electrocardiogram Personally Reviewed:    COORDINATION OF CARE:  Care Discussed with Consultants/Other Providers [Y/N]:  Prior or Outpatient Records Reviewed [Y/N]:

## 2023-12-02 NOTE — PROGRESS NOTE ADULT - COMMENTS
Patient pleasant, stable, slept well, eating well. Aware he's awaiting financials for [facility] transfer and nothing yet confirmed. States he will enjoy watching football over the weekend. No b/B complaints, no H/A or pain

## 2023-12-02 NOTE — PROGRESS NOTE ADULT - ASSESSMENT
57 y/o M with a PHx HTN, DM, CVA sp tnK with full resolution symptoms, who presented to [facility] on 10/12 s/p fall +LOC, found to have left temporal occipital lobe infarctions and acute punctate left occipital lobe infarction.    # Occluded R MCA/PCA  - s/p thrombectomy  - Eliquis 5mg BID given extensive atherosclerotic disease. Continue for 3 months.    - Atorvastatin 80mg qhs.   - cont comprehensive rehab program PT, OT 3 hours daily 5 x week.  - neuropsychology support, recreation therapy  - Precautions: cardiac, DM, respiratory,  fall, aspiration.    # Memory deficits  - Memantine 10mg BID   - Donepezil 10mg  - EKG QTc  438 11/30    # Bradycardia, stable   - off Metoprolol   - per electrophysiology notes, no indication for pacemaker, s/p ILR on 10/20  - HR 49-65, asymptomatic 12/2  - needs EP follow up as an outpatient    # HTN  - amlodipine dc  - BP (122/75 - 153/82) 12/2. Monitor closely, norvasc recently dc    # HLD  - Atorvastatin 80 mg daily    # DM 2  - A1C 8.6  - Metformin     # h/o ETOH abuse  -  MVI, thiamine and folic acid    # GI/Bowel:  - Miralax  PRN    # FEN   - DASH DIET     # DVT ppx  - Eliquis 5mg BID    # Case discussed in IDT rounds 11/28 (follow up):  - supervision transfers, independent bed mobiltiy, supervision ambulation 150 feet without assist device/CG, set up UB/CG LB dressing, set up eating and grooming. Reduced memory, reduced initiation. Negotiates 12 steps with 1 HR and CS  - goals: supervision iADLs, supervision bADLs requires cues. Discussed with patient and in teams; patient not safe with intermittent supervision waking hours, requires continuous oversight  - Medicare, able to downgrade to alternate level of care.  D/C ASAP upon placement.       # LABs  CBC BMP 11/30      Lety # [phone].  [phone] home.      Outpatient Follow-up (Specialty/Name of physician):    Rj Delgado  Cardiac Electrophysiology  [address], [address]  [place] [address]  Phone: [phone]  Fax: [phone]  Follow Up Time:     Wily Barkleyuda  Neurology  [address] [place] [address]  Phone: [phone]  Fax: [phone]  Follow Up Time: 1 week    Denzel Zapata  Interventional Cardiology  [address], [address]  [place] [address]  Phone: [phone]  Fax: [phone]  Follow Up Time: 1 week    PCP,   Phone: (   )    -  Fax: (   )    -  Follow Up Time: 1 week    Arsenio Goodrich  Interventional Neuroradiology  [address] [place] [address]  Phone: [phone]  Fax: [phone]

## 2023-12-02 NOTE — PROGRESS NOTE ADULT - ASSESSMENT
58M SP CVA 2022 No Residual HTN DM  Came to [facility] Oct12 with LOC  Ischemic CVA SP Mechanical Thrombectomy  Eliquis, SP ILR placement    #Ischemic CVA  - ASA/Plavix/Statin    #Bradycardia  - TTE EF 50-55%, no other structural abnormalities  - Metoprolol discontinued   - per electrophysiology notes, no indication for pacemaker, s/p ILR on 10/20  - needs EP follow up as an outpatient.    #intermittent Dizziness   -Encouraged fluid intake - improved now    #ETOH use disorder  -  in remission, stable    #HTN  - No meds at home  - did not tolerate lisinopril 5mg during hospitalization (intermittent hypotension)  - controlled rn. goal bp <130mmHG can follow up as outpatient    #DM 2  - Wasnt taking meds at home  - Ha1c 8.6%  - Metformin, linagliptin  - controlled    #Elevated TSH  - T4 level normal  - outpt f/u    #DVT ppx  - Eliquis

## 2023-12-03 PROCEDURE — 99232 SBSQ HOSP IP/OBS MODERATE 35: CPT

## 2023-12-03 PROCEDURE — 99231 SBSQ HOSP IP/OBS SF/LOW 25: CPT

## 2023-12-03 RX ADMIN — DONEPEZIL HYDROCHLORIDE 10 MILLIGRAM(S): 10 TABLET, FILM COATED ORAL at 08:12

## 2023-12-03 RX ADMIN — METFORMIN HYDROCHLORIDE 500 MILLIGRAM(S): 850 TABLET ORAL at 17:25

## 2023-12-03 RX ADMIN — Medication 1 MILLIGRAM(S): at 11:28

## 2023-12-03 RX ADMIN — FAMOTIDINE 20 MILLIGRAM(S): 10 INJECTION INTRAVENOUS at 11:28

## 2023-12-03 RX ADMIN — Medication 1 TABLET(S): at 11:29

## 2023-12-03 RX ADMIN — Medication 100 MILLIGRAM(S): at 11:28

## 2023-12-03 RX ADMIN — APIXABAN 5 MILLIGRAM(S): 2.5 TABLET, FILM COATED ORAL at 17:25

## 2023-12-03 RX ADMIN — ATORVASTATIN CALCIUM 80 MILLIGRAM(S): 80 TABLET, FILM COATED ORAL at 21:19

## 2023-12-03 RX ADMIN — MEMANTINE HYDROCHLORIDE 10 MILLIGRAM(S): 10 TABLET ORAL at 05:29

## 2023-12-03 RX ADMIN — APIXABAN 5 MILLIGRAM(S): 2.5 TABLET, FILM COATED ORAL at 05:29

## 2023-12-03 RX ADMIN — METFORMIN HYDROCHLORIDE 500 MILLIGRAM(S): 850 TABLET ORAL at 08:12

## 2023-12-03 RX ADMIN — MEMANTINE HYDROCHLORIDE 10 MILLIGRAM(S): 10 TABLET ORAL at 17:25

## 2023-12-03 RX ADMIN — LINAGLIPTIN 5 MILLIGRAM(S): 5 TABLET, FILM COATED ORAL at 11:28

## 2023-12-03 RX ADMIN — CITALOPRAM 20 MILLIGRAM(S): 10 TABLET, FILM COATED ORAL at 11:28

## 2023-12-03 NOTE — PROGRESS NOTE ADULT - GASTROINTESTINAL
soft/nontender
normal/soft/nontender/nondistended/normal active bowel sounds
soft/nontender

## 2023-12-03 NOTE — PROGRESS NOTE ADULT - CARDIOVASCULAR
normal/regular rate and rhythm/S1 S2 present/no gallops/no rub/no murmur
regular rate and rhythm
normal/regular rate and rhythm/S1 S2 present/no gallops/no rub/no murmur

## 2023-12-03 NOTE — PROGRESS NOTE ADULT - MOTOR
BUE and LE motor 5/5  calves soft no TTP  no pedal edema  normal tone
able to localize to LT UE and % accuracy  BUE and LE motor 5/5 normal tone  no calf TTP bilaterally
right UE shoulder, elbow flexion, extension, wrist flexion/ext and gross grasp 5/5 right HF, quad, ham , ankle PF and DF 5/5. left shoulder 5/5 elbow flexoin 5-/5 extension 5/5 gross grasp 4+/5  left HF 4+/5 quad 5/5 ankle PF 5/5 Df 5-/5
tone LUE and LE WNL  left grasp 5-/5 intrinsic 5-/5 elbow flexion and extension 5/5  finger tapping very mldly impaired compared to right side
BUE and LE motor 5/5  calves sof tno TTP
BUE and LE motor 5/5  no calf TTP +soft no erythema

## 2023-12-03 NOTE — PROGRESS NOTE ADULT - COMMENTS
Patient stable, no issues overnight. Excited to watch his [employer] game today. No H/A, continued numbness left side. Aware he is awaiting clearance for [facility] due to insurance confirmation issues Patient stable, no issues overnight. Excited to watch his [employer] game today. No H/A, continued numbness left side. Aware he is awaiting clearance for [facility] due to insurance confirmation issues Patient stable, no issues overnight. Excited to watch his [employer] game today. No H/A, continued numbness left side. Aware he is awaiting clearance for [facility] due to insurance confirmation issues

## 2023-12-03 NOTE — PROGRESS NOTE ADULT - RESPIRATORY
normal/clear to auscultation bilaterally/no wheezes/no rales/no rhonchi
clear to auscultation bilaterally/no respiratory distress

## 2023-12-03 NOTE — PROGRESS NOTE ADULT - SUBJECTIVE AND OBJECTIVE BOX
Hospitalist: Gabriella Barcenas DO    CHIEF COMPLAINT: Patient is a 58y old  male who presents with a chief complaint of CVA and left hemiparesis (03 Dec 2023 10:21)      SUBJECTIVE / OVERNIGHT EVENTS: Patient seen and examined. No acute events overnight. Pain well controlled and patient without any complaints.    MEDICATIONS  (STANDING):  apixaban 5 milliGRAM(s) Oral two times a day  atorvastatin 80 milliGRAM(s) Oral at bedtime  citalopram 20 milliGRAM(s) Oral daily  dextrose 50% Injectable 25 Gram(s) IV Push once  donepezil 10 milliGRAM(s) Oral with breakfast  famotidine    Tablet 20 milliGRAM(s) Oral daily  folic acid 1 milliGRAM(s) Oral daily  linagliptin 5 milliGRAM(s) Oral daily  memantine 10 milliGRAM(s) Oral two times a day  metFORMIN 500 milliGRAM(s) Oral two times a day  multivitamin 1 Tablet(s) Oral daily  thiamine 100 milliGRAM(s) Oral daily    MEDICATIONS  (PRN):  polyethylene glycol 3350 17 Gram(s) Oral daily PRN Constipation      VITALS:  T(F): 97.8 (12-03-23 @ 08:14), Max: 98 (12-02-23 @ 20:01)  HR: 51 (12-03-23 @ 08:14) (51 - 58)  BP: 146/77 (12-03-23 @ 08:14) (126/80 - 156/91)  RR: 15 (12-03-23 @ 08:14) (14 - 15)  SpO2: 96% (12-03-23 @ 08:14)      REVIEW OF SYSTEMS:  For ROV please refer back to H&P     PHYSICAL EXAM:  GENERAL: NAD, obese male   HEAD:  Atraumatic, Normocephalic  NECK: Supple, No JVD  CHEST/LUNG: Clear to auscultation bilaterally; No wheeze, nonlabored breathing  HEART: Regular rate and rhythm; No murmurs, rubs, or gallops  ABDOMEN: Soft, Nontender, Nondistended; Bowel sounds present  EXTREMITIES:  No clubbing, cyanosis, or edema  NEUROLOGY: AAOx3, non-focal  PSYCH: calm, appropriate mood      RADIOLOGY & ADDITIONAL TESTS:    Imaging Personally Reviewed:    [X] Consultant(s) Notes Reviewed:  [X] Care Discussed with Consultants/Other Providers:

## 2023-12-03 NOTE — PROGRESS NOTE ADULT - SUBJECTIVE AND OBJECTIVE BOX
Patient is a 58y old  Male who presents with a chief complaint of CVA and left hemiparesis (02 Dec 2023 13:44)      HPI:  Patient is a 57 y/o M with a PHx HTN, DM, stroke one year ago (at the time patient received Tenecteplase and had full resolution of symptoms), who presented to [facility] on 10/12/23 following sudden fall at work and lost consciousness. NIHSS 10 on admission. Admitted under NeuroICU on 10/12. Imaging revealed ischemia in the posterior circulation, CT angiogram head/neck showed right PCA P1-2 cutoff, possibly chronic right MCA M1 cutoff which had distal reconstitution and established collaterals, and left PCA occlusion which patient was subsequently transferred for mechanical thrombectomy.    Cerebral angiogram and mechanical thrombectomy performed using aspiration with TICI 3 recanalization of left PCA. The right occipital lobe filled from right SHANI collaterals, and the right MCA as well was thought to be chronic given noted collateralization. MR with small to moderate left temporal occipital lobe infarctions. Occluded right MCA, occluded right PCA and right vertebral artery proximal segment. The patient was started on a hep gtt given extensive atherosclerotic disease which was switched to apixaban 5mg BID with recommendations for a three month course.    TTE with normal left ventricular internal cavity size. Left ventricular ejection fractio 50 to 55%. Per Neuro, no need for ROCIO at this time. Pt had an episode of bradycardia during his stay and there were concern for 2:1 block. ILR placed 10/20 and no indication for PPM at this time.     Patient was evaluated by PM&R and therapy for functional deficits, gait/ADL impairments and acute rehabilitation was recommended. Patient was medically optimized for discharge to [facility] IRF on 10/27/23.  (27 Oct 2023 12:30)      PAST MEDICAL & SURGICAL HISTORY:  HTN (hypertension)      CVA (cerebrovascular accident)      History of hip surgery          MEDICATIONS  (STANDING):  apixaban 5 milliGRAM(s) Oral two times a day  atorvastatin 80 milliGRAM(s) Oral at bedtime  citalopram 20 milliGRAM(s) Oral daily  dextrose 50% Injectable 25 Gram(s) IV Push once  donepezil 10 milliGRAM(s) Oral with breakfast  famotidine    Tablet 20 milliGRAM(s) Oral daily  folic acid 1 milliGRAM(s) Oral daily  linagliptin 5 milliGRAM(s) Oral daily  memantine 10 milliGRAM(s) Oral two times a day  metFORMIN 500 milliGRAM(s) Oral two times a day  multivitamin 1 Tablet(s) Oral daily  thiamine 100 milliGRAM(s) Oral daily    MEDICATIONS  (PRN):  polyethylene glycol 3350 17 Gram(s) Oral daily PRN Constipation      Allergies    No Known Allergies    Intolerances          VITALS  58y  Vital Signs Last 24 Hrs  T(C): 36.6 (03 Dec 2023 08:14), Max: 36.7 (02 Dec 2023 20:01)  T(F): 97.8 (03 Dec 2023 08:14), Max: 98 (02 Dec 2023 20:01)  HR: 51 (03 Dec 2023 08:14) (51 - 58)  BP: 146/77 (03 Dec 2023 08:14) (126/80 - 156/91)  BP(mean): --  RR: 15 (03 Dec 2023 08:14) (14 - 15)  SpO2: 96% (03 Dec 2023 08:14) (96% - 97%)    Parameters below as of 03 Dec 2023 08:14  Patient On (Oxygen Delivery Method): room air      Daily     Daily         RECENT LABS:                      CAPILLARY BLOOD GLUCOSE                   Patient is a 58y old  Male who presents with a chief complaint of CVA and left hemiparesis (02 Dec 2023 13:44)      HPI:  Patient is a 57 y/o M with a PHx HTN, DM, stroke one year ago (at the time patient received Tenecteplase and had full resolution of symptoms), who presented to [facility] on 10/12/23 following sudden fall at work and lost consciousness. NIHSS 10 on admission. Admitted under NeuroICU on 10/12. Imaging revealed ischemia in the posterior circulation, CT angiogram head/neck showed right PCA P1-2 cutoff, possibly chronic right MCA M1 cutoff which had distal reconstitution and established collaterals, and left PCA occlusion which patient was subsequently transferred for mechanical thrombectomy.    Cerebral angiogram and mechanical thrombectomy performed using aspiration with TICI 3 recanalization of left PCA. The right occipital lobe filled from right SHANI collaterals, and the right MCA as well was thought to be chronic given noted collateralization. MR with small to moderate left temporal occipital lobe infarctions. Occluded right MCA, occluded right PCA and right vertebral artery proximal segment. The patient was started on a hep gtt given extensive atherosclerotic disease which was switched to apixaban 5mg BID with recommendations for a three month course.    TTE with normal left ventricular internal cavity size. Left ventricular ejection fractio 50 to 55%. Per Neuro, no need for ROCIO at this time. Pt had an episode of bradycardia during his stay and there were concern for 2:1 block. ILR placed 10/20 and no indication for PPM at this time.     Patient was evaluated by PM&R and therapy for functional deficits, gait/ADL impairments and acute rehabilitation was recommended. Patient was medically optimized for discharge to [facility] IRF on 10/27/23.  (27 Oct 2023 12:30)      PAST MEDICAL & SURGICAL HISTORY:  HTN (hypertension)      CVA (cerebrovascular accident)      History of hip surgery          MEDICATIONS  (STANDING):  apixaban 5 milliGRAM(s) Oral two times a day  atorvastatin 80 milliGRAM(s) Oral at bedtime  citalopram 20 milliGRAM(s) Oral daily  dextrose 50% Injectable 25 Gram(s) IV Push once  donepezil 10 milliGRAM(s) Oral with breakfast  famotidine    Tablet 20 milliGRAM(s) Oral daily  folic acid 1 milliGRAM(s) Oral daily  linagliptin 5 milliGRAM(s) Oral daily  memantine 10 milliGRAM(s) Oral two times a day  metFORMIN 500 milliGRAM(s) Oral two times a day  multivitamin 1 Tablet(s) Oral daily  thiamine 100 milliGRAM(s) Oral daily    MEDICATIONS  (PRN):  polyethylene glycol 3350 17 Gram(s) Oral daily PRN Constipation      Allergies    No Known Allergies    Intolerances          VITALS  58y  Vital Signs Last 24 Hrs  T(C): 36.6 (03 Dec 2023 08:14), Max: 36.7 (02 Dec 2023 20:01)  T(F): 97.8 (03 Dec 2023 08:14), Max: 98 (02 Dec 2023 20:01)  HR: 51 (03 Dec 2023 08:14) (51 - 58)  BP: 146/77 (03 Dec 2023 08:14) (126/80 - 156/91)  BP(mean): --  RR: 15 (03 Dec 2023 08:14) (14 - 15)  SpO2: 96% (03 Dec 2023 08:14) (96% - 97%)    Parameters below as of 03 Dec 2023 08:14  Patient On (Oxygen Delivery Method): room air      Daily     Daily         RECENT LABS:                      CAPILLARY BLOOD GLUCOSE                   Patient is a 58y old  Male who presents with a chief complaint of CVA and left hemiparesis (02 Dec 2023 13:44)      HPI:  Patient is a 59 y/o M with a PHx HTN, DM, stroke one year ago (at the time patient received Tenecteplase and had full resolution of symptoms), who presented to [facility] on 10/12/23 following sudden fall at work and lost consciousness. NIHSS 10 on admission. Admitted under NeuroICU on 10/12. Imaging revealed ischemia in the posterior circulation, CT angiogram head/neck showed right PCA P1-2 cutoff, possibly chronic right MCA M1 cutoff which had distal reconstitution and established collaterals, and left PCA occlusion which patient was subsequently transferred for mechanical thrombectomy.    Cerebral angiogram and mechanical thrombectomy performed using aspiration with TICI 3 recanalization of left PCA. The right occipital lobe filled from right SHANI collaterals, and the right MCA as well was thought to be chronic given noted collateralization. MR with small to moderate left temporal occipital lobe infarctions. Occluded right MCA, occluded right PCA and right vertebral artery proximal segment. The patient was started on a hep gtt given extensive atherosclerotic disease which was switched to apixaban 5mg BID with recommendations for a three month course.    TTE with normal left ventricular internal cavity size. Left ventricular ejection fractio 50 to 55%. Per Neuro, no need for ROCIO at this time. Pt had an episode of bradycardia during his stay and there were concern for 2:1 block. ILR placed 10/20 and no indication for PPM at this time.     Patient was evaluated by PM&R and therapy for functional deficits, gait/ADL impairments and acute rehabilitation was recommended. Patient was medically optimized for discharge to [facility] IRF on 10/27/23.  (27 Oct 2023 12:30)      PAST MEDICAL & SURGICAL HISTORY:  HTN (hypertension)      CVA (cerebrovascular accident)      History of hip surgery          MEDICATIONS  (STANDING):  apixaban 5 milliGRAM(s) Oral two times a day  atorvastatin 80 milliGRAM(s) Oral at bedtime  citalopram 20 milliGRAM(s) Oral daily  dextrose 50% Injectable 25 Gram(s) IV Push once  donepezil 10 milliGRAM(s) Oral with breakfast  famotidine    Tablet 20 milliGRAM(s) Oral daily  folic acid 1 milliGRAM(s) Oral daily  linagliptin 5 milliGRAM(s) Oral daily  memantine 10 milliGRAM(s) Oral two times a day  metFORMIN 500 milliGRAM(s) Oral two times a day  multivitamin 1 Tablet(s) Oral daily  thiamine 100 milliGRAM(s) Oral daily    MEDICATIONS  (PRN):  polyethylene glycol 3350 17 Gram(s) Oral daily PRN Constipation      Allergies    No Known Allergies    Intolerances          VITALS  58y  Vital Signs Last 24 Hrs  T(C): 36.6 (03 Dec 2023 08:14), Max: 36.7 (02 Dec 2023 20:01)  T(F): 97.8 (03 Dec 2023 08:14), Max: 98 (02 Dec 2023 20:01)  HR: 51 (03 Dec 2023 08:14) (51 - 58)  BP: 146/77 (03 Dec 2023 08:14) (126/80 - 156/91)  BP(mean): --  RR: 15 (03 Dec 2023 08:14) (14 - 15)  SpO2: 96% (03 Dec 2023 08:14) (96% - 97%)    Parameters below as of 03 Dec 2023 08:14  Patient On (Oxygen Delivery Method): room air      Daily     Daily         RECENT LABS:                      CAPILLARY BLOOD GLUCOSE

## 2023-12-03 NOTE — PROGRESS NOTE ADULT - ASSESSMENT
57 y/o M with a PHx HTN, DM, CVA sp tnK with full resolution symptoms, who presented to [facility] on 10/12 s/p fall +LOC, found to have left temporal occipital lobe infarctions and acute punctate left occipital lobe infarction.    # Occluded R MCA/PCA  - s/p thrombectomy  - Eliquis 5mg BID given extensive atherosclerotic disease. Continue for 3 months.    - Atorvastatin 80mg qhs.   - cont comprehensive rehab program PT, OT 3 hours daily 5 x week.  - neuropsychology support, recreation therapy  - Precautions: cardiac, DM, respiratory,  fall, aspiration.    # Memory deficits  - Memantine 10mg BID   - Donepezil 10mg  - EKG QTc  438 11/30    # Bradycardia, stable   - off Metoprolol   - per electrophysiology notes, no indication for pacemaker, s/p ILR on 10/20  - HR 51-58 12/3  - needs EP follow up as an outpatient    # HTN  - amlodipine dc  - BP  (126/80 - 156/91) 12/3. Reviewed with patient    # HLD  - Atorvastatin 80 mg daily    # DM 2  - A1C 8.6  - Metformin     # h/o ETOH abuse  -  MVI, thiamine and folic acid    # GI/Bowel:  - Miralax  PRN    # FEN   - DASH DIET     # DVT ppx  - Eliquis 5mg BID    # Case discussed in IDT rounds 11/28 (follow up):  - supervision transfers, independent bed mobiltiy, supervision ambulation 150 feet without assist device/CG, set up UB/CG LB dressing, set up eating and grooming. Reduced memory, reduced initiation. Negotiates 12 steps with 1 HR and CS  - goals: supervision iADLs, supervision bADLs requires cues. Discussed with patient and in teams; patient not safe with intermittent supervision waking hours, requires continuous oversight  - Medicare, able to downgrade to alternate level of care.  D/C ASAP upon placement.             Lety # [phone].  [phone] home.      Outpatient Follow-up (Specialty/Name of physician):    Rj Delgado  Cardiac Electrophysiology  [address] [facility], [address]  [place] [address]  Phone: [phone]  Fax: [phone]  Follow Up Time:     Wily Barkley  Neurology  [address] [place] [address]  Phone: [phone]  Fax: [phone]  Follow Up Time: 1 week    Denzel Zapata  Interventional Cardiology  [address] [facility], [facility] [address]  [place] [address]  Phone: [phone]  Fax: [phone]  Follow Up Time: 1 week    PCP,   Phone: (   )    -  Fax: (   )    -  Follow Up Time: 1 week    Arsenio Goodrich  Interventional Neuroradiology  [address] [place] [address]  Phone: [phone]  Fax: [phone]                   59 y/o M with a PHx HTN, DM, CVA sp tnK with full resolution symptoms, who presented to [facility] on 10/12 s/p fall +LOC, found to have left temporal occipital lobe infarctions and acute punctate left occipital lobe infarction.    # Occluded R MCA/PCA  - s/p thrombectomy  - Eliquis 5mg BID given extensive atherosclerotic disease. Continue for 3 months.    - Atorvastatin 80mg qhs.   - cont comprehensive rehab program PT, OT 3 hours daily 5 x week.  - neuropsychology support, recreation therapy  - Precautions: cardiac, DM, respiratory,  fall, aspiration.    # Memory deficits  - Memantine 10mg BID   - Donepezil 10mg  - EKG QTc  438 11/30    # Bradycardia, stable   - off Metoprolol   - per electrophysiology notes, no indication for pacemaker, s/p ILR on 10/20  - HR 51-58 12/3  - needs EP follow up as an outpatient    # HTN  - amlodipine dc  - BP  (126/80 - 156/91) 12/3. Reviewed with patient    # HLD  - Atorvastatin 80 mg daily    # DM 2  - A1C 8.6  - Metformin     # h/o ETOH abuse  -  MVI, thiamine and folic acid    # GI/Bowel:  - Miralax  PRN    # FEN   - DASH DIET     # DVT ppx  - Eliquis 5mg BID    # Case discussed in IDT rounds 11/28 (follow up):  - supervision transfers, independent bed mobiltiy, supervision ambulation 150 feet without assist device/CG, set up UB/CG LB dressing, set up eating and grooming. Reduced memory, reduced initiation. Negotiates 12 steps with 1 HR and CS  - goals: supervision iADLs, supervision bADLs requires cues. Discussed with patient and in teams; patient not safe with intermittent supervision waking hours, requires continuous oversight  - Medicare, able to downgrade to alternate level of care.  D/C ASAP upon placement.             Lety # [phone].  [phone] home.      Outpatient Follow-up (Specialty/Name of physician):    Rj Delgado  Cardiac Electrophysiology  [address] [facility], [address]  [place] [address]  Phone: [phone]  Fax: [phone]  Follow Up Time:     Wily Barkley  Neurology  [address] [place] [address]  Phone: [phone]  Fax: [phone]  Follow Up Time: 1 week    Denzel Zapata  Interventional Cardiology  [address] [facility], [facility] [address]  [place] [address]  Phone: [phone]  Fax: [phone]  Follow Up Time: 1 week    PCP,   Phone: (   )    -  Fax: (   )    -  Follow Up Time: 1 week    Arsenio Goodrich  Interventional Neuroradiology  [address] [place] [address]  Phone: [phone]  Fax: [phone]                   57 y/o M with a PHx HTN, DM, CVA sp tnK with full resolution symptoms, who presented to [facility] on 10/12 s/p fall +LOC, found to have left temporal occipital lobe infarctions and acute punctate left occipital lobe infarction.    # Occluded R MCA/PCA  - s/p thrombectomy  - Eliquis 5mg BID given extensive atherosclerotic disease. Continue for 3 months.    - Atorvastatin 80mg qhs.   - cont comprehensive rehab program PT, OT 3 hours daily 5 x week.  - neuropsychology support, recreation therapy  - Precautions: cardiac, DM, respiratory,  fall, aspiration.    # Memory deficits  - Memantine 10mg BID   - Donepezil 10mg  - EKG QTc  438 11/30    # Bradycardia, stable   - off Metoprolol   - per electrophysiology notes, no indication for pacemaker, s/p ILR on 10/20  - HR 51-58 12/3  - needs EP follow up as an outpatient    # HTN  - amlodipine dc  - BP  (126/80 - 156/91) 12/3. Reviewed with patient    # HLD  - Atorvastatin 80 mg daily    # DM 2  - A1C 8.6  - Metformin     # h/o ETOH abuse  -  MVI, thiamine and folic acid    # GI/Bowel:  - Miralax  PRN    # FEN   - DASH DIET     # DVT ppx  - Eliquis 5mg BID    # Case discussed in IDT rounds 11/28 (follow up):  - supervision transfers, independent bed mobiltiy, supervision ambulation 150 feet without assist device/CG, set up UB/CG LB dressing, set up eating and grooming. Reduced memory, reduced initiation. Negotiates 12 steps with 1 HR and CS  - goals: supervision iADLs, supervision bADLs requires cues. Discussed with patient and in teams; patient not safe with intermittent supervision waking hours, requires continuous oversight  - Medicare, able to downgrade to alternate level of care.  D/C ASAP upon placement.             Lety # [phone].  [phone] home.      Outpatient Follow-up (Specialty/Name of physician):    Rj Delgado  Cardiac Electrophysiology  [address] [facility], [address]  [place] [address]  Phone: [phone]  Fax: [phone]  Follow Up Time:     Wily Barkley  Neurology  [address] [place] [address]  Phone: [phone]  Fax: [phone]  Follow Up Time: 1 week    Denzel Zapata  Interventional Cardiology  [address] [facility], [facility] [address]  [place] [address]  Phone: [phone]  Fax: [phone]  Follow Up Time: 1 week    PCP,   Phone: (   )    -  Fax: (   )    -  Follow Up Time: 1 week    Arsenio Goodrich  Interventional Neuroradiology  [address] [place] [address]  Phone: [phone]  Fax: [phone]

## 2023-12-04 LAB
ALBUMIN SERPL ELPH-MCNC: 3.4 G/DL — SIGNIFICANT CHANGE UP (ref 3.3–5)
ALBUMIN SERPL ELPH-MCNC: 3.4 G/DL — SIGNIFICANT CHANGE UP (ref 3.3–5)
ALP SERPL-CCNC: 76 U/L — SIGNIFICANT CHANGE UP (ref 40–120)
ALP SERPL-CCNC: 76 U/L — SIGNIFICANT CHANGE UP (ref 40–120)
ALT FLD-CCNC: 30 U/L — SIGNIFICANT CHANGE UP (ref 10–45)
ALT FLD-CCNC: 30 U/L — SIGNIFICANT CHANGE UP (ref 10–45)
ANION GAP SERPL CALC-SCNC: 9 MMOL/L — SIGNIFICANT CHANGE UP (ref 5–17)
ANION GAP SERPL CALC-SCNC: 9 MMOL/L — SIGNIFICANT CHANGE UP (ref 5–17)
AST SERPL-CCNC: 18 U/L — SIGNIFICANT CHANGE UP (ref 10–40)
AST SERPL-CCNC: 18 U/L — SIGNIFICANT CHANGE UP (ref 10–40)
BASOPHILS # BLD AUTO: 0.03 K/UL — SIGNIFICANT CHANGE UP (ref 0–0.2)
BASOPHILS # BLD AUTO: 0.03 K/UL — SIGNIFICANT CHANGE UP (ref 0–0.2)
BASOPHILS NFR BLD AUTO: 0.5 % — SIGNIFICANT CHANGE UP (ref 0–2)
BASOPHILS NFR BLD AUTO: 0.5 % — SIGNIFICANT CHANGE UP (ref 0–2)
BILIRUB SERPL-MCNC: 0.5 MG/DL — SIGNIFICANT CHANGE UP (ref 0.2–1.2)
BILIRUB SERPL-MCNC: 0.5 MG/DL — SIGNIFICANT CHANGE UP (ref 0.2–1.2)
BUN SERPL-MCNC: 16 MG/DL — SIGNIFICANT CHANGE UP (ref 7–23)
BUN SERPL-MCNC: 16 MG/DL — SIGNIFICANT CHANGE UP (ref 7–23)
CALCIUM SERPL-MCNC: 8.9 MG/DL — SIGNIFICANT CHANGE UP (ref 8.4–10.5)
CALCIUM SERPL-MCNC: 8.9 MG/DL — SIGNIFICANT CHANGE UP (ref 8.4–10.5)
CHLORIDE SERPL-SCNC: 104 MMOL/L — SIGNIFICANT CHANGE UP (ref 96–108)
CHLORIDE SERPL-SCNC: 104 MMOL/L — SIGNIFICANT CHANGE UP (ref 96–108)
CO2 SERPL-SCNC: 26 MMOL/L — SIGNIFICANT CHANGE UP (ref 22–31)
CO2 SERPL-SCNC: 26 MMOL/L — SIGNIFICANT CHANGE UP (ref 22–31)
CREAT SERPL-MCNC: 0.91 MG/DL — SIGNIFICANT CHANGE UP (ref 0.5–1.3)
CREAT SERPL-MCNC: 0.91 MG/DL — SIGNIFICANT CHANGE UP (ref 0.5–1.3)
EGFR: 98 ML/MIN/1.73M2 — SIGNIFICANT CHANGE UP
EGFR: 98 ML/MIN/1.73M2 — SIGNIFICANT CHANGE UP
EOSINOPHIL # BLD AUTO: 0.14 K/UL — SIGNIFICANT CHANGE UP (ref 0–0.5)
EOSINOPHIL # BLD AUTO: 0.14 K/UL — SIGNIFICANT CHANGE UP (ref 0–0.5)
EOSINOPHIL NFR BLD AUTO: 2.2 % — SIGNIFICANT CHANGE UP (ref 0–6)
EOSINOPHIL NFR BLD AUTO: 2.2 % — SIGNIFICANT CHANGE UP (ref 0–6)
GLUCOSE SERPL-MCNC: 102 MG/DL — HIGH (ref 70–99)
GLUCOSE SERPL-MCNC: 102 MG/DL — HIGH (ref 70–99)
HCT VFR BLD CALC: 38.8 % — LOW (ref 39–50)
HCT VFR BLD CALC: 38.8 % — LOW (ref 39–50)
HGB BLD-MCNC: 13.4 G/DL — SIGNIFICANT CHANGE UP (ref 13–17)
HGB BLD-MCNC: 13.4 G/DL — SIGNIFICANT CHANGE UP (ref 13–17)
IMM GRANULOCYTES NFR BLD AUTO: 0.5 % — SIGNIFICANT CHANGE UP (ref 0–0.9)
IMM GRANULOCYTES NFR BLD AUTO: 0.5 % — SIGNIFICANT CHANGE UP (ref 0–0.9)
LYMPHOCYTES # BLD AUTO: 1.08 K/UL — SIGNIFICANT CHANGE UP (ref 1–3.3)
LYMPHOCYTES # BLD AUTO: 1.08 K/UL — SIGNIFICANT CHANGE UP (ref 1–3.3)
LYMPHOCYTES # BLD AUTO: 16.6 % — SIGNIFICANT CHANGE UP (ref 13–44)
LYMPHOCYTES # BLD AUTO: 16.6 % — SIGNIFICANT CHANGE UP (ref 13–44)
MCHC RBC-ENTMCNC: 32 PG — SIGNIFICANT CHANGE UP (ref 27–34)
MCHC RBC-ENTMCNC: 32 PG — SIGNIFICANT CHANGE UP (ref 27–34)
MCHC RBC-ENTMCNC: 34.5 GM/DL — SIGNIFICANT CHANGE UP (ref 32–36)
MCHC RBC-ENTMCNC: 34.5 GM/DL — SIGNIFICANT CHANGE UP (ref 32–36)
MCV RBC AUTO: 92.6 FL — SIGNIFICANT CHANGE UP (ref 80–100)
MCV RBC AUTO: 92.6 FL — SIGNIFICANT CHANGE UP (ref 80–100)
MONOCYTES # BLD AUTO: 0.42 K/UL — SIGNIFICANT CHANGE UP (ref 0–0.9)
MONOCYTES # BLD AUTO: 0.42 K/UL — SIGNIFICANT CHANGE UP (ref 0–0.9)
MONOCYTES NFR BLD AUTO: 6.5 % — SIGNIFICANT CHANGE UP (ref 2–14)
MONOCYTES NFR BLD AUTO: 6.5 % — SIGNIFICANT CHANGE UP (ref 2–14)
NEUTROPHILS # BLD AUTO: 4.8 K/UL — SIGNIFICANT CHANGE UP (ref 1.8–7.4)
NEUTROPHILS # BLD AUTO: 4.8 K/UL — SIGNIFICANT CHANGE UP (ref 1.8–7.4)
NEUTROPHILS NFR BLD AUTO: 73.7 % — SIGNIFICANT CHANGE UP (ref 43–77)
NEUTROPHILS NFR BLD AUTO: 73.7 % — SIGNIFICANT CHANGE UP (ref 43–77)
NRBC # BLD: 0 /100 WBCS — SIGNIFICANT CHANGE UP (ref 0–0)
NRBC # BLD: 0 /100 WBCS — SIGNIFICANT CHANGE UP (ref 0–0)
PLATELET # BLD AUTO: 108 K/UL — LOW (ref 150–400)
PLATELET # BLD AUTO: 108 K/UL — LOW (ref 150–400)
POTASSIUM SERPL-MCNC: 4.1 MMOL/L — SIGNIFICANT CHANGE UP (ref 3.5–5.3)
POTASSIUM SERPL-MCNC: 4.1 MMOL/L — SIGNIFICANT CHANGE UP (ref 3.5–5.3)
POTASSIUM SERPL-SCNC: 4.1 MMOL/L — SIGNIFICANT CHANGE UP (ref 3.5–5.3)
POTASSIUM SERPL-SCNC: 4.1 MMOL/L — SIGNIFICANT CHANGE UP (ref 3.5–5.3)
PROT SERPL-MCNC: 6.6 G/DL — SIGNIFICANT CHANGE UP (ref 6–8.3)
PROT SERPL-MCNC: 6.6 G/DL — SIGNIFICANT CHANGE UP (ref 6–8.3)
RBC # BLD: 4.19 M/UL — LOW (ref 4.2–5.8)
RBC # BLD: 4.19 M/UL — LOW (ref 4.2–5.8)
RBC # FLD: 13.2 % — SIGNIFICANT CHANGE UP (ref 10.3–14.5)
RBC # FLD: 13.2 % — SIGNIFICANT CHANGE UP (ref 10.3–14.5)
SODIUM SERPL-SCNC: 139 MMOL/L — SIGNIFICANT CHANGE UP (ref 135–145)
SODIUM SERPL-SCNC: 139 MMOL/L — SIGNIFICANT CHANGE UP (ref 135–145)
WBC # BLD: 6.5 K/UL — SIGNIFICANT CHANGE UP (ref 3.8–10.5)
WBC # BLD: 6.5 K/UL — SIGNIFICANT CHANGE UP (ref 3.8–10.5)
WBC # FLD AUTO: 6.5 K/UL — SIGNIFICANT CHANGE UP (ref 3.8–10.5)
WBC # FLD AUTO: 6.5 K/UL — SIGNIFICANT CHANGE UP (ref 3.8–10.5)

## 2023-12-04 PROCEDURE — 99232 SBSQ HOSP IP/OBS MODERATE 35: CPT

## 2023-12-04 RX ADMIN — LINAGLIPTIN 5 MILLIGRAM(S): 5 TABLET, FILM COATED ORAL at 12:12

## 2023-12-04 RX ADMIN — Medication 100 MILLIGRAM(S): at 12:10

## 2023-12-04 RX ADMIN — FAMOTIDINE 20 MILLIGRAM(S): 10 INJECTION INTRAVENOUS at 12:11

## 2023-12-04 RX ADMIN — APIXABAN 5 MILLIGRAM(S): 2.5 TABLET, FILM COATED ORAL at 17:26

## 2023-12-04 RX ADMIN — METFORMIN HYDROCHLORIDE 500 MILLIGRAM(S): 850 TABLET ORAL at 17:26

## 2023-12-04 RX ADMIN — DONEPEZIL HYDROCHLORIDE 10 MILLIGRAM(S): 10 TABLET, FILM COATED ORAL at 08:46

## 2023-12-04 RX ADMIN — APIXABAN 5 MILLIGRAM(S): 2.5 TABLET, FILM COATED ORAL at 05:21

## 2023-12-04 RX ADMIN — CITALOPRAM 20 MILLIGRAM(S): 10 TABLET, FILM COATED ORAL at 12:11

## 2023-12-04 RX ADMIN — METFORMIN HYDROCHLORIDE 500 MILLIGRAM(S): 850 TABLET ORAL at 08:46

## 2023-12-04 RX ADMIN — MEMANTINE HYDROCHLORIDE 10 MILLIGRAM(S): 10 TABLET ORAL at 05:21

## 2023-12-04 RX ADMIN — ATORVASTATIN CALCIUM 80 MILLIGRAM(S): 80 TABLET, FILM COATED ORAL at 21:21

## 2023-12-04 RX ADMIN — Medication 1 TABLET(S): at 12:10

## 2023-12-04 RX ADMIN — Medication 1 MILLIGRAM(S): at 12:10

## 2023-12-04 RX ADMIN — MEMANTINE HYDROCHLORIDE 10 MILLIGRAM(S): 10 TABLET ORAL at 17:27

## 2023-12-04 NOTE — PROGRESS NOTE ADULT - NUTRITIONAL ASSESSMENT
- ANTITHROMBOTIC THERAPY:  After discussion with [facility] attending Dr. Goodrich decision was made (given fluctuating/new symptoms as noted) to initiate Apixaban 5 mg BID. Anticipate 3 month course for now, long term to be determined w/ Dr. Goodrich as outpatient.    - BP goal as noted above as patient appears to be dependent on collateral flow and high risk for recurrent thrombotic event.  Monitor closely and repeat CT head for any change and notify on call neurology team. Avoid rapid fluctuations and relative hypotension. If titrated at any point it would need to be done very gradually with close neurological monitoring and minimal incremental dose changes.  - ASA discontinued in setting of therapeutic anticoagulation. To be reconsidered if transitioned off anticoagulation.   - titrate statin to LDL goal less than 70  - Encourage adherence to follow up/ recommended medications, ongoing due to memory impairment   - Consideration in Outpatient NM spect w/w out Diamox w/ close outpatient follow up for consideration in bypass pending repeat imaging and clinical course   - Dysphagia screen: pass  - Physical therapy/OT/Speech eval/treatment. Cognitive evaluation. Supervision.     - CARDIOVASCULAR: TTE as noted, cardiac monitoring w/ telemetry for now, at this time defer ROCIO a this time as to avoid precipitating hypotension due to concern for hypoperfusion, d/w cardiology team, no obvious evidence of cardiac mass/ vegetation to warrant further evaluation given overall risk / benefit.  ILR implanted. Ideally, suggest to defer BP pressure altering agents at this time due to sensitivity and concern for hypoperfusion and SBP goal as noted; if agent is  absolutely needed suggest to titrate gradually with hold parameters to hold if  SBP less than 160mmHg.                 - HEMATOLOGY: H/H 14.1/41.0, Platelets 115, close monitoring of thrombocytopenia, patient should have all age and risk appropriate malignancy screenings with PCP or sooner if clinically suspected and to ensure no underlying hypercoagulable states , hyper coag panel.      DVT ppx: heparin gtt    PULMONARY: protecting airway, saturating well
Diet, Consistent Carbohydrate/No Snacks:   DASH/TLC {Sodium & Cholesterol Restricted} (DASH)  Supplement Feeding Modality:  Oral  Glucerna Shake Cans or Servings Per Day:  1       Frequency:  Two Times a day (10-27-23 @ 14:29) [Active]
Diet, Consistent Carbohydrate/No Snacks:   DASH/TLC {Sodium & Cholesterol Restricted} (DASH)  Supplement Feeding Modality:  Oral  Glucerna Shake Cans or Servings Per Day:  1       Frequency:  Two Times a day (10-27-23 @ 14:29) [Active]
This patient has been assessed with a concern for Malnutrition and has been determined to have a diagnosis/diagnoses of Moderate protein-calorie malnutrition.    This patient is being managed with:   Diet Consistent Carbohydrate/No Snacks-  DASH/TLC {Sodium & Cholesterol Restricted} (DASH)  Supplement Feeding Modality:  Oral  Glucerna Shake Cans or Servings Per Day:  1       Frequency:  Two Times a day  Entered: Oct 19 2023  4:33PM  
Diet, Consistent Carbohydrate/No Snacks:   DASH/TLC {Sodium & Cholesterol Restricted} (DASH)  Supplement Feeding Modality:  Oral  Glucerna Shake Cans or Servings Per Day:  1       Frequency:  Two Times a day (10-27-23 @ 14:29) [Active]
This patient has been assessed with a concern for Malnutrition and has been determined to have a diagnosis/diagnoses of Moderate protein-calorie malnutrition.    This patient is being managed with:   Diet Consistent Carbohydrate/No Snacks-  DASH/TLC {Sodium & Cholesterol Restricted} (DASH)  Supplement Feeding Modality:  Oral  Glucerna Shake Cans or Servings Per Day:  1       Frequency:  Two Times a day  Entered: Oct 19 2023  4:33PM  
This patient has been assessed with a concern for Malnutrition and has been determined to have a diagnosis/diagnoses of Moderate protein-calorie malnutrition.    This patient is being managed with:   Diet Consistent Carbohydrate/No Snacks-  DASH/TLC {Sodium & Cholesterol Restricted} (DASH)  Supplement Feeding Modality:  Oral  Glucerna Shake Cans or Servings Per Day:  1       Frequency:  Two Times a day  Entered: Oct 19 2023  4:33PM  
Diet, Consistent Carbohydrate/No Snacks:   DASH/TLC {Sodium & Cholesterol Restricted} (DASH)  Supplement Feeding Modality:  Oral  Glucerna Shake Cans or Servings Per Day:  1       Frequency:  Two Times a day (10-27-23 @ 14:29) [Active]
Diet, Consistent Carbohydrate/No Snacks:   DASH/TLC {Sodium & Cholesterol Restricted} (DASH)  Supplement Feeding Modality:  Oral  Glucerna Shake Cans or Servings Per Day:  1       Frequency:  Two Times a day (10-27-23 @ 14:29) [Active]
This patient has been assessed with a concern for Malnutrition and has been determined to have a diagnosis/diagnoses of Moderate protein-calorie malnutrition.    This patient is being managed with:   Diet Consistent Carbohydrate/No Snacks-  DASH/TLC {Sodium & Cholesterol Restricted} (DASH)  Supplement Feeding Modality:  Oral  Glucerna Shake Cans or Servings Per Day:  1       Frequency:  Two Times a day  Entered: Oct 19 2023  4:33PM  
Diet, Consistent Carbohydrate/No Snacks:   DASH/TLC {Sodium & Cholesterol Restricted} (DASH)  Supplement Feeding Modality:  Oral  Glucerna Shake Cans or Servings Per Day:  1       Frequency:  Two Times a day (10-27-23 @ 14:29) [Active]
This patient has been assessed with a concern for Malnutrition and has been determined to have a diagnosis/diagnoses of Moderate protein-calorie malnutrition.    This patient is being managed with:   Diet Consistent Carbohydrate/No Snacks-  DASH/TLC {Sodium & Cholesterol Restricted} (DASH)  Supplement Feeding Modality:  Oral  Glucerna Shake Cans or Servings Per Day:  1       Frequency:  Two Times a day  Entered: Oct 19 2023  4:33PM  
Diet, Consistent Carbohydrate/No Snacks:   DASH/TLC {Sodium & Cholesterol Restricted} (DASH)  Supplement Feeding Modality:  Oral  Glucerna Shake Cans or Servings Per Day:  1       Frequency:  Two Times a day (10-27-23 @ 14:29) [Active]

## 2023-12-04 NOTE — PROGRESS NOTE ADULT - SUBJECTIVE AND OBJECTIVE BOX
HPI:  Patient is a 59 y/o M with a PHx HTN, DM, stroke one year ago (at the time patient received Tenecteplase and had full resolution of symptoms), who presented to [facility] on 10/12/23 following sudden fall at work and lost consciousness. NIHSS 10 on admission. Admitted under NeuroICU on 10/12. Imaging revealed ischemia in the posterior circulation, CT angiogram head/neck showed right PCA P1-2 cutoff, possibly chronic right MCA M1 cutoff which had distal reconstitution and established collaterals, and left PCA occlusion which patient was subsequently transferred for mechanical thrombectomy.    Cerebral angiogram and mechanical thrombectomy performed using aspiration with TICI 3 recanalization of left PCA. The right occipital lobe filled from right SHANI collaterals, and the right MCA as well was thought to be chronic given noted collateralization. MR with small to moderate left temporal occipital lobe infarctions. Occluded right MCA, occluded right PCA and right vertebral artery proximal segment. The patient was started on a hep gtt given extensive atherosclerotic disease which was switched to apixaban 5mg BID with recommendations for a three month course.    TTE with normal left ventricular internal cavity size. Left ventricular ejection fractio 50 to 55%. Per Neuro, no need for ROCIO at this time. Pt had an episode of bradycardia during his stay and there were concern for 2:1 block. ILR placed 10/20 and no indication for PPM at this time.     Patient was evaluated by PM&R and therapy for functional deficits, gait/ADL impairments and acute rehabilitation was recommended. Patient was medically optimized for discharge to [facility]F on 10/27/23.  (27 Oct 2023 12:30)          Subjective:  Seen this AM.  No new complaints      VITALS  Vital Signs Last 24 Hrs  T(C): 36.5 (04 Dec 2023 08:52), Max: 36.7 (03 Dec 2023 19:38)  T(F): 97.7 (04 Dec 2023 08:52), Max: 98 (03 Dec 2023 19:38)  HR: 57 (04 Dec 2023 08:52) (52 - 57)  BP: 117/87 (04 Dec 2023 08:52) (117/87 - 128/70)  BP(mean): --  RR: 14 (04 Dec 2023 08:52) (14 - 14)  SpO2: 96% (04 Dec 2023 08:52) (96% - 96%)    Parameters below as of 04 Dec 2023 08:52  Patient On (Oxygen Delivery Method): room air        REVIEW OF SYMPTOMS  Neurological deficits      PHYSICAL EXAM  Gen - NAD, Comfortable  HEENT - NCAT, EOMI, MMM  Pulm - good chest expansion, no conversational dyspnea  Cardiovascular - warm and well perfused  Abdomen - Soft, NT,   Extremities - No edema, No calf tenderness  Neuro-     Cognitive - AAOx3     Communication - Fluent, No dysarthria     Memory: Recall 2/3 spontaneously, 3/3 with cat. cues.       Cranial Nerves - CN 2-12 intact except left facial numbness     Motor strength WFL 5/5s, tested supine. Still requires supervision and set-up for functional tasks.      Tone - normal  Psychiatric - Mood stable, Affect WNL      RECENT LABS                        13.4   6.50  )-----------( 108      ( 04 Dec 2023 07:40 )             38.8     12-04    139  |  104  |  16  ----------------------------<  102<H>  4.1   |  26  |  0.91    Ca    8.9      04 Dec 2023 07:40    TPro  6.6  /  Alb  3.4  /  TBili  0.5  /  DBili  x   /  AST  18  /  ALT  30  /  AlkPhos  76  12-04      Urinalysis Basic - ( 04 Dec 2023 07:40 )    Color: x / Appearance: x / SG: x / pH: x  Gluc: 102 mg/dL / Ketone: x  / Bili: x / Urobili: x   Blood: x / Protein: x / Nitrite: x   Leuk Esterase: x / RBC: x / WBC x   Sq Epi: x / Non Sq Epi: x / Bacteria: x          RADIOLOGY/OTHER RESULTS      MEDICATIONS  (STANDING):  apixaban 5 milliGRAM(s) Oral two times a day  atorvastatin 80 milliGRAM(s) Oral at bedtime  citalopram 20 milliGRAM(s) Oral daily  dextrose 50% Injectable 25 Gram(s) IV Push once  donepezil 10 milliGRAM(s) Oral with breakfast  famotidine    Tablet 20 milliGRAM(s) Oral daily  folic acid 1 milliGRAM(s) Oral daily  linagliptin 5 milliGRAM(s) Oral daily  memantine 10 milliGRAM(s) Oral two times a day  metFORMIN 500 milliGRAM(s) Oral two times a day  multivitamin 1 Tablet(s) Oral daily  thiamine 100 milliGRAM(s) Oral daily    MEDICATIONS  (PRN):  polyethylene glycol 3350 17 Gram(s) Oral daily PRN Constipation         HPI:  Patient is a 59 y/o M with a PHx HTN, DM, stroke one year ago (at the time patient received Tenecteplase and had full resolution of symptoms), who presented to [facility] on 10/12/23 following sudden fall at work and lost consciousness. NIHSS 10 on admission. Admitted under NeuroICU on 10/12. Imaging revealed ischemia in the posterior circulation, CT angiogram head/neck showed right PCA P1-2 cutoff, possibly chronic right MCA M1 cutoff which had distal reconstitution and established collaterals, and left PCA occlusion which patient was subsequently transferred for mechanical thrombectomy.    Cerebral angiogram and mechanical thrombectomy performed using aspiration with TICI 3 recanalization of left PCA. The right occipital lobe filled from right SHANI collaterals, and the right MCA as well was thought to be chronic given noted collateralization. MR with small to moderate left temporal occipital lobe infarctions. Occluded right MCA, occluded right PCA and right vertebral artery proximal segment. The patient was started on a hep gtt given extensive atherosclerotic disease which was switched to apixaban 5mg BID with recommendations for a three month course.    TTE with normal left ventricular internal cavity size. Left ventricular ejection fractio 50 to 55%. Per Neuro, no need for ROCIO at this time. Pt had an episode of bradycardia during his stay and there were concern for 2:1 block. ILR placed 10/20 and no indication for PPM at this time.     Patient was evaluated by PM&R and therapy for functional deficits, gait/ADL impairments and acute rehabilitation was recommended. Patient was medically optimized for discharge to [facility] IR[facility] on 10/27/23.  (27 Oct 2023 12:30)          Subjective:  Seen this AM.  No new complaints      VITALS  Vital Signs Last 24 Hrs  T(C): 36.5 (04 Dec 2023 08:52), Max: 36.7 (03 Dec 2023 19:38)  T(F): 97.7 (04 Dec 2023 08:52), Max: 98 (03 Dec 2023 19:38)  HR: 57 (04 Dec 2023 08:52) (52 - 57)  BP: 117/87 (04 Dec 2023 08:52) (117/87 - 128/70)  BP(mean): --  RR: 14 (04 Dec 2023 08:52) (14 - 14)  SpO2: 96% (04 Dec 2023 08:52) (96% - 96%)    Parameters below as of 04 Dec 2023 08:52  Patient On (Oxygen Delivery Method): room air        REVIEW OF SYMPTOMS  Neurological deficits      PHYSICAL EXAM  Gen - NAD, Comfortable  HEENT - NCAT, EOMI, MMM  Pulm - good chest expansion, no conversational dyspnea  Cardiovascular - warm and well perfused  Abdomen - Soft, NT,   Extremities - No edema, No calf tenderness  Neuro-     Cognitive - AAOx3     Communication - Fluent, No dysarthria     Memory: Recall 2/3 spontaneously, 3/3 with cat. cues.       Cranial Nerves - CN 2-12 intact except left facial numbness     Motor strength WFL 5/5s, tested supine. Still requires supervision and set-up for functional tasks.      Tone - normal  Psychiatric - Mood stable, Affect WNL      RECENT LABS                        13.4   6.50  )-----------( 108      ( 04 Dec 2023 07:40 )             38.8     12-04    139  |  104  |  16  ----------------------------<  102<H>  4.1   |  26  |  0.91    Ca    8.9      04 Dec 2023 07:40    TPro  6.6  /  Alb  3.4  /  TBili  0.5  /  DBili  x   /  AST  18  /  ALT  30  /  AlkPhos  76  12-04      Urinalysis Basic - ( 04 Dec 2023 07:40 )    Color: x / Appearance: x / SG: x / pH: x  Gluc: 102 mg/dL / Ketone: x  / Bili: x / Urobili: x   Blood: x / Protein: x / Nitrite: x   Leuk Esterase: x / RBC: x / WBC x   Sq Epi: x / Non Sq Epi: x / Bacteria: x          RADIOLOGY/OTHER RESULTS      MEDICATIONS  (STANDING):  apixaban 5 milliGRAM(s) Oral two times a day  atorvastatin 80 milliGRAM(s) Oral at bedtime  citalopram 20 milliGRAM(s) Oral daily  dextrose 50% Injectable 25 Gram(s) IV Push once  donepezil 10 milliGRAM(s) Oral with breakfast  famotidine    Tablet 20 milliGRAM(s) Oral daily  folic acid 1 milliGRAM(s) Oral daily  linagliptin 5 milliGRAM(s) Oral daily  memantine 10 milliGRAM(s) Oral two times a day  metFORMIN 500 milliGRAM(s) Oral two times a day  multivitamin 1 Tablet(s) Oral daily  thiamine 100 milliGRAM(s) Oral daily    MEDICATIONS  (PRN):  polyethylene glycol 3350 17 Gram(s) Oral daily PRN Constipation

## 2023-12-04 NOTE — PROGRESS NOTE ADULT - SUBJECTIVE AND OBJECTIVE BOX
Patient is a 58y old  Male who presents with a chief complaint of CVA and left hemiparesis (03 Dec 2023 13:14)    Patient seen and examined at bedside. No events overnight, laying comfortably in bed. Patient denies headache, changes in vision, nausea or vomiting, no new neurological changes    ALLERGIES:  No Known Allergies    MEDICATIONS  (STANDING):  apixaban 5 milliGRAM(s) Oral two times a day  atorvastatin 80 milliGRAM(s) Oral at bedtime  citalopram 20 milliGRAM(s) Oral daily  dextrose 50% Injectable 25 Gram(s) IV Push once  donepezil 10 milliGRAM(s) Oral with breakfast  famotidine    Tablet 20 milliGRAM(s) Oral daily  folic acid 1 milliGRAM(s) Oral daily  linagliptin 5 milliGRAM(s) Oral daily  memantine 10 milliGRAM(s) Oral two times a day  metFORMIN 500 milliGRAM(s) Oral two times a day  multivitamin 1 Tablet(s) Oral daily  thiamine 100 milliGRAM(s) Oral daily    MEDICATIONS  (PRN):  polyethylene glycol 3350 17 Gram(s) Oral daily PRN Constipation    Vital Signs Last 24 Hrs  T(F): 97.7 (04 Dec 2023 08:52), Max: 98 (03 Dec 2023 19:38)  HR: 57 (04 Dec 2023 08:52) (52 - 57)  BP: 117/87 (04 Dec 2023 08:52) (117/87 - 133/72)  RR: 14 (04 Dec 2023 08:52) (14 - 15)  SpO2: 96% (04 Dec 2023 08:52) (96% - 96%)  I&O's Summary      PHYSICAL EXAM:  GENERAL: NAD, well-groomed, sitting in chair  HEAD:  Atraumatic, Normocephalic  EYES: PEEL, conjunctiva and sclera clear  ENMT: Moist mucous membranes, Supple, No JVD  CHEST/LUNG: Clear to auscultation bilaterally, good air entry, non-labored breathing  HEART: RRR; S1/S2, No murmur  ABDOMEN: Soft, Nontender, Nondistended; Bowel sounds present  EXTREMITIES: No calf tenderness, No cyanosis, No edema  SKIN: Warm, perfused  PSYCH: Normal mood, Normal affect  NERVOUS SYSTEM:  awake alert oriented, Good concentration    LABS:                        13.4   6.50  )-----------( 108      ( 04 Dec 2023 07:40 )             38.8     12-04    139  |  104  |  16  ----------------------------<  102  4.1   |  26  |  0.91    Ca    8.9      04 Dec 2023 07:40    TPro  6.6  /  Alb  3.4  /  TBili  0.5  /  DBili  x   /  AST  18  /  ALT  30  /  AlkPhos  76  12-04              10-13 Chol 199 mg/dL LDL -- HDL 29 mg/dL Trig 277 mg/dL                  Urinalysis Basic - ( 04 Dec 2023 07:40 )    Color: x / Appearance: x / SG: x / pH: x  Gluc: 102 mg/dL / Ketone: x  / Bili: x / Urobili: x   Blood: x / Protein: x / Nitrite: x   Leuk Esterase: x / RBC: x / WBC x   Sq Epi: x / Non Sq Epi: x / Bacteria: x            RADIOLOGY & ADDITIONAL TESTS:    Care Discussed with Consultants/Other Providers:

## 2023-12-04 NOTE — PROGRESS NOTE ADULT - ASSESSMENT
59 y/o M with a PHx HTN, DM, CVA sp tnK with full resolution symptoms, who presented to [facility] on 10/12 s/p fall +LOC, found to have left temporal occipital lobe infarctions and acute punctate left occipital lobe infarction.    #Ischemic CVA  - continue ASA/Plavix/Statin  - continue eliquis 5mg BID  - continue comprehensive rehab program    #Bradycardia  - TTE EF 50-55%, no other structural abnormalities  - Metoprolol discontinued   - per electrophysiology notes, no indication for pacemaker, s/p ILR on 10/20  - needs EP follow up as an outpatient.    #intermittent Dizziness   -Encouraged fluid intake - improved now    #ETOH use disorder  -  in remission, stable    #HTN  - No meds at home  - did not tolerate lisinopril 5mg during hospitalization (intermittent hypotension)  - controlled rn. goal bp <130mmHG can follow up as outpatient    #DM 2  - Wasn't taking meds at home  - Ha1c 8.6%  - Metformin, linagliptin  - controlled    #Elevated TSH  - T4 level normal  - outpt f/u    #DVT ppx  - Eliquis   57 y/o M with a PHx HTN, DM, CVA sp tnK with full resolution symptoms, who presented to [facility] on 10/12 s/p fall +LOC, found to have left temporal occipital lobe infarctions and acute punctate left occipital lobe infarction.    #Ischemic CVA  - continue ASA/Plavix/Statin  - continue eliquis 5mg BID  - continue comprehensive rehab program    #Bradycardia  - TTE EF 50-55%, no other structural abnormalities  - Metoprolol discontinued   - per electrophysiology notes, no indication for pacemaker, s/p ILR on 10/20  - needs EP follow up as an outpatient.    #intermittent Dizziness   -Encouraged fluid intake - improved now    #ETOH use disorder  -  in remission, stable    #HTN  - No meds at home  - did not tolerate lisinopril 5mg during hospitalization (intermittent hypotension)  - controlled rn. goal bp <130mmHG can follow up as outpatient    #DM 2  - Wasn't taking meds at home  - Ha1c 8.6%  - Metformin, linagliptin  - controlled    #Elevated TSH  - T4 level normal  - outpt f/u    #DVT ppx  - Eliquis

## 2023-12-04 NOTE — PROGRESS NOTE ADULT - ASSESSMENT
57 y/o M with a PHx HTN, DM, CVA sp tnK with full resolution symptoms, who presented to [facility] on 10/12 s/p fall +LOC, found to have left temporal occipital lobe infarctions and acute punctate left occipital lobe infarction.    # Occluded R MCA/PCA  - s/p thrombectomy  - Eliquis 5mg BID given extensive atherosclerotic disease. Continue for 3 months.    - Atorvastatin 80mg qhs.   - cont comprehensive rehab program PT, OT 3 hours daily 5 x week.  - neuropsychology support, recreation therapy  - Precautions: cardiac, DM, respiratory,  fall, aspiration.    # Memory deficits  - Memantine 10mg BID   - Donepezil 10mg  - EKG QTc  438 11/30      # Bradycardia, stable   - off Metoprolol   - per electrophysiology notes, no indication for pacemaker, s/p ILR on 10/20  - needs EP follow up as an outpatient    # HTN  - currently off anti HTN meds--  - BP controlled-- -140s      # HLD  - Atorvastatin 80 mg daily    # DM 2  - A1C 8.6  - Metformin     # h/o ETOH abuse  -  MVI, thiamine and folic acid    # GI/Bowel:  - Miralax  PRN      # FEN   - DASH DIET     # DVT ppx  - Eliquis 5mg BID    # Case discussed in IDT rounds 11/28 (follow up):  - supervision transfers, independent bed mobiltiy, supervision ambulation 150 feet without assist device/CG, set up UB/CG LB dressing, set up eating and grooming. Reduced memory, reduced initiation. Negotiates 12 steps with 1 HR and CS  - goals: supervision iADLs, supervision bADLs requires cues. Discussed with patient and in teams; patient not safe with intermittent supervision waking hours, requires continuous oversight  -d/c planning to [facility] tomorrow        # LABs  CBC BMP 11/30      Lety # [phone].  [phone] home.      Outpatient Follow-up (Specialty/Name of physician):    Rj Delgado  Cardiac Electrophysiology  [address], [address]  [place] [address]  Phone: [phone]  Fax: [phone]  Follow Up Time:     Wily Barkley  Neurology  [address] [place] [address]  Phone: [phone]  Fax: [phone]  Follow Up Time: 1 week    Denzel Zapata  Interventional Cardiology  [address] [facility], [address]  [place] [address]  Phone: [phone]  Fax: [phone]  Follow Up Time: 1 week    PCP,   Phone: (   )    -  Fax: (   )    -  Follow Up Time: 1 week    Arsenio Goodrich  Interventional Neuroradiology  [address] [place] [address]  Phone: [phone]  Fax: [phone]                   59 y/o M with a PHx HTN, DM, CVA sp tnK with full resolution symptoms, who presented to [facility] on 10/12 s/p fall +LOC, found to have left temporal occipital lobe infarctions and acute punctate left occipital lobe infarction.    # Occluded R MCA/PCA  - s/p thrombectomy  - Eliquis 5mg BID given extensive atherosclerotic disease. Continue for 3 months.    - Atorvastatin 80mg qhs.   - cont comprehensive rehab program PT, OT 3 hours daily 5 x week.  - neuropsychology support, recreation therapy  - Precautions: cardiac, DM, respiratory,  fall, aspiration.    # Memory deficits  - Memantine 10mg BID   - Donepezil 10mg  - EKG QTc  438 11/30      # Bradycardia, stable   - off Metoprolol   - per electrophysiology notes, no indication for pacemaker, s/p ILR on 10/20  - needs EP follow up as an outpatient    # HTN  - currently off anti HTN meds--  - BP controlled-- -140s      # HLD  - Atorvastatin 80 mg daily    # DM 2  - A1C 8.6  - Metformin     # h/o ETOH abuse  -  MVI, thiamine and folic acid    # GI/Bowel:  - Miralax  PRN      # FEN   - DASH DIET     # DVT ppx  - Eliquis 5mg BID    # Case discussed in IDT rounds 11/28 (follow up):  - supervision transfers, independent bed mobiltiy, supervision ambulation 150 feet without assist device/CG, set up UB/CG LB dressing, set up eating and grooming. Reduced memory, reduced initiation. Negotiates 12 steps with 1 HR and CS  - goals: supervision iADLs, supervision bADLs requires cues. Discussed with patient and in teams; patient not safe with intermittent supervision waking hours, requires continuous oversight  -d/c planning to [facility] tomorrow        # LABs  CBC BMP 11/30      Lety # [phone].  [phone] home.      Outpatient Follow-up (Specialty/Name of physician):    Rj Delgado  Cardiac Electrophysiology  [address], [address]  [place] [address]  Phone: [phone]  Fax: [phone]  Follow Up Time:     Wily Barkley  Neurology  [address] [place] [address]  Phone: [phone]  Fax: [phone]  Follow Up Time: 1 week    Denzel Zapata  Interventional Cardiology  [address], [address]  [place] [address]  Phone: [phone]  Fax: [phone]  Follow Up Time: 1 week    PCP,   Phone: (   )    -  Fax: (   )    -  Follow Up Time: 1 week    Arsenio Goodrich  Interventional Neuroradiology  [address] [place] [address]  Phone: [phone]  Fax: [phone]

## 2023-12-05 ENCOUNTER — TRANSCRIPTION ENCOUNTER (OUTPATIENT)
Age: 58
End: 2023-12-05

## 2023-12-05 VITALS
HEART RATE: 60 BPM | SYSTOLIC BLOOD PRESSURE: 139 MMHG | TEMPERATURE: 98 F | DIASTOLIC BLOOD PRESSURE: 90 MMHG | OXYGEN SATURATION: 97 % | RESPIRATION RATE: 14 BRPM

## 2023-12-05 PROCEDURE — 97535 SELF CARE MNGMENT TRAINING: CPT

## 2023-12-05 PROCEDURE — 97112 NEUROMUSCULAR REEDUCATION: CPT

## 2023-12-05 PROCEDURE — 80053 COMPREHEN METABOLIC PANEL: CPT

## 2023-12-05 PROCEDURE — 87635 SARS-COV-2 COVID-19 AMP PRB: CPT

## 2023-12-05 PROCEDURE — 36415 COLL VENOUS BLD VENIPUNCTURE: CPT

## 2023-12-05 PROCEDURE — 85025 COMPLETE CBC W/AUTO DIFF WBC: CPT

## 2023-12-05 PROCEDURE — 97530 THERAPEUTIC ACTIVITIES: CPT

## 2023-12-05 PROCEDURE — 92523 SPEECH SOUND LANG COMPREHEN: CPT

## 2023-12-05 PROCEDURE — 99238 HOSP IP/OBS DSCHRG MGMT 30/<: CPT

## 2023-12-05 PROCEDURE — 97129 THER IVNTJ 1ST 15 MIN: CPT

## 2023-12-05 PROCEDURE — 93005 ELECTROCARDIOGRAM TRACING: CPT

## 2023-12-05 PROCEDURE — 92507 TX SP LANG VOICE COMM INDIV: CPT

## 2023-12-05 PROCEDURE — 92610 EVALUATE SWALLOWING FUNCTION: CPT

## 2023-12-05 PROCEDURE — 97116 GAIT TRAINING THERAPY: CPT

## 2023-12-05 PROCEDURE — 97167 OT EVAL HIGH COMPLEX 60 MIN: CPT

## 2023-12-05 PROCEDURE — 97110 THERAPEUTIC EXERCISES: CPT

## 2023-12-05 PROCEDURE — 97163 PT EVAL HIGH COMPLEX 45 MIN: CPT

## 2023-12-05 PROCEDURE — 82962 GLUCOSE BLOOD TEST: CPT

## 2023-12-05 PROCEDURE — 99232 SBSQ HOSP IP/OBS MODERATE 35: CPT

## 2023-12-05 PROCEDURE — 85027 COMPLETE CBC AUTOMATED: CPT

## 2023-12-05 RX ADMIN — CITALOPRAM 20 MILLIGRAM(S): 10 TABLET, FILM COATED ORAL at 11:46

## 2023-12-05 RX ADMIN — METFORMIN HYDROCHLORIDE 500 MILLIGRAM(S): 850 TABLET ORAL at 05:52

## 2023-12-05 RX ADMIN — FAMOTIDINE 20 MILLIGRAM(S): 10 INJECTION INTRAVENOUS at 11:47

## 2023-12-05 RX ADMIN — MEMANTINE HYDROCHLORIDE 10 MILLIGRAM(S): 10 TABLET ORAL at 05:51

## 2023-12-05 RX ADMIN — LINAGLIPTIN 5 MILLIGRAM(S): 5 TABLET, FILM COATED ORAL at 11:47

## 2023-12-05 RX ADMIN — Medication 100 MILLIGRAM(S): at 11:46

## 2023-12-05 RX ADMIN — Medication 1 TABLET(S): at 11:46

## 2023-12-05 RX ADMIN — DONEPEZIL HYDROCHLORIDE 10 MILLIGRAM(S): 10 TABLET, FILM COATED ORAL at 08:49

## 2023-12-05 RX ADMIN — Medication 1 MILLIGRAM(S): at 11:45

## 2023-12-05 RX ADMIN — APIXABAN 5 MILLIGRAM(S): 2.5 TABLET, FILM COATED ORAL at 05:52

## 2023-12-05 NOTE — PROGRESS NOTE ADULT - REASON FOR ADMISSION
CVA and left hemiparesis

## 2023-12-05 NOTE — PROGRESS NOTE ADULT - PROVIDER SPECIALTY LIST ADULT
Hospitalist
Internal Medicine
Neuropsychology
Rehab Medicine
Hospitalist
Hospitalist
Neuropsychology
Physiatry
Rehab Medicine
Hospitalist
Internal Medicine
Physiatry
Physiatry
Rehab Medicine
Hospitalist
Internal Medicine
Physiatry
Rehab Medicine
Rehab Medicine
Hospitalist
Hospitalist
Internal Medicine
Physiatry
Rehab Medicine
Physiatry
Physiatry

## 2023-12-05 NOTE — PROGRESS NOTE ADULT - ATTENDING COMMENTS
Pt. seen with resident & fellow.  Agree with documentation above as per fellow with amendments made as appropriate. Patient medically stable. Making progress towards rehab goals.     CVA  Patient medically stable for discharge to [facility] today.  Discharge medications and instructions reviewed with patient's sister Selma via phone.  Was not able to reach Lety.  Requested SW to Email her discharge instructions.  All questions answered. Pt. seen with resident & fellow.  Agree with documentation above as per fellow with amendments made as appropriate. Patient medically stable. Making progress towards rehab goals.     CVA  Patient medically stable for discharge to [facility] today.  Discharge medications and instructions reviewed with patient's sister Selma via phone.  Was not able to reach Lety.  Requested SW to Email her discharge instructions.  All questions answered.

## 2023-12-05 NOTE — PROGRESS NOTE ADULT - ASSESSMENT
59 y/o M with a PHx HTN, DM, CVA sp tnK with full resolution symptoms, who presented to [facility] on 10/12 s/p fall +LOC, found to have left temporal occipital lobe infarctions and acute punctate left occipital lobe infarction.    #Ischemic CVA  - continue ASA/Plavix/Statin  - continue eliquis 5mg BID  - continue comprehensive rehab program    #Bradycardia  - TTE EF 50-55%, no other structural abnormalities  - Metoprolol discontinued   - per electrophysiology notes, no indication for pacemaker, s/p ILR on 10/20  - needs EP follow up as an outpatient.    #intermittent Dizziness   -Encouraged fluid intake - improved now    #ETOH use disorder  -  in remission, stable    #HTN  - No meds at home  - did not tolerate lisinopril 5mg during hospitalization (intermittent hypotension)  - controlled rn. goal bp <130mmHG can follow up as outpatient    #DM 2  - Wasn't taking meds at home  - Ha1c 8.6%  - Metformin, linagliptin  - controlled    #Elevated TSH  - T4 level normal  - outpt f/u    #DVT ppx  - Eliquis 57 y/o M with a PHx HTN, DM, CVA sp tnK with full resolution symptoms, who presented to [facility] on 10/12 s/p fall +LOC, found to have left temporal occipital lobe infarctions and acute punctate left occipital lobe infarction.    #Ischemic CVA  - continue ASA/Plavix/Statin  - continue eliquis 5mg BID  - continue comprehensive rehab program    #Bradycardia  - TTE EF 50-55%, no other structural abnormalities  - Metoprolol discontinued   - per electrophysiology notes, no indication for pacemaker, s/p ILR on 10/20  - needs EP follow up as an outpatient.    #intermittent Dizziness   -Encouraged fluid intake - improved now    #ETOH use disorder  -  in remission, stable    #HTN  - No meds at home  - did not tolerate lisinopril 5mg during hospitalization (intermittent hypotension)  - controlled rn. goal bp <130mmHG can follow up as outpatient    #DM 2  - Wasn't taking meds at home  - Ha1c 8.6%  - Metformin, linagliptin  - controlled    #Elevated TSH  - T4 level normal  - outpt f/u    #DVT ppx  - Eliquis

## 2023-12-05 NOTE — DISCHARGE NOTE NURSING/CASE MANAGEMENT/SOCIAL WORK - NSDCPEFALRISK_GEN_ALL_CORE
For information on Fall & Injury Prevention, visit: [email or web address] OR  [email or web address] OR  https://www.cdc.gov/steadi/patient.html For information on Fall & Injury Prevention, visit: [email or web address] OR  [email or web address] OR  https://www.cdc.gov/steadi/patient.html

## 2023-12-05 NOTE — PROGRESS NOTE ADULT - SUBJECTIVE AND OBJECTIVE BOX
HPI:  Patient is a 59 y/o M with a PHx HTN, DM, stroke one year ago (at the time patient received Tenecteplase and had full resolution of symptoms), who presented to [facility] on 10/12/23 following sudden fall at work and lost consciousness. NIHSS 10 on admission. Admitted under NeuroICU on 10/12. Imaging revealed ischemia in the posterior circulation, CT angiogram head/neck showed right PCA P1-2 cutoff, possibly chronic right MCA M1 cutoff which had distal reconstitution and established collaterals, and left PCA occlusion which patient was subsequently transferred for mechanical thrombectomy.    Cerebral angiogram and mechanical thrombectomy performed using aspiration with TICI 3 recanalization of left PCA. The right occipital lobe filled from right SHANI collaterals, and the right MCA as well was thought to be chronic given noted collateralization. MR with small to moderate left temporal occipital lobe infarctions. Occluded right MCA, occluded right PCA and right vertebral artery proximal segment. The patient was started on a hep gtt given extensive atherosclerotic disease which was switched to apixaban 5mg BID with recommendations for a three month course.    TTE with normal left ventricular internal cavity size. Left ventricular ejection fractio 50 to 55%. Per Neuro, no need for ROCIO at this time. Pt had an episode of bradycardia during his stay and there were concern for 2:1 block. ILR placed 10/20 and no indication for PPM at this time.     Patient was evaluated by PM&R and therapy for functional deficits, gait/ADL impairments and acute rehabilitation was recommended. Patient was medically optimized for discharge to [facility] IRF on 10/27/23.  (27 Oct 2023 12:30)    Subjective:  No acute overnight events. Patient seen and examined by bedside. No new numbness/tingling/weakness. Working hard in therapies. Plan for discharge to [facility] this afternoon. Bradycardia, afebrile.     VITALS  Vital Signs Last 24 Hrs  T(C): 36.5 (04 Dec 2023 08:52), Max: 36.7 (03 Dec 2023 19:38)  T(F): 97.7 (04 Dec 2023 08:52), Max: 98 (03 Dec 2023 19:38)  HR: 57 (04 Dec 2023 08:52) (52 - 57)  BP: 117/87 (04 Dec 2023 08:52) (117/87 - 128/70)  BP(mean): --  RR: 14 (04 Dec 2023 08:52) (14 - 14)  SpO2: 96% (04 Dec 2023 08:52) (96% - 96%)    Parameters below as of 04 Dec 2023 08:52  Patient On (Oxygen Delivery Method): room air    REVIEW OF SYMPTOMS  Neurological deficits    PHYSICAL EXAM  Gen - NAD, Comfortable  Pulm - good chest expansion  Cardiovascular - warm and well perfused  Abdomen - soft, NT,   Extremities - no edema, No calf tenderness  Neuro-     Cognitive - AAOx3     Communication - Fluent, No dysarthria     Cranial Nerves - CN 2-12 intact except left facial numbness     Motor strength WFL 5/5s, tested supine. Still requires supervision and set-up for functional tasks.      Tone - normal  Psychiatric - Mood stable, Affect WNL      RECENT LABS                        13.4   6.50  )-----------( 108      ( 04 Dec 2023 07:40 )             38.8     12-04    139  |  104  |  16  ----------------------------<  102<H>  4.1   |  26  |  0.91    Ca    8.9      04 Dec 2023 07:40    TPro  6.6  /  Alb  3.4  /  TBili  0.5  /  DBili  x   /  AST  18  /  ALT  30  /  AlkPhos  76  12-04      Urinalysis Basic - ( 04 Dec 2023 07:40 )    Color: x / Appearance: x / SG: x / pH: x  Gluc: 102 mg/dL / Ketone: x  / Bili: x / Urobili: x   Blood: x / Protein: x / Nitrite: x   Leuk Esterase: x / RBC: x / WBC x   Sq Epi: x / Non Sq Epi: x / Bacteria: x    RADIOLOGY/OTHER RESULTS  Reviewed       MEDICATIONS  (STANDING):  apixaban 5 milliGRAM(s) Oral two times a day  atorvastatin 80 milliGRAM(s) Oral at bedtime  citalopram 20 milliGRAM(s) Oral daily  dextrose 50% Injectable 25 Gram(s) IV Push once  donepezil 10 milliGRAM(s) Oral with breakfast  famotidine    Tablet 20 milliGRAM(s) Oral daily  folic acid 1 milliGRAM(s) Oral daily  linagliptin 5 milliGRAM(s) Oral daily  memantine 10 milliGRAM(s) Oral two times a day  metFORMIN 500 milliGRAM(s) Oral two times a day  multivitamin 1 Tablet(s) Oral daily  thiamine 100 milliGRAM(s) Oral daily    MEDICATIONS  (PRN):  polyethylene glycol 3350 17 Gram(s) Oral daily PRN Constipation    Assessment and Plan:   59 y/o M with a PHx HTN, DM, CVA sp tnK with full resolution symptoms, who presented to [facility] on 10/12 s/p fall +LOC, found to have left temporal occipital lobe infarctions and acute punctate left occipital lobe infarction.    # Occluded R MCA/PCA  - s/p thrombectomy  - Eliquis 5mg BID (started on 10/21/23, course x3 months, tentative end date 1/13/24)  - Plan to follow up with Dr. Goodrich as outpatient to address anticoagulation   - Atorvastatin 80mg qhs     # Memory deficits  - Memantine 10mg BID   - Donepezil 10mg  - EKG QTc  438 11/30    #Depression   - Celexa 20mg daily     # Bradycardia, stable   - off Metoprolol   - per electrophysiology notes, no indication for pacemaker, s/p ILR on 10/20  - needs EP follow up as an outpatient    # HTN  - currently off anti HTN meds--  - BP controlled-- -140s    # HLD  - Atorvastatin 80 mg daily    # DM 2  - A1C 8.6  - Metformin 500mg BID     # h/o ETOH abuse  -  MVI, thiamine and folic acid    # GI/Bowel:  - Miralax  PRN    # FEN   - DASH DIET     # DVT ppx  - Eliquis 5mg BID    # Case discussed in IDT rounds 11/28 (follow up):  - supervision transfers, independent bed mobiltiy, supervision ambulation 150 feet without assist device/CG, set up UB/CG LB dressing, set up eating and grooming. Reduced memory, reduced initiation. Negotiates 12 steps with 1 HR and CS  - goals: supervision iADLs, supervision bADLs requires cues. Discussed with patient and in teams; patient not safe with intermittent supervision waking hours, requires continuous oversight  -d/c planning to [facility] tomorrow    Lety # [phone].  [phone] home.      Outpatient Follow-up (Specialty/Name of physician):    Rj Delgado  Cardiac Electrophysiology  [address] [facility], [address]  [place] [address]  Phone: [phone]  Fax: [phone]  Follow Up Time:     Wily Barkley  Neurology  [address] [place] [address]  Phone: [phone]  Fax: [phone]  Follow Up Time: 1 week    Denzel Zapata  Interventional Cardiology  [address] [facility], [address]  [place] [address]  Phone: [phone]  Fax: [phone]  Follow Up Time: 1 week    PCP,   Phone: (   )    -  Fax: (   )    -  Follow Up Time: 1 week    Arsenio Goodrich  Interventional Neuroradiology  [address] [place] [address]  Phone: [phone]  Fax: [phone]   HPI:  Patient is a 59 y/o M with a PHx HTN, DM, stroke one year ago (at the time patient received Tenecteplase and had full resolution of symptoms), who presented to [facility] on 10/12/23 following sudden fall at work and lost consciousness. NIHSS 10 on admission. Admitted under NeuroICU on 10/12. Imaging revealed ischemia in the posterior circulation, CT angiogram head/neck showed right PCA P1-2 cutoff, possibly chronic right MCA M1 cutoff which had distal reconstitution and established collaterals, and left PCA occlusion which patient was subsequently transferred for mechanical thrombectomy.    Cerebral angiogram and mechanical thrombectomy performed using aspiration with TICI 3 recanalization of left PCA. The right occipital lobe filled from right SHANI collaterals, and the right MCA as well was thought to be chronic given noted collateralization. MR with small to moderate left temporal occipital lobe infarctions. Occluded right MCA, occluded right PCA and right vertebral artery proximal segment. The patient was started on a hep gtt given extensive atherosclerotic disease which was switched to apixaban 5mg BID with recommendations for a three month course.    TTE with normal left ventricular internal cavity size. Left ventricular ejection fractio 50 to 55%. Per Neuro, no need for ROCIO at this time. Pt had an episode of bradycardia during his stay and there were concern for 2:1 block. ILR placed 10/20 and no indication for PPM at this time.     Patient was evaluated by PM&R and therapy for functional deficits, gait/ADL impairments and acute rehabilitation was recommended. Patient was medically optimized for discharge to [facility] IR[facility] on 10/27/23.  (27 Oct 2023 12:30)    Subjective:  No acute overnight events. Patient seen and examined by bedside. No new numbness/tingling/weakness. Working hard in therapies. Plan for discharge to [facility] this afternoon. Bradycardia, afebrile.     VITALS  Vital Signs Last 24 Hrs  T(C): 36.5 (04 Dec 2023 08:52), Max: 36.7 (03 Dec 2023 19:38)  T(F): 97.7 (04 Dec 2023 08:52), Max: 98 (03 Dec 2023 19:38)  HR: 57 (04 Dec 2023 08:52) (52 - 57)  BP: 117/87 (04 Dec 2023 08:52) (117/87 - 128/70)  BP(mean): --  RR: 14 (04 Dec 2023 08:52) (14 - 14)  SpO2: 96% (04 Dec 2023 08:52) (96% - 96%)    Parameters below as of 04 Dec 2023 08:52  Patient On (Oxygen Delivery Method): room air    REVIEW OF SYMPTOMS  Neurological deficits    PHYSICAL EXAM  Gen - NAD, Comfortable  Pulm - good chest expansion  Cardiovascular - warm and well perfused  Abdomen - soft, NT,   Extremities - no edema, No calf tenderness  Neuro-     Cognitive - AAOx3     Communication - Fluent, No dysarthria     Cranial Nerves - CN 2-12 intact except left facial numbness     Motor strength WFL 5/5s, tested supine. Still requires supervision and set-up for functional tasks.      Tone - normal  Psychiatric - Mood stable, Affect WNL      RECENT LABS                        13.4   6.50  )-----------( 108      ( 04 Dec 2023 07:40 )             38.8     12-04    139  |  104  |  16  ----------------------------<  102<H>  4.1   |  26  |  0.91    Ca    8.9      04 Dec 2023 07:40    TPro  6.6  /  Alb  3.4  /  TBili  0.5  /  DBili  x   /  AST  18  /  ALT  30  /  AlkPhos  76  12-04      Urinalysis Basic - ( 04 Dec 2023 07:40 )    Color: x / Appearance: x / SG: x / pH: x  Gluc: 102 mg/dL / Ketone: x  / Bili: x / Urobili: x   Blood: x / Protein: x / Nitrite: x   Leuk Esterase: x / RBC: x / WBC x   Sq Epi: x / Non Sq Epi: x / Bacteria: x    RADIOLOGY/OTHER RESULTS  Reviewed       MEDICATIONS  (STANDING):  apixaban 5 milliGRAM(s) Oral two times a day  atorvastatin 80 milliGRAM(s) Oral at bedtime  citalopram 20 milliGRAM(s) Oral daily  dextrose 50% Injectable 25 Gram(s) IV Push once  donepezil 10 milliGRAM(s) Oral with breakfast  famotidine    Tablet 20 milliGRAM(s) Oral daily  folic acid 1 milliGRAM(s) Oral daily  linagliptin 5 milliGRAM(s) Oral daily  memantine 10 milliGRAM(s) Oral two times a day  metFORMIN 500 milliGRAM(s) Oral two times a day  multivitamin 1 Tablet(s) Oral daily  thiamine 100 milliGRAM(s) Oral daily    MEDICATIONS  (PRN):  polyethylene glycol 3350 17 Gram(s) Oral daily PRN Constipation    Assessment and Plan:   59 y/o M with a PHx HTN, DM, CVA sp tnK with full resolution symptoms, who presented to [facility] on 10/12 s/p fall +LOC, found to have left temporal occipital lobe infarctions and acute punctate left occipital lobe infarction.    # Occluded R MCA/PCA  - s/p thrombectomy  - Eliquis 5mg BID (started on 10/21/23, course x3 months, tentative end date 1/13/24)  - Plan to follow up with Dr. Goodrich as outpatient to address anticoagulation   - Atorvastatin 80mg qhs     # Memory deficits  - Memantine 10mg BID   - Donepezil 10mg  - EKG QTc  438 11/30    #Depression   - Celexa 20mg daily     # Bradycardia, stable   - off Metoprolol   - per electrophysiology notes, no indication for pacemaker, s/p ILR on 10/20  - needs EP follow up as an outpatient    # HTN  - currently off anti HTN meds--  - BP controlled-- -140s    # HLD  - Atorvastatin 80 mg daily    # DM 2  - A1C 8.6  - Metformin 500mg BID     # h/o ETOH abuse  -  MVI, thiamine and folic acid    # GI/Bowel:  - Miralax  PRN    # FEN   - DASH DIET     # DVT ppx  - Eliquis 5mg BID    # Case discussed in IDT rounds 11/28 (follow up):  - supervision transfers, independent bed mobiltiy, supervision ambulation 150 feet without assist device/CG, set up UB/CG LB dressing, set up eating and grooming. Reduced memory, reduced initiation. Negotiates 12 steps with 1 HR and CS  - goals: supervision iADLs, supervision bADLs requires cues. Discussed with patient and in teams; patient not safe with intermittent supervision waking hours, requires continuous oversight  -d/c planning to [facility] tomorrow    Lety # [phone].  [phone] home.      Outpatient Follow-up (Specialty/Name of physician):    Rj Delgado  Cardiac Electrophysiology  [address] [facility], [address]  [place] [address]  Phone: [phone]  Fax: [phone]  Follow Up Time:     Wily Barkley  Neurology  [address] [place] [address]  Phone: [phone]  Fax: [phone]  Follow Up Time: 1 week    Denzel Zapata  Interventional Cardiology  [address] [facility], [address]  [place] [address]  Phone: [phone]  Fax: [phone]  Follow Up Time: 1 week    PCP,   Phone: (   )    -  Fax: (   )    -  Follow Up Time: 1 week    Arsenio Goodrich  Interventional Neuroradiology  [address] [place] [address]  Phone: [phone]  Fax: [phone]   HPI:  Patient is a 59 y/o M with a PHx HTN, DM, stroke one year ago (at the time patient received Tenecteplase and had full resolution of symptoms), who presented to [facility] on 10/12/23 following sudden fall at work and lost consciousness. NIHSS 10 on admission. Admitted under NeuroICU on 10/12. Imaging revealed ischemia in the posterior circulation, CT angiogram head/neck showed right PCA P1-2 cutoff, possibly chronic right MCA M1 cutoff which had distal reconstitution and established collaterals, and left PCA occlusion which patient was subsequently transferred for mechanical thrombectomy.    Cerebral angiogram and mechanical thrombectomy performed using aspiration with TICI 3 recanalization of left PCA. The right occipital lobe filled from right SHANI collaterals, and the right MCA as well was thought to be chronic given noted collateralization. MR with small to moderate left temporal occipital lobe infarctions. Occluded right MCA, occluded right PCA and right vertebral artery proximal segment. The patient was started on a hep gtt given extensive atherosclerotic disease which was switched to apixaban 5mg BID with recommendations for a three month course.    TTE with normal left ventricular internal cavity size. Left ventricular ejection fractio 50 to 55%. Per Neuro, no need for ROCIO at this time. Pt had an episode of bradycardia during his stay and there were concern for 2:1 block. ILR placed 10/20 and no indication for PPM at this time.     Patient was evaluated by PM&R and therapy for functional deficits, gait/ADL impairments and acute rehabilitation was recommended. Patient was medically optimized for discharge to [facility] on 10/27/23.  (27 Oct 2023 12:30)    Subjective:  No acute overnight events. Patient seen and examined by bedside. No new numbness/tingling/weakness. Working hard in therapies. Plan for discharge to [facility] this afternoon. Bradycardia, afebrile.     VITALS  Vital Signs Last 24 Hrs  T(C): 36.5 (04 Dec 2023 08:52), Max: 36.7 (03 Dec 2023 19:38)  T(F): 97.7 (04 Dec 2023 08:52), Max: 98 (03 Dec 2023 19:38)  HR: 57 (04 Dec 2023 08:52) (52 - 57)  BP: 117/87 (04 Dec 2023 08:52) (117/87 - 128/70)  BP(mean): --  RR: 14 (04 Dec 2023 08:52) (14 - 14)  SpO2: 96% (04 Dec 2023 08:52) (96% - 96%)    Parameters below as of 04 Dec 2023 08:52  Patient On (Oxygen Delivery Method): room air    REVIEW OF SYMPTOMS  Neurological deficits    PHYSICAL EXAM  Gen - NAD, Comfortable  Pulm - good chest expansion  Cardiovascular - warm and well perfused  Abdomen - soft, NT,   Extremities - no edema, No calf tenderness  Neuro-     Cognitive - AAOx3     Communication - Fluent, No dysarthria     Cranial Nerves - CN 2-12 intact except left facial numbness     Motor strength WFL 5/5s, tested supine. Still requires supervision and set-up for functional tasks.      Tone - normal  Psychiatric - Mood stable, Affect WNL      RECENT LABS                        13.4   6.50  )-----------( 108      ( 04 Dec 2023 07:40 )             38.8     12-04    139  |  104  |  16  ----------------------------<  102<H>  4.1   |  26  |  0.91    Ca    8.9      04 Dec 2023 07:40    TPro  6.6  /  Alb  3.4  /  TBili  0.5  /  DBili  x   /  AST  18  /  ALT  30  /  AlkPhos  76  12-04      Urinalysis Basic - ( 04 Dec 2023 07:40 )    Color: x / Appearance: x / SG: x / pH: x  Gluc: 102 mg/dL / Ketone: x  / Bili: x / Urobili: x   Blood: x / Protein: x / Nitrite: x   Leuk Esterase: x / RBC: x / WBC x   Sq Epi: x / Non Sq Epi: x / Bacteria: x    RADIOLOGY/OTHER RESULTS  Reviewed       MEDICATIONS  (STANDING):  apixaban 5 milliGRAM(s) Oral two times a day  atorvastatin 80 milliGRAM(s) Oral at bedtime  citalopram 20 milliGRAM(s) Oral daily  dextrose 50% Injectable 25 Gram(s) IV Push once  donepezil 10 milliGRAM(s) Oral with breakfast  famotidine    Tablet 20 milliGRAM(s) Oral daily  folic acid 1 milliGRAM(s) Oral daily  linagliptin 5 milliGRAM(s) Oral daily  memantine 10 milliGRAM(s) Oral two times a day  metFORMIN 500 milliGRAM(s) Oral two times a day  multivitamin 1 Tablet(s) Oral daily  thiamine 100 milliGRAM(s) Oral daily    MEDICATIONS  (PRN):  polyethylene glycol 3350 17 Gram(s) Oral daily PRN Constipation    Assessment and Plan:   59 y/o M with a PHx HTN, DM, CVA sp tnK with full resolution symptoms, who presented to [facility] on 10/12 s/p fall +LOC, found to have left temporal occipital lobe infarctions and acute punctate left occipital lobe infarction.    Patient medically stable for discharge to [facility].  Discharge medications and instructions reviewed with patient and pt's family.      # Occluded R MCA/PCA  - s/p thrombectomy  - Eliquis 5mg BID (started on 10/21/23, course x3 months, tentative end date 1/13/24)  - Plan to follow up with Dr. Goodrich as outpatient to address anticoagulation   - Atorvastatin 80mg qhs     # Memory deficits  - Memantine 10mg BID   - Donepezil 10mg  - EKG QTc  438 11/30    #Depression   - Celexa 20mg daily     # Bradycardia, stable   - off Metoprolol   - per electrophysiology notes, no indication for pacemaker, s/p ILR on 10/20  - needs EP follow up as an outpatient    # HTN  - currently off anti HTN meds--  - BP controlled-- -140s    # HLD  - Atorvastatin 80 mg daily    # DM 2  - A1C 8.6  - Metformin 500mg BID     # h/o ETOH abuse  -  MVI, thiamine and folic acid    # GI/Bowel:  - Miralax  PRN    # FEN   - DASH DIET     # DVT ppx  - Eliquis 5mg BID    # Case discussed in IDT rounds 11/28 (follow up):  - supervision transfers, independent bed mobiltiy, supervision ambulation 150 feet without assist device/CG, set up UB/CG LB dressing, set up eating and grooming. Reduced memory, reduced initiation. Negotiates 12 steps with 1 HR and CS  - goals: supervision iADLs, supervision bADLs requires cues. Discussed with patient and in teams; patient not safe with intermittent supervision waking hours, requires continuous oversight  -d/c planning to [facility] tomorrow    Lety # [phone].  [phone] home.      Outpatient Follow-up (Specialty/Name of physician):    Rj Delgado  Cardiac Electrophysiology  [address] [address]  Phone: [phone]  Fax: [phone]  Follow Up Time:     Wily Barkley  Neurology  [address] [place] [address]  Phone: [phone]  Fax: [phone]  Follow Up Time: 1 week    Denzel Zapata  Interventional Cardiology  [address] [id number]  Phone: [phone]  Fax: [phone]  Follow Up Time: 1 week    PCP,   Phone: (   )    -  Fax: (   )    -  Follow Up Time: 1 week    Arsenio Goodrich  Interventional Neuroradiology  [address] [id number]  Phone: [phone]  Fax: [phone]   HPI:  Patient is a 57 y/o M with a PHx HTN, DM, stroke one year ago (at the time patient received Tenecteplase and had full resolution of symptoms), who presented to [facility] on 10/12/23 following sudden fall at work and lost consciousness. NIHSS 10 on admission. Admitted under NeuroICU on 10/12. Imaging revealed ischemia in the posterior circulation, CT angiogram head/neck showed right PCA P1-2 cutoff, possibly chronic right MCA M1 cutoff which had distal reconstitution and established collaterals, and left PCA occlusion which patient was subsequently transferred for mechanical thrombectomy.    Cerebral angiogram and mechanical thrombectomy performed using aspiration with TICI 3 recanalization of left PCA. The right occipital lobe filled from right SHANI collaterals, and the right MCA as well was thought to be chronic given noted collateralization. MR with small to moderate left temporal occipital lobe infarctions. Occluded right MCA, occluded right PCA and right vertebral artery proximal segment. The patient was started on a hep gtt given extensive atherosclerotic disease which was switched to apixaban 5mg BID with recommendations for a three month course.    TTE with normal left ventricular internal cavity size. Left ventricular ejection fractio 50 to 55%. Per Neuro, no need for ROCIO at this time. Pt had an episode of bradycardia during his stay and there were concern for 2:1 block. ILR placed 10/20 and no indication for PPM at this time.     Patient was evaluated by PM&R and therapy for functional deficits, gait/ADL impairments and acute rehabilitation was recommended. Patient was medically optimized for discharge to [facility] on 10/27/23.  (27 Oct 2023 12:30)    Subjective:  No acute overnight events. Patient seen and examined by bedside. No new numbness/tingling/weakness. Working hard in therapies. Plan for discharge to [facility] this afternoon. Bradycardia, afebrile.     VITALS  Vital Signs Last 24 Hrs  T(C): 36.5 (04 Dec 2023 08:52), Max: 36.7 (03 Dec 2023 19:38)  T(F): 97.7 (04 Dec 2023 08:52), Max: 98 (03 Dec 2023 19:38)  HR: 57 (04 Dec 2023 08:52) (52 - 57)  BP: 117/87 (04 Dec 2023 08:52) (117/87 - 128/70)  BP(mean): --  RR: 14 (04 Dec 2023 08:52) (14 - 14)  SpO2: 96% (04 Dec 2023 08:52) (96% - 96%)    Parameters below as of 04 Dec 2023 08:52  Patient On (Oxygen Delivery Method): room air    REVIEW OF SYMPTOMS  Neurological deficits    PHYSICAL EXAM  Gen - NAD, Comfortable  Pulm - good chest expansion  Cardiovascular - warm and well perfused  Abdomen - soft, NT,   Extremities - no edema, No calf tenderness  Neuro-     Cognitive - AAOx3     Communication - Fluent, No dysarthria     Cranial Nerves - CN 2-12 intact except left facial numbness     Motor strength WFL 5/5s, tested supine. Still requires supervision and set-up for functional tasks.      Tone - normal  Psychiatric - Mood stable, Affect WNL      RECENT LABS                        13.4   6.50  )-----------( 108      ( 04 Dec 2023 07:40 )             38.8     12-04    139  |  104  |  16  ----------------------------<  102<H>  4.1   |  26  |  0.91    Ca    8.9      04 Dec 2023 07:40    TPro  6.6  /  Alb  3.4  /  TBili  0.5  /  DBili  x   /  AST  18  /  ALT  30  /  AlkPhos  76  12-04      Urinalysis Basic - ( 04 Dec 2023 07:40 )    Color: x / Appearance: x / SG: x / pH: x  Gluc: 102 mg/dL / Ketone: x  / Bili: x / Urobili: x   Blood: x / Protein: x / Nitrite: x   Leuk Esterase: x / RBC: x / WBC x   Sq Epi: x / Non Sq Epi: x / Bacteria: x    RADIOLOGY/OTHER RESULTS  Reviewed       MEDICATIONS  (STANDING):  apixaban 5 milliGRAM(s) Oral two times a day  atorvastatin 80 milliGRAM(s) Oral at bedtime  citalopram 20 milliGRAM(s) Oral daily  dextrose 50% Injectable 25 Gram(s) IV Push once  donepezil 10 milliGRAM(s) Oral with breakfast  famotidine    Tablet 20 milliGRAM(s) Oral daily  folic acid 1 milliGRAM(s) Oral daily  linagliptin 5 milliGRAM(s) Oral daily  memantine 10 milliGRAM(s) Oral two times a day  metFORMIN 500 milliGRAM(s) Oral two times a day  multivitamin 1 Tablet(s) Oral daily  thiamine 100 milliGRAM(s) Oral daily    MEDICATIONS  (PRN):  polyethylene glycol 3350 17 Gram(s) Oral daily PRN Constipation    Assessment and Plan:   57 y/o M with a PHx HTN, DM, CVA sp tnK with full resolution symptoms, who presented to [facility] on 10/12 s/p fall +LOC, found to have left temporal occipital lobe infarctions and acute punctate left occipital lobe infarction.    Patient medically stable for discharge to [facility].  Discharge medications and instructions reviewed with patient and pt's family.      # Occluded R MCA/PCA  - s/p thrombectomy  - Eliquis 5mg BID (started on 10/21/23, course x3 months, tentative end date 1/13/24)  - Plan to follow up with Dr. Goodrich as outpatient to address anticoagulation   - Atorvastatin 80mg qhs     # Memory deficits  - Memantine 10mg BID   - Donepezil 10mg  - EKG QTc  438 11/30    #Depression   - Celexa 20mg daily     # Bradycardia, stable   - off Metoprolol   - per electrophysiology notes, no indication for pacemaker, s/p ILR on 10/20  - needs EP follow up as an outpatient    # HTN  - currently off anti HTN meds--  - BP controlled-- -140s    # HLD  - Atorvastatin 80 mg daily    # DM 2  - A1C 8.6  - Metformin 500mg BID     # h/o ETOH abuse  -  MVI, thiamine and folic acid    # GI/Bowel:  - Miralax  PRN    # FEN   - DASH DIET     # DVT ppx  - Eliquis 5mg BID    # Case discussed in IDT rounds 11/28 (follow up):  - supervision transfers, independent bed mobiltiy, supervision ambulation 150 feet without assist device/CG, set up UB/CG LB dressing, set up eating and grooming. Reduced memory, reduced initiation. Negotiates 12 steps with 1 HR and CS  - goals: supervision iADLs, supervision bADLs requires cues. Discussed with patient and in teams; patient not safe with intermittent supervision waking hours, requires continuous oversight  -d/c planning to [facility] tomorrow    Lety # [phone].  [phone] home.      Outpatient Follow-up (Specialty/Name of physician):    Rj Delgado  Cardiac Electrophysiology  [address] [address]  Phone: [phone]  Fax: [phone]  Follow Up Time:     Wily Barkley  Neurology  [address] [place] [address]  Phone: [phone]  Fax: [phone]  Follow Up Time: 1 week    Denzel Zapata  Interventional Cardiology  [address] [place] [id number]  Phone: [phone]  Fax: [phone]  Follow Up Time: 1 week    PCP,   Phone: (   )    -  Fax: (   )    -  Follow Up Time: 1 week    Arsenio Goodrich  Interventional Neuroradiology  [address] [id number]  Phone: [phone]  Fax: [phone]

## 2023-12-05 NOTE — PROGRESS NOTE ADULT - SUBJECTIVE AND OBJECTIVE BOX
Patient is a 58y old  Male who presents with a chief complaint of CVA and left hemiparesis (04 Dec 2023 17:26)    Patient seen and examined at bedside. No events overnight, laying comfortably in bed. Patient denies headache, changes in vision, nausea or vomiting, no new neurological changes    ALLERGIES:  No Known Allergies    MEDICATIONS  (STANDING):  apixaban 5 milliGRAM(s) Oral two times a day  atorvastatin 80 milliGRAM(s) Oral at bedtime  citalopram 20 milliGRAM(s) Oral daily  dextrose 50% Injectable 25 Gram(s) IV Push once  donepezil 10 milliGRAM(s) Oral with breakfast  famotidine    Tablet 20 milliGRAM(s) Oral daily  folic acid 1 milliGRAM(s) Oral daily  linagliptin 5 milliGRAM(s) Oral daily  memantine 10 milliGRAM(s) Oral two times a day  metFORMIN 500 milliGRAM(s) Oral two times a day  multivitamin 1 Tablet(s) Oral daily  thiamine 100 milliGRAM(s) Oral daily    MEDICATIONS  (PRN):  polyethylene glycol 3350 17 Gram(s) Oral daily PRN Constipation    Vital Signs Last 24 Hrs  T(F): 98 (05 Dec 2023 08:50), Max: 98 (05 Dec 2023 08:50)  HR: 60 (05 Dec 2023 08:50) (60 - 63)  BP: 139/90 (05 Dec 2023 08:50) (139/90 - 144/82)  RR: 14 (05 Dec 2023 08:50) (14 - 15)  SpO2: 97% (05 Dec 2023 08:50) (95% - 97%)  I&O's Summary      PHYSICAL EXAM:  GENERAL: NAD, well-groomed, sitting in chair  HEAD:  Atraumatic, Normocephalic  EYES: PEEL, conjunctiva and sclera clear  ENMT: Moist mucous membranes, Supple, No JVD  CHEST/LUNG: Clear to auscultation bilaterally, good air entry, non-labored breathing  HEART: RRR; S1/S2, No murmur  ABDOMEN: Soft, Nontender, Nondistended; Bowel sounds present  EXTREMITIES: No calf tenderness, No cyanosis, No edema  SKIN: Warm, perfused  PSYCH: Normal mood, Normal affect  NERVOUS SYSTEM:  awake alert oriented, Good concentration    LABS:                        13.4   6.50  )-----------( 108      ( 04 Dec 2023 07:40 )             38.8     12-04    139  |  104  |  16  ----------------------------<  102  4.1   |  26  |  0.91    Ca    8.9      04 Dec 2023 07:40    TPro  6.6  /  Alb  3.4  /  TBili  0.5  /  DBili  x   /  AST  18  /  ALT  30  /  AlkPhos  76  12-04              10-13 Chol 199 mg/dL LDL -- HDL 29 mg/dL Trig 277 mg/dL                  Urinalysis Basic - ( 04 Dec 2023 07:40 )    Color: x / Appearance: x / SG: x / pH: x  Gluc: 102 mg/dL / Ketone: x  / Bili: x / Urobili: x   Blood: x / Protein: x / Nitrite: x   Leuk Esterase: x / RBC: x / WBC x   Sq Epi: x / Non Sq Epi: x / Bacteria: x            RADIOLOGY & ADDITIONAL TESTS:    Care Discussed with Consultants/Other Providers:

## 2023-12-05 NOTE — DISCHARGE NOTE NURSING/CASE MANAGEMENT/SOCIAL WORK - PATIENT PORTAL LINK FT
You can access the FollowMyHealth Patient Portal offered by [facility] by registering at the following website: [email or web address]. By joining [facility]’s FollowMyHealth portal, you will also be able to view your health information using other applications (apps) compatible with our system. You can access the FollowMyHealth Patient Portal offered by [facility] by registering at the following website: [email or web address]. By joining [email or web address]’s FollowMyHealth portal, you will also be able to view your health information using other applications (apps) compatible with our system.

## 2023-12-28 ENCOUNTER — NON-APPOINTMENT (OUTPATIENT)
Age: 58
End: 2023-12-28

## 2023-12-28 ENCOUNTER — APPOINTMENT (OUTPATIENT)
Dept: ELECTROPHYSIOLOGY | Facility: CLINIC | Age: 58
End: 2023-12-28
Payer: MEDICAID

## 2023-12-28 PROCEDURE — 93298 REM INTERROG DEV EVAL SCRMS: CPT

## 2023-12-28 PROCEDURE — G2066: CPT

## 2024-01-18 ENCOUNTER — APPOINTMENT (OUTPATIENT)
Dept: CARDIOLOGY | Facility: CLINIC | Age: 59
End: 2024-01-18
Payer: MEDICAID

## 2024-01-18 ENCOUNTER — NON-APPOINTMENT (OUTPATIENT)
Age: 59
End: 2024-01-18

## 2024-01-18 VITALS — DIASTOLIC BLOOD PRESSURE: 96 MMHG | SYSTOLIC BLOOD PRESSURE: 152 MMHG

## 2024-01-18 VITALS
TEMPERATURE: 97.7 F | OXYGEN SATURATION: 96 % | SYSTOLIC BLOOD PRESSURE: 146 MMHG | DIASTOLIC BLOOD PRESSURE: 82 MMHG | HEIGHT: 66 IN | HEART RATE: 64 BPM | BODY MASS INDEX: 34.55 KG/M2 | WEIGHT: 215 LBS

## 2024-01-18 DIAGNOSIS — E78.5 HYPERLIPIDEMIA, UNSPECIFIED: ICD-10-CM

## 2024-01-18 DIAGNOSIS — I49.3 VENTRICULAR PREMATURE DEPOLARIZATION: ICD-10-CM

## 2024-01-18 DIAGNOSIS — E66.01 MORBID (SEVERE) OBESITY DUE TO EXCESS CALORIES: ICD-10-CM

## 2024-01-18 DIAGNOSIS — I10 ESSENTIAL (PRIMARY) HYPERTENSION: ICD-10-CM

## 2024-01-18 DIAGNOSIS — I49.1 ATRIAL PREMATURE DEPOLARIZATION: ICD-10-CM

## 2024-01-18 PROCEDURE — 93000 ELECTROCARDIOGRAM COMPLETE: CPT

## 2024-01-18 PROCEDURE — 99205 OFFICE O/P NEW HI 60 MIN: CPT | Mod: 25

## 2024-01-18 RX ORDER — METFORMIN HYDROCHLORIDE 500 MG/1
500 TABLET, COATED ORAL DAILY
Qty: 90 | Refills: 0 | Status: ACTIVE | COMMUNITY
Start: 2024-01-18

## 2024-01-18 RX ORDER — APIXABAN 5 MG/1
5 TABLET, FILM COATED ORAL
Qty: 180 | Refills: 3 | Status: ACTIVE | COMMUNITY
Start: 2024-01-18

## 2024-01-18 RX ORDER — CITALOPRAM 20 MG/1
20 TABLET, FILM COATED ORAL DAILY
Refills: 0 | Status: ACTIVE | COMMUNITY
Start: 2024-01-18

## 2024-01-18 RX ORDER — DOCUSATE SODIUM 100 MG/1
100 CAPSULE, LIQUID FILLED ORAL TWICE DAILY
Refills: 0 | Status: ACTIVE | COMMUNITY
Start: 2024-01-18

## 2024-01-18 RX ORDER — FOLIC ACID 1 MG/1
1 TABLET ORAL DAILY
Refills: 0 | Status: ACTIVE | COMMUNITY
Start: 2024-01-18

## 2024-01-18 RX ORDER — MULTIVITAMIN
TABLET ORAL DAILY
Refills: 0 | Status: ACTIVE | COMMUNITY
Start: 2024-01-18

## 2024-01-18 RX ORDER — MEMANTINE HYDROCHLORIDE 10 MG/1
10 TABLET, FILM COATED ORAL TWICE DAILY
Refills: 0 | Status: ACTIVE | COMMUNITY
Start: 2024-01-18

## 2024-01-18 RX ORDER — DONEPEZIL HYDROCHLORIDE 5 MG/1
5 TABLET ORAL DAILY
Refills: 0 | Status: ACTIVE | COMMUNITY
Start: 2024-01-18

## 2024-01-18 NOTE — HISTORY OF PRESENT ILLNESS
[FreeTextEntry1] : 58M h/o obesity (BM 38), ADRIANO (not on CPAP), HTN, HLD, CVA (2022 at [facility] s/p IV thrombolysis), Moyamoya disease, was admitted to [facility] 10/2023 after fell at work with syncope found with acute CVA L-PCA s/p mechanical thrombectomy, found sinus bradycardic 35s with PVCs, blocked PACs s/p Medtronic ILR, did not undergo ROCIO as concern from neurology about hypotension after acute CVA, discharged empirically on Eliquis 5mg BID plan for 3 months use, remains at the rehab, presents for outpatient cardiology evaluation.  Patient presents with his sister for the visit, reports memory remains impaired but has no physical limitations, may need to go to assisted living facility. He reports the 1st stroke at [facility] was attributed to HTN/diabetes but had cerebral angiogram done diagnosed with Moyamoya, no records of prior ROCIO being done there. Pending to see neurology and neurosurgery for follow up.   ILR remains in sinus since discharge.   He was layoff from work prior to his 2nd stroke and planning to apply for disability.   NeuroIR report 10/12/2023:  POSTOPERATIVE DIAGNOSIS: 1. Successful revascularization of the left posterior cerebral artery  with mechanical thrombectomy 2. Moyamoya disease within the right cerebral hemisphere

## 2024-01-18 NOTE — CARDIOLOGY SUMMARY
[de-identified] : 1/18/24- Sinus 56, RBBB, PAC x2, QTc 467 [de-identified] : 10/13/23-  Summary:  1. Technically difficult study.  2. Normal left ventricular internal cavity size.  3. Left ventricular ejection fraction, by visual estimation, is 50 to  55%.  4. Normal left atrial size.  5. Normal right atrial size.  6. Mildly enlarged right ventricle.  7. Dilatation of the ascending aorta.  8. Sclerotic aortic valve with normal opening.  9. Mild mitral annular calcification. 10. Mild thickening of the anterior mitral valve leaflet. 11. Trace mitral valve regurgitation. 12. Mild tricuspid regurgitation. 13. Trivial pericardial effusion. 14. Recommend transesophageal echocardiogram.

## 2024-01-18 NOTE — DISCUSSION/SUMMARY
[FreeTextEntry1] : 58M h/o obesity (BM 38), ADRIANO (not on CPAP), HTN, HLD, CVA (2022 at [facility] s/p IV thrombolysis), Moyamoya disease, was admitted to [facility] 10/2023 after fell at work with syncope found with acute CVA L-PCA s/p mechanical thrombectomy, found sinus bradycardic 35s with PVCs, blocked PACs s/p Medtronic ILR, did not undergo ROCIO as concern from neurology about hypotension after acute CVA, discharged empirically on Eliquis 5mg BID plan for 3 months use, remains at the rehab, presents for outpatient cardiology evaluation.  Most recent acute CVA unclear if embolic in nature remains on empiric Eliquis, given no bleeding event will await neurology and neurosurgery follow up to determine if need additional workup with ROCIO as no prior record from [facility]. Need follow up also for Moyamoya. BP not at goal will need to restart losartan 50mg use.    1. Recurrent CVA -continue empiric Eliquis 5mg BID for now, if ILR remains without pAfib and once ROCIO is done then can likely monitor off AC -continue high dose statin, LDL goal <70 -cognitive impairment on memantine and donepezil  2. HTN -start losartan 50mg, goal BP <140/90  3. DM2 -on oral agents  4. Moyamoya disease -follow up with neurosurgery if need cerebral bypass then would obtain CCTA to exclude obstructive CAD.     Follow up in 4 months.   [EKG obtained to assist in diagnosis and management of assessed problem(s)] : EKG obtained to assist in diagnosis and management of assessed problem(s)

## 2024-01-18 NOTE — PHYSICAL EXAM
[Well Developed] : well developed [Well Nourished] : well nourished [No Acute Distress] : no acute distress [Normal Conjunctiva] : normal conjunctiva [Normal Venous Pressure] : normal venous pressure [No Carotid Bruit] : no carotid bruit [Normal S1, S2] : normal S1, S2 [No Murmur] : no murmur [No Rub] : no rub [No Gallop] : no gallop [Clear Lung Fields] : clear lung fields [Good Air Entry] : good air entry [No Respiratory Distress] : no respiratory distress  [Soft] : abdomen soft [Non Tender] : non-tender [No Masses/organomegaly] : no masses/organomegaly [Normal Bowel Sounds] : normal bowel sounds [Normal Gait] : normal gait [No Edema] : no edema [No Cyanosis] : no cyanosis [No Clubbing] : no clubbing [No Varicosities] : no varicosities [No Rash] : no rash [No Skin Lesions] : no skin lesions [Moves all extremities] : moves all extremities [No Focal Deficits] : no focal deficits [Normal Speech] : normal speech [Alert and Oriented] : alert and oriented [Cognitive Impairment] : cognitive impairment

## 2024-02-20 ENCOUNTER — NON-APPOINTMENT (OUTPATIENT)
Age: 59
End: 2024-02-20

## 2024-02-21 ENCOUNTER — APPOINTMENT (OUTPATIENT)
Dept: ELECTROPHYSIOLOGY | Facility: CLINIC | Age: 59
End: 2024-02-21
Payer: MEDICAID

## 2024-02-21 PROCEDURE — 93298 REM INTERROG DEV EVAL SCRMS: CPT

## 2024-03-05 ENCOUNTER — APPOINTMENT (OUTPATIENT)
Dept: NEUROLOGY | Facility: CLINIC | Age: 59
End: 2024-03-05

## 2024-03-24 ENCOUNTER — NON-APPOINTMENT (OUTPATIENT)
Age: 59
End: 2024-03-24

## 2024-03-25 ENCOUNTER — APPOINTMENT (OUTPATIENT)
Dept: ELECTROPHYSIOLOGY | Facility: CLINIC | Age: 59
End: 2024-03-25
Payer: MEDICAID

## 2024-03-25 PROCEDURE — 93298 REM INTERROG DEV EVAL SCRMS: CPT

## 2024-03-28 RX ORDER — CHLORHEXIDINE GLUCONATE 213 G/1000ML
1 SOLUTION TOPICAL ONCE
Refills: 0 | Status: DISCONTINUED | OUTPATIENT
Start: 2024-03-29 | End: 2024-04-12

## 2024-03-28 NOTE — H&P PST ADULT - HISTORY OF PRESENT ILLNESS
Department of Cardiology                                                                  [facility]/[place] [address] [address] [facility]                                                            Telephone: [phone]. Fax:[phone]                                                                                    [facility]     Chief complaint:    Patient is a 59y old  Male who presents with a chief complaint of CVA presents for transesophageal echocardiogram    HPI: 59M h/o obesity (BM 38), ADRIANO (not on CPAP), HTN, HLD, CVA ( at [facility] s/p IV thrombolysis), Moyamoya disease, was admitted to [facility] 10/2023 after fell at work with syncope found with acute CVA L-PCA s/p mechanical thrombectomy, found sinus bradycardic 35s with PVCs, blocked PACs s/p Medtronic ILR, did not undergo ROCIO as concern from neurology about hypotension after acute CVA, discharged empirically on Eliquis 5mg BID plan for 3 months use, remains at the rehab, presents for outpatient cardiology evaluation.  ?  ILR remains in sinus since discharge.  ?  NeuroIR report 10/12/2023:  POSTOPERATIVE DIAGNOSIS:  1. Successful revascularization of the left posterior cerebral artery  with mechanical thrombectomy  2. Moyamoya disease within the right cerebral hemisphere  ?  Cardiology Summary          EC24- Sinus 56, RBBB, PAC x2, QTc 467    Echo: 10/13/23-  Summary:   1. Technically difficult study.   2. Normal left ventricular internal cavity size.   3. Left ventricular ejection fraction, by visual estimation, is 50 to  55%.   4. Normal left atrial size.   5. Normal right atrial size.   6. Mildly enlarged right ventricle.   7. Dilatation of the ascending aorta.   8. Sclerotic aortic valve with normal opening.   9. Mild mitral annular calcification.  10. Mild thickening of the anterior mitral valve leaflet.  11. Trace mitral valve regurgitation.  12. Mild tricuspid regurgitation.  13. Trivial pericardial effusion.  14. Recommend transesophageal echocardiogram.        Associated Risk Factors:        Cerebrovascular Disease: Y       Chronic Lung Disease: N/A       Peripheral Arterial Disease: N/A       Chronic Kidney Disease (if yes, what is GFR): N/A       Uncontrolled Diabetes (if yes, what is HgbA1C or FBS): N/A       Poorly Controlled Hypertension (if yes, what is SBP): N/A       Morbid Obesity (if yes, what is BMI): 38       History of Recent Ventricular Arrhythmia: N/A       Inability to Ambulate Safely: N/A       Need for Therapeutic Anticoagulation: Eliquis       Antiplatelet or Contrast Allergy: N/A         Anticoagulation Therapies:    Eliquis  	  ROS: as stated above, otherwise negative      	    LABS:	 	                                                                           Department of Cardiology                                                                  [facility]/[place] [address] [id number]                                                            Telephone: [phone]. Fax:[phone]                                                                                    [facility]     Chief complaint:    Patient is a 59y old  Male who presents with a chief complaint of CVA presents for transesophageal echocardiogram.    HPI: This is a 59 year old male with a PMHx of obesity (BMI 38), ADRIANO (not on CPAP), HTN, HLD, CVA ( at [facility] s/p IV thrombolysis), Moyamoya disease. Patient was admitted to [facility] 10/2023 after fall at work with syncope found to have acute CVA L-PCA s/p mechanical thrombectomy, found at the time to be bradycardiac (~35 bmp with PVCs, blocked PACs). Patient did not undergo ROCIO at the time due to concern for hypotension after acute CVA per neurology, and is now s/p Medronic ILR (most recent transmission sinus rhythm with PACs), Patient was discharged on Eliquis 5mg BID empirically and remains at Rehab.     ?  NeuroIR report 10/12/2023:  POSTOPERATIVE DIAGNOSIS:  1. Successful revascularization of the left posterior cerebral artery  with mechanical thrombectomy  2. Moyamoya disease within the right cerebral hemisphere  ?  Cardiology Summary  EC24- Sinus 56, RBBB, PAC x2, QTc 467      Echo: 10/13/23-  Summary:   1. Technically difficult study.   2. Normal left ventricular internal cavity size.   3. Left ventricular ejection fraction, by visual estimation, is 50 to 55%.   4. Normal left atrial size.   5. Normal right atrial size.   6. Mildly enlarged right ventricle.   7. Dilatation of the ascending aorta.   8. Sclerotic aortic valve with normal opening.   9. Mild mitral annular calcification.  10. Mild thickening of the anterior mitral valve leaflet.  11. Trace mitral valve regurgitation.  12. Mild tricuspid regurgitation.  13. Trivial pericardial effusion.  14. Recommend transesophageal echocardiogram.      Associated Risk Factors:        Cerebrovascular Disease: Y       Chronic Lung Disease: N/A       Peripheral Arterial Disease: N/A       Chronic Kidney Disease (if yes, what is GFR): N/A       Uncontrolled Diabetes (if yes, what is HgbA1C or FBS): N/A       Poorly Controlled Hypertension (if yes, what is SBP): N/A       Morbid Obesity (if yes, what is BMI): 38       History of Recent Ventricular Arrhythmia: N/A       Inability to Ambulate Safely: N/A       Need for Therapeutic Anticoagulation: Eliquis       Antiplatelet or Contrast Allergy: N/A    Anticoagulation Therapies:    Eliquis  	  REVIEW OF SYSTEMS:    CONSTITUTIONAL:  No weakness, fevers, or chills  EYES/ENT:  No visual changes, vertigo, or throat pain   NECK:  No pain or stiffness  RESPIRATORY:  No SOB, cough, wheezing, hemoptysis  CARDIOVASCULAR:  No chest pain or palpitations  GASTROINTESTINAL:  No abdominal pain, nausea, vomiting, or hematemesis; No diarrhea or constipation. No melena or hematochezia.  GENITOURINARY:  No dysuria, change in frequency, or hematuria  NEUROLOGICAL:  No numbness or weakness  SKIN:  No itching or rashes    PHYSICAL EXAM:  GENERAL: NAD, lying in bed comfortably  HEAD:  Atraumatic, normocephalic  EYES: EOMI, PERRLA, conjunctiva and sclera clear  NECK: Supple, trachea midline, no JVD  HEART: Regular rate and rhythm, no murmurs, rubs, or gallops  LUNGS: Unlabored respirations.  Clear to auscultation bilaterally, no crackles, wheezing, or rhonchi  ABDOMEN: Soft, nontender, nondistended, +BS  EXTREMITIES: 2+ peripheral pulses bilaterally. No clubbing, cyanosis, or edema  NERVOUS SYSTEM:  A&Ox3, moving all extremities, no focal deficits   SKIN: No rashes or lesions    LABS:	 	                                                                           Department of Cardiology                                                                  [facility]/[place] [address] [address] [facility]                                                            Telephone: [phone]. Fax:[phone]                                                                                    [facility]     Chief complaint:    Patient is a 59y old  Male who presents with a chief complaint of CVA presents for transesophageal echocardiogram.    HPI: This is a 59 year old male with a PMHx of obesity (BMI 38), ADRIANO (not on CPAP), HTN, HLD, CVA ( at [facility] s/p IV thrombolysis), Moyamoya disease. Patient was admitted to [facility] 10/2023 after fall at work with syncope found to have acute CVA L-PCA s/p mechanical thrombectomy, found at the time to be bradycardiac (~35 bmp with PVCs, blocked PACs). Patient did not undergo ROCIO at the time due to concern for hypotension after acute CVA per neurology, and is now s/p Medronic ILR (most recent transmission sinus rhythm with PACs), Patient was discharged on Eliquis 5mg BID empirically and remains at Rehab. Patient with significant memory impairment. Denies chest pain, SOB, WILKINSON, LE swelling, syncope. He does endorse feelings of palpitations.     ?  NeuroIR report 10/12/2023:  POSTOPERATIVE DIAGNOSIS:  1. Successful revascularization of the left posterior cerebral artery  with mechanical thrombectomy  2. Moyamoya disease within the right cerebral hemisphere  ?  Cardiology Summary  EC24- Sinus 56, RBBB, PAC x2, QTc 467      Echo: 10/13/23-  Summary:   1. Technically difficult study.   2. Normal left ventricular internal cavity size.   3. Left ventricular ejection fraction, by visual estimation, is 50 to 55%.   4. Normal left atrial size.   5. Normal right atrial size.   6. Mildly enlarged right ventricle.   7. Dilatation of the ascending aorta.   8. Sclerotic aortic valve with normal opening.   9. Mild mitral annular calcification.  10. Mild thickening of the anterior mitral valve leaflet.  11. Trace mitral valve regurgitation.  12. Mild tricuspid regurgitation.  13. Trivial pericardial effusion.  14. Recommend transesophageal echocardiogram.      Associated Risk Factors:        Cerebrovascular Disease: Yes, patient with a Hx of CVAs with significant memory impairment       Chronic Lung Disease: N/A       Peripheral Arterial Disease: N/A       Chronic Kidney Disease (if yes, what is GFR): N/A       Uncontrolled Diabetes (if yes, what is HgbA1C or FBS): N/A       Poorly Controlled Hypertension (if yes, what is SBP): N/A       Morbid Obesity (if yes, what is BMI): 38       History of Recent Ventricular Arrhythmia: N/A       Inability to Ambulate Safely: N/A       Need for Therapeutic Anticoagulation: Eliquis       Antiplatelet or Contrast Allergy: N/A    Anticoagulation Therapies:    Eliquis  	  REVIEW OF SYSTEMS:    CONSTITUTIONAL:  No weakness, fevers, or chills  EYES/ENT:  No visual changes, vertigo, or throat pain   NECK:  No pain or stiffness  RESPIRATORY:  No SOB, cough, wheezing, hemoptysis  CARDIOVASCULAR:  Endorses palpitations, no chest pain   GASTROINTESTINAL:  No abdominal pain, nausea, vomiting, or hematemesis; No diarrhea or constipation. No melena or hematochezia.  GENITOURINARY:  No dysuria, change in frequency, or hematuria  NEUROLOGICAL:  No numbness or weakness  SKIN:  No itching or rashes    PHYSICAL EXAM:  GENERAL: NAD, lying in bed comfortably  HEAD:  Atraumatic, normocephalic  EYES: EOMI, PERRLA, conjunctiva and sclera clear  NECK: Supple, trachea midline, no JVD  HEART: irregular rate and rhythm, no murmurs, rubs, or gallops  LUNGS: Unlabored respirations.  Clear to auscultation bilaterally, no crackles, wheezing, or rhonchi  ABDOMEN: Rounded, Soft, nontender, nondistended, +BS  EXTREMITIES: 2+ peripheral pulses bilaterally. No clubbing, cyanosis, or edema  NERVOUS SYSTEM:  A&Ox3, moving all extremities, no focal deficits   SKIN: No rashes or lesions    LABS:

## 2024-03-28 NOTE — H&P PST ADULT - ASSESSMENT
HPI: 58 yo male with HTN, HLD, ADRIANO, CVA, Moyamoya disease to undergo transesophageal echocardiogram.        ASA  Mallampati  GFR  Creat  Bleeding Risk      Plan/Recommendations:   -plan for ROCIO on 3/29  -patient seen and examined  -ECG and Labs reviewed  -NPO after midnight prior with exception of sip of water with morning medications  -Continue/Hold metformin and alogliptin am of procedure       Impression: 60 yo male with HTN, HLD, ADRIANO, CVA, Moyamoya disease to undergo transesophageal echocardiogram.    ASA & Mallampati to be assessed by Anesthesia     PLAN:  Plan/Recommendations:   -plan for ROCIO on 3/29  -patient seen and examined  -ECG and Labs reviewed  -NPO after midnight prior with exception of sip of water with morning medications  -Continue/Hold metformin and alogliptin am of procedure

## 2024-03-29 ENCOUNTER — OUTPATIENT (OUTPATIENT)
Dept: OUTPATIENT SERVICES | Facility: HOSPITAL | Age: 59
LOS: 1 days | End: 2024-03-29
Payer: COMMERCIAL

## 2024-03-29 ENCOUNTER — RESULT REVIEW (OUTPATIENT)
Age: 59
End: 2024-03-29

## 2024-03-29 ENCOUNTER — TRANSCRIPTION ENCOUNTER (OUTPATIENT)
Age: 59
End: 2024-03-29

## 2024-03-29 VITALS
HEART RATE: 102 BPM | OXYGEN SATURATION: 95 % | DIASTOLIC BLOOD PRESSURE: 65 MMHG | RESPIRATION RATE: 16 BRPM | SYSTOLIC BLOOD PRESSURE: 115 MMHG

## 2024-03-29 VITALS
TEMPERATURE: 98 F | DIASTOLIC BLOOD PRESSURE: 78 MMHG | RESPIRATION RATE: 16 BRPM | OXYGEN SATURATION: 96 % | HEART RATE: 118 BPM | SYSTOLIC BLOOD PRESSURE: 109 MMHG

## 2024-03-29 DIAGNOSIS — I63.9 CEREBRAL INFARCTION, UNSPECIFIED: ICD-10-CM

## 2024-03-29 DIAGNOSIS — Z98.890 OTHER SPECIFIED POSTPROCEDURAL STATES: Chronic | ICD-10-CM

## 2024-03-29 LAB
ANION GAP SERPL CALC-SCNC: 12 MMOL/L — SIGNIFICANT CHANGE UP (ref 5–17)
APTT BLD: 37.9 SEC — HIGH (ref 24.5–35.6)
BASOPHILS # BLD AUTO: 0.04 K/UL — SIGNIFICANT CHANGE UP (ref 0–0.2)
BASOPHILS NFR BLD AUTO: 0.7 % — SIGNIFICANT CHANGE UP (ref 0–2)
BUN SERPL-MCNC: 11.3 MG/DL — SIGNIFICANT CHANGE UP (ref 8–20)
CALCIUM SERPL-MCNC: 9.2 MG/DL — SIGNIFICANT CHANGE UP (ref 8.4–10.5)
CHLORIDE SERPL-SCNC: 103 MMOL/L — SIGNIFICANT CHANGE UP (ref 96–108)
CO2 SERPL-SCNC: 25 MMOL/L — SIGNIFICANT CHANGE UP (ref 22–29)
CREAT SERPL-MCNC: 0.81 MG/DL — SIGNIFICANT CHANGE UP (ref 0.5–1.3)
EGFR: 102 ML/MIN/1.73M2 — SIGNIFICANT CHANGE UP
EOSINOPHIL # BLD AUTO: 0.1 K/UL — SIGNIFICANT CHANGE UP (ref 0–0.5)
EOSINOPHIL NFR BLD AUTO: 1.6 % — SIGNIFICANT CHANGE UP (ref 0–6)
GLUCOSE BLDC GLUCOMTR-MCNC: 104 MG/DL — HIGH (ref 70–99)
GLUCOSE SERPL-MCNC: 109 MG/DL — HIGH (ref 70–99)
HCT VFR BLD CALC: 42.5 % — SIGNIFICANT CHANGE UP (ref 39–50)
HGB BLD-MCNC: 14.5 G/DL — SIGNIFICANT CHANGE UP (ref 13–17)
IMM GRANULOCYTES NFR BLD AUTO: 0.3 % — SIGNIFICANT CHANGE UP (ref 0–0.9)
INR BLD: 1.09 RATIO — SIGNIFICANT CHANGE UP (ref 0.85–1.18)
LYMPHOCYTES # BLD AUTO: 1.23 K/UL — SIGNIFICANT CHANGE UP (ref 1–3.3)
LYMPHOCYTES # BLD AUTO: 20 % — SIGNIFICANT CHANGE UP (ref 13–44)
MCHC RBC-ENTMCNC: 30.9 PG — SIGNIFICANT CHANGE UP (ref 27–34)
MCHC RBC-ENTMCNC: 34.1 GM/DL — SIGNIFICANT CHANGE UP (ref 32–36)
MCV RBC AUTO: 90.4 FL — SIGNIFICANT CHANGE UP (ref 80–100)
MONOCYTES # BLD AUTO: 0.47 K/UL — SIGNIFICANT CHANGE UP (ref 0–0.9)
MONOCYTES NFR BLD AUTO: 7.7 % — SIGNIFICANT CHANGE UP (ref 2–14)
NEUTROPHILS # BLD AUTO: 4.28 K/UL — SIGNIFICANT CHANGE UP (ref 1.8–7.4)
NEUTROPHILS NFR BLD AUTO: 69.7 % — SIGNIFICANT CHANGE UP (ref 43–77)
PLATELET # BLD AUTO: 117 K/UL — LOW (ref 150–400)
POTASSIUM SERPL-MCNC: 4.4 MMOL/L — SIGNIFICANT CHANGE UP (ref 3.5–5.3)
POTASSIUM SERPL-SCNC: 4.4 MMOL/L — SIGNIFICANT CHANGE UP (ref 3.5–5.3)
PROTHROM AB SERPL-ACNC: 12.1 SEC — SIGNIFICANT CHANGE UP (ref 9.5–13)
RBC # BLD: 4.7 M/UL — SIGNIFICANT CHANGE UP (ref 4.2–5.8)
RBC # FLD: 13.9 % — SIGNIFICANT CHANGE UP (ref 10.3–14.5)
SODIUM SERPL-SCNC: 140 MMOL/L — SIGNIFICANT CHANGE UP (ref 135–145)
WBC # BLD: 6.14 K/UL — SIGNIFICANT CHANGE UP (ref 3.8–10.5)
WBC # FLD AUTO: 6.14 K/UL — SIGNIFICANT CHANGE UP (ref 3.8–10.5)

## 2024-03-29 PROCEDURE — 76376 3D RENDER W/INTRP POSTPROCES: CPT | Mod: 26

## 2024-03-29 PROCEDURE — 93320 DOPPLER ECHO COMPLETE: CPT

## 2024-03-29 PROCEDURE — 85025 COMPLETE CBC W/AUTO DIFF WBC: CPT

## 2024-03-29 PROCEDURE — 93312 ECHO TRANSESOPHAGEAL: CPT

## 2024-03-29 PROCEDURE — 80048 BASIC METABOLIC PNL TOTAL CA: CPT

## 2024-03-29 PROCEDURE — 85610 PROTHROMBIN TIME: CPT

## 2024-03-29 PROCEDURE — 82962 GLUCOSE BLOOD TEST: CPT

## 2024-03-29 PROCEDURE — 36415 COLL VENOUS BLD VENIPUNCTURE: CPT

## 2024-03-29 PROCEDURE — 76376 3D RENDER W/INTRP POSTPROCES: CPT

## 2024-03-29 PROCEDURE — 93325 DOPPLER ECHO COLOR FLOW MAPG: CPT | Mod: 26

## 2024-03-29 PROCEDURE — 93005 ELECTROCARDIOGRAM TRACING: CPT

## 2024-03-29 PROCEDURE — 85730 THROMBOPLASTIN TIME PARTIAL: CPT

## 2024-03-29 PROCEDURE — 93320 DOPPLER ECHO COMPLETE: CPT | Mod: 26

## 2024-03-29 PROCEDURE — 93312 ECHO TRANSESOPHAGEAL: CPT | Mod: 26

## 2024-03-29 PROCEDURE — 93010 ELECTROCARDIOGRAM REPORT: CPT

## 2024-03-29 PROCEDURE — 93325 DOPPLER ECHO COLOR FLOW MAPG: CPT

## 2024-03-29 RX ORDER — DRONEDARONE 400 MG/1
1 TABLET, FILM COATED ORAL
Qty: 0 | Refills: 0 | DISCHARGE
Start: 2024-03-29

## 2024-03-29 RX ORDER — ENOXAPARIN SODIUM 100 MG/ML
90 INJECTION SUBCUTANEOUS ONCE
Refills: 0 | Status: COMPLETED | OUTPATIENT
Start: 2024-03-29 | End: 2024-03-29

## 2024-03-29 RX ORDER — DRONEDARONE 400 MG/1
1 TABLET, FILM COATED ORAL
Qty: 30 | Refills: 0
Start: 2024-03-29

## 2024-03-29 RX ORDER — DRONEDARONE 400 MG/1
400 TABLET, FILM COATED ORAL ONCE
Refills: 0 | Status: COMPLETED | OUTPATIENT
Start: 2024-03-29 | End: 2024-03-29

## 2024-03-29 RX ORDER — DRONEDARONE 400 MG/1
1 TABLET, FILM COATED ORAL
Qty: 60 | Refills: 1
Start: 2024-03-29 | End: 2024-05-27

## 2024-03-29 RX ORDER — DRONEDARONE 400 MG/1
400 TABLET, FILM COATED ORAL
Refills: 0 | Status: DISCONTINUED | OUTPATIENT
Start: 2024-03-29 | End: 2024-03-29

## 2024-03-29 RX ADMIN — ENOXAPARIN SODIUM 90 MILLIGRAM(S): 100 INJECTION SUBCUTANEOUS at 12:39

## 2024-03-29 RX ADMIN — DRONEDARONE 400 MILLIGRAM(S): 400 TABLET, FILM COATED ORAL at 14:19

## 2024-03-29 NOTE — DISCHARGE NOTE PROVIDER - NSDCFUSCHEDAPPT_GEN_ALL_CORE_FT
Rj Delgado  [facility]  ELECTROPH [address]  Scheduled Appointment: 04/02/2024    [facility]  ELECTROPH [address]  Scheduled Appointment: 04/29/2024    Trenton Lucas  [facility]  CARDIOLOGY [address]  Scheduled Appointment: 05/23/2024

## 2024-03-29 NOTE — PROGRESS NOTE ADULT - SUBJECTIVE AND OBJECTIVE BOX
Department of Cardiology                                                                  [facility]/[place] [address] [place] [address]                                                            Telephone: [phone]. Fax:[phone]                                                                                     Post-ROCIO Note      Narrative: This is a 59 year old male with a PMHx of obesity (BMI 38), ADRIANO (not on CPAP), HTN, HLD, CVA (2022 at [facility] s/p IV thrombolysis), Moyamoya disease. Patient was admitted to [facility] 10/2023 after fall at work with syncope found to have acute CVA L-PCA s/p mechanical thrombectomy, found at the time to be bradycardiac (~35 bmp with PVCs, blocked PACs). Patient did not undergo ROCIO at the time due to concern for hypotension after acute CVA per neurology, and is now s/p Medronic ILR (most recent transmission sinus rhythm with PACs), Patient was discharged on Eliquis 5mg BID empirically and remains at Rehab. Patient with significant memory impairment. Denies chest pain, SOB, WILKINSON, LE swelling, syncope. He does endorse feelings of palpitations.     ROCIO findings:   + gargle  No PFO  No clot visualized  Mild AI  L atrial enlargement       PHYSICAL EXAM:  T(C): 36.6 (03-29-24 @ 10:20), Max: 36.6 (03-29-24 @ 10:20)  HR: 118 (03-29-24 @ 10:20) (118 - 118)  BP: 109/78 (03-29-24 @ 10:20) (109/78 - 109/78)  RR: 16 (03-29-24 @ 10:20) (16 - 16)  SpO2: 96% (03-29-24 @ 10:20) (96% - 96%)  Wt(kg): --  Constitutional: A & O x 3  HEENT:   Normal oral mucosa, PERRL, EOMI	  Cardiovascular: Irregular, No JVD, No murmurs, No edema  Respiratory: Lungs clear to auscultation	  Gastrointestinal:  Soft, Non-tender, + BS	  Skin: No rashes, No ecchymoses, No cyanosis  Extremities: Normal range of motion, No clubbing, cyanosis or edema  Vascular: Peripheral pulses palpable 2+ bilaterally    TELEMETRY: 	    ECG: Atrial fibrillation 	      ASSESSMENT:  -Post ROCIO orders  -preliminary verbal report; centricity pending  -per EP, patient to start Dronedarone 400mg po BID for atrial fibrillation. Patient to continue Eliquis. Patient to follow up next week with Dr. Delgado.

## 2024-03-29 NOTE — DISCHARGE NOTE PROVIDER - NSDCFUADDINST_GEN_ALL_CORE_FT
-You had a ROCIO today with Dr. Lucas  -You may have a little trouble swallowing right after the test, but this will go away within a few hours.  -It is common to have a sore throat for a day or two after the test. You may use throat lozengers to alleviate these symptoms.  -Because of the sedative you received during the procedure, please don't drive x 24 hours or drink alcohol for 24 hours.  -If your sore throat persists for more than 48 hours, please contact your doctor.  -Please return to nearest ED immediately if you have any chest pain, chest pressure, palpitations, shortness of breath, excessive sleepiness or increase in lethargy, difficulty arousing, or change in neurologic status or productive cough with blood tinged sputum.  -Please follow with your cardiologist in 1-2 weeks after the procedure.

## 2024-03-29 NOTE — DISCHARGE NOTE PROVIDER - CARE PROVIDER_API CALL
Trenton Lucas  Cardiovascular Disease  [address] [facility], [address] [address]  Phone: [phone]  Fax: [phone]  Established Patient  Follow Up Time: 2 weeks

## 2024-03-29 NOTE — DISCHARGE NOTE PROVIDER - NSDCCPCAREPLAN_GEN_ALL_CORE_FT
PRINCIPAL DISCHARGE DIAGNOSIS  Diagnosis: CVA (cerebrovascular accident)  Assessment and Plan of Treatment: You have previously experienced CVA's. We did a ROCIO today to ensure that the cause of your ROCIO is not related to your heart. We did not find any abnormalities within your heart that would cause you to have strokes.   Please return to the nearest ER if you experiene any shortness of breath, syncope, chest pain, or palpitations.

## 2024-03-29 NOTE — DISCHARGE NOTE PROVIDER - NSDCMRMEDTOKEN_GEN_ALL_CORE_FT
alogliptin 25 mg oral tablet: 1 tab(s) orally  apixaban 5 mg oral tablet: 1 tab(s) orally 2 times a day  atorvastatin 80 mg oral tablet: 1 tab(s) orally once a day (at bedtime)  CeleXA 20 mg oral tablet: 1 tab(s) orally once a day  donepezil 5 mg oral tablet: 2 tab(s) orally once a day (before a meal)  dronedarone 400 mg oral tablet: 1 tab(s) orally 2 times a day  famotidine 20 mg oral tablet: 1 tab(s) orally once a day  folic acid 1 mg oral tablet: 1 tab(s) orally once a day  linagliptin 5 mg oral tablet: 1 tab(s) orally once a day  memantine 10 mg oral tablet: 1 tab(s) orally 2 times a day  metFORMIN 500 mg oral tablet: 1 tab(s) orally once a day  Multiple Vitamins oral tablet: 1 tab(s) orally once a day  polyethylene glycol 3350 oral powder for reconstitution: 17 gram(s) orally once a day as needed for  constipation  thiamine 100 mg oral tablet: 1 tab(s) orally once a day   alogliptin 25 mg oral tablet: 1 tab(s) orally  apixaban 5 mg oral tablet: 1 tab(s) orally 2 times a day  atorvastatin 80 mg oral tablet: 1 tab(s) orally once a day (at bedtime)  CeleXA 20 mg oral tablet: 1 tab(s) orally once a day  donepezil 5 mg oral tablet: 2 tab(s) orally once a day (before a meal)  famotidine 20 mg oral tablet: 1 tab(s) orally once a day  folic acid 1 mg oral tablet: 1 tab(s) orally once a day  linagliptin 5 mg oral tablet: 1 tab(s) orally once a day  memantine 10 mg oral tablet: 1 tab(s) orally 2 times a day  metFORMIN 500 mg oral tablet: 1 tab(s) orally once a day  Multaq 400 mg oral tablet: 1 tab(s) orally 2 times a day  Multiple Vitamins oral tablet: 1 tab(s) orally once a day  polyethylene glycol 3350 oral powder for reconstitution: 17 gram(s) orally once a day as needed for  constipation  thiamine 100 mg oral tablet: 1 tab(s) orally once a day

## 2024-03-29 NOTE — DISCHARGE NOTE NURSING/CASE MANAGEMENT/SOCIAL WORK - PATIENT PORTAL LINK FT
You can access the FollowMyHealth Patient Portal offered by [facility] by registering at the following website: [email or web address]. By joining [facility]’s FollowMyHealth portal, you will also be able to view your health information using other applications (apps) compatible with our system.

## 2024-03-29 NOTE — ASU DISCHARGE PLAN (ADULT/PEDIATRIC) - NS MD DC FALL RISK RISK
For information on Fall & Injury Prevention, visit: [email or web address] OR  [email or web address] OR  https://www.cdc.gov/steadi/patient.html

## 2024-03-29 NOTE — DISCHARGE NOTE PROVIDER - NSDCCPTREATMENT_GEN_ALL_CORE_FT
PRINCIPAL PROCEDURE  Procedure: Transesophageal echocardiogram  Findings and Treatment: -You had a ROCIO today with Dr. Lucas  -You may have a little trouble swallowing right after the test, but this will go away within a few hours.  -It is common to have a sore throat for a day or two after the test. You may use throat lozengers to alleviate these symptoms.  -Because of the sedative you received during the procedure, please don't drive x 24 hours or drink alcohol for 24 hours.  -If your sore throat persists for more than 48 hours, please contact your doctor.  -Please return to nearest ED immediately if you have any chest pain, chest pressure, palpitations, shortness of breath, excessive sleepiness or increase in lethargy, difficulty arousing, or change in neurologic status or productive cough with blood tinged sputum.  -Please follow with your cardiologist in 1-2 weeks after the procedure.

## 2024-03-29 NOTE — DISCHARGE NOTE PROVIDER - HOSPITAL COURSE
Narrative: This is a 59 year old male with a PMHx of obesity (BMI 38), ADRIANO (not on CPAP), HTN, HLD, CVA (2022 at [facility] s/p IV thrombolysis), Moyamoya disease. Patient was admitted to [facility] 10/2023 after fall at work with syncope found to have acute CVA L-PCA s/p mechanical thrombectomy, found at the time to be bradycardiac (~35 bmp with PVCs, blocked PACs). Patient did not undergo ROCIO at the time due to concern for hypotension after acute CVA per neurology, and is now s/p Medronic ILR (most recent transmission sinus rhythm with PACs), Patient was discharged on Eliquis 5mg BID empirically and remains at Rehab. Patient with significant memory impairment. Denies chest pain, SOB, WILKINSON, LE swelling, syncope. He does endorse feelings of palpitations.     ROCIO findings:   + gargle  No PFO  No clot visualized  Mild AI  L atrial enlargement       PHYSICAL EXAM:  T(C): 36.6 (03-29-24 @ 10:20), Max: 36.6 (03-29-24 @ 10:20)  HR: 118 (03-29-24 @ 10:20) (118 - 118)  BP: 109/78 (03-29-24 @ 10:20) (109/78 - 109/78)  RR: 16 (03-29-24 @ 10:20) (16 - 16)  SpO2: 96% (03-29-24 @ 10:20) (96% - 96%)  Wt(kg): --  Constitutional: A & O x 3  HEENT:   Normal oral mucosa, PERRL, EOMI	  Cardiovascular: Irregular, No JVD, No murmurs, No edema  Respiratory: Lungs clear to auscultation	  Gastrointestinal:  Soft, Non-tender, + BS	  Skin: No rashes, No ecchymoses, No cyanosis  Extremities: Normal range of motion, No clubbing, cyanosis or edema  Vascular: Peripheral pulses palpable 2+ bilaterally    TELEMETRY: 	    ECG: Atrial fibrillation 	    ASSESSMENT:  -Post ROCIO orders  -preliminary verbal report; centricity pending  -per EP, patient to start Dronedarone 400mg po BID for atrial fibrillation. Patient to continue Eliquis. Patient to follow up next week with Dr. Delgado.

## 2024-04-02 ENCOUNTER — APPOINTMENT (OUTPATIENT)
Dept: ELECTROPHYSIOLOGY | Facility: CLINIC | Age: 59
End: 2024-04-02
Payer: MEDICAID

## 2024-04-02 VITALS
SYSTOLIC BLOOD PRESSURE: 122 MMHG | WEIGHT: 208 LBS | HEIGHT: 66 IN | HEART RATE: 66 BPM | BODY MASS INDEX: 33.43 KG/M2 | OXYGEN SATURATION: 97 % | DIASTOLIC BLOOD PRESSURE: 80 MMHG

## 2024-04-02 DIAGNOSIS — I67.5 MOYAMOYA DISEASE: ICD-10-CM

## 2024-04-02 PROCEDURE — 93000 ELECTROCARDIOGRAM COMPLETE: CPT

## 2024-04-02 PROCEDURE — 99215 OFFICE O/P EST HI 40 MIN: CPT | Mod: 25

## 2024-04-02 NOTE — DISCUSSION/SUMMARY
[FreeTextEntry1] : In summary, the patient is a 59-year-old male with a history of HTN, ADRIANO, Moyamoya disease, bradycardia, RBBB, and symptomatic paroxysmal atrial fibrillation (ILR detected). He has a high AF burden with rates in excess of 100 bpm. We discussed various therapeutic options including medications and ablation. Given his young age, known bradycardia, and conduction system disease (RBBB), catheter ablation would be preferable over medications. We discussed the details of the procedure including potential complications which include but are not limited to bleeding, hematoma, infection, damage to blood vessels, cardiac perforation, damage to the conduction system requiring pacemaker, MI, stroke and DVT. The patient expressed understanding and wishes to proceed. The procedure will be performed on uninterrupted oral anticoagulation. I will start him on low dose metoprolol in the meantime.  [EKG obtained to assist in diagnosis and management of assessed problem(s)] : EKG obtained to assist in diagnosis and management of assessed problem(s)

## 2024-04-02 NOTE — HISTORY OF PRESENT ILLNESS
[FreeTextEntry1] : The patient is a 58-year-old male presenting for follow up today. The patient has a history of HTN, ADRIANO, Moyamoya disease, CVA, bradycardia and PVCs s/p ILR implant on 10/20/2023. The patient presented to [facility] in 10/2023 following a possible syncopal episode and was found to have acute CVA. He was noted to have bradycardia on telemetry with fascicular PVCs (from LPF) and non-conducted P waves vs retrograde P waves and antegrade block. There was no evidence of high-grade AV block or infranodal block. An ILR was implanted. Subsequently the patient was noted to have paroxysmal AF on remote monitoring. He was started on Eliquis for stroke prophylaxis. He has a high burden of AF and experiences symptoms of palpitations. The patient denies any symptoms of shortness of breath, dizziness, chest pain, syncope or extremity edema. He still notes some residual weakness and sensory deficits from the stroke. He is tolerating eliquis well without bleeding episodes.

## 2024-05-06 ENCOUNTER — NON-APPOINTMENT (OUTPATIENT)
Age: 59
End: 2024-05-06

## 2024-05-06 ENCOUNTER — APPOINTMENT (OUTPATIENT)
Dept: ELECTROPHYSIOLOGY | Facility: CLINIC | Age: 59
End: 2024-05-06
Payer: MEDICAID

## 2024-05-06 PROCEDURE — 93298 REM INTERROG DEV EVAL SCRMS: CPT

## 2024-05-09 ENCOUNTER — TRANSCRIPTION ENCOUNTER (OUTPATIENT)
Age: 59
End: 2024-05-09

## 2024-05-09 ENCOUNTER — INPATIENT (INPATIENT)
Facility: HOSPITAL | Age: 59
LOS: 0 days | Discharge: NURSING, REHAB OR LONG-TERM CARE | DRG: 310 | End: 2024-05-10
Attending: INTERNAL MEDICINE | Admitting: INTERNAL MEDICINE
Payer: COMMERCIAL

## 2024-05-09 VITALS
RESPIRATION RATE: 16 BRPM | HEIGHT: 67 IN | TEMPERATURE: 97 F | DIASTOLIC BLOOD PRESSURE: 108 MMHG | WEIGHT: 199.96 LBS | HEART RATE: 61 BPM | SYSTOLIC BLOOD PRESSURE: 138 MMHG | OXYGEN SATURATION: 98 %

## 2024-05-09 DIAGNOSIS — I48.0 PAROXYSMAL ATRIAL FIBRILLATION: ICD-10-CM

## 2024-05-09 DIAGNOSIS — Z98.890 OTHER SPECIFIED POSTPROCEDURAL STATES: Chronic | ICD-10-CM

## 2024-05-09 LAB
ANION GAP SERPL CALC-SCNC: 11 MMOL/L — SIGNIFICANT CHANGE UP (ref 5–17)
APTT BLD: 33.9 SEC — SIGNIFICANT CHANGE UP (ref 24.5–35.6)
BLD GP AB SCN SERPL QL: SIGNIFICANT CHANGE UP
BUN SERPL-MCNC: 13.1 MG/DL — SIGNIFICANT CHANGE UP (ref 8–20)
CALCIUM SERPL-MCNC: 9.2 MG/DL — SIGNIFICANT CHANGE UP (ref 8.4–10.5)
CHLORIDE SERPL-SCNC: 102 MMOL/L — SIGNIFICANT CHANGE UP (ref 96–108)
CO2 SERPL-SCNC: 28 MMOL/L — SIGNIFICANT CHANGE UP (ref 22–29)
CREAT SERPL-MCNC: 0.85 MG/DL — SIGNIFICANT CHANGE UP (ref 0.5–1.3)
EGFR: 100 ML/MIN/1.73M2 — SIGNIFICANT CHANGE UP
GLUCOSE BLDC GLUCOMTR-MCNC: 133 MG/DL — HIGH (ref 70–99)
GLUCOSE SERPL-MCNC: 119 MG/DL — HIGH (ref 70–99)
HCT VFR BLD CALC: 44.5 % — SIGNIFICANT CHANGE UP (ref 39–50)
HGB BLD-MCNC: 14.9 G/DL — SIGNIFICANT CHANGE UP (ref 13–17)
INR BLD: 0.94 RATIO — SIGNIFICANT CHANGE UP (ref 0.85–1.18)
MCHC RBC-ENTMCNC: 30.2 PG — SIGNIFICANT CHANGE UP (ref 27–34)
MCHC RBC-ENTMCNC: 33.5 GM/DL — SIGNIFICANT CHANGE UP (ref 32–36)
MCV RBC AUTO: 90.3 FL — SIGNIFICANT CHANGE UP (ref 80–100)
PLATELET # BLD AUTO: 113 K/UL — LOW (ref 150–400)
POTASSIUM SERPL-MCNC: 4.1 MMOL/L — SIGNIFICANT CHANGE UP (ref 3.5–5.3)
POTASSIUM SERPL-SCNC: 4.1 MMOL/L — SIGNIFICANT CHANGE UP (ref 3.5–5.3)
PROTHROM AB SERPL-ACNC: 10.5 SEC — SIGNIFICANT CHANGE UP (ref 9.5–13)
RBC # BLD: 4.93 M/UL — SIGNIFICANT CHANGE UP (ref 4.2–5.8)
RBC # FLD: 13.2 % — SIGNIFICANT CHANGE UP (ref 10.3–14.5)
SODIUM SERPL-SCNC: 141 MMOL/L — SIGNIFICANT CHANGE UP (ref 135–145)
WBC # BLD: 6.01 K/UL — SIGNIFICANT CHANGE UP (ref 3.8–10.5)
WBC # FLD AUTO: 6.01 K/UL — SIGNIFICANT CHANGE UP (ref 3.8–10.5)

## 2024-05-09 RX ORDER — SUCRALFATE 1 G
1 TABLET ORAL
Refills: 0 | Status: DISCONTINUED | OUTPATIENT
Start: 2024-05-09 | End: 2024-05-10

## 2024-05-09 RX ORDER — MEMANTINE HYDROCHLORIDE 10 MG/1
10 TABLET ORAL
Refills: 0 | Status: DISCONTINUED | OUTPATIENT
Start: 2024-05-09 | End: 2024-05-10

## 2024-05-09 RX ORDER — LOSARTAN POTASSIUM 100 MG/1
50 TABLET, FILM COATED ORAL DAILY
Refills: 0 | Status: DISCONTINUED | OUTPATIENT
Start: 2024-05-09 | End: 2024-05-10

## 2024-05-09 RX ORDER — ACETAMINOPHEN 500 MG
650 TABLET ORAL EVERY 6 HOURS
Refills: 0 | Status: DISCONTINUED | OUTPATIENT
Start: 2024-05-09 | End: 2024-05-10

## 2024-05-09 RX ORDER — PANTOPRAZOLE SODIUM 20 MG/1
40 TABLET, DELAYED RELEASE ORAL EVERY 12 HOURS
Refills: 0 | Status: DISCONTINUED | OUTPATIENT
Start: 2024-05-09 | End: 2024-05-10

## 2024-05-09 RX ORDER — FERROUS SULFATE 325(65) MG
325 TABLET ORAL DAILY
Refills: 0 | Status: DISCONTINUED | OUTPATIENT
Start: 2024-05-09 | End: 2024-05-10

## 2024-05-09 RX ORDER — FUROSEMIDE 40 MG
20 TABLET ORAL DAILY
Refills: 0 | Status: DISCONTINUED | OUTPATIENT
Start: 2024-05-10 | End: 2024-05-10

## 2024-05-09 RX ORDER — ATORVASTATIN CALCIUM 80 MG/1
80 TABLET, FILM COATED ORAL AT BEDTIME
Refills: 0 | Status: DISCONTINUED | OUTPATIENT
Start: 2024-05-09 | End: 2024-05-10

## 2024-05-09 RX ORDER — OXYCODONE HYDROCHLORIDE 5 MG/1
5 TABLET ORAL EVERY 6 HOURS
Refills: 0 | Status: DISCONTINUED | OUTPATIENT
Start: 2024-05-09 | End: 2024-05-10

## 2024-05-09 RX ORDER — SENNA PLUS 8.6 MG/1
1 TABLET ORAL
Refills: 0 | DISCHARGE

## 2024-05-09 RX ORDER — CITALOPRAM 10 MG/1
20 TABLET, FILM COATED ORAL DAILY
Refills: 0 | Status: DISCONTINUED | OUTPATIENT
Start: 2024-05-09 | End: 2024-05-10

## 2024-05-09 RX ORDER — FUROSEMIDE 40 MG
20 TABLET ORAL ONCE
Refills: 0 | Status: COMPLETED | OUTPATIENT
Start: 2024-05-09 | End: 2024-05-09

## 2024-05-09 RX ORDER — ONDANSETRON 8 MG/1
4 TABLET, FILM COATED ORAL EVERY 6 HOURS
Refills: 0 | Status: DISCONTINUED | OUTPATIENT
Start: 2024-05-09 | End: 2024-05-10

## 2024-05-09 RX ORDER — FERROUS SULFATE 325(65) MG
1 TABLET ORAL
Refills: 0 | DISCHARGE

## 2024-05-09 RX ORDER — DOCUSATE SODIUM 100 MG
1 CAPSULE ORAL
Refills: 0 | DISCHARGE

## 2024-05-09 RX ORDER — LOSARTAN POTASSIUM 100 MG/1
1 TABLET, FILM COATED ORAL
Refills: 0 | DISCHARGE

## 2024-05-09 RX ORDER — ALPRAZOLAM 0.25 MG
0.25 TABLET ORAL EVERY 8 HOURS
Refills: 0 | Status: DISCONTINUED | OUTPATIENT
Start: 2024-05-09 | End: 2024-05-10

## 2024-05-09 RX ORDER — METFORMIN HYDROCHLORIDE 850 MG/1
500 TABLET ORAL DAILY
Refills: 0 | Status: DISCONTINUED | OUTPATIENT
Start: 2024-05-10 | End: 2024-05-10

## 2024-05-09 RX ORDER — APIXABAN 2.5 MG/1
5 TABLET, FILM COATED ORAL
Refills: 0 | Status: DISCONTINUED | OUTPATIENT
Start: 2024-05-09 | End: 2024-05-10

## 2024-05-09 RX ORDER — ALOGLIPTIN 12.5 MG/1
1 TABLET, FILM COATED ORAL
Refills: 0 | DISCHARGE

## 2024-05-09 RX ORDER — DONEPEZIL HYDROCHLORIDE 10 MG/1
10 TABLET, FILM COATED ORAL AT BEDTIME
Refills: 0 | Status: DISCONTINUED | OUTPATIENT
Start: 2024-05-09 | End: 2024-05-10

## 2024-05-09 RX ORDER — BENZOCAINE AND MENTHOL 5; 1 G/100ML; G/100ML
1 LIQUID ORAL
Refills: 0 | Status: DISCONTINUED | OUTPATIENT
Start: 2024-05-09 | End: 2024-05-10

## 2024-05-09 RX ADMIN — Medication 20 MILLIGRAM(S): at 16:09

## 2024-05-09 RX ADMIN — MEMANTINE HYDROCHLORIDE 10 MILLIGRAM(S): 10 TABLET ORAL at 17:43

## 2024-05-09 RX ADMIN — PANTOPRAZOLE SODIUM 40 MILLIGRAM(S): 20 TABLET, DELAYED RELEASE ORAL at 17:43

## 2024-05-09 RX ADMIN — APIXABAN 5 MILLIGRAM(S): 2.5 TABLET, FILM COATED ORAL at 16:41

## 2024-05-09 RX ADMIN — Medication 1 GRAM(S): at 17:43

## 2024-05-09 RX ADMIN — DONEPEZIL HYDROCHLORIDE 10 MILLIGRAM(S): 10 TABLET, FILM COATED ORAL at 21:54

## 2024-05-09 RX ADMIN — ATORVASTATIN CALCIUM 80 MILLIGRAM(S): 80 TABLET, FILM COATED ORAL at 21:54

## 2024-05-09 NOTE — DISCHARGE NOTE PROVIDER - HOSPITAL COURSE
58-year-old male who resides at [facility] with HTN, ADRIANO, Moyamoya disease, CVA, ILR implant on 10/20/2023, bradycardia, PVCs, and paroxysmal AF(on eliquis). Pt is now POD #1 s/p Radiofrequency Ablation of atrial fibrillation (PVI/CTI) ablation. b/l groin sutures removed at bedside this AM. Denies any complaints at time of assessment.

## 2024-05-09 NOTE — H&P PST ADULT - HISTORY OF PRESENT ILLNESS
58-year-old male who resides at [facility] with HTN, ADRIANO, Moyamoya disease, CVA, ILR implant on 10/20/2023, bradycardia, PVCs, and paroxysmal AF(on eliquis).  In regards to the pt's pA.fib, it was first diagnosed on ILR which was implanted after pt's CVA.  During that hospitalization pt was noted to have bradycardia on telemetry with fascicular PVCs (from LPF) and non-conducted P waves vs retrograde P waves and antegrade block. There was no evidence of high-grade AV block or infranodal block. Prior to discharge an ILR was implanted which revealed paroxysmal AF on remote monitoring. Pt was started on Eliquis for stroke prophylaxis. He has a high burden of AF and experiences symptoms of palpitations. Pt denies any other symptoms of shortness of breath, dizziness, chest pain, syncope or extremity edema.  Of note, pt does have short term memory loss and still notes some residual left sided weakness and sensory deficits from the stroke. He is tolerating eliquis well without bleeding episodes.   Given pt's young age, known bradycardia, and conduction system disease (RBBB), catheter ablation would be preferable over medications for A.fib management.  Pt now presents electively for A.fib ablation with Dr Delgado.  Pt sister is also here with him as support due to his short term memory loss.  Pt and sister deny any interval changes since last office visit with Dr Delgado on 24.  As per paperwork from [facility], pt has been on Eliquis without interruption with last dose being last night.  NPO confirmed    Cardiology Summary     EC24: sinus rhythm, RBBB, bursts of AT     ROCIO 3/29/24    1. Left ventricular systolic function is normal with an ejection fraction visually estimated at 55 to 60 %.   2. Normal right ventricular cavity size and normal systolic function, inadequate estimation of RVSP.   3. The left atrium is moderately dilated.   4. No evidence of left atrial appendage thrombus. The left atrial appendage emptying velocity is normal.   5. The interatrial septum appears intact.   6. Agitated saline injection was negative for intracardiac shunt.   7. Mild aortic regurgitation.   8. The aortic annulus, aortic root, ascending aorta, aortic arch and descending aorta appear normal in size.   9. Very mild focal aortic calcification seen in the transverse arch.  10. No pericardial effusion seen.

## 2024-05-09 NOTE — DISCHARGE NOTE PROVIDER - CARE PROVIDER_API CALL
[facility]  Cardiac Electrophysiology  [address] [facility], [address]  [place] [address]  Phone: [phone]  Fax: [phone]  Follow Up Time:

## 2024-05-09 NOTE — H&P PST ADULT - ASSESSMENT
58-year-old male who resides at [facility] with HTN, ADRIANO, Moyamoya disease, CVA, ILR implant on 10/20/2023, bradycardia, PVCs, and paroxysmal AF(on eliquis).  In regards to the pt's pA.fib, it was first diagnosed on ILR which was implanted after pt's CVA.  During that hospitalization pt was noted to have bradycardia on telemetry with fascicular PVCs (from LPF) and non-conducted P waves vs retrograde P waves and antegrade block. There was no evidence of high-grade AV block or infranodal block. Prior to discharge an ILR was implanted which revealed paroxysmal AF on remote monitoring. Pt was started on Eliquis for stroke prophylaxis. He has a high burden of AF and experiences symptoms of palpitations. Pt denies any other symptoms of shortness of breath, dizziness, chest pain, syncope or extremity edema.  Of note, pt does have short term memory loss and still notes some residual left sided weakness and sensory deficits from the stroke. He is tolerating eliquis well without bleeding episodes.   Given pt's young age, known bradycardia, and conduction system disease (RBBB), catheter ablation would be preferable over medications for A.fib management.  Pt now presents electively for A.fib ablation with Dr Delgado.     Plan  -Stat labs and EKG  -consent with attending

## 2024-05-09 NOTE — DISCHARGE NOTE PROVIDER - NSDCMRMEDTOKEN_GEN_ALL_CORE_FT
apixaban 5 mg oral tablet: 1 tab(s) orally 2 times a day  atorvastatin 80 mg oral tablet: 1 tab(s) orally once a day (at bedtime)  CeleXA 20 mg oral tablet: 1 tab(s) orally once a day  docusate sodium 100 mg oral tablet: 1 tab(s) orally 2 times a day  donepezil 5 mg oral tablet: 2 tab(s) orally once a day (before a meal)  ferrous sulfate 325 mg (65 mg elemental iron) oral tablet: 1 tab(s) orally once a day  losartan 50 mg oral tablet: 1 tab(s) orally once a day  memantine 10 mg oral tablet: 1 tab(s) orally 2 times a day  metFORMIN 500 mg oral tablet: 1 tab(s) orally once a day  senna (sennosides) 8.6 mg oral tablet: 1 tab(s) orally once a day   apixaban 5 mg oral tablet: 1 tab(s) orally 2 times a day  atorvastatin 80 mg oral tablet: 1 tab(s) orally once a day (at bedtime)  CeleXA 20 mg oral tablet: 1 tab(s) orally once a day  docusate sodium 100 mg oral tablet: 1 tab(s) orally 2 times a day  donepezil 5 mg oral tablet: 2 tab(s) orally once a day (before a meal)  ferrous sulfate 325 mg (65 mg elemental iron) oral tablet: 1 tab(s) orally once a day  furosemide 20 mg oral tablet: 1 tab(s) orally once a day  losartan 50 mg oral tablet: 1 tab(s) orally once a day  memantine 10 mg oral tablet: 1 tab(s) orally 2 times a day  metFORMIN 500 mg oral tablet: 1 tab(s) orally once a day  pantoprazole 40 mg oral delayed release tablet: 1 tab(s) orally every 12 hours Take 1 tablet every 12 hours for 14 days, followed by 1 tablet once a day for 6 weeks  senna (sennosides) 8.6 mg oral tablet: 1 tab(s) orally once a day  sucralfate 1 g/10 mL oral suspension: 10 milliliter(s) orally 2 times a day x 14 days

## 2024-05-09 NOTE — H&P PST ADULT - PATIENT'S PREFERRED PRONOUN
Chief Complaint   Patient presents with   • Physical     LPS - 11/15/2016 - [facility] - ASC-US - HPV Neg; 12/15/2016 - [place] - Repeat pap/hpv 1 year.  No concerns.        History of Present Illness:  Viviana is a 32 year old female,  , seen today for routine gynecologic exam and refill of oral contraceptives. The patient has no other concerns except allergies. She currently is using Allegra.        No past medical history on file.    History reviewed. No pertinent surgical history.    Current Outpatient Prescriptions   Medication Sig Dispense Refill   • fexofenadine (ALLEGRA) 180 MG tablet Take 180 mg by mouth daily.     • desogestrel-ethinyl estradiol (RECLIPSEN) 0.15-30 MG-MCG per tablet Take 1 tablet by mouth daily. 84 tablet 3   • EPINEPHrine 0.3 MG/0.3ML Solution Auto-injector Inject 0.3 mLs into the muscle once as needed for Anaphylaxis. 1 each 1     No current facility-administered medications for this visit.        Social History     Social History   • Marital status:      Spouse name: N/A   • Number of children: 0   • Years of education: N/A     Occupational History   •     •  [employer]     Social History Main Topics   • Smoking status: Never Smoker   • Smokeless tobacco: Never Used   • Alcohol use 0.0 - 1.0 oz/week      Comment: mostly on weekends   • Drug use: No   • Sexual activity: Yes     Partners: Male     Birth control/ protection: Pill     Other Topics Concern   • Caffeine Concern No     1 cup daily   • Exercise Yes     Regular   • Seat Belt Yes   • Self-Exams Yes     Occas.     Social History Narrative   • No narrative on file       Health Maintenance Summary     Topic Due On Due Status Completed On Postpone Until Reason    Pap Smear - Cervical Cancer Screening  Nov 15, 2021 Not Due Nov 15, 2016      Immunization - TDAP Pregnancy  Hidden       IMMUNIZATION - DTaP/Tdap/Td 2024 Not Due 2014      Immunization-Influenza Sep 1, 2017 Postponed ,  2018 Patient Refused     Nov 16, 2018 Not Due Nov 16, 2017            I have reviewed the patient's medications and allergies, past medical, surgical, social and family history, updating these as appropriate on 11/16/2017.  See Histories section of the EMR for a display of this information.      REVIEW OF SYSTEMS:  CONSTITUTIONAL: Denies fever, fatigue and unintentional weight change.  EYES: Denies trouble with eyes or vision.  Denies frequent headaches.    ENT: Denies trouble with ears or hearing.  Denies nasal congestion or nose bleeds.  Denies trouble smelling.    CV:  Denies cough, chest pain, or palpitations.   RESPIRATORY: Denies trouble breathing or shortness of breath.  GI:  Denies abdominal pain, change in appetite, nausea, vomiting, diarrhea or constipation.  Denies change in the color or character of stool.    : Denies frequency, dysuria, or troublesome incontinence.  MSK: Denies aching muscles or joint pain.   SKIN:  Denies concerning or changing skin lesions.  NEURO:  Denies troublesome headaches, dizziness, or difficulty with memory.  PSYCH: Negative for unstable anxiety, depression, or other mood disorder.  HEME/LYMPH:  Denies anemia or blood disorder.  BREAST: Denies breast lump, nipple discharge, breast pain.          PHYSICAL EXAM:  /74 (BP [place] [facility]: Sitting, Cuff Size: Regular)   Ht 5' 7\" (1.702 m)   Wt 72.4 kg   LMP 10/24/2017 (Exact Date)   Body mass index is 25 kg/m².  General: Healthy female in no acute distress.  Head: normocephalic and atraumatic.   Mouth: no deformity or lesions with good dentition  Neck: no masses, thyromegaly, or abnormal cervical nodes.  Breasts: symmetric, no masses or nipple discharge bilaterally.  No axillary adenopathy.  Lungs: clear bilaterally to auscultation.  Heart: regular rate and rhythm, S1, S2, without murmurs.  Abdomen: soft and non-tender without masses, hepatosplenomegaly, or hernias noted.  Neurologic: Appropriately  oriented, with no focal deficits.   Skin: intact without lesions or rashes.  Inguinal nodes: no significant adenopathy.   Psych: alert and cooperative; normal mood and affect; normal attention span and concentration.    Pelvic Exam:  External genitalia: normal appearance, no lesions or discharge.  Urethra: no discharge, normal mobility.  Bladder: no cystocele, nontender.  Vagina: normal appearing epithelium without lesions or discharge.  Cervix: normal appearance, no lesions or discharge, no cervical motion tenderness.  Uterus: mobile, non-tender, without deformity, normal  Adnexa: no masses, non-tender  Rectal: Deferred      ASSESSMENT:    1. Encounter for gynecological examination without abnormal finding    2. Pap smear for cervical cancer screening        PLAN: The patient was counseled regarding steroid nasal sprays as an adjuvant to her Allegra.    Orders Placed This Encounter   • Thin Prep Pap Test with HPV regardless   • fexofenadine (ALLEGRA) 180 MG tablet   • desogestrel-ethinyl estradiol (RECLIPSEN) 0.15-30 MG-MCG per tablet       Return in about 1 year (around 11/16/2018) for CPE.           Him/He

## 2024-05-09 NOTE — ASU PATIENT PROFILE, ADULT - FALL HARM RISK - HARM RISK INTERVENTIONS

## 2024-05-09 NOTE — DISCHARGE NOTE PROVIDER - NSDCFUSCHEDAPPT_GEN_ALL_CORE_FT
Trenton Lucas  [facility]  CARDIOLOGY [address]  Scheduled Appointment: 05/23/2024    [facility]  ELECTROPH [address]  Scheduled Appointment: 06/10/2024

## 2024-05-09 NOTE — DISCHARGE NOTE PROVIDER - NSDCFUADDINST_GEN_ALL_CORE_FT
Follow up with Dr. Delgado in 2 - 4 weeks. Our office will contact you in 3-5 days to schedule this appointment. Please call [phone] with questions or concerns.  Continue with Eliquis 5mg every 12 hours  Start Lasix (Furosemide) 20mg once a day for 2 days  Start Pantoprazole 40mg every 12 hours for 14 days, followed by 40mg daily for 6 weeks  Start Sulcralfate suspension 1g every 12 hours for 14 days   Complete full course of medication as prescribed

## 2024-05-09 NOTE — H&P PST ADULT - NSICDXPASTMEDICALHX_GEN_ALL_CORE_FT
PAST MEDICAL HISTORY:  CVA (cerebrovascular accident)     HTN (hypertension)     Moyamoya disease     Paroxysmal atrial fibrillation

## 2024-05-10 ENCOUNTER — TRANSCRIPTION ENCOUNTER (OUTPATIENT)
Age: 59
End: 2024-05-10

## 2024-05-10 VITALS
TEMPERATURE: 98 F | HEART RATE: 77 BPM | SYSTOLIC BLOOD PRESSURE: 133 MMHG | DIASTOLIC BLOOD PRESSURE: 86 MMHG | OXYGEN SATURATION: 98 % | RESPIRATION RATE: 18 BRPM

## 2024-05-10 PROBLEM — I48.0 PAROXYSMAL ATRIAL FIBRILLATION: Chronic | Status: ACTIVE | Noted: 2024-05-09

## 2024-05-10 PROBLEM — I67.5 MOYAMOYA DISEASE: Chronic | Status: ACTIVE | Noted: 2024-05-09

## 2024-05-10 LAB
ANION GAP SERPL CALC-SCNC: 11 MMOL/L — SIGNIFICANT CHANGE UP (ref 5–17)
BUN SERPL-MCNC: 15.7 MG/DL — SIGNIFICANT CHANGE UP (ref 8–20)
CALCIUM SERPL-MCNC: 8.7 MG/DL — SIGNIFICANT CHANGE UP (ref 8.4–10.5)
CHLORIDE SERPL-SCNC: 104 MMOL/L — SIGNIFICANT CHANGE UP (ref 96–108)
CO2 SERPL-SCNC: 25 MMOL/L — SIGNIFICANT CHANGE UP (ref 22–29)
CREAT SERPL-MCNC: 0.89 MG/DL — SIGNIFICANT CHANGE UP (ref 0.5–1.3)
EGFR: 99 ML/MIN/1.73M2 — SIGNIFICANT CHANGE UP
GLUCOSE BLDC GLUCOMTR-MCNC: 144 MG/DL — HIGH (ref 70–99)
GLUCOSE SERPL-MCNC: 157 MG/DL — HIGH (ref 70–99)
HCT VFR BLD CALC: 37.4 % — LOW (ref 39–50)
HGB BLD-MCNC: 12.4 G/DL — LOW (ref 13–17)
MAGNESIUM SERPL-MCNC: 1.9 MG/DL — SIGNIFICANT CHANGE UP (ref 1.6–2.6)
MCHC RBC-ENTMCNC: 30.8 PG — SIGNIFICANT CHANGE UP (ref 27–34)
MCHC RBC-ENTMCNC: 33.2 GM/DL — SIGNIFICANT CHANGE UP (ref 32–36)
MCV RBC AUTO: 93 FL — SIGNIFICANT CHANGE UP (ref 80–100)
PLATELET # BLD AUTO: 101 K/UL — LOW (ref 150–400)
POTASSIUM SERPL-MCNC: 4.2 MMOL/L — SIGNIFICANT CHANGE UP (ref 3.5–5.3)
POTASSIUM SERPL-SCNC: 4.2 MMOL/L — SIGNIFICANT CHANGE UP (ref 3.5–5.3)
RBC # BLD: 4.02 M/UL — LOW (ref 4.2–5.8)
RBC # FLD: 13.2 % — SIGNIFICANT CHANGE UP (ref 10.3–14.5)
SODIUM SERPL-SCNC: 140 MMOL/L — SIGNIFICANT CHANGE UP (ref 135–145)
WBC # BLD: 8.39 K/UL — SIGNIFICANT CHANGE UP (ref 3.8–10.5)
WBC # FLD AUTO: 8.39 K/UL — SIGNIFICANT CHANGE UP (ref 3.8–10.5)

## 2024-05-10 PROCEDURE — 86901 BLOOD TYPING SEROLOGIC RH(D): CPT

## 2024-05-10 PROCEDURE — 86900 BLOOD TYPING SEROLOGIC ABO: CPT

## 2024-05-10 PROCEDURE — 85730 THROMBOPLASTIN TIME PARTIAL: CPT

## 2024-05-10 PROCEDURE — 83735 ASSAY OF MAGNESIUM: CPT

## 2024-05-10 PROCEDURE — 93656 COMPRE EP EVAL ABLTJ ATR FIB: CPT

## 2024-05-10 PROCEDURE — 85027 COMPLETE CBC AUTOMATED: CPT

## 2024-05-10 PROCEDURE — 85610 PROTHROMBIN TIME: CPT

## 2024-05-10 PROCEDURE — C9399: CPT

## 2024-05-10 PROCEDURE — 82962 GLUCOSE BLOOD TEST: CPT

## 2024-05-10 PROCEDURE — 80048 BASIC METABOLIC PNL TOTAL CA: CPT

## 2024-05-10 PROCEDURE — 93657 TX L/R ATRIAL FIB ADDL: CPT

## 2024-05-10 PROCEDURE — 36415 COLL VENOUS BLD VENIPUNCTURE: CPT

## 2024-05-10 PROCEDURE — 93010 ELECTROCARDIOGRAM REPORT: CPT

## 2024-05-10 PROCEDURE — 86850 RBC ANTIBODY SCREEN: CPT

## 2024-05-10 PROCEDURE — 93005 ELECTROCARDIOGRAM TRACING: CPT

## 2024-05-10 RX ORDER — SUCRALFATE 1 G
10 TABLET ORAL
Qty: 280 | Refills: 0
Start: 2024-05-10 | End: 2024-05-23

## 2024-05-10 RX ORDER — HYDRALAZINE HCL 50 MG
5 TABLET ORAL ONCE
Refills: 0 | Status: COMPLETED | OUTPATIENT
Start: 2024-05-10 | End: 2024-05-10

## 2024-05-10 RX ORDER — PANTOPRAZOLE SODIUM 20 MG/1
1 TABLET, DELAYED RELEASE ORAL
Qty: 70 | Refills: 0
Start: 2024-05-10 | End: 2024-05-23

## 2024-05-10 RX ORDER — FUROSEMIDE 40 MG
1 TABLET ORAL
Qty: 2 | Refills: 0
Start: 2024-05-10 | End: 2024-05-11

## 2024-05-10 RX ADMIN — CITALOPRAM 20 MILLIGRAM(S): 10 TABLET, FILM COATED ORAL at 08:26

## 2024-05-10 RX ADMIN — APIXABAN 5 MILLIGRAM(S): 2.5 TABLET, FILM COATED ORAL at 05:13

## 2024-05-10 RX ADMIN — LOSARTAN POTASSIUM 50 MILLIGRAM(S): 100 TABLET, FILM COATED ORAL at 05:13

## 2024-05-10 RX ADMIN — MEMANTINE HYDROCHLORIDE 10 MILLIGRAM(S): 10 TABLET ORAL at 05:13

## 2024-05-10 RX ADMIN — Medication 325 MILLIGRAM(S): at 08:26

## 2024-05-10 RX ADMIN — Medication 20 MILLIGRAM(S): at 05:13

## 2024-05-10 RX ADMIN — PANTOPRAZOLE SODIUM 40 MILLIGRAM(S): 20 TABLET, DELAYED RELEASE ORAL at 05:13

## 2024-05-10 RX ADMIN — Medication 5 MILLIGRAM(S): at 10:18

## 2024-05-10 RX ADMIN — Medication 1 GRAM(S): at 05:13

## 2024-05-10 RX ADMIN — METFORMIN HYDROCHLORIDE 500 MILLIGRAM(S): 850 TABLET ORAL at 08:26

## 2024-05-10 NOTE — PROGRESS NOTE ADULT - SUBJECTIVE AND OBJECTIVE BOX
Admission Criteria  Please admit the patient to the following service: CARDIOLOGY    Major Criteria:    - Continuous EKG monitoring is required for condition causing arrhythmia (hyperkalemia, etc)    - Significant volume load > 200 ml    Admit to: 4BKT     Patient is being admitted to the inpatient service due to high risk characteristics and need for further management/monitoring and is considered to be at a significantly increased risk of major adverse cardiac and vascular events if discharged.  
  Pt doing well POD #1 s/p Radiofrequency Ablation of atrial fibrillation (PVI/CTI) ablation. b/l groin sutures removed at bedside this AM. Denies any complaints at time of assessment.     EKG: Sinus rhythm with RBBB. Ectopic junctional beats.     TELE: SR with intact conduction. RBBB    MEDICATIONS  (STANDING):  apixaban 5 milliGRAM(s) Oral <User Schedule>  atorvastatin 80 milliGRAM(s) Oral at bedtime  citalopram 20 milliGRAM(s) Oral daily  donepezil 10 milliGRAM(s) Oral at bedtime  ferrous    sulfate 325 milliGRAM(s) Oral daily  furosemide    Tablet 20 milliGRAM(s) Oral daily  hydrALAZINE Injectable 5 milliGRAM(s) IV Push once  losartan 50 milliGRAM(s) Oral daily  memantine 10 milliGRAM(s) Oral two times a day  metFORMIN 500 milliGRAM(s) Oral daily  pantoprazole    Tablet 40 milliGRAM(s) Oral every 12 hours  sucralfate suspension 1 Gram(s) Oral two times a day    MEDICATIONS  (PRN):  acetaminophen     Tablet .. 650 milliGRAM(s) Oral every 6 hours PRN Mild Pain (1 - 3)  ALPRAZolam 0.25 milliGRAM(s) Oral every 8 hours PRN anxiety  aluminum hydroxide/magnesium hydroxide/simethicone Suspension 30 milliLiter(s) Oral every 6 hours PRN Dyspepsia  benzocaine/menthol Lozenge 1 Lozenge Oral every 2 hours PRN Sore Throat  ondansetron Injectable 4 milliGRAM(s) IV Push every 6 hours PRN Nausea and/or Vomiting  oxyCODONE    IR 5 milliGRAM(s) Oral every 6 hours PRN Moderate Pain (4 - 6)      Allergies    No Known Allergies    Intolerances      PAST MEDICAL & SURGICAL HISTORY:  HTN (hypertension)      CVA (cerebrovascular accident)      Moyamoya disease      Paroxysmal atrial fibrillation      History of hip surgery          Vital Signs Last 24 Hrs  T(C): 36.3 (10 May 2024 08:29), Max: 36.8 (09 May 2024 16:53)  T(F): 97.4 (10 May 2024 08:29), Max: 98.3 (09 May 2024 16:53)  HR: 69 (10 May 2024 08:29) (57 - 88)  BP: 183/81 (10 May 2024 08:29) (131/89 - 185/91)  BP(mean): --  RR: 18 (10 May 2024 08:29) (7 - 18)  SpO2: 97% (10 May 2024 08:29) (95% - 100%)    Parameters below as of 10 May 2024 08:29  Patient On (Oxygen Delivery Method): room air        Physical Exam:  Constitutional: NAD, AAOx3  Cardiovascular: +S1S2 RRR  Pulmonary: CTA b/l, unlabored  Abd: soft NTND +BS  Groins: C/D/I bilaterally; no bleeding, hematoma, edema  Extremities: no pedal edema, +distal pulses b/l  Neuro: non focal, HEATH x4    LABS:                        12.4   8.39  )-----------( 101      ( 10 May 2024 05:48 )             37.4     05-10    140  |  104  |  15.7  ----------------------------<  157<H>  4.2   |  25.0  |  0.89    Ca    8.7      10 May 2024 05:48  Mg     1.9     05-10      PT/INR - ( 09 May 2024 07:18 )   PT: 10.5 sec;   INR: 0.94 ratio         PTT - ( 09 May 2024 07:18 )  PTT:33.9 sec  Urinalysis Basic - ( 10 May 2024 05:48 )    Color: x / Appearance: x / SG: x / pH: x  Gluc: 157 mg/dL / Ketone: x  / Bili: x / Urobili: x   Blood: x / Protein: x / Nitrite: x   Leuk Esterase: x / RBC: x / WBC x   Sq Epi: x / Non Sq Epi: x / Bacteria: x        Assessment:   58-year-old male who resides at [facility] with HTN, ADRIANO, Moyamoya disease, CVA, ILR implant on 10/20/2023, bradycardia, PVCs, and paroxysmal AF(on eliquis). Pt is now POD #1 s/p Radiofrequency Ablation of atrial fibrillation (PVI/CTI) ablation. b/l groin sutures removed at bedside this AM. Denies any complaints at time of assessment.    Plan:   Lasix 20mg PO daily x 2 days.   Start Protonix 40mg twice daily x 2 weeks, then once daily x 6 weeks.   Start Carafate 1gm BID x 2 weeks.   Pt instructed as activity limitations - no lifting/pushing/pulling >10 lbs or strenuous exercise x 1 week.   Pt instructed as to access site care and f/up - written instructions included in d/c documents.  Outpt f/up in 2-4 weeks - office will contact pt to schedule.  Plan of care reviewed with Supervisor Adrián at [facility] as pt with significant short term memory loss. Confirmed pt to receive all prescribed medications as instructed.  
PROCEDURE: Radiofrequency Ablation of Atrial Fibrillation    ELECTROPHYSIOLOGIST: Rj Delgado MD,          COMPLICATIONS:  none        DISPOSITION: observation      CONDITION: stable      Pt doing well s/p Radiofrequency Ablation of atrial fibrillation (PVI/CTI) with access obtain via b/l femoral veins and right femoral artery accessed for BP monitoring and hemostasis achieved with figure of 8 sutures. Denies complaint.       MEDICATIONS  (STANDING):  apixaban 5 milliGRAM(s) Oral <User Schedule>  atorvastatin 80 milliGRAM(s) Oral at bedtime  citalopram 20 milliGRAM(s) Oral daily  donepezil 10 milliGRAM(s) Oral at bedtime  ferrous    sulfate 325 milliGRAM(s) Oral daily  furosemide   Injectable 20 milliGRAM(s) IV Push once  losartan 50 milliGRAM(s) Oral daily  memantine 10 milliGRAM(s) Oral two times a day  pantoprazole    Tablet 40 milliGRAM(s) Oral every 12 hours  sucralfate suspension 1 Gram(s) Oral two times a day    MEDICATIONS  (PRN):  acetaminophen     Tablet .. 650 milliGRAM(s) Oral every 6 hours PRN Mild Pain (1 - 3)  ALPRAZolam 0.25 milliGRAM(s) Oral every 8 hours PRN anxiety  aluminum hydroxide/magnesium hydroxide/simethicone Suspension 30 milliLiter(s) Oral every 6 hours PRN Dyspepsia  benzocaine/menthol Lozenge 1 Lozenge Oral every 2 hours PRN Sore Throat  ondansetron Injectable 4 milliGRAM(s) IV Push every 6 hours PRN Nausea and/or Vomiting  oxyCODONE    IR 5 milliGRAM(s) Oral every 6 hours PRN Moderate Pain (4 - 6)      Allergies  No Known Allergies          VS:  T(C): 36.3 (05-09-24 @ 07:41), Max: 36.3 (05-09-24 @ 07:32)  HR: 68 (05-09-24 @ 12:32) (61 - 69)  BP: 139/108 (05-09-24 @ 07:41) (138/108 - 139/108)  RR: 7 (05-09-24 @ 12:32) (7 - 16)  SpO2: 100% (05-09-24 @ 12:32) (98% - 100%)      Post procedure VS:  HR 62  /84  SpO2 99% on 2L NC    Physical Exam:  Neuro: A&O x 3, HEATH, FC  Card: S1/S2, RRR, no m/g/r  Resp: lungs CTA b/l  Abd: S/NT/ND  Groins: hemostatic sutures in place; sites C/D/I; no bleeding, hematoma, erythema, exudate or edema  Ext: no edema; distal pulses intact    I/Os:1.9/DTV     ECG: SR 68bpm, with RBBB, ETHAN 146ms, QRSD 154ms    Assessment:   58-year-old male who resides at [facility] with HTN, ADRIANO, Moyamoya disease, CVA, ILR implant on 10/20/2023, bradycardia, PVCs, and paroxysmal AF(on eliquis).  In regards to the pt's pA.fib, it was first diagnosed on ILR which was implanted after pt's CVA.  During that hospitalization pt was noted to have bradycardia on telemetry with fascicular PVCs (from LPF) and non-conducted P waves vs retrograde P waves and antegrade block. There was no evidence of high-grade AV block or infranodal block. Prior to discharge an ILR was implanted which revealed paroxysmal AF on remote monitoring. Of note, pt does have short term memory loss and still notes some residual left sided weakness and sensory deficits from the stroke. Pt presented electively for and is now status post uncomplicated radiofrequency ablation of atrial fibrillation.      Plan:   Bedrest x 4 hours, then OOB with assistance and progress as tolerated.   Groin sutures to be removed by EP service in AM.   Resume eliquis @ 16:30 today pending groin status   DO NOT HOLD, INTERRUPT OR REVERSE ANTICOAGULATION WITHOUT EXPLICIT APPROVAL FROM EP SERVICE.   Lasix 20mg IV x 1 dose once ambulating, then Lasix 20mg PO daily x 3 days.   Start Protonix 40mg twice daily x 2 weeks, then once daily x 6 weeks.   Start Carafate 1gm BID x 2 weeks.   Continue other home medications.   Strict I/Os.  Please encourage incentive spirometry and ambulation once able.  Observation and monitoring on telemetry overnight with anticipated discharge in the AM and outpt follow up in 2-4 weeks.

## 2024-05-10 NOTE — DISCHARGE NOTE NURSING/CASE MANAGEMENT/SOCIAL WORK - NSDCPEFALRISK_GEN_ALL_CORE
For information on Fall & Injury Prevention, visit: [email or web address] OR  [email or web address] OR  https://www.cdc.gov/steadi/patient.html

## 2024-05-10 NOTE — DISCHARGE NOTE NURSING/CASE MANAGEMENT/SOCIAL WORK - PATIENT PORTAL LINK FT
You can access the FollowMyHealth Patient Portal offered by [facility] by registering at the following website: [email or web address]. By joining [facility]’s FollowMyHealth portal, you will also be able to view your health information using other applications (apps) compatible with our system.

## 2024-05-23 ENCOUNTER — APPOINTMENT (OUTPATIENT)
Dept: CARDIOLOGY | Facility: CLINIC | Age: 59
End: 2024-05-23
Payer: MEDICAID

## 2024-05-23 ENCOUNTER — NON-APPOINTMENT (OUTPATIENT)
Age: 59
End: 2024-05-23

## 2024-05-23 VITALS
HEIGHT: 66 IN | SYSTOLIC BLOOD PRESSURE: 120 MMHG | DIASTOLIC BLOOD PRESSURE: 70 MMHG | WEIGHT: 204 LBS | BODY MASS INDEX: 32.78 KG/M2 | OXYGEN SATURATION: 98 % | HEART RATE: 53 BPM

## 2024-05-23 DIAGNOSIS — G47.33 OBSTRUCTIVE SLEEP APNEA (ADULT) (PEDIATRIC): ICD-10-CM

## 2024-05-23 DIAGNOSIS — E66.9 OBESITY, UNSPECIFIED: ICD-10-CM

## 2024-05-23 DIAGNOSIS — I49.1 ATRIAL PREMATURE DEPOLARIZATION: ICD-10-CM

## 2024-05-23 PROCEDURE — 99215 OFFICE O/P EST HI 40 MIN: CPT | Mod: 25

## 2024-05-23 PROCEDURE — 93000 ELECTROCARDIOGRAM COMPLETE: CPT

## 2024-05-23 PROCEDURE — G2211 COMPLEX E/M VISIT ADD ON: CPT | Mod: NC

## 2024-05-23 NOTE — DISCUSSION/SUMMARY
[FreeTextEntry1] : 59M h/o obesity (BM 38), ADRIANO (not on CPAP), HTN, HLD, CVA (2022 at [facility] s/p IV thrombolysis), Moyamoya disease, was admitted to [facility] 10/2023 after fell at work with syncope found with acute CVA L-PCA s/p mechanical thrombectomy, found sinus bradycardic 35s with PVCs, blocked PACs s/p Medtronic ILR, did not undergo ROCIO as concern from neurology about hypotension after acute CVA, discharged empirically on Eliquis 5mg BID plan for 3 months use, remains at the rehab, presents for outpatient cardiology evaluation seen on 3/2024, underwent outpatient ROCIO .3/29/24 moderate LA enlargement and with severe ADRIANO during the procedure, found with pAfib RVR episodes started on Eliquis and interval had Afib ablation on 5/9/24 with EP/Dr. Delgado, returns for general cardiology follow up.   Remains in sinus on EKG and ILR but still with frequent PACs can be at risk for pAfib recurrence, monitor ILR closely and EP follow up. BP controlled but need sleep medicine eval. for CPAP use.    Recurrent CVA -continue long term AC given with pAfib -continue high dose statin, LDL goal <70 -cognitive impairment on memantine and donepezil  pAfib RVR s/p ablation 5/9/24, PACs -continue Eliquis 5mg BID -ILR monitoring and EP follow up.  -sinus james and chronic RBBB- not on AV alejandro blocker -needs treatment for ADRIANO  HTN -continue losartan 50mg, goal BP <140/90  DM2 -on oral agents  Moyamoya disease -follow up with neurosurgery if need cerebral bypass then would obtain CCTA to exclude obstructive CAD.   Severe ADRIANO -need sleep medicine eval. to start CPAP use   Updated his sister Selma over the phone.  Follow up in 4 months.   [EKG obtained to assist in diagnosis and management of assessed problem(s)] : EKG obtained to assist in diagnosis and management of assessed problem(s)

## 2024-05-23 NOTE — CARDIOLOGY SUMMARY
[de-identified] : 1/18/24- Sinus 56, RBBB, PAC x2, QTc 467 5/23/24- Sinus 54 with PACs x2, RBBB, QTc 446 [de-identified] : 3/29/24- LV EF 55-60%, moderate LA enlargement; Patient has severe obstructive sleep apnea with anesthesia during the procedure, he was in paroxysmal atrial RVR with HR up to 150s intermixed with few beats of sinus rhythm since arrival and throughout the entire study, plan is for short term antiarrhythmic use with Amiodarone loading to maintain sinus until eventual long term rhythm control options with EP/Dr. Delgado.  10/13/23-  Summary:  1. Technically difficult study.  2. Normal left ventricular internal cavity size.  3. Left ventricular ejection fraction, by visual estimation, is 50 to  55%.  4. Normal left atrial size.  5. Normal right atrial size.  6. Mildly enlarged right ventricle.  7. Dilatation of the ascending aorta.  8. Sclerotic aortic valve with normal opening.  9. Mild mitral annular calcification. 10. Mild thickening of the anterior mitral valve leaflet. 11. Trace mitral valve regurgitation. 12. Mild tricuspid regurgitation. 13. Trivial pericardial effusion. 14. Recommend transesophageal echocardiogram.

## 2024-05-23 NOTE — HISTORY OF PRESENT ILLNESS
[FreeTextEntry1] : 59M h/o obesity (BM 38), ADRIANO (not on CPAP), HTN, HLD, CVA (2022 at [facility] s/p IV thrombolysis), Moyamoya disease, was admitted to [facility] 10/2023 after fell at work with syncope found with acute CVA L-PCA s/p mechanical thrombectomy, found sinus bradycardic 35s with PVCs, blocked PACs s/p Medtronic ILR, did not undergo ROCIO as concern from neurology about hypotension after acute CVA, discharged empirically on Eliquis 5mg BID plan for 3 months use, remains at the rehab, presents for outpatient cardiology evaluation seen on 3/2024, underwent outpatient ROCIO .3/29/24 moderate LA enlargement and with severe ADRIANO during the procedure, found with pAfib RVR episodes started on Eliquis and interval had Afib ablation on 5/9/24 with EP/Dr. Delgado, returns for general cardiology follow up.  Patient presents with the  for the visit.  Reports feeling well, no cardiac complains, no groin discomfort, denies bleeding or fall with Eliquis, planning for eventual transfer to an assisted living facility in [place]. Has not seen sleep medicine yet for CPAP use.    Prior visit 3/2024:  Patient presents with his sister for the visit, reports memory remains impaired but has no physical limitations, may need to go to assisted living facility. He reports the 1st stroke at [facility] was attributed to HTN/diabetes but had cerebral angiogram done diagnosed with Moyamoya, no records of prior ROCIO being done there. Pending to see neurology and neurosurgery for follow up.   ILR remains in sinus since discharge.   He was layoff from work prior to his 2nd stroke and planning to apply for disability.   NeuroIR report 10/12/2023:  POSTOPERATIVE DIAGNOSIS: 1. Successful revascularization of the left posterior cerebral artery  with mechanical thrombectomy 2. Moyamoya disease within the right cerebral hemisphere

## 2024-06-04 ENCOUNTER — NON-APPOINTMENT (OUTPATIENT)
Age: 59
End: 2024-06-04

## 2024-06-04 ENCOUNTER — APPOINTMENT (OUTPATIENT)
Dept: ELECTROPHYSIOLOGY | Facility: CLINIC | Age: 59
End: 2024-06-04
Payer: MEDICAID

## 2024-06-04 VITALS
BODY MASS INDEX: 32.14 KG/M2 | WEIGHT: 200 LBS | HEIGHT: 66 IN | DIASTOLIC BLOOD PRESSURE: 79 MMHG | SYSTOLIC BLOOD PRESSURE: 149 MMHG | HEART RATE: 54 BPM | OXYGEN SATURATION: 98 %

## 2024-06-04 DIAGNOSIS — Z98.890 OTHER SPECIFIED POSTPROCEDURAL STATES: ICD-10-CM

## 2024-06-04 DIAGNOSIS — I63.9 CEREBRAL INFARCTION, UNSPECIFIED: ICD-10-CM

## 2024-06-04 DIAGNOSIS — I48.0 PAROXYSMAL ATRIAL FIBRILLATION: ICD-10-CM

## 2024-06-04 DIAGNOSIS — R55 SYNCOPE AND COLLAPSE: ICD-10-CM

## 2024-06-04 DIAGNOSIS — Z86.79 OTHER SPECIFIED POSTPROCEDURAL STATES: ICD-10-CM

## 2024-06-04 PROCEDURE — 99214 OFFICE O/P EST MOD 30 MIN: CPT | Mod: 25

## 2024-06-04 PROCEDURE — 93000 ELECTROCARDIOGRAM COMPLETE: CPT

## 2024-06-04 RX ORDER — PANTOPRAZOLE 40 MG/1
40 TABLET, DELAYED RELEASE ORAL DAILY
Refills: 0 | Status: ACTIVE | COMMUNITY

## 2024-06-04 RX ORDER — ATORVASTATIN CALCIUM 80 MG/1
80 TABLET, FILM COATED ORAL
Refills: 0 | Status: DISCONTINUED | COMMUNITY
Start: 2024-01-18 | End: 2024-06-04

## 2024-06-04 RX ORDER — FAMOTIDINE 20 MG/1
20 TABLET, FILM COATED ORAL DAILY
Qty: 90 | Refills: 3 | Status: DISCONTINUED | COMMUNITY
Start: 2024-01-18 | End: 2024-06-04

## 2024-06-04 RX ORDER — SIMVASTATIN 40 MG/1
40 TABLET, FILM COATED ORAL DAILY
Refills: 0 | Status: ACTIVE | COMMUNITY

## 2024-06-04 RX ORDER — LOSARTAN POTASSIUM 50 MG/1
50 TABLET, FILM COATED ORAL DAILY
Refills: 0 | Status: ACTIVE | COMMUNITY

## 2024-06-04 RX ORDER — ALOGLIPTIN 25 MG/1
25 TABLET, FILM COATED ORAL DAILY
Refills: 0 | Status: DISCONTINUED | COMMUNITY
Start: 2024-01-18 | End: 2024-06-04

## 2024-06-04 RX ORDER — POLYETHYLENE GLYCOL 3350 17 G/17G
17 POWDER, FOR SOLUTION ORAL
Refills: 0 | Status: DISCONTINUED | COMMUNITY
Start: 2024-01-18 | End: 2024-06-04

## 2024-06-04 RX ORDER — AMLODIPINE BESYLATE 5 MG/1
5 TABLET ORAL DAILY
Refills: 0 | Status: ACTIVE | COMMUNITY

## 2024-06-05 NOTE — END OF VISIT
[Time Spent: ___ minutes] : I have spent [unfilled] minutes of time on the encounter. [FreeTextEntry3] : I, Dr. Delgado, personally performed the evaluation and management (E/M) services for this new patient. That E/M includes conducting the clinically appropriate initial history &/or exam, assessing all conditions, and establishing the plan of care. Today, my assistant, Cheryl Mancera NP, was here to observe my evaluation and management service for this patient & follow plan of care established by me going forward.

## 2024-06-05 NOTE — DISCUSSION/SUMMARY
[EKG obtained to assist in diagnosis and management of assessed problem(s)] : EKG obtained to assist in diagnosis and management of assessed problem(s) [FreeTextEntry1] : The patient is a 59-year-old male presenting for follow up today. The patient has a history of HTN, ADRIANO, Moyamoya disease, CVA, bradycardia and PVCs s/p ILR implant on 10/20/2023. The patient presented to [facility] in 10/2023 following a possible syncopal episode and was found to have acute CVA. He was noted to have bradycardia on telemetry with PVCs and non-conducted P waves vs retrograde P waves and antegrade block. There was no evidence of high-grade AV block or infranodal block. An ILR was implanted. Subsequently the patient was noted to have paroxysmal AF on remote monitoring. He was started on Eliquis for stroke prophylaxis. He has a high burden of AF and experiences symptoms of palpitations. The patient presents for follow up s/p  catheter ablation of atrial fibrillation (PVI, CTI line, Frequent junctional ectopic beats - slow pathway modification for treatment was unsuccessful) by Dr. Delgado 5/9/24.   Patient presents with his brother and Aide from facility who serves as historian due to his memory loss. Pt denies CP, SOB, palpitations, dizziness, syncope. ILR reveals maintaining SR since ablation. Tolerating Eliquis 5mg BID. Denies easy bruising, bleeding, or falls.   Recommendation:   Mr. Somers is doing well s/p ablation 5/9/24, maintaining SR on ILR. Tolerating Eliquis 5mg BID. To continue lifelong due to history of CVA. Remote ILR monitoring in place. To return for EP follow up in 3-4 months or sooner PRN.  Deirdre Mancera ANP-C

## 2024-06-05 NOTE — HISTORY OF PRESENT ILLNESS
[FreeTextEntry1] : The patient is a 59-year-old male presenting for follow up today. The patient has a history of HTN, ADRIANO, Moyamoya disease, CVA, bradycardia and PVCs s/p ILR implant on 10/20/2023. The patient presented to [facility] in 10/2023 following a possible syncopal episode and was found to have acute CVA. He was noted to have bradycardia on telemetry with PVCs and non-conducted P waves vs retrograde P waves and antegrade block. There was no evidence of high-grade AV block or infranodal block. An ILR was implanted. Subsequently the patient was noted to have paroxysmal AF on remote monitoring. He was started on Eliquis for stroke prophylaxis. He has a high burden of AF and experiences symptoms of palpitations. The patient presents for follow up s/p  catheter ablation of atrial fibrillation (PVI, CTI line, Frequent junctional ectopic beats - slow pathway modification for treatment was unsuccessful) by Dr. Delgado 5/9/24.   Patient presents with his brother and Aide from facility who serves as historian due to his memory loss. Pt denies CP, SOB, palpitations, dizziness, syncope. ILR reveals maintaining SR since ablation. Tolerating Eliquis 5mg BID. Denies easy bruising, bleeding, or falls.

## 2024-06-25 ENCOUNTER — APPOINTMENT (OUTPATIENT)
Dept: ELECTROPHYSIOLOGY | Facility: CLINIC | Age: 59
End: 2024-06-25

## 2024-07-23 NOTE — PROGRESS NOTE ADULT - PROBLEM SELECTOR PROBLEM 1
Anesthesia Post Evaluation    Patient: Keri Baca    Procedure(s) Performed: Procedure(s) (LRB):  LYMPHADENECTOMY (Left)  BLOCK, NERVE, INTERCOSTAL, 2 OR MORE (Left)  LYSIS, ADHESIONS (Left)  XI ROBOTIC WEDGE RESECTION, LUNG (RATS) (Left)    Final Anesthesia Type: general      Level of consciousness: awake and alert  Post-procedure vital signs: reviewed and stable  Pain control: Pain has been treated.  Airway patency: patent    PONV status: Absent or treated.  Anesthetic complications: no      Cardiovascular status: hemodynamically stable  Respiratory status: unassisted  Hydration status: euvolemic                Vitals Value Taken Time   /67 07/23/24 1416   Temp 36.6 °C (97.9 °F) 07/23/24 1200   Pulse 132 07/23/24 1423   Resp 21 07/23/24 1423   SpO2 97 % 07/23/24 1423   Vitals shown include unfiled device data.      Event Time   Out of Recovery 12:15:00         Pain/Nia Score: Pain Rating Prior to Med Admin: 8 (7/23/2024 12:51 PM)  Nia Score: 10 (7/23/2024 12:15 PM)          
Stroke
Stroke

## 2024-07-25 ENCOUNTER — NON-APPOINTMENT (OUTPATIENT)
Age: 59
End: 2024-07-25

## 2024-07-26 ENCOUNTER — APPOINTMENT (OUTPATIENT)
Dept: ELECTROPHYSIOLOGY | Facility: CLINIC | Age: 59
End: 2024-07-26
Payer: MEDICAID

## 2024-07-26 PROCEDURE — 93298 REM INTERROG DEV EVAL SCRMS: CPT

## 2024-08-22 ENCOUNTER — APPOINTMENT (OUTPATIENT)
Dept: PULMONOLOGY | Facility: CLINIC | Age: 59
End: 2024-08-22
Payer: MEDICAID

## 2024-08-22 DIAGNOSIS — E66.9 OBESITY, UNSPECIFIED: ICD-10-CM

## 2024-08-22 DIAGNOSIS — G47.33 OBSTRUCTIVE SLEEP APNEA (ADULT) (PEDIATRIC): ICD-10-CM

## 2024-08-22 PROCEDURE — 99203 OFFICE O/P NEW LOW 30 MIN: CPT

## 2024-08-22 RX ORDER — CHLORHEXIDINE GLUCONATE 4 %
325 (65 FE) LIQUID (ML) TOPICAL
Refills: 0 | Status: ACTIVE | COMMUNITY

## 2024-08-22 NOTE — CONSULT LETTER
[Dear  ___] : Dear  [unfilled], [Consult Letter:] : I had the pleasure of evaluating your patient, [unfilled]. [Please see my note below.] : Please see my note below. [Consult Closing:] : Thank you very much for allowing me to participate in the care of this patient.  If you have any questions, please do not hesitate to contact me. [Sincerely,] : Sincerely, [DrMau  ___] : Dr. HERNANDEZ

## 2024-08-22 NOTE — HISTORY OF PRESENT ILLNESS
[TextBox_4] : 8/22/24  59-year-old male presenting for sleep evaluation today. The patient has a history of HTN, ADRIANO, Moyamoya disease, CVA, bradycardia and PVCs s/p ILR implant on 10/20/2023. The patient presented to [facility] in 10/2023 following a possible syncopal episode and was found to have acute CVA. He was noted to have bradycardia on telemetry with PVCs and non-conducted P waves vs retrograde P waves and antegrade block. There was no evidence of high-grade AV block or infranodal block. An ILR was implanted. Subsequently the patient was noted to have paroxysmal AF on remote monitoring. He was started on Eliquis for stroke prophylaxis. He has a high burden of AF and experiences symptoms of palpitations. The patient presents S/P catheter ablation of atrial fibrillation  Memory issues.  PSG 3/19/16: TFD=416 Patient weighed 232 lbs in 2016 and now weighs 200 lbs. (5'7"; BMI=32.28)

## 2024-08-25 ENCOUNTER — NON-APPOINTMENT (OUTPATIENT)
Age: 59
End: 2024-08-25

## 2024-08-26 ENCOUNTER — APPOINTMENT (OUTPATIENT)
Dept: ELECTROPHYSIOLOGY | Facility: CLINIC | Age: 59
End: 2024-08-26
Payer: MEDICAID

## 2024-08-26 PROCEDURE — 93298 REM INTERROG DEV EVAL SCRMS: CPT

## 2024-08-28 ENCOUNTER — APPOINTMENT (OUTPATIENT)
Dept: NEUROLOGY | Facility: CLINIC | Age: 59
End: 2024-08-28
Payer: MEDICAID

## 2024-08-28 VITALS
SYSTOLIC BLOOD PRESSURE: 128 MMHG | HEIGHT: 66 IN | DIASTOLIC BLOOD PRESSURE: 85 MMHG | WEIGHT: 200 LBS | BODY MASS INDEX: 32.14 KG/M2

## 2024-08-28 DIAGNOSIS — F32.9 MAJOR DEPRESSIVE DISORDER, SINGLE EPISODE, UNSPECIFIED: ICD-10-CM

## 2024-08-28 DIAGNOSIS — I48.0 PAROXYSMAL ATRIAL FIBRILLATION: ICD-10-CM

## 2024-08-28 DIAGNOSIS — I63.9 CEREBRAL INFARCTION, UNSPECIFIED: ICD-10-CM

## 2024-08-28 DIAGNOSIS — R41.3 OTHER AMNESIA: ICD-10-CM

## 2024-08-28 DIAGNOSIS — I67.5 MOYAMOYA DISEASE: ICD-10-CM

## 2024-08-28 PROCEDURE — 99215 OFFICE O/P EST HI 40 MIN: CPT | Mod: 25

## 2024-08-28 PROCEDURE — G2212 PROLONG OUTPT/OFFICE VIS: CPT

## 2024-08-28 NOTE — DISCUSSION/SUMMARY
[FreeTextEntry1] : 59-year-old man with remote history of opiate use (clean), moyamoya syndrome/disease with chronic right middle cerebral artery occlusion, prior ischemic stroke in 2021, and hospitalization at [facility] October 2023 with acute bilateral posterior cerebral artery occlusions status post mechanical thrombectomy and successful revascularization of the left posterior cerebral artery, severe stenotic appearing occlusion of the right posterior cerebral artery with subsequent infarction of bilateral mesial temporal and occipital lobes, found to have atrial fibrillation currently anticoagulated with Eliquis status post ablation May 2024.  Patient has residual short-term memory loss, anterograde amnesia, and likely severe depression. Depression likely resultant from chronic stroke symptoms and social situation.  Neurologically he has recovered fairly well with independent motor function and most of his vision (residual left upper homonymous quadrantanopia).  His short-term memory is poor but his working memory is good.  Etiology of stroke is cardioembolism.  In regards to his moyamoya disease, chronic right MCA occlusion he had robust collaterals seen on angiography in October 2023 from the right anterior cerebral artery.  Given clinical improvement and fairly stable neurological status, recommend conservative management at this time.  Repeat cerebral angiography and neuroimaging reserved if patient clinically deteriorating or has recurrent ischemic stroke within the right hemisphere.  Patient has severe depression and should be aggressively treated.  I recommended to the nursing facility that he undergo psychiatric evaluation and psychological evaluation for cognitive behavioral therapy and depression treatment.  Speech therapy for cognitive rehab is also recommended if able.  He is currently medically maximized on anticoagulant, statin, and on donepezil and memantine, and Celexa.  No changes to medications from a neurological standpoint is recommended at this time.  If medication changes are needed for depression treatment then that is reasonable.  Recommendation: 1.  Psychiatry evaluation for severe depression 2.  Psychological evaluation for severe depression cognitive behavioral therapy 3.  Speech therapy for cognitive rehab 4.  Continue Eliquis and statin  Patient was educated about signs and symptoms of stroke and was counseled to contact 911 upon emergence of stroke-like symptoms. Intravenous thrombolysis and mechanical thrombectomy was also counseled and educated to the patient today.   Follow up 4 months.

## 2024-08-28 NOTE — PHYSICAL EXAM
[FreeTextEntry1] : GENERAL APPEARANCE: Well developed, well nourished man in no acute distress. CARDIOVASCULAR: Regular rate and rhythm    NEUROLOGIC EXAM: MENTAL STATUS: Alert and Oriented to person, place, year.  He did answer October for month.  He is oriented to the current president. He is able to name, repeat and follow commands. Immediate recall is 5/5 Delayed recall is 4/5 Serial sevens 3/5, he began subtracting sixes instead of sevens which were correct. He has some mild psychomotor agitation with some lability in mood. Impaired short-term memory. CRANIAL NERVES: CN 2:    Left upper homonymous quadrantanopia CN 3, 4, 6: Extraocular movements are intact. No nystagmus or ophthalmoplegia is evident. Pupils are equally round and reactive to light. CN 5:     Facial sensation is intact to light touch in all 3 divisions CN 7:     Facial excursion is full and symmetric bilaterally. MOTOR: Left upper extremity 5-/5 Left lower extremity 5 -/5 hip flexion, 5/5 knee extension dorsiflexion Right upper and lower extremity 5/5 Minimal orbiting of the right around the left upper extremity no drift. SENSORY: Intact to light touch perception in all four extremities without extinction to double simultaneous stimulation COORD: Finger to nose testing without dysmetria bilaterally. GAIT: Normal station and gait.  Ambulates independently without assistive device.

## 2024-08-28 NOTE — HISTORY OF PRESENT ILLNESS
[FreeTextEntry1] : Patient has not been to neurology follow-up since his hospitalization in October 2023.  At the time he suffered bilateral posterior cerebral artery occlusion status post mechanical thrombectomy and successful recanalization of the left PCA.  There appeared to be a possibly chronic occlusion of the right PCA with good collaterals from the right hemisphere.  He was also noted at that time to have moyamoya with chronic right MCA occlusion with robust collaterals to the right cerebral hemisphere.  Despite recanalization of the left posterior cerebral artery, he suffered ischemic infarction of the left mesial temporal lobe as well as a portion of the left occipital lobe.  Subsequently during his hospitalization he also suffered a right posterior cerebral artery infarction.  He had a loop recorder placed during that hospitalization in October 2023 and was eventually found to have atrial fibrillation.  He was started on Eliquis and underwent ablation in May 2024.  He has maintained Eliquis for anticoagulation.  He is currently a resident at a nursing home facility.  He is ambulatory independently but he has significant memory issues.  He is mood and personality began to change a few months ago with outbursts of anger and frustration.  He is depressed and has some passive suicidal ideation at times.  No active suicidal or homicidal ideation.  His nursing facility is not where he wants to be and feels that he is very unhappy.  He has other social stressors that contribute to his mood issues.  He is currently on donepezil and Namenda to try to aid with his mentation and memory.  He is cognizant of his short-term memory issues.  He has good insight to his problem that he can remember things immediately, however, 5 minutes later he seems to have a very hard time holding on to new information.  He can remember his past very well.

## 2024-09-26 ENCOUNTER — APPOINTMENT (OUTPATIENT)
Dept: CARDIOLOGY | Facility: CLINIC | Age: 59
End: 2024-09-26

## 2024-09-29 ENCOUNTER — NON-APPOINTMENT (OUTPATIENT)
Age: 59
End: 2024-09-29

## 2024-09-30 ENCOUNTER — APPOINTMENT (OUTPATIENT)
Dept: ELECTROPHYSIOLOGY | Facility: CLINIC | Age: 59
End: 2024-09-30
Payer: MEDICAID

## 2024-09-30 DIAGNOSIS — Z86.79 OTHER SPECIFIED POSTPROCEDURAL STATES: ICD-10-CM

## 2024-09-30 DIAGNOSIS — I63.9 CEREBRAL INFARCTION, UNSPECIFIED: ICD-10-CM

## 2024-09-30 DIAGNOSIS — Z98.890 OTHER SPECIFIED POSTPROCEDURAL STATES: ICD-10-CM

## 2024-09-30 PROCEDURE — 93298 REM INTERROG DEV EVAL SCRMS: CPT

## 2024-10-04 ENCOUNTER — OUTPATIENT (OUTPATIENT)
Dept: OUTPATIENT SERVICES | Facility: HOSPITAL | Age: 59
LOS: 1 days | End: 2024-10-04
Payer: MEDICAID

## 2024-10-04 ENCOUNTER — APPOINTMENT (OUTPATIENT)
Dept: MRI IMAGING | Facility: CLINIC | Age: 59
End: 2024-10-04

## 2024-10-04 DIAGNOSIS — I63.9 CEREBRAL INFARCTION, UNSPECIFIED: ICD-10-CM

## 2024-10-04 DIAGNOSIS — Z98.890 OTHER SPECIFIED POSTPROCEDURAL STATES: Chronic | ICD-10-CM

## 2024-10-04 PROCEDURE — 70544 MR ANGIOGRAPHY HEAD W/O DYE: CPT | Mod: 26

## 2024-10-04 PROCEDURE — 70544 MR ANGIOGRAPHY HEAD W/O DYE: CPT

## 2024-10-08 DIAGNOSIS — R46.89 OTHER SYMPTOMS AND SIGNS INVOLVING APPEARANCE AND BEHAVIOR: ICD-10-CM

## 2024-11-04 ENCOUNTER — APPOINTMENT (OUTPATIENT)
Dept: ELECTROPHYSIOLOGY | Facility: CLINIC | Age: 59
End: 2024-11-04
Payer: MEDICAID

## 2024-11-04 ENCOUNTER — APPOINTMENT (OUTPATIENT)
Dept: MRI IMAGING | Facility: CLINIC | Age: 59
End: 2024-11-04

## 2024-11-04 ENCOUNTER — NON-APPOINTMENT (OUTPATIENT)
Age: 59
End: 2024-11-04

## 2024-11-04 PROCEDURE — 93298 REM INTERROG DEV EVAL SCRMS: CPT

## 2024-11-14 ENCOUNTER — APPOINTMENT (OUTPATIENT)
Dept: ELECTROPHYSIOLOGY | Facility: CLINIC | Age: 59
End: 2024-11-14

## 2024-12-10 ENCOUNTER — APPOINTMENT (OUTPATIENT)
Dept: NEUROLOGY | Facility: CLINIC | Age: 59
End: 2024-12-10
Payer: MEDICAID

## 2024-12-10 VITALS
SYSTOLIC BLOOD PRESSURE: 90 MMHG | HEIGHT: 66 IN | HEART RATE: 63 BPM | BODY MASS INDEX: 32.14 KG/M2 | WEIGHT: 200 LBS | DIASTOLIC BLOOD PRESSURE: 56 MMHG | OXYGEN SATURATION: 96 %

## 2024-12-10 DIAGNOSIS — I48.0 PAROXYSMAL ATRIAL FIBRILLATION: ICD-10-CM

## 2024-12-10 DIAGNOSIS — R41.3 OTHER AMNESIA: ICD-10-CM

## 2024-12-10 DIAGNOSIS — I63.9 CEREBRAL INFARCTION, UNSPECIFIED: ICD-10-CM

## 2024-12-10 PROCEDURE — G2211 COMPLEX E/M VISIT ADD ON: CPT | Mod: NC

## 2024-12-10 PROCEDURE — 99214 OFFICE O/P EST MOD 30 MIN: CPT

## 2024-12-10 RX ORDER — MIRTAZAPINE 7.5 MG/1
7.5 TABLET, FILM COATED ORAL
Refills: 0 | Status: ACTIVE | COMMUNITY

## 2024-12-10 RX ORDER — SENNOSIDES 8.6 MG TABLETS 8.6 MG/1
8.6 TABLET ORAL
Refills: 0 | Status: ACTIVE | COMMUNITY

## 2024-12-10 RX ORDER — CHLORHEXIDINE GLUCONATE 4 %
5 LIQUID (ML) TOPICAL
Refills: 0 | Status: ACTIVE | COMMUNITY

## 2024-12-19 ENCOUNTER — APPOINTMENT (OUTPATIENT)
Dept: ELECTROPHYSIOLOGY | Facility: CLINIC | Age: 59
End: 2024-12-19

## 2024-12-19 PROCEDURE — 93298 REM INTERROG DEV EVAL SCRMS: CPT

## 2025-01-23 ENCOUNTER — APPOINTMENT (OUTPATIENT)
Dept: ELECTROPHYSIOLOGY | Facility: CLINIC | Age: 60
End: 2025-01-23
Payer: MEDICAID

## 2025-01-24 ENCOUNTER — NON-APPOINTMENT (OUTPATIENT)
Age: 60
End: 2025-01-24

## 2025-01-24 PROCEDURE — 93298 REM INTERROG DEV EVAL SCRMS: CPT

## 2025-02-27 ENCOUNTER — APPOINTMENT (OUTPATIENT)
Dept: ELECTROPHYSIOLOGY | Facility: CLINIC | Age: 60
End: 2025-02-27

## 2025-02-27 PROCEDURE — 93298 REM INTERROG DEV EVAL SCRMS: CPT

## 2025-04-03 ENCOUNTER — APPOINTMENT (OUTPATIENT)
Dept: ELECTROPHYSIOLOGY | Facility: CLINIC | Age: 60
End: 2025-04-03
Payer: MEDICAID

## 2025-04-03 ENCOUNTER — NON-APPOINTMENT (OUTPATIENT)
Age: 60
End: 2025-04-03

## 2025-04-03 PROCEDURE — 93298 REM INTERROG DEV EVAL SCRMS: CPT

## 2025-04-24 NOTE — PROGRESS NOTE ADULT - SUBJECTIVE AND OBJECTIVE BOX
PAT call complete. Education provided to the patient on the following:      - You will need to arrive on 05/05/2025 at [facility] located at 2800 Norton Brownsboro Patient is a 58y old  Male who presents with a chief complaint of CVA and left hemiparesis (10 Nov 2023 14:12)    HPI:  Patient is a 57 y/o M with a PHx HTN, DM, stroke one year ago (at the time patient received Tenecteplase and had full resolution of symptoms), who presented to [facility] on 10/12/23 following sudden fall at work and lost consciousness. NIHSS 10 on admission. Admitted under NeuroICU on 10/12. Imaging revealed ischemia in the posterior circulation, CT angiogram head/neck showed right PCA P1-2 cutoff, possibly chronic right MCA M1 cutoff which had distal reconstitution and established collaterals, and left PCA occlusion which patient was subsequently transferred for mechanical thrombectomy.    Cerebral angiogram and mechanical thrombectomy performed using aspiration with TICI 3 recanalization of left PCA. The right occipital lobe filled from right SHANI collaterals, and the right MCA as well was thought to be chronic given noted collateralization. MR with small to moderate left temporal occipital lobe infarctions. Occluded right MCA, occluded right PCA and right vertebral artery proximal segment. The patient was started on a hep gtt given extensive atherosclerotic disease which was switched to apixaban 5mg BID with recommendations for a three month course.    TTE with normal left ventricular internal cavity size. Left ventricular ejection fractio 50 to 55%. Per Neuro, no need for ROCIO at this time. Pt had an episode of bradycardia during his stay and there were concern for 2:1 block. ILR placed 10/20 and no indication for PPM at this time.     Patient was evaluated by PM&R and therapy for functional deficits, gait/ADL impairments and acute rehabilitation was recommended. Patient was medically optimized for discharge to [facility] on 10/27/23.  (27 Oct 2023 12:30)    ----------------------------------------------------------------------    SUBJECTIVE:  Seen and evaluated at bedside this AM. No acute issues overnight.   BP within normal limits  HR stable bradycardia - asymptomatic     Other ROS:  Denies: headache, lightheadedness, CP, SOB, abdominal pain, dysuria, nausea, constipation  ----------------------------------------------------------------------  PHYSICAL EXAM:    Vital Signs Last 24 Hrs  T(C): 36.7 (11 Nov 2023 09:00), Max: 36.7 (11 Nov 2023 09:00)  T(F): 98 (11 Nov 2023 09:00), Max: 98 (11 Nov 2023 09:00)  HR: 52 (11 Nov 2023 09:00) (52 - 54)  BP: 130/90 (11 Nov 2023 09:00) (126/66 - 130/90)  BP(mean): --  RR: 18 (11 Nov 2023 09:00) (16 - 18)  SpO2: 96% (11 Nov 2023 09:00) (96% - 96%)    Parameters below as of 11 Nov 2023 09:00  Patient On (Oxygen Delivery Method): room air      Daily     Daily     PHYSICAL EXAM:    General - NAD, alert, follows commands  Heart- pulse regular  Lungs- breathing comfortably without respiratory distress  Abd- no visible abdominal distension   Ext- No calf pain, extremities well perfused  Neuro- Exam unchanged     ----------------------------------------------------------------------  RECENT LABS:                  CAPILLARY BLOOD GLUCOSE        ----------------------------------------------------------------------  RECENT IMAGING:    ***  ----------------------------------------------------------------------  MEDICATIONS:  MEDICATIONS  (STANDING):  apixaban 5 milliGRAM(s) Oral two times a day  atorvastatin 80 milliGRAM(s) Oral at bedtime  bisacodyl 5 milliGRAM(s) Oral at bedtime  citalopram 20 milliGRAM(s) Oral daily  dextrose 50% Injectable 25 Gram(s) IV Push once  donepezil 5 milliGRAM(s) Oral with breakfast  famotidine    Tablet 20 milliGRAM(s) Oral daily  folic acid 1 milliGRAM(s) Oral daily  memantine 10 milliGRAM(s) Oral two times a day  metFORMIN 500 milliGRAM(s) Oral two times a day  multivitamin 1 Tablet(s) Oral daily  polyethylene glycol 3350 17 Gram(s) Oral daily  senna 2 Tablet(s) Oral at bedtime  thiamine 100 milliGRAM(s) Oral daily    MEDICATIONS  (PRN):    ----------------------------------------------------------------------

## 2025-05-08 ENCOUNTER — APPOINTMENT (OUTPATIENT)
Dept: ELECTROPHYSIOLOGY | Facility: CLINIC | Age: 60
End: 2025-05-08

## 2025-05-08 ENCOUNTER — APPOINTMENT (OUTPATIENT)
Dept: CARDIOLOGY | Facility: CLINIC | Age: 60
End: 2025-05-08

## 2025-05-08 ENCOUNTER — NON-APPOINTMENT (OUTPATIENT)
Age: 60
End: 2025-05-08

## 2025-05-08 PROCEDURE — 93298 REM INTERROG DEV EVAL SCRMS: CPT

## 2025-06-10 ENCOUNTER — APPOINTMENT (OUTPATIENT)
Dept: NEUROLOGY | Facility: CLINIC | Age: 60
End: 2025-06-10

## 2025-06-12 ENCOUNTER — APPOINTMENT (OUTPATIENT)
Dept: ELECTROPHYSIOLOGY | Facility: CLINIC | Age: 60
End: 2025-06-12

## 2025-06-12 PROCEDURE — 93298 REM INTERROG DEV EVAL SCRMS: CPT

## 2025-08-15 ENCOUNTER — APPOINTMENT (OUTPATIENT)
Dept: ELECTROPHYSIOLOGY | Facility: CLINIC | Age: 60
End: 2025-08-15
Payer: SELF-PAY

## 2025-08-15 ENCOUNTER — NON-APPOINTMENT (OUTPATIENT)
Age: 60
End: 2025-08-15

## 2025-08-15 PROCEDURE — 93298 REM INTERROG DEV EVAL SCRMS: CPT

## 2025-09-19 ENCOUNTER — APPOINTMENT (OUTPATIENT)
Dept: ELECTROPHYSIOLOGY | Facility: CLINIC | Age: 60
End: 2025-09-19